# Patient Record
Sex: FEMALE | Race: WHITE | NOT HISPANIC OR LATINO | Employment: OTHER | RURAL
[De-identification: names, ages, dates, MRNs, and addresses within clinical notes are randomized per-mention and may not be internally consistent; named-entity substitution may affect disease eponyms.]

---

## 2020-06-16 ENCOUNTER — HISTORICAL (OUTPATIENT)
Dept: ADMINISTRATIVE | Facility: HOSPITAL | Age: 85
End: 2020-06-16

## 2020-09-07 ENCOUNTER — HISTORICAL (OUTPATIENT)
Dept: ADMINISTRATIVE | Facility: HOSPITAL | Age: 85
End: 2020-09-07

## 2020-09-07 LAB
ALBUMIN SERPL BCP-MCNC: 3.5 G/DL (ref 3.5–5)
ALBUMIN/GLOB SERPL: 0.9 {RATIO}
ALP SERPL-CCNC: 40 U/L (ref 55–142)
ALT SERPL W P-5'-P-CCNC: 11 U/L (ref 13–56)
ANION GAP SERPL CALCULATED.3IONS-SCNC: 16 MMOL/L
APTT PPP: 24.5 SECONDS (ref 25.2–37.3)
AST SERPL W P-5'-P-CCNC: 22 U/L (ref 15–37)
BACTERIA #/AREA URNS HPF: ABNORMAL /HPF
BASOPHILS # BLD AUTO: 0.04 X10E3/UL (ref 0–0.2)
BASOPHILS NFR BLD AUTO: 0.5 % (ref 0–1)
BILIRUB SERPL-MCNC: 0.3 MG/DL (ref 0–1.2)
BILIRUB UR QL STRIP: NEGATIVE MG/DL
BUN SERPL-MCNC: 70 MG/DL (ref 7–18)
BUN/CREAT SERPL: 23.3
CALCIUM SERPL-MCNC: 8.7 MG/DL (ref 8.5–10.1)
CHLORIDE SERPL-SCNC: 107 MMOL/L (ref 98–107)
CLARITY UR: CLEAR
CLARITY UR: CLEAR
CO2 SERPL-SCNC: 21 MMOL/L (ref 21–32)
COLOR UR: ABNORMAL
COLOR UR: ABNORMAL
CREAT SERPL-MCNC: 3 MG/DL (ref 0.55–1.02)
EOSINOPHIL # BLD AUTO: 0.22 X10E3/UL (ref 0–0.5)
EOSINOPHIL NFR BLD AUTO: 2.9 % (ref 1–4)
ERYTHROCYTE [DISTWIDTH] IN BLOOD BY AUTOMATED COUNT: 12.9 % (ref 11.5–14.5)
GLOBULIN SER-MCNC: 3.9 G/DL (ref 2–4)
GLUCOSE SERPL-MCNC: 224 MG/DL (ref 74–106)
GLUCOSE UR STRIP-MCNC: ABNORMAL MG/DL
HCT VFR BLD AUTO: 32.8 % (ref 38–47)
HGB BLD-MCNC: 10.7 G/DL (ref 12–16)
IMM GRANULOCYTES # BLD AUTO: 0.02 X10E3/UL (ref 0–0.04)
IMM GRANULOCYTES NFR BLD: 0.3 % (ref 0–0.4)
INR BLD: 1 (ref 0–3.4)
KETONES UR STRIP-SCNC: NEGATIVE MG/DL
LEUKOCYTE ESTERASE UR QL STRIP: NEGATIVE LEU/UL
LYMPHOCYTES # BLD AUTO: 1.25 X10E3/UL (ref 1–4.8)
LYMPHOCYTES NFR BLD AUTO: 16.2 % (ref 27–41)
MCH RBC QN AUTO: 31.8 PG (ref 27–31)
MCHC RBC AUTO-ENTMCNC: 32.6 G/DL (ref 32–36)
MCV RBC AUTO: 97.3 FL (ref 80–96)
MONOCYTES # BLD AUTO: 0.79 X10E3/UL (ref 0–0.8)
MONOCYTES NFR BLD AUTO: 10.3 % (ref 2–6)
MPC BLD CALC-MCNC: 11.2 FL (ref 9.4–12.4)
NEUTROPHILS # BLD AUTO: 5.38 X10E3/UL (ref 1.8–7.7)
NEUTROPHILS NFR BLD AUTO: 69.8 % (ref 53–65)
NITRITE UR QL STRIP: NEGATIVE
PH UR STRIP: 6 PH UNITS (ref 5–8)
PLATELET # BLD AUTO: 162 X10E3/UL (ref 150–400)
POTASSIUM SERPL-SCNC: 4.4 MMOL/L (ref 3.5–5.1)
PROT SERPL-MCNC: 7.4 G/DL (ref 6.4–8.2)
PROT UR QL STRIP: 100 MG/DL
PROTHROMBIN TIME: 12.9 SECONDS (ref 11.7–14.7)
RBC # BLD AUTO: 3.37 X10E6/UL (ref 4.2–5.4)
RBC # UR STRIP: ABNORMAL ERY/UL
RBC #/AREA URNS HPF: ABNORMAL /HPF (ref 0–3)
SARS-COV+SARS-COV-2 AG RESP QL IA.RAPID: NEGATIVE
SODIUM SERPL-SCNC: 140 MMOL/L (ref 136–145)
SP GR UR STRIP: 1.02 (ref 1–1.03)
SQUAMOUS #/AREA URNS LPF: ABNORMAL /LPF
UROBILINOGEN UR STRIP-ACNC: 0.2 MG/DL
WBC # BLD AUTO: 7.7 X10E3/UL (ref 4.5–11)
WBC #/AREA URNS HPF: ABNORMAL /HPF (ref 0–5)

## 2020-09-08 ENCOUNTER — HISTORICAL (OUTPATIENT)
Dept: ADMINISTRATIVE | Facility: HOSPITAL | Age: 85
End: 2020-09-08

## 2020-09-08 LAB
ABO: NORMAL
ANION GAP SERPL CALCULATED.3IONS-SCNC: 13 MMOL/L
ANTIBODY SCREEN: NORMAL
BUN SERPL-MCNC: 63 MG/DL (ref 7–18)
CALCIUM SERPL-MCNC: 7.9 MG/DL (ref 8.5–10.1)
CHLORIDE SERPL-SCNC: 111 MMOL/L (ref 98–107)
CO2 SERPL-SCNC: 20 MMOL/L (ref 21–32)
CREAT SERPL-MCNC: 2.9 MG/DL (ref 0.55–1.02)
GLUCOSE SERPL-MCNC: 219 MG/DL (ref 74–106)
HCT VFR BLD AUTO: 27.2 % (ref 38–47)
HGB BLD-MCNC: 8.6 G/DL (ref 12–16)
PLATELET # BLD AUTO: 143 X10E3/UL (ref 150–400)
POTASSIUM SERPL-SCNC: 4.6 MMOL/L (ref 3.5–5.1)
RH TYPE: NORMAL
SODIUM SERPL-SCNC: 139 MMOL/L (ref 136–145)

## 2020-09-09 ENCOUNTER — HISTORICAL (OUTPATIENT)
Dept: ADMINISTRATIVE | Facility: HOSPITAL | Age: 85
End: 2020-09-09

## 2020-09-09 LAB
ANION GAP SERPL CALCULATED.3IONS-SCNC: 12 MMOL/L (ref 7–16)
BASOPHILS # BLD AUTO: 0.01 X10E3/UL (ref 0–0.2)
BASOPHILS NFR BLD AUTO: 0.1 % (ref 0–1)
BUN SERPL-MCNC: 58 MG/DL (ref 7–18)
CALCIUM SERPL-MCNC: 7.8 MG/DL (ref 8.5–10.1)
CHLORIDE SERPL-SCNC: 118 MMOL/L (ref 98–107)
CO2 SERPL-SCNC: 18 MMOL/L (ref 21–32)
CREAT SERPL-MCNC: 2.94 MG/DL (ref 0.5–1.02)
EOSINOPHIL # BLD AUTO: 0 X10E3/UL (ref 0–0.5)
EOSINOPHIL NFR BLD AUTO: 0 % (ref 1–4)
ERYTHROCYTE [DISTWIDTH] IN BLOOD BY AUTOMATED COUNT: 13.2 % (ref 11.5–14.5)
EST. AVERAGE GLUCOSE BLD GHB EST-MCNC: 117 MG/DL
FOLATE SERPL-MCNC: >20 NG/ML (ref 3.1–17.5)
GLUCOSE SERPL-MCNC: 196 MG/DL (ref 70–105)
GLUCOSE SERPL-MCNC: 199 MG/DL (ref 70–105)
GLUCOSE SERPL-MCNC: 210 MG/DL (ref 70–105)
GLUCOSE SERPL-MCNC: 222 MG/DL (ref 74–106)
GLUCOSE SERPL-MCNC: 295 MG/DL (ref 70–105)
HBA1C MFR BLD HPLC: 6.1 %
HCT VFR BLD AUTO: 26.7 % (ref 38–47)
HGB BLD-MCNC: 8.4 G/DL (ref 12–16)
IMM GRANULOCYTES # BLD AUTO: 0.02 X10E3/UL (ref 0–0.04)
IMM GRANULOCYTES NFR BLD: 0.2 % (ref 0–0.4)
LYMPHOCYTES # BLD AUTO: 0.31 X10E3/UL (ref 1–4.8)
LYMPHOCYTES NFR BLD AUTO: 3.5 % (ref 27–41)
MCH RBC QN AUTO: 31 PG (ref 27–31)
MCHC RBC AUTO-ENTMCNC: 31.5 G/DL (ref 32–36)
MCV RBC AUTO: 98.5 FL (ref 80–96)
MONOCYTES # BLD AUTO: 0.69 X10E3/UL (ref 0–0.8)
MONOCYTES NFR BLD AUTO: 7.8 % (ref 2–6)
MPC BLD CALC-MCNC: 11.9 FL (ref 9.4–12.4)
NEUTROPHILS # BLD AUTO: 7.81 X10E3/UL (ref 1.8–7.7)
NEUTROPHILS NFR BLD AUTO: 88.4 % (ref 53–65)
NRBC # BLD AUTO: 0 X10E3/UL (ref 0–0)
NRBC, AUTO (.00): 0 /100 (ref 0–0)
PLATELET # BLD AUTO: 139 X10E3/UL (ref 150–400)
POTASSIUM SERPL-SCNC: 4.4 MMOL/L (ref 3.5–5.1)
RBC # BLD AUTO: 2.71 X10E6/UL (ref 4.2–5.4)
SODIUM SERPL-SCNC: 144 MMOL/L (ref 136–145)
T4 SERPL-MCNC: 6.8 UG/DL (ref 4.8–13.9)
TSH SERPL DL<=0.005 MIU/L-ACNC: 0.72 UIU/ML (ref 0.36–3.74)
VIT B12 SERPL-MCNC: 1659 PG/ML (ref 193–986)
WBC # BLD AUTO: 8.84 X10E3/UL (ref 4.5–11)

## 2020-09-10 ENCOUNTER — HISTORICAL (OUTPATIENT)
Dept: ADMINISTRATIVE | Facility: HOSPITAL | Age: 85
End: 2020-09-10

## 2020-09-10 LAB
ABO: NORMAL
ANION GAP SERPL CALCULATED.3IONS-SCNC: 12 MMOL/L
ANTIBODY SCREEN: NORMAL
BASOPHILS # BLD AUTO: 0.02 X10E3/UL (ref 0–0.2)
BASOPHILS NFR BLD AUTO: 0.2 % (ref 0–1)
BUN SERPL-MCNC: 69 MG/DL (ref 7–18)
CALCIUM SERPL-MCNC: 7.9 MG/DL (ref 8.5–10.1)
CHLORIDE SERPL-SCNC: 111 MMOL/L (ref 98–107)
CO2 SERPL-SCNC: 23 MMOL/L (ref 21–32)
CREAT SERPL-MCNC: 2.86 MG/DL (ref 0.55–1.02)
EOSINOPHIL # BLD AUTO: 0.02 X10E3/UL (ref 0–0.5)
EOSINOPHIL NFR BLD AUTO: 0.2 % (ref 1–4)
ERYTHROCYTE [DISTWIDTH] IN BLOOD BY AUTOMATED COUNT: 13.4 % (ref 11.5–14.5)
GLUCOSE SERPL-MCNC: 110 MG/DL (ref 70–105)
GLUCOSE SERPL-MCNC: 114 MG/DL (ref 70–105)
GLUCOSE SERPL-MCNC: 144 MG/DL (ref 70–105)
GLUCOSE SERPL-MCNC: 189 MG/DL (ref 70–105)
GLUCOSE SERPL-MCNC: 64 MG/DL (ref 70–105)
GLUCOSE SERPL-MCNC: 99 MG/DL (ref 74–106)
HCT VFR BLD AUTO: 22 % (ref 38–47)
HGB BLD-MCNC: 6.9 G/DL (ref 12–16)
IMM GRANULOCYTES # BLD AUTO: 0.04 X10E3/UL (ref 0–0.04)
IMM GRANULOCYTES NFR BLD: 0.5 % (ref 0–0.4)
LYMPHOCYTES # BLD AUTO: 1.19 X10E3/UL (ref 1–4.8)
LYMPHOCYTES NFR BLD AUTO: 13.6 % (ref 27–41)
MCH RBC QN AUTO: 31.4 PG (ref 27–31)
MCHC RBC AUTO-ENTMCNC: 31.4 G/DL (ref 32–36)
MCV RBC AUTO: 100 FL (ref 80–96)
MONOCYTES # BLD AUTO: 0.98 X10E3/UL (ref 0–0.8)
MONOCYTES NFR BLD AUTO: 11.2 % (ref 2–6)
MPC BLD CALC-MCNC: 10.4 FL (ref 9.4–12.4)
NEUTROPHILS # BLD AUTO: 6.49 X10E3/UL (ref 1.8–7.7)
NEUTROPHILS NFR BLD AUTO: 74.3 % (ref 53–65)
NRBC # BLD AUTO: 0 X10E3/UL (ref 0–0)
NRBC, AUTO (.00): 0 /100 (ref 0–0)
PLATELET # BLD AUTO: 119 X10E3/UL (ref 150–400)
POTASSIUM SERPL-SCNC: 4.4 MMOL/L (ref 3.5–5.1)
RBC # BLD AUTO: 2.2 X10E6/UL (ref 4.2–5.4)
RH TYPE: NORMAL
SODIUM SERPL-SCNC: 142 MMOL/L (ref 136–145)
UNIT NUMBER: NORMAL
UNIT NUMBER: NORMAL
UNIT STATUS: NORMAL
UNIT STATUS: NORMAL
WBC # BLD AUTO: 8.74 X10E3/UL (ref 4.5–11)

## 2020-09-11 ENCOUNTER — HISTORICAL (OUTPATIENT)
Dept: ADMINISTRATIVE | Facility: HOSPITAL | Age: 85
End: 2020-09-11

## 2020-09-11 LAB
ANION GAP SERPL CALCULATED.3IONS-SCNC: 11 MMOL/L
ANION GAP SERPL CALCULATED.3IONS-SCNC: 12 MMOL/L
BACTERIA #/AREA URNS HPF: ABNORMAL /HPF
BASOPHILS # BLD AUTO: 0.02 X10E3/UL (ref 0–0.2)
BASOPHILS # BLD AUTO: 0.04 X10E3/UL (ref 0–0.2)
BASOPHILS NFR BLD AUTO: 0.3 % (ref 0–1)
BASOPHILS NFR BLD AUTO: 0.5 % (ref 0–1)
BILIRUB UR QL STRIP: NEGATIVE MG/DL
BUN SERPL-MCNC: 72 MG/DL (ref 7–18)
BUN SERPL-MCNC: 74 MG/DL (ref 7–18)
CALCIUM SERPL-MCNC: 8.2 MG/DL (ref 8.5–10.1)
CALCIUM SERPL-MCNC: 8.6 MG/DL (ref 8.5–10.1)
CHLORIDE SERPL-SCNC: 108 MMOL/L (ref 98–107)
CHLORIDE SERPL-SCNC: 109 MMOL/L (ref 98–107)
CLARITY UR: CLEAR
CLARITY UR: CLEAR
CO2 SERPL-SCNC: 25 MMOL/L (ref 21–32)
CO2 SERPL-SCNC: 26 MMOL/L (ref 21–32)
COLOR UR: YELLOW
COLOR UR: YELLOW
CREAT SERPL-MCNC: 2.73 MG/DL (ref 0.55–1.02)
CREAT SERPL-MCNC: 2.94 MG/DL (ref 0.55–1.02)
EOSINOPHIL # BLD AUTO: 0.11 X10E3/UL (ref 0–0.5)
EOSINOPHIL # BLD AUTO: 0.17 X10E3/UL (ref 0–0.5)
EOSINOPHIL NFR BLD AUTO: 1.5 % (ref 1–4)
EOSINOPHIL NFR BLD AUTO: 2.4 % (ref 1–4)
ERYTHROCYTE [DISTWIDTH] IN BLOOD BY AUTOMATED COUNT: 16.7 % (ref 11.5–14.5)
ERYTHROCYTE [DISTWIDTH] IN BLOOD BY AUTOMATED COUNT: 16.8 % (ref 11.5–14.5)
GLUCOSE SERPL-MCNC: 179 MG/DL (ref 74–106)
GLUCOSE SERPL-MCNC: 91 MG/DL (ref 70–105)
GLUCOSE SERPL-MCNC: 93 MG/DL (ref 74–106)
GLUCOSE UR STRIP-MCNC: ABNORMAL MG/DL
HCT VFR BLD AUTO: 27.5 % (ref 38–47)
HCT VFR BLD AUTO: 29.3 % (ref 38–47)
HGB BLD-MCNC: 8.8 G/DL (ref 12–16)
HGB BLD-MCNC: 9.8 G/DL (ref 12–16)
IMM GRANULOCYTES # BLD AUTO: 0.02 X10E3/UL (ref 0–0.04)
IMM GRANULOCYTES # BLD AUTO: 0.06 X10E3/UL (ref 0–0.04)
IMM GRANULOCYTES NFR BLD: 0.3 % (ref 0–0.4)
IMM GRANULOCYTES NFR BLD: 0.8 % (ref 0–0.4)
KETONES UR STRIP-SCNC: NEGATIVE MG/DL
LEUKOCYTE ESTERASE UR QL STRIP: NEGATIVE LEU/UL
LYMPHOCYTES # BLD AUTO: 0.84 X10E3/UL (ref 1–4.8)
LYMPHOCYTES # BLD AUTO: 1.18 X10E3/UL (ref 1–4.8)
LYMPHOCYTES NFR BLD AUTO: 12 % (ref 27–41)
LYMPHOCYTES NFR BLD AUTO: 16.1 % (ref 27–41)
MCH RBC QN AUTO: 30.4 PG (ref 27–31)
MCH RBC QN AUTO: 31 PG (ref 27–31)
MCHC RBC AUTO-ENTMCNC: 32 G/DL (ref 32–36)
MCHC RBC AUTO-ENTMCNC: 33.4 G/DL (ref 32–36)
MCV RBC AUTO: 92.7 FL (ref 80–96)
MCV RBC AUTO: 95.2 FL (ref 80–96)
MONOCYTES # BLD AUTO: 0.9 X10E3/UL (ref 0–0.8)
MONOCYTES # BLD AUTO: 0.97 X10E3/UL (ref 0–0.8)
MONOCYTES NFR BLD AUTO: 12.8 % (ref 2–6)
MONOCYTES NFR BLD AUTO: 13.3 % (ref 2–6)
MPC BLD CALC-MCNC: 10.5 FL (ref 9.4–12.4)
MPC BLD CALC-MCNC: 11.2 FL (ref 9.4–12.4)
NEUTROPHILS # BLD AUTO: 4.95 X10E3/UL (ref 1.8–7.7)
NEUTROPHILS # BLD AUTO: 5.06 X10E3/UL (ref 1.8–7.7)
NEUTROPHILS NFR BLD AUTO: 67.8 % (ref 53–65)
NEUTROPHILS NFR BLD AUTO: 72.2 % (ref 53–65)
NITRITE UR QL STRIP: NEGATIVE
NRBC # BLD AUTO: 0 X10E3/UL (ref 0–0)
NRBC, AUTO (.00): 0 /100 (ref 0–0)
PH UR STRIP: 5.5 PH UNITS (ref 5–8)
PLATELET # BLD AUTO: 130 X10E3/UL (ref 150–400)
PLATELET # BLD AUTO: 145 X10E3/UL (ref 150–400)
POTASSIUM SERPL-SCNC: 4.2 MMOL/L (ref 3.5–5.1)
POTASSIUM SERPL-SCNC: 4.4 MMOL/L (ref 3.5–5.1)
PROT UR QL STRIP: 100 MG/DL
RBC # BLD AUTO: 2.89 X10E6/UL (ref 4.2–5.4)
RBC # BLD AUTO: 3.16 X10E6/UL (ref 4.2–5.4)
RBC # UR STRIP: ABNORMAL ERY/UL
RBC #/AREA URNS HPF: ABNORMAL /HPF (ref 0–3)
SODIUM SERPL-SCNC: 141 MMOL/L (ref 136–145)
SODIUM SERPL-SCNC: 142 MMOL/L (ref 136–145)
SP GR UR STRIP: 1.02 (ref 1–1.03)
SQUAMOUS #/AREA URNS LPF: ABNORMAL /LPF
UROBILINOGEN UR STRIP-ACNC: 0.2 MG/DL
WBC # BLD AUTO: 7.01 X10E3/UL (ref 4.5–11)
WBC # BLD AUTO: 7.31 X10E3/UL (ref 4.5–11)
WBC #/AREA URNS HPF: ABNORMAL /HPF (ref 0–5)

## 2020-09-12 ENCOUNTER — HISTORICAL (OUTPATIENT)
Dept: ADMINISTRATIVE | Facility: HOSPITAL | Age: 85
End: 2020-09-12

## 2020-09-12 LAB
GLUCOSE SERPL-MCNC: 130 MG/DL (ref 70–105)
GLUCOSE SERPL-MCNC: 140 MG/DL (ref 70–105)
GLUCOSE SERPL-MCNC: 179 MG/DL (ref 70–105)
GLUCOSE SERPL-MCNC: 55 MG/DL (ref 70–105)

## 2020-09-13 ENCOUNTER — HISTORICAL (OUTPATIENT)
Dept: ADMINISTRATIVE | Facility: HOSPITAL | Age: 85
End: 2020-09-13

## 2020-09-13 LAB
GLUCOSE SERPL-MCNC: 164 MG/DL (ref 70–105)
GLUCOSE SERPL-MCNC: 286 MG/DL (ref 70–105)
GLUCOSE SERPL-MCNC: 63 MG/DL (ref 70–105)

## 2020-09-14 ENCOUNTER — HISTORICAL (OUTPATIENT)
Dept: ADMINISTRATIVE | Facility: HOSPITAL | Age: 85
End: 2020-09-14

## 2020-09-14 LAB
GLUCOSE SERPL-MCNC: 101 MG/DL (ref 70–105)
GLUCOSE SERPL-MCNC: 201 MG/DL (ref 70–105)

## 2020-09-15 ENCOUNTER — HISTORICAL (OUTPATIENT)
Dept: ADMINISTRATIVE | Facility: HOSPITAL | Age: 85
End: 2020-09-15

## 2020-09-15 LAB
GLUCOSE SERPL-MCNC: 140 MG/DL (ref 70–105)
GLUCOSE SERPL-MCNC: 93 MG/DL (ref 70–105)

## 2020-09-16 ENCOUNTER — HISTORICAL (OUTPATIENT)
Dept: ADMINISTRATIVE | Facility: HOSPITAL | Age: 85
End: 2020-09-16

## 2020-09-16 LAB
ANION GAP SERPL CALCULATED.3IONS-SCNC: 12 MMOL/L
BUN SERPL-MCNC: 74 MG/DL (ref 7–18)
CALCIUM SERPL-MCNC: 8.8 MG/DL (ref 8.5–10.1)
CHLORIDE SERPL-SCNC: 107 MMOL/L (ref 98–107)
CO2 SERPL-SCNC: 27 MMOL/L (ref 21–32)
CREAT SERPL-MCNC: 2.5 MG/DL (ref 0.55–1.02)
GLUCOSE SERPL-MCNC: 122 MG/DL (ref 70–105)
GLUCOSE SERPL-MCNC: 127 MG/DL (ref 74–106)
GLUCOSE SERPL-MCNC: 95 MG/DL (ref 70–105)
POTASSIUM SERPL-SCNC: 5 MMOL/L (ref 3.5–5.1)
SODIUM SERPL-SCNC: 141 MMOL/L (ref 136–145)

## 2020-09-17 ENCOUNTER — HISTORICAL (OUTPATIENT)
Dept: ADMINISTRATIVE | Facility: HOSPITAL | Age: 85
End: 2020-09-17

## 2020-09-17 LAB
GLUCOSE SERPL-MCNC: 174 MG/DL (ref 70–105)
GLUCOSE SERPL-MCNC: 78 MG/DL (ref 70–105)

## 2020-09-18 ENCOUNTER — HISTORICAL (OUTPATIENT)
Dept: ADMINISTRATIVE | Facility: HOSPITAL | Age: 85
End: 2020-09-18

## 2020-09-18 LAB
ANION GAP SERPL CALCULATED.3IONS-SCNC: 9 MMOL/L
BASOPHILS # BLD AUTO: 0.05 X10E3/UL (ref 0–0.2)
BASOPHILS NFR BLD AUTO: 0.8 % (ref 0–1)
BUN SERPL-MCNC: 71 MG/DL (ref 7–18)
CALCIUM SERPL-MCNC: 8.3 MG/DL (ref 8.5–10.1)
CHLORIDE SERPL-SCNC: 110 MMOL/L (ref 98–107)
CO2 SERPL-SCNC: 25 MMOL/L (ref 21–32)
CREAT SERPL-MCNC: 2.64 MG/DL (ref 0.55–1.02)
EOSINOPHIL # BLD AUTO: 0.34 X10E3/UL (ref 0–0.5)
EOSINOPHIL NFR BLD AUTO: 5.2 % (ref 1–4)
ERYTHROCYTE [DISTWIDTH] IN BLOOD BY AUTOMATED COUNT: 14.9 % (ref 11.5–14.5)
GLUCOSE SERPL-MCNC: 106 MG/DL (ref 74–106)
GLUCOSE SERPL-MCNC: 113 MG/DL (ref 70–105)
GLUCOSE SERPL-MCNC: 161 MG/DL (ref 70–105)
HCT VFR BLD AUTO: 26.7 % (ref 38–47)
HGB BLD-MCNC: 8.3 G/DL (ref 12–16)
IMM GRANULOCYTES # BLD AUTO: 0.01 X10E3/UL (ref 0–0.04)
IMM GRANULOCYTES NFR BLD: 0.2 % (ref 0–0.4)
LYMPHOCYTES # BLD AUTO: 1.61 X10E3/UL (ref 1–4.8)
LYMPHOCYTES NFR BLD AUTO: 24.7 % (ref 27–41)
MCH RBC QN AUTO: 30.1 PG (ref 27–31)
MCHC RBC AUTO-ENTMCNC: 31.1 G/DL (ref 32–36)
MCV RBC AUTO: 96.7 FL (ref 80–96)
MONOCYTES # BLD AUTO: 0.97 X10E3/UL (ref 0–0.8)
MONOCYTES NFR BLD AUTO: 14.9 % (ref 2–6)
MPC BLD CALC-MCNC: 10.5 FL (ref 9.4–12.4)
NEUTROPHILS # BLD AUTO: 3.54 X10E3/UL (ref 1.8–7.7)
NEUTROPHILS NFR BLD AUTO: 54.2 % (ref 53–65)
PLATELET # BLD AUTO: 222 X10E3/UL (ref 150–400)
POTASSIUM SERPL-SCNC: 4.3 MMOL/L (ref 3.5–5.1)
RBC # BLD AUTO: 2.76 X10E6/UL (ref 4.2–5.4)
SODIUM SERPL-SCNC: 140 MMOL/L (ref 136–145)
WBC # BLD AUTO: 6.52 X10E3/UL (ref 4.5–11)

## 2020-09-19 ENCOUNTER — HISTORICAL (OUTPATIENT)
Dept: ADMINISTRATIVE | Facility: HOSPITAL | Age: 85
End: 2020-09-19

## 2020-09-19 LAB — GLUCOSE SERPL-MCNC: 119 MG/DL (ref 70–105)

## 2020-09-20 ENCOUNTER — HISTORICAL (OUTPATIENT)
Dept: ADMINISTRATIVE | Facility: HOSPITAL | Age: 85
End: 2020-09-20

## 2020-09-20 LAB
GLUCOSE SERPL-MCNC: 121 MG/DL (ref 70–105)
GLUCOSE SERPL-MCNC: 99 MG/DL (ref 70–105)

## 2020-09-21 ENCOUNTER — HISTORICAL (OUTPATIENT)
Dept: ADMINISTRATIVE | Facility: HOSPITAL | Age: 85
End: 2020-09-21

## 2020-09-21 LAB
GLUCOSE SERPL-MCNC: 119 MG/DL (ref 70–105)
GLUCOSE SERPL-MCNC: 76 MG/DL (ref 70–105)

## 2020-09-22 ENCOUNTER — HISTORICAL (OUTPATIENT)
Dept: ADMINISTRATIVE | Facility: HOSPITAL | Age: 85
End: 2020-09-22

## 2020-09-22 LAB
GLUCOSE SERPL-MCNC: 74 MG/DL (ref 70–105)
GLUCOSE SERPL-MCNC: 96 MG/DL (ref 70–105)

## 2020-09-23 ENCOUNTER — HISTORICAL (OUTPATIENT)
Dept: ADMINISTRATIVE | Facility: HOSPITAL | Age: 85
End: 2020-09-23

## 2020-09-23 LAB — GLUCOSE SERPL-MCNC: 85 MG/DL (ref 70–105)

## 2020-11-05 ENCOUNTER — HISTORICAL (OUTPATIENT)
Dept: ADMINISTRATIVE | Facility: HOSPITAL | Age: 85
End: 2020-11-05

## 2020-11-05 LAB
ALBUMIN SERPL BCP-MCNC: 3.5 G/DL (ref 3.5–5)
ALBUMIN/GLOB SERPL: 0.9 {RATIO}
ALP SERPL-CCNC: 62 U/L (ref 55–142)
ALT SERPL W P-5'-P-CCNC: 18 U/L (ref 13–56)
ANION GAP SERPL CALCULATED.3IONS-SCNC: 13 MMOL/L
APTT PPP: 26.2 SECONDS (ref 25.2–37.3)
AST SERPL W P-5'-P-CCNC: 23 U/L (ref 15–37)
BASOPHILS # BLD AUTO: 0.05 X10E3/UL (ref 0–0.2)
BASOPHILS NFR BLD AUTO: 0.9 % (ref 0–1)
BILIRUB SERPL-MCNC: 0.3 MG/DL (ref 0–1.2)
BUN SERPL-MCNC: 75 MG/DL (ref 7–18)
BUN/CREAT SERPL: 22.8
CALCIUM SERPL-MCNC: 9.1 MG/DL (ref 8.5–10.1)
CHLORIDE SERPL-SCNC: 106 MMOL/L (ref 98–107)
CO2 SERPL-SCNC: 26 MMOL/L (ref 21–32)
CREAT SERPL-MCNC: 3.29 MG/DL (ref 0.55–1.02)
EOSINOPHIL # BLD AUTO: 0.42 X10E3/UL (ref 0–0.5)
EOSINOPHIL NFR BLD AUTO: 7.2 % (ref 1–4)
ERYTHROCYTE [DISTWIDTH] IN BLOOD BY AUTOMATED COUNT: 13.8 % (ref 11.5–14.5)
GLOBULIN SER-MCNC: 4 G/DL (ref 2–4)
GLUCOSE SERPL-MCNC: 91 MG/DL (ref 74–106)
GLUCOSE SERPL-MCNC: 92 MG/DL (ref 70–105)
HCT VFR BLD AUTO: 34 % (ref 38–47)
HCT VFR BLD AUTO: 35.6 % (ref 38–47)
HGB BLD-MCNC: 10.7 G/DL (ref 12–16)
HGB BLD-MCNC: 11.3 G/DL (ref 12–16)
IMM GRANULOCYTES # BLD AUTO: 0.01 X10E3/UL (ref 0–0.04)
IMM GRANULOCYTES NFR BLD: 0.2 % (ref 0–0.4)
INR BLD: 1 (ref 0–3.4)
LYMPHOCYTES # BLD AUTO: 2.28 X10E3/UL (ref 1–4.8)
LYMPHOCYTES NFR BLD AUTO: 39.3 % (ref 27–41)
MAGNESIUM SERPL-MCNC: 2.5 MG/DL (ref 1.7–2.3)
MCH RBC QN AUTO: 31.2 PG (ref 27–31)
MCHC RBC AUTO-ENTMCNC: 31.7 G/DL (ref 32–36)
MCV RBC AUTO: 98.3 FL (ref 80–96)
MONOCYTES # BLD AUTO: 0.6 X10E3/UL (ref 0–0.8)
MONOCYTES NFR BLD AUTO: 10.3 % (ref 2–6)
MPC BLD CALC-MCNC: 10.7 FL (ref 9.4–12.4)
NEUTROPHILS # BLD AUTO: 2.44 X10E3/UL (ref 1.8–7.7)
NEUTROPHILS NFR BLD AUTO: 42.1 % (ref 53–65)
NT-PROBNP SERPL-MCNC: 975 PG/ML (ref 1–450)
PLATELET # BLD AUTO: 168 X10E3/UL (ref 150–400)
PLATELET # BLD AUTO: 172 X10E3/UL (ref 150–400)
POTASSIUM SERPL-SCNC: 4.4 MMOL/L (ref 3.5–5.1)
PROT SERPL-MCNC: 7.5 G/DL (ref 6.4–8.2)
PROTHROMBIN TIME: 13.1 SECONDS (ref 11.7–14.7)
RBC # BLD AUTO: 3.62 X10E6/UL (ref 4.2–5.4)
REPORT: NORMAL
SARS-COV+SARS-COV-2 AG RESP QL IA.RAPID: NEGATIVE
SODIUM SERPL-SCNC: 141 MMOL/L (ref 136–145)
WBC # BLD AUTO: 5.8 X10E3/UL (ref 4.5–11)

## 2020-11-06 ENCOUNTER — HISTORICAL (OUTPATIENT)
Dept: ADMINISTRATIVE | Facility: HOSPITAL | Age: 85
End: 2020-11-06

## 2020-11-06 LAB
ANION GAP SERPL CALCULATED.3IONS-SCNC: 17 MMOL/L
BASOPHILS # BLD AUTO: 0.06 X10E3/UL (ref 0–0.2)
BASOPHILS NFR BLD AUTO: 1 % (ref 0–1)
BUN SERPL-MCNC: 74 MG/DL (ref 7–18)
CALCIUM SERPL-MCNC: 8.6 MG/DL (ref 8.5–10.1)
CHLORIDE SERPL-SCNC: 110 MMOL/L (ref 98–107)
CO2 SERPL-SCNC: 22 MMOL/L (ref 21–32)
CREAT SERPL-MCNC: 3.11 MG/DL (ref 0.55–1.02)
EOSINOPHIL # BLD AUTO: 0.15 X10E3/UL (ref 0–0.5)
EOSINOPHIL NFR BLD AUTO: 2.4 % (ref 1–4)
ERYTHROCYTE [DISTWIDTH] IN BLOOD BY AUTOMATED COUNT: 13.4 % (ref 11.5–14.5)
GLUCOSE SERPL-MCNC: 102 MG/DL (ref 74–106)
HCT VFR BLD AUTO: 30.8 % (ref 38–47)
HCT VFR BLD AUTO: 31.8 % (ref 38–47)
HCT VFR BLD AUTO: 32.8 % (ref 38–47)
HGB BLD-MCNC: 10 G/DL (ref 12–16)
HGB BLD-MCNC: 10.3 G/DL (ref 12–16)
HGB BLD-MCNC: 9.6 G/DL (ref 12–16)
IMM GRANULOCYTES # BLD AUTO: 0.02 X10E3/UL (ref 0–0.04)
IMM GRANULOCYTES NFR BLD: 0.3 % (ref 0–0.4)
LYMPHOCYTES # BLD AUTO: 0.84 X10E3/UL (ref 1–4.8)
LYMPHOCYTES NFR BLD AUTO: 13.4 % (ref 27–41)
MCH RBC QN AUTO: 31.3 PG (ref 27–31)
MCHC RBC AUTO-ENTMCNC: 31.4 G/DL (ref 32–36)
MCV RBC AUTO: 99.7 FL (ref 80–96)
MONOCYTES # BLD AUTO: 0.68 X10E3/UL (ref 0–0.8)
MONOCYTES NFR BLD AUTO: 10.9 % (ref 2–6)
MPC BLD CALC-MCNC: 11 FL (ref 9.4–12.4)
NEUTROPHILS # BLD AUTO: 4.5 X10E3/UL (ref 1.8–7.7)
NEUTROPHILS NFR BLD AUTO: 72 % (ref 53–65)
NRBC # BLD AUTO: 0 X10E3/UL (ref 0–0)
NRBC, AUTO (.00): 0 /100 (ref 0–0)
PLATELET # BLD AUTO: 152 X10E3/UL (ref 150–400)
PLATELET # BLD AUTO: 154 X10E3/UL (ref 150–400)
PLATELET # BLD AUTO: 164 X10E3/UL (ref 150–400)
POTASSIUM SERPL-SCNC: 4.4 MMOL/L (ref 3.5–5.1)
RBC # BLD AUTO: 3.19 X10E6/UL (ref 4.2–5.4)
REPORT: NORMAL
SODIUM SERPL-SCNC: 145 MMOL/L (ref 136–145)
T4 FREE SERPL-MCNC: 1.05 NG/DL (ref 0.76–1.46)
TSH SERPL DL<=0.005 MIU/L-ACNC: 0.93 UIU/ML (ref 0.36–3.74)
WBC # BLD AUTO: 6.25 X10E3/UL (ref 4.5–11)

## 2020-11-08 LAB
REPORT: 38
REPORT: NORMAL

## 2020-11-09 LAB
INSULIN SERPL-ACNC: NORMAL U[IU]/ML
LAB AP CLINICAL INFORMATION: NORMAL
LAB AP CLINICAL INFORMATION: NORMAL
LAB AP COMMENTS: NORMAL
LAB AP COMMENTS: NORMAL
LAB AP DIAGNOSIS - HISTORICAL: NORMAL
LAB AP DIAGNOSIS - HISTORICAL: NORMAL
LAB AP GENERAL CAT - HISTORICAL: NEGATIVE
LAB AP GROSS PATHOLOGY - HISTORICAL: NORMAL
LAB AP PREPARATIONS - HISTORICAL: NORMAL
LAB AP SPECIMEN SUBMITTED - HISTORICAL: NORMAL
LAB AP SPECIMEN SUBMITTED - HISTORICAL: NORMAL

## 2020-11-11 LAB
REPORT: NORMAL

## 2020-12-13 LAB
REPORT: NORMAL

## 2020-12-27 LAB
REPORT: NORMAL

## 2021-01-01 ENCOUNTER — TELEPHONE (OUTPATIENT)
Dept: PAIN MEDICINE | Facility: CLINIC | Age: 86
End: 2021-01-01

## 2021-01-01 ENCOUNTER — OFFICE VISIT (OUTPATIENT)
Dept: PAIN MEDICINE | Facility: CLINIC | Age: 86
End: 2021-01-01
Payer: COMMERCIAL

## 2021-01-01 ENCOUNTER — CLINICAL SUPPORT (OUTPATIENT)
Dept: PRIMARY CARE CLINIC | Facility: CLINIC | Age: 86
End: 2021-01-01
Payer: COMMERCIAL

## 2021-01-01 VITALS
RESPIRATION RATE: 18 BRPM | SYSTOLIC BLOOD PRESSURE: 160 MMHG | DIASTOLIC BLOOD PRESSURE: 64 MMHG | HEIGHT: 67 IN | HEART RATE: 52 BPM | BODY MASS INDEX: 26.84 KG/M2 | WEIGHT: 171 LBS

## 2021-01-01 DIAGNOSIS — M54.17 LUMBOSACRAL RADICULOPATHY: Primary | Chronic | ICD-10-CM

## 2021-01-01 DIAGNOSIS — G62.9 NEUROPATHY: Chronic | ICD-10-CM

## 2021-01-01 DIAGNOSIS — M89.49 OSTEOARTHROSIS MULTIPLE SITES, NOT SPECIFIED AS GENERALIZED: Chronic | ICD-10-CM

## 2021-01-01 DIAGNOSIS — G89.4 CHRONIC PAIN SYNDROME: Chronic | ICD-10-CM

## 2021-01-01 DIAGNOSIS — Z79.899 ENCOUNTER FOR LONG-TERM (CURRENT) USE OF OTHER MEDICATIONS: ICD-10-CM

## 2021-01-01 DIAGNOSIS — E11.9 TYPE 2 DIABETES MELLITUS WITHOUT COMPLICATION, WITHOUT LONG-TERM CURRENT USE OF INSULIN: ICD-10-CM

## 2021-01-01 DIAGNOSIS — E78.5 HYPERLIPIDEMIA, UNSPECIFIED HYPERLIPIDEMIA TYPE: ICD-10-CM

## 2021-01-01 LAB
6-ACETYLMORPHINE, URINE (RUSH): NEGATIVE 10 NG/ML
7-AMINOCLONAZEPAM, URINE (RUSH): NEGATIVE 25 NG/ML
A-HYDROXYALPRAZOLAM, URINE (RUSH): NEGATIVE 25 NG/ML
ACETYL FENTANYL, URINE (RUSH): NEGATIVE 2.5 NG/ML
ACETYL NORFENTANYL OXALATE, URINE (RUSH): NEGATIVE 5 NG/ML
AMPHET UR QL SCN: NEGATIVE 100 NG/ML
BENZOYLECGONINE, URINE (RUSH): NEGATIVE 100 NG/ML
BUPRENORPHINE UR QL SCN: NEGATIVE 25 NG/ML
CODEINE, URINE (RUSH): NEGATIVE 25 NG/ML
CREAT UR-MCNC: 129 MG/DL (ref 28–219)
CTP QC/QA: YES
EDDP, URINE (RUSH): NEGATIVE 25 NG/ML
FENTANYL, URINE (RUSH): NEGATIVE 2.5 NG/ML
HYDROCODONE, URINE (RUSH): >250 25 NG/ML
HYDROMORPHONE, URINE (RUSH): >250 25 NG/ML
LORAZEPAM, URINE (RUSH): NEGATIVE 25 NG/ML
METHADONE UR QL SCN: NEGATIVE 25 NG/ML
METHAMPHET UR QL SCN: NEGATIVE 100 NG/ML
MORPHINE, URINE (RUSH): NEGATIVE 25 NG/ML
NORBUPRENORPHINE, URINE (RUSH): NEGATIVE 25 NG/ML
NORDIAZEPAM, URINE (RUSH): NEGATIVE 25 NG/ML
NORFENTANYL OXALATE, URINE (RUSH): NEGATIVE 5 NG/ML
NORHYDROCODONE, URINE (RUSH): >500 50 NG/ML
NOROXYCODONE HCL, URINE (RUSH): NEGATIVE 50 NG/ML
OXAZEPAM, URINE (RUSH): NEGATIVE 25 NG/ML
OXYCODONE UR QL SCN: NEGATIVE 25 NG/ML
OXYMORPHONE, URINE (RUSH): NEGATIVE 25 NG/ML
PH UR STRIP: 5.5 PH UNITS
POC (AMP) AMPHETAMINE: NEGATIVE
POC (BAR) BARBITURATES: NEGATIVE
POC (BUP) BUPRENORPHINE: NEGATIVE
POC (BZO) BENZODIAZEPINES: ABNORMAL
POC (COC) COCAINE: NEGATIVE
POC (MDMA) METHYLENEDIOXYMETHAMPHETAMINE 3,4: NEGATIVE
POC (MET) METHAMPHETAMINE: NEGATIVE
POC (MOP) OPIATES: ABNORMAL
POC (MTD) METHADONE: NEGATIVE
POC (OXY) OXYCODONE: NEGATIVE
POC (PCP) PHENCYCLIDINE: NEGATIVE
POC (TCA) TRICYCLIC ANTIDEPRESSANTS: NEGATIVE
POC TEMPERATURE (URINE): 90
POC THC: NEGATIVE
SP GR UR STRIP: >=1.03
TAPENTADOL, URINE (RUSH): NEGATIVE 25 NG/ML
TEMAZEPAM, URINE (RUSH): 25.4 25 NG/ML
THC-COOH, URINE (RUSH): NEGATIVE 25 NG/ML
TRAMADOL, URINE (RUSH): NEGATIVE 100 NG/ML

## 2021-01-01 PROCEDURE — 99214 OFFICE O/P EST MOD 30 MIN: CPT | Mod: S$PBB,,, | Performed by: PHYSICIAN ASSISTANT

## 2021-01-01 PROCEDURE — 99214 PR OFFICE/OUTPT VISIT, EST, LEVL IV, 30-39 MIN: ICD-10-PCS | Mod: S$PBB,,, | Performed by: PHYSICIAN ASSISTANT

## 2021-01-01 PROCEDURE — 99214 OFFICE O/P EST MOD 30 MIN: CPT | Mod: PBBFAC | Performed by: PHYSICIAN ASSISTANT

## 2021-01-01 PROCEDURE — G0481 PR DRUG TEST DEF 8-14 CLASSES: ICD-10-PCS | Mod: ,,, | Performed by: CLINICAL MEDICAL LABORATORY

## 2021-01-01 PROCEDURE — 80305 DRUG TEST PRSMV DIR OPT OBS: CPT | Mod: PBBFAC | Performed by: PHYSICIAN ASSISTANT

## 2021-01-01 PROCEDURE — G0481 DRUG TEST DEF 8-14 CLASSES: HCPCS | Mod: ,,, | Performed by: CLINICAL MEDICAL LABORATORY

## 2021-01-01 RX ORDER — GABAPENTIN 300 MG/1
300 CAPSULE ORAL EVERY 8 HOURS
Qty: 90 CAPSULE | Refills: 2 | Status: SHIPPED | OUTPATIENT
Start: 2021-01-01 | End: 2022-01-01 | Stop reason: SDUPTHER

## 2021-01-01 RX ORDER — HYDROCODONE BITARTRATE AND ACETAMINOPHEN 10; 325 MG/1; MG/1
1 TABLET ORAL EVERY 12 HOURS
Qty: 60 TABLET | Refills: 0 | Status: SHIPPED | OUTPATIENT
Start: 2021-01-01 | End: 2021-01-01

## 2021-01-01 RX ORDER — SIMVASTATIN 20 MG/1
20 TABLET, FILM COATED ORAL NIGHTLY
Qty: 90 TABLET | Refills: 3 | Status: SHIPPED | OUTPATIENT
Start: 2021-01-01 | End: 2022-01-01 | Stop reason: SDUPTHER

## 2021-01-01 RX ORDER — HYDROCODONE BITARTRATE AND ACETAMINOPHEN 10; 325 MG/1; MG/1
1 TABLET ORAL EVERY 12 HOURS
Qty: 60 TABLET | Refills: 0 | Status: SHIPPED | OUTPATIENT
Start: 2021-01-01 | End: 2022-01-01 | Stop reason: SDUPTHER

## 2021-01-01 RX ORDER — GLIPIZIDE 5 MG/1
5 TABLET, FILM COATED, EXTENDED RELEASE ORAL
Qty: 30 TABLET | Refills: 3 | Status: SHIPPED | OUTPATIENT
Start: 2021-01-01 | End: 2022-01-01 | Stop reason: SDUPTHER

## 2021-02-17 ENCOUNTER — HISTORICAL (OUTPATIENT)
Dept: ADMINISTRATIVE | Facility: HOSPITAL | Age: 86
End: 2021-02-17

## 2021-04-25 RX ORDER — ALLOPURINOL 100 MG/1
100 TABLET ORAL DAILY
COMMUNITY
End: 2021-07-19 | Stop reason: SDUPTHER

## 2021-04-25 RX ORDER — HYDROCODONE BITARTRATE AND ACETAMINOPHEN 10; 325 MG/1; MG/1
1 TABLET ORAL EVERY 12 HOURS
COMMUNITY
End: 2021-05-19 | Stop reason: SDUPTHER

## 2021-04-25 RX ORDER — GLIPIZIDE 5 MG/1
5 TABLET, FILM COATED, EXTENDED RELEASE ORAL
COMMUNITY
End: 2021-05-21 | Stop reason: SDUPTHER

## 2021-04-25 RX ORDER — SIMVASTATIN 20 MG/1
20 TABLET, FILM COATED ORAL NIGHTLY
COMMUNITY
End: 2021-07-19 | Stop reason: SDUPTHER

## 2021-04-25 RX ORDER — GABAPENTIN 300 MG/1
300 CAPSULE ORAL EVERY 8 HOURS
COMMUNITY
End: 2021-04-26 | Stop reason: SDUPTHER

## 2021-04-25 RX ORDER — METOPROLOL TARTRATE 25 MG/1
25 TABLET, FILM COATED ORAL 2 TIMES DAILY
COMMUNITY
End: 2021-07-19 | Stop reason: SDUPTHER

## 2021-04-25 RX ORDER — MULTIVITAMIN
1 TABLET ORAL DAILY
COMMUNITY
End: 2022-01-01 | Stop reason: SDUPTHER

## 2021-04-25 RX ORDER — UBIDECARENONE 75 MG
500 CAPSULE ORAL DAILY
COMMUNITY
End: 2022-01-01 | Stop reason: SDUPTHER

## 2021-04-25 RX ORDER — AMLODIPINE BESYLATE 5 MG/1
5 TABLET ORAL DAILY
COMMUNITY
End: 2021-07-19 | Stop reason: SDUPTHER

## 2021-04-26 ENCOUNTER — OFFICE VISIT (OUTPATIENT)
Dept: PAIN MEDICINE | Facility: CLINIC | Age: 86
End: 2021-04-26
Payer: COMMERCIAL

## 2021-04-26 VITALS
BODY MASS INDEX: 25.11 KG/M2 | RESPIRATION RATE: 17 BRPM | HEIGHT: 67 IN | WEIGHT: 160 LBS | SYSTOLIC BLOOD PRESSURE: 129 MMHG | HEART RATE: 56 BPM | DIASTOLIC BLOOD PRESSURE: 78 MMHG

## 2021-04-26 DIAGNOSIS — G89.4 CHRONIC PAIN SYNDROME: Chronic | ICD-10-CM

## 2021-04-26 DIAGNOSIS — M54.17 LUMBOSACRAL RADICULOPATHY: Primary | Chronic | ICD-10-CM

## 2021-04-26 DIAGNOSIS — M89.49 OSTEOARTHROSIS MULTIPLE SITES, NOT SPECIFIED AS GENERALIZED: Chronic | ICD-10-CM

## 2021-04-26 DIAGNOSIS — Z79.899 ENCOUNTER FOR LONG-TERM (CURRENT) USE OF OTHER MEDICATIONS: ICD-10-CM

## 2021-04-26 DIAGNOSIS — G62.9 NEUROPATHY: Chronic | ICD-10-CM

## 2021-04-26 LAB
CTP QC/QA: YES
POC (AMP) AMPHETAMINE: NEGATIVE
POC (BAR) BARBITURATES: NEGATIVE
POC (BUP) BUPRENORPHINE: NEGATIVE
POC (BZO) BENZODIAZEPINES: NEGATIVE
POC (COC) COCAINE: NEGATIVE
POC (MDMA) METHYLENEDIOXYMETHAMPHETAMINE 3,4: NEGATIVE
POC (MET) METHAMPHETAMINE: NEGATIVE
POC (MOP) OPIATES: ABNORMAL
POC (MTD) METHADONE: NEGATIVE
POC (OXY) OXYCODONE: NEGATIVE
POC (PCP) PHENCYCLIDINE: NEGATIVE
POC (TCA) TRICYCLIC ANTIDEPRESSANTS: NEGATIVE
POC TEMPERATURE (URINE): 92
POC THC: NEGATIVE

## 2021-04-26 PROCEDURE — 99214 OFFICE O/P EST MOD 30 MIN: CPT | Mod: S$PBB,,, | Performed by: PHYSICIAN ASSISTANT

## 2021-04-26 PROCEDURE — 99999 PR PBB SHADOW E&M-EST. PATIENT-LVL IV: ICD-10-PCS | Mod: PBBFAC,,, | Performed by: PHYSICIAN ASSISTANT

## 2021-04-26 PROCEDURE — 80305 DRUG TEST PRSMV DIR OPT OBS: CPT | Mod: PBBFAC | Performed by: PHYSICIAN ASSISTANT

## 2021-04-26 PROCEDURE — 99999 PR PBB SHADOW E&M-EST. PATIENT-LVL IV: CPT | Mod: PBBFAC,,, | Performed by: PHYSICIAN ASSISTANT

## 2021-04-26 PROCEDURE — 99214 PR OFFICE/OUTPT VISIT, EST, LEVL IV, 30-39 MIN: ICD-10-PCS | Mod: S$PBB,,, | Performed by: PHYSICIAN ASSISTANT

## 2021-04-26 PROCEDURE — 99214 OFFICE O/P EST MOD 30 MIN: CPT | Mod: PBBFAC | Performed by: PHYSICIAN ASSISTANT

## 2021-04-26 RX ORDER — HYDROCODONE BITARTRATE AND ACETAMINOPHEN 10; 325 MG/1; MG/1
1 TABLET ORAL EVERY 12 HOURS
Qty: 60 TABLET | Refills: 0 | Status: SHIPPED | OUTPATIENT
Start: 2021-04-26 | End: 2021-05-19 | Stop reason: SDUPTHER

## 2021-04-26 RX ORDER — HYDROCODONE BITARTRATE AND ACETAMINOPHEN 10; 325 MG/1; MG/1
1 TABLET ORAL EVERY 12 HOURS
Qty: 60 TABLET | Refills: 0 | Status: SHIPPED | OUTPATIENT
Start: 2021-05-26 | End: 2021-05-19 | Stop reason: SDUPTHER

## 2021-04-26 RX ORDER — GABAPENTIN 300 MG/1
300 CAPSULE ORAL EVERY 8 HOURS
Qty: 90 CAPSULE | Refills: 2 | Status: SHIPPED | OUTPATIENT
Start: 2021-04-26 | End: 2021-07-26 | Stop reason: SDUPTHER

## 2021-04-26 RX ORDER — HYDROCODONE BITARTRATE AND ACETAMINOPHEN 10; 325 MG/1; MG/1
1 TABLET ORAL EVERY 12 HOURS
Qty: 60 TABLET | Refills: 0 | Status: SHIPPED | OUTPATIENT
Start: 2021-06-25 | End: 2021-07-26

## 2021-05-19 ENCOUNTER — OFFICE VISIT (OUTPATIENT)
Dept: CARDIOLOGY | Facility: CLINIC | Age: 86
End: 2021-05-19
Payer: COMMERCIAL

## 2021-05-19 VITALS
HEIGHT: 67 IN | SYSTOLIC BLOOD PRESSURE: 130 MMHG | HEART RATE: 72 BPM | RESPIRATION RATE: 18 BRPM | BODY MASS INDEX: 25.11 KG/M2 | WEIGHT: 160 LBS | DIASTOLIC BLOOD PRESSURE: 70 MMHG

## 2021-05-19 DIAGNOSIS — N18.5 STAGE 5 CHRONIC KIDNEY DISEASE NOT ON CHRONIC DIALYSIS: ICD-10-CM

## 2021-05-19 DIAGNOSIS — R05.3 CHRONIC COUGH: Primary | ICD-10-CM

## 2021-05-19 PROCEDURE — 99214 PR OFFICE/OUTPT VISIT, EST, LEVL IV, 30-39 MIN: ICD-10-PCS | Mod: S$PBB,,, | Performed by: STUDENT IN AN ORGANIZED HEALTH CARE EDUCATION/TRAINING PROGRAM

## 2021-05-19 PROCEDURE — 99214 OFFICE O/P EST MOD 30 MIN: CPT | Mod: PBBFAC | Performed by: STUDENT IN AN ORGANIZED HEALTH CARE EDUCATION/TRAINING PROGRAM

## 2021-05-19 PROCEDURE — 99214 OFFICE O/P EST MOD 30 MIN: CPT | Mod: S$PBB,,, | Performed by: STUDENT IN AN ORGANIZED HEALTH CARE EDUCATION/TRAINING PROGRAM

## 2021-05-19 RX ORDER — FUROSEMIDE 20 MG/1
20 TABLET ORAL 2 TIMES DAILY
Qty: 5 TABLET | Refills: 0 | Status: SHIPPED | OUTPATIENT
Start: 2021-05-19 | End: 2022-01-01 | Stop reason: SDUPTHER

## 2021-05-19 RX ORDER — ASPIRIN 81 MG/1
81 TABLET ORAL DAILY
COMMUNITY
End: 2022-01-01 | Stop reason: SDUPTHER

## 2021-05-19 RX ORDER — FENOFIBRATE 54 MG/1
54 TABLET ORAL DAILY
COMMUNITY
End: 2021-05-21 | Stop reason: SDUPTHER

## 2021-05-21 RX ORDER — GLIPIZIDE 5 MG/1
5 TABLET, FILM COATED, EXTENDED RELEASE ORAL
Qty: 90 TABLET | Refills: 3 | Status: SHIPPED | OUTPATIENT
Start: 2021-05-21 | End: 2021-07-19 | Stop reason: SDUPTHER

## 2021-05-21 RX ORDER — FENOFIBRATE 54 MG/1
54 TABLET ORAL DAILY
Qty: 90 TABLET | Refills: 3 | Status: SHIPPED | OUTPATIENT
Start: 2021-05-21 | End: 2021-05-24

## 2021-07-01 ENCOUNTER — OFFICE VISIT (OUTPATIENT)
Dept: PULMONOLOGY | Facility: CLINIC | Age: 86
End: 2021-07-01
Payer: COMMERCIAL

## 2021-07-01 VITALS
DIASTOLIC BLOOD PRESSURE: 74 MMHG | WEIGHT: 165 LBS | BODY MASS INDEX: 25.9 KG/M2 | HEIGHT: 67 IN | RESPIRATION RATE: 18 BRPM | HEART RATE: 61 BPM | SYSTOLIC BLOOD PRESSURE: 130 MMHG | OXYGEN SATURATION: 98 %

## 2021-07-01 DIAGNOSIS — R06.02 SOB (SHORTNESS OF BREATH): Primary | ICD-10-CM

## 2021-07-01 DIAGNOSIS — R05.3 CHRONIC COUGH: ICD-10-CM

## 2021-07-01 PROCEDURE — 99215 OFFICE O/P EST HI 40 MIN: CPT | Mod: PBBFAC | Performed by: INTERNAL MEDICINE

## 2021-07-01 PROCEDURE — 99213 PR OFFICE/OUTPT VISIT, EST, LEVL III, 20-29 MIN: ICD-10-PCS | Mod: S$PBB,,, | Performed by: INTERNAL MEDICINE

## 2021-07-01 PROCEDURE — 99213 OFFICE O/P EST LOW 20 MIN: CPT | Mod: S$PBB,,, | Performed by: INTERNAL MEDICINE

## 2021-07-01 RX ORDER — BUDESONIDE AND FORMOTEROL FUMARATE DIHYDRATE 160; 4.5 UG/1; UG/1
2 AEROSOL RESPIRATORY (INHALATION) EVERY 12 HOURS
Qty: 10.2 G | Refills: 5 | Status: SHIPPED | OUTPATIENT
Start: 2021-07-01 | End: 2022-01-01 | Stop reason: SDUPTHER

## 2021-07-01 RX ORDER — ALBUTEROL SULFATE 90 UG/1
2 AEROSOL, METERED RESPIRATORY (INHALATION) EVERY 6 HOURS PRN
Qty: 8 G | Refills: 5 | Status: SHIPPED | OUTPATIENT
Start: 2021-07-01

## 2021-07-19 DIAGNOSIS — E78.5 HYPERLIPIDEMIA, UNSPECIFIED HYPERLIPIDEMIA TYPE: ICD-10-CM

## 2021-07-19 DIAGNOSIS — I10 ESSENTIAL HYPERTENSION: ICD-10-CM

## 2021-07-19 DIAGNOSIS — M10.9 GOUT, UNSPECIFIED CAUSE, UNSPECIFIED CHRONICITY, UNSPECIFIED SITE: ICD-10-CM

## 2021-07-19 DIAGNOSIS — E11.9 TYPE 2 DIABETES MELLITUS WITHOUT COMPLICATION, WITHOUT LONG-TERM CURRENT USE OF INSULIN: Primary | ICD-10-CM

## 2021-07-19 RX ORDER — ALLOPURINOL 100 MG/1
100 TABLET ORAL DAILY
Qty: 90 TABLET | Refills: 1 | Status: SHIPPED | OUTPATIENT
Start: 2021-07-19 | End: 2022-01-01 | Stop reason: SDUPTHER

## 2021-07-19 RX ORDER — AMLODIPINE BESYLATE 5 MG/1
5 TABLET ORAL DAILY
Qty: 90 TABLET | Refills: 1 | Status: SHIPPED | OUTPATIENT
Start: 2021-07-19 | End: 2022-01-01 | Stop reason: SDUPTHER

## 2021-07-19 RX ORDER — GLIPIZIDE 5 MG/1
5 TABLET, FILM COATED, EXTENDED RELEASE ORAL
Qty: 90 TABLET | Refills: 1 | Status: SHIPPED | OUTPATIENT
Start: 2021-07-19 | End: 2021-08-18 | Stop reason: SDUPTHER

## 2021-07-19 RX ORDER — FENOFIBRATE 54 MG/1
54 TABLET ORAL DAILY
Qty: 90 TABLET | Refills: 1 | Status: SHIPPED | OUTPATIENT
Start: 2021-07-19 | End: 2021-08-18 | Stop reason: SDUPTHER

## 2021-07-19 RX ORDER — METOPROLOL TARTRATE 25 MG/1
25 TABLET, FILM COATED ORAL 2 TIMES DAILY
Qty: 180 TABLET | Refills: 1 | Status: SHIPPED | OUTPATIENT
Start: 2021-07-19 | End: 2022-01-01 | Stop reason: SDUPTHER

## 2021-07-19 RX ORDER — SIMVASTATIN 20 MG/1
20 TABLET, FILM COATED ORAL NIGHTLY
Qty: 90 TABLET | Refills: 1 | Status: SHIPPED | OUTPATIENT
Start: 2021-07-19 | End: 2021-01-01 | Stop reason: SDUPTHER

## 2021-07-26 ENCOUNTER — OFFICE VISIT (OUTPATIENT)
Dept: PAIN MEDICINE | Facility: CLINIC | Age: 86
End: 2021-07-26
Payer: COMMERCIAL

## 2021-07-26 VITALS
SYSTOLIC BLOOD PRESSURE: 155 MMHG | HEIGHT: 67 IN | DIASTOLIC BLOOD PRESSURE: 79 MMHG | HEART RATE: 47 BPM | BODY MASS INDEX: 26.84 KG/M2 | WEIGHT: 171 LBS

## 2021-07-26 DIAGNOSIS — M54.17 LUMBOSACRAL RADICULOPATHY: Chronic | ICD-10-CM

## 2021-07-26 DIAGNOSIS — M89.49 OSTEOARTHROSIS MULTIPLE SITES, NOT SPECIFIED AS GENERALIZED: Chronic | ICD-10-CM

## 2021-07-26 DIAGNOSIS — Z79.899 ENCOUNTER FOR LONG-TERM (CURRENT) USE OF OTHER MEDICATIONS: Primary | ICD-10-CM

## 2021-07-26 DIAGNOSIS — G89.4 CHRONIC PAIN SYNDROME: Chronic | ICD-10-CM

## 2021-07-26 PROCEDURE — 99214 PR OFFICE/OUTPT VISIT, EST, LEVL IV, 30-39 MIN: ICD-10-PCS | Mod: S$PBB,,, | Performed by: PAIN MEDICINE

## 2021-07-26 PROCEDURE — 99214 OFFICE O/P EST MOD 30 MIN: CPT | Mod: PBBFAC | Performed by: PAIN MEDICINE

## 2021-07-26 PROCEDURE — 99214 OFFICE O/P EST MOD 30 MIN: CPT | Mod: S$PBB,,, | Performed by: PAIN MEDICINE

## 2021-07-26 PROCEDURE — 80305 DRUG TEST PRSMV DIR OPT OBS: CPT | Mod: PBBFAC | Performed by: PAIN MEDICINE

## 2021-07-26 RX ORDER — HYDROCODONE BITARTRATE AND ACETAMINOPHEN 10; 325 MG/1; MG/1
1 TABLET ORAL EVERY 12 HOURS PRN
Qty: 60 TABLET | Refills: 0 | Status: SHIPPED | OUTPATIENT
Start: 2021-01-01 | End: 2021-01-01 | Stop reason: SDUPTHER

## 2021-07-26 RX ORDER — HYDROCODONE BITARTRATE AND ACETAMINOPHEN 10; 325 MG/1; MG/1
1 TABLET ORAL EVERY 12 HOURS PRN
Qty: 60 TABLET | Refills: 0 | Status: SHIPPED | OUTPATIENT
Start: 2021-07-26 | End: 2021-08-25

## 2021-07-26 RX ORDER — GABAPENTIN 300 MG/1
300 CAPSULE ORAL EVERY 8 HOURS
Qty: 90 CAPSULE | Refills: 2 | Status: SHIPPED | OUTPATIENT
Start: 2021-07-26 | End: 2021-01-01 | Stop reason: SDUPTHER

## 2021-07-26 RX ORDER — HYDROCODONE BITARTRATE AND ACETAMINOPHEN 10; 325 MG/1; MG/1
1 TABLET ORAL EVERY 12 HOURS PRN
Qty: 60 TABLET | Refills: 0 | Status: SHIPPED | OUTPATIENT
Start: 2021-08-25 | End: 2021-01-01

## 2021-08-12 ENCOUNTER — OFFICE VISIT (OUTPATIENT)
Dept: PULMONOLOGY | Facility: CLINIC | Age: 86
End: 2021-08-12
Payer: COMMERCIAL

## 2021-08-12 VITALS
DIASTOLIC BLOOD PRESSURE: 78 MMHG | OXYGEN SATURATION: 96 % | SYSTOLIC BLOOD PRESSURE: 210 MMHG | BODY MASS INDEX: 26.84 KG/M2 | HEIGHT: 67 IN | HEART RATE: 52 BPM | WEIGHT: 171 LBS | RESPIRATION RATE: 16 BRPM

## 2021-08-12 DIAGNOSIS — R05.3 CHRONIC COUGH: ICD-10-CM

## 2021-08-12 DIAGNOSIS — R06.02 SOB (SHORTNESS OF BREATH): ICD-10-CM

## 2021-08-12 PROCEDURE — 99215 OFFICE O/P EST HI 40 MIN: CPT | Mod: PBBFAC | Performed by: INTERNAL MEDICINE

## 2021-08-12 PROCEDURE — 99213 PR OFFICE/OUTPT VISIT, EST, LEVL III, 20-29 MIN: ICD-10-PCS | Mod: S$PBB,,, | Performed by: INTERNAL MEDICINE

## 2021-08-12 PROCEDURE — 99213 OFFICE O/P EST LOW 20 MIN: CPT | Mod: S$PBB,,, | Performed by: INTERNAL MEDICINE

## 2021-08-18 ENCOUNTER — OFFICE VISIT (OUTPATIENT)
Dept: PRIMARY CARE CLINIC | Facility: CLINIC | Age: 86
End: 2021-08-18
Payer: COMMERCIAL

## 2021-08-18 VITALS
RESPIRATION RATE: 20 BRPM | OXYGEN SATURATION: 98 % | BODY MASS INDEX: 27.15 KG/M2 | DIASTOLIC BLOOD PRESSURE: 86 MMHG | HEART RATE: 98 BPM | HEIGHT: 67 IN | SYSTOLIC BLOOD PRESSURE: 124 MMHG | WEIGHT: 173 LBS | TEMPERATURE: 98 F

## 2021-08-18 DIAGNOSIS — E78.5 HYPERLIPIDEMIA, UNSPECIFIED HYPERLIPIDEMIA TYPE: ICD-10-CM

## 2021-08-18 DIAGNOSIS — N18.30 CHRONIC RENAL FAILURE, STAGE 3 (MODERATE), UNSPECIFIED WHETHER STAGE 3A OR 3B CKD: Primary | ICD-10-CM

## 2021-08-18 DIAGNOSIS — E11.9 TYPE 2 DIABETES MELLITUS WITHOUT COMPLICATION, WITHOUT LONG-TERM CURRENT USE OF INSULIN: ICD-10-CM

## 2021-08-18 PROCEDURE — 99213 PR OFFICE/OUTPT VISIT, EST, LEVL III, 20-29 MIN: ICD-10-PCS | Mod: ,,, | Performed by: FAMILY MEDICINE

## 2021-08-18 PROCEDURE — 99213 OFFICE O/P EST LOW 20 MIN: CPT | Mod: ,,, | Performed by: FAMILY MEDICINE

## 2021-08-18 RX ORDER — FENOFIBRATE 54 MG/1
54 TABLET ORAL DAILY
Qty: 90 TABLET | Refills: 1 | Status: SHIPPED | OUTPATIENT
Start: 2021-08-18 | End: 2022-01-01 | Stop reason: SDUPTHER

## 2021-08-18 RX ORDER — GLIPIZIDE 5 MG/1
5 TABLET, FILM COATED, EXTENDED RELEASE ORAL
Qty: 90 TABLET | Refills: 1 | Status: SHIPPED | OUTPATIENT
Start: 2021-08-18 | End: 2021-09-15 | Stop reason: SDUPTHER

## 2021-09-15 DIAGNOSIS — E11.9 TYPE 2 DIABETES MELLITUS WITHOUT COMPLICATION, WITHOUT LONG-TERM CURRENT USE OF INSULIN: Primary | ICD-10-CM

## 2021-09-15 RX ORDER — GLIPIZIDE 5 MG/1
5 TABLET, FILM COATED, EXTENDED RELEASE ORAL
Qty: 90 TABLET | Refills: 3 | Status: SHIPPED | OUTPATIENT
Start: 2021-09-15 | End: 2021-09-15 | Stop reason: SDUPTHER

## 2021-09-15 RX ORDER — GLIPIZIDE 5 MG/1
5 TABLET, FILM COATED, EXTENDED RELEASE ORAL
Qty: 30 TABLET | Refills: 1 | Status: SHIPPED | OUTPATIENT
Start: 2021-09-15 | End: 2021-01-01 | Stop reason: SDUPTHER

## 2022-01-01 ENCOUNTER — HOSPITAL ENCOUNTER (INPATIENT)
Facility: HOSPITAL | Age: 87
LOS: 9 days | Discharge: HOME HEALTH | DRG: 189 | End: 2022-04-28
Attending: EMERGENCY MEDICINE | Admitting: HOSPITALIST
Payer: MEDICARE

## 2022-01-01 ENCOUNTER — OFFICE VISIT (OUTPATIENT)
Dept: SURGERY | Facility: CLINIC | Age: 87
End: 2022-01-01
Attending: SURGERY
Payer: MEDICARE

## 2022-01-01 ENCOUNTER — OFFICE VISIT (OUTPATIENT)
Dept: PRIMARY CARE CLINIC | Facility: CLINIC | Age: 87
End: 2022-01-01
Payer: MEDICARE

## 2022-01-01 ENCOUNTER — TELEPHONE (OUTPATIENT)
Dept: PRIMARY CARE CLINIC | Facility: CLINIC | Age: 87
End: 2022-01-01
Payer: MEDICARE

## 2022-01-01 ENCOUNTER — TELEPHONE (OUTPATIENT)
Dept: EMERGENCY MEDICINE | Facility: HOSPITAL | Age: 87
End: 2022-01-01
Payer: MEDICARE

## 2022-01-01 ENCOUNTER — TELEPHONE (OUTPATIENT)
Dept: ORTHOPEDICS | Facility: HOSPITAL | Age: 87
End: 2022-01-01
Payer: MEDICARE

## 2022-01-01 ENCOUNTER — HOSPITAL ENCOUNTER (OUTPATIENT)
Dept: RADIOLOGY | Facility: HOSPITAL | Age: 87
Discharge: HOME | End: 2022-05-12
Attending: SURGERY
Payer: MEDICARE

## 2022-01-01 ENCOUNTER — OFFICE VISIT (OUTPATIENT)
Dept: CARDIOLOGY | Facility: CLINIC | Age: 87
End: 2022-01-01
Payer: MEDICARE

## 2022-01-01 ENCOUNTER — HOSPITAL ENCOUNTER (OUTPATIENT)
Dept: RADIOLOGY | Facility: HOSPITAL | Age: 87
Discharge: HOME | End: 2022-05-17
Attending: SURGERY
Payer: MEDICARE

## 2022-01-01 ENCOUNTER — TELEPHONE (OUTPATIENT)
Dept: SURGERY | Facility: CLINIC | Age: 87
End: 2022-01-01
Payer: MEDICARE

## 2022-01-01 ENCOUNTER — HOSPITAL ENCOUNTER (OUTPATIENT)
Dept: RADIOLOGY | Facility: HOSPITAL | Age: 87
Discharge: HOME | End: 2022-08-25
Attending: INTERNAL MEDICINE
Payer: MEDICARE

## 2022-01-01 ENCOUNTER — HOSPITAL ENCOUNTER (EMERGENCY)
Facility: HOSPITAL | Age: 87
Discharge: ANOTHER HOSPITAL | End: 2022-04-19
Attending: EMERGENCY MEDICINE
Payer: MEDICARE

## 2022-01-01 ENCOUNTER — ANESTHESIA EVENT (OUTPATIENT)
Dept: SURGERY | Facility: HOSPITAL | Age: 87
DRG: 208 | End: 2022-01-01
Payer: MEDICARE

## 2022-01-01 ENCOUNTER — OFFICE VISIT (OUTPATIENT)
Dept: PULMONOLOGY | Facility: CLINIC | Age: 87
End: 2022-01-01
Payer: MEDICARE

## 2022-01-01 ENCOUNTER — ANESTHESIA EVENT (OUTPATIENT)
Dept: SURGERY | Facility: HOSPITAL | Age: 87
End: 2022-01-01
Payer: MEDICARE

## 2022-01-01 ENCOUNTER — HOSPITAL ENCOUNTER (OUTPATIENT)
Dept: CARDIOLOGY | Facility: HOSPITAL | Age: 87
Discharge: HOME | End: 2022-05-17
Attending: SURGERY
Payer: MEDICARE

## 2022-01-01 ENCOUNTER — HOSPITAL ENCOUNTER (INPATIENT)
Facility: HOSPITAL | Age: 87
LOS: 5 days | Discharge: HOME HEALTH | DRG: 683 | End: 2022-04-12
Attending: FAMILY MEDICINE | Admitting: FAMILY MEDICINE
Payer: MEDICARE

## 2022-01-01 ENCOUNTER — ANESTHESIA (OUTPATIENT)
Dept: SURGERY | Facility: HOSPITAL | Age: 87
DRG: 208 | End: 2022-01-01
Payer: MEDICARE

## 2022-01-01 ENCOUNTER — HOSPITAL ENCOUNTER (OUTPATIENT)
Dept: RADIOLOGY | Facility: HOSPITAL | Age: 87
Discharge: HOME | End: 2022-01-18
Attending: FAMILY MEDICINE
Payer: MEDICARE

## 2022-01-01 ENCOUNTER — HOSPITAL ENCOUNTER (EMERGENCY)
Facility: HOSPITAL | Age: 87
Discharge: HOME | End: 2022-05-16
Attending: FAMILY MEDICINE
Payer: MEDICARE

## 2022-01-01 ENCOUNTER — ANESTHESIA (OUTPATIENT)
Dept: SURGERY | Facility: HOSPITAL | Age: 87
DRG: 189 | End: 2022-01-01
Payer: MEDICARE

## 2022-01-01 ENCOUNTER — HOSPITAL ENCOUNTER (OUTPATIENT)
Facility: HOSPITAL | Age: 87
Discharge: HOME | End: 2022-05-21
Attending: SURGERY | Admitting: SURGERY
Payer: MEDICARE

## 2022-01-01 ENCOUNTER — HOSPITAL ENCOUNTER (EMERGENCY)
Facility: HOSPITAL | Age: 87
Discharge: HOME | End: 2022-01-21
Attending: SPECIALIST
Payer: MEDICARE

## 2022-01-01 ENCOUNTER — OFFICE VISIT (OUTPATIENT)
Dept: PAIN MEDICINE | Facility: CLINIC | Age: 87
End: 2022-01-01
Payer: MEDICARE

## 2022-01-01 ENCOUNTER — TELEPHONE (OUTPATIENT)
Dept: INTERNAL MEDICINE | Facility: CLINIC | Age: 87
End: 2022-01-01
Payer: MEDICARE

## 2022-01-01 ENCOUNTER — ANESTHESIA (OUTPATIENT)
Dept: SURGERY | Facility: HOSPITAL | Age: 87
End: 2022-01-01
Payer: MEDICARE

## 2022-01-01 ENCOUNTER — HOSPITAL ENCOUNTER (EMERGENCY)
Facility: HOSPITAL | Age: 87
Discharge: ANOTHER HOSPITAL | End: 2022-04-07
Attending: INTERNAL MEDICINE
Payer: MEDICARE

## 2022-01-01 ENCOUNTER — ANESTHESIA EVENT (OUTPATIENT)
Dept: SURGERY | Facility: HOSPITAL | Age: 87
DRG: 189 | End: 2022-01-01
Payer: MEDICARE

## 2022-01-01 ENCOUNTER — HOSPITAL ENCOUNTER (INPATIENT)
Facility: HOSPITAL | Age: 87
LOS: 22 days | DRG: 208 | End: 2022-09-20
Attending: INTERNAL MEDICINE | Admitting: INTERNAL MEDICINE
Payer: MEDICARE

## 2022-01-01 ENCOUNTER — TELEPHONE (OUTPATIENT)
Dept: PULMONOLOGY | Facility: CLINIC | Age: 87
End: 2022-01-01
Payer: MEDICARE

## 2022-01-01 ENCOUNTER — HOSPITAL ENCOUNTER (OUTPATIENT)
Dept: RADIOLOGY | Facility: HOSPITAL | Age: 87
Discharge: HOME | End: 2022-03-10
Attending: INTERNAL MEDICINE
Payer: MEDICARE

## 2022-01-01 ENCOUNTER — TELEPHONE (OUTPATIENT)
Dept: PRIMARY CARE CLINIC | Facility: CLINIC | Age: 87
End: 2022-01-01

## 2022-01-01 VITALS
HEIGHT: 67 IN | SYSTOLIC BLOOD PRESSURE: 120 MMHG | WEIGHT: 152 LBS | DIASTOLIC BLOOD PRESSURE: 72 MMHG | WEIGHT: 153 LBS | OXYGEN SATURATION: 97 % | DIASTOLIC BLOOD PRESSURE: 68 MMHG | HEART RATE: 60 BPM | HEART RATE: 44 BPM | TEMPERATURE: 97 F | SYSTOLIC BLOOD PRESSURE: 122 MMHG | BODY MASS INDEX: 23.86 KG/M2 | BODY MASS INDEX: 24.01 KG/M2 | HEIGHT: 67 IN

## 2022-01-01 VITALS
BODY MASS INDEX: 23.86 KG/M2 | DIASTOLIC BLOOD PRESSURE: 70 MMHG | WEIGHT: 152 LBS | SYSTOLIC BLOOD PRESSURE: 120 MMHG | HEART RATE: 64 BPM | HEIGHT: 67 IN | RESPIRATION RATE: 18 BRPM

## 2022-01-01 VITALS
WEIGHT: 166 LBS | SYSTOLIC BLOOD PRESSURE: 163 MMHG | RESPIRATION RATE: 19 BRPM | HEART RATE: 66 BPM | HEIGHT: 67 IN | DIASTOLIC BLOOD PRESSURE: 88 MMHG | BODY MASS INDEX: 26.06 KG/M2 | OXYGEN SATURATION: 99 % | TEMPERATURE: 98 F

## 2022-01-01 VITALS
HEART RATE: 105 BPM | WEIGHT: 167 LBS | RESPIRATION RATE: 18 BRPM | DIASTOLIC BLOOD PRESSURE: 72 MMHG | SYSTOLIC BLOOD PRESSURE: 153 MMHG | BODY MASS INDEX: 26.21 KG/M2 | HEIGHT: 67 IN | OXYGEN SATURATION: 93 % | TEMPERATURE: 98 F

## 2022-01-01 VITALS
HEART RATE: 65 BPM | HEIGHT: 67 IN | WEIGHT: 165 LBS | DIASTOLIC BLOOD PRESSURE: 82 MMHG | BODY MASS INDEX: 25.9 KG/M2 | RESPIRATION RATE: 14 BRPM | SYSTOLIC BLOOD PRESSURE: 122 MMHG | OXYGEN SATURATION: 96 %

## 2022-01-01 VITALS
HEART RATE: 56 BPM | WEIGHT: 171 LBS | HEIGHT: 67 IN | DIASTOLIC BLOOD PRESSURE: 68 MMHG | SYSTOLIC BLOOD PRESSURE: 172 MMHG | TEMPERATURE: 98 F | OXYGEN SATURATION: 94 % | RESPIRATION RATE: 18 BRPM | BODY MASS INDEX: 26.84 KG/M2

## 2022-01-01 VITALS
HEART RATE: 95 BPM | HEIGHT: 67 IN | WEIGHT: 171.5 LBS | TEMPERATURE: 98 F | RESPIRATION RATE: 18 BRPM | BODY MASS INDEX: 26.92 KG/M2 | SYSTOLIC BLOOD PRESSURE: 127 MMHG | OXYGEN SATURATION: 96 % | DIASTOLIC BLOOD PRESSURE: 75 MMHG

## 2022-01-01 VITALS
WEIGHT: 166.19 LBS | TEMPERATURE: 97 F | RESPIRATION RATE: 16 BRPM | SYSTOLIC BLOOD PRESSURE: 131 MMHG | DIASTOLIC BLOOD PRESSURE: 62 MMHG | HEART RATE: 57 BPM | HEIGHT: 67 IN | BODY MASS INDEX: 26.08 KG/M2 | OXYGEN SATURATION: 100 %

## 2022-01-01 VITALS
OXYGEN SATURATION: 94 % | WEIGHT: 155 LBS | OXYGEN SATURATION: 94 % | TEMPERATURE: 98 F | HEART RATE: 87 BPM | BODY MASS INDEX: 24.64 KG/M2 | DIASTOLIC BLOOD PRESSURE: 70 MMHG | BODY MASS INDEX: 24.33 KG/M2 | RESPIRATION RATE: 22 BRPM | HEART RATE: 102 BPM | HEIGHT: 67 IN | HEIGHT: 67 IN | WEIGHT: 157 LBS | SYSTOLIC BLOOD PRESSURE: 122 MMHG | SYSTOLIC BLOOD PRESSURE: 130 MMHG | DIASTOLIC BLOOD PRESSURE: 68 MMHG | RESPIRATION RATE: 16 BRPM

## 2022-01-01 VITALS
BODY MASS INDEX: 24.48 KG/M2 | OXYGEN SATURATION: 98 % | HEART RATE: 97 BPM | HEIGHT: 67 IN | DIASTOLIC BLOOD PRESSURE: 74 MMHG | SYSTOLIC BLOOD PRESSURE: 109 MMHG | RESPIRATION RATE: 18 BRPM | WEIGHT: 156 LBS

## 2022-01-01 VITALS
HEIGHT: 67 IN | WEIGHT: 171 LBS | DIASTOLIC BLOOD PRESSURE: 60 MMHG | HEART RATE: 52 BPM | BODY MASS INDEX: 26.84 KG/M2 | OXYGEN SATURATION: 93 % | SYSTOLIC BLOOD PRESSURE: 120 MMHG | TEMPERATURE: 96 F

## 2022-01-01 VITALS
SYSTOLIC BLOOD PRESSURE: 122 MMHG | WEIGHT: 151 LBS | HEIGHT: 65 IN | TEMPERATURE: 98 F | HEART RATE: 106 BPM | OXYGEN SATURATION: 88 % | BODY MASS INDEX: 25.16 KG/M2 | DIASTOLIC BLOOD PRESSURE: 68 MMHG | RESPIRATION RATE: 22 BRPM

## 2022-01-01 VITALS
HEART RATE: 65 BPM | BODY MASS INDEX: 26.21 KG/M2 | RESPIRATION RATE: 15 BRPM | DIASTOLIC BLOOD PRESSURE: 67 MMHG | HEIGHT: 67 IN | SYSTOLIC BLOOD PRESSURE: 145 MMHG | WEIGHT: 167 LBS

## 2022-01-01 VITALS
RESPIRATION RATE: 18 BRPM | WEIGHT: 153 LBS | BODY MASS INDEX: 24.01 KG/M2 | HEART RATE: 65 BPM | SYSTOLIC BLOOD PRESSURE: 118 MMHG | DIASTOLIC BLOOD PRESSURE: 79 MMHG | DIASTOLIC BLOOD PRESSURE: 76 MMHG | HEART RATE: 86 BPM | SYSTOLIC BLOOD PRESSURE: 121 MMHG | RESPIRATION RATE: 17 BRPM | TEMPERATURE: 98 F | OXYGEN SATURATION: 95 % | BODY MASS INDEX: 23.98 KG/M2 | HEIGHT: 67 IN | TEMPERATURE: 98 F | HEIGHT: 67 IN | WEIGHT: 152.81 LBS | OXYGEN SATURATION: 98 %

## 2022-01-01 VITALS
DIASTOLIC BLOOD PRESSURE: 88 MMHG | TEMPERATURE: 98 F | BODY MASS INDEX: 26.21 KG/M2 | SYSTOLIC BLOOD PRESSURE: 140 MMHG | HEIGHT: 67 IN | WEIGHT: 167 LBS | RESPIRATION RATE: 20 BRPM | OXYGEN SATURATION: 91 % | HEART RATE: 102 BPM

## 2022-01-01 VITALS
HEIGHT: 65 IN | DIASTOLIC BLOOD PRESSURE: 72 MMHG | OXYGEN SATURATION: 92 % | HEART RATE: 87 BPM | RESPIRATION RATE: 18 BRPM | TEMPERATURE: 98 F | SYSTOLIC BLOOD PRESSURE: 108 MMHG | BODY MASS INDEX: 26.13 KG/M2

## 2022-01-01 VITALS
OXYGEN SATURATION: 100 % | WEIGHT: 172.19 LBS | TEMPERATURE: 100 F | RESPIRATION RATE: 14 BRPM | HEIGHT: 65 IN | DIASTOLIC BLOOD PRESSURE: 32 MMHG | HEART RATE: 80 BPM | SYSTOLIC BLOOD PRESSURE: 77 MMHG | BODY MASS INDEX: 28.69 KG/M2

## 2022-01-01 VITALS
TEMPERATURE: 97 F | RESPIRATION RATE: 12 BRPM | SYSTOLIC BLOOD PRESSURE: 152 MMHG | WEIGHT: 152 LBS | HEIGHT: 67 IN | OXYGEN SATURATION: 97 % | BODY MASS INDEX: 23.86 KG/M2 | HEART RATE: 70 BPM | DIASTOLIC BLOOD PRESSURE: 65 MMHG

## 2022-01-01 DIAGNOSIS — R00.1 BRADYCARDIA: ICD-10-CM

## 2022-01-01 DIAGNOSIS — E11.9 TYPE 2 DIABETES MELLITUS WITHOUT COMPLICATION, WITHOUT LONG-TERM CURRENT USE OF INSULIN: ICD-10-CM

## 2022-01-01 DIAGNOSIS — M10.9 GOUT, UNSPECIFIED CAUSE, UNSPECIFIED CHRONICITY, UNSPECIFIED SITE: ICD-10-CM

## 2022-01-01 DIAGNOSIS — N18.5 CHRONIC RENAL FAILURE, STAGE 5: Primary | ICD-10-CM

## 2022-01-01 DIAGNOSIS — N18.6 TYPE 2 DIABETES MELLITUS WITH CHRONIC KIDNEY DISEASE ON CHRONIC DIALYSIS, WITHOUT LONG-TERM CURRENT USE OF INSULIN: ICD-10-CM

## 2022-01-01 DIAGNOSIS — N18.5 ACUTE RENAL FAILURE WITH OTHER SPECIFIED PATHOLOGICAL KIDNEY LESION SUPERIMPOSED ON STAGE 5 CHRONIC KIDNEY DISEASE, NOT ON CHRONIC DIALYSIS: Primary | ICD-10-CM

## 2022-01-01 DIAGNOSIS — N18.6 TYPE 2 DIABETES MELLITUS WITH CHRONIC KIDNEY DISEASE ON CHRONIC DIALYSIS, WITHOUT LONG-TERM CURRENT USE OF INSULIN: Primary | ICD-10-CM

## 2022-01-01 DIAGNOSIS — J41.0 SIMPLE CHRONIC BRONCHITIS: ICD-10-CM

## 2022-01-01 DIAGNOSIS — D63.1 ANEMIA OF CHRONIC RENAL FAILURE: ICD-10-CM

## 2022-01-01 DIAGNOSIS — N18.4 ACUTE RENAL FAILURE SUPERIMPOSED ON STAGE 4 CHRONIC KIDNEY DISEASE: ICD-10-CM

## 2022-01-01 DIAGNOSIS — M89.49 OSTEOARTHROSIS MULTIPLE SITES, NOT SPECIFIED AS GENERALIZED: Chronic | ICD-10-CM

## 2022-01-01 DIAGNOSIS — Z86.2 HISTORY OF ANEMIA DUE TO CHRONIC KIDNEY DISEASE: ICD-10-CM

## 2022-01-01 DIAGNOSIS — J90 PLEURAL EFFUSION, NOT ELSEWHERE CLASSIFIED: Primary | ICD-10-CM

## 2022-01-01 DIAGNOSIS — N18.30 CHRONIC RENAL FAILURE, STAGE 3 (MODERATE): ICD-10-CM

## 2022-01-01 DIAGNOSIS — M54.17 LUMBOSACRAL RADICULOPATHY: Chronic | ICD-10-CM

## 2022-01-01 DIAGNOSIS — J81.0 ACUTE PULMONARY EDEMA: Primary | ICD-10-CM

## 2022-01-01 DIAGNOSIS — R06.02 SOB (SHORTNESS OF BREATH): Primary | ICD-10-CM

## 2022-01-01 DIAGNOSIS — Z99.2 DEPENDENCE ON PERITONEAL DIALYSIS: ICD-10-CM

## 2022-01-01 DIAGNOSIS — R91.1 SOLITARY PULMONARY NODULE: ICD-10-CM

## 2022-01-01 DIAGNOSIS — N18.4 CKD (CHRONIC KIDNEY DISEASE), STAGE IV: ICD-10-CM

## 2022-01-01 DIAGNOSIS — J93.9 PNEUMOTHORAX ON RIGHT: ICD-10-CM

## 2022-01-01 DIAGNOSIS — Z79.899 DVT PROPHYLAXIS: ICD-10-CM

## 2022-01-01 DIAGNOSIS — G62.9 NEUROPATHY: Chronic | ICD-10-CM

## 2022-01-01 DIAGNOSIS — N18.6 END STAGE CHRONIC KIDNEY DISEASE: Primary | ICD-10-CM

## 2022-01-01 DIAGNOSIS — N18.6 END-STAGE RENAL DISEASE NEEDING DIALYSIS: ICD-10-CM

## 2022-01-01 DIAGNOSIS — R79.89 ELEVATED TROPONIN: ICD-10-CM

## 2022-01-01 DIAGNOSIS — D63.1 ANEMIA OF CHRONIC RENAL FAILURE, UNSPECIFIED CKD STAGE: ICD-10-CM

## 2022-01-01 DIAGNOSIS — E34.9 NEUROPATHY ASSOCIATED WITH ENDOCRINE DISORDER: ICD-10-CM

## 2022-01-01 DIAGNOSIS — Z99.2 TYPE 2 DIABETES MELLITUS WITH CHRONIC KIDNEY DISEASE ON CHRONIC DIALYSIS, WITHOUT LONG-TERM CURRENT USE OF INSULIN: ICD-10-CM

## 2022-01-01 DIAGNOSIS — E78.5 HYPERLIPIDEMIA, UNSPECIFIED HYPERLIPIDEMIA TYPE: ICD-10-CM

## 2022-01-01 DIAGNOSIS — J06.9 UPPER RESPIRATORY TRACT INFECTION, UNSPECIFIED TYPE: Primary | ICD-10-CM

## 2022-01-01 DIAGNOSIS — I10 ESSENTIAL HYPERTENSION: ICD-10-CM

## 2022-01-01 DIAGNOSIS — N18.6 END STAGE RENAL DISEASE: ICD-10-CM

## 2022-01-01 DIAGNOSIS — D53.9 MACROCYTIC ANEMIA: ICD-10-CM

## 2022-01-01 DIAGNOSIS — M19.90 ARTHRITIS: ICD-10-CM

## 2022-01-01 DIAGNOSIS — R53.1 WEAKNESS: ICD-10-CM

## 2022-01-01 DIAGNOSIS — G89.4 CHRONIC PAIN SYNDROME: Chronic | ICD-10-CM

## 2022-01-01 DIAGNOSIS — J40 BRONCHITIS: ICD-10-CM

## 2022-01-01 DIAGNOSIS — E11.22 TYPE 2 DIABETES MELLITUS WITH CHRONIC KIDNEY DISEASE ON CHRONIC DIALYSIS, WITHOUT LONG-TERM CURRENT USE OF INSULIN: Primary | ICD-10-CM

## 2022-01-01 DIAGNOSIS — J90 PLEURAL EFFUSION, NOT ELSEWHERE CLASSIFIED: ICD-10-CM

## 2022-01-01 DIAGNOSIS — E11.22 TYPE 2 DIABETES MELLITUS WITH CHRONIC KIDNEY DISEASE ON CHRONIC DIALYSIS, WITHOUT LONG-TERM CURRENT USE OF INSULIN: ICD-10-CM

## 2022-01-01 DIAGNOSIS — N18.9 ANEMIA OF CHRONIC RENAL FAILURE, UNSPECIFIED CKD STAGE: ICD-10-CM

## 2022-01-01 DIAGNOSIS — R19.7 DIARRHEA, UNSPECIFIED TYPE: Primary | ICD-10-CM

## 2022-01-01 DIAGNOSIS — J20.9 COPD WITH ACUTE BRONCHITIS: ICD-10-CM

## 2022-01-01 DIAGNOSIS — T68.XXXA HYPOTHERMIA, INITIAL ENCOUNTER: ICD-10-CM

## 2022-01-01 DIAGNOSIS — E11.9 TYPE 2 DIABETES MELLITUS: ICD-10-CM

## 2022-01-01 DIAGNOSIS — D63.1 ANEMIA OF CHRONIC RENAL FAILURE, STAGE 5: ICD-10-CM

## 2022-01-01 DIAGNOSIS — G63 NEUROPATHY ASSOCIATED WITH ENDOCRINE DISORDER: ICD-10-CM

## 2022-01-01 DIAGNOSIS — J96.01 ACUTE HYPOXEMIC RESPIRATORY FAILURE: ICD-10-CM

## 2022-01-01 DIAGNOSIS — N20.0 NEPHROLITHIASIS: ICD-10-CM

## 2022-01-01 DIAGNOSIS — R06.02 SOB (SHORTNESS OF BREATH): ICD-10-CM

## 2022-01-01 DIAGNOSIS — N18.5 CHRONIC RENAL FAILURE, STAGE 5: ICD-10-CM

## 2022-01-01 DIAGNOSIS — N18.4 ACUTE RENAL FAILURE SUPERIMPOSED ON STAGE 4 CHRONIC KIDNEY DISEASE, UNSPECIFIED ACUTE RENAL FAILURE TYPE: ICD-10-CM

## 2022-01-01 DIAGNOSIS — N18.4 STAGE 4 CHRONIC KIDNEY DISEASE: ICD-10-CM

## 2022-01-01 DIAGNOSIS — J44.9 COPD (CHRONIC OBSTRUCTIVE PULMONARY DISEASE): ICD-10-CM

## 2022-01-01 DIAGNOSIS — N18.9 ANEMIA OF CHRONIC RENAL FAILURE: ICD-10-CM

## 2022-01-01 DIAGNOSIS — I48.91 A-FIB: ICD-10-CM

## 2022-01-01 DIAGNOSIS — R09.02 HYPOXIA: ICD-10-CM

## 2022-01-01 DIAGNOSIS — Z99.2 END-STAGE RENAL DISEASE NEEDING DIALYSIS: ICD-10-CM

## 2022-01-01 DIAGNOSIS — E13.9 DIABETES 1.5, MANAGED AS TYPE 2: ICD-10-CM

## 2022-01-01 DIAGNOSIS — E11.649 TYPE 2 DIABETES MELLITUS WITH HYPOGLYCEMIA WITHOUT COMA, WITH LONG-TERM CURRENT USE OF INSULIN: ICD-10-CM

## 2022-01-01 DIAGNOSIS — N18.9 HISTORY OF ANEMIA DUE TO CHRONIC KIDNEY DISEASE: ICD-10-CM

## 2022-01-01 DIAGNOSIS — Z79.4 TYPE 2 DIABETES MELLITUS WITH HYPOGLYCEMIA WITHOUT COMA, WITH LONG-TERM CURRENT USE OF INSULIN: ICD-10-CM

## 2022-01-01 DIAGNOSIS — J90 PLEURAL EFFUSION ON RIGHT: Primary | ICD-10-CM

## 2022-01-01 DIAGNOSIS — E11.9 TYPE 2 DIABETES MELLITUS WITHOUT COMPLICATION, WITHOUT LONG-TERM CURRENT USE OF INSULIN: Primary | ICD-10-CM

## 2022-01-01 DIAGNOSIS — N17.9 ACUTE RENAL FAILURE SUPERIMPOSED ON STAGE 5 CHRONIC KIDNEY DISEASE, NOT ON CHRONIC DIALYSIS, UNSPECIFIED ACUTE RENAL FAILURE TYPE: ICD-10-CM

## 2022-01-01 DIAGNOSIS — R06.02 SHORTNESS OF BREATH: ICD-10-CM

## 2022-01-01 DIAGNOSIS — N17.9 ACUTE RENAL FAILURE: ICD-10-CM

## 2022-01-01 DIAGNOSIS — N17.9 ACUTE RENAL FAILURE SUPERIMPOSED ON STAGE 4 CHRONIC KIDNEY DISEASE: ICD-10-CM

## 2022-01-01 DIAGNOSIS — R05.9 COUGH: ICD-10-CM

## 2022-01-01 DIAGNOSIS — R94.31 ABNORMAL ELECTROCARDIOGRAM (ECG) (EKG): ICD-10-CM

## 2022-01-01 DIAGNOSIS — N17.9 ACUTE RENAL FAILURE SUPERIMPOSED ON STAGE 4 CHRONIC KIDNEY DISEASE, UNSPECIFIED ACUTE RENAL FAILURE TYPE: ICD-10-CM

## 2022-01-01 DIAGNOSIS — Z99.2 TYPE 2 DIABETES MELLITUS WITH CHRONIC KIDNEY DISEASE ON CHRONIC DIALYSIS, WITHOUT LONG-TERM CURRENT USE OF INSULIN: Primary | ICD-10-CM

## 2022-01-01 DIAGNOSIS — M19.90 ARTHRITIS: Primary | ICD-10-CM

## 2022-01-01 DIAGNOSIS — J20.9 COPD WITH ACUTE BRONCHITIS: Primary | ICD-10-CM

## 2022-01-01 DIAGNOSIS — N17.8 ACUTE RENAL FAILURE WITH OTHER SPECIFIED PATHOLOGICAL KIDNEY LESION SUPERIMPOSED ON STAGE 5 CHRONIC KIDNEY DISEASE, NOT ON CHRONIC DIALYSIS: Primary | ICD-10-CM

## 2022-01-01 DIAGNOSIS — M54.2 NECK PAIN: Primary | ICD-10-CM

## 2022-01-01 DIAGNOSIS — N18.6 END STAGE CHRONIC KIDNEY DISEASE: ICD-10-CM

## 2022-01-01 DIAGNOSIS — Z79.899 ENCOUNTER FOR LONG-TERM (CURRENT) USE OF OTHER MEDICATIONS: ICD-10-CM

## 2022-01-01 DIAGNOSIS — J44.0 COPD WITH ACUTE BRONCHITIS: ICD-10-CM

## 2022-01-01 DIAGNOSIS — N19 RENAL FAILURE, UNSPECIFIED CHRONICITY: Primary | ICD-10-CM

## 2022-01-01 DIAGNOSIS — M54.17 LUMBOSACRAL RADICULOPATHY: Primary | Chronic | ICD-10-CM

## 2022-01-01 DIAGNOSIS — J44.0 COPD WITH ACUTE BRONCHITIS: Primary | ICD-10-CM

## 2022-01-01 DIAGNOSIS — E86.0 DEHYDRATION: ICD-10-CM

## 2022-01-01 DIAGNOSIS — N18.9 CHRONIC RENAL FAILURE, UNSPECIFIED CKD STAGE: ICD-10-CM

## 2022-01-01 DIAGNOSIS — J44.9 CHRONIC OBSTRUCTIVE PULMONARY DISEASE, UNSPECIFIED COPD TYPE: ICD-10-CM

## 2022-01-01 DIAGNOSIS — Z13.6 ENCOUNTER FOR SCREENING FOR CARDIOVASCULAR DISORDERS: ICD-10-CM

## 2022-01-01 DIAGNOSIS — R07.9 CHEST PAIN: ICD-10-CM

## 2022-01-01 DIAGNOSIS — N18.6 END STAGE RENAL DISEASE: Primary | ICD-10-CM

## 2022-01-01 DIAGNOSIS — N18.5 ACUTE RENAL FAILURE SUPERIMPOSED ON STAGE 5 CHRONIC KIDNEY DISEASE, NOT ON CHRONIC DIALYSIS, UNSPECIFIED ACUTE RENAL FAILURE TYPE: ICD-10-CM

## 2022-01-01 DIAGNOSIS — J90 PLEURAL EFFUSION ON RIGHT: ICD-10-CM

## 2022-01-01 DIAGNOSIS — J93.9 PNEUMOTHORAX: ICD-10-CM

## 2022-01-01 DIAGNOSIS — I50.9 ACUTE ON CHRONIC CONGESTIVE HEART FAILURE, UNSPECIFIED HEART FAILURE TYPE: ICD-10-CM

## 2022-01-01 DIAGNOSIS — J40 BRONCHITIS: Primary | ICD-10-CM

## 2022-01-01 DIAGNOSIS — N18.5 ANEMIA OF CHRONIC RENAL FAILURE, STAGE 5: ICD-10-CM

## 2022-01-01 DIAGNOSIS — R41.82 ALTERED MENTAL STATUS, UNSPECIFIED ALTERED MENTAL STATUS TYPE: ICD-10-CM

## 2022-01-01 DIAGNOSIS — N18.30 CHRONIC RENAL FAILURE, STAGE 3 (MODERATE), UNSPECIFIED WHETHER STAGE 3A OR 3B CKD: Primary | ICD-10-CM

## 2022-01-01 DIAGNOSIS — E16.2 HYPOGLYCEMIA: ICD-10-CM

## 2022-01-01 DIAGNOSIS — I49.9 CARDIAC RHYTHM DISORDER OR DISTURBANCE OR CHANGE: ICD-10-CM

## 2022-01-01 DIAGNOSIS — E87.70 HYPERVOLEMIA, UNSPECIFIED HYPERVOLEMIA TYPE: Primary | ICD-10-CM

## 2022-01-01 DIAGNOSIS — R06.02 SHORTNESS OF BREATH: Primary | ICD-10-CM

## 2022-01-01 DIAGNOSIS — R00.0 TACHYCARDIA: ICD-10-CM

## 2022-01-01 DIAGNOSIS — I10 PRIMARY HYPERTENSION: ICD-10-CM

## 2022-01-01 DIAGNOSIS — I15.1 HYPERTENSION SECONDARY TO OTHER RENAL DISORDERS: ICD-10-CM

## 2022-01-01 DIAGNOSIS — R06.00 DYSPNEA: ICD-10-CM

## 2022-01-01 DIAGNOSIS — Z01.810 ENCOUNTER FOR PRE-OPERATIVE CARDIOVASCULAR CLEARANCE: Primary | ICD-10-CM

## 2022-01-01 LAB
ABO + RH BLD: NORMAL
ALBUMIN SERPL BCP-MCNC: 2 G/DL (ref 3.5–5)
ALBUMIN SERPL BCP-MCNC: 2 G/DL (ref 3.5–5)
ALBUMIN SERPL BCP-MCNC: 2.1 G/DL (ref 3.5–5)
ALBUMIN SERPL BCP-MCNC: 2.2 G/DL (ref 3.5–5)
ALBUMIN SERPL BCP-MCNC: 2.3 G/DL (ref 3.5–5)
ALBUMIN SERPL BCP-MCNC: 2.5 G/DL (ref 3.5–5)
ALBUMIN SERPL BCP-MCNC: 2.6 G/DL (ref 3.5–5)
ALBUMIN SERPL BCP-MCNC: 2.7 G/DL (ref 3.5–5)
ALBUMIN SERPL BCP-MCNC: 2.8 G/DL (ref 3.5–5)
ALBUMIN SERPL BCP-MCNC: 3 G/DL (ref 3.5–5)
ALBUMIN SERPL BCP-MCNC: 3.1 G/DL (ref 3.5–5)
ALBUMIN/GLOB SERPL: 0.4 {RATIO}
ALBUMIN/GLOB SERPL: 0.5 {RATIO}
ALBUMIN/GLOB SERPL: 0.6 {RATIO}
ALBUMIN/GLOB SERPL: 0.7 {RATIO}
ALBUMIN/GLOB SERPL: 0.8 {RATIO}
ALBUMIN/GLOB SERPL: 0.9 {RATIO}
ALP SERPL-CCNC: 40 U/L (ref 55–142)
ALP SERPL-CCNC: 50 U/L (ref 55–142)
ALP SERPL-CCNC: 67 U/L (ref 55–142)
ALP SERPL-CCNC: 72 U/L (ref 55–142)
ALP SERPL-CCNC: 74 U/L (ref 55–142)
ALP SERPL-CCNC: 81 U/L (ref 55–142)
ALP SERPL-CCNC: 96 U/L (ref 55–142)
ALT SERPL W P-5'-P-CCNC: 10 U/L (ref 13–56)
ALT SERPL W P-5'-P-CCNC: 21 U/L (ref 13–56)
ALT SERPL W P-5'-P-CCNC: 21 U/L (ref 13–56)
ALT SERPL W P-5'-P-CCNC: 23 U/L (ref 13–56)
ALT SERPL W P-5'-P-CCNC: 35 U/L (ref 13–56)
ALT SERPL W P-5'-P-CCNC: 6 U/L (ref 13–56)
ALT SERPL W P-5'-P-CCNC: 9 U/L (ref 13–56)
AMORPH PHOS CRY #/AREA URNS LPF: ABNORMAL /LPF
ANION GAP SERPL CALCULATED.3IONS-SCNC: 11 MMOL/L (ref 7–16)
ANION GAP SERPL CALCULATED.3IONS-SCNC: 11 MMOL/L (ref 7–16)
ANION GAP SERPL CALCULATED.3IONS-SCNC: 13 MMOL/L (ref 7–16)
ANION GAP SERPL CALCULATED.3IONS-SCNC: 14 MMOL/L (ref 7–16)
ANION GAP SERPL CALCULATED.3IONS-SCNC: 15 MMOL/L (ref 7–16)
ANION GAP SERPL CALCULATED.3IONS-SCNC: 16 MMOL/L (ref 7–16)
ANION GAP SERPL CALCULATED.3IONS-SCNC: 17 MMOL/L (ref 7–16)
ANION GAP SERPL CALCULATED.3IONS-SCNC: 17 MMOL/L (ref 7–16)
ANION GAP SERPL CALCULATED.3IONS-SCNC: 18 MMOL/L (ref 7–16)
ANION GAP SERPL CALCULATED.3IONS-SCNC: 20 MMOL/L (ref 7–16)
ANION GAP SERPL CALCULATED.3IONS-SCNC: 22 MMOL/L (ref 7–16)
ANION GAP SERPL CALCULATED.3IONS-SCNC: 23 MMOL/L (ref 7–16)
ANION GAP SERPL CALCULATED.3IONS-SCNC: 24 MMOL/L (ref 7–16)
ANION GAP SERPL CALCULATED.3IONS-SCNC: 24 MMOL/L (ref 7–16)
ANION GAP SERPL CALCULATED.3IONS-SCNC: 8 MMOL/L (ref 7–16)
ANISOCYTOSIS BLD QL SMEAR: ABNORMAL
AORTIC ROOT ANNULUS: 2.2 CM
AORTIC VALVE CUSP SEPERATION: 18.3 CM
APTT PPP: 20.6 SECONDS (ref 25.2–37.3)
APTT PPP: 23 SECONDS (ref 25.2–37.3)
APTT PPP: 29.7 SECONDS (ref 25.2–37.3)
AST SERPL W P-5'-P-CCNC: 15 U/L (ref 15–37)
AST SERPL W P-5'-P-CCNC: 16 U/L (ref 15–37)
AST SERPL W P-5'-P-CCNC: 22 U/L (ref 15–37)
AST SERPL W P-5'-P-CCNC: 25 U/L (ref 15–37)
AST SERPL W P-5'-P-CCNC: 30 U/L (ref 15–37)
AST SERPL W P-5'-P-CCNC: 39 U/L (ref 15–37)
AST SERPL W P-5'-P-CCNC: 43 U/L (ref 15–37)
ATYPICAL LYMPHOCYTES: ABNORMAL
AV INDEX (PROSTH): 0.74
AV MEAN GRADIENT: 6 MMHG
AV PEAK GRADIENT: 10 MMHG
AV VALVE AREA: 1.48 CM2
AV VELOCITY RATIO: 0.75
BACTERIA #/AREA URNS HPF: ABNORMAL /HPF
BACTERIA #/AREA URNS HPF: ABNORMAL /HPF
BACTERIA BLD CULT: ABNORMAL
BACTERIA BLD CULT: ABNORMAL
BACTERIA BLD CULT: NORMAL
BACTERIA BLD CULT: NORMAL
BACTERIA SPEC BFLD CULT: NORMAL
BASOPHILS # BLD AUTO: 0.02 K/UL (ref 0–0.2)
BASOPHILS # BLD AUTO: 0.03 K/UL (ref 0–0.2)
BASOPHILS # BLD AUTO: 0.04 K/UL (ref 0–0.2)
BASOPHILS # BLD AUTO: 0.05 K/UL (ref 0–0.2)
BASOPHILS # BLD AUTO: 0.06 K/UL (ref 0–0.2)
BASOPHILS # BLD AUTO: 0.07 K/UL (ref 0–0.2)
BASOPHILS NFR BLD AUTO: 0.3 % (ref 0–1)
BASOPHILS NFR BLD AUTO: 0.4 % (ref 0–1)
BASOPHILS NFR BLD AUTO: 0.5 % (ref 0–1)
BASOPHILS NFR BLD AUTO: 0.6 % (ref 0–1)
BASOPHILS NFR BLD AUTO: 0.7 % (ref 0–1)
BASOPHILS NFR BLD AUTO: 0.8 % (ref 0–1)
BASOPHILS NFR BLD AUTO: 0.9 % (ref 0–1)
BASOPHILS NFR BLD AUTO: 1 % (ref 0–1)
BASOPHILS NFR BLD MANUAL: 1 % (ref 0–1)
BASOPHILS NFR BLD MANUAL: 1 % (ref 0–1)
BASOPHILS NFR BLD MANUAL: 2 % (ref 0–1)
BILIRUB DIRECT SERPL-MCNC: 0.3 MG/DL (ref 0–0.2)
BILIRUB SERPL-MCNC: 0.3 MG/DL (ref 0–1.2)
BILIRUB SERPL-MCNC: 0.3 MG/DL (ref 0–1.2)
BILIRUB SERPL-MCNC: 0.4 MG/DL (ref 0–1.2)
BILIRUB SERPL-MCNC: 0.4 MG/DL (ref ?–1.2)
BILIRUB SERPL-MCNC: 0.5 MG/DL (ref 0–1.2)
BILIRUB SERPL-MCNC: 0.5 MG/DL (ref ?–1.2)
BILIRUB SERPL-MCNC: 0.6 MG/DL (ref ?–1.2)
BILIRUB UR QL STRIP: NEGATIVE
BILIRUB UR QL STRIP: NEGATIVE
BLD PROD TYP BPU: NORMAL
BLOOD UNIT EXPIRATION DATE: NORMAL
BLOOD UNIT TYPE CODE: 5100
BLOOD UNIT TYPE CODE: 5100
BLOOD UNIT TYPE CODE: 600
BLOOD UNIT TYPE CODE: 6200
BLOOD UNIT TYPE CODE: 6200
BSA FOR ECHO PROCEDURE: 1.91 M2
BUN SERPL-MCNC: 102 MG/DL (ref 7–18)
BUN SERPL-MCNC: 102 MG/DL (ref 7–18)
BUN SERPL-MCNC: 103 MG/DL (ref 7–18)
BUN SERPL-MCNC: 111 MG/DL (ref 7–18)
BUN SERPL-MCNC: 13 MG/DL (ref 7–18)
BUN SERPL-MCNC: 16 MG/DL (ref 7–18)
BUN SERPL-MCNC: 19 MG/DL (ref 7–18)
BUN SERPL-MCNC: 19 MG/DL (ref 7–18)
BUN SERPL-MCNC: 22 MG/DL (ref 7–18)
BUN SERPL-MCNC: 25 MG/DL (ref 7–18)
BUN SERPL-MCNC: 26 MG/DL (ref 7–18)
BUN SERPL-MCNC: 27 MG/DL (ref 7–18)
BUN SERPL-MCNC: 28 MG/DL (ref 7–18)
BUN SERPL-MCNC: 29 MG/DL (ref 7–18)
BUN SERPL-MCNC: 30 MG/DL (ref 7–18)
BUN SERPL-MCNC: 33 MG/DL (ref 7–18)
BUN SERPL-MCNC: 33 MG/DL (ref 7–18)
BUN SERPL-MCNC: 36 MG/DL (ref 7–18)
BUN SERPL-MCNC: 37 MG/DL (ref 7–18)
BUN SERPL-MCNC: 38 MG/DL (ref 7–18)
BUN SERPL-MCNC: 41 MG/DL (ref 7–18)
BUN SERPL-MCNC: 46 MG/DL (ref 7–18)
BUN SERPL-MCNC: 46 MG/DL (ref 7–18)
BUN SERPL-MCNC: 50 MG/DL (ref 7–18)
BUN SERPL-MCNC: 64 MG/DL (ref 7–18)
BUN SERPL-MCNC: 70 MG/DL (ref 7–18)
BUN SERPL-MCNC: 72 MG/DL (ref 7–18)
BUN SERPL-MCNC: 79 MG/DL (ref 7–18)
BUN SERPL-MCNC: 80 MG/DL (ref 7–18)
BUN SERPL-MCNC: 81 MG/DL (ref 7–18)
BUN SERPL-MCNC: 81 MG/DL (ref 7–18)
BUN SERPL-MCNC: 87 MG/DL (ref 7–18)
BUN SERPL-MCNC: 94 MG/DL (ref 7–18)
BUN/CREAT SERPL: 10 (ref 6–20)
BUN/CREAT SERPL: 11 (ref 6–20)
BUN/CREAT SERPL: 12 (ref 6–20)
BUN/CREAT SERPL: 12 (ref 6–20)
BUN/CREAT SERPL: 13 (ref 6–20)
BUN/CREAT SERPL: 15 (ref 6–20)
BUN/CREAT SERPL: 16 (ref 6–20)
BUN/CREAT SERPL: 16 (ref 6–20)
BUN/CREAT SERPL: 4 (ref 6–20)
BUN/CREAT SERPL: 4 (ref 6–20)
BUN/CREAT SERPL: 5 (ref 6–20)
BUN/CREAT SERPL: 6 (ref 6–20)
BUN/CREAT SERPL: 7 (ref 6–20)
BUN/CREAT SERPL: 7 (ref 6–20)
BUN/CREAT SERPL: 9 (ref 6–20)
C COLI+JEJ+UPSA DNA STL QL NAA+NON-PROBE: NEGATIVE
C DIFF TOX A+B STL IA-ACNC: NEGATIVE
CALCIUM SERPL-MCNC: 6.6 MG/DL (ref 8.5–10.1)
CALCIUM SERPL-MCNC: 6.8 MG/DL (ref 8.5–10.1)
CALCIUM SERPL-MCNC: 6.8 MG/DL (ref 8.5–10.1)
CALCIUM SERPL-MCNC: 7 MG/DL (ref 8.5–10.1)
CALCIUM SERPL-MCNC: 7.2 MG/DL (ref 8.5–10.1)
CALCIUM SERPL-MCNC: 7.2 MG/DL (ref 8.5–10.1)
CALCIUM SERPL-MCNC: 7.3 MG/DL (ref 8.5–10.1)
CALCIUM SERPL-MCNC: 7.5 MG/DL (ref 8.5–10.1)
CALCIUM SERPL-MCNC: 7.6 MG/DL (ref 8.5–10.1)
CALCIUM SERPL-MCNC: 7.7 MG/DL (ref 8.5–10.1)
CALCIUM SERPL-MCNC: 7.7 MG/DL (ref 8.5–10.1)
CALCIUM SERPL-MCNC: 7.9 MG/DL (ref 8.5–10.1)
CALCIUM SERPL-MCNC: 7.9 MG/DL (ref 8.5–10.1)
CALCIUM SERPL-MCNC: 8 MG/DL (ref 8.5–10.1)
CALCIUM SERPL-MCNC: 8.1 MG/DL (ref 8.5–10.1)
CALCIUM SERPL-MCNC: 8.1 MG/DL (ref 8.5–10.1)
CALCIUM SERPL-MCNC: 8.2 MG/DL (ref 8.5–10.1)
CALCIUM SERPL-MCNC: 8.2 MG/DL (ref 8.5–10.1)
CALCIUM SERPL-MCNC: 8.3 MG/DL (ref 8.5–10.1)
CALCIUM SERPL-MCNC: 8.4 MG/DL (ref 8.5–10.1)
CALCIUM SERPL-MCNC: 8.5 MG/DL (ref 8.5–10.1)
CALCIUM SERPL-MCNC: 8.6 MG/DL (ref 8.5–10.1)
CALCIUM SERPL-MCNC: 8.7 MG/DL (ref 8.5–10.1)
CALCIUM SERPL-MCNC: 8.8 MG/DL (ref 8.5–10.1)
CALCIUM SERPL-MCNC: 8.9 MG/DL (ref 8.5–10.1)
CHLORIDE SERPL-SCNC: 100 MMOL/L (ref 98–107)
CHLORIDE SERPL-SCNC: 100 MMOL/L (ref 98–107)
CHLORIDE SERPL-SCNC: 101 MMOL/L (ref 98–107)
CHLORIDE SERPL-SCNC: 102 MMOL/L (ref 98–107)
CHLORIDE SERPL-SCNC: 103 MMOL/L (ref 98–107)
CHLORIDE SERPL-SCNC: 104 MMOL/L (ref 98–107)
CHLORIDE SERPL-SCNC: 105 MMOL/L (ref 98–107)
CHLORIDE SERPL-SCNC: 105 MMOL/L (ref 98–107)
CHLORIDE SERPL-SCNC: 106 MMOL/L (ref 98–107)
CHLORIDE SERPL-SCNC: 107 MMOL/L (ref 98–107)
CHLORIDE SERPL-SCNC: 107 MMOL/L (ref 98–107)
CHLORIDE SERPL-SCNC: 108 MMOL/L (ref 98–107)
CHLORIDE SERPL-SCNC: 109 MMOL/L (ref 98–107)
CHLORIDE SERPL-SCNC: 110 MMOL/L (ref 98–107)
CHLORIDE SERPL-SCNC: 112 MMOL/L (ref 98–107)
CHLORIDE SERPL-SCNC: 112 MMOL/L (ref 98–107)
CHLORIDE SERPL-SCNC: 113 MMOL/L (ref 98–107)
CHLORIDE SERPL-SCNC: 114 MMOL/L (ref 98–107)
CHLORIDE SERPL-SCNC: 92 MMOL/L (ref 98–107)
CHLORIDE SERPL-SCNC: 96 MMOL/L (ref 98–107)
CHLORIDE SERPL-SCNC: 97 MMOL/L (ref 98–107)
CHLORIDE SERPL-SCNC: 97 MMOL/L (ref 98–107)
CHLORIDE SERPL-SCNC: 98 MMOL/L (ref 98–107)
CHLORIDE SERPL-SCNC: 98 MMOL/L (ref 98–107)
CHLORIDE SERPL-SCNC: 99 MMOL/L (ref 98–107)
CHOLEST FLD-MCNC: <50 MG/DL
CLARITY FLD: CLEAR
CLARITY FLD: CLEAR
CLARITY UR: ABNORMAL
CLARITY UR: CLEAR
CO2 SERPL-SCNC: 14 MMOL/L (ref 21–32)
CO2 SERPL-SCNC: 17 MMOL/L (ref 21–32)
CO2 SERPL-SCNC: 18 MMOL/L (ref 21–32)
CO2 SERPL-SCNC: 19 MMOL/L (ref 21–32)
CO2 SERPL-SCNC: 20 MMOL/L (ref 21–32)
CO2 SERPL-SCNC: 21 MMOL/L (ref 21–32)
CO2 SERPL-SCNC: 22 MMOL/L (ref 21–32)
CO2 SERPL-SCNC: 23 MMOL/L (ref 21–32)
CO2 SERPL-SCNC: 23 MMOL/L (ref 21–32)
CO2 SERPL-SCNC: 24 MMOL/L (ref 21–32)
CO2 SERPL-SCNC: 25 MMOL/L (ref 21–32)
CO2 SERPL-SCNC: 26 MMOL/L (ref 21–32)
CO2 SERPL-SCNC: 27 MMOL/L (ref 21–32)
CO2 SERPL-SCNC: 29 MMOL/L (ref 21–32)
COARSE GRAN CASTS #/AREA URNS LPF: ABNORMAL /LPF
COLOR FLD: ABNORMAL
COLOR FLD: COLORLESS
COLOR UR: YELLOW
COLOR UR: YELLOW
CREAT SERPL-MCNC: 1.52 MG/DL (ref 0.55–1.02)
CREAT SERPL-MCNC: 2.21 MG/DL (ref 0.55–1.02)
CREAT SERPL-MCNC: 3.01 MG/DL (ref 0.55–1.02)
CREAT SERPL-MCNC: 3.1 MG/DL (ref 0.55–1.02)
CREAT SERPL-MCNC: 3.6 MG/DL (ref 0.55–1.02)
CREAT SERPL-MCNC: 3.63 MG/DL (ref 0.55–1.02)
CREAT SERPL-MCNC: 3.77 MG/DL (ref 0.55–1.02)
CREAT SERPL-MCNC: 4.07 MG/DL (ref 0.55–1.02)
CREAT SERPL-MCNC: 4.73 MG/DL (ref 0.55–1.02)
CREAT SERPL-MCNC: 4.77 MG/DL (ref 0.55–1.02)
CREAT SERPL-MCNC: 5.32 MG/DL (ref 0.55–1.02)
CREAT SERPL-MCNC: 5.33 MG/DL (ref 0.55–1.02)
CREAT SERPL-MCNC: 5.37 MG/DL (ref 0.55–1.02)
CREAT SERPL-MCNC: 6.08 MG/DL (ref 0.55–1.02)
CREAT SERPL-MCNC: 6.18 MG/DL (ref 0.55–1.02)
CREAT SERPL-MCNC: 6.19 MG/DL (ref 0.55–1.02)
CREAT SERPL-MCNC: 6.25 MG/DL (ref 0.55–1.02)
CREAT SERPL-MCNC: 6.31 MG/DL (ref 0.55–1.02)
CREAT SERPL-MCNC: 6.37 MG/DL (ref 0.55–1.02)
CREAT SERPL-MCNC: 6.38 MG/DL (ref 0.55–1.02)
CREAT SERPL-MCNC: 6.4 MG/DL (ref 0.55–1.02)
CREAT SERPL-MCNC: 6.46 MG/DL (ref 0.55–1.02)
CREAT SERPL-MCNC: 6.48 MG/DL (ref 0.55–1.02)
CREAT SERPL-MCNC: 6.52 MG/DL (ref 0.55–1.02)
CREAT SERPL-MCNC: 6.65 MG/DL (ref 0.55–1.02)
CREAT SERPL-MCNC: 6.68 MG/DL (ref 0.55–1.02)
CREAT SERPL-MCNC: 6.83 MG/DL (ref 0.55–1.02)
CREAT SERPL-MCNC: 6.91 MG/DL (ref 0.55–1.02)
CREAT SERPL-MCNC: 6.92 MG/DL (ref 0.55–1.02)
CREAT SERPL-MCNC: 6.93 MG/DL (ref 0.55–1.02)
CREAT SERPL-MCNC: 7.16 MG/DL (ref 0.55–1.02)
CREAT SERPL-MCNC: 7.75 MG/DL (ref 0.55–1.02)
CREAT SERPL-MCNC: 7.84 MG/DL (ref 0.55–1.02)
CROSSMATCH INTERPRETATION: NORMAL
CTP QC/QA: YES
CV ECHO LV RWT: 0.53 CM
CV STRESS BASE HR: 44 BPM
D DIMER PPP FEU-MCNC: 5.03 ΜG/ML (ref 0–0.47)
DACRYOCYTES BLD QL SMEAR: ABNORMAL
DIASTOLIC BLOOD PRESSURE: 68 MMHG
DIFFERENTIAL METHOD BLD: ABNORMAL
DISPENSE STATUS: NORMAL
DOP CALC AO PEAK VEL: 1.6 M/S
DOP CALC AO VTI: 42 CM
DOP CALC LVOT AREA: 2 CM2
DOP CALC LVOT DIAMETER: 1.6 CM
DOP CALC LVOT PEAK VEL: 1.2 M/S
DOP CALC LVOT STROKE VOLUME: 62.3 CM3
DOP CALC MV VTI: 51.6 CM
DOP CALCLVOT PEAK VEL VTI: 31 CM
E COLI SXT1 STL QL IA: NEGATIVE
E COLI SXT2 STL QL IA: NEGATIVE
E WAVE DECELERATION TIME: 163 MSEC
ECHO EF ESTIMATED: 70 %
ECHO LV POSTERIOR WALL: 1.26 CM (ref 0.6–1.1)
EGFR (NO RACE VARIABLE) (RUSH/TITUS): 10 ML/MIN/1.73M²
EGFR (NO RACE VARIABLE) (RUSH/TITUS): 12 ML/MIN/1.73M²
EGFR (NO RACE VARIABLE) (RUSH/TITUS): 5 ML/MIN/1.73M²
EGFR (NO RACE VARIABLE) (RUSH/TITUS): 6 ML/MIN/1.73M²
EGFR (NO RACE VARIABLE) (RUSH/TITUS): 7 ML/MIN/1.73M²
EGFR (NO RACE VARIABLE) (RUSH/TITUS): 9 ML/MIN/1.73M²
EJECTION FRACTION: 70 %
EOSINOPHIL # BLD AUTO: 0.05 K/UL (ref 0–0.5)
EOSINOPHIL # BLD AUTO: 0.06 K/UL (ref 0–0.5)
EOSINOPHIL # BLD AUTO: 0.1 K/UL (ref 0–0.5)
EOSINOPHIL # BLD AUTO: 0.12 K/UL (ref 0–0.5)
EOSINOPHIL # BLD AUTO: 0.13 K/UL (ref 0–0.5)
EOSINOPHIL # BLD AUTO: 0.16 K/UL (ref 0–0.5)
EOSINOPHIL # BLD AUTO: 0.17 K/UL (ref 0–0.5)
EOSINOPHIL # BLD AUTO: 0.18 K/UL (ref 0–0.5)
EOSINOPHIL # BLD AUTO: 0.18 K/UL (ref 0–0.5)
EOSINOPHIL # BLD AUTO: 0.19 K/UL (ref 0–0.5)
EOSINOPHIL # BLD AUTO: 0.21 K/UL (ref 0–0.5)
EOSINOPHIL # BLD AUTO: 0.22 K/UL (ref 0–0.5)
EOSINOPHIL # BLD AUTO: 0.24 K/UL (ref 0–0.5)
EOSINOPHIL # BLD AUTO: 0.25 K/UL (ref 0–0.5)
EOSINOPHIL # BLD AUTO: 0.25 K/UL (ref 0–0.5)
EOSINOPHIL # BLD AUTO: 0.26 K/UL (ref 0–0.5)
EOSINOPHIL # BLD AUTO: 0.27 K/UL (ref 0–0.5)
EOSINOPHIL # BLD AUTO: 0.29 K/UL (ref 0–0.5)
EOSINOPHIL # BLD AUTO: 0.34 K/UL (ref 0–0.5)
EOSINOPHIL # BLD AUTO: 0.34 K/UL (ref 0–0.5)
EOSINOPHIL # BLD AUTO: 0.35 K/UL (ref 0–0.5)
EOSINOPHIL # BLD AUTO: 0.42 K/UL (ref 0–0.5)
EOSINOPHIL NFR BLD AUTO: 0.7 % (ref 1–4)
EOSINOPHIL NFR BLD AUTO: 0.8 % (ref 1–4)
EOSINOPHIL NFR BLD AUTO: 0.9 % (ref 1–4)
EOSINOPHIL NFR BLD AUTO: 1 % (ref 1–4)
EOSINOPHIL NFR BLD AUTO: 1.1 % (ref 1–4)
EOSINOPHIL NFR BLD AUTO: 1.6 % (ref 1–4)
EOSINOPHIL NFR BLD AUTO: 1.6 % (ref 1–4)
EOSINOPHIL NFR BLD AUTO: 1.9 % (ref 1–4)
EOSINOPHIL NFR BLD AUTO: 2.4 % (ref 1–4)
EOSINOPHIL NFR BLD AUTO: 2.5 % (ref 1–4)
EOSINOPHIL NFR BLD AUTO: 2.5 % (ref 1–4)
EOSINOPHIL NFR BLD AUTO: 2.7 % (ref 1–4)
EOSINOPHIL NFR BLD AUTO: 2.9 % (ref 1–4)
EOSINOPHIL NFR BLD AUTO: 3.2 % (ref 1–4)
EOSINOPHIL NFR BLD AUTO: 3.5 % (ref 1–4)
EOSINOPHIL NFR BLD AUTO: 3.6 % (ref 1–4)
EOSINOPHIL NFR BLD AUTO: 3.6 % (ref 1–4)
EOSINOPHIL NFR BLD AUTO: 3.9 % (ref 1–4)
EOSINOPHIL NFR BLD AUTO: 4.6 % (ref 1–4)
EOSINOPHIL NFR BLD AUTO: 4.8 % (ref 1–4)
EOSINOPHIL NFR BLD AUTO: 4.9 % (ref 1–4)
EOSINOPHIL NFR BLD AUTO: 5 % (ref 1–4)
EOSINOPHIL NFR BLD AUTO: 5.1 % (ref 1–4)
EOSINOPHIL NFR BLD AUTO: 5.3 % (ref 1–4)
EOSINOPHIL NFR BLD AUTO: 5.4 % (ref 1–4)
EOSINOPHIL NFR BLD AUTO: 5.4 % (ref 1–4)
EOSINOPHIL NFR BLD AUTO: 5.6 % (ref 1–4)
EOSINOPHIL NFR BLD AUTO: 5.7 % (ref 1–4)
EOSINOPHIL NFR BLD AUTO: 6.1 % (ref 1–4)
EOSINOPHIL NFR BLD AUTO: 6.1 % (ref 1–4)
EOSINOPHIL NFR BLD AUTO: 7.2 % (ref 1–4)
EOSINOPHIL NFR BLD MANUAL: 1 % (ref 1–4)
EOSINOPHIL NFR BLD MANUAL: 1 % (ref 1–4)
EOSINOPHIL NFR BLD MANUAL: 2 % (ref 1–4)
EOSINOPHIL NFR BLD MANUAL: 3 % (ref 1–4)
EOSINOPHIL NFR BLD MANUAL: 5 % (ref 1–4)
EOSINOPHIL NFR BLD MANUAL: 6 % (ref 1–4)
EOSINOPHIL NFR FLD MANUAL: 1 %
ERYTHROCYTE [DISTWIDTH] IN BLOOD BY AUTOMATED COUNT: 13 % (ref 11.5–14.5)
ERYTHROCYTE [DISTWIDTH] IN BLOOD BY AUTOMATED COUNT: 13.1 % (ref 11.5–14.5)
ERYTHROCYTE [DISTWIDTH] IN BLOOD BY AUTOMATED COUNT: 13.2 % (ref 11.5–14.5)
ERYTHROCYTE [DISTWIDTH] IN BLOOD BY AUTOMATED COUNT: 13.2 % (ref 11.5–14.5)
ERYTHROCYTE [DISTWIDTH] IN BLOOD BY AUTOMATED COUNT: 13.3 % (ref 11.5–14.5)
ERYTHROCYTE [DISTWIDTH] IN BLOOD BY AUTOMATED COUNT: 13.3 % (ref 11.5–14.5)
ERYTHROCYTE [DISTWIDTH] IN BLOOD BY AUTOMATED COUNT: 13.4 % (ref 11.5–14.5)
ERYTHROCYTE [DISTWIDTH] IN BLOOD BY AUTOMATED COUNT: 13.5 % (ref 11.5–14.5)
ERYTHROCYTE [DISTWIDTH] IN BLOOD BY AUTOMATED COUNT: 13.5 % (ref 11.5–14.5)
ERYTHROCYTE [DISTWIDTH] IN BLOOD BY AUTOMATED COUNT: 13.7 % (ref 11.5–14.5)
ERYTHROCYTE [DISTWIDTH] IN BLOOD BY AUTOMATED COUNT: 14.1 % (ref 11.5–14.5)
ERYTHROCYTE [DISTWIDTH] IN BLOOD BY AUTOMATED COUNT: 14.7 % (ref 11.5–14.5)
ERYTHROCYTE [DISTWIDTH] IN BLOOD BY AUTOMATED COUNT: 15 % (ref 11.5–14.5)
ERYTHROCYTE [DISTWIDTH] IN BLOOD BY AUTOMATED COUNT: 15.3 % (ref 11.5–14.5)
ERYTHROCYTE [DISTWIDTH] IN BLOOD BY AUTOMATED COUNT: 15.4 % (ref 11.5–14.5)
ERYTHROCYTE [DISTWIDTH] IN BLOOD BY AUTOMATED COUNT: 15.4 % (ref 11.5–14.5)
ERYTHROCYTE [DISTWIDTH] IN BLOOD BY AUTOMATED COUNT: 15.6 % (ref 11.5–14.5)
ERYTHROCYTE [DISTWIDTH] IN BLOOD BY AUTOMATED COUNT: 15.7 % (ref 11.5–14.5)
ERYTHROCYTE [DISTWIDTH] IN BLOOD BY AUTOMATED COUNT: 16 % (ref 11.5–14.5)
ERYTHROCYTE [DISTWIDTH] IN BLOOD BY AUTOMATED COUNT: 16.5 % (ref 11.5–14.5)
ERYTHROCYTE [DISTWIDTH] IN BLOOD BY AUTOMATED COUNT: 16.6 % (ref 11.5–14.5)
ERYTHROCYTE [DISTWIDTH] IN BLOOD BY AUTOMATED COUNT: 16.9 % (ref 11.5–14.5)
ERYTHROCYTE [DISTWIDTH] IN BLOOD BY AUTOMATED COUNT: 17 % (ref 11.5–14.5)
ERYTHROCYTE [DISTWIDTH] IN BLOOD BY AUTOMATED COUNT: 17.4 % (ref 11.5–14.5)
ERYTHROCYTE [DISTWIDTH] IN BLOOD BY AUTOMATED COUNT: 17.5 % (ref 11.5–14.5)
ERYTHROCYTE [DISTWIDTH] IN BLOOD BY AUTOMATED COUNT: 17.6 % (ref 11.5–14.5)
ERYTHROCYTE [DISTWIDTH] IN BLOOD BY AUTOMATED COUNT: 18.1 % (ref 11.5–14.5)
EST. AVERAGE GLUCOSE BLD GHB EST-MCNC: 97 MG/DL
FERRITIN SERPL-MCNC: 136 NG/ML (ref 8–252)
FERRITIN SERPL-MCNC: 588 NG/ML (ref 8–252)
FINE GRAN CASTS #/AREA URNS LPF: ABNORMAL /LPF
FLUAV AG UPPER RESP QL IA.RAPID: NEGATIVE
FLUBV AG UPPER RESP QL IA.RAPID: NEGATIVE
FOLATE SERPL-MCNC: 13.6 NG/ML (ref 3.1–17.5)
FOLATE SERPL-MCNC: 7.8 NG/ML (ref 3.1–17.5)
FOLATE SERPL-MCNC: 9.5 NG/ML (ref 3.1–17.5)
FRACTIONAL SHORTENING: 44 % (ref 28–44)
GLOBULIN SER-MCNC: 2.9 G/DL (ref 2–4)
GLOBULIN SER-MCNC: 3.5 G/DL (ref 2–4)
GLOBULIN SER-MCNC: 4 G/DL (ref 2–4)
GLOBULIN SER-MCNC: 4.1 G/DL (ref 2–4)
GLOBULIN SER-MCNC: 4.3 G/DL (ref 2–4)
GLOBULIN SER-MCNC: 5.3 G/DL (ref 2–4)
GLUCOSE FLD-MCNC: 203 MG/DL
GLUCOSE SERPL-MCNC: 100 MG/DL (ref 70–105)
GLUCOSE SERPL-MCNC: 101 MG/DL (ref 70–105)
GLUCOSE SERPL-MCNC: 102 MG/DL (ref 70–105)
GLUCOSE SERPL-MCNC: 102 MG/DL (ref 74–106)
GLUCOSE SERPL-MCNC: 102 MG/DL (ref 74–106)
GLUCOSE SERPL-MCNC: 104 MG/DL (ref 70–105)
GLUCOSE SERPL-MCNC: 107 MG/DL (ref 74–106)
GLUCOSE SERPL-MCNC: 108 MG/DL (ref 70–105)
GLUCOSE SERPL-MCNC: 109 MG/DL (ref 70–105)
GLUCOSE SERPL-MCNC: 109 MG/DL (ref 74–106)
GLUCOSE SERPL-MCNC: 110 MG/DL (ref 74–106)
GLUCOSE SERPL-MCNC: 111 MG/DL (ref 70–105)
GLUCOSE SERPL-MCNC: 112 MG/DL (ref 70–105)
GLUCOSE SERPL-MCNC: 113 MG/DL (ref 70–105)
GLUCOSE SERPL-MCNC: 114 MG/DL (ref 70–105)
GLUCOSE SERPL-MCNC: 114 MG/DL (ref 70–105)
GLUCOSE SERPL-MCNC: 115 MG/DL (ref 70–105)
GLUCOSE SERPL-MCNC: 116 MG/DL (ref 70–105)
GLUCOSE SERPL-MCNC: 116 MG/DL (ref 74–106)
GLUCOSE SERPL-MCNC: 118 MG/DL (ref 70–105)
GLUCOSE SERPL-MCNC: 118 MG/DL (ref 70–105)
GLUCOSE SERPL-MCNC: 118 MG/DL (ref 74–106)
GLUCOSE SERPL-MCNC: 119 MG/DL (ref 70–105)
GLUCOSE SERPL-MCNC: 119 MG/DL (ref 70–105)
GLUCOSE SERPL-MCNC: 120 MG/DL (ref 70–105)
GLUCOSE SERPL-MCNC: 121 MG/DL (ref 70–105)
GLUCOSE SERPL-MCNC: 121 MG/DL (ref 70–105)
GLUCOSE SERPL-MCNC: 122 MG/DL (ref 70–105)
GLUCOSE SERPL-MCNC: 123 MG/DL (ref 70–105)
GLUCOSE SERPL-MCNC: 124 MG/DL (ref 70–105)
GLUCOSE SERPL-MCNC: 124 MG/DL (ref 70–105)
GLUCOSE SERPL-MCNC: 125 MG/DL (ref 70–105)
GLUCOSE SERPL-MCNC: 125 MG/DL (ref 70–105)
GLUCOSE SERPL-MCNC: 126 MG/DL (ref 70–105)
GLUCOSE SERPL-MCNC: 127 MG/DL (ref 70–105)
GLUCOSE SERPL-MCNC: 127 MG/DL (ref 70–105)
GLUCOSE SERPL-MCNC: 127 MG/DL (ref 74–106)
GLUCOSE SERPL-MCNC: 128 MG/DL (ref 70–105)
GLUCOSE SERPL-MCNC: 128 MG/DL (ref 74–106)
GLUCOSE SERPL-MCNC: 129 MG/DL (ref 70–105)
GLUCOSE SERPL-MCNC: 129 MG/DL (ref 74–106)
GLUCOSE SERPL-MCNC: 130 MG/DL (ref 70–105)
GLUCOSE SERPL-MCNC: 130 MG/DL (ref 74–106)
GLUCOSE SERPL-MCNC: 131 MG/DL (ref 70–105)
GLUCOSE SERPL-MCNC: 131 MG/DL (ref 70–105)
GLUCOSE SERPL-MCNC: 132 MG/DL (ref 70–105)
GLUCOSE SERPL-MCNC: 132 MG/DL (ref 70–105)
GLUCOSE SERPL-MCNC: 133 MG/DL (ref 70–105)
GLUCOSE SERPL-MCNC: 134 MG/DL (ref 70–105)
GLUCOSE SERPL-MCNC: 134 MG/DL (ref 74–106)
GLUCOSE SERPL-MCNC: 135 MG/DL (ref 70–105)
GLUCOSE SERPL-MCNC: 136 MG/DL (ref 70–105)
GLUCOSE SERPL-MCNC: 137 MG/DL (ref 70–105)
GLUCOSE SERPL-MCNC: 138 MG/DL (ref 70–105)
GLUCOSE SERPL-MCNC: 140 MG/DL (ref 70–105)
GLUCOSE SERPL-MCNC: 141 MG/DL (ref 70–105)
GLUCOSE SERPL-MCNC: 142 MG/DL (ref 70–105)
GLUCOSE SERPL-MCNC: 142 MG/DL (ref 70–105)
GLUCOSE SERPL-MCNC: 142 MG/DL (ref 70–110)
GLUCOSE SERPL-MCNC: 144 MG/DL (ref 74–106)
GLUCOSE SERPL-MCNC: 145 MG/DL (ref 70–105)
GLUCOSE SERPL-MCNC: 145 MG/DL (ref 74–106)
GLUCOSE SERPL-MCNC: 147 MG/DL (ref 70–105)
GLUCOSE SERPL-MCNC: 147 MG/DL (ref 70–105)
GLUCOSE SERPL-MCNC: 148 MG/DL (ref 70–105)
GLUCOSE SERPL-MCNC: 150 MG/DL (ref 70–105)
GLUCOSE SERPL-MCNC: 152 MG/DL (ref 70–105)
GLUCOSE SERPL-MCNC: 152 MG/DL (ref 70–105)
GLUCOSE SERPL-MCNC: 152 MG/DL (ref 74–106)
GLUCOSE SERPL-MCNC: 153 MG/DL (ref 70–105)
GLUCOSE SERPL-MCNC: 153 MG/DL (ref 70–105)
GLUCOSE SERPL-MCNC: 157 MG/DL (ref 74–106)
GLUCOSE SERPL-MCNC: 158 MG/DL (ref 70–105)
GLUCOSE SERPL-MCNC: 159 MG/DL (ref 70–105)
GLUCOSE SERPL-MCNC: 161 MG/DL (ref 70–105)
GLUCOSE SERPL-MCNC: 163 MG/DL (ref 70–105)
GLUCOSE SERPL-MCNC: 164 MG/DL (ref 70–105)
GLUCOSE SERPL-MCNC: 165 MG/DL (ref 70–105)
GLUCOSE SERPL-MCNC: 167 MG/DL (ref 70–105)
GLUCOSE SERPL-MCNC: 172 MG/DL (ref 70–105)
GLUCOSE SERPL-MCNC: 174 MG/DL (ref 70–105)
GLUCOSE SERPL-MCNC: 176 MG/DL (ref 74–106)
GLUCOSE SERPL-MCNC: 179 MG/DL (ref 70–105)
GLUCOSE SERPL-MCNC: 180 MG/DL (ref 70–105)
GLUCOSE SERPL-MCNC: 181 MG/DL (ref 70–105)
GLUCOSE SERPL-MCNC: 185 MG/DL (ref 70–105)
GLUCOSE SERPL-MCNC: 187 MG/DL (ref 70–105)
GLUCOSE SERPL-MCNC: 188 MG/DL (ref 70–105)
GLUCOSE SERPL-MCNC: 191 MG/DL (ref 70–105)
GLUCOSE SERPL-MCNC: 195 MG/DL (ref 70–105)
GLUCOSE SERPL-MCNC: 195 MG/DL (ref 70–105)
GLUCOSE SERPL-MCNC: 195 MG/DL (ref 74–106)
GLUCOSE SERPL-MCNC: 198 MG/DL (ref 70–105)
GLUCOSE SERPL-MCNC: 201 MG/DL (ref 70–105)
GLUCOSE SERPL-MCNC: 202 MG/DL (ref 70–105)
GLUCOSE SERPL-MCNC: 209 MG/DL (ref 70–105)
GLUCOSE SERPL-MCNC: 210 MG/DL (ref 70–105)
GLUCOSE SERPL-MCNC: 211 MG/DL (ref 70–105)
GLUCOSE SERPL-MCNC: 214 MG/DL (ref 70–105)
GLUCOSE SERPL-MCNC: 214 MG/DL (ref 70–105)
GLUCOSE SERPL-MCNC: 215 MG/DL (ref 74–106)
GLUCOSE SERPL-MCNC: 219 MG/DL (ref 70–105)
GLUCOSE SERPL-MCNC: 221 MG/DL (ref 70–105)
GLUCOSE SERPL-MCNC: 222 MG/DL (ref 70–105)
GLUCOSE SERPL-MCNC: 222 MG/DL (ref 70–105)
GLUCOSE SERPL-MCNC: 232 MG/DL (ref 70–110)
GLUCOSE SERPL-MCNC: 232 MG/DL (ref 74–106)
GLUCOSE SERPL-MCNC: 233 MG/DL (ref 70–105)
GLUCOSE SERPL-MCNC: 237 MG/DL (ref 70–105)
GLUCOSE SERPL-MCNC: 241 MG/DL (ref 74–106)
GLUCOSE SERPL-MCNC: 247 MG/DL (ref 70–105)
GLUCOSE SERPL-MCNC: 248 MG/DL (ref 70–105)
GLUCOSE SERPL-MCNC: 248 MG/DL (ref 70–105)
GLUCOSE SERPL-MCNC: 248 MG/DL (ref 74–106)
GLUCOSE SERPL-MCNC: 266 MG/DL (ref 70–105)
GLUCOSE SERPL-MCNC: 274 MG/DL (ref 70–105)
GLUCOSE SERPL-MCNC: 310 MG/DL (ref 74–106)
GLUCOSE SERPL-MCNC: 322 MG/DL (ref 74–106)
GLUCOSE SERPL-MCNC: 327 MG/DL (ref 70–105)
GLUCOSE SERPL-MCNC: 333 MG/DL (ref 70–105)
GLUCOSE SERPL-MCNC: 61 MG/DL (ref 74–106)
GLUCOSE SERPL-MCNC: 78 MG/DL (ref 70–105)
GLUCOSE SERPL-MCNC: 81 MG/DL (ref 70–105)
GLUCOSE SERPL-MCNC: 83 MG/DL (ref 70–105)
GLUCOSE SERPL-MCNC: 86 MG/DL (ref 70–105)
GLUCOSE SERPL-MCNC: 90 MG/DL (ref 70–105)
GLUCOSE SERPL-MCNC: 91 MG/DL (ref 70–105)
GLUCOSE SERPL-MCNC: 91 MG/DL (ref 70–105)
GLUCOSE SERPL-MCNC: 92 MG/DL (ref 70–105)
GLUCOSE SERPL-MCNC: 93 MG/DL (ref 70–105)
GLUCOSE SERPL-MCNC: 93 MG/DL (ref 74–106)
GLUCOSE SERPL-MCNC: 94 MG/DL (ref 74–106)
GLUCOSE SERPL-MCNC: 94 MG/DL (ref 74–106)
GLUCOSE SERPL-MCNC: 95 MG/DL (ref 74–106)
GLUCOSE SERPL-MCNC: 96 MG/DL (ref 70–105)
GLUCOSE SERPL-MCNC: 97 MG/DL (ref 70–105)
GLUCOSE SERPL-MCNC: 97 MG/DL (ref 70–105)
GLUCOSE SERPL-MCNC: 97 MG/DL (ref 74–106)
GLUCOSE SERPL-MCNC: 98 MG/DL (ref 70–105)
GLUCOSE SERPL-MCNC: 98 MG/DL (ref 74–106)
GLUCOSE UR STRIP-MCNC: 250 MG/DL
GLUCOSE UR STRIP-MCNC: 500 MG/DL
GRAM STN SPEC: ABNORMAL
GRAM STN SPEC: NORMAL
GRAM STN SPEC: NORMAL
GRANULOCYTES NFR FLD MANUAL: 8 % (ref 0–25)
HAV IGM SER QL: NORMAL
HAV IGM SER QL: NORMAL
HBA1C MFR BLD HPLC: 5.5 % (ref 4.5–6.6)
HBV CORE IGM SER QL: NORMAL
HBV CORE IGM SER QL: NORMAL
HBV SURFACE AG SERPL QL IA: NORMAL
HBV SURFACE AG SERPL QL IA: NORMAL
HCO3 UR-SCNC: 18.4 MMOL/L (ref 21–28)
HCT VFR BLD AUTO: 20.6 % (ref 38–47)
HCT VFR BLD AUTO: 20.8 % (ref 38–47)
HCT VFR BLD AUTO: 21.8 % (ref 38–47)
HCT VFR BLD AUTO: 21.8 % (ref 38–47)
HCT VFR BLD AUTO: 22.2 % (ref 38–47)
HCT VFR BLD AUTO: 22.5 % (ref 38–47)
HCT VFR BLD AUTO: 23.1 % (ref 38–47)
HCT VFR BLD AUTO: 23.6 % (ref 38–47)
HCT VFR BLD AUTO: 23.9 % (ref 38–47)
HCT VFR BLD AUTO: 24.2 % (ref 38–47)
HCT VFR BLD AUTO: 24.3 % (ref 38–47)
HCT VFR BLD AUTO: 25 % (ref 38–47)
HCT VFR BLD AUTO: 25.4 % (ref 38–47)
HCT VFR BLD AUTO: 25.6 % (ref 38–47)
HCT VFR BLD AUTO: 25.8 % (ref 38–47)
HCT VFR BLD AUTO: 25.8 % (ref 38–47)
HCT VFR BLD AUTO: 26.5 % (ref 38–47)
HCT VFR BLD AUTO: 26.7 % (ref 38–47)
HCT VFR BLD AUTO: 26.8 % (ref 38–47)
HCT VFR BLD AUTO: 27.1 % (ref 38–47)
HCT VFR BLD AUTO: 27.3 % (ref 38–47)
HCT VFR BLD AUTO: 27.6 % (ref 38–47)
HCT VFR BLD AUTO: 27.7 % (ref 38–47)
HCT VFR BLD AUTO: 27.8 % (ref 38–47)
HCT VFR BLD AUTO: 28.7 % (ref 38–47)
HCT VFR BLD AUTO: 28.8 % (ref 38–47)
HCT VFR BLD AUTO: 29.4 % (ref 38–47)
HCT VFR BLD AUTO: 30.5 % (ref 38–47)
HCT VFR BLD AUTO: 31 % (ref 38–47)
HCT VFR BLD AUTO: 31.2 % (ref 38–47)
HCT VFR BLD AUTO: 31.9 % (ref 38–47)
HCT VFR BLD AUTO: 32.7 % (ref 38–47)
HCT VFR BLD AUTO: 33.3 % (ref 38–47)
HCT VFR BLD AUTO: 33.6 % (ref 38–47)
HCT VFR BLD AUTO: 33.8 % (ref 38–47)
HCT VFR BLD AUTO: 34.3 % (ref 38–47)
HCV AB SER QL: NORMAL
HCV AB SER QL: NORMAL
HGB BLD-MCNC: 10 G/DL (ref 12–16)
HGB BLD-MCNC: 10.1 G/DL (ref 12–16)
HGB BLD-MCNC: 10.5 G/DL (ref 12–16)
HGB BLD-MCNC: 10.6 G/DL (ref 12–16)
HGB BLD-MCNC: 11.1 G/DL (ref 12–16)
HGB BLD-MCNC: 11.3 G/DL (ref 12–16)
HGB BLD-MCNC: 6.2 G/DL (ref 12–16)
HGB BLD-MCNC: 6.3 G/DL (ref 12–16)
HGB BLD-MCNC: 6.7 G/DL (ref 12–16)
HGB BLD-MCNC: 6.7 G/DL (ref 12–16)
HGB BLD-MCNC: 6.8 G/DL (ref 12–16)
HGB BLD-MCNC: 7.2 G/DL (ref 12–16)
HGB BLD-MCNC: 7.3 G/DL (ref 12–16)
HGB BLD-MCNC: 7.4 G/DL (ref 12–16)
HGB BLD-MCNC: 7.6 G/DL (ref 12–16)
HGB BLD-MCNC: 7.9 G/DL (ref 12–16)
HGB BLD-MCNC: 7.9 G/DL (ref 12–16)
HGB BLD-MCNC: 8 G/DL (ref 12–16)
HGB BLD-MCNC: 8.1 G/DL (ref 12–16)
HGB BLD-MCNC: 8.2 G/DL (ref 12–16)
HGB BLD-MCNC: 8.3 G/DL (ref 12–16)
HGB BLD-MCNC: 8.3 G/DL (ref 12–16)
HGB BLD-MCNC: 8.5 G/DL (ref 12–16)
HGB BLD-MCNC: 8.7 G/DL (ref 12–16)
HGB BLD-MCNC: 8.9 G/DL (ref 12–16)
HGB BLD-MCNC: 9.3 G/DL (ref 12–16)
HGB BLD-MCNC: 9.5 G/DL (ref 12–16)
HGB BLD-MCNC: 9.9 G/DL (ref 12–16)
HOLD SPECIMEN: NORMAL
HYPOCHROMIA BLD QL SMEAR: ABNORMAL
IMM GRANULOCYTES # BLD AUTO: 0.01 K/UL (ref 0–0.04)
IMM GRANULOCYTES # BLD AUTO: 0.02 K/UL (ref 0–0.04)
IMM GRANULOCYTES # BLD AUTO: 0.03 K/UL (ref 0–0.04)
IMM GRANULOCYTES # BLD AUTO: 0.04 K/UL (ref 0–0.04)
IMM GRANULOCYTES # BLD AUTO: 0.05 K/UL (ref 0–0.04)
IMM GRANULOCYTES # BLD AUTO: 0.05 K/UL (ref 0–0.04)
IMM GRANULOCYTES # BLD AUTO: 0.08 K/UL (ref 0–0.04)
IMM GRANULOCYTES # BLD AUTO: 0.33 K/UL (ref 0–0.04)
IMM GRANULOCYTES # BLD AUTO: 0.53 K/UL (ref 0–0.04)
IMM GRANULOCYTES NFR BLD: 0.1 % (ref 0–0.4)
IMM GRANULOCYTES NFR BLD: 0.2 % (ref 0–0.4)
IMM GRANULOCYTES NFR BLD: 0.2 % (ref 0–0.4)
IMM GRANULOCYTES NFR BLD: 0.3 % (ref 0–0.4)
IMM GRANULOCYTES NFR BLD: 0.4 % (ref 0–0.4)
IMM GRANULOCYTES NFR BLD: 0.5 % (ref 0–0.4)
IMM GRANULOCYTES NFR BLD: 0.6 % (ref 0–0.4)
IMM GRANULOCYTES NFR BLD: 0.7 % (ref 0–0.4)
IMM GRANULOCYTES NFR BLD: 0.7 % (ref 0–0.4)
IMM GRANULOCYTES NFR BLD: 0.8 % (ref 0–0.4)
IMM GRANULOCYTES NFR BLD: 1.4 % (ref 0–0.4)
IMM GRANULOCYTES NFR BLD: 2.4 % (ref 0–0.4)
IMM GRANULOCYTES NFR BLD: 5.1 % (ref 0–0.4)
INDIRECT COOMBS: NORMAL
INR BLD: 0.98
INR BLD: 1
INR BLD: 1.11 (ref 0.9–1.1)
INSULIN SERPL-ACNC: NORMAL U[IU]/ML
INSULIN SERPL-ACNC: NORMAL U[IU]/ML
INTERVENTRICULAR SEPTUM: 1.37 CM (ref 0.6–1.1)
IRON SATN MFR SERPL: 10 % (ref 14–50)
IRON SATN MFR SERPL: 18 % (ref 14–50)
IRON SATN MFR SERPL: 21 % (ref 14–50)
IRON SERPL-MCNC: 24 ΜG/DL (ref 50–170)
IRON SERPL-MCNC: 31 ΜG/DL (ref 50–170)
IRON SERPL-MCNC: 64 ΜG/DL (ref 50–170)
IVC OSTIUM: 1.5 CM
KETONES UR STRIP-SCNC: NEGATIVE MG/DL
KETONES UR STRIP-SCNC: NEGATIVE MG/DL
LAB AP CLINICAL INFORMATION: NORMAL
LAB AP COMMENTS: NORMAL
LAB AP GROSS DESCRIPTION: NORMAL
LAB AP GROSS DESCRIPTION: NORMAL
LAB AP LABORATORY NOTES: NORMAL
LAB AP LABORATORY NOTES: NORMAL
LAB AP SPECIMEN A NON-GYN GENERAL CATEGORIZATION: NEGATIVE
LAB AP SPECIMEN A NON-GYN GENERAL CATEGORIZATION: NEGATIVE
LAB AP SPECIMEN A NON-GYN INTERPETATION: NORMAL
LAB AP SPECIMEN A NON-GYN INTERPETATION: NORMAL
LACTATE SERPL-SCNC: 1.2 MMOL/L (ref 0.4–2)
LACTATE SERPL-SCNC: 1.6 MMOL/L (ref 0.4–2)
LACTATE SERPL-SCNC: 2.2 MMOL/L (ref 0.4–2)
LDH FLD-CCNC: 43 U/L
LEFT ATRIUM SIZE: 3.4 CM
LEFT INTERNAL DIMENSION IN SYSTOLE: 2.62 CM (ref 2.1–4)
LEFT VENTRICLE DIASTOLIC VOLUME INDEX: 54.71 ML/M2
LEFT VENTRICLE DIASTOLIC VOLUME: 103.4 ML
LEFT VENTRICLE MASS INDEX: 129 G/M2
LEFT VENTRICLE SYSTOLIC VOLUME INDEX: 13.3 ML/M2
LEFT VENTRICLE SYSTOLIC VOLUME: 25.1 ML
LEFT VENTRICULAR INTERNAL DIMENSION IN DIASTOLE: 4.72 CM (ref 3.5–6)
LEFT VENTRICULAR MASS: 243.47 G
LEUKOCYTE ESTERASE UR QL STRIP: NEGATIVE
LEUKOCYTE ESTERASE UR QL STRIP: NEGATIVE
LVOT MG: 3 MMHG
LYMPHOCYTES # BLD AUTO: 0.34 K/UL (ref 1–4.8)
LYMPHOCYTES # BLD AUTO: 0.55 K/UL (ref 1–4.8)
LYMPHOCYTES # BLD AUTO: 0.56 K/UL (ref 1–4.8)
LYMPHOCYTES # BLD AUTO: 0.57 K/UL (ref 1–4.8)
LYMPHOCYTES # BLD AUTO: 0.57 K/UL (ref 1–4.8)
LYMPHOCYTES # BLD AUTO: 0.58 K/UL (ref 1–4.8)
LYMPHOCYTES # BLD AUTO: 0.58 K/UL (ref 1–4.8)
LYMPHOCYTES # BLD AUTO: 0.61 K/UL (ref 1–4.8)
LYMPHOCYTES # BLD AUTO: 0.65 K/UL (ref 1–4.8)
LYMPHOCYTES # BLD AUTO: 0.66 K/UL (ref 1–4.8)
LYMPHOCYTES # BLD AUTO: 0.71 K/UL (ref 1–4.8)
LYMPHOCYTES # BLD AUTO: 0.72 K/UL (ref 1–4.8)
LYMPHOCYTES # BLD AUTO: 0.77 K/UL (ref 1–4.8)
LYMPHOCYTES # BLD AUTO: 0.82 K/UL (ref 1–4.8)
LYMPHOCYTES # BLD AUTO: 0.84 K/UL (ref 1–4.8)
LYMPHOCYTES # BLD AUTO: 0.86 K/UL (ref 1–4.8)
LYMPHOCYTES # BLD AUTO: 0.88 K/UL (ref 1–4.8)
LYMPHOCYTES # BLD AUTO: 0.88 K/UL (ref 1–4.8)
LYMPHOCYTES # BLD AUTO: 0.89 K/UL (ref 1–4.8)
LYMPHOCYTES # BLD AUTO: 0.91 K/UL (ref 1–4.8)
LYMPHOCYTES # BLD AUTO: 0.94 K/UL (ref 1–4.8)
LYMPHOCYTES # BLD AUTO: 1 K/UL (ref 1–4.8)
LYMPHOCYTES # BLD AUTO: 1.01 K/UL (ref 1–4.8)
LYMPHOCYTES # BLD AUTO: 1.02 K/UL (ref 1–4.8)
LYMPHOCYTES # BLD AUTO: 1.03 K/UL (ref 1–4.8)
LYMPHOCYTES # BLD AUTO: 1.1 K/UL (ref 1–4.8)
LYMPHOCYTES # BLD AUTO: 1.22 K/UL (ref 1–4.8)
LYMPHOCYTES # BLD AUTO: 1.48 K/UL (ref 1–4.8)
LYMPHOCYTES # BLD AUTO: 1.48 K/UL (ref 1–4.8)
LYMPHOCYTES # BLD AUTO: 1.77 K/UL (ref 1–4.8)
LYMPHOCYTES # BLD AUTO: 1.91 K/UL (ref 1–4.8)
LYMPHOCYTES NFR BLD AUTO: 12 % (ref 27–41)
LYMPHOCYTES NFR BLD AUTO: 12.2 % (ref 27–41)
LYMPHOCYTES NFR BLD AUTO: 12.3 % (ref 27–41)
LYMPHOCYTES NFR BLD AUTO: 12.5 % (ref 27–41)
LYMPHOCYTES NFR BLD AUTO: 12.6 % (ref 27–41)
LYMPHOCYTES NFR BLD AUTO: 13 % (ref 27–41)
LYMPHOCYTES NFR BLD AUTO: 14.3 % (ref 27–41)
LYMPHOCYTES NFR BLD AUTO: 14.7 % (ref 27–41)
LYMPHOCYTES NFR BLD AUTO: 15.2 % (ref 27–41)
LYMPHOCYTES NFR BLD AUTO: 15.2 % (ref 27–41)
LYMPHOCYTES NFR BLD AUTO: 16.1 % (ref 27–41)
LYMPHOCYTES NFR BLD AUTO: 16.5 % (ref 27–41)
LYMPHOCYTES NFR BLD AUTO: 17.2 % (ref 27–41)
LYMPHOCYTES NFR BLD AUTO: 17.4 % (ref 27–41)
LYMPHOCYTES NFR BLD AUTO: 17.5 % (ref 27–41)
LYMPHOCYTES NFR BLD AUTO: 18.4 % (ref 27–41)
LYMPHOCYTES NFR BLD AUTO: 18.4 % (ref 27–41)
LYMPHOCYTES NFR BLD AUTO: 19.4 % (ref 27–41)
LYMPHOCYTES NFR BLD AUTO: 20.7 % (ref 27–41)
LYMPHOCYTES NFR BLD AUTO: 21.3 % (ref 27–41)
LYMPHOCYTES NFR BLD AUTO: 21.4 % (ref 27–41)
LYMPHOCYTES NFR BLD AUTO: 21.9 % (ref 27–41)
LYMPHOCYTES NFR BLD AUTO: 22.4 % (ref 27–41)
LYMPHOCYTES NFR BLD AUTO: 7.6 % (ref 27–41)
LYMPHOCYTES NFR BLD AUTO: 7.6 % (ref 27–41)
LYMPHOCYTES NFR BLD AUTO: 8.1 % (ref 27–41)
LYMPHOCYTES NFR BLD AUTO: 8.4 % (ref 27–41)
LYMPHOCYTES NFR BLD AUTO: 8.5 % (ref 27–41)
LYMPHOCYTES NFR BLD AUTO: 9.2 % (ref 27–41)
LYMPHOCYTES NFR BLD MANUAL: 10 % (ref 27–41)
LYMPHOCYTES NFR BLD MANUAL: 11 % (ref 27–41)
LYMPHOCYTES NFR BLD MANUAL: 12 % (ref 27–41)
LYMPHOCYTES NFR BLD MANUAL: 12 % (ref 27–41)
LYMPHOCYTES NFR BLD MANUAL: 14 % (ref 27–41)
LYMPHOCYTES NFR BLD MANUAL: 21 % (ref 27–41)
LYMPHOCYTES NFR BLD MANUAL: 4 % (ref 27–41)
LYMPHOCYTES NFR BLD MANUAL: 7 % (ref 27–41)
LYMPHOCYTES NFR FLD MANUAL: 100 %
LYMPHOCYTES NFR FLD MANUAL: 48 %
MACROCYTES BLD QL SMEAR: ABNORMAL
MACROPHAGES NFR FLD MANUAL: 11 %
MAGNESIUM SERPL-MCNC: 1.7 MG/DL (ref 1.7–2.3)
MAGNESIUM SERPL-MCNC: 1.7 MG/DL (ref 1.7–2.3)
MAGNESIUM SERPL-MCNC: 1.8 MG/DL (ref 1.7–2.3)
MAGNESIUM SERPL-MCNC: 1.9 MG/DL (ref 1.7–2.3)
MAGNESIUM SERPL-MCNC: 2 MG/DL (ref 1.7–2.3)
MAGNESIUM SERPL-MCNC: 2 MG/DL (ref 1.7–2.3)
MAGNESIUM SERPL-MCNC: 2.2 MG/DL (ref 1.7–2.3)
MCH RBC QN AUTO: 29.1 PG (ref 27–31)
MCH RBC QN AUTO: 29.6 PG (ref 27–31)
MCH RBC QN AUTO: 29.6 PG (ref 27–31)
MCH RBC QN AUTO: 29.8 PG (ref 27–31)
MCH RBC QN AUTO: 29.9 PG (ref 27–31)
MCH RBC QN AUTO: 29.9 PG (ref 27–31)
MCH RBC QN AUTO: 30 PG (ref 27–31)
MCH RBC QN AUTO: 30.2 PG (ref 27–31)
MCH RBC QN AUTO: 30.3 PG (ref 27–31)
MCH RBC QN AUTO: 30.4 PG (ref 27–31)
MCH RBC QN AUTO: 30.7 PG (ref 27–31)
MCH RBC QN AUTO: 30.7 PG (ref 27–31)
MCH RBC QN AUTO: 30.8 PG (ref 27–31)
MCH RBC QN AUTO: 30.9 PG (ref 27–31)
MCH RBC QN AUTO: 31.6 PG (ref 27–31)
MCH RBC QN AUTO: 31.8 PG (ref 27–31)
MCH RBC QN AUTO: 32 PG (ref 27–31)
MCH RBC QN AUTO: 32.1 PG (ref 27–31)
MCH RBC QN AUTO: 32.3 PG (ref 27–31)
MCH RBC QN AUTO: 32.4 PG (ref 27–31)
MCH RBC QN AUTO: 32.6 PG (ref 27–31)
MCH RBC QN AUTO: 32.6 PG (ref 27–31)
MCH RBC QN AUTO: 32.7 PG (ref 27–31)
MCH RBC QN AUTO: 32.7 PG (ref 27–31)
MCH RBC QN AUTO: 32.8 PG (ref 27–31)
MCHC RBC AUTO-ENTMCNC: 29.4 G/DL (ref 32–36)
MCHC RBC AUTO-ENTMCNC: 29.5 G/DL (ref 32–36)
MCHC RBC AUTO-ENTMCNC: 29.7 G/DL (ref 32–36)
MCHC RBC AUTO-ENTMCNC: 29.7 G/DL (ref 32–36)
MCHC RBC AUTO-ENTMCNC: 29.8 G/DL (ref 32–36)
MCHC RBC AUTO-ENTMCNC: 30.1 G/DL (ref 32–36)
MCHC RBC AUTO-ENTMCNC: 30.1 G/DL (ref 32–36)
MCHC RBC AUTO-ENTMCNC: 30.2 G/DL (ref 32–36)
MCHC RBC AUTO-ENTMCNC: 30.3 G/DL (ref 32–36)
MCHC RBC AUTO-ENTMCNC: 30.3 G/DL (ref 32–36)
MCHC RBC AUTO-ENTMCNC: 30.4 G/DL (ref 32–36)
MCHC RBC AUTO-ENTMCNC: 30.6 G/DL (ref 32–36)
MCHC RBC AUTO-ENTMCNC: 30.7 G/DL (ref 32–36)
MCHC RBC AUTO-ENTMCNC: 30.7 G/DL (ref 32–36)
MCHC RBC AUTO-ENTMCNC: 30.9 G/DL (ref 32–36)
MCHC RBC AUTO-ENTMCNC: 31 G/DL (ref 32–36)
MCHC RBC AUTO-ENTMCNC: 31 G/DL (ref 32–36)
MCHC RBC AUTO-ENTMCNC: 31.1 G/DL (ref 32–36)
MCHC RBC AUTO-ENTMCNC: 31.1 G/DL (ref 32–36)
MCHC RBC AUTO-ENTMCNC: 31.3 G/DL (ref 32–36)
MCHC RBC AUTO-ENTMCNC: 31.4 G/DL (ref 32–36)
MCHC RBC AUTO-ENTMCNC: 31.5 G/DL (ref 32–36)
MCHC RBC AUTO-ENTMCNC: 31.6 G/DL (ref 32–36)
MCHC RBC AUTO-ENTMCNC: 32.4 G/DL (ref 32–36)
MCHC RBC AUTO-ENTMCNC: 32.5 G/DL (ref 32–36)
MCHC RBC AUTO-ENTMCNC: 32.9 G/DL (ref 32–36)
MCHC RBC AUTO-ENTMCNC: 33.3 G/DL (ref 32–36)
MCHC RBC AUTO-ENTMCNC: 34.2 G/DL (ref 32–36)
MCV RBC AUTO: 100 FL (ref 80–96)
MCV RBC AUTO: 100.4 FL (ref 80–96)
MCV RBC AUTO: 100.4 FL (ref 80–96)
MCV RBC AUTO: 100.6 FL (ref 80–96)
MCV RBC AUTO: 101 FL (ref 80–96)
MCV RBC AUTO: 101.1 FL (ref 80–96)
MCV RBC AUTO: 101.2 FL (ref 80–96)
MCV RBC AUTO: 102.4 FL (ref 80–96)
MCV RBC AUTO: 103 FL (ref 80–96)
MCV RBC AUTO: 103.4 FL (ref 80–96)
MCV RBC AUTO: 104.4 FL (ref 80–96)
MCV RBC AUTO: 105 FL (ref 80–96)
MCV RBC AUTO: 105.4 FL (ref 80–96)
MCV RBC AUTO: 105.7 FL (ref 80–96)
MCV RBC AUTO: 108 FL (ref 80–96)
MCV RBC AUTO: 108.3 FL (ref 80–96)
MCV RBC AUTO: 94.9 FL (ref 80–96)
MCV RBC AUTO: 95.5 FL (ref 80–96)
MCV RBC AUTO: 95.6 FL (ref 80–96)
MCV RBC AUTO: 95.7 FL (ref 80–96)
MCV RBC AUTO: 95.8 FL (ref 80–96)
MCV RBC AUTO: 97.5 FL (ref 80–96)
MCV RBC AUTO: 97.6 FL (ref 80–96)
MCV RBC AUTO: 97.6 FL (ref 80–96)
MCV RBC AUTO: 97.8 FL (ref 80–96)
MCV RBC AUTO: 97.9 FL (ref 80–96)
MCV RBC AUTO: 97.9 FL (ref 80–96)
MCV RBC AUTO: 99 FL (ref 80–96)
MCV RBC AUTO: 99.3 FL (ref 80–96)
MCV RBC AUTO: 99.4 FL (ref 80–96)
MCV RBC AUTO: 99.7 FL (ref 80–96)
METAMYELOCYTES NFR BLD MANUAL: 1 %
METAMYELOCYTES NFR BLD MANUAL: 2 %
MONOCYTES # BLD AUTO: 0.44 K/UL (ref 0–0.8)
MONOCYTES # BLD AUTO: 0.55 K/UL (ref 0–0.8)
MONOCYTES # BLD AUTO: 0.58 K/UL (ref 0–0.8)
MONOCYTES # BLD AUTO: 0.6 K/UL (ref 0–0.8)
MONOCYTES # BLD AUTO: 0.61 K/UL (ref 0–0.8)
MONOCYTES # BLD AUTO: 0.62 K/UL (ref 0–0.8)
MONOCYTES # BLD AUTO: 0.63 K/UL (ref 0–0.8)
MONOCYTES # BLD AUTO: 0.64 K/UL (ref 0–0.8)
MONOCYTES # BLD AUTO: 0.64 K/UL (ref 0–0.8)
MONOCYTES # BLD AUTO: 0.65 K/UL (ref 0–0.8)
MONOCYTES # BLD AUTO: 0.67 K/UL (ref 0–0.8)
MONOCYTES # BLD AUTO: 0.7 K/UL (ref 0–0.8)
MONOCYTES # BLD AUTO: 0.71 K/UL (ref 0–0.8)
MONOCYTES # BLD AUTO: 0.72 K/UL (ref 0–0.8)
MONOCYTES # BLD AUTO: 0.72 K/UL (ref 0–0.8)
MONOCYTES # BLD AUTO: 0.73 K/UL (ref 0–0.8)
MONOCYTES # BLD AUTO: 0.75 K/UL (ref 0–0.8)
MONOCYTES # BLD AUTO: 0.75 K/UL (ref 0–0.8)
MONOCYTES # BLD AUTO: 0.76 K/UL (ref 0–0.8)
MONOCYTES # BLD AUTO: 0.79 K/UL (ref 0–0.8)
MONOCYTES # BLD AUTO: 0.8 K/UL (ref 0–0.8)
MONOCYTES # BLD AUTO: 0.8 K/UL (ref 0–0.8)
MONOCYTES # BLD AUTO: 0.83 K/UL (ref 0–0.8)
MONOCYTES # BLD AUTO: 0.83 K/UL (ref 0–0.8)
MONOCYTES # BLD AUTO: 0.85 K/UL (ref 0–0.8)
MONOCYTES # BLD AUTO: 0.85 K/UL (ref 0–0.8)
MONOCYTES # BLD AUTO: 0.96 K/UL (ref 0–0.8)
MONOCYTES # BLD AUTO: 0.98 K/UL (ref 0–0.8)
MONOCYTES # BLD AUTO: 1.03 K/UL (ref 0–0.8)
MONOCYTES # BLD AUTO: 1.04 K/UL (ref 0–0.8)
MONOCYTES # BLD AUTO: 1.26 K/UL (ref 0–0.8)
MONOCYTES NFR BLD AUTO: 10.5 % (ref 2–6)
MONOCYTES NFR BLD AUTO: 10.6 % (ref 2–6)
MONOCYTES NFR BLD AUTO: 10.9 % (ref 2–6)
MONOCYTES NFR BLD AUTO: 10.9 % (ref 2–6)
MONOCYTES NFR BLD AUTO: 11.8 % (ref 2–6)
MONOCYTES NFR BLD AUTO: 11.9 % (ref 2–6)
MONOCYTES NFR BLD AUTO: 11.9 % (ref 2–6)
MONOCYTES NFR BLD AUTO: 12.3 % (ref 2–6)
MONOCYTES NFR BLD AUTO: 12.4 % (ref 2–6)
MONOCYTES NFR BLD AUTO: 12.6 % (ref 2–6)
MONOCYTES NFR BLD AUTO: 12.6 % (ref 2–6)
MONOCYTES NFR BLD AUTO: 12.8 % (ref 2–6)
MONOCYTES NFR BLD AUTO: 13 % (ref 2–6)
MONOCYTES NFR BLD AUTO: 13.1 % (ref 2–6)
MONOCYTES NFR BLD AUTO: 13.2 % (ref 2–6)
MONOCYTES NFR BLD AUTO: 13.4 % (ref 2–6)
MONOCYTES NFR BLD AUTO: 13.8 % (ref 2–6)
MONOCYTES NFR BLD AUTO: 15.3 % (ref 2–6)
MONOCYTES NFR BLD AUTO: 15.5 % (ref 2–6)
MONOCYTES NFR BLD AUTO: 15.9 % (ref 2–6)
MONOCYTES NFR BLD AUTO: 17.1 % (ref 2–6)
MONOCYTES NFR BLD AUTO: 17.9 % (ref 2–6)
MONOCYTES NFR BLD AUTO: 18 % (ref 2–6)
MONOCYTES NFR BLD AUTO: 18.2 % (ref 2–6)
MONOCYTES NFR BLD AUTO: 21.3 % (ref 2–6)
MONOCYTES NFR BLD AUTO: 6 % (ref 2–6)
MONOCYTES NFR BLD AUTO: 8.1 % (ref 2–6)
MONOCYTES NFR BLD AUTO: 8.8 % (ref 2–6)
MONOCYTES NFR BLD AUTO: 8.9 % (ref 2–6)
MONOCYTES NFR BLD AUTO: 9.3 % (ref 2–6)
MONOCYTES NFR BLD AUTO: 9.6 % (ref 2–6)
MONOCYTES NFR BLD MANUAL: 11 % (ref 2–6)
MONOCYTES NFR BLD MANUAL: 12 % (ref 2–6)
MONOCYTES NFR BLD MANUAL: 15 % (ref 2–6)
MONOCYTES NFR BLD MANUAL: 16 % (ref 2–6)
MONOCYTES NFR BLD MANUAL: 5 % (ref 2–6)
MONOCYTES NFR BLD MANUAL: 7 % (ref 2–6)
MONOCYTES NFR BLD MANUAL: 7 % (ref 2–6)
MONOCYTES NFR BLD MANUAL: 9 % (ref 2–6)
MONOCYTES NFR FLD MANUAL: 32 %
MPC BLD CALC-MCNC: 10.1 FL (ref 9.4–12.4)
MPC BLD CALC-MCNC: 10.2 FL (ref 9.4–12.4)
MPC BLD CALC-MCNC: 10.3 FL (ref 9.4–12.4)
MPC BLD CALC-MCNC: 10.4 FL (ref 9.4–12.4)
MPC BLD CALC-MCNC: 10.5 FL (ref 9.4–12.4)
MPC BLD CALC-MCNC: 10.7 FL (ref 9.4–12.4)
MPC BLD CALC-MCNC: 10.8 FL (ref 9.4–12.4)
MPC BLD CALC-MCNC: 10.9 FL (ref 9.4–12.4)
MPC BLD CALC-MCNC: 11 FL (ref 9.4–12.4)
MPC BLD CALC-MCNC: 11.1 FL (ref 9.4–12.4)
MPC BLD CALC-MCNC: 11.2 FL (ref 9.4–12.4)
MPC BLD CALC-MCNC: 11.2 FL (ref 9.4–12.4)
MPC BLD CALC-MCNC: 11.3 FL (ref 9.4–12.4)
MPC BLD CALC-MCNC: 11.4 FL (ref 9.4–12.4)
MPC BLD CALC-MCNC: 11.5 FL (ref 9.4–12.4)
MPC BLD CALC-MCNC: 11.5 FL (ref 9.4–12.4)
MPC BLD CALC-MCNC: 11.6 FL (ref 9.4–12.4)
MPC BLD CALC-MCNC: 11.8 FL (ref 9.4–12.4)
MPC BLD CALC-MCNC: 11.9 FL (ref 9.4–12.4)
MPC BLD CALC-MCNC: 12.3 FL (ref 9.4–12.4)
MPC BLD CALC-MCNC: 9.8 FL (ref 9.4–12.4)
MUCOUS THREADS #/AREA URNS HPF: ABNORMAL /HPF
MV MEAN GRADIENT: 2 MMHG
MV PEAK E VEL: 1.06 M/S
MV PEAK GRADIENT: 6 MMHG
MV STENOSIS PRESSURE HALF TIME: 78 MS
MV VALVE AREA BY CONTINUITY EQUATION: 1.21 CM2
MV VALVE AREA P 1/2 METHOD: 2.82 CM2
NEUTROPHILS # BLD AUTO: 10.53 K/UL (ref 1.8–7.7)
NEUTROPHILS # BLD AUTO: 2.63 K/UL (ref 1.8–7.7)
NEUTROPHILS # BLD AUTO: 2.72 K/UL (ref 1.8–7.7)
NEUTROPHILS # BLD AUTO: 2.82 K/UL (ref 1.8–7.7)
NEUTROPHILS # BLD AUTO: 2.91 K/UL (ref 1.8–7.7)
NEUTROPHILS # BLD AUTO: 2.93 K/UL (ref 1.8–7.7)
NEUTROPHILS # BLD AUTO: 2.94 K/UL (ref 1.8–7.7)
NEUTROPHILS # BLD AUTO: 2.97 K/UL (ref 1.8–7.7)
NEUTROPHILS # BLD AUTO: 3 K/UL (ref 1.8–7.7)
NEUTROPHILS # BLD AUTO: 3.04 K/UL (ref 1.8–7.7)
NEUTROPHILS # BLD AUTO: 3.23 K/UL (ref 1.8–7.7)
NEUTROPHILS # BLD AUTO: 3.25 K/UL (ref 1.8–7.7)
NEUTROPHILS # BLD AUTO: 3.29 K/UL (ref 1.8–7.7)
NEUTROPHILS # BLD AUTO: 3.4 K/UL (ref 1.8–7.7)
NEUTROPHILS # BLD AUTO: 3.53 K/UL (ref 1.8–7.7)
NEUTROPHILS # BLD AUTO: 3.56 K/UL (ref 1.8–7.7)
NEUTROPHILS # BLD AUTO: 3.73 K/UL (ref 1.8–7.7)
NEUTROPHILS # BLD AUTO: 3.75 K/UL (ref 1.8–7.7)
NEUTROPHILS # BLD AUTO: 3.81 K/UL (ref 1.8–7.7)
NEUTROPHILS # BLD AUTO: 3.97 K/UL (ref 1.8–7.7)
NEUTROPHILS # BLD AUTO: 4.07 K/UL (ref 1.8–7.7)
NEUTROPHILS # BLD AUTO: 4.38 K/UL (ref 1.8–7.7)
NEUTROPHILS # BLD AUTO: 4.6 K/UL (ref 1.8–7.7)
NEUTROPHILS # BLD AUTO: 5.15 K/UL (ref 1.8–7.7)
NEUTROPHILS # BLD AUTO: 5.32 K/UL (ref 1.8–7.7)
NEUTROPHILS # BLD AUTO: 5.44 K/UL (ref 1.8–7.7)
NEUTROPHILS # BLD AUTO: 5.58 K/UL (ref 1.8–7.7)
NEUTROPHILS # BLD AUTO: 5.79 K/UL (ref 1.8–7.7)
NEUTROPHILS # BLD AUTO: 6.36 K/UL (ref 1.8–7.7)
NEUTROPHILS # BLD AUTO: 6.41 K/UL (ref 1.8–7.7)
NEUTROPHILS # BLD AUTO: 7.17 K/UL (ref 1.8–7.7)
NEUTROPHILS NFR BLD AUTO: 58.1 % (ref 53–65)
NEUTROPHILS NFR BLD AUTO: 58.7 % (ref 53–65)
NEUTROPHILS NFR BLD AUTO: 59.4 % (ref 53–65)
NEUTROPHILS NFR BLD AUTO: 59.9 % (ref 53–65)
NEUTROPHILS NFR BLD AUTO: 60 % (ref 53–65)
NEUTROPHILS NFR BLD AUTO: 61.1 % (ref 53–65)
NEUTROPHILS NFR BLD AUTO: 61.6 % (ref 53–65)
NEUTROPHILS NFR BLD AUTO: 61.6 % (ref 53–65)
NEUTROPHILS NFR BLD AUTO: 62.2 % (ref 53–65)
NEUTROPHILS NFR BLD AUTO: 62.7 % (ref 53–65)
NEUTROPHILS NFR BLD AUTO: 63 % (ref 53–65)
NEUTROPHILS NFR BLD AUTO: 63.7 % (ref 53–65)
NEUTROPHILS NFR BLD AUTO: 64.2 % (ref 53–65)
NEUTROPHILS NFR BLD AUTO: 64.8 % (ref 53–65)
NEUTROPHILS NFR BLD AUTO: 66 % (ref 53–65)
NEUTROPHILS NFR BLD AUTO: 66.5 % (ref 53–65)
NEUTROPHILS NFR BLD AUTO: 67.2 % (ref 53–65)
NEUTROPHILS NFR BLD AUTO: 67.2 % (ref 53–65)
NEUTROPHILS NFR BLD AUTO: 68.1 % (ref 53–65)
NEUTROPHILS NFR BLD AUTO: 68.5 % (ref 53–65)
NEUTROPHILS NFR BLD AUTO: 69 % (ref 53–65)
NEUTROPHILS NFR BLD AUTO: 70.2 % (ref 53–65)
NEUTROPHILS NFR BLD AUTO: 71.6 % (ref 53–65)
NEUTROPHILS NFR BLD AUTO: 73.4 % (ref 53–65)
NEUTROPHILS NFR BLD AUTO: 75.5 % (ref 53–65)
NEUTROPHILS NFR BLD AUTO: 76.2 % (ref 53–65)
NEUTROPHILS NFR BLD AUTO: 77.6 % (ref 53–65)
NEUTROPHILS NFR BLD AUTO: 77.7 % (ref 53–65)
NEUTROPHILS NFR BLD AUTO: 78.3 % (ref 53–65)
NEUTROPHILS NFR BLD AUTO: 79.1 % (ref 53–65)
NEUTROPHILS NFR BLD AUTO: 79.5 % (ref 53–65)
NEUTS BAND NFR BLD MANUAL: 2 % (ref 1–5)
NEUTS BAND NFR BLD MANUAL: 4 % (ref 1–5)
NEUTS SEG NFR BLD MANUAL: 63 % (ref 50–62)
NEUTS SEG NFR BLD MANUAL: 67 % (ref 50–62)
NEUTS SEG NFR BLD MANUAL: 70 % (ref 50–62)
NEUTS SEG NFR BLD MANUAL: 72 % (ref 50–62)
NEUTS SEG NFR BLD MANUAL: 75 % (ref 50–62)
NEUTS SEG NFR BLD MANUAL: 79 % (ref 50–62)
NEUTS SEG NFR BLD MANUAL: 83 % (ref 50–62)
NEUTS SEG NFR BLD MANUAL: 87 % (ref 50–62)
NITRITE UR QL STRIP: NEGATIVE
NITRITE UR QL STRIP: NEGATIVE
NOROVIRUS GI+II RNA STL QL NAA+NON-PROBE: NEGATIVE
NRBC # BLD AUTO: 0 X10E3/UL
NRBC # BLD AUTO: 0.03 X10E3/UL
NRBC # BLD AUTO: 0.08 X10E3/UL
NRBC BLD MANUAL-RTO: 1 /100 WBC
NRBC BLD MANUAL-RTO: ABNORMAL %
NRBC, AUTO (.00): 0 %
NRBC, AUTO (.00): 0.6 %
NRBC, AUTO (.00): 0.8 %
NT-PROBNP SERPL-MCNC: 7607 PG/ML (ref 1–450)
NT-PROBNP SERPL-MCNC: 7729 PG/ML (ref 1–450)
NT-PROBNP SERPL-MCNC: ABNORMAL PG/ML (ref 1–450)
OCCULT BLOOD: NEGATIVE
OHS CV CPX 1 MINUTE RECOVERY HEART RATE: 54 BPM
OHS CV CPX 85 PERCENT MAX PREDICTED HEART RATE MALE: 112
OHS CV CPX MAX PREDICTED HEART RATE: 131
OHS CV CPX PATIENT IS FEMALE: 1
OHS CV CPX PATIENT IS MALE: 0
OHS CV CPX PEAK DIASTOLIC BLOOD PRESSURE: 43 MMHG
OHS CV CPX PEAK HEAR RATE: 66 BPM
OHS CV CPX PEAK RATE PRESSURE PRODUCT: 8382
OHS CV CPX PEAK SYSTOLIC BLOOD PRESSURE: 127 MMHG
OHS CV CPX PERCENT MAX PREDICTED HEART RATE ACHIEVED: 50
OHS CV CPX RATE PRESSURE PRODUCT PRESENTING: 5368
OVALOCYTES BLD QL SMEAR: ABNORMAL
PCO2 BLDA: 40 MMHG (ref 35–48)
PH FLD STRIP: 8.5 PH UNITS (ref 6.8–7.6)
PH SMN: 7.27 [PH] (ref 7.35–7.45)
PH UR STRIP: 5.5 PH UNITS
PH UR STRIP: 7 PH UNITS
PHOSPHATE SERPL-MCNC: 2 MG/DL (ref 2.5–4.5)
PHOSPHATE SERPL-MCNC: 2.8 MG/DL (ref 2.5–4.5)
PHOSPHATE SERPL-MCNC: 3.2 MG/DL (ref 2.5–4.5)
PHOSPHATE SERPL-MCNC: 4.3 MG/DL (ref 2.5–4.5)
PHOSPHATE SERPL-MCNC: 4.4 MG/DL (ref 2.5–4.5)
PHOSPHATE SERPL-MCNC: 5.5 MG/DL (ref 2.5–4.5)
PHOSPHATE SERPL-MCNC: 5.8 MG/DL (ref 2.5–4.5)
PHOSPHATE SERPL-MCNC: 6.3 MG/DL (ref 2.5–4.5)
PHOSPHATE SERPL-MCNC: 6.6 MG/DL (ref 2.5–4.5)
PISA TR MAX VEL: 3.1 M/S
PLATELET # BLD AUTO: 101 K/UL (ref 150–400)
PLATELET # BLD AUTO: 109 K/UL (ref 150–400)
PLATELET # BLD AUTO: 110 K/UL (ref 150–400)
PLATELET # BLD AUTO: 110 K/UL (ref 150–400)
PLATELET # BLD AUTO: 111 K/UL (ref 150–400)
PLATELET # BLD AUTO: 116 K/UL (ref 150–400)
PLATELET # BLD AUTO: 118 K/UL (ref 150–400)
PLATELET # BLD AUTO: 122 K/UL (ref 150–400)
PLATELET # BLD AUTO: 125 K/UL (ref 150–400)
PLATELET # BLD AUTO: 127 K/UL (ref 150–400)
PLATELET # BLD AUTO: 127 K/UL (ref 150–400)
PLATELET # BLD AUTO: 133 K/UL (ref 150–400)
PLATELET # BLD AUTO: 134 K/UL (ref 150–400)
PLATELET # BLD AUTO: 134 K/UL (ref 150–400)
PLATELET # BLD AUTO: 135 K/UL (ref 150–400)
PLATELET # BLD AUTO: 136 K/UL (ref 150–400)
PLATELET # BLD AUTO: 145 K/UL (ref 150–400)
PLATELET # BLD AUTO: 146 K/UL (ref 150–400)
PLATELET # BLD AUTO: 149 K/UL (ref 150–400)
PLATELET # BLD AUTO: 155 K/UL (ref 150–400)
PLATELET # BLD AUTO: 162 K/UL (ref 150–400)
PLATELET # BLD AUTO: 163 K/UL (ref 150–400)
PLATELET # BLD AUTO: 170 K/UL (ref 150–400)
PLATELET # BLD AUTO: 174 K/UL (ref 150–400)
PLATELET # BLD AUTO: 184 K/UL (ref 150–400)
PLATELET # BLD AUTO: 254 K/UL (ref 150–400)
PLATELET # BLD AUTO: 287 K/UL (ref 150–400)
PLATELET # BLD AUTO: 87 K/UL (ref 150–400)
PLATELET # BLD AUTO: 95 K/UL (ref 150–400)
PLATELET # BLD AUTO: 97 K/UL (ref 150–400)
PLATELET # BLD AUTO: 98 K/UL (ref 150–400)
PLATELET MORPHOLOGY: ABNORMAL
PLATELET MORPHOLOGY: NORMAL
PLATELET MORPHOLOGY: NORMAL
PO2 BLDA: 376 MMHG (ref 83–108)
POC (AMP) AMPHETAMINE: NEGATIVE
POC (BAR) BARBITURATES: NEGATIVE
POC (BUP) BUPRENORPHINE: NEGATIVE
POC (BZO) BENZODIAZEPINES: NEGATIVE
POC (COC) COCAINE: NEGATIVE
POC (MDMA) METHYLENEDIOXYMETHAMPHETAMINE 3,4: NEGATIVE
POC (MET) METHAMPHETAMINE: NEGATIVE
POC (MOP) OPIATES: ABNORMAL
POC (MTD) METHADONE: NEGATIVE
POC (OXY) OXYCODONE: NEGATIVE
POC (PCP) PHENCYCLIDINE: NEGATIVE
POC (TCA) TRICYCLIC ANTIDEPRESSANTS: NEGATIVE
POC BASE EXCESS: -8 MMOL/L (ref -2–3)
POC SATURATED O2: 100 % (ref 95–98)
POC TEMPERATURE (URINE): 90
POC THC: NEGATIVE
POLYCHROMASIA BLD QL SMEAR: ABNORMAL
POTASSIUM SERPL-SCNC: 3.4 MMOL/L (ref 3.5–5.1)
POTASSIUM SERPL-SCNC: 3.5 MMOL/L (ref 3.5–5.1)
POTASSIUM SERPL-SCNC: 3.7 MMOL/L (ref 3.5–5.1)
POTASSIUM SERPL-SCNC: 4 MMOL/L (ref 3.5–5.1)
POTASSIUM SERPL-SCNC: 4 MMOL/L (ref 3.5–5.1)
POTASSIUM SERPL-SCNC: 4.1 MMOL/L (ref 3.5–5.1)
POTASSIUM SERPL-SCNC: 4.2 MMOL/L (ref 3.5–5.1)
POTASSIUM SERPL-SCNC: 4.3 MMOL/L (ref 3.5–5.1)
POTASSIUM SERPL-SCNC: 4.3 MMOL/L (ref 3.5–5.1)
POTASSIUM SERPL-SCNC: 4.4 MMOL/L (ref 3.5–5.1)
POTASSIUM SERPL-SCNC: 4.5 MMOL/L (ref 3.5–5.1)
POTASSIUM SERPL-SCNC: 4.6 MMOL/L (ref 3.5–5.1)
POTASSIUM SERPL-SCNC: 4.7 MMOL/L (ref 3.5–5.1)
POTASSIUM SERPL-SCNC: 4.8 MMOL/L (ref 3.5–5.1)
POTASSIUM SERPL-SCNC: 4.9 MMOL/L (ref 3.5–5.1)
POTASSIUM SERPL-SCNC: 5.1 MMOL/L (ref 3.5–5.1)
POTASSIUM SERPL-SCNC: 5.3 MMOL/L (ref 3.5–5.1)
POTASSIUM SERPL-SCNC: 5.4 MMOL/L (ref 3.5–5.1)
POTASSIUM SERPL-SCNC: 5.4 MMOL/L (ref 3.5–5.1)
PROT FLD-MCNC: 0.6 G/DL
PROT SERPL-MCNC: 5.6 G/DL (ref 6.4–8.2)
PROT SERPL-MCNC: 5.7 G/DL (ref 6.4–8.2)
PROT SERPL-MCNC: 6.1 G/DL (ref 6.4–8.2)
PROT SERPL-MCNC: 6.2 G/DL (ref 6.4–8.2)
PROT SERPL-MCNC: 6.8 G/DL (ref 6.4–8.2)
PROT SERPL-MCNC: 7.1 G/DL (ref 6.4–8.2)
PROT SERPL-MCNC: 7.5 G/DL (ref 6.4–8.2)
PROT UR QL STRIP: 100
PROT UR QL STRIP: >=300
PROTHROMBIN TIME: 12.6 SECONDS (ref 11.7–14.7)
PROTHROMBIN TIME: 12.8 SECONDS (ref 11.7–14.7)
PROTHROMBIN TIME: 14.3 SECONDS (ref 11.7–14.7)
RA MAJOR: 4.3 CM
RA PRESSURE: 3 MMHG
RBC # BLD AUTO: 2.04 M/UL (ref 4.2–5.4)
RBC # BLD AUTO: 2.18 M/UL (ref 4.2–5.4)
RBC # BLD AUTO: 2.2 M/UL (ref 4.2–5.4)
RBC # BLD AUTO: 2.34 M/UL (ref 4.2–5.4)
RBC # BLD AUTO: 2.35 M/UL (ref 4.2–5.4)
RBC # BLD AUTO: 2.35 M/UL (ref 4.2–5.4)
RBC # BLD AUTO: 2.37 M/UL (ref 4.2–5.4)
RBC # BLD AUTO: 2.42 M/UL (ref 4.2–5.4)
RBC # BLD AUTO: 2.44 M/UL (ref 4.2–5.4)
RBC # BLD AUTO: 2.45 M/UL (ref 4.2–5.4)
RBC # BLD AUTO: 2.47 M/UL (ref 4.2–5.4)
RBC # BLD AUTO: 2.57 M/UL (ref 4.2–5.4)
RBC # BLD AUTO: 2.57 M/UL (ref 4.2–5.4)
RBC # BLD AUTO: 2.65 M/UL (ref 4.2–5.4)
RBC # BLD AUTO: 2.66 M/UL (ref 4.2–5.4)
RBC # BLD AUTO: 2.67 M/UL (ref 4.2–5.4)
RBC # BLD AUTO: 2.67 M/UL (ref 4.2–5.4)
RBC # BLD AUTO: 2.7 M/UL (ref 4.2–5.4)
RBC # BLD AUTO: 2.73 M/UL (ref 4.2–5.4)
RBC # BLD AUTO: 2.75 M/UL (ref 4.2–5.4)
RBC # BLD AUTO: 2.82 M/UL (ref 4.2–5.4)
RBC # BLD AUTO: 2.97 M/UL (ref 4.2–5.4)
RBC # BLD AUTO: 3.1 M/UL (ref 4.2–5.4)
RBC # BLD AUTO: 3.19 M/UL (ref 4.2–5.4)
RBC # BLD AUTO: 3.24 M/UL (ref 4.2–5.4)
RBC # BLD AUTO: 3.3 M/UL (ref 4.2–5.4)
RBC # BLD AUTO: 3.34 M/UL (ref 4.2–5.4)
RBC # BLD AUTO: 3.35 M/UL (ref 4.2–5.4)
RBC # BLD AUTO: 3.38 M/UL (ref 4.2–5.4)
RBC # BLD AUTO: 3.4 M/UL (ref 4.2–5.4)
RBC # BLD AUTO: 3.44 M/UL (ref 4.2–5.4)
RBC # FLD MANUAL: <3000 /CUMM
RBC # FLD MANUAL: <3000 /CUMM
RBC # UR STRIP: ABNORMAL /UL
RBC # UR STRIP: ABNORMAL /UL
RBC #/AREA URNS HPF: ABNORMAL /HPF
RBC #/AREA URNS HPF: ABNORMAL /HPF
RBC MORPH BLD: NORMAL
RBC MORPH BLD: NORMAL
RH BLD: NORMAL
RIGHT VENTRICULAR END-DIASTOLIC DIMENSION: 4.6 CM
RVA RNA STL QL NAA+NON-PROBE: NEGATIVE
S ENT+BONG DNA STL QL NAA+NON-PROBE: NEGATIVE
SARS-COV+SARS-COV-2 AG RESP QL IA.RAPID: NEGATIVE
SHIGELLA SPECIES NAT: NEGATIVE
SODIUM SERPL-SCNC: 126 MMOL/L (ref 136–145)
SODIUM SERPL-SCNC: 131 MMOL/L (ref 136–145)
SODIUM SERPL-SCNC: 131 MMOL/L (ref 136–145)
SODIUM SERPL-SCNC: 133 MMOL/L (ref 136–145)
SODIUM SERPL-SCNC: 134 MMOL/L (ref 136–145)
SODIUM SERPL-SCNC: 134 MMOL/L (ref 136–145)
SODIUM SERPL-SCNC: 135 MMOL/L (ref 136–145)
SODIUM SERPL-SCNC: 136 MMOL/L (ref 136–145)
SODIUM SERPL-SCNC: 136 MMOL/L (ref 136–145)
SODIUM SERPL-SCNC: 137 MMOL/L (ref 136–145)
SODIUM SERPL-SCNC: 137 MMOL/L (ref 136–145)
SODIUM SERPL-SCNC: 138 MMOL/L (ref 136–145)
SODIUM SERPL-SCNC: 139 MMOL/L (ref 136–145)
SODIUM SERPL-SCNC: 140 MMOL/L (ref 136–145)
SODIUM SERPL-SCNC: 141 MMOL/L (ref 136–145)
SODIUM SERPL-SCNC: 142 MMOL/L (ref 136–145)
SODIUM SERPL-SCNC: 143 MMOL/L (ref 136–145)
SODIUM SERPL-SCNC: 145 MMOL/L (ref 136–145)
SP GR UR STRIP: 1.01
SP GR UR STRIP: 1.02
SQUAMOUS #/AREA URNS LPF: ABNORMAL /LPF
SQUAMOUS #/AREA URNS LPF: ABNORMAL /LPF
SYSTOLIC BLOOD PRESSURE: 122 MMHG
TIBC SERPL-MCNC: 171 ΜG/DL (ref 250–450)
TIBC SERPL-MCNC: 239 ΜG/DL (ref 250–450)
TIBC SERPL-MCNC: 312 ΜG/DL (ref 250–450)
TR MAX PG: 38 MMHG
TRANS CELLS #/AREA URNS LPF: ABNORMAL /LPF
TRICUSPID ANNULAR PLANE SYSTOLIC EXCURSION: 2 CM
TROPONIN I SERPL HS-MCNC: 104 PG/ML
TROPONIN I SERPL HS-MCNC: 155.9 PG/ML
TROPONIN I SERPL HS-MCNC: 184.1 PG/ML
TROPONIN I SERPL HS-MCNC: 200.8 PG/ML
TROPONIN I SERPL HS-MCNC: 214 PG/ML
TROPONIN I SERPL HS-MCNC: 31.7 PG/ML
TROPONIN I SERPL HS-MCNC: 47.1 PG/ML
TROPONIN I SERPL HS-MCNC: 49.7 PG/ML
TROPONIN I SERPL HS-MCNC: 50.2 PG/ML
TROPONIN I SERPL HS-MCNC: 50.6 PG/ML
TROPONIN I SERPL HS-MCNC: 53 PG/ML
TROPONIN I SERPL HS-MCNC: 55.5 PG/ML
TV REST PULMONARY ARTERY PRESSURE: 41 MMHG
UA COMPLETE W REFLEX CULTURE PNL UR: NO GROWTH
UNIT NUMBER: NORMAL
UROBILINOGEN UR STRIP-ACNC: 0.2 MG/DL
UROBILINOGEN UR STRIP-ACNC: 0.2 MG/DL
V CHOL+PARA+VUL DNA STL QL NAA+NON-PROBE: NEGATIVE
VERIGENE RESULT: ABNORMAL
VIT B12 SERPL-MCNC: 1809 PG/ML (ref 193–986)
VIT B12 SERPL-MCNC: 1941 PG/ML (ref 193–986)
VIT B12 SERPL-MCNC: 819 PG/ML (ref 193–986)
WBC # BLD AUTO: 10.38 K/UL (ref 4.5–11)
WBC # BLD AUTO: 13.56 K/UL (ref 4.5–11)
WBC # BLD AUTO: 3.7 K/UL (ref 4.5–11)
WBC # BLD AUTO: 4.42 K/UL (ref 4.5–11)
WBC # BLD AUTO: 4.53 K/UL (ref 4.5–11)
WBC # BLD AUTO: 4.62 K/UL (ref 4.5–11)
WBC # BLD AUTO: 4.63 K/UL (ref 4.5–11)
WBC # BLD AUTO: 4.79 K/UL (ref 4.5–11)
WBC # BLD AUTO: 4.82 K/UL (ref 4.5–11)
WBC # BLD AUTO: 4.83 K/UL (ref 4.5–11)
WBC # BLD AUTO: 4.84 K/UL (ref 4.5–11)
WBC # BLD AUTO: 4.89 K/UL (ref 4.5–11)
WBC # BLD AUTO: 5.17 K/UL (ref 4.5–11)
WBC # BLD AUTO: 5.33 K/UL (ref 4.5–11)
WBC # BLD AUTO: 5.44 K/UL (ref 4.5–11)
WBC # BLD AUTO: 5.48 K/UL (ref 4.5–11)
WBC # BLD AUTO: 5.55 K/UL (ref 4.5–11)
WBC # BLD AUTO: 5.67 K/UL (ref 4.5–11)
WBC # BLD AUTO: 5.73 K/UL (ref 4.5–11)
WBC # BLD AUTO: 5.84 K/UL (ref 4.5–11)
WBC # BLD AUTO: 5.86 K/UL (ref 4.5–11)
WBC # BLD AUTO: 6.17 K/UL (ref 4.5–11)
WBC # BLD AUTO: 6.63 K/UL (ref 4.5–11)
WBC # BLD AUTO: 6.72 K/UL (ref 4.5–11)
WBC # BLD AUTO: 6.73 K/UL (ref 4.5–11)
WBC # BLD AUTO: 6.77 K/UL (ref 4.5–11)
WBC # BLD AUTO: 7.2 K/UL (ref 4.5–11)
WBC # BLD AUTO: 7.27 K/UL (ref 4.5–11)
WBC # BLD AUTO: 8.13 K/UL (ref 4.5–11)
WBC # BLD AUTO: 8.29 K/UL (ref 4.5–11)
WBC # BLD AUTO: 8.96 K/UL (ref 4.5–11)
WBC # FLD MANUAL: 210 /CUMM
WBC # FLD MANUAL: <2 /CUMM
WBC #/AREA URNS HPF: ABNORMAL /HPF
WBC #/AREA URNS HPF: ABNORMAL /HPF
WBC CLUMPS, UA: ABNORMAL /HPF
Y ENTEROCOL DNA STL QL NAA+NON-PROBE: NEGATIVE

## 2022-01-01 PROCEDURE — 3074F PR MOST RECENT SYSTOLIC BLOOD PRESSURE < 130 MM HG: ICD-10-PCS | Mod: ,,, | Performed by: FAMILY MEDICINE

## 2022-01-01 PROCEDURE — 80053 COMPREHEN METABOLIC PANEL: CPT | Performed by: SPECIALIST

## 2022-01-01 PROCEDURE — 32551 INSERTION OF CHEST TUBE: CPT | Mod: RT,,, | Performed by: SURGERY

## 2022-01-01 PROCEDURE — 63600175 PHARM REV CODE 636 W HCPCS

## 2022-01-01 PROCEDURE — 94640 AIRWAY INHALATION TREATMENT: CPT

## 2022-01-01 PROCEDURE — 94761 N-INVAS EAR/PLS OXIMETRY MLT: CPT

## 2022-01-01 PROCEDURE — 99233 SBSQ HOSP IP/OBS HIGH 50: CPT | Mod: ,,, | Performed by: STUDENT IN AN ORGANIZED HEALTH CARE EDUCATION/TRAINING PROGRAM

## 2022-01-01 PROCEDURE — 82962 GLUCOSE BLOOD TEST: CPT

## 2022-01-01 PROCEDURE — 93005 ELECTROCARDIOGRAM TRACING: CPT

## 2022-01-01 PROCEDURE — 80053 COMPREHEN METABOLIC PANEL: CPT | Performed by: HOSPITALIST

## 2022-01-01 PROCEDURE — 1159F MED LIST DOCD IN RCRD: CPT | Mod: CPTII,,, | Performed by: SURGERY

## 2022-01-01 PROCEDURE — 83735 ASSAY OF MAGNESIUM: CPT | Performed by: HOSPITALIST

## 2022-01-01 PROCEDURE — 3288F FALL RISK ASSESSMENT DOCD: CPT | Mod: CPTII,,, | Performed by: INTERNAL MEDICINE

## 2022-01-01 PROCEDURE — 25000003 PHARM REV CODE 250: Performed by: NURSE PRACTITIONER

## 2022-01-01 PROCEDURE — 3288F PR FALLS RISK ASSESSMENT DOCUMENTED: ICD-10-PCS | Mod: ,,, | Performed by: FAMILY MEDICINE

## 2022-01-01 PROCEDURE — 25000003 PHARM REV CODE 250: Performed by: STUDENT IN AN ORGANIZED HEALTH CARE EDUCATION/TRAINING PROGRAM

## 2022-01-01 PROCEDURE — 11000001 HC ACUTE MED/SURG PRIVATE ROOM

## 2022-01-01 PROCEDURE — 36415 COLL VENOUS BLD VENIPUNCTURE: CPT | Performed by: NURSE PRACTITIONER

## 2022-01-01 PROCEDURE — 3288F FALL RISK ASSESSMENT DOCD: CPT | Mod: ,,, | Performed by: FAMILY MEDICINE

## 2022-01-01 PROCEDURE — 99233 PR SUBSEQUENT HOSPITAL CARE,LEVL III: ICD-10-PCS | Mod: ,,, | Performed by: INTERNAL MEDICINE

## 2022-01-01 PROCEDURE — 25000003 PHARM REV CODE 250: Performed by: RADIOLOGY

## 2022-01-01 PROCEDURE — 82746 ASSAY OF FOLIC ACID SERUM: CPT | Performed by: HOSPITALIST

## 2022-01-01 PROCEDURE — 90935 HEMODIALYSIS ONE EVALUATION: CPT

## 2022-01-01 PROCEDURE — 82728 ASSAY OF FERRITIN: CPT | Performed by: STUDENT IN AN ORGANIZED HEALTH CARE EDUCATION/TRAINING PROGRAM

## 2022-01-01 PROCEDURE — 63600175 PHARM REV CODE 636 W HCPCS: Performed by: INTERNAL MEDICINE

## 2022-01-01 PROCEDURE — 71000015 HC POSTOP RECOV 1ST HR: Performed by: SURGERY

## 2022-01-01 PROCEDURE — 80069 RENAL FUNCTION PANEL: CPT | Performed by: STUDENT IN AN ORGANIZED HEALTH CARE EDUCATION/TRAINING PROGRAM

## 2022-01-01 PROCEDURE — 93018 CV STRESS TEST I&R ONLY: CPT | Mod: ,,, | Performed by: STUDENT IN AN ORGANIZED HEALTH CARE EDUCATION/TRAINING PROGRAM

## 2022-01-01 PROCEDURE — A4216 STERILE WATER/SALINE, 10 ML: HCPCS | Performed by: RADIOLOGY

## 2022-01-01 PROCEDURE — 96372 THER/PROPH/DIAG INJ SC/IM: CPT | Mod: ,,, | Performed by: FAMILY MEDICINE

## 2022-01-01 PROCEDURE — 51798 US URINE CAPACITY MEASURE: CPT

## 2022-01-01 PROCEDURE — 3288F PR FALLS RISK ASSESSMENT DOCUMENTED: ICD-10-PCS | Mod: CPTII,,, | Performed by: INTERNAL MEDICINE

## 2022-01-01 PROCEDURE — 93016 CV STRESS TEST SUPVJ ONLY: CPT | Mod: ,,, | Performed by: NURSE PRACTITIONER

## 2022-01-01 PROCEDURE — 36592 COLLECT BLOOD FROM PICC: CPT | Performed by: NURSE PRACTITIONER

## 2022-01-01 PROCEDURE — 20000000 HC ICU ROOM

## 2022-01-01 PROCEDURE — 36415 COLL VENOUS BLD VENIPUNCTURE: CPT | Performed by: STUDENT IN AN ORGANIZED HEALTH CARE EDUCATION/TRAINING PROGRAM

## 2022-01-01 PROCEDURE — 85025 COMPLETE CBC W/AUTO DIFF WBC: CPT | Performed by: INTERNAL MEDICINE

## 2022-01-01 PROCEDURE — 87428 SARSCOV & INF VIR A&B AG IA: CPT | Performed by: EMERGENCY MEDICINE

## 2022-01-01 PROCEDURE — 3078F DIAST BP <80 MM HG: CPT | Mod: CPTII,,, | Performed by: STUDENT IN AN ORGANIZED HEALTH CARE EDUCATION/TRAINING PROGRAM

## 2022-01-01 PROCEDURE — 1159F PR MEDICATION LIST DOCUMENTED IN MEDICAL RECORD: ICD-10-PCS | Mod: CPTII,,, | Performed by: SURGERY

## 2022-01-01 PROCEDURE — 63600175 PHARM REV CODE 636 W HCPCS: Performed by: NURSE PRACTITIONER

## 2022-01-01 PROCEDURE — 99239 PR HOSPITAL DISCHARGE DAY,>30 MIN: ICD-10-PCS | Mod: ,,, | Performed by: FAMILY MEDICINE

## 2022-01-01 PROCEDURE — 1125F PR PAIN SEVERITY QUANTIFIED, PAIN PRESENT: ICD-10-PCS | Mod: CPTII,,, | Performed by: PHYSICIAN ASSISTANT

## 2022-01-01 PROCEDURE — 99900035 HC TECH TIME PER 15 MIN (STAT)

## 2022-01-01 PROCEDURE — 36415 COLL VENOUS BLD VENIPUNCTURE: CPT | Performed by: HOSPITALIST

## 2022-01-01 PROCEDURE — 99213 OFFICE O/P EST LOW 20 MIN: CPT | Mod: PBBFAC | Performed by: SURGERY

## 2022-01-01 PROCEDURE — 25000242 PHARM REV CODE 250 ALT 637 W/ HCPCS: Performed by: INTERNAL MEDICINE

## 2022-01-01 PROCEDURE — 71046 X-RAY EXAM CHEST 2 VIEWS: CPT | Mod: TC,59,77

## 2022-01-01 PROCEDURE — C9113 INJ PANTOPRAZOLE SODIUM, VIA: HCPCS | Performed by: NURSE PRACTITIONER

## 2022-01-01 PROCEDURE — 1100F PR PT FALLS ASSESS DOC 2+ FALLS/FALL W/INJURY/YR: ICD-10-PCS | Mod: ,,, | Performed by: FAMILY MEDICINE

## 2022-01-01 PROCEDURE — 25000003 PHARM REV CODE 250: Performed by: NURSE ANESTHETIST, CERTIFIED REGISTERED

## 2022-01-01 PROCEDURE — 99232 SBSQ HOSP IP/OBS MODERATE 35: CPT | Mod: ,,, | Performed by: INTERNAL MEDICINE

## 2022-01-01 PROCEDURE — 25000003 PHARM REV CODE 250: Performed by: INTERNAL MEDICINE

## 2022-01-01 PROCEDURE — 31500 INSERT EMERGENCY AIRWAY: CPT

## 2022-01-01 PROCEDURE — 25000003 PHARM REV CODE 250: Performed by: HOSPITALIST

## 2022-01-01 PROCEDURE — 99233 PR SUBSEQUENT HOSPITAL CARE,LEVL III: ICD-10-PCS | Mod: ,,, | Performed by: STUDENT IN AN ORGANIZED HEALTH CARE EDUCATION/TRAINING PROGRAM

## 2022-01-01 PROCEDURE — 99285 EMERGENCY DEPT VISIT HI MDM: CPT | Mod: 25 | Performed by: EMERGENCY MEDICINE

## 2022-01-01 PROCEDURE — 97110 THERAPEUTIC EXERCISES: CPT

## 2022-01-01 PROCEDURE — 93010 ELECTROCARDIOGRAM REPORT: CPT | Mod: ,,, | Performed by: INTERNAL MEDICINE

## 2022-01-01 PROCEDURE — 36415 COLL VENOUS BLD VENIPUNCTURE: CPT | Performed by: SPECIALIST

## 2022-01-01 PROCEDURE — 99215 OFFICE O/P EST HI 40 MIN: CPT | Mod: PBBFAC,25 | Performed by: INTERNAL MEDICINE

## 2022-01-01 PROCEDURE — 36000709 HC OR TIME LEV III EA ADD 15 MIN: Performed by: SURGERY

## 2022-01-01 PROCEDURE — 96372 THER/PROPH/DIAG INJ SC/IM: CPT

## 2022-01-01 PROCEDURE — 99232 SBSQ HOSP IP/OBS MODERATE 35: CPT | Mod: ,,, | Performed by: STUDENT IN AN ORGANIZED HEALTH CARE EDUCATION/TRAINING PROGRAM

## 2022-01-01 PROCEDURE — 85025 COMPLETE CBC W/AUTO DIFF WBC: CPT | Performed by: HOSPITALIST

## 2022-01-01 PROCEDURE — 89050 BODY FLUID CELL COUNT: CPT | Performed by: INTERNAL MEDICINE

## 2022-01-01 PROCEDURE — 71046 X-RAY EXAM CHEST 2 VIEWS: CPT | Mod: TC,59

## 2022-01-01 PROCEDURE — 25000003 PHARM REV CODE 250: Performed by: SURGERY

## 2022-01-01 PROCEDURE — 63600175 PHARM REV CODE 636 W HCPCS: Performed by: NURSE ANESTHETIST, CERTIFIED REGISTERED

## 2022-01-01 PROCEDURE — 85025 COMPLETE CBC W/AUTO DIFF WBC: CPT | Performed by: EMERGENCY MEDICINE

## 2022-01-01 PROCEDURE — 77001 FLUOROGUIDE FOR VEIN DEVICE: CPT | Mod: 26,,, | Performed by: SURGERY

## 2022-01-01 PROCEDURE — 3078F PR MOST RECENT DIASTOLIC BLOOD PRESSURE < 80 MM HG: ICD-10-PCS | Mod: CPTII,,, | Performed by: STUDENT IN AN ORGANIZED HEALTH CARE EDUCATION/TRAINING PROGRAM

## 2022-01-01 PROCEDURE — 3074F SYST BP LT 130 MM HG: CPT | Mod: CPTII,,, | Performed by: INTERNAL MEDICINE

## 2022-01-01 PROCEDURE — 85014 HEMATOCRIT: CPT | Mod: 91 | Performed by: SURGERY

## 2022-01-01 PROCEDURE — 80048 BASIC METABOLIC PNL TOTAL CA: CPT | Performed by: INTERNAL MEDICINE

## 2022-01-01 PROCEDURE — 36430 TRANSFUSION BLD/BLD COMPNT: CPT

## 2022-01-01 PROCEDURE — 36415 COLL VENOUS BLD VENIPUNCTURE: CPT | Performed by: INTERNAL MEDICINE

## 2022-01-01 PROCEDURE — 1159F PR MEDICATION LIST DOCUMENTED IN MEDICAL RECORD: ICD-10-PCS | Mod: CPTII,,, | Performed by: STUDENT IN AN ORGANIZED HEALTH CARE EDUCATION/TRAINING PROGRAM

## 2022-01-01 PROCEDURE — 3079F DIAST BP 80-89 MM HG: CPT | Mod: CPTII,,, | Performed by: INTERNAL MEDICINE

## 2022-01-01 PROCEDURE — 1101F PT FALLS ASSESS-DOCD LE1/YR: CPT | Mod: CPTII,,, | Performed by: INTERNAL MEDICINE

## 2022-01-01 PROCEDURE — 3077F SYST BP >= 140 MM HG: CPT | Mod: CPTII,,, | Performed by: PHYSICIAN ASSISTANT

## 2022-01-01 PROCEDURE — 80048 BASIC METABOLIC PNL TOTAL CA: CPT | Performed by: NURSE PRACTITIONER

## 2022-01-01 PROCEDURE — 1160F PR REVIEW ALL MEDS BY PRESCRIBER/CLIN PHARMACIST DOCUMENTED: ICD-10-PCS | Mod: CPTII,,, | Performed by: SURGERY

## 2022-01-01 PROCEDURE — 25000003 PHARM REV CODE 250

## 2022-01-01 PROCEDURE — 82962 GLUCOSE BLOOD TEST: CPT | Mod: 91

## 2022-01-01 PROCEDURE — 25000003 PHARM REV CODE 250: Performed by: SPECIALIST

## 2022-01-01 PROCEDURE — 3074F PR MOST RECENT SYSTOLIC BLOOD PRESSURE < 130 MM HG: ICD-10-PCS | Mod: CPTII,,, | Performed by: INTERNAL MEDICINE

## 2022-01-01 PROCEDURE — 99232 PR SUBSEQUENT HOSPITAL CARE,LEVL II: ICD-10-PCS | Mod: ,,, | Performed by: INTERNAL MEDICINE

## 2022-01-01 PROCEDURE — 27000221 HC OXYGEN, UP TO 24 HOURS

## 2022-01-01 PROCEDURE — 71250 CT THORAX DX C-: CPT | Mod: 26,,, | Performed by: STUDENT IN AN ORGANIZED HEALTH CARE EDUCATION/TRAINING PROGRAM

## 2022-01-01 PROCEDURE — P9016 RBC LEUKOCYTES REDUCED: HCPCS | Performed by: HOSPITALIST

## 2022-01-01 PROCEDURE — 84132 ASSAY OF SERUM POTASSIUM: CPT | Performed by: SURGERY

## 2022-01-01 PROCEDURE — 1159F MED LIST DOCD IN RCRD: CPT | Mod: CPTII,,, | Performed by: PHYSICIAN ASSISTANT

## 2022-01-01 PROCEDURE — 99499 UNLISTED E&M SERVICE: CPT | Mod: S$PBB,,, | Performed by: SURGERY

## 2022-01-01 PROCEDURE — 3078F DIAST BP <80 MM HG: CPT | Mod: CPTII,,, | Performed by: PHYSICIAN ASSISTANT

## 2022-01-01 PROCEDURE — 36558 INSERT TUNNELED CV CATH: CPT | Mod: LT,,, | Performed by: SURGERY

## 2022-01-01 PROCEDURE — 99213 OFFICE O/P EST LOW 20 MIN: CPT | Mod: S$PBB,,, | Performed by: INTERNAL MEDICINE

## 2022-01-01 PROCEDURE — D9220A PRA ANESTHESIA: Mod: CRNA,,, | Performed by: NURSE ANESTHETIST, CERTIFIED REGISTERED

## 2022-01-01 PROCEDURE — 85025 COMPLETE CBC W/AUTO DIFF WBC: CPT | Performed by: NURSE PRACTITIONER

## 2022-01-01 PROCEDURE — 80074 ACUTE HEPATITIS PANEL: CPT | Performed by: INTERNAL MEDICINE

## 2022-01-01 PROCEDURE — 63600175 PHARM REV CODE 636 W HCPCS: Performed by: HOSPITALIST

## 2022-01-01 PROCEDURE — 97161 PT EVAL LOW COMPLEX 20 MIN: CPT

## 2022-01-01 PROCEDURE — 3074F SYST BP LT 130 MM HG: CPT | Mod: ,,, | Performed by: FAMILY MEDICINE

## 2022-01-01 PROCEDURE — D9220A PRA ANESTHESIA: Mod: ANES,,, | Performed by: ANESTHESIOLOGY

## 2022-01-01 PROCEDURE — 99232 PR SUBSEQUENT HOSPITAL CARE,LEVL II: ICD-10-PCS | Mod: ,,, | Performed by: STUDENT IN AN ORGANIZED HEALTH CARE EDUCATION/TRAINING PROGRAM

## 2022-01-01 PROCEDURE — 80069 RENAL FUNCTION PANEL: CPT | Mod: ,,, | Performed by: CLINICAL MEDICAL LABORATORY

## 2022-01-01 PROCEDURE — 3077F SYST BP >= 140 MM HG: CPT | Mod: ,,, | Performed by: FAMILY MEDICINE

## 2022-01-01 PROCEDURE — 3078F PR MOST RECENT DIASTOLIC BLOOD PRESSURE < 80 MM HG: ICD-10-PCS | Mod: ,,, | Performed by: FAMILY MEDICINE

## 2022-01-01 PROCEDURE — 84484 ASSAY OF TROPONIN QUANT: CPT | Performed by: HOSPITALIST

## 2022-01-01 PROCEDURE — 36000706: Performed by: SURGERY

## 2022-01-01 PROCEDURE — 83735 ASSAY OF MAGNESIUM: CPT | Performed by: EMERGENCY MEDICINE

## 2022-01-01 PROCEDURE — 71250 CT THORAX DX C-: CPT | Mod: TC

## 2022-01-01 PROCEDURE — 86923 COMPATIBILITY TEST ELECTRIC: CPT | Performed by: HOSPITALIST

## 2022-01-01 PROCEDURE — 1160F PR REVIEW ALL MEDS BY PRESCRIBER/CLIN PHARMACIST DOCUMENTED: ICD-10-PCS | Mod: CPTII,,, | Performed by: INTERNAL MEDICINE

## 2022-01-01 PROCEDURE — 82803 BLOOD GASES ANY COMBINATION: CPT

## 2022-01-01 PROCEDURE — 71000033 HC RECOVERY, INTIAL HOUR: Performed by: SURGERY

## 2022-01-01 PROCEDURE — 3078F DIAST BP <80 MM HG: CPT | Mod: ,,, | Performed by: FAMILY MEDICINE

## 2022-01-01 PROCEDURE — 3075F PR MOST RECENT SYSTOLIC BLOOD PRESS GE 130-139MM HG: ICD-10-PCS | Mod: ,,, | Performed by: FAMILY MEDICINE

## 2022-01-01 PROCEDURE — 71046 X-RAY EXAM CHEST 2 VIEWS: CPT | Mod: 26,,, | Performed by: RADIOLOGY

## 2022-01-01 PROCEDURE — 63600175 PHARM REV CODE 636 W HCPCS: Performed by: FAMILY MEDICINE

## 2022-01-01 PROCEDURE — 99233 SBSQ HOSP IP/OBS HIGH 50: CPT | Mod: ,,, | Performed by: NURSE PRACTITIONER

## 2022-01-01 PROCEDURE — 80048 BASIC METABOLIC PNL TOTAL CA: CPT | Performed by: HOSPITALIST

## 2022-01-01 PROCEDURE — 99499 NO LOS: ICD-10-PCS | Mod: S$PBB,,, | Performed by: SURGERY

## 2022-01-01 PROCEDURE — 80053 COMPREHEN METABOLIC PANEL: CPT | Performed by: INTERNAL MEDICINE

## 2022-01-01 PROCEDURE — 1159F MED LIST DOCD IN RCRD: CPT | Mod: ,,, | Performed by: FAMILY MEDICINE

## 2022-01-01 PROCEDURE — 25000003 PHARM REV CODE 250: Performed by: EMERGENCY MEDICINE

## 2022-01-01 PROCEDURE — 1126F AMNT PAIN NOTED NONE PRSNT: CPT | Mod: CPTII,,, | Performed by: INTERNAL MEDICINE

## 2022-01-01 PROCEDURE — 36415 COLL VENOUS BLD VENIPUNCTURE: CPT | Performed by: REGISTERED NURSE

## 2022-01-01 PROCEDURE — 3077F PR MOST RECENT SYSTOLIC BLOOD PRESSURE >= 140 MM HG: ICD-10-PCS | Mod: ,,, | Performed by: FAMILY MEDICINE

## 2022-01-01 PROCEDURE — 85014 HEMATOCRIT: CPT | Performed by: INTERNAL MEDICINE

## 2022-01-01 PROCEDURE — 99213 OFFICE O/P EST LOW 20 MIN: CPT | Mod: 25,,, | Performed by: FAMILY MEDICINE

## 2022-01-01 PROCEDURE — A4216 STERILE WATER/SALINE, 10 ML: HCPCS | Performed by: INTERNAL MEDICINE

## 2022-01-01 PROCEDURE — 99285 EMERGENCY DEPT VISIT HI MDM: CPT | Mod: 25,CS

## 2022-01-01 PROCEDURE — 99223 PR INITIAL HOSPITAL CARE,LEVL III: ICD-10-PCS | Mod: ,,, | Performed by: INTERNAL MEDICINE

## 2022-01-01 PROCEDURE — A4216 STERILE WATER/SALINE, 10 ML: HCPCS | Performed by: STUDENT IN AN ORGANIZED HEALTH CARE EDUCATION/TRAINING PROGRAM

## 2022-01-01 PROCEDURE — 3078F DIAST BP <80 MM HG: CPT | Mod: CPTII,,, | Performed by: INTERNAL MEDICINE

## 2022-01-01 PROCEDURE — 99214 PR OFFICE/OUTPT VISIT, EST, LEVL IV, 30-39 MIN: ICD-10-PCS | Mod: S$PBB,,, | Performed by: SURGERY

## 2022-01-01 PROCEDURE — 71000016 HC POSTOP RECOV ADDL HR: Performed by: SURGERY

## 2022-01-01 PROCEDURE — 93010 EKG 12-LEAD: ICD-10-PCS | Mod: ,,, | Performed by: INTERNAL MEDICINE

## 2022-01-01 PROCEDURE — 1111F DSCHRG MED/CURRENT MED MERGE: CPT | Mod: ,,, | Performed by: FAMILY MEDICINE

## 2022-01-01 PROCEDURE — 87428 SARSCOV & INF VIR A&B AG IA: CPT | Performed by: INTERNAL MEDICINE

## 2022-01-01 PROCEDURE — D9220A PRA ANESTHESIA: ICD-10-PCS | Mod: CRNA,,, | Performed by: NURSE ANESTHETIST, CERTIFIED REGISTERED

## 2022-01-01 PROCEDURE — 99212 OFFICE O/P EST SF 10 MIN: CPT | Mod: ,,, | Performed by: FAMILY MEDICINE

## 2022-01-01 PROCEDURE — 82962 GLUCOSE BLOOD TEST: CPT | Mod: QW,,, | Performed by: FAMILY MEDICINE

## 2022-01-01 PROCEDURE — 3079F PR MOST RECENT DIASTOLIC BLOOD PRESSURE 80-89 MM HG: ICD-10-PCS | Mod: CPTII,,, | Performed by: INTERNAL MEDICINE

## 2022-01-01 PROCEDURE — 99223 PR INITIAL HOSPITAL CARE,LEVL III: ICD-10-PCS | Mod: 25,,, | Performed by: SURGERY

## 2022-01-01 PROCEDURE — 99233 PR SUBSEQUENT HOSPITAL CARE,LEVL III: ICD-10-PCS | Mod: ,,, | Performed by: NURSE PRACTITIONER

## 2022-01-01 PROCEDURE — 25000242 PHARM REV CODE 250 ALT 637 W/ HCPCS: Performed by: SPECIALIST

## 2022-01-01 PROCEDURE — 87506 IADNA-DNA/RNA PROBE TQ 6-11: CPT | Performed by: HOSPITALIST

## 2022-01-01 PROCEDURE — 25000003 PHARM REV CODE 250: Performed by: ANESTHESIOLOGY

## 2022-01-01 PROCEDURE — 1101F PT FALLS ASSESS-DOCD LE1/YR: CPT | Mod: CPTII,,, | Performed by: PHYSICIAN ASSISTANT

## 2022-01-01 PROCEDURE — 99232 PR SUBSEQUENT HOSPITAL CARE,LEVL II: ICD-10-PCS | Mod: ,,, | Performed by: FAMILY MEDICINE

## 2022-01-01 PROCEDURE — 63600175 PHARM REV CODE 636 W HCPCS: Performed by: ANESTHESIOLOGY

## 2022-01-01 PROCEDURE — 87040 BLOOD CULTURE FOR BACTERIA: CPT | Mod: 59 | Performed by: SPECIALIST

## 2022-01-01 PROCEDURE — 87653 STREP B DNA AMP PROBE: CPT | Performed by: INTERNAL MEDICINE

## 2022-01-01 PROCEDURE — 99291 CRITICAL CARE FIRST HOUR: CPT | Mod: ,,, | Performed by: INTERNAL MEDICINE

## 2022-01-01 PROCEDURE — D9220A PRA ANESTHESIA: ICD-10-PCS | Mod: ANES,,, | Performed by: ANESTHESIOLOGY

## 2022-01-01 PROCEDURE — 93018 NUCLEAR STRESS TEST (CUPID ONLY): ICD-10-PCS | Mod: ,,, | Performed by: STUDENT IN AN ORGANIZED HEALTH CARE EDUCATION/TRAINING PROGRAM

## 2022-01-01 PROCEDURE — 1159F PR MEDICATION LIST DOCUMENTED IN MEDICAL RECORD: ICD-10-PCS | Mod: ,,, | Performed by: FAMILY MEDICINE

## 2022-01-01 PROCEDURE — 80076 HEPATIC FUNCTION PANEL: CPT | Performed by: INTERNAL MEDICINE

## 2022-01-01 PROCEDURE — 36415 COLL VENOUS BLD VENIPUNCTURE: CPT | Performed by: SURGERY

## 2022-01-01 PROCEDURE — 99213 PR OFFICE/OUTPT VISIT, EST, LEVL III, 20-29 MIN: ICD-10-PCS | Mod: ,,, | Performed by: FAMILY MEDICINE

## 2022-01-01 PROCEDURE — 3078F PR MOST RECENT DIASTOLIC BLOOD PRESSURE < 80 MM HG: ICD-10-PCS | Mod: CPTII,,, | Performed by: PHYSICIAN ASSISTANT

## 2022-01-01 PROCEDURE — 99223 PR INITIAL HOSPITAL CARE,LEVL III: ICD-10-PCS | Mod: ,,, | Performed by: HOSPITALIST

## 2022-01-01 PROCEDURE — 97162 PT EVAL MOD COMPLEX 30 MIN: CPT

## 2022-01-01 PROCEDURE — 99223 PR INITIAL HOSPITAL CARE,LEVL III: ICD-10-PCS | Mod: AI,,, | Performed by: HOSPITALIST

## 2022-01-01 PROCEDURE — 36415 COLL VENOUS BLD VENIPUNCTURE: CPT

## 2022-01-01 PROCEDURE — 97165 OT EVAL LOW COMPLEX 30 MIN: CPT

## 2022-01-01 PROCEDURE — 99214 OFFICE O/P EST MOD 30 MIN: CPT | Mod: S$PBB,,, | Performed by: PHYSICIAN ASSISTANT

## 2022-01-01 PROCEDURE — 99284 PR EMERGENCY DEPT VISIT,LEVEL IV: ICD-10-PCS | Mod: ,,, | Performed by: EMERGENCY MEDICINE

## 2022-01-01 PROCEDURE — 87149 DNA/RNA DIRECT PROBE: CPT | Performed by: SPECIALIST

## 2022-01-01 PROCEDURE — 63600175 PHARM REV CODE 636 W HCPCS: Performed by: EMERGENCY MEDICINE

## 2022-01-01 PROCEDURE — 99232 SBSQ HOSP IP/OBS MODERATE 35: CPT | Mod: ,,, | Performed by: FAMILY MEDICINE

## 2022-01-01 PROCEDURE — 99223 1ST HOSP IP/OBS HIGH 75: CPT | Mod: ,,, | Performed by: HOSPITALIST

## 2022-01-01 PROCEDURE — 96361 HYDRATE IV INFUSION ADD-ON: CPT

## 2022-01-01 PROCEDURE — 1101F PR PT FALLS ASSESS DOC 0-1 FALLS W/OUT INJ PAST YR: ICD-10-PCS | Mod: ,,, | Performed by: FAMILY MEDICINE

## 2022-01-01 PROCEDURE — 80100014 HC HEMODIALYSIS 1:1

## 2022-01-01 PROCEDURE — 1159F MED LIST DOCD IN RCRD: CPT | Mod: CPTII,,, | Performed by: INTERNAL MEDICINE

## 2022-01-01 PROCEDURE — 85025 COMPLETE CBC W/AUTO DIFF WBC: CPT | Performed by: STUDENT IN AN ORGANIZED HEALTH CARE EDUCATION/TRAINING PROGRAM

## 2022-01-01 PROCEDURE — 86850 RBC ANTIBODY SCREEN: CPT | Performed by: SURGERY

## 2022-01-01 PROCEDURE — 99223 PR INITIAL HOSPITAL CARE,LEVL III: ICD-10-PCS | Mod: AI,,, | Performed by: INTERNAL MEDICINE

## 2022-01-01 PROCEDURE — 99223 1ST HOSP IP/OBS HIGH 75: CPT | Mod: AI,,, | Performed by: HOSPITALIST

## 2022-01-01 PROCEDURE — 83605 ASSAY OF LACTIC ACID: CPT | Performed by: SPECIALIST

## 2022-01-01 PROCEDURE — 81001 URINALYSIS AUTO W/SCOPE: CPT | Performed by: EMERGENCY MEDICINE

## 2022-01-01 PROCEDURE — 99231 PR SUBSEQUENT HOSPITAL CARE,LEVL I: ICD-10-PCS | Mod: ,,, | Performed by: INTERNAL MEDICINE

## 2022-01-01 PROCEDURE — 1111F PR DISCHARGE MEDS RECONCILED W/ CURRENT OUTPATIENT MED LIST: ICD-10-PCS | Mod: ,,, | Performed by: FAMILY MEDICINE

## 2022-01-01 PROCEDURE — P9612 CATHETERIZE FOR URINE SPEC: HCPCS

## 2022-01-01 PROCEDURE — 99213 PR OFFICE/OUTPT VISIT, EST, LEVL III, 20-29 MIN: ICD-10-PCS | Mod: S$PBB,,, | Performed by: INTERNAL MEDICINE

## 2022-01-01 PROCEDURE — 83605 ASSAY OF LACTIC ACID: CPT | Performed by: INTERNAL MEDICINE

## 2022-01-01 PROCEDURE — 99231 SBSQ HOSP IP/OBS SF/LOW 25: CPT | Mod: ,,, | Performed by: INTERNAL MEDICINE

## 2022-01-01 PROCEDURE — 99214 OFFICE O/P EST MOD 30 MIN: CPT | Mod: PBBFAC,25 | Performed by: STUDENT IN AN ORGANIZED HEALTH CARE EDUCATION/TRAINING PROGRAM

## 2022-01-01 PROCEDURE — 71046 X-RAY EXAM CHEST 2 VIEWS: CPT | Mod: TC

## 2022-01-01 PROCEDURE — 99233 PR SUBSEQUENT HOSPITAL CARE,LEVL III: ICD-10-PCS | Mod: ,,, | Performed by: HOSPITALIST

## 2022-01-01 PROCEDURE — 96374 THER/PROPH/DIAG INJ IV PUSH: CPT | Performed by: EMERGENCY MEDICINE

## 2022-01-01 PROCEDURE — 99223 1ST HOSP IP/OBS HIGH 75: CPT | Mod: ,,, | Performed by: INTERNAL MEDICINE

## 2022-01-01 PROCEDURE — 99233 SBSQ HOSP IP/OBS HIGH 50: CPT | Mod: ,,, | Performed by: HOSPITALIST

## 2022-01-01 PROCEDURE — 31500 PR INSERT, EMERGENCY ENDOTRACH AIRWAY: ICD-10-PCS | Mod: ,,, | Performed by: HOSPITALIST

## 2022-01-01 PROCEDURE — 1111F DSCHRG MED/CURRENT MED MERGE: CPT | Mod: CPTII,,, | Performed by: STUDENT IN AN ORGANIZED HEALTH CARE EDUCATION/TRAINING PROGRAM

## 2022-01-01 PROCEDURE — 83735 ASSAY OF MAGNESIUM: CPT | Performed by: INTERNAL MEDICINE

## 2022-01-01 PROCEDURE — 37000008 HC ANESTHESIA 1ST 15 MINUTES: Performed by: SURGERY

## 2022-01-01 PROCEDURE — 37000009 HC ANESTHESIA EA ADD 15 MINS: Performed by: SURGERY

## 2022-01-01 PROCEDURE — 99284 EMERGENCY DEPT VISIT MOD MDM: CPT | Performed by: SPECIALIST

## 2022-01-01 PROCEDURE — 86901 BLOOD TYPING SEROLOGIC RH(D): CPT | Performed by: HOSPITALIST

## 2022-01-01 PROCEDURE — A9500 TC99M SESTAMIBI: HCPCS

## 2022-01-01 PROCEDURE — C1750 CATH, HEMODIALYSIS,LONG-TERM: HCPCS | Performed by: SURGERY

## 2022-01-01 PROCEDURE — 82607 VITAMIN B-12: CPT | Performed by: HOSPITALIST

## 2022-01-01 PROCEDURE — 83880 ASSAY OF NATRIURETIC PEPTIDE: CPT | Performed by: EMERGENCY MEDICINE

## 2022-01-01 PROCEDURE — 85025 COMPLETE CBC W/AUTO DIFF WBC: CPT | Performed by: SPECIALIST

## 2022-01-01 PROCEDURE — P9016 RBC LEUKOCYTES REDUCED: HCPCS | Performed by: NURSE PRACTITIONER

## 2022-01-01 PROCEDURE — 99233 PR SUBSEQUENT HOSPITAL CARE,LEVL III: ICD-10-PCS | Mod: ,,, | Performed by: SURGERY

## 2022-01-01 PROCEDURE — 1160F RVW MEDS BY RX/DR IN RCRD: CPT | Mod: ,,, | Performed by: FAMILY MEDICINE

## 2022-01-01 PROCEDURE — 36000708 HC OR TIME LEV III 1ST 15 MIN: Performed by: SURGERY

## 2022-01-01 PROCEDURE — 49324 LAP INSERT TUNNEL IP CATH: CPT | Mod: 22,,, | Performed by: SURGERY

## 2022-01-01 PROCEDURE — 96365 THER/PROPH/DIAG IV INF INIT: CPT

## 2022-01-01 PROCEDURE — C1729 CATH, DRAINAGE: HCPCS

## 2022-01-01 PROCEDURE — 3075F SYST BP GE 130 - 139MM HG: CPT | Mod: ,,, | Performed by: FAMILY MEDICINE

## 2022-01-01 PROCEDURE — 3078F PR MOST RECENT DIASTOLIC BLOOD PRESSURE < 80 MM HG: ICD-10-PCS | Mod: CPTII,,, | Performed by: INTERNAL MEDICINE

## 2022-01-01 PROCEDURE — 99900026 HC AIRWAY MAINTENANCE (STAT)

## 2022-01-01 PROCEDURE — 87040 BLOOD CULTURE FOR BACTERIA: CPT | Performed by: SPECIALIST

## 2022-01-01 PROCEDURE — 63600175 PHARM REV CODE 636 W HCPCS: Performed by: SURGERY

## 2022-01-01 PROCEDURE — 3077F PR MOST RECENT SYSTOLIC BLOOD PRESSURE >= 140 MM HG: ICD-10-PCS | Mod: CPTII,,, | Performed by: PHYSICIAN ASSISTANT

## 2022-01-01 PROCEDURE — 83036 HEMOGLOBIN GLYCOSYLATED A1C: CPT | Performed by: HOSPITALIST

## 2022-01-01 PROCEDURE — 1101F PR PT FALLS ASSESS DOC 0-1 FALLS W/OUT INJ PAST YR: ICD-10-PCS | Mod: CPTII,,, | Performed by: INTERNAL MEDICINE

## 2022-01-01 PROCEDURE — 99495 TCM SERVICES (MODERATE COMPLEXITY): ICD-10-PCS | Mod: ,,, | Performed by: FAMILY MEDICINE

## 2022-01-01 PROCEDURE — 36558 INSERT TUNNELED CV CATH: CPT | Mod: RT,,, | Performed by: SURGERY

## 2022-01-01 PROCEDURE — 99233 SBSQ HOSP IP/OBS HIGH 50: CPT | Mod: ,,, | Performed by: INTERNAL MEDICINE

## 2022-01-01 PROCEDURE — 99233 SBSQ HOSP IP/OBS HIGH 50: CPT | Mod: ,,, | Performed by: SURGERY

## 2022-01-01 PROCEDURE — 3288F FALL RISK ASSESSMENT DOCD: CPT | Mod: CPTII,,, | Performed by: PHYSICIAN ASSISTANT

## 2022-01-01 PROCEDURE — 80069 RENAL FUNCTION PANEL: ICD-10-PCS | Mod: ,,, | Performed by: CLINICAL MEDICAL LABORATORY

## 2022-01-01 PROCEDURE — 84484 ASSAY OF TROPONIN QUANT: CPT | Performed by: INTERNAL MEDICINE

## 2022-01-01 PROCEDURE — 1101F PT FALLS ASSESS-DOCD LE1/YR: CPT | Mod: ,,, | Performed by: FAMILY MEDICINE

## 2022-01-01 PROCEDURE — 3074F PR MOST RECENT SYSTOLIC BLOOD PRESSURE < 130 MM HG: ICD-10-PCS | Mod: CPTII,,, | Performed by: STUDENT IN AN ORGANIZED HEALTH CARE EDUCATION/TRAINING PROGRAM

## 2022-01-01 PROCEDURE — 80053 COMPREHEN METABOLIC PANEL: CPT | Performed by: FAMILY MEDICINE

## 2022-01-01 PROCEDURE — 1111F DSCHRG MED/CURRENT MED MERGE: CPT | Mod: CPTII,,, | Performed by: SURGERY

## 2022-01-01 PROCEDURE — 99239 HOSP IP/OBS DSCHRG MGMT >30: CPT | Mod: ,,, | Performed by: FAMILY MEDICINE

## 2022-01-01 PROCEDURE — 25000003 PHARM REV CODE 250: Performed by: FAMILY MEDICINE

## 2022-01-01 PROCEDURE — 89051 BODY FLUID CELL COUNT: CPT | Performed by: INTERNAL MEDICINE

## 2022-01-01 PROCEDURE — 71046 XR CHEST PA AND LATERAL: ICD-10-PCS | Mod: 26,,, | Performed by: RADIOLOGY

## 2022-01-01 PROCEDURE — 96375 TX/PRO/DX INJ NEW DRUG ADDON: CPT

## 2022-01-01 PROCEDURE — 82962 GLUCOSE BLOOD TEST: CPT | Mod: ,,, | Performed by: FAMILY MEDICINE

## 2022-01-01 PROCEDURE — 84484 ASSAY OF TROPONIN QUANT: CPT | Performed by: EMERGENCY MEDICINE

## 2022-01-01 PROCEDURE — 87493 C DIFF AMPLIFIED PROBE: CPT | Performed by: HOSPITALIST

## 2022-01-01 PROCEDURE — 99212 PR OFFICE/OUTPT VISIT, EST, LEVL II, 10-19 MIN: ICD-10-PCS | Mod: ,,, | Performed by: FAMILY MEDICINE

## 2022-01-01 PROCEDURE — 3079F DIAST BP 80-89 MM HG: CPT | Mod: ,,, | Performed by: FAMILY MEDICINE

## 2022-01-01 PROCEDURE — 99215 OFFICE O/P EST HI 40 MIN: CPT | Mod: PBBFAC,25 | Performed by: SURGERY

## 2022-01-01 PROCEDURE — 25000242 PHARM REV CODE 250 ALT 637 W/ HCPCS: Performed by: HOSPITALIST

## 2022-01-01 PROCEDURE — 36000707: Performed by: SURGERY

## 2022-01-01 PROCEDURE — 87040 BLOOD CULTURE FOR BACTERIA: CPT | Performed by: INTERNAL MEDICINE

## 2022-01-01 PROCEDURE — 3288F PR FALLS RISK ASSESSMENT DOCUMENTED: ICD-10-PCS | Mod: CPTII,,, | Performed by: PHYSICIAN ASSISTANT

## 2022-01-01 PROCEDURE — 99223 1ST HOSP IP/OBS HIGH 75: CPT | Mod: AI,,, | Performed by: INTERNAL MEDICINE

## 2022-01-01 PROCEDURE — 94002 VENT MGMT INPAT INIT DAY: CPT

## 2022-01-01 PROCEDURE — 94640 AIRWAY INHALATION TREATMENT: CPT | Mod: XB

## 2022-01-01 PROCEDURE — 87428 SARSCOV & INF VIR A&B AG IA: CPT | Performed by: SPECIALIST

## 2022-01-01 PROCEDURE — 27200155 *HC SET PRIMARY NONVENTED: Performed by: ANESTHESIOLOGY

## 2022-01-01 PROCEDURE — 93010 EKG 12-LEAD: ICD-10-PCS | Mod: ,,, | Performed by: HOSPITALIST

## 2022-01-01 PROCEDURE — 77001 CHG FLUOROGUIDE CNTRL VEN ACCESS,PLACE,REPLACE,REMOVE: ICD-10-PCS | Mod: 26,,, | Performed by: SURGERY

## 2022-01-01 PROCEDURE — 80053 COMPREHEN METABOLIC PANEL: CPT | Performed by: EMERGENCY MEDICINE

## 2022-01-01 PROCEDURE — 99213 PR OFFICE/OUTPT VISIT, EST, LEVL III, 20-29 MIN: ICD-10-PCS | Mod: 25,,, | Performed by: FAMILY MEDICINE

## 2022-01-01 PROCEDURE — 82746 ASSAY OF FOLIC ACID SERUM: CPT | Performed by: STUDENT IN AN ORGANIZED HEALTH CARE EDUCATION/TRAINING PROGRAM

## 2022-01-01 PROCEDURE — 99223 1ST HOSP IP/OBS HIGH 75: CPT | Mod: 25,,, | Performed by: SURGERY

## 2022-01-01 PROCEDURE — G0257 UNSCHED DIALYSIS ESRD PT HOS: HCPCS

## 2022-01-01 PROCEDURE — 85014 HEMATOCRIT: CPT | Performed by: HOSPITALIST

## 2022-01-01 PROCEDURE — 99495 TRANSJ CARE MGMT MOD F2F 14D: CPT | Mod: ,,, | Performed by: FAMILY MEDICINE

## 2022-01-01 PROCEDURE — 32551 PR TUBE THORACOSTOMY INCLUDES WATER SEAL: ICD-10-PCS | Mod: RT,,, | Performed by: SURGERY

## 2022-01-01 PROCEDURE — 49324 PR LAP INSERTION TUNNELED INTRAPERITONEAL CATHETER: ICD-10-PCS | Mod: 22,,, | Performed by: SURGERY

## 2022-01-01 PROCEDURE — 87086 URINE CULTURE/COLONY COUNT: CPT | Performed by: INTERNAL MEDICINE

## 2022-01-01 PROCEDURE — 93010 EKG 12-LEAD: ICD-10-PCS | Mod: ,,, | Performed by: EMERGENCY MEDICINE

## 2022-01-01 PROCEDURE — 93985 DUP-SCAN HEMO COMPL BI STD: CPT | Mod: TC

## 2022-01-01 PROCEDURE — 99214 PR OFFICE/OUTPT VISIT, EST, LEVL IV, 30-39 MIN: ICD-10-PCS | Mod: S$PBB,,, | Performed by: PHYSICIAN ASSISTANT

## 2022-01-01 PROCEDURE — 83986 ASSAY PH BODY FLUID NOS: CPT | Performed by: INTERNAL MEDICINE

## 2022-01-01 PROCEDURE — 1159F PR MEDICATION LIST DOCUMENTED IN MEDICAL RECORD: ICD-10-PCS | Mod: CPTII,,, | Performed by: PHYSICIAN ASSISTANT

## 2022-01-01 PROCEDURE — 83540 ASSAY OF IRON: CPT | Performed by: STUDENT IN AN ORGANIZED HEALTH CARE EDUCATION/TRAINING PROGRAM

## 2022-01-01 PROCEDURE — 84484 ASSAY OF TROPONIN QUANT: CPT | Performed by: SPECIALIST

## 2022-01-01 PROCEDURE — 99285 EMERGENCY DEPT VISIT HI MDM: CPT | Performed by: INTERNAL MEDICINE

## 2022-01-01 PROCEDURE — 85025 COMPLETE CBC W/AUTO DIFF WBC: CPT | Performed by: REGISTERED NURSE

## 2022-01-01 PROCEDURE — 99214 OFFICE O/P EST MOD 30 MIN: CPT | Mod: S$PBB,,, | Performed by: STUDENT IN AN ORGANIZED HEALTH CARE EDUCATION/TRAINING PROGRAM

## 2022-01-01 PROCEDURE — 71046 XR CHEST 2 VIEW INSPIRATION EXPIRATION: ICD-10-PCS | Mod: 26,,, | Performed by: RADIOLOGY

## 2022-01-01 PROCEDURE — 82728 ASSAY OF FERRITIN: CPT | Performed by: HOSPITALIST

## 2022-01-01 PROCEDURE — 85610 PROTHROMBIN TIME: CPT | Performed by: HOSPITALIST

## 2022-01-01 PROCEDURE — 31500 INSERT EMERGENCY AIRWAY: CPT | Mod: ,,, | Performed by: HOSPITALIST

## 2022-01-01 PROCEDURE — 99214 OFFICE O/P EST MOD 30 MIN: CPT | Mod: PBBFAC | Performed by: PHYSICIAN ASSISTANT

## 2022-01-01 PROCEDURE — C9113 INJ PANTOPRAZOLE SODIUM, VIA: HCPCS | Performed by: INTERNAL MEDICINE

## 2022-01-01 PROCEDURE — 1111F DSCHRG MED/CURRENT MED MERGE: CPT | Mod: CPTII,,, | Performed by: FAMILY MEDICINE

## 2022-01-01 PROCEDURE — 1125F AMNT PAIN NOTED PAIN PRSNT: CPT | Mod: CPTII,,, | Performed by: PHYSICIAN ASSISTANT

## 2022-01-01 PROCEDURE — 99285 EMERGENCY DEPT VISIT HI MDM: CPT | Performed by: EMERGENCY MEDICINE

## 2022-01-01 PROCEDURE — 1100F PTFALLS ASSESS-DOCD GE2>/YR: CPT | Mod: CPTII,,, | Performed by: INTERNAL MEDICINE

## 2022-01-01 PROCEDURE — 1160F PR REVIEW ALL MEDS BY PRESCRIBER/CLIN PHARMACIST DOCUMENTED: ICD-10-PCS | Mod: ,,, | Performed by: FAMILY MEDICINE

## 2022-01-01 PROCEDURE — 99214 OFFICE O/P EST MOD 30 MIN: CPT | Mod: S$PBB,,, | Performed by: SURGERY

## 2022-01-01 PROCEDURE — P9047 ALBUMIN (HUMAN), 25%, 50ML: HCPCS | Performed by: INTERNAL MEDICINE

## 2022-01-01 PROCEDURE — 27201423 OPTIME MED/SURG SUP & DEVICES STERILE SUPPLY: Performed by: SURGERY

## 2022-01-01 PROCEDURE — 83540 ASSAY OF IRON: CPT | Performed by: HOSPITALIST

## 2022-01-01 PROCEDURE — 93010 ELECTROCARDIOGRAM REPORT: CPT | Mod: ,,, | Performed by: HOSPITALIST

## 2022-01-01 PROCEDURE — 84484 ASSAY OF TROPONIN QUANT: CPT | Performed by: FAMILY MEDICINE

## 2022-01-01 PROCEDURE — 99291 PR CRITICAL CARE, E/M 30-74 MINUTES: ICD-10-PCS | Mod: ,,, | Performed by: INTERNAL MEDICINE

## 2022-01-01 PROCEDURE — 99024 PR POST-OP FOLLOW-UP VISIT: ICD-10-PCS | Mod: ,,, | Performed by: SURGERY

## 2022-01-01 PROCEDURE — 85730 THROMBOPLASTIN TIME PARTIAL: CPT | Performed by: STUDENT IN AN ORGANIZED HEALTH CARE EDUCATION/TRAINING PROGRAM

## 2022-01-01 PROCEDURE — 80069 RENAL FUNCTION PANEL: CPT | Performed by: REGISTERED NURSE

## 2022-01-01 PROCEDURE — 1111F PR DISCHARGE MEDS RECONCILED W/ CURRENT OUTPATIENT MED LIST: ICD-10-PCS | Mod: CPTII,,, | Performed by: SURGERY

## 2022-01-01 PROCEDURE — P9016 RBC LEUKOCYTES REDUCED: HCPCS | Performed by: INTERNAL MEDICINE

## 2022-01-01 PROCEDURE — 36415 COLL VENOUS BLD VENIPUNCTURE: CPT | Performed by: EMERGENCY MEDICINE

## 2022-01-01 PROCEDURE — 92950 HEART/LUNG RESUSCITATION CPR: CPT

## 2022-01-01 PROCEDURE — 84100 ASSAY OF PHOSPHORUS: CPT | Performed by: EMERGENCY MEDICINE

## 2022-01-01 PROCEDURE — 1159F MED LIST DOCD IN RCRD: CPT | Mod: CPTII,,, | Performed by: STUDENT IN AN ORGANIZED HEALTH CARE EDUCATION/TRAINING PROGRAM

## 2022-01-01 PROCEDURE — 3074F PR MOST RECENT SYSTOLIC BLOOD PRESSURE < 130 MM HG: ICD-10-PCS | Mod: CPTII,,, | Performed by: PHYSICIAN ASSISTANT

## 2022-01-01 PROCEDURE — 32555 US THORACENTESIS WITH IMAGING, ASPIRATION ONLY: ICD-10-PCS | Mod: RT,,, | Performed by: STUDENT IN AN ORGANIZED HEALTH CARE EDUCATION/TRAINING PROGRAM

## 2022-01-01 PROCEDURE — 1111F PR DISCHARGE MEDS RECONCILED W/ CURRENT OUTPATIENT MED LIST: ICD-10-PCS | Mod: CPTII,,, | Performed by: FAMILY MEDICINE

## 2022-01-01 PROCEDURE — 93010 ELECTROCARDIOGRAM REPORT: CPT | Mod: ,,, | Performed by: EMERGENCY MEDICINE

## 2022-01-01 PROCEDURE — 99284 EMERGENCY DEPT VISIT MOD MDM: CPT | Mod: ,,, | Performed by: EMERGENCY MEDICINE

## 2022-01-01 PROCEDURE — 1100F PTFALLS ASSESS-DOCD GE2>/YR: CPT | Mod: ,,, | Performed by: FAMILY MEDICINE

## 2022-01-01 PROCEDURE — 99024 POSTOP FOLLOW-UP VISIT: CPT | Mod: ,,, | Performed by: SURGERY

## 2022-01-01 PROCEDURE — 83880 ASSAY OF NATRIURETIC PEPTIDE: CPT | Performed by: FAMILY MEDICINE

## 2022-01-01 PROCEDURE — 36415 COLL VENOUS BLD VENIPUNCTURE: CPT | Performed by: FAMILY MEDICINE

## 2022-01-01 PROCEDURE — 36558 PR INSERT TUNNELED CV CATH W/O PORT OR PUMP: ICD-10-PCS | Mod: RT,,, | Performed by: SURGERY

## 2022-01-01 PROCEDURE — 1159F PR MEDICATION LIST DOCUMENTED IN MEDICAL RECORD: ICD-10-PCS | Mod: CPTII,,, | Performed by: INTERNAL MEDICINE

## 2022-01-01 PROCEDURE — 94003 VENT MGMT INPAT SUBQ DAY: CPT

## 2022-01-01 PROCEDURE — 93016 NUCLEAR STRESS TEST (CUPID ONLY): ICD-10-PCS | Mod: ,,, | Performed by: NURSE PRACTITIONER

## 2022-01-01 PROCEDURE — 84157 ASSAY OF PROTEIN OTHER: CPT | Performed by: INTERNAL MEDICINE

## 2022-01-01 PROCEDURE — 81001 URINALYSIS AUTO W/SCOPE: CPT | Performed by: INTERNAL MEDICINE

## 2022-01-01 PROCEDURE — 86923 COMPATIBILITY TEST ELECTRIC: CPT | Mod: 91 | Performed by: NURSE PRACTITIONER

## 2022-01-01 PROCEDURE — 83735 ASSAY OF MAGNESIUM: CPT | Performed by: SPECIALIST

## 2022-01-01 PROCEDURE — 84075 ASSAY ALKALINE PHOSPHATASE: CPT | Performed by: FAMILY MEDICINE

## 2022-01-01 PROCEDURE — 78452 HT MUSCLE IMAGE SPECT MULT: CPT | Mod: 26,,, | Performed by: STUDENT IN AN ORGANIZED HEALTH CARE EDUCATION/TRAINING PROGRAM

## 2022-01-01 PROCEDURE — P9045 ALBUMIN (HUMAN), 5%, 250 ML: HCPCS | Performed by: INTERNAL MEDICINE

## 2022-01-01 PROCEDURE — 1101F PR PT FALLS ASSESS DOC 0-1 FALLS W/OUT INJ PAST YR: ICD-10-PCS | Mod: CPTII,,, | Performed by: PHYSICIAN ASSISTANT

## 2022-01-01 PROCEDURE — 93985 US VEIN MAPPING, HEMODIALYSIS, BILATERAL: ICD-10-PCS | Mod: 26,,, | Performed by: SURGERY

## 2022-01-01 PROCEDURE — 94760 N-INVAS EAR/PLS OXIMETRY 1: CPT

## 2022-01-01 PROCEDURE — 3074F SYST BP LT 130 MM HG: CPT | Mod: CPTII,,, | Performed by: PHYSICIAN ASSISTANT

## 2022-01-01 PROCEDURE — 32555 ASPIRATE PLEURA W/ IMAGING: CPT | Mod: RT,,, | Performed by: STUDENT IN AN ORGANIZED HEALTH CARE EDUCATION/TRAINING PROGRAM

## 2022-01-01 PROCEDURE — 78452 NM MYOCARDIAL PERFUSION SPECT MULTI PHARM: ICD-10-PCS | Mod: 26,,, | Performed by: STUDENT IN AN ORGANIZED HEALTH CARE EDUCATION/TRAINING PROGRAM

## 2022-01-01 PROCEDURE — 96372 PR INJECTION,THERAP/PROPH/DIAG2ST, IM OR SUBCUT: ICD-10-PCS | Mod: ,,, | Performed by: FAMILY MEDICINE

## 2022-01-01 PROCEDURE — 85730 THROMBOPLASTIN TIME PARTIAL: CPT | Performed by: HOSPITALIST

## 2022-01-01 PROCEDURE — 93985 DUP-SCAN HEMO COMPL BI STD: CPT | Mod: 26,,, | Performed by: SURGERY

## 2022-01-01 PROCEDURE — 1126F PR PAIN SEVERITY QUANTIFIED, NO PAIN PRESENT: ICD-10-PCS | Mod: CPTII,,, | Performed by: INTERNAL MEDICINE

## 2022-01-01 PROCEDURE — 99213 OFFICE O/P EST LOW 20 MIN: CPT | Mod: ,,, | Performed by: FAMILY MEDICINE

## 2022-01-01 PROCEDURE — 82962 POCT GLUCOSE, HAND-HELD DEVICE: ICD-10-PCS | Mod: QW,,, | Performed by: FAMILY MEDICINE

## 2022-01-01 PROCEDURE — 99284 EMERGENCY DEPT VISIT MOD MDM: CPT | Performed by: FAMILY MEDICINE

## 2022-01-01 PROCEDURE — 99284 EMERGENCY DEPT VISIT MOD MDM: CPT

## 2022-01-01 PROCEDURE — 85014 HEMATOCRIT: CPT | Performed by: SURGERY

## 2022-01-01 PROCEDURE — 80305 DRUG TEST PRSMV DIR OPT OBS: CPT | Mod: PBBFAC | Performed by: PHYSICIAN ASSISTANT

## 2022-01-01 PROCEDURE — 3074F SYST BP LT 130 MM HG: CPT | Mod: CPTII,,, | Performed by: STUDENT IN AN ORGANIZED HEALTH CARE EDUCATION/TRAINING PROGRAM

## 2022-01-01 PROCEDURE — 1111F PR DISCHARGE MEDS RECONCILED W/ CURRENT OUTPATIENT MED LIST: ICD-10-PCS | Mod: CPTII,,, | Performed by: STUDENT IN AN ORGANIZED HEALTH CARE EDUCATION/TRAINING PROGRAM

## 2022-01-01 PROCEDURE — 1100F PR PT FALLS ASSESS DOC 2+ FALLS/FALL W/INJURY/YR: ICD-10-PCS | Mod: CPTII,,, | Performed by: INTERNAL MEDICINE

## 2022-01-01 PROCEDURE — 82962 POCT GLUCOSE, HAND-HELD DEVICE: ICD-10-PCS | Mod: ,,, | Performed by: FAMILY MEDICINE

## 2022-01-01 PROCEDURE — 3079F PR MOST RECENT DIASTOLIC BLOOD PRESSURE 80-89 MM HG: ICD-10-PCS | Mod: ,,, | Performed by: FAMILY MEDICINE

## 2022-01-01 PROCEDURE — 85025 COMPLETE CBC W/AUTO DIFF WBC: CPT | Performed by: FAMILY MEDICINE

## 2022-01-01 PROCEDURE — 84100 ASSAY OF PHOSPHORUS: CPT | Performed by: INTERNAL MEDICINE

## 2022-01-01 PROCEDURE — 63600175 PHARM REV CODE 636 W HCPCS: Performed by: SPECIALIST

## 2022-01-01 PROCEDURE — 93017 CV STRESS TEST TRACING ONLY: CPT

## 2022-01-01 PROCEDURE — 99285 EMERGENCY DEPT VISIT HI MDM: CPT | Mod: 25

## 2022-01-01 PROCEDURE — 82040 ASSAY OF SERUM ALBUMIN: CPT | Performed by: FAMILY MEDICINE

## 2022-01-01 PROCEDURE — 99214 PR OFFICE/OUTPT VISIT, EST, LEVL IV, 30-39 MIN: ICD-10-PCS | Mod: S$PBB,,, | Performed by: STUDENT IN AN ORGANIZED HEALTH CARE EDUCATION/TRAINING PROGRAM

## 2022-01-01 PROCEDURE — 1160F RVW MEDS BY RX/DR IN RCRD: CPT | Mod: CPTII,,, | Performed by: INTERNAL MEDICINE

## 2022-01-01 PROCEDURE — 85378 FIBRIN DEGRADE SEMIQUANT: CPT | Performed by: INTERNAL MEDICINE

## 2022-01-01 PROCEDURE — 85610 PROTHROMBIN TIME: CPT | Performed by: INTERNAL MEDICINE

## 2022-01-01 PROCEDURE — 1160F RVW MEDS BY RX/DR IN RCRD: CPT | Mod: CPTII,,, | Performed by: SURGERY

## 2022-01-01 PROCEDURE — 36558 PR INSERT TUNNELED CV CATH W/O PORT OR PUMP: ICD-10-PCS | Mod: LT,,, | Performed by: SURGERY

## 2022-01-01 PROCEDURE — 93010 ELECTROCARDIOGRAM REPORT: CPT | Performed by: INTERNAL MEDICINE

## 2022-01-01 PROCEDURE — 85730 THROMBOPLASTIN TIME PARTIAL: CPT | Performed by: INTERNAL MEDICINE

## 2022-01-01 PROCEDURE — 86923 COMPATIBILITY TEST ELECTRIC: CPT | Performed by: INTERNAL MEDICINE

## 2022-01-01 PROCEDURE — 71250 CT CHEST WITHOUT CONTRAST: ICD-10-PCS | Mod: 26,,, | Performed by: STUDENT IN AN ORGANIZED HEALTH CARE EDUCATION/TRAINING PROGRAM

## 2022-01-01 PROCEDURE — C1729 CATH, DRAINAGE: HCPCS | Performed by: SURGERY

## 2022-01-01 PROCEDURE — 83880 ASSAY OF NATRIURETIC PEPTIDE: CPT | Performed by: INTERNAL MEDICINE

## 2022-01-01 DEVICE — CATH GLIDEPATH 14.5F 19CM 24CM: Type: IMPLANTABLE DEVICE | Site: CHEST | Status: FUNCTIONAL

## 2022-01-01 RX ORDER — SIMVASTATIN 20 MG/1
20 TABLET, FILM COATED ORAL NIGHTLY
Qty: 90 TABLET | Refills: 3 | Status: SHIPPED | OUTPATIENT
Start: 2022-01-01 | End: 2022-01-01 | Stop reason: SDUPTHER

## 2022-01-01 RX ORDER — METOPROLOL TARTRATE 1 MG/ML
5 INJECTION, SOLUTION INTRAVENOUS ONCE
Status: DISCONTINUED | OUTPATIENT
Start: 2022-01-01 | End: 2022-01-01

## 2022-01-01 RX ORDER — GABAPENTIN 100 MG/1
100 CAPSULE ORAL NIGHTLY
Status: DISCONTINUED | OUTPATIENT
Start: 2022-01-01 | End: 2022-01-01 | Stop reason: HOSPADM

## 2022-01-01 RX ORDER — SODIUM CHLORIDE 9 MG/ML
INJECTION, SOLUTION INTRAVENOUS CONTINUOUS
Status: DISCONTINUED | OUTPATIENT
Start: 2022-01-01 | End: 2022-01-01

## 2022-01-01 RX ORDER — DOCUSATE SODIUM 100 MG/1
100 CAPSULE, LIQUID FILLED ORAL 2 TIMES DAILY
Status: DISCONTINUED | OUTPATIENT
Start: 2022-01-01 | End: 2022-01-01 | Stop reason: HOSPADM

## 2022-01-01 RX ORDER — SEVELAMER CARBONATE 800 MG/1
TABLET, FILM COATED ORAL
COMMUNITY
Start: 2022-01-01

## 2022-01-01 RX ORDER — HYDRALAZINE HYDROCHLORIDE 100 MG/1
100 TABLET, FILM COATED ORAL EVERY 8 HOURS
Qty: 90 TABLET | Refills: 11 | Status: SHIPPED | OUTPATIENT
Start: 2022-01-01 | End: 2023-07-19

## 2022-01-01 RX ORDER — BUDESONIDE 0.5 MG/2ML
0.5 INHALANT ORAL EVERY 12 HOURS
Status: DISCONTINUED | OUTPATIENT
Start: 2022-01-01 | End: 2022-01-01 | Stop reason: HOSPADM

## 2022-01-01 RX ORDER — HYDRALAZINE HYDROCHLORIDE 20 MG/ML
5 INJECTION INTRAMUSCULAR; INTRAVENOUS EVERY 6 HOURS PRN
Status: DISCONTINUED | OUTPATIENT
Start: 2022-01-01 | End: 2022-01-01 | Stop reason: HOSPADM

## 2022-01-01 RX ORDER — AMLODIPINE BESYLATE 5 MG/1
5 TABLET ORAL DAILY
Status: DISCONTINUED | OUTPATIENT
Start: 2022-01-01 | End: 2022-01-01

## 2022-01-01 RX ORDER — ATORVASTATIN CALCIUM 20 MG/1
20 TABLET, FILM COATED ORAL DAILY
Refills: 3 | Status: DISCONTINUED | OUTPATIENT
Start: 2022-01-01 | End: 2022-01-01 | Stop reason: HOSPADM

## 2022-01-01 RX ORDER — MORPHINE SULFATE 2 MG/ML
2 INJECTION, SOLUTION INTRAMUSCULAR; INTRAVENOUS
Status: DISCONTINUED | OUTPATIENT
Start: 2022-01-01 | End: 2022-01-01

## 2022-01-01 RX ORDER — MORPHINE SULFATE 2 MG/ML
2 INJECTION, SOLUTION INTRAMUSCULAR; INTRAVENOUS ONCE
Status: DISCONTINUED | OUTPATIENT
Start: 2022-01-01 | End: 2022-01-01

## 2022-01-01 RX ORDER — LIDOCAINE AND PRILOCAINE 25; 25 MG/G; MG/G
CREAM TOPICAL
Status: DISCONTINUED | OUTPATIENT
Start: 2022-01-01 | End: 2022-01-01 | Stop reason: HOSPADM

## 2022-01-01 RX ORDER — OXYCODONE HYDROCHLORIDE 5 MG/1
5 TABLET ORAL EVERY 4 HOURS PRN
Status: DISCONTINUED | OUTPATIENT
Start: 2022-01-01 | End: 2022-01-01 | Stop reason: HOSPADM

## 2022-01-01 RX ORDER — AMOXICILLIN 250 MG
1 CAPSULE ORAL DAILY
Status: DISCONTINUED | OUTPATIENT
Start: 2022-01-01 | End: 2022-01-01

## 2022-01-01 RX ORDER — HYDROCODONE BITARTRATE AND ACETAMINOPHEN 500; 5 MG/1; MG/1
TABLET ORAL
Status: DISCONTINUED | OUTPATIENT
Start: 2022-01-01 | End: 2022-01-01 | Stop reason: HOSPADM

## 2022-01-01 RX ORDER — SODIUM CHLORIDE 0.9 % (FLUSH) 0.9 %
10 SYRINGE (ML) INJECTION EVERY 12 HOURS PRN
Status: DISCONTINUED | OUTPATIENT
Start: 2022-01-01 | End: 2022-01-01 | Stop reason: HOSPADM

## 2022-01-01 RX ORDER — ALBUTEROL SULFATE 0.83 MG/ML
2.5 SOLUTION RESPIRATORY (INHALATION) EVERY 6 HOURS
Status: DISCONTINUED | OUTPATIENT
Start: 2022-01-01 | End: 2022-01-01

## 2022-01-01 RX ORDER — GABAPENTIN 100 MG/1
100 CAPSULE ORAL 2 TIMES DAILY
Qty: 60 CAPSULE | Refills: 0 | Status: SHIPPED | OUTPATIENT
Start: 2022-01-01 | End: 2022-01-01 | Stop reason: SDUPTHER

## 2022-01-01 RX ORDER — ONDANSETRON 4 MG/1
8 TABLET, ORALLY DISINTEGRATING ORAL EVERY 8 HOURS PRN
Status: DISCONTINUED | OUTPATIENT
Start: 2022-01-01 | End: 2022-01-01 | Stop reason: HOSPADM

## 2022-01-01 RX ORDER — AMOXICILLIN 250 MG
1 CAPSULE ORAL 2 TIMES DAILY
Status: DISCONTINUED | OUTPATIENT
Start: 2022-01-01 | End: 2022-01-01

## 2022-01-01 RX ORDER — CALCITRIOL 0.25 UG/1
0.25 CAPSULE ORAL DAILY
COMMUNITY
Start: 2022-01-01

## 2022-01-01 RX ORDER — METOPROLOL SUCCINATE 25 MG/1
12.5 TABLET, EXTENDED RELEASE ORAL DAILY
COMMUNITY
End: 2022-01-01 | Stop reason: SDUPTHER

## 2022-01-01 RX ORDER — ONDANSETRON 2 MG/ML
4 INJECTION INTRAMUSCULAR; INTRAVENOUS DAILY PRN
Status: DISCONTINUED | OUTPATIENT
Start: 2022-01-01 | End: 2022-01-01 | Stop reason: HOSPADM

## 2022-01-01 RX ORDER — HYDROCODONE BITARTRATE AND ACETAMINOPHEN 10; 325 MG/1; MG/1
1 TABLET ORAL EVERY 12 HOURS
Qty: 60 TABLET | Refills: 0 | Status: SHIPPED | OUTPATIENT
Start: 2022-01-01 | End: 2022-01-01

## 2022-01-01 RX ORDER — METOPROLOL TARTRATE 25 MG/1
25 TABLET, FILM COATED ORAL 2 TIMES DAILY
Qty: 180 TABLET | Refills: 3 | Status: SHIPPED | OUTPATIENT
Start: 2022-01-01 | End: 2022-01-01 | Stop reason: SDUPTHER

## 2022-01-01 RX ORDER — ONDANSETRON 4 MG/1
8 TABLET, ORALLY DISINTEGRATING ORAL EVERY 8 HOURS PRN
Status: CANCELLED | OUTPATIENT
Start: 2022-01-01

## 2022-01-01 RX ORDER — LACTULOSE 10 G/15ML
20 SOLUTION ORAL DAILY
Status: DISCONTINUED | OUTPATIENT
Start: 2022-01-01 | End: 2022-01-01

## 2022-01-01 RX ORDER — MORPHINE SULFATE 10 MG/ML
4 INJECTION INTRAMUSCULAR; INTRAVENOUS; SUBCUTANEOUS EVERY 5 MIN PRN
Status: DISCONTINUED | OUTPATIENT
Start: 2022-01-01 | End: 2022-01-01 | Stop reason: HOSPADM

## 2022-01-01 RX ORDER — AMLODIPINE BESYLATE 10 MG/1
10 TABLET ORAL DAILY
Qty: 30 TABLET | Refills: 11 | Status: SHIPPED | OUTPATIENT
Start: 2022-01-01 | End: 2022-01-01 | Stop reason: ALTCHOICE

## 2022-01-01 RX ORDER — METOPROLOL TARTRATE 25 MG/1
25 TABLET, FILM COATED ORAL 2 TIMES DAILY
Status: DISCONTINUED | OUTPATIENT
Start: 2022-01-01 | End: 2022-01-01 | Stop reason: HOSPADM

## 2022-01-01 RX ORDER — CALCIUM CHLORIDE INJECTION 100 MG/ML
INJECTION, SOLUTION INTRAVENOUS
Status: DISCONTINUED | OUTPATIENT
Start: 2022-01-01 | End: 2022-01-01

## 2022-01-01 RX ORDER — ALLOPURINOL 100 MG/1
100 TABLET ORAL DAILY
Qty: 90 TABLET | Refills: 1 | Status: SHIPPED | OUTPATIENT
Start: 2022-01-01 | End: 2022-01-01 | Stop reason: SDUPTHER

## 2022-01-01 RX ORDER — OXYCODONE HYDROCHLORIDE 5 MG/1
5 TABLET ORAL
Status: DISCONTINUED | OUTPATIENT
Start: 2022-01-01 | End: 2022-01-01 | Stop reason: HOSPADM

## 2022-01-01 RX ORDER — LANOLIN ALCOHOL/MO/W.PET/CERES
800 CREAM (GRAM) TOPICAL
Status: DISCONTINUED | OUTPATIENT
Start: 2022-01-01 | End: 2022-01-01 | Stop reason: HOSPADM

## 2022-01-01 RX ORDER — HEPARIN SODIUM 1000 [USP'U]/ML
4000 INJECTION, SOLUTION INTRAVENOUS; SUBCUTANEOUS
Status: DISCONTINUED | OUTPATIENT
Start: 2022-01-01 | End: 2022-01-01 | Stop reason: HOSPADM

## 2022-01-01 RX ORDER — CALCITRIOL 0.25 UG/1
0.25 CAPSULE ORAL DAILY
Status: DISCONTINUED | OUTPATIENT
Start: 2022-01-01 | End: 2022-01-01 | Stop reason: HOSPADM

## 2022-01-01 RX ORDER — OXYCODONE AND ACETAMINOPHEN 10; 325 MG/1; MG/1
1 TABLET ORAL EVERY 4 HOURS PRN
Status: DISCONTINUED | OUTPATIENT
Start: 2022-01-01 | End: 2022-01-01 | Stop reason: HOSPADM

## 2022-01-01 RX ORDER — MEPERIDINE HYDROCHLORIDE 25 MG/ML
25 INJECTION INTRAMUSCULAR; INTRAVENOUS; SUBCUTANEOUS EVERY 10 MIN PRN
Status: ACTIVE | OUTPATIENT
Start: 2022-01-01 | End: 2022-01-01

## 2022-01-01 RX ORDER — PROPOFOL 10 MG/ML
0-50 INJECTION, EMULSION INTRAVENOUS CONTINUOUS
Status: DISCONTINUED | OUTPATIENT
Start: 2022-01-01 | End: 2022-01-01 | Stop reason: HOSPADM

## 2022-01-01 RX ORDER — CALCIUM CARBONATE 200(500)MG
1 TABLET,CHEWABLE ORAL DAILY
Qty: 30 TABLET | Refills: 0 | Status: SHIPPED | OUTPATIENT
Start: 2022-01-01 | End: 2023-04-12

## 2022-01-01 RX ORDER — HYDRALAZINE HYDROCHLORIDE 50 MG/1
50 TABLET, FILM COATED ORAL EVERY 8 HOURS
Qty: 90 TABLET | Refills: 0 | Status: SHIPPED | OUTPATIENT
Start: 2022-01-01 | End: 2022-01-01 | Stop reason: SDUPTHER

## 2022-01-01 RX ORDER — AMLODIPINE BESYLATE 5 MG/1
5 TABLET ORAL DAILY
COMMUNITY
End: 2022-01-01 | Stop reason: SDUPTHER

## 2022-01-01 RX ORDER — PANTOPRAZOLE SODIUM 40 MG/1
40 TABLET, DELAYED RELEASE ORAL
Status: DISCONTINUED | OUTPATIENT
Start: 2022-01-01 | End: 2022-01-01

## 2022-01-01 RX ORDER — ASPIRIN 81 MG/1
81 TABLET ORAL DAILY
Qty: 90 TABLET | Refills: 3 | Status: SHIPPED | OUTPATIENT
Start: 2022-01-01 | End: 2022-01-01 | Stop reason: SDUPTHER

## 2022-01-01 RX ORDER — INSULIN ASPART 100 [IU]/ML
1-10 INJECTION, SOLUTION INTRAVENOUS; SUBCUTANEOUS
Status: DISCONTINUED | OUTPATIENT
Start: 2022-01-01 | End: 2022-01-01

## 2022-01-01 RX ORDER — GLIPIZIDE 5 MG/1
5 TABLET, FILM COATED, EXTENDED RELEASE ORAL
Qty: 90 TABLET | Refills: 3 | Status: SHIPPED | OUTPATIENT
Start: 2022-01-01 | End: 2022-01-01

## 2022-01-01 RX ORDER — GUAIFENESIN 100 MG/5ML
100-200 SOLUTION ORAL 4 TIMES DAILY PRN
Qty: 118 ML | Refills: 0 | Status: SHIPPED | OUTPATIENT
Start: 2022-01-01 | End: 2022-01-01

## 2022-01-01 RX ORDER — KETOROLAC TROMETHAMINE 30 MG/ML
INJECTION, SOLUTION INTRAMUSCULAR; INTRAVENOUS
Status: DISCONTINUED | OUTPATIENT
Start: 2022-01-01 | End: 2022-01-01

## 2022-01-01 RX ORDER — CEFAZOLIN SODIUM 1 G/3ML
INJECTION, POWDER, FOR SOLUTION INTRAMUSCULAR; INTRAVENOUS
Status: DISCONTINUED | OUTPATIENT
Start: 2022-01-01 | End: 2022-01-01

## 2022-01-01 RX ORDER — ENOXAPARIN SODIUM 100 MG/ML
40 INJECTION SUBCUTANEOUS EVERY 24 HOURS
Status: DISCONTINUED | OUTPATIENT
Start: 2022-01-01 | End: 2022-01-01 | Stop reason: HOSPADM

## 2022-01-01 RX ORDER — AMLODIPINE BESYLATE 5 MG/1
5 TABLET ORAL DAILY
Status: DISCONTINUED | OUTPATIENT
Start: 2022-01-01 | End: 2022-01-01 | Stop reason: HOSPADM

## 2022-01-01 RX ORDER — MEPERIDINE HYDROCHLORIDE 25 MG/ML
25 INJECTION INTRAMUSCULAR; INTRAVENOUS; SUBCUTANEOUS EVERY 10 MIN PRN
Status: DISCONTINUED | OUTPATIENT
Start: 2022-01-01 | End: 2022-01-01 | Stop reason: HOSPADM

## 2022-01-01 RX ORDER — IPRATROPIUM BROMIDE AND ALBUTEROL SULFATE 2.5; .5 MG/3ML; MG/3ML
3 SOLUTION RESPIRATORY (INHALATION) EVERY 6 HOURS
Status: DISCONTINUED | OUTPATIENT
Start: 2022-01-01 | End: 2022-01-01 | Stop reason: HOSPADM

## 2022-01-01 RX ORDER — BUPIVACAINE HYDROCHLORIDE 2.5 MG/ML
INJECTION, SOLUTION EPIDURAL; INFILTRATION; INTRACAUDAL
Status: DISCONTINUED | OUTPATIENT
Start: 2022-01-01 | End: 2022-01-01 | Stop reason: HOSPADM

## 2022-01-01 RX ORDER — FENTANYL CITRATE 50 UG/ML
INJECTION, SOLUTION INTRAMUSCULAR; INTRAVENOUS
Status: DISCONTINUED | OUTPATIENT
Start: 2022-01-01 | End: 2022-01-01

## 2022-01-01 RX ORDER — HYDRALAZINE HYDROCHLORIDE 50 MG/1
50 TABLET, FILM COATED ORAL EVERY 8 HOURS
Status: DISCONTINUED | OUTPATIENT
Start: 2022-01-01 | End: 2022-01-01

## 2022-01-01 RX ORDER — FENTANYL CITRATE 50 UG/ML
25 INJECTION, SOLUTION INTRAMUSCULAR; INTRAVENOUS ONCE
Status: DISCONTINUED | OUTPATIENT
Start: 2022-01-01 | End: 2022-01-01

## 2022-01-01 RX ORDER — MORPHINE SULFATE 8 MG/ML
4 INJECTION INTRAMUSCULAR; INTRAVENOUS; SUBCUTANEOUS EVERY 5 MIN PRN
Status: DISCONTINUED | OUTPATIENT
Start: 2022-01-01 | End: 2022-01-01 | Stop reason: HOSPADM

## 2022-01-01 RX ORDER — SODIUM CHLORIDE 0.9 % (FLUSH) 0.9 %
10 SYRINGE (ML) INJECTION
Status: DISCONTINUED | OUTPATIENT
Start: 2022-01-01 | End: 2022-01-01 | Stop reason: HOSPADM

## 2022-01-01 RX ORDER — SODIUM BICARBONATE 325 MG/1
325 TABLET ORAL DAILY
Status: DISCONTINUED | OUTPATIENT
Start: 2022-01-01 | End: 2022-01-01

## 2022-01-01 RX ORDER — PROPOFOL 10 MG/ML
VIAL (ML) INTRAVENOUS
Status: DISCONTINUED | OUTPATIENT
Start: 2022-01-01 | End: 2022-01-01

## 2022-01-01 RX ORDER — DIGOXIN 0.25 MG/ML
500 INJECTION INTRAMUSCULAR; INTRAVENOUS ONCE
Status: COMPLETED | OUTPATIENT
Start: 2022-01-01 | End: 2022-01-01

## 2022-01-01 RX ORDER — ENOXAPARIN SODIUM 100 MG/ML
1 INJECTION SUBCUTANEOUS
Status: DISCONTINUED | OUTPATIENT
Start: 2022-01-01 | End: 2022-01-01 | Stop reason: HOSPADM

## 2022-01-01 RX ORDER — ACETAMINOPHEN 325 MG/1
650 TABLET ORAL EVERY 6 HOURS PRN
Status: DISCONTINUED | OUTPATIENT
Start: 2022-01-01 | End: 2022-01-01 | Stop reason: HOSPADM

## 2022-01-01 RX ORDER — SIMVASTATIN 20 MG/1
20 TABLET, FILM COATED ORAL NIGHTLY
Qty: 90 TABLET | Refills: 3 | Status: SHIPPED | OUTPATIENT
Start: 2022-01-01

## 2022-01-01 RX ORDER — SODIUM,POTASSIUM PHOSPHATES 280-250MG
2 POWDER IN PACKET (EA) ORAL
Status: DISCONTINUED | OUTPATIENT
Start: 2022-01-01 | End: 2022-01-01 | Stop reason: HOSPADM

## 2022-01-01 RX ORDER — ALBUTEROL SULFATE 90 UG/1
2 AEROSOL, METERED RESPIRATORY (INHALATION) EVERY 6 HOURS PRN
Status: DISCONTINUED | OUTPATIENT
Start: 2022-01-01 | End: 2022-01-01

## 2022-01-01 RX ORDER — GABAPENTIN 300 MG/1
300 CAPSULE ORAL 2 TIMES DAILY
Status: DISCONTINUED | OUTPATIENT
Start: 2022-01-01 | End: 2022-01-01

## 2022-01-01 RX ORDER — HYDROMORPHONE HYDROCHLORIDE 2 MG/ML
0.5 INJECTION, SOLUTION INTRAMUSCULAR; INTRAVENOUS; SUBCUTANEOUS EVERY 5 MIN PRN
Status: DISCONTINUED | OUTPATIENT
Start: 2022-01-01 | End: 2022-01-01

## 2022-01-01 RX ORDER — FUROSEMIDE 10 MG/ML
40 INJECTION INTRAMUSCULAR; INTRAVENOUS ONCE
Status: COMPLETED | OUTPATIENT
Start: 2022-01-01 | End: 2022-01-01

## 2022-01-01 RX ORDER — ONDANSETRON 2 MG/ML
4 INJECTION INTRAMUSCULAR; INTRAVENOUS EVERY 6 HOURS PRN
Status: DISCONTINUED | OUTPATIENT
Start: 2022-01-01 | End: 2022-01-01

## 2022-01-01 RX ORDER — MORPHINE SULFATE 2 MG/ML
2 INJECTION, SOLUTION INTRAMUSCULAR; INTRAVENOUS ONCE
Status: COMPLETED | OUTPATIENT
Start: 2022-01-01 | End: 2022-01-01

## 2022-01-01 RX ORDER — CALCIUM CARBONATE 200(500)MG
1 TABLET,CHEWABLE ORAL DAILY
Status: DISCONTINUED | OUTPATIENT
Start: 2022-01-01 | End: 2022-01-01 | Stop reason: HOSPADM

## 2022-01-01 RX ORDER — ORPHENADRINE CITRATE 30 MG/ML
60 INJECTION INTRAMUSCULAR; INTRAVENOUS
Status: COMPLETED | OUTPATIENT
Start: 2022-01-01 | End: 2022-01-01

## 2022-01-01 RX ORDER — TALC
6 POWDER (GRAM) TOPICAL NIGHTLY PRN
Status: DISCONTINUED | OUTPATIENT
Start: 2022-01-01 | End: 2022-01-01 | Stop reason: HOSPADM

## 2022-01-01 RX ORDER — ASPIRIN 325 MG
325 TABLET ORAL
Status: COMPLETED | OUTPATIENT
Start: 2022-01-01 | End: 2022-01-01

## 2022-01-01 RX ORDER — IBUPROFEN 200 MG
16 TABLET ORAL
Status: DISCONTINUED | OUTPATIENT
Start: 2022-01-01 | End: 2022-01-01 | Stop reason: HOSPADM

## 2022-01-01 RX ORDER — METOPROLOL TARTRATE 25 MG/1
25 TABLET, FILM COATED ORAL 2 TIMES DAILY
Qty: 180 TABLET | Refills: 3 | Status: ON HOLD | OUTPATIENT
Start: 2022-01-01 | End: 2022-01-01 | Stop reason: SDUPTHER

## 2022-01-01 RX ORDER — HYDROCODONE BITARTRATE AND ACETAMINOPHEN 7.5; 325 MG/1; MG/1
1 TABLET ORAL EVERY 6 HOURS PRN
Qty: 15 TABLET | Refills: 0 | Status: SHIPPED | OUTPATIENT
Start: 2022-01-01 | End: 2022-01-01

## 2022-01-01 RX ORDER — HYDROCODONE BITARTRATE AND ACETAMINOPHEN 5; 325 MG/1; MG/1
1 TABLET ORAL ONCE
Status: COMPLETED | OUTPATIENT
Start: 2022-01-01 | End: 2022-01-01

## 2022-01-01 RX ORDER — ALLOPURINOL 100 MG/1
100 TABLET ORAL DAILY
Status: DISCONTINUED | OUTPATIENT
Start: 2022-01-01 | End: 2022-01-01 | Stop reason: HOSPADM

## 2022-01-01 RX ORDER — OXYCODONE AND ACETAMINOPHEN 7.5; 325 MG/1; MG/1
1 TABLET ORAL EVERY 4 HOURS PRN
Status: DISCONTINUED | OUTPATIENT
Start: 2022-01-01 | End: 2022-01-01

## 2022-01-01 RX ORDER — ACETAMINOPHEN 500 MG
1000 TABLET ORAL EVERY 6 HOURS PRN
Status: DISCONTINUED | OUTPATIENT
Start: 2022-01-01 | End: 2022-01-01 | Stop reason: HOSPADM

## 2022-01-01 RX ORDER — AMLODIPINE BESYLATE 10 MG/1
10 TABLET ORAL DAILY
Status: DISCONTINUED | OUTPATIENT
Start: 2022-01-01 | End: 2022-01-01 | Stop reason: HOSPADM

## 2022-01-01 RX ORDER — SODIUM CHLORIDE 9 MG/ML
INJECTION, SOLUTION INTRAVENOUS ONCE
Status: DISCONTINUED | OUTPATIENT
Start: 2022-01-01 | End: 2022-01-01 | Stop reason: HOSPADM

## 2022-01-01 RX ORDER — MUPIROCIN 20 MG/G
1 OINTMENT TOPICAL 2 TIMES DAILY
Status: DISCONTINUED | OUTPATIENT
Start: 2022-01-01 | End: 2022-01-01 | Stop reason: HOSPADM

## 2022-01-01 RX ORDER — BENZONATATE 100 MG/1
200 CAPSULE ORAL 3 TIMES DAILY PRN
Qty: 30 CAPSULE | Refills: 0 | Status: SHIPPED | OUTPATIENT
Start: 2022-01-01 | End: 2022-01-01

## 2022-01-01 RX ORDER — SODIUM CHLORIDE 9 MG/ML
INJECTION, SOLUTION INTRAVENOUS CONTINUOUS PRN
Status: DISCONTINUED | OUTPATIENT
Start: 2022-01-01 | End: 2022-01-01

## 2022-01-01 RX ORDER — ONDANSETRON 2 MG/ML
INJECTION INTRAMUSCULAR; INTRAVENOUS
Status: DISCONTINUED | OUTPATIENT
Start: 2022-01-01 | End: 2022-01-01

## 2022-01-01 RX ORDER — FUROSEMIDE 20 MG/1
20 TABLET ORAL 2 TIMES DAILY
Qty: 180 TABLET | Refills: 3 | Status: SHIPPED | OUTPATIENT
Start: 2022-01-01 | End: 2022-01-01 | Stop reason: SDUPTHER

## 2022-01-01 RX ORDER — PNV NO.95/FERROUS FUM/FOLIC AC 28MG-0.8MG
100 TABLET ORAL DAILY
COMMUNITY

## 2022-01-01 RX ORDER — ETOMIDATE 2 MG/ML
INJECTION INTRAVENOUS
Status: DISCONTINUED | OUTPATIENT
Start: 2022-01-01 | End: 2022-01-01

## 2022-01-01 RX ORDER — LIDOCAINE HYDROCHLORIDE AND EPINEPHRINE 10; 10 MG/ML; UG/ML
INJECTION, SOLUTION INFILTRATION; PERINEURAL
Status: DISCONTINUED | OUTPATIENT
Start: 2022-01-01 | End: 2022-01-01 | Stop reason: HOSPADM

## 2022-01-01 RX ORDER — MULTIVITAMIN
1 TABLET ORAL DAILY
Qty: 90 TABLET | Refills: 3 | Status: ON HOLD | OUTPATIENT
Start: 2022-01-01 | End: 2022-01-01 | Stop reason: CLARIF

## 2022-01-01 RX ORDER — HEPARIN SODIUM 5000 [USP'U]/ML
5000 INJECTION, SOLUTION INTRAVENOUS; SUBCUTANEOUS EVERY 12 HOURS
Status: DISCONTINUED | OUTPATIENT
Start: 2022-01-01 | End: 2022-01-01

## 2022-01-01 RX ORDER — PANTOPRAZOLE SODIUM 40 MG/10ML
40 INJECTION, POWDER, LYOPHILIZED, FOR SOLUTION INTRAVENOUS 2 TIMES DAILY
Status: DISCONTINUED | OUTPATIENT
Start: 2022-01-01 | End: 2022-01-01

## 2022-01-01 RX ORDER — POLYETHYLENE GLYCOL 3350 17 G/17G
34 POWDER, FOR SOLUTION ORAL 2 TIMES DAILY
Status: DISCONTINUED | OUTPATIENT
Start: 2022-01-01 | End: 2022-01-01

## 2022-01-01 RX ORDER — HEPARIN SODIUM 1000 [USP'U]/ML
4000 INJECTION, SOLUTION INTRAVENOUS; SUBCUTANEOUS ONCE
Status: COMPLETED | OUTPATIENT
Start: 2022-01-01 | End: 2022-01-01

## 2022-01-01 RX ORDER — IPRATROPIUM BROMIDE AND ALBUTEROL SULFATE 2.5; .5 MG/3ML; MG/3ML
3 SOLUTION RESPIRATORY (INHALATION) EVERY 6 HOURS PRN
Status: DISCONTINUED | OUTPATIENT
Start: 2022-01-01 | End: 2022-01-01 | Stop reason: HOSPADM

## 2022-01-01 RX ORDER — DEXTROMETHORPHAN POLISTIREX 30 MG/5 ML
1 SUSPENSION, EXTENDED RELEASE 12 HR ORAL ONCE
Status: COMPLETED | OUTPATIENT
Start: 2022-01-01 | End: 2022-01-01

## 2022-01-01 RX ORDER — ASPIRIN 81 MG/1
81 TABLET ORAL DAILY
Status: DISCONTINUED | OUTPATIENT
Start: 2022-01-01 | End: 2022-01-01

## 2022-01-01 RX ORDER — ASPIRIN 81 MG/1
81 TABLET ORAL DAILY
Status: DISCONTINUED | OUTPATIENT
Start: 2022-01-01 | End: 2022-01-01 | Stop reason: HOSPADM

## 2022-01-01 RX ORDER — HYDROCODONE BITARTRATE AND ACETAMINOPHEN 10; 325 MG/1; MG/1
1 TABLET ORAL EVERY 12 HOURS
Qty: 60 TABLET | Refills: 0 | Status: SHIPPED | OUTPATIENT
Start: 2022-01-01 | End: 2022-09-25

## 2022-01-01 RX ORDER — SODIUM CHLORIDE 9 MG/ML
INJECTION, SOLUTION INTRAVENOUS
Status: DISCONTINUED | OUTPATIENT
Start: 2022-01-01 | End: 2022-01-01 | Stop reason: HOSPADM

## 2022-01-01 RX ORDER — ACETAMINOPHEN 325 MG/1
650 TABLET ORAL EVERY 4 HOURS PRN
Status: DISCONTINUED | OUTPATIENT
Start: 2022-01-01 | End: 2022-01-01

## 2022-01-01 RX ORDER — ALLOPURINOL 100 MG/1
100 TABLET ORAL DAILY
Status: DISCONTINUED | OUTPATIENT
Start: 2022-01-01 | End: 2022-01-01

## 2022-01-01 RX ORDER — HYDRALAZINE HYDROCHLORIDE 20 MG/ML
10 INJECTION INTRAMUSCULAR; INTRAVENOUS EVERY 6 HOURS PRN
Status: DISCONTINUED | OUTPATIENT
Start: 2022-01-01 | End: 2022-01-01

## 2022-01-01 RX ORDER — FUROSEMIDE 10 MG/ML
120 INJECTION INTRAMUSCULAR; INTRAVENOUS
Status: COMPLETED | OUTPATIENT
Start: 2022-01-01 | End: 2022-01-01

## 2022-01-01 RX ORDER — ATORVASTATIN CALCIUM 40 MG/1
40 TABLET, FILM COATED ORAL DAILY
Refills: 3 | Status: DISCONTINUED | OUTPATIENT
Start: 2022-01-01 | End: 2022-01-01 | Stop reason: HOSPADM

## 2022-01-01 RX ORDER — LIDOCAINE HYDROCHLORIDE 10 MG/ML
INJECTION INFILTRATION; PERINEURAL
Status: COMPLETED
Start: 2022-01-01 | End: 2022-01-01

## 2022-01-01 RX ORDER — ATORVASTATIN CALCIUM 40 MG/1
40 TABLET, FILM COATED ORAL NIGHTLY
Status: DISCONTINUED | OUTPATIENT
Start: 2022-01-01 | End: 2022-01-01 | Stop reason: HOSPADM

## 2022-01-01 RX ORDER — METOPROLOL TARTRATE 25 MG/1
12.5 TABLET, FILM COATED ORAL DAILY
Qty: 180 TABLET | Refills: 3 | Status: SHIPPED | OUTPATIENT
Start: 2022-01-01 | End: 2022-01-01

## 2022-01-01 RX ORDER — HYDRALAZINE HYDROCHLORIDE 50 MG/1
50 TABLET, FILM COATED ORAL EVERY 8 HOURS
Status: DISCONTINUED | OUTPATIENT
Start: 2022-01-01 | End: 2022-01-01 | Stop reason: HOSPADM

## 2022-01-01 RX ORDER — PNV NO.95/FERROUS FUM/FOLIC AC 28MG-0.8MG
100 TABLET ORAL DAILY
Status: DISCONTINUED | OUTPATIENT
Start: 2022-01-01 | End: 2022-01-01 | Stop reason: HOSPADM

## 2022-01-01 RX ORDER — GUAIFENESIN 100 MG/5ML
100-200 SOLUTION ORAL 4 TIMES DAILY PRN
Qty: 118 ML | Refills: 0 | Status: SHIPPED | OUTPATIENT
Start: 2022-01-01 | End: 2022-01-01 | Stop reason: SDUPTHER

## 2022-01-01 RX ORDER — AZITHROMYCIN 250 MG/1
250 TABLET, FILM COATED ORAL DAILY
Qty: 10 TABLET | Refills: 0 | Status: SHIPPED | OUTPATIENT
Start: 2022-01-01 | End: 2022-01-01

## 2022-01-01 RX ORDER — GABAPENTIN 100 MG/1
100 CAPSULE ORAL 2 TIMES DAILY
Status: DISCONTINUED | OUTPATIENT
Start: 2022-01-01 | End: 2022-01-01 | Stop reason: HOSPADM

## 2022-01-01 RX ORDER — ROCURONIUM BROMIDE 10 MG/ML
INJECTION, SOLUTION INTRAVENOUS
Status: DISCONTINUED | OUTPATIENT
Start: 2022-01-01 | End: 2022-01-01

## 2022-01-01 RX ORDER — HYDROMORPHONE HYDROCHLORIDE 2 MG/ML
0.5 INJECTION, SOLUTION INTRAMUSCULAR; INTRAVENOUS; SUBCUTANEOUS EVERY 5 MIN PRN
Status: DISCONTINUED | OUTPATIENT
Start: 2022-01-01 | End: 2022-01-01 | Stop reason: HOSPADM

## 2022-01-01 RX ORDER — PHENYLEPHRINE HYDROCHLORIDE 10 MG/ML
INJECTION INTRAVENOUS
Status: DISCONTINUED | OUTPATIENT
Start: 2022-01-01 | End: 2022-01-01

## 2022-01-01 RX ORDER — HEPARIN SODIUM 1000 [USP'U]/ML
INJECTION, SOLUTION INTRAVENOUS; SUBCUTANEOUS
Status: DISCONTINUED | OUTPATIENT
Start: 2022-01-01 | End: 2022-01-01 | Stop reason: HOSPADM

## 2022-01-01 RX ORDER — HYDROCODONE BITARTRATE AND ACETAMINOPHEN 10; 325 MG/1; MG/1
1 TABLET ORAL EVERY 6 HOURS PRN
Status: DISCONTINUED | OUTPATIENT
Start: 2022-01-01 | End: 2022-01-01 | Stop reason: HOSPADM

## 2022-01-01 RX ORDER — GLUCAGON 1 MG
1 KIT INJECTION
Status: DISCONTINUED | OUTPATIENT
Start: 2022-01-01 | End: 2022-01-01 | Stop reason: HOSPADM

## 2022-01-01 RX ORDER — SODIUM CHLORIDE 9 MG/ML
INJECTION, SOLUTION INTRAVENOUS CONTINUOUS
Status: CANCELLED | OUTPATIENT
Start: 2022-01-01

## 2022-01-01 RX ORDER — DIPHENHYDRAMINE HYDROCHLORIDE 50 MG/ML
25 INJECTION INTRAMUSCULAR; INTRAVENOUS EVERY 6 HOURS PRN
Status: DISCONTINUED | OUTPATIENT
Start: 2022-01-01 | End: 2022-01-01 | Stop reason: HOSPADM

## 2022-01-01 RX ORDER — INSULIN ASPART 100 [IU]/ML
0-5 INJECTION, SOLUTION INTRAVENOUS; SUBCUTANEOUS
Status: DISCONTINUED | OUTPATIENT
Start: 2022-01-01 | End: 2022-01-01

## 2022-01-01 RX ORDER — ZOLPIDEM TARTRATE 5 MG/1
5 TABLET ORAL NIGHTLY PRN
Status: DISCONTINUED | OUTPATIENT
Start: 2022-01-01 | End: 2022-01-01 | Stop reason: HOSPADM

## 2022-01-01 RX ORDER — SODIUM CHLORIDE 0.9 % (FLUSH) 0.9 %
10 SYRINGE (ML) INJECTION EVERY 6 HOURS
Status: DISCONTINUED | OUTPATIENT
Start: 2022-01-01 | End: 2022-01-01

## 2022-01-01 RX ORDER — IPRATROPIUM BROMIDE AND ALBUTEROL SULFATE 2.5; .5 MG/3ML; MG/3ML
3 SOLUTION RESPIRATORY (INHALATION)
Status: COMPLETED | OUTPATIENT
Start: 2022-01-01 | End: 2022-01-01

## 2022-01-01 RX ORDER — SODIUM CHLORIDE 9 MG/ML
INJECTION, SOLUTION INTRAVENOUS CONTINUOUS
Status: DISCONTINUED | OUTPATIENT
Start: 2022-01-01 | End: 2022-01-01 | Stop reason: HOSPADM

## 2022-01-01 RX ORDER — HYDROCODONE BITARTRATE AND ACETAMINOPHEN 10; 325 MG/1; MG/1
1 TABLET ORAL EVERY 12 HOURS
Qty: 60 TABLET | Refills: 0 | Status: SHIPPED | OUTPATIENT
Start: 2022-09-23 | End: 2022-10-23

## 2022-01-01 RX ORDER — INSULIN ASPART 100 [IU]/ML
1-10 INJECTION, SOLUTION INTRAVENOUS; SUBCUTANEOUS EVERY 6 HOURS
Status: DISCONTINUED | OUTPATIENT
Start: 2022-01-01 | End: 2022-01-01 | Stop reason: HOSPADM

## 2022-01-01 RX ORDER — MAG HYDROX/ALUMINUM HYD/SIMETH 200-200-20
30 SUSPENSION, ORAL (FINAL DOSE FORM) ORAL 4 TIMES DAILY PRN
Status: DISCONTINUED | OUTPATIENT
Start: 2022-01-01 | End: 2022-01-01 | Stop reason: HOSPADM

## 2022-01-01 RX ORDER — TRAMADOL HYDROCHLORIDE 50 MG/1
50 TABLET ORAL EVERY 12 HOURS PRN
Qty: 60 TABLET | Refills: 2 | Status: SHIPPED | OUTPATIENT
Start: 2022-01-01 | End: 2022-01-01

## 2022-01-01 RX ORDER — UBIDECARENONE 75 MG
500 CAPSULE ORAL DAILY
Status: DISCONTINUED | OUTPATIENT
Start: 2022-01-01 | End: 2022-01-01

## 2022-01-01 RX ORDER — GABAPENTIN 100 MG/1
100 CAPSULE ORAL 2 TIMES DAILY
Qty: 60 CAPSULE | Refills: 2 | Status: SHIPPED | OUTPATIENT
Start: 2022-01-01 | End: 2023-07-19

## 2022-01-01 RX ORDER — ASPIRIN 81 MG/1
81 TABLET ORAL DAILY
Qty: 90 TABLET | Refills: 3 | Status: SHIPPED | OUTPATIENT
Start: 2022-01-01

## 2022-01-01 RX ORDER — DIPHENHYDRAMINE HCL 25 MG
25 CAPSULE ORAL ONCE
Status: COMPLETED | OUTPATIENT
Start: 2022-01-01 | End: 2022-01-01

## 2022-01-01 RX ORDER — DEXTROSE MONOHYDRATE 5 G/100ML
INJECTION INTRAVENOUS
Status: DISPENSED
Start: 2022-01-01 | End: 2022-01-01

## 2022-01-01 RX ORDER — AZITHROMYCIN 250 MG/1
250 TABLET, FILM COATED ORAL DAILY
COMMUNITY
End: 2022-01-01 | Stop reason: SDUPTHER

## 2022-01-01 RX ORDER — SODIUM CHLORIDE 0.9 % (FLUSH) 0.9 %
10 SYRINGE (ML) INJECTION
Status: DISCONTINUED | OUTPATIENT
Start: 2022-01-01 | End: 2022-01-01

## 2022-01-01 RX ORDER — BUDESONIDE AND FORMOTEROL FUMARATE DIHYDRATE 160; 4.5 UG/1; UG/1
2 AEROSOL RESPIRATORY (INHALATION) EVERY 12 HOURS
Qty: 10.2 G | Refills: 5 | Status: SHIPPED | OUTPATIENT
Start: 2022-01-01 | End: 2022-01-01 | Stop reason: SDUPTHER

## 2022-01-01 RX ORDER — ALLOPURINOL 100 MG/1
100 TABLET ORAL DAILY
Qty: 90 TABLET | Refills: 3 | Status: SHIPPED | OUTPATIENT
Start: 2022-01-01 | End: 2022-01-01 | Stop reason: SDUPTHER

## 2022-01-01 RX ORDER — TALC
9 POWDER (GRAM) TOPICAL NIGHTLY PRN
Status: DISCONTINUED | OUTPATIENT
Start: 2022-01-01 | End: 2022-01-01 | Stop reason: HOSPADM

## 2022-01-01 RX ORDER — TRAMADOL HYDROCHLORIDE 50 MG/1
50 TABLET ORAL EVERY 6 HOURS PRN
Status: DISCONTINUED | OUTPATIENT
Start: 2022-01-01 | End: 2022-01-01 | Stop reason: HOSPADM

## 2022-01-01 RX ORDER — GLIPIZIDE 5 MG/1
5 TABLET, FILM COATED, EXTENDED RELEASE ORAL
Qty: 90 TABLET | Refills: 3 | Status: SHIPPED | OUTPATIENT
Start: 2022-01-01 | End: 2022-01-01 | Stop reason: SDUPTHER

## 2022-01-01 RX ORDER — PANTOPRAZOLE SODIUM 40 MG/1
40 TABLET, DELAYED RELEASE ORAL 2 TIMES DAILY
Status: DISCONTINUED | OUTPATIENT
Start: 2022-01-01 | End: 2022-01-01 | Stop reason: HOSPADM

## 2022-01-01 RX ORDER — MORPHINE SULFATE 4 MG/ML
4 INJECTION, SOLUTION INTRAMUSCULAR; INTRAVENOUS ONCE
Status: COMPLETED | OUTPATIENT
Start: 2022-01-01 | End: 2022-01-01

## 2022-01-01 RX ORDER — PNV NO.95/FERROUS FUM/FOLIC AC 28MG-0.8MG
100 TABLET ORAL DAILY
Qty: 30 TABLET | Refills: 0 | Status: SHIPPED | OUTPATIENT
Start: 2022-01-01 | End: 2022-01-01

## 2022-01-01 RX ORDER — HYDROCODONE BITARTRATE AND ACETAMINOPHEN 7.5; 325 MG/1; MG/1
1 TABLET ORAL EVERY 6 HOURS PRN
Status: DISCONTINUED | OUTPATIENT
Start: 2022-01-01 | End: 2022-01-01 | Stop reason: HOSPADM

## 2022-01-01 RX ORDER — INSULIN ASPART 100 [IU]/ML
0-5 INJECTION, SOLUTION INTRAVENOUS; SUBCUTANEOUS
Status: DISCONTINUED | OUTPATIENT
Start: 2022-01-01 | End: 2022-01-01 | Stop reason: HOSPADM

## 2022-01-01 RX ORDER — KETOROLAC TROMETHAMINE 30 MG/ML
30 INJECTION, SOLUTION INTRAMUSCULAR; INTRAVENOUS
Status: COMPLETED | OUTPATIENT
Start: 2022-01-01 | End: 2022-01-01

## 2022-01-01 RX ORDER — REGADENOSON 0.08 MG/ML
0.4 INJECTION, SOLUTION INTRAVENOUS ONCE
Status: COMPLETED | OUTPATIENT
Start: 2022-01-01 | End: 2022-01-01

## 2022-01-01 RX ORDER — HYDRALAZINE HYDROCHLORIDE 50 MG/1
100 TABLET, FILM COATED ORAL EVERY 8 HOURS
Status: DISCONTINUED | OUTPATIENT
Start: 2022-01-01 | End: 2022-01-01

## 2022-01-01 RX ORDER — AMLODIPINE BESYLATE 5 MG/1
5 TABLET ORAL DAILY
Qty: 90 TABLET | Refills: 3 | Status: ON HOLD | OUTPATIENT
Start: 2022-01-01 | End: 2022-01-01 | Stop reason: HOSPADM

## 2022-01-01 RX ORDER — MIDAZOLAM HYDROCHLORIDE 1 MG/ML
2 INJECTION INTRAMUSCULAR; INTRAVENOUS ONCE
Status: DISCONTINUED | OUTPATIENT
Start: 2022-01-01 | End: 2022-01-01

## 2022-01-01 RX ORDER — ONDANSETRON 4 MG/1
8 TABLET, ORALLY DISINTEGRATING ORAL EVERY 8 HOURS PRN
Status: DISCONTINUED | OUTPATIENT
Start: 2022-01-01 | End: 2022-01-01

## 2022-01-01 RX ORDER — SODIUM CHLORIDE 0.9 % (FLUSH) 0.9 %
10 SYRINGE (ML) INJECTION EVERY 6 HOURS
Status: DISCONTINUED | OUTPATIENT
Start: 2022-01-01 | End: 2022-01-01 | Stop reason: HOSPADM

## 2022-01-01 RX ORDER — IBUPROFEN 200 MG
24 TABLET ORAL
Status: DISCONTINUED | OUTPATIENT
Start: 2022-01-01 | End: 2022-01-01 | Stop reason: HOSPADM

## 2022-01-01 RX ORDER — GABAPENTIN 300 MG/1
300 CAPSULE ORAL EVERY 8 HOURS
Qty: 90 CAPSULE | Refills: 2 | Status: ON HOLD | OUTPATIENT
Start: 2022-01-01 | End: 2022-01-01 | Stop reason: HOSPADM

## 2022-01-01 RX ORDER — ACETAMINOPHEN 325 MG/1
650 TABLET ORAL EVERY 8 HOURS PRN
Status: DISCONTINUED | OUTPATIENT
Start: 2022-01-01 | End: 2022-01-01

## 2022-01-01 RX ORDER — NALOXONE HCL 0.4 MG/ML
0.02 VIAL (ML) INJECTION
Status: DISCONTINUED | OUTPATIENT
Start: 2022-01-01 | End: 2022-01-01 | Stop reason: HOSPADM

## 2022-01-01 RX ORDER — HYDROMORPHONE HYDROCHLORIDE 2 MG/ML
1 INJECTION, SOLUTION INTRAMUSCULAR; INTRAVENOUS; SUBCUTANEOUS EVERY 4 HOURS PRN
Status: DISCONTINUED | OUTPATIENT
Start: 2022-01-01 | End: 2022-01-01 | Stop reason: HOSPADM

## 2022-01-01 RX ORDER — CALCIUM CARBONATE 200(500)MG
1 TABLET,CHEWABLE ORAL DAILY
Status: DISCONTINUED | OUTPATIENT
Start: 2022-01-01 | End: 2022-01-01

## 2022-01-01 RX ORDER — METOCLOPRAMIDE HYDROCHLORIDE 5 MG/ML
10 INJECTION INTRAMUSCULAR; INTRAVENOUS EVERY 6 HOURS PRN
Status: DISCONTINUED | OUTPATIENT
Start: 2022-01-01 | End: 2022-01-01 | Stop reason: HOSPADM

## 2022-01-01 RX ORDER — FLUTICASONE FUROATE AND VILANTEROL 100; 25 UG/1; UG/1
1 POWDER RESPIRATORY (INHALATION) DAILY
Refills: 5 | Status: DISCONTINUED | OUTPATIENT
Start: 2022-01-01 | End: 2022-01-01

## 2022-01-01 RX ORDER — POLYETHYLENE GLYCOL 3350 17 G/17G
17 POWDER, FOR SOLUTION ORAL DAILY PRN
Status: DISCONTINUED | OUTPATIENT
Start: 2022-01-01 | End: 2022-01-01 | Stop reason: HOSPADM

## 2022-01-01 RX ORDER — CEFAZOLIN SODIUM 2 G/50ML
2 SOLUTION INTRAVENOUS
Status: DISCONTINUED | OUTPATIENT
Start: 2022-01-01 | End: 2022-01-01 | Stop reason: HOSPADM

## 2022-01-01 RX ORDER — AMIODARONE HYDROCHLORIDE 200 MG/1
400 TABLET ORAL 2 TIMES DAILY
Status: DISCONTINUED | OUTPATIENT
Start: 2022-01-01 | End: 2022-01-01

## 2022-01-01 RX ORDER — BUDESONIDE AND FORMOTEROL FUMARATE DIHYDRATE 160; 4.5 UG/1; UG/1
2 AEROSOL RESPIRATORY (INHALATION) EVERY 12 HOURS
Qty: 10.2 G | Refills: 5 | Status: SHIPPED | OUTPATIENT
Start: 2022-01-01 | End: 2022-09-21

## 2022-01-01 RX ORDER — GLIPIZIDE 5 MG/1
5 TABLET, FILM COATED, EXTENDED RELEASE ORAL
Qty: 30 TABLET | Refills: 3 | Status: SHIPPED | OUTPATIENT
Start: 2022-01-01 | End: 2022-01-01 | Stop reason: SDUPTHER

## 2022-01-01 RX ORDER — HYDROCODONE BITARTRATE AND ACETAMINOPHEN 5; 325 MG/1; MG/1
1 TABLET ORAL EVERY 6 HOURS PRN
Status: DISCONTINUED | OUTPATIENT
Start: 2022-01-01 | End: 2022-01-01

## 2022-01-01 RX ORDER — ALBUMIN HUMAN 250 G/1000ML
25 SOLUTION INTRAVENOUS ONCE
Status: COMPLETED | OUTPATIENT
Start: 2022-01-01 | End: 2022-01-01

## 2022-01-01 RX ORDER — AMLODIPINE BESYLATE 5 MG/1
5 TABLET ORAL DAILY
Qty: 90 TABLET | Refills: 1 | Status: SHIPPED | OUTPATIENT
Start: 2022-01-01 | End: 2022-01-01 | Stop reason: SDUPTHER

## 2022-01-01 RX ORDER — ALLOPURINOL 100 MG/1
100 TABLET ORAL DAILY
Qty: 90 TABLET | Refills: 3 | Status: SHIPPED | OUTPATIENT
Start: 2022-01-01

## 2022-01-01 RX ORDER — MORPHINE SULFATE 10 MG/ML
4 INJECTION INTRAMUSCULAR; INTRAVENOUS; SUBCUTANEOUS EVERY 5 MIN PRN
Status: DISCONTINUED | OUTPATIENT
Start: 2022-01-01 | End: 2022-01-01

## 2022-01-01 RX ORDER — ALBUTEROL SULFATE 2.5 MG/.5ML
2.5 SOLUTION RESPIRATORY (INHALATION) EVERY 4 HOURS PRN
Qty: 60 EACH | Refills: 0 | Status: SHIPPED | OUTPATIENT
Start: 2022-01-01 | End: 2022-01-01

## 2022-01-01 RX ORDER — ALBUTEROL SULFATE 0.83 MG/ML
SOLUTION RESPIRATORY (INHALATION)
COMMUNITY
Start: 2022-01-01

## 2022-01-01 RX ORDER — HYDROCODONE BITARTRATE AND ACETAMINOPHEN 5; 325 MG/1; MG/1
1 TABLET ORAL EVERY 4 HOURS PRN
Status: DISCONTINUED | OUTPATIENT
Start: 2022-01-01 | End: 2022-01-01 | Stop reason: HOSPADM

## 2022-01-01 RX ORDER — DEXTROSE 4 G
TABLET,CHEWABLE ORAL
Qty: 1 EACH | Refills: 0 | Status: SHIPPED | OUTPATIENT
Start: 2022-01-01

## 2022-01-01 RX ORDER — EPHEDRINE SULFATE 50 MG/ML
INJECTION, SOLUTION INTRAVENOUS
Status: DISCONTINUED | OUTPATIENT
Start: 2022-01-01 | End: 2022-01-01

## 2022-01-01 RX ORDER — LORAZEPAM 2 MG/ML
1 INJECTION INTRAMUSCULAR
Status: DISCONTINUED | OUTPATIENT
Start: 2022-01-01 | End: 2022-01-01 | Stop reason: HOSPADM

## 2022-01-01 RX ORDER — METOPROLOL TARTRATE 25 MG/1
25 TABLET, FILM COATED ORAL 2 TIMES DAILY
Qty: 180 TABLET | Refills: 1 | Status: SHIPPED | OUTPATIENT
Start: 2022-01-01 | End: 2022-01-01 | Stop reason: SDUPTHER

## 2022-01-01 RX ORDER — CEFAZOLIN SODIUM 2 G/50ML
2 SOLUTION INTRAVENOUS
Status: CANCELLED | OUTPATIENT
Start: 2022-01-01

## 2022-01-01 RX ORDER — SEVELAMER CARBONATE 800 MG/1
800 TABLET, FILM COATED ORAL
Status: DISCONTINUED | OUTPATIENT
Start: 2022-01-01 | End: 2022-01-01

## 2022-01-01 RX ORDER — ALBUMIN HUMAN 50 G/1000ML
12.5 SOLUTION INTRAVENOUS ONCE
Status: COMPLETED | OUTPATIENT
Start: 2022-01-01 | End: 2022-01-01

## 2022-01-01 RX ORDER — HYDROCODONE BITARTRATE AND ACETAMINOPHEN 10; 325 MG/1; MG/1
1 TABLET ORAL EVERY 12 HOURS
Qty: 60 TABLET | Refills: 0 | Status: ON HOLD | OUTPATIENT
Start: 2022-01-01 | End: 2022-01-01 | Stop reason: HOSPADM

## 2022-01-01 RX ORDER — FENOFIBRATE 54 MG/1
54 TABLET ORAL DAILY
Qty: 90 TABLET | Refills: 3 | Status: SHIPPED | OUTPATIENT
Start: 2022-01-01 | End: 2022-01-01 | Stop reason: SDUPTHER

## 2022-01-01 RX ORDER — AMLODIPINE BESYLATE 5 MG/1
5 TABLET ORAL DAILY
Qty: 90 TABLET | Refills: 3 | Status: SHIPPED | OUTPATIENT
Start: 2022-01-01 | End: 2022-01-01 | Stop reason: SDUPTHER

## 2022-01-01 RX ORDER — UBIDECARENONE 75 MG
500 CAPSULE ORAL DAILY
Qty: 90 TABLET | Refills: 3 | Status: ON HOLD | OUTPATIENT
Start: 2022-01-01 | End: 2022-01-01 | Stop reason: HOSPADM

## 2022-01-01 RX ORDER — DRONABINOL 2.5 MG/1
2.5 CAPSULE ORAL 2 TIMES DAILY
Status: DISCONTINUED | OUTPATIENT
Start: 2022-01-01 | End: 2022-01-01 | Stop reason: HOSPADM

## 2022-01-01 RX ORDER — SODIUM BICARBONATE 325 MG/1
325 TABLET ORAL DAILY
Qty: 14 TABLET | Refills: 0 | Status: ON HOLD | OUTPATIENT
Start: 2022-01-01 | End: 2022-01-01 | Stop reason: CLARIF

## 2022-01-01 RX ORDER — POLYETHYLENE GLYCOL 3350 17 G/17G
17 POWDER, FOR SOLUTION ORAL 2 TIMES DAILY
Status: DISCONTINUED | OUTPATIENT
Start: 2022-01-01 | End: 2022-01-01

## 2022-01-01 RX ORDER — LIDOCAINE HYDROCHLORIDE 20 MG/ML
INJECTION INTRAVENOUS
Status: DISCONTINUED | OUTPATIENT
Start: 2022-01-01 | End: 2022-01-01

## 2022-01-01 RX ORDER — ACETAMINOPHEN 500 MG
1000 TABLET ORAL EVERY 8 HOURS PRN
Status: DISCONTINUED | OUTPATIENT
Start: 2022-01-01 | End: 2022-01-01 | Stop reason: HOSPADM

## 2022-01-01 RX ORDER — IPRATROPIUM BROMIDE AND ALBUTEROL SULFATE 2.5; .5 MG/3ML; MG/3ML
3 SOLUTION RESPIRATORY (INHALATION) EVERY 12 HOURS
Status: DISCONTINUED | OUTPATIENT
Start: 2022-01-01 | End: 2022-01-01 | Stop reason: DRUGHIGH

## 2022-01-01 RX ORDER — MORPHINE SULFATE 2 MG/ML
INJECTION, SOLUTION INTRAMUSCULAR; INTRAVENOUS
Status: COMPLETED
Start: 2022-01-01 | End: 2022-01-01

## 2022-01-01 RX ORDER — METOPROLOL TARTRATE 1 MG/ML
5 INJECTION, SOLUTION INTRAVENOUS ONCE
Status: COMPLETED | OUTPATIENT
Start: 2022-01-01 | End: 2022-01-01

## 2022-01-01 RX ORDER — ALBUTEROL SULFATE 0.83 MG/ML
2.5 SOLUTION RESPIRATORY (INHALATION) EVERY 6 HOURS PRN
Status: DISCONTINUED | OUTPATIENT
Start: 2022-01-01 | End: 2022-01-01 | Stop reason: HOSPADM

## 2022-01-01 RX ORDER — HYDRALAZINE HYDROCHLORIDE 50 MG/1
50 TABLET, FILM COATED ORAL EVERY 8 HOURS
Qty: 90 TABLET | Refills: 0 | Status: ON HOLD | OUTPATIENT
Start: 2022-01-01 | End: 2022-01-01 | Stop reason: HOSPADM

## 2022-01-01 RX ORDER — GABAPENTIN 100 MG/1
100 CAPSULE ORAL 2 TIMES DAILY
Qty: 60 CAPSULE | Refills: 2 | Status: SHIPPED | OUTPATIENT
Start: 2022-01-01 | End: 2022-01-01 | Stop reason: SDUPTHER

## 2022-01-01 RX ORDER — AMLODIPINE BESYLATE 5 MG/1
5 TABLET ORAL DAILY
Qty: 90 TABLET | Refills: 1 | Status: SHIPPED | OUTPATIENT
Start: 2022-01-01

## 2022-01-01 RX ORDER — DEXAMETHASONE SODIUM PHOSPHATE 4 MG/ML
INJECTION, SOLUTION INTRA-ARTICULAR; INTRALESIONAL; INTRAMUSCULAR; INTRAVENOUS; SOFT TISSUE
Status: DISCONTINUED | OUTPATIENT
Start: 2022-01-01 | End: 2022-01-01

## 2022-01-01 RX ORDER — ADHESIVE BANDAGE
30 BANDAGE TOPICAL DAILY
Status: DISCONTINUED | OUTPATIENT
Start: 2022-01-01 | End: 2022-01-01

## 2022-01-01 RX ORDER — FUROSEMIDE 20 MG/1
20 TABLET ORAL 2 TIMES DAILY
Qty: 180 TABLET | Refills: 3 | Status: SHIPPED | OUTPATIENT
Start: 2022-01-01 | End: 2022-01-01

## 2022-01-01 RX ORDER — GABAPENTIN 300 MG/1
300 CAPSULE ORAL EVERY 8 HOURS
Qty: 90 CAPSULE | Refills: 2 | Status: SHIPPED | OUTPATIENT
Start: 2022-01-01 | End: 2022-01-01

## 2022-01-01 RX ORDER — PANTOPRAZOLE SODIUM 40 MG/10ML
40 INJECTION, POWDER, LYOPHILIZED, FOR SOLUTION INTRAVENOUS DAILY
Status: DISCONTINUED | OUTPATIENT
Start: 2022-01-01 | End: 2022-01-01 | Stop reason: HOSPADM

## 2022-01-01 RX ORDER — HYDRALAZINE HYDROCHLORIDE 100 MG/1
100 TABLET, FILM COATED ORAL EVERY 8 HOURS
Qty: 90 TABLET | Refills: 11 | Status: SHIPPED | OUTPATIENT
Start: 2022-01-01 | End: 2022-01-01 | Stop reason: SDUPTHER

## 2022-01-01 RX ORDER — DEXAMETHASONE SODIUM PHOSPHATE 4 MG/ML
4 INJECTION, SOLUTION INTRA-ARTICULAR; INTRALESIONAL; INTRAMUSCULAR; INTRAVENOUS; SOFT TISSUE
Status: COMPLETED | OUTPATIENT
Start: 2022-01-01 | End: 2022-01-01

## 2022-01-01 RX ORDER — HYDRALAZINE HYDROCHLORIDE 100 MG/1
100 TABLET, FILM COATED ORAL EVERY 8 HOURS
Status: DISCONTINUED | OUTPATIENT
Start: 2022-01-01 | End: 2022-01-01 | Stop reason: HOSPADM

## 2022-01-01 RX ORDER — LIDOCAINE HYDROCHLORIDE 20 MG/ML
INJECTION, SOLUTION EPIDURAL; INFILTRATION; INTRACAUDAL; PERINEURAL
Status: DISCONTINUED | OUTPATIENT
Start: 2022-01-01 | End: 2022-01-01

## 2022-01-01 RX ORDER — FENOFIBRATE 54 MG/1
54 TABLET ORAL DAILY
Qty: 90 TABLET | Refills: 3 | Status: ON HOLD | OUTPATIENT
Start: 2022-01-01 | End: 2022-01-01 | Stop reason: HOSPADM

## 2022-01-01 RX ORDER — MUPIROCIN 20 MG/G
OINTMENT TOPICAL DAILY
Status: DISCONTINUED | OUTPATIENT
Start: 2022-01-01 | End: 2022-01-01 | Stop reason: HOSPADM

## 2022-01-01 RX ORDER — BISACODYL 10 MG
10 SUPPOSITORY, RECTAL RECTAL DAILY PRN
Status: DISCONTINUED | OUTPATIENT
Start: 2022-01-01 | End: 2022-01-01 | Stop reason: HOSPADM

## 2022-01-01 RX ADMIN — SODIUM CHLORIDE 200 ML: 9 INJECTION, SOLUTION INTRAVENOUS at 12:09

## 2022-01-01 RX ADMIN — SEVELAMER CARBONATE 800 MG: 800 TABLET, FILM COATED ORAL at 04:09

## 2022-01-01 RX ADMIN — INSULIN ASPART 1 UNITS: 100 INJECTION, SOLUTION INTRAVENOUS; SUBCUTANEOUS at 08:08

## 2022-01-01 RX ADMIN — SODIUM CHLORIDE, PRESERVATIVE FREE 10 ML: 5 INJECTION INTRAVENOUS at 06:09

## 2022-01-01 RX ADMIN — CALCITRIOL 0.25 MCG: 0.25 CAPSULE, LIQUID FILLED ORAL at 09:09

## 2022-01-01 RX ADMIN — OXYCODONE AND ACETAMINOPHEN 1 TABLET: 10; 325 TABLET ORAL at 06:09

## 2022-01-01 RX ADMIN — ASPIRIN 81 MG: 81 TABLET, COATED ORAL at 09:09

## 2022-01-01 RX ADMIN — HYDRALAZINE HYDROCHLORIDE 100 MG: 100 TABLET ORAL at 01:04

## 2022-01-01 RX ADMIN — CALCITRIOL 0.25 MCG: 0.25 CAPSULE, LIQUID FILLED ORAL at 10:09

## 2022-01-01 RX ADMIN — PANTOPRAZOLE SODIUM 40 MG: 40 TABLET, DELAYED RELEASE ORAL at 06:09

## 2022-01-01 RX ADMIN — SODIUM CHLORIDE, PRESERVATIVE FREE 10 ML: 5 INJECTION INTRAVENOUS at 05:09

## 2022-01-01 RX ADMIN — ALLOPURINOL 100 MG: 100 TABLET ORAL at 09:09

## 2022-01-01 RX ADMIN — SENNOSIDES AND DOCUSATE SODIUM 1 TABLET: 50; 8.6 TABLET ORAL at 09:09

## 2022-01-01 RX ADMIN — IPRATROPIUM BROMIDE AND ALBUTEROL SULFATE 3 ML: 2.5; .5 SOLUTION RESPIRATORY (INHALATION) at 12:09

## 2022-01-01 RX ADMIN — FUROSEMIDE 120 MG: 10 INJECTION, SOLUTION INTRAVENOUS at 11:04

## 2022-01-01 RX ADMIN — PROPOFOL 30 MCG/KG/MIN: 10 INJECTION, EMULSION INTRAVENOUS at 07:09

## 2022-01-01 RX ADMIN — GABAPENTIN 100 MG: 100 CAPSULE ORAL at 08:04

## 2022-01-01 RX ADMIN — SODIUM CHLORIDE, PRESERVATIVE FREE 10 ML: 5 INJECTION INTRAVENOUS at 12:09

## 2022-01-01 RX ADMIN — SENNOSIDES AND DOCUSATE SODIUM 1 TABLET: 50; 8.6 TABLET ORAL at 08:09

## 2022-01-01 RX ADMIN — Medication 100 MCG: at 09:09

## 2022-01-01 RX ADMIN — APIXABAN 2.5 MG: 2.5 TABLET, FILM COATED ORAL at 09:09

## 2022-01-01 RX ADMIN — BUDESONIDE INHALATION 0.5 MG: 0.5 SUSPENSION RESPIRATORY (INHALATION) at 07:09

## 2022-01-01 RX ADMIN — AMIODARONE HYDROCHLORIDE 400 MG: 200 TABLET ORAL at 08:09

## 2022-01-01 RX ADMIN — GABAPENTIN 100 MG: 100 CAPSULE ORAL at 09:09

## 2022-01-01 RX ADMIN — AMLODIPINE BESYLATE 10 MG: 10 TABLET ORAL at 08:04

## 2022-01-01 RX ADMIN — IPRATROPIUM BROMIDE AND ALBUTEROL SULFATE 3 ML: 2.5; .5 SOLUTION RESPIRATORY (INHALATION) at 01:09

## 2022-01-01 RX ADMIN — ATORVASTATIN CALCIUM 40 MG: 40 TABLET, FILM COATED ORAL at 08:04

## 2022-01-01 RX ADMIN — ATORVASTATIN CALCIUM 40 MG: 20 TABLET, FILM COATED ORAL at 10:09

## 2022-01-01 RX ADMIN — SEVELAMER CARBONATE 800 MG: 800 TABLET, FILM COATED ORAL at 06:09

## 2022-01-01 RX ADMIN — METOPROLOL TARTRATE 25 MG: 25 TABLET, FILM COATED ORAL at 09:04

## 2022-01-01 RX ADMIN — BUDESONIDE INHALATION 0.5 MG: 0.5 SUSPENSION RESPIRATORY (INHALATION) at 08:09

## 2022-01-01 RX ADMIN — SODIUM CHLORIDE, POTASSIUM CHLORIDE, SODIUM LACTATE AND CALCIUM CHLORIDE 1000 ML: 600; 310; 30; 20 INJECTION, SOLUTION INTRAVENOUS at 11:05

## 2022-01-01 RX ADMIN — BUDESONIDE INHALATION 0.5 MG: 0.5 SUSPENSION RESPIRATORY (INHALATION) at 07:04

## 2022-01-01 RX ADMIN — HYDRALAZINE HYDROCHLORIDE 50 MG: 50 TABLET ORAL at 05:04

## 2022-01-01 RX ADMIN — GABAPENTIN 100 MG: 100 CAPSULE ORAL at 08:09

## 2022-01-01 RX ADMIN — ALLOPURINOL 100 MG: 100 TABLET ORAL at 09:04

## 2022-01-01 RX ADMIN — MUPIROCIN: 20 OINTMENT TOPICAL at 08:09

## 2022-01-01 RX ADMIN — GABAPENTIN 100 MG: 100 CAPSULE ORAL at 09:05

## 2022-01-01 RX ADMIN — AMIODARONE HYDROCHLORIDE 400 MG: 200 TABLET ORAL at 09:09

## 2022-01-01 RX ADMIN — CALCITRIOL 0.25 MCG: 0.25 CAPSULE, LIQUID FILLED ORAL at 08:09

## 2022-01-01 RX ADMIN — SENNOSIDES AND DOCUSATE SODIUM 1 TABLET: 50; 8.6 TABLET ORAL at 08:08

## 2022-01-01 RX ADMIN — MUPIROCIN: 20 OINTMENT TOPICAL at 10:09

## 2022-01-01 RX ADMIN — ALLOPURINOL 100 MG: 100 TABLET ORAL at 10:09

## 2022-01-01 RX ADMIN — ASPIRIN 81 MG: 81 TABLET, COATED ORAL at 10:04

## 2022-01-01 RX ADMIN — ALLOPURINOL 100 MG: 100 TABLET ORAL at 11:04

## 2022-01-01 RX ADMIN — IPRATROPIUM BROMIDE AND ALBUTEROL SULFATE 3 ML: 2.5; .5 SOLUTION RESPIRATORY (INHALATION) at 12:08

## 2022-01-01 RX ADMIN — APIXABAN 2.5 MG: 2.5 TABLET, FILM COATED ORAL at 10:09

## 2022-01-01 RX ADMIN — Medication 100 MCG: at 10:09

## 2022-01-01 RX ADMIN — HEPARIN SODIUM 5000 UNITS: 5000 INJECTION INTRAVENOUS; SUBCUTANEOUS at 08:09

## 2022-01-01 RX ADMIN — AMIODARONE HYDROCHLORIDE 400 MG: 200 TABLET ORAL at 10:09

## 2022-01-01 RX ADMIN — HYDRALAZINE HYDROCHLORIDE 50 MG: 50 TABLET ORAL at 09:04

## 2022-01-01 RX ADMIN — PROPOFOL 10 MG: 10 INJECTION, EMULSION INTRAVENOUS at 10:04

## 2022-01-01 RX ADMIN — SODIUM CHLORIDE: 9 INJECTION, SOLUTION INTRAVENOUS at 10:05

## 2022-01-01 RX ADMIN — IPRATROPIUM BROMIDE AND ALBUTEROL SULFATE 3 ML: 2.5; .5 SOLUTION RESPIRATORY (INHALATION) at 07:09

## 2022-01-01 RX ADMIN — IPRATROPIUM BROMIDE AND ALBUTEROL SULFATE 3 ML: 2.5; .5 SOLUTION RESPIRATORY (INHALATION) at 01:08

## 2022-01-01 RX ADMIN — CALCIUM CARBONATE (ANTACID) CHEW TAB 500 MG 500 MG: 500 CHEW TAB at 08:09

## 2022-01-01 RX ADMIN — INSULIN ASPART 2 UNITS: 100 INJECTION, SOLUTION INTRAVENOUS; SUBCUTANEOUS at 12:09

## 2022-01-01 RX ADMIN — SENNOSIDES AND DOCUSATE SODIUM 1 TABLET: 50; 8.6 TABLET ORAL at 09:08

## 2022-01-01 RX ADMIN — ATORVASTATIN CALCIUM 40 MG: 40 TABLET, FILM COATED ORAL at 09:04

## 2022-01-01 RX ADMIN — MORPHINE SULFATE 2 MG: 2 INJECTION, SOLUTION INTRAMUSCULAR; INTRAVENOUS at 03:08

## 2022-01-01 RX ADMIN — IPRATROPIUM BROMIDE AND ALBUTEROL SULFATE 3 ML: .5; 3 SOLUTION RESPIRATORY (INHALATION) at 07:04

## 2022-01-01 RX ADMIN — SODIUM CHLORIDE, PRESERVATIVE FREE 10 ML: 5 INJECTION INTRAVENOUS at 10:09

## 2022-01-01 RX ADMIN — HEPARIN SODIUM 5000 UNITS: 5000 INJECTION INTRAVENOUS; SUBCUTANEOUS at 10:04

## 2022-01-01 RX ADMIN — HYDRALAZINE HYDROCHLORIDE 50 MG: 50 TABLET ORAL at 02:04

## 2022-01-01 RX ADMIN — HEPARIN SODIUM 4000 UNITS: 1000 INJECTION INTRAVENOUS; SUBCUTANEOUS at 12:09

## 2022-01-01 RX ADMIN — Medication 100 MCG: at 08:04

## 2022-01-01 RX ADMIN — MELATONIN 6 MG: at 09:04

## 2022-01-01 RX ADMIN — PROPOFOL 35 MCG/KG/MIN: 10 INJECTION, EMULSION INTRAVENOUS at 10:09

## 2022-01-01 RX ADMIN — SODIUM CHLORIDE: 9 INJECTION, SOLUTION INTRAVENOUS at 09:04

## 2022-01-01 RX ADMIN — SODIUM BICARBONATE 325 MG: 650 TABLET ORAL at 09:04

## 2022-01-01 RX ADMIN — LIDOCAINE HYDROCHLORIDE 20 MG: 20 INJECTION, SOLUTION INTRAVENOUS at 10:04

## 2022-01-01 RX ADMIN — HYDRALAZINE HYDROCHLORIDE 50 MG: 50 TABLET ORAL at 03:04

## 2022-01-01 RX ADMIN — POLYETHYLENE GLYCOL 3350 17 G: 17 POWDER, FOR SOLUTION ORAL at 10:09

## 2022-01-01 RX ADMIN — POLYETHYLENE GLYCOL 3350 17 G: 17 POWDER, FOR SOLUTION ORAL at 09:09

## 2022-01-01 RX ADMIN — GABAPENTIN 100 MG: 100 CAPSULE ORAL at 08:08

## 2022-01-01 RX ADMIN — POLYETHYLENE GLYCOL 3350 34 G: 17 POWDER, FOR SOLUTION ORAL at 08:09

## 2022-01-01 RX ADMIN — INSULIN ASPART 2 UNITS: 100 INJECTION, SOLUTION INTRAVENOUS; SUBCUTANEOUS at 11:08

## 2022-01-01 RX ADMIN — CEFAZOLIN 2 G: 1 INJECTION, POWDER, FOR SOLUTION INTRAMUSCULAR; INTRAVENOUS; PARENTERAL at 03:05

## 2022-01-01 RX ADMIN — GABAPENTIN 100 MG: 100 CAPSULE ORAL at 10:04

## 2022-01-01 RX ADMIN — HYDRALAZINE HYDROCHLORIDE 100 MG: 100 TABLET ORAL at 03:04

## 2022-01-01 RX ADMIN — POLYETHYLENE GLYCOL 3350 34 G: 17 POWDER, FOR SOLUTION ORAL at 09:09

## 2022-01-01 RX ADMIN — HYDRALAZINE HYDROCHLORIDE 100 MG: 100 TABLET ORAL at 02:04

## 2022-01-01 RX ADMIN — SODIUM CHLORIDE, PRESERVATIVE FREE 10 ML: 5 INJECTION INTRAVENOUS at 01:09

## 2022-01-01 RX ADMIN — SODIUM CHLORIDE: 9 INJECTION, SOLUTION INTRAVENOUS at 08:09

## 2022-01-01 RX ADMIN — AMIODARONE HYDROCHLORIDE 400 MG: 200 TABLET ORAL at 03:09

## 2022-01-01 RX ADMIN — GABAPENTIN 100 MG: 100 CAPSULE ORAL at 09:04

## 2022-01-01 RX ADMIN — PANTOPRAZOLE SODIUM 40 MG: 40 INJECTION, POWDER, FOR SOLUTION INTRAVENOUS at 08:09

## 2022-01-01 RX ADMIN — NOREPINEPHRINE BITARTRATE 0.28 MCG/KG/MIN: 1 INJECTION INTRAVENOUS at 11:09

## 2022-01-01 RX ADMIN — AMLODIPINE BESYLATE 5 MG: 5 TABLET ORAL at 09:04

## 2022-01-01 RX ADMIN — NOREPINEPHRINE BITARTRATE 0.32 MCG/KG/MIN: 1 INJECTION INTRAVENOUS at 12:09

## 2022-01-01 RX ADMIN — ALLOPURINOL 100 MG: 100 TABLET ORAL at 08:04

## 2022-01-01 RX ADMIN — ONDANSETRON 4 MG: 2 INJECTION INTRAMUSCULAR; INTRAVENOUS at 09:09

## 2022-01-01 RX ADMIN — NOREPINEPHRINE BITARTRATE 0.44 MCG/KG/MIN: 1 INJECTION INTRAVENOUS at 04:09

## 2022-01-01 RX ADMIN — CALCIUM CARBONATE (ANTACID) CHEW TAB 500 MG 500 MG: 500 CHEW TAB at 09:09

## 2022-01-01 RX ADMIN — SEVELAMER CARBONATE 800 MG: 800 TABLET, FILM COATED ORAL at 11:09

## 2022-01-01 RX ADMIN — SODIUM CHLORIDE 250 ML: 9 INJECTION, SOLUTION INTRAVENOUS at 09:08

## 2022-01-01 RX ADMIN — FUROSEMIDE 40 MG: 10 INJECTION, SOLUTION INTRAVENOUS at 03:04

## 2022-01-01 RX ADMIN — OXYCODONE AND ACETAMINOPHEN 1 TABLET: 10; 325 TABLET ORAL at 05:09

## 2022-01-01 RX ADMIN — ETOMIDATE 2 MG: 2 INJECTION, SOLUTION INTRAVENOUS at 07:09

## 2022-01-01 RX ADMIN — HEPARIN SODIUM 5000 UNITS: 5000 INJECTION INTRAVENOUS; SUBCUTANEOUS at 09:08

## 2022-01-01 RX ADMIN — METOCLOPRAMIDE 10 MG: 5 INJECTION, SOLUTION INTRAMUSCULAR; INTRAVENOUS at 12:04

## 2022-01-01 RX ADMIN — Medication 100 MCG: at 09:08

## 2022-01-01 RX ADMIN — HYDRALAZINE HYDROCHLORIDE 100 MG: 50 TABLET ORAL at 08:08

## 2022-01-01 RX ADMIN — ASPIRIN 81 MG: 81 TABLET, COATED ORAL at 08:09

## 2022-01-01 RX ADMIN — METOPROLOL TARTRATE 25 MG: 25 TABLET, FILM COATED ORAL at 10:04

## 2022-01-01 RX ADMIN — SODIUM CHLORIDE: 9 INJECTION, SOLUTION INTRAVENOUS at 11:04

## 2022-01-01 RX ADMIN — ROCURONIUM BROMIDE 30 MG: 10 INJECTION, SOLUTION INTRAVENOUS at 03:05

## 2022-01-01 RX ADMIN — HEPARIN SODIUM 4000 UNITS: 1000 INJECTION INTRAVENOUS; SUBCUTANEOUS at 10:09

## 2022-01-01 RX ADMIN — MUPIROCIN: 20 OINTMENT TOPICAL at 09:09

## 2022-01-01 RX ADMIN — GABAPENTIN 100 MG: 100 CAPSULE ORAL at 10:09

## 2022-01-01 RX ADMIN — DEXAMETHASONE SODIUM PHOSPHATE 8 MG: 4 INJECTION, SOLUTION INTRA-ARTICULAR; INTRALESIONAL; INTRAMUSCULAR; INTRAVENOUS; SOFT TISSUE at 09:09

## 2022-01-01 RX ADMIN — OXYCODONE AND ACETAMINOPHEN 1 TABLET: 7.5; 325 TABLET ORAL at 12:09

## 2022-01-01 RX ADMIN — PROPOFOL 100 MG: 10 INJECTION, EMULSION INTRAVENOUS at 07:09

## 2022-01-01 RX ADMIN — SODIUM CHLORIDE: 9 INJECTION, SOLUTION INTRAVENOUS at 03:09

## 2022-01-01 RX ADMIN — OXYCODONE AND ACETAMINOPHEN 1 TABLET: 10; 325 TABLET ORAL at 11:09

## 2022-01-01 RX ADMIN — SODIUM CHLORIDE, PRESERVATIVE FREE 10 ML: 5 INJECTION INTRAVENOUS at 11:09

## 2022-01-01 RX ADMIN — HYDRALAZINE HYDROCHLORIDE 75 MG: 50 TABLET ORAL at 09:04

## 2022-01-01 RX ADMIN — HYDRALAZINE HYDROCHLORIDE 100 MG: 100 TABLET ORAL at 05:04

## 2022-01-01 RX ADMIN — SODIUM CHLORIDE, PRESERVATIVE FREE 10 ML: 5 INJECTION INTRAVENOUS at 07:09

## 2022-01-01 RX ADMIN — IPRATROPIUM BROMIDE AND ALBUTEROL SULFATE 3 ML: 2.5; .5 SOLUTION RESPIRATORY (INHALATION) at 07:08

## 2022-01-01 RX ADMIN — HYDRALAZINE HYDROCHLORIDE 75 MG: 50 TABLET ORAL at 05:04

## 2022-01-01 RX ADMIN — PANTOPRAZOLE SODIUM 40 MG: 40 TABLET, DELAYED RELEASE ORAL at 03:09

## 2022-01-01 RX ADMIN — PANTOPRAZOLE SODIUM 40 MG: 40 TABLET, DELAYED RELEASE ORAL at 05:09

## 2022-01-01 RX ADMIN — NOREPINEPHRINE BITARTRATE 0.3 MCG/KG/MIN: 1 INJECTION INTRAVENOUS at 09:09

## 2022-01-01 RX ADMIN — PANTOPRAZOLE SODIUM 40 MG: 40 TABLET, DELAYED RELEASE ORAL at 04:09

## 2022-01-01 RX ADMIN — HEPARIN SODIUM 4000 UNITS: 1000 INJECTION INTRAVENOUS; SUBCUTANEOUS at 03:09

## 2022-01-01 RX ADMIN — ATORVASTATIN CALCIUM 40 MG: 20 TABLET, FILM COATED ORAL at 09:09

## 2022-01-01 RX ADMIN — ASPIRIN 81 MG: 81 TABLET, COATED ORAL at 09:08

## 2022-01-01 RX ADMIN — AMIODARONE HYDROCHLORIDE 150 MG: 50 INJECTION, SOLUTION INTRAVENOUS at 01:09

## 2022-01-01 RX ADMIN — AMLODIPINE BESYLATE 5 MG: 5 TABLET ORAL at 08:04

## 2022-01-01 RX ADMIN — OXYCODONE HYDROCHLORIDE 5 MG: 5 TABLET ORAL at 05:05

## 2022-01-01 RX ADMIN — HYDRALAZINE HYDROCHLORIDE 50 MG: 50 TABLET ORAL at 11:04

## 2022-01-01 RX ADMIN — OXYCODONE AND ACETAMINOPHEN 1 TABLET: 10; 325 TABLET ORAL at 08:09

## 2022-01-01 RX ADMIN — SODIUM BICARBONATE 325 MG: 650 TABLET ORAL at 10:04

## 2022-01-01 RX ADMIN — Medication 100 MCG: at 08:09

## 2022-01-01 RX ADMIN — ATORVASTATIN CALCIUM 40 MG: 20 TABLET, FILM COATED ORAL at 08:09

## 2022-01-01 RX ADMIN — ETOMIDATE 2 MG: 2 INJECTION, SOLUTION INTRAVENOUS at 10:04

## 2022-01-01 RX ADMIN — ONDANSETRON 8 MG: 4 TABLET, ORALLY DISINTEGRATING ORAL at 06:04

## 2022-01-01 RX ADMIN — SODIUM CHLORIDE: 9 INJECTION, SOLUTION INTRAVENOUS at 08:04

## 2022-01-01 RX ADMIN — CALCIUM CARBONATE (ANTACID) CHEW TAB 500 MG 500 MG: 500 CHEW TAB at 10:09

## 2022-01-01 RX ADMIN — EPOETIN ALFA-EPBX 10000 UNITS: 10000 INJECTION, SOLUTION INTRAVENOUS; SUBCUTANEOUS at 03:09

## 2022-01-01 RX ADMIN — METOPROLOL TARTRATE 5 MG: 5 INJECTION, SOLUTION INTRAVENOUS at 02:09

## 2022-01-01 RX ADMIN — NOREPINEPHRINE BITARTRATE 0.52 MCG/KG/MIN: 1 INJECTION INTRAVENOUS at 02:09

## 2022-01-01 RX ADMIN — ALLOPURINOL 100 MG: 100 TABLET ORAL at 08:09

## 2022-01-01 RX ADMIN — PROPOFOL 45 MCG/KG/MIN: 10 INJECTION, EMULSION INTRAVENOUS at 08:09

## 2022-01-01 RX ADMIN — NOREPINEPHRINE BITARTRATE 0.3 MCG/KG/MIN: 1 INJECTION INTRAVENOUS at 06:09

## 2022-01-01 RX ADMIN — OXYCODONE AND ACETAMINOPHEN 1 TABLET: 7.5; 325 TABLET ORAL at 04:09

## 2022-01-01 RX ADMIN — Medication 100 MCG: at 09:04

## 2022-01-01 RX ADMIN — MORPHINE SULFATE 4 MG: 4 INJECTION INTRAVENOUS at 08:08

## 2022-01-01 RX ADMIN — OXYCODONE AND ACETAMINOPHEN 1 TABLET: 10; 325 TABLET ORAL at 01:09

## 2022-01-01 RX ADMIN — HYDRALAZINE HYDROCHLORIDE 100 MG: 100 TABLET ORAL at 09:04

## 2022-01-01 RX ADMIN — GABAPENTIN 300 MG: 300 CAPSULE ORAL at 08:04

## 2022-01-01 RX ADMIN — IPRATROPIUM BROMIDE AND ALBUTEROL SULFATE 3 ML: .5; 3 SOLUTION RESPIRATORY (INHALATION) at 04:01

## 2022-01-01 RX ADMIN — SODIUM CHLORIDE, PRESERVATIVE FREE 10 ML: 5 INJECTION INTRAVENOUS at 03:09

## 2022-01-01 RX ADMIN — SEVELAMER CARBONATE 800 MG: 800 TABLET, FILM COATED ORAL at 10:09

## 2022-01-01 RX ADMIN — OXYCODONE AND ACETAMINOPHEN 1 TABLET: 10; 325 TABLET ORAL at 12:09

## 2022-01-01 RX ADMIN — ALLOPURINOL 100 MG: 100 TABLET ORAL at 09:05

## 2022-01-01 RX ADMIN — SEVELAMER CARBONATE 800 MG: 800 TABLET, FILM COATED ORAL at 06:08

## 2022-01-01 RX ADMIN — SODIUM CHLORIDE: 9 INJECTION, SOLUTION INTRAVENOUS at 03:05

## 2022-01-01 RX ADMIN — NOREPINEPHRINE BITARTRATE 0.02 MCG/KG/MIN: 1 INJECTION INTRAVENOUS at 12:09

## 2022-01-01 RX ADMIN — CEFAZOLIN 2 G: 1 INJECTION, POWDER, FOR SOLUTION INTRAMUSCULAR; INTRAVENOUS; PARENTERAL at 10:04

## 2022-01-01 RX ADMIN — INSULIN ASPART 1 UNITS: 100 INJECTION, SOLUTION INTRAVENOUS; SUBCUTANEOUS at 09:04

## 2022-01-01 RX ADMIN — SODIUM CHLORIDE 2000 ML: 9 INJECTION, SOLUTION INTRAVENOUS at 03:04

## 2022-01-01 RX ADMIN — HEPARIN SODIUM 4000 UNITS: 1000 INJECTION, SOLUTION INTRAVENOUS; SUBCUTANEOUS at 04:04

## 2022-01-01 RX ADMIN — PANTOPRAZOLE SODIUM 40 MG: 40 TABLET, DELAYED RELEASE ORAL at 08:04

## 2022-01-01 RX ADMIN — SITAGLIPTIN 25 MG: 25 TABLET, FILM COATED ORAL at 09:05

## 2022-01-01 RX ADMIN — SEVELAMER CARBONATE 800 MG: 800 TABLET, FILM COATED ORAL at 05:09

## 2022-01-01 RX ADMIN — IPRATROPIUM BROMIDE AND ALBUTEROL SULFATE 3 ML: 2.5; .5 SOLUTION RESPIRATORY (INHALATION) at 02:08

## 2022-01-01 RX ADMIN — EPOETIN ALFA-EPBX 10000 UNITS: 10000 INJECTION, SOLUTION INTRAVENOUS; SUBCUTANEOUS at 10:09

## 2022-01-01 RX ADMIN — CALCIUM CARBONATE (ANTACID) CHEW TAB 500 MG 500 MG: 500 CHEW TAB at 09:05

## 2022-01-01 RX ADMIN — IPRATROPIUM BROMIDE AND ALBUTEROL SULFATE 3 ML: .5; 3 SOLUTION RESPIRATORY (INHALATION) at 02:01

## 2022-01-01 RX ADMIN — OXYCODONE AND ACETAMINOPHEN 1 TABLET: 7.5; 325 TABLET ORAL at 09:09

## 2022-01-01 RX ADMIN — PROPOFOL 25 MG: 10 INJECTION, EMULSION INTRAVENOUS at 03:05

## 2022-01-01 RX ADMIN — SODIUM BICARBONATE: 84 INJECTION, SOLUTION INTRAVENOUS at 01:04

## 2022-01-01 RX ADMIN — INSULIN ASPART 4 UNITS: 100 INJECTION, SOLUTION INTRAVENOUS; SUBCUTANEOUS at 05:09

## 2022-01-01 RX ADMIN — DRONABINOL 2.5 MG: 2.5 CAPSULE ORAL at 08:04

## 2022-01-01 RX ADMIN — IPRATROPIUM BROMIDE AND ALBUTEROL SULFATE 3 ML: 2.5; .5 SOLUTION RESPIRATORY (INHALATION) at 08:09

## 2022-01-01 RX ADMIN — ENOXAPARIN SODIUM 70 MG: 80 INJECTION SUBCUTANEOUS at 10:09

## 2022-01-01 RX ADMIN — MORPHINE SULFATE: 2 INJECTION, SOLUTION INTRAMUSCULAR; INTRAVENOUS at 01:08

## 2022-01-01 RX ADMIN — LACTULOSE 20 G: 20 SOLUTION ORAL at 08:09

## 2022-01-01 RX ADMIN — MUPIROCIN: 20 OINTMENT TOPICAL at 09:08

## 2022-01-01 RX ADMIN — OXYCODONE AND ACETAMINOPHEN 1 TABLET: 10; 325 TABLET ORAL at 07:09

## 2022-01-01 RX ADMIN — SEVELAMER CARBONATE 800 MG: 800 TABLET, FILM COATED ORAL at 03:09

## 2022-01-01 RX ADMIN — HYDRALAZINE HYDROCHLORIDE 100 MG: 100 TABLET ORAL at 06:04

## 2022-01-01 RX ADMIN — BUDESONIDE INHALATION 0.5 MG: 0.5 SUSPENSION RESPIRATORY (INHALATION) at 08:08

## 2022-01-01 RX ADMIN — ONDANSETRON 8 MG: 4 TABLET, ORALLY DISINTEGRATING ORAL at 02:04

## 2022-01-01 RX ADMIN — HEPARIN SODIUM 5000 UNITS: 5000 INJECTION INTRAVENOUS; SUBCUTANEOUS at 08:08

## 2022-01-01 RX ADMIN — PANTOPRAZOLE SODIUM 40 MG: 40 TABLET, DELAYED RELEASE ORAL at 10:09

## 2022-01-01 RX ADMIN — OXYCODONE AND ACETAMINOPHEN 1 TABLET: 10; 325 TABLET ORAL at 02:09

## 2022-01-01 RX ADMIN — SEVELAMER CARBONATE 800 MG: 800 TABLET, FILM COATED ORAL at 12:09

## 2022-01-01 RX ADMIN — ALLOPURINOL 100 MG: 100 TABLET ORAL at 10:04

## 2022-01-01 RX ADMIN — KETOROLAC TROMETHAMINE 30 MG: 30 INJECTION, SOLUTION INTRAMUSCULAR; INTRAVENOUS at 01:05

## 2022-01-01 RX ADMIN — ASPIRIN 81 MG: 81 TABLET, COATED ORAL at 09:05

## 2022-01-01 RX ADMIN — PROPOFOL 10 MG: 10 INJECTION, EMULSION INTRAVENOUS at 11:04

## 2022-01-01 RX ADMIN — HEPARIN SODIUM 5000 UNITS: 5000 INJECTION INTRAVENOUS; SUBCUTANEOUS at 09:09

## 2022-01-01 RX ADMIN — OXYCODONE AND ACETAMINOPHEN 1 TABLET: 7.5; 325 TABLET ORAL at 05:09

## 2022-01-01 RX ADMIN — SODIUM CHLORIDE 2000 ML: 9 INJECTION, SOLUTION INTRAVENOUS at 03:09

## 2022-01-01 RX ADMIN — SEVELAMER CARBONATE 800 MG: 800 TABLET, FILM COATED ORAL at 03:08

## 2022-01-01 RX ADMIN — MUPIROCIN 1 G: 20 OINTMENT TOPICAL at 09:05

## 2022-01-01 RX ADMIN — SEVELAMER CARBONATE 800 MG: 800 TABLET, FILM COATED ORAL at 05:08

## 2022-01-01 RX ADMIN — SODIUM BICARBONATE: 84 INJECTION, SOLUTION INTRAVENOUS at 12:04

## 2022-01-01 RX ADMIN — HYDROMORPHONE HYDROCHLORIDE 1 MG: 2 INJECTION INTRAMUSCULAR; INTRAVENOUS; SUBCUTANEOUS at 07:09

## 2022-01-01 RX ADMIN — PROPOFOL 35 MCG/KG/MIN: 10 INJECTION, EMULSION INTRAVENOUS at 01:09

## 2022-01-01 RX ADMIN — CALCITRIOL 0.25 MCG: 0.25 CAPSULE, LIQUID FILLED ORAL at 09:08

## 2022-01-01 RX ADMIN — CEFTRIAXONE SODIUM 1 G: 1 INJECTION, POWDER, FOR SOLUTION INTRAMUSCULAR; INTRAVENOUS at 01:01

## 2022-01-01 RX ADMIN — SODIUM CHLORIDE 3000 ML: 9 INJECTION, SOLUTION INTRAVENOUS at 10:04

## 2022-01-01 RX ADMIN — OXYCODONE AND ACETAMINOPHEN 1 TABLET: 7.5; 325 TABLET ORAL at 03:08

## 2022-01-01 RX ADMIN — ORPHENADRINE CITRATE 60 MG: 30 INJECTION INTRAMUSCULAR; INTRAVENOUS at 08:05

## 2022-01-01 RX ADMIN — OXYCODONE AND ACETAMINOPHEN 1 TABLET: 7.5; 325 TABLET ORAL at 09:08

## 2022-01-01 RX ADMIN — HYDRALAZINE HYDROCHLORIDE 50 MG: 50 TABLET ORAL at 01:04

## 2022-01-01 RX ADMIN — AMLODIPINE BESYLATE 10 MG: 10 TABLET ORAL at 10:04

## 2022-01-01 RX ADMIN — PANTOPRAZOLE SODIUM 40 MG: 40 TABLET, DELAYED RELEASE ORAL at 09:04

## 2022-01-01 RX ADMIN — SODIUM CHLORIDE: 9 INJECTION, SOLUTION INTRAVENOUS at 11:09

## 2022-01-01 RX ADMIN — REGADENOSON 0.4 MG: 0.08 INJECTION, SOLUTION INTRAVENOUS at 10:05

## 2022-01-01 RX ADMIN — NOREPINEPHRINE BITARTRATE 0.3 MCG/KG/MIN: 1 INJECTION INTRAVENOUS at 03:09

## 2022-01-01 RX ADMIN — MUPIROCIN 1 G: 20 OINTMENT TOPICAL at 11:05

## 2022-01-01 RX ADMIN — CALCIUM GLUCONATE 1 G: 98 INJECTION, SOLUTION INTRAVENOUS at 09:04

## 2022-01-01 RX ADMIN — AMLODIPINE BESYLATE 10 MG: 10 TABLET ORAL at 11:04

## 2022-01-01 RX ADMIN — METOPROLOL TARTRATE 25 MG: 25 TABLET, FILM COATED ORAL at 08:04

## 2022-01-01 RX ADMIN — ATORVASTATIN CALCIUM 40 MG: 20 TABLET, FILM COATED ORAL at 09:08

## 2022-01-01 RX ADMIN — ALLOPURINOL 100 MG: 100 TABLET ORAL at 09:08

## 2022-01-01 RX ADMIN — HEPARIN SODIUM 4000 UNITS: 1000 INJECTION INTRAVENOUS; SUBCUTANEOUS at 11:09

## 2022-01-01 RX ADMIN — HEPARIN SODIUM 5000 UNITS: 5000 INJECTION INTRAVENOUS; SUBCUTANEOUS at 09:04

## 2022-01-01 RX ADMIN — MUPIROCIN: 20 OINTMENT TOPICAL at 12:09

## 2022-01-01 RX ADMIN — OXYCODONE HYDROCHLORIDE 5 MG: 5 TABLET ORAL at 01:04

## 2022-01-01 RX ADMIN — INSULIN ASPART 4 UNITS: 100 INJECTION, SOLUTION INTRAVENOUS; SUBCUTANEOUS at 07:09

## 2022-01-01 RX ADMIN — SENNOSIDES AND DOCUSATE SODIUM 1 TABLET: 50; 8.6 TABLET ORAL at 10:09

## 2022-01-01 RX ADMIN — ETOMIDATE 2 MG: 2 INJECTION, SOLUTION INTRAVENOUS at 08:09

## 2022-01-01 RX ADMIN — EPOETIN ALFA-EPBX 10000 UNITS: 10000 INJECTION, SOLUTION INTRAVENOUS; SUBCUTANEOUS at 11:09

## 2022-01-01 RX ADMIN — ASPIRIN 81 MG: 81 TABLET, COATED ORAL at 09:04

## 2022-01-01 RX ADMIN — LIDOCAINE HYDROCHLORIDE 100 MG: 20 INJECTION, SOLUTION INTRAVENOUS at 03:05

## 2022-01-01 RX ADMIN — LORAZEPAM 1 MG: 2 INJECTION INTRAMUSCULAR; INTRAVENOUS at 12:09

## 2022-01-01 RX ADMIN — AMLODIPINE BESYLATE 10 MG: 10 TABLET ORAL at 09:04

## 2022-01-01 RX ADMIN — EPOETIN ALFA-EPBX 10000 UNITS: 10000 INJECTION, SOLUTION INTRAVENOUS; SUBCUTANEOUS at 12:09

## 2022-01-01 RX ADMIN — ACETAMINOPHEN 1000 MG: 500 TABLET ORAL at 08:09

## 2022-01-01 RX ADMIN — MAGNESIUM HYDROXIDE 2400 MG: 400 SUSPENSION ORAL at 08:09

## 2022-01-01 RX ADMIN — HYDROMORPHONE HYDROCHLORIDE 1 MG: 2 INJECTION INTRAMUSCULAR; INTRAVENOUS; SUBCUTANEOUS at 11:09

## 2022-01-01 RX ADMIN — SODIUM CHLORIDE: 9 INJECTION, SOLUTION INTRAVENOUS at 07:09

## 2022-01-01 RX ADMIN — AMLODIPINE BESYLATE 5 MG: 5 TABLET ORAL at 11:08

## 2022-01-01 RX ADMIN — SODIUM CHLORIDE: 9 INJECTION, SOLUTION INTRAVENOUS at 12:09

## 2022-01-01 RX ADMIN — DIGOXIN 500 MCG: 0.25 INJECTION INTRAMUSCULAR; INTRAVENOUS at 04:09

## 2022-01-01 RX ADMIN — INSULIN ASPART 1 UNITS: 100 INJECTION, SOLUTION INTRAVENOUS; SUBCUTANEOUS at 08:04

## 2022-01-01 RX ADMIN — OXYCODONE AND ACETAMINOPHEN 1 TABLET: 10; 325 TABLET ORAL at 03:09

## 2022-01-01 RX ADMIN — ASPIRIN 81 MG: 81 TABLET, COATED ORAL at 10:09

## 2022-01-01 RX ADMIN — ASPIRIN 81 MG: 81 TABLET, COATED ORAL at 08:04

## 2022-01-01 RX ADMIN — OXYCODONE AND ACETAMINOPHEN 1 TABLET: 7.5; 325 TABLET ORAL at 11:09

## 2022-01-01 RX ADMIN — Medication 500 MCG: at 08:04

## 2022-01-01 RX ADMIN — HYDRALAZINE HYDROCHLORIDE 100 MG: 100 TABLET ORAL at 10:04

## 2022-01-01 RX ADMIN — BUDESONIDE INHALATION 0.5 MG: 0.5 SUSPENSION RESPIRATORY (INHALATION) at 07:08

## 2022-01-01 RX ADMIN — ONDANSETRON 4 MG: 2 INJECTION INTRAMUSCULAR; INTRAVENOUS at 08:04

## 2022-01-01 RX ADMIN — HYDROCODONE BITARTRATE AND ACETAMINOPHEN 1 TABLET: 5; 325 TABLET ORAL at 10:05

## 2022-01-01 RX ADMIN — FENTANYL CITRATE 100 MCG: 50 INJECTION INTRAMUSCULAR; INTRAVENOUS at 03:05

## 2022-01-01 RX ADMIN — IPRATROPIUM BROMIDE AND ALBUTEROL SULFATE 3 ML: 2.5; .5 SOLUTION RESPIRATORY (INHALATION) at 08:08

## 2022-01-01 RX ADMIN — ALBUMIN (HUMAN) 25 G: 12.5 SOLUTION INTRAVENOUS at 10:09

## 2022-01-01 RX ADMIN — ATORVASTATIN CALCIUM 20 MG: 20 TABLET, FILM COATED ORAL at 09:05

## 2022-01-01 RX ADMIN — AMLODIPINE BESYLATE 5 MG: 5 TABLET ORAL at 10:04

## 2022-01-01 RX ADMIN — CEFAZOLIN 2 G: 1 INJECTION, POWDER, FOR SOLUTION INTRAMUSCULAR; INTRAVENOUS; PARENTERAL at 07:09

## 2022-01-01 RX ADMIN — BUDESONIDE INHALATION 0.5 MG: 0.5 SUSPENSION RESPIRATORY (INHALATION) at 08:04

## 2022-01-01 RX ADMIN — OXYCODONE AND ACETAMINOPHEN 1 TABLET: 7.5; 325 TABLET ORAL at 06:08

## 2022-01-01 RX ADMIN — CALCIUM GLUCONATE 1 G: 98 INJECTION, SOLUTION INTRAVENOUS at 08:04

## 2022-01-01 RX ADMIN — DRONABINOL 2.5 MG: 2.5 CAPSULE ORAL at 11:04

## 2022-01-01 RX ADMIN — DEXAMETHASONE SODIUM PHOSPHATE 4 MG: 4 INJECTION, SOLUTION INTRAMUSCULAR; INTRAVENOUS at 01:01

## 2022-01-01 RX ADMIN — GABAPENTIN 100 MG: 100 CAPSULE ORAL at 09:08

## 2022-01-01 RX ADMIN — DIPHENHYDRAMINE HYDROCHLORIDE 25 MG: 25 CAPSULE ORAL at 05:04

## 2022-01-01 RX ADMIN — CALCIUM CARBONATE (ANTACID) CHEW TAB 500 MG 500 MG: 500 CHEW TAB at 09:08

## 2022-01-01 RX ADMIN — SODIUM CHLORIDE: 9 INJECTION, SOLUTION INTRAVENOUS at 09:05

## 2022-01-01 RX ADMIN — FUROSEMIDE 40 MG: 10 INJECTION, SOLUTION INTRAVENOUS at 01:04

## 2022-01-01 RX ADMIN — METOPROLOL TARTRATE 25 MG: 25 TABLET, FILM COATED ORAL at 11:04

## 2022-01-01 RX ADMIN — SODIUM BICARBONATE: 84 INJECTION, SOLUTION INTRAVENOUS at 11:04

## 2022-01-01 RX ADMIN — ACETAMINOPHEN 1000 MG: 500 TABLET ORAL at 09:09

## 2022-01-01 RX ADMIN — PROMETHAZINE HYDROCHLORIDE 12.5 MG: 25 INJECTION INTRAMUSCULAR; INTRAVENOUS at 12:04

## 2022-01-01 RX ADMIN — SODIUM CHLORIDE: 9 INJECTION, SOLUTION INTRAVENOUS at 10:04

## 2022-01-01 RX ADMIN — AMIODARONE HYDROCHLORIDE 1 MG/MIN: 50 INJECTION, SOLUTION INTRAVENOUS at 02:09

## 2022-01-01 RX ADMIN — DRONABINOL 2.5 MG: 2.5 CAPSULE ORAL at 10:04

## 2022-01-01 RX ADMIN — HYDROCODONE BITARTRATE AND ACETAMINOPHEN 1 TABLET: 7.5; 325 TABLET ORAL at 09:05

## 2022-01-01 RX ADMIN — SODIUM CHLORIDE 250 ML: 9 INJECTION, SOLUTION INTRAVENOUS at 02:09

## 2022-01-01 RX ADMIN — Medication 100 MCG: at 09:05

## 2022-01-01 RX ADMIN — SODIUM CHLORIDE 2000 ML: 9 INJECTION, SOLUTION INTRAVENOUS at 12:09

## 2022-01-01 RX ADMIN — PROPOFOL 5 MCG/KG/MIN: 10 INJECTION, EMULSION INTRAVENOUS at 12:09

## 2022-01-01 RX ADMIN — EPHEDRINE SULFATE 50 MG: 50 INJECTION INTRAVENOUS at 03:05

## 2022-01-01 RX ADMIN — AMIODARONE HYDROCHLORIDE 0.5 MG/MIN: 50 INJECTION, SOLUTION INTRAVENOUS at 07:09

## 2022-01-01 RX ADMIN — HEPARIN SODIUM 4000 UNITS: 1000 INJECTION, SOLUTION INTRAVENOUS; SUBCUTANEOUS at 11:04

## 2022-01-01 RX ADMIN — MINERAL OIL 1 ENEMA: 100 ENEMA RECTAL at 11:09

## 2022-01-01 RX ADMIN — HYDRALAZINE HYDROCHLORIDE 75 MG: 50 TABLET ORAL at 02:04

## 2022-01-01 RX ADMIN — PANTOPRAZOLE SODIUM 40 MG: 40 INJECTION, POWDER, FOR SOLUTION INTRAVENOUS at 11:04

## 2022-01-01 RX ADMIN — INSULIN ASPART 2 UNITS: 100 INJECTION, SOLUTION INTRAVENOUS; SUBCUTANEOUS at 11:09

## 2022-01-01 RX ADMIN — KETOROLAC TROMETHAMINE 60 MG: 30 INJECTION, SOLUTION INTRAMUSCULAR at 09:09

## 2022-01-01 RX ADMIN — OXYCODONE AND ACETAMINOPHEN 1 TABLET: 7.5; 325 TABLET ORAL at 06:09

## 2022-01-01 RX ADMIN — LACTULOSE 20 G: 20 SOLUTION ORAL at 11:09

## 2022-01-01 RX ADMIN — SODIUM CHLORIDE 1000 ML: 9 INJECTION, SOLUTION INTRAVENOUS at 03:01

## 2022-01-01 RX ADMIN — HYDROCODONE BITARTRATE AND ACETAMINOPHEN 1 TABLET: 10; 325 TABLET ORAL at 04:04

## 2022-01-01 RX ADMIN — NOREPINEPHRINE BITARTRATE 0.2 MCG/KG/MIN: 1 INJECTION INTRAVENOUS at 04:09

## 2022-01-01 RX ADMIN — ALBUMIN (HUMAN) 12.5 G: 12.5 INJECTION, SOLUTION INTRAVENOUS at 04:04

## 2022-01-01 RX ADMIN — HYDROMORPHONE HYDROCHLORIDE 1 MG: 2 INJECTION INTRAMUSCULAR; INTRAVENOUS; SUBCUTANEOUS at 04:09

## 2022-01-01 RX ADMIN — HYDRALAZINE HYDROCHLORIDE 50 MG: 50 TABLET ORAL at 06:04

## 2022-01-01 RX ADMIN — SODIUM CHLORIDE, PRESERVATIVE FREE 10 ML: 5 INJECTION INTRAVENOUS at 08:09

## 2022-01-01 RX ADMIN — ONDANSETRON 8 MG: 4 TABLET, ORALLY DISINTEGRATING ORAL at 07:05

## 2022-01-01 RX ADMIN — INSULIN ASPART 2 UNITS: 100 INJECTION, SOLUTION INTRAVENOUS; SUBCUTANEOUS at 08:08

## 2022-01-01 RX ADMIN — HEPARIN SODIUM 4000 UNITS: 1000 INJECTION, SOLUTION INTRAVENOUS; SUBCUTANEOUS at 08:04

## 2022-01-01 RX ADMIN — NOREPINEPHRINE BITARTRATE 0.2 MCG/KG/MIN: 1 INJECTION INTRAVENOUS at 12:09

## 2022-01-01 RX ADMIN — HYDROMORPHONE HYDROCHLORIDE 1 MG: 2 INJECTION INTRAMUSCULAR; INTRAVENOUS; SUBCUTANEOUS at 09:09

## 2022-01-01 RX ADMIN — HYDROCODONE BITARTRATE AND ACETAMINOPHEN 1 TABLET: 5; 325 TABLET ORAL at 12:05

## 2022-01-01 RX ADMIN — PROMETHAZINE HYDROCHLORIDE 12.5 MG: 25 INJECTION INTRAMUSCULAR; INTRAVENOUS at 01:04

## 2022-01-01 RX ADMIN — HYDROCODONE BITARTRATE AND ACETAMINOPHEN 1 TABLET: 10; 325 TABLET ORAL at 08:04

## 2022-01-01 RX ADMIN — HEPARIN SODIUM 5000 UNITS: 5000 INJECTION INTRAVENOUS; SUBCUTANEOUS at 10:09

## 2022-01-01 RX ADMIN — SODIUM CHLORIDE, PRESERVATIVE FREE 10 ML: 5 INJECTION INTRAVENOUS at 06:08

## 2022-01-01 RX ADMIN — PROPOFOL 50 MCG/KG/MIN: 10 INJECTION, EMULSION INTRAVENOUS at 04:09

## 2022-01-01 RX ADMIN — NOREPINEPHRINE BITARTRATE 0.22 MCG/KG/MIN: 1 INJECTION INTRAVENOUS at 06:09

## 2022-01-01 RX ADMIN — Medication 500 MCG: at 09:04

## 2022-01-01 RX ADMIN — PHENYLEPHRINE HYDROCHLORIDE 100 MCG: 10 INJECTION INTRAVENOUS at 08:09

## 2022-01-01 RX ADMIN — NITROGLYCERIN 0.5 INCH: 20 OINTMENT TOPICAL at 11:04

## 2022-01-01 RX ADMIN — CALCIUM CHLORIDE 1 G: 100 INJECTION INTRAVENOUS; INTRAVENTRICULAR at 03:05

## 2022-01-01 RX ADMIN — ASPIRIN 325 MG ORAL TABLET 325 MG: 325 PILL ORAL at 08:05

## 2022-01-01 RX ADMIN — HUMAN INSULIN 10 UNITS: 100 INJECTION, SOLUTION SUBCUTANEOUS at 03:01

## 2022-01-01 RX ADMIN — INSULIN ASPART 4 UNITS: 100 INJECTION, SOLUTION INTRAVENOUS; SUBCUTANEOUS at 11:08

## 2022-01-01 RX ADMIN — DOCUSATE SODIUM 100 MG: 100 CAPSULE, LIQUID FILLED ORAL at 09:05

## 2022-01-01 RX ADMIN — INSULIN ASPART 1 UNITS: 100 INJECTION, SOLUTION INTRAVENOUS; SUBCUTANEOUS at 10:04

## 2022-01-01 RX ADMIN — OXYCODONE AND ACETAMINOPHEN 1 TABLET: 7.5; 325 TABLET ORAL at 03:09

## 2022-01-01 RX ADMIN — SODIUM CHLORIDE 250 ML: 9 INJECTION, SOLUTION INTRAVENOUS at 06:05

## 2022-01-01 RX ADMIN — SODIUM CHLORIDE: 9 INJECTION, SOLUTION INTRAVENOUS at 09:09

## 2022-01-01 RX ADMIN — OXYCODONE AND ACETAMINOPHEN 1 TABLET: 7.5; 325 TABLET ORAL at 10:09

## 2022-01-01 RX ADMIN — PANTOPRAZOLE SODIUM 40 MG: 40 TABLET, DELAYED RELEASE ORAL at 07:09

## 2022-01-01 RX ADMIN — SEVELAMER CARBONATE 800 MG: 800 TABLET, FILM COATED ORAL at 07:09

## 2022-01-01 RX ADMIN — SEVELAMER CARBONATE 800 MG: 800 TABLET, FILM COATED ORAL at 11:08

## 2022-01-01 RX ADMIN — OXYCODONE AND ACETAMINOPHEN 1 TABLET: 10; 325 TABLET ORAL at 10:09

## 2022-01-01 RX ADMIN — LIDOCAINE HYDROCHLORIDE: 10 INJECTION, SOLUTION INFILTRATION; PERINEURAL at 01:08

## 2022-01-01 RX ADMIN — OXYCODONE AND ACETAMINOPHEN 1 TABLET: 7.5; 325 TABLET ORAL at 11:08

## 2022-01-01 RX ADMIN — HYDROMORPHONE HYDROCHLORIDE 1 MG: 2 INJECTION INTRAMUSCULAR; INTRAVENOUS; SUBCUTANEOUS at 03:09

## 2022-01-12 NOTE — TELEPHONE ENCOUNTER
----- Message from Derrick Hunt sent at 1/11/2022  1:45 PM CST -----  Regarding: refill  Glipizide  Simvastatin  Amlodipine  Allopurinol  Metoprolol    Needs the following medicine sent to [facility] pharmacy

## 2022-01-18 PROBLEM — I10 ESSENTIAL HYPERTENSION: Status: ACTIVE | Noted: 2022-01-01

## 2022-01-18 PROBLEM — I12.9 NEPHROSCLEROSIS: Status: ACTIVE | Noted: 2022-01-01

## 2022-01-18 PROBLEM — G62.2 INFLAMMATORY AND TOXIC NEUROPATHY: Status: ACTIVE | Noted: 2022-01-01

## 2022-01-18 PROBLEM — G61.9 INFLAMMATORY AND TOXIC NEUROPATHY: Status: ACTIVE | Noted: 2022-01-01

## 2022-01-18 PROBLEM — N20.0 NEPHROLITHIASIS: Status: ACTIVE | Noted: 2022-01-01

## 2022-01-18 PROBLEM — R05.9 COUGH: Status: ACTIVE | Noted: 2021-05-19

## 2022-01-18 PROBLEM — I25.10 ATHEROSCLEROTIC HEART DISEASE OF NATIVE CORONARY ARTERY WITHOUT ANGINA PECTORIS: Status: ACTIVE | Noted: 2022-01-01

## 2022-01-18 PROBLEM — R91.1 SOLITARY PULMONARY NODULE: Status: ACTIVE | Noted: 2022-01-01

## 2022-01-18 PROBLEM — N18.4 STAGE 4 CHRONIC KIDNEY DISEASE: Status: ACTIVE | Noted: 2022-01-01

## 2022-01-18 PROBLEM — M19.90 ARTHRITIS: Status: ACTIVE | Noted: 2022-01-01

## 2022-01-18 PROBLEM — E11.21 DIABETIC RENAL DISEASE: Status: ACTIVE | Noted: 2022-01-01

## 2022-01-18 PROBLEM — G47.30 SLEEP APNEA: Status: ACTIVE | Noted: 2022-01-01

## 2022-01-18 NOTE — PROGRESS NOTES
Subjective:       Patient ID: Bria Ray is a 86 y.o. female.    Chief Complaint: Cough, Fatigue, Nasal Congestion (C/O cough, fatigue, fever, shortness of breath, and congestion x 2 weeks.), and Fever      Pt. Feels terrible. She has been weak and congested for over 2 weeks. CXR is abnormal - looks like a right hilar mass.    Review of Systems   Constitutional: Negative for fatigue and fever.   HENT: Negative for nasal congestion and dental problem.    Eyes: Negative for discharge.   Respiratory: Positive for cough and shortness of breath.    Cardiovascular: Negative for chest pain.   Gastrointestinal: Negative for constipation, diarrhea, nausea and vomiting.   Genitourinary: Negative for bladder incontinence, difficulty urinating and hot flashes.   Allergic/Immunologic: Negative for environmental allergies.   Neurological: Negative for headaches.   Psychiatric/Behavioral: Negative for behavioral problems and confusion.         Objective:      Physical Exam  Vitals and nursing note reviewed.   Constitutional:       Appearance: Normal appearance. She is normal weight.   HENT:      Head: Normocephalic and atraumatic.      Right Ear: Tympanic membrane normal.      Left Ear: Tympanic membrane normal.      Nose: Nose normal.      Mouth/Throat:      Mouth: Mucous membranes are moist.   Eyes:      Extraocular Movements: Extraocular movements intact.      Conjunctiva/sclera: Conjunctivae normal.      Pupils: Pupils are equal, round, and reactive to light.   Cardiovascular:      Rate and Rhythm: Normal rate and regular rhythm.      Pulses: Normal pulses.   Pulmonary:      Effort: Pulmonary effort is normal.      Breath sounds: Rhonchi present.      Comments: Pt. Is moving air bilaterally. Scattered rhonchi that do clear with cough  Abdominal:      General: Abdomen is flat. Bowel sounds are normal.      Palpations: Abdomen is soft.   Musculoskeletal:         General: Normal range of motion.      Cervical back: Normal  range of motion and neck supple.      Comments: Multiple site arthritis   Skin:     General: Skin is warm and dry.   Neurological:      General: No focal deficit present.      Mental Status: She is alert and oriented to person, place, and time.   Psychiatric:         Mood and Affect: Mood normal.         Assessment:       Shortness of breath  -     X-Ray Chest PA And Lateral; Future; Expected date: 01/18/2022    Type 2 diabetes mellitus without complication, without long-term current use of insulin  -     POCT Glucose, Hand-Held Device    Solitary pulmonary nodule  -     CT Chest Without Contrast; Future; Expected date: 01/18/2022    Cough  -     CT Chest Without Contrast; Future; Expected date: 01/18/2022

## 2022-01-20 NOTE — PROGRESS NOTES
Disclaimer:  This note has been generated using voice recognition software.  There may be type of graft focal areas that have been missed during a proof reading      Subjective:      Patient ID: Bria Ray is a 86 y.o. female.    Chief Complaint: Joint Pain, Neck Pain, and Back Pain      Foot Pain   This is a chronic problem. The current episode started more than 1 year ago. The problem occurs daily. The problem has been unchanged. Pertinent negatives include no fever.     Review of Systems   Constitutional: Negative for activity change, chills, diaphoresis, fever and unexpected weight change.   HENT: Negative for drooling, ear discharge, ear pain, facial swelling, nosebleeds, sore throat, trouble swallowing, voice change and goiter.    Eyes: Negative for photophobia, pain, discharge, redness and visual disturbance.   Respiratory: Negative for apnea, cough, choking, chest tightness, shortness of breath, wheezing and stridor.    Cardiovascular: Negative for chest pain, palpitations and leg swelling.   Gastrointestinal: Negative for abdominal distention, change in bowel habit, diarrhea, rectal pain, vomiting, fecal incontinence and change in bowel habit.   Endocrine: Negative for cold intolerance, heat intolerance, polydipsia, polyphagia and polyuria.   Genitourinary: Negative for bladder incontinence, dysuria, flank pain, frequency and hot flashes.   Musculoskeletal: Positive for arthralgias, back pain, gait problem, leg pain and neck stiffness.   Integumentary:  Negative for color change, pallor and rash.   Allergic/Immunologic: Negative for immunocompromised state.   Neurological: Negative for dizziness, vertigo, seizures, syncope, facial asymmetry, speech difficulty, light-headedness, disturbances in coordination, memory loss and coordination difficulties.   Hematological: Negative for adenopathy. Does not bruise/bleed easily.   Psychiatric/Behavioral: Negative for agitation, behavioral problems, confusion,  "decreased concentration, dysphoric mood, hallucinations, self-injury and suicidal ideas. The patient is not nervous/anxious and is not hyperactive.             Objective:  Vitals:    01/24/22 1239 01/24/22 1240   BP: (!) 145/67    Pulse: 65    Resp: 15    Weight: 75.8 kg (167 lb)    Height: 5' 7" (1.702 m)    PainSc:   8   8         Physical Exam  Vitals and nursing note reviewed. Exam conducted with a chaperone present.   Constitutional:       General: She is awake. She is not in acute distress.     Appearance: Normal appearance. She is not toxic-appearing or diaphoretic.   HENT:      Head: Normocephalic and atraumatic.      Nose: Nose normal.      Mouth/Throat:      Mouth: Mucous membranes are moist.      Pharynx: Oropharynx is clear.   Eyes:      Conjunctiva/sclera: Conjunctivae normal.      Pupils: Pupils are equal, round, and reactive to light.   Cardiovascular:      Rate and Rhythm: Normal rate.   Pulmonary:      Effort: Pulmonary effort is normal. No respiratory distress.   Abdominal:      Palpations: Abdomen is soft.      Tenderness: There is no guarding.   Musculoskeletal:         General: No signs of injury. Normal range of motion.      Cervical back: Normal range of motion and neck supple. No rigidity.   Skin:     General: Skin is warm and dry.      Coloration: Skin is not jaundiced or pale.   Neurological:      General: No focal deficit present.      Mental Status: She is alert and oriented to person, place, and time. Mental status is at baseline.      Cranial Nerves: Cranial nerves are intact. No cranial nerve deficit (II-XII).   Psychiatric:         Mood and Affect: Mood normal.         Behavior: Behavior normal. Behavior is cooperative.         Thought Content: Thought content normal.           X-Ray Chest PA And Lateral  Narrative: EXAMINATION:  XR CHEST PA AND LATERAL    CLINICAL HISTORY:  Shortness of breath    COMPARISON:  18 January 2022    FINDINGS:  The heart and mediastinum are normal in size " and configuration.  The pulmonary vascularity is normal in caliber.  Small amounts of increased lower lung density present similar to previous.  No other lung infiltrates, effusions, pneumothorax or other abnormality is demonstrated.  Impression: Small amounts of faint lower lung pulmonary density appears similar to recent study.  No other definite changes.    Electronically signed by: Robert Montoya  Date:    01/21/2022  Time:    12:03       Admission on 01/21/2022, Discharged on 01/21/2022   Component Date Value Ref Range Status    Sodium 01/21/2022 137  136 - 145 mmol/L Final    Potassium 01/21/2022 4.9  3.5 - 5.1 mmol/L Final    Chloride 01/21/2022 102  98 - 107 mmol/L Final    CO2 01/21/2022 22  21 - 32 mmol/L Final    Anion Gap 01/21/2022 18* 7 - 16 mmol/L Final    Glucose 01/21/2022 322* 74 - 106 mg/dL Final    BUN 01/21/2022 70* 7 - 18 mg/dL Final    Creatinine 01/21/2022 4.77* 0.55 - 1.02 mg/dL Final    BUN/Creatinine Ratio 01/21/2022 15  6 - 20 Final    Calcium 01/21/2022 8.5  8.5 - 10.1 mg/dL Final    Total Protein 01/21/2022 7.5  6.4 - 8.2 g/dL Final    Albumin 01/21/2022 2.2* 3.5 - 5.0 g/dL Final    Globulin 01/21/2022 5.3* 2.0 - 4.0 g/dL Final    A/G Ratio 01/21/2022 0.4   Final    Bilirubin, Total 01/21/2022 0.3  0.0 - 1.2 mg/dL Final    Alk Phos 01/21/2022 96  55 - 142 U/L Final    ALT 01/21/2022 10* 13 - 56 U/L Final    AST 01/21/2022 22  15 - 37 U/L Final    eGFR 01/21/2022 9* >=60 mL/min/1.73m² Final    Lactic Acid 01/21/2022 1.2  0.4 - 2.0 mmol/L Final    Magnesium 01/21/2022 2.0  1.7 - 2.3 mg/dL Final    Gram Stain Result 01/21/2022 Gram positive cocci*  Preliminary    Gram Stain Result 01/21/2022 Gram positive cocci*  Preliminary    Culture, Blood 01/21/2022 Coagulase-negative Staphylococcus species*  Preliminary    Gram Stain Result 01/21/2022 Gram positive cocci*  Preliminary    Gram Stain Result 01/21/2022 Gram positive cocci*  Preliminary    WBC 01/21/2022 13.56*  4.50 - 11.00 K/uL Final    RBC 01/21/2022 3.10* 4.20 - 5.40 M/uL Final    Hemoglobin 01/21/2022 9.3* 12.0 - 16.0 g/dL Final    Hematocrit 01/21/2022 31.2* 38.0 - 47.0 % Final    MCV 01/21/2022 100.6* 80.0 - 96.0 fL Final    MCH 01/21/2022 30.0  27.0 - 31.0 pg Final    MCHC 01/21/2022 29.8* 32.0 - 36.0 g/dL Final    RDW 01/21/2022 14.1  11.5 - 14.5 % Final    Platelet Count 01/21/2022 254  150 - 400 K/uL Final    MPV 01/21/2022 10.4  9.4 - 12.4 fL Final    Neutrophils % 01/21/2022 77.7* 53.0 - 65.0 % Final    Lymphocytes % 01/21/2022 7.6* 27.0 - 41.0 % Final    Neutrophils, Abs 01/21/2022 10.53* 1.80 - 7.70 K/uL Final    Lymphocytes, Absolute 01/21/2022 1.03  1.00 - 4.80 K/uL Final    Diff Type 01/21/2022 Manual   Final    Monocytes % 01/21/2022 9.3* 2.0 - 6.0 % Final    Eosinophils % 01/21/2022 2.5  1.0 - 4.0 % Final    Basophils % 01/21/2022 0.5  0.0 - 1.0 % Final    Immature Granulocytes % 01/21/2022 2.4* 0.0 - 0.4 % Final    Monocytes, Absolute 01/21/2022 1.26* 0.00 - 0.80 K/uL Final    Eosinophils, Absolute 01/21/2022 0.34  0.00 - 0.50 K/uL Final    Immature Granulocytes, Absolute 01/21/2022 0.33* 0.00 - 0.04 K/uL Final    Basophils, Absolute 01/21/2022 0.07  0.00 - 0.20 K/uL Final    Influenza A 01/21/2022 Negative  Negative, Invalid Final    Influenza B 01/21/2022 Negative  Negative, Invalid Final    COVID-19 Ag 01/21/2022 Negative  Negative, Invalid Final    Segmented Neutrophils, Man % 01/21/2022 79* 50 - 62 % Final    Bands, Man % 01/21/2022 2  1 - 5 % Final    Lymphocytes, Man % 01/21/2022 12* 27 - 41 % Final    Monocytes, Man % 01/21/2022 7* 2 - 6 % Final    Platelet Morphology 01/21/2022 Normal  Normal Final    RBC Morphology 01/21/2022 Normal   Final    Atypical Lymphocytes 01/21/2022 Few   Final    Troponin I High Sensitivity 01/21/2022 31.7  <=60.4 pg/mL Final    POC Glucose 01/21/2022 210* 70 - 105 mg/dL Final    Verigene Result 01/21/2022 Coagulase-negative  Staphylococcus, other than Staph epidermidis and Staph lugdunensis* Negative, E Coli ESBL Positive Final   Office Visit on 01/18/2022   Component Date Value Ref Range Status    POC Glucose 01/18/2022 232* 70 - 110 MG/DL Final   Office Visit on 10/26/2021   Component Date Value Ref Range Status    POC Amphetamines 10/26/2021 Negative  Negative, Inconclusive Final    POC Barbiturates 10/26/2021 Negative  Negative, Inconclusive Final    POC Benzodiazepines 10/26/2021 Presumptive Positive* Negative, Inconclusive Final    POC Cocaine 10/26/2021 Negative  Negative, Inconclusive Final    POC THC 10/26/2021 Negative  Negative, Inconclusive Final    POC Methadone 10/26/2021 Negative  Negative, Inconclusive Final    POC Methamphetamine 10/26/2021 Negative  Negative, Inconclusive Final    POC Opiates 10/26/2021 Presumptive Positive* Negative, Inconclusive Final    POC Oxycodone 10/26/2021 Negative  Negative, Inconclusive Final    POC Phencyclidine 10/26/2021 Negative  Negative, Inconclusive Final    POC Methylenedioxymethamphetamine * 10/26/2021 Negative  Negative, Inconclusive Final    POC Tricyclic Antidepressants 10/26/2021 Negative  Negative, Inconclusive Final    POC Buprenorphine 10/26/2021 Negative   Final     Acceptable 10/26/2021 Yes   Final    POC Temperature (Urine) 10/26/2021 90   Final    pH, UA 10/26/2021 5.5  5.0, 5.5, 6.0, 6.5, 7.0, 7.5, 8.0 pH Units Final    Specific Gravity, UA 10/26/2021 >=1.030* <=1.005, 1.010, 1.015, 1.020, 1.025, 1.030 Final    Creatinine, Urine 10/26/2021 129  28 - 219 mg/dL Final    6-Acetylmorphine 10/26/2021 Negative  10 ng/mL Final    7-Aminoclonazepam 10/26/2021 Negative  Negative 25 ng/mL Final    a-Hydroxyalprazolam 10/26/2021 Negative  25 ng/mL Final    Acetyl Fentanyl 10/26/2021 Negative  2.5 ng/mL Final    Acetyl Norfentanyl Oxalate 10/26/2021 Negative  5 ng/mL Final    Benzoylecgonine 10/26/2021 Negative  100 ng/mL Final     Buprenorphine 10/26/2021 Negative  25 ng/mL Final    Codeine 10/26/2021 Negative  25 ng/mL Final    EDDP 10/26/2021 Negative  25 ng/mL Final    Fentanyl 10/26/2021 Negative  2.5 ng/mL Final    Hydrocodone 10/26/2021 >250.0* <25.0 25 ng/mL Final    Hydromorphone 10/26/2021 >250.0* <25.0 25 ng/mL Final    Lorazepam 10/26/2021 Negative  25 ng/mL Final    Morphine 10/26/2021 Negative  25 ng/mL Final    Norbuprenorphine 10/26/2021 Negative  25 ng/mL Final    Nordiazepam 10/26/2021 Negative  25 ng/mL Final    Norfentanyl Oxalate 10/26/2021 Negative  5 ng/mL Final    Norhydrocodone 10/26/2021 >500.0* <50.0 50 ng/mL Final    Noroxycodone HCL 10/26/2021 Negative  50 ng/mL Final    Oxazepam 10/26/2021 Negative  25 ng/mL Final    Oxymorphone 10/26/2021 Negative  25 ng/mL Final    Tapentadol 10/26/2021 Negative  25 ng/mL Final    Temazepam 10/26/2021 25.4* <25.0 25 ng/mL Final    THC-COOH 10/26/2021 Negative  25 ng/mL Final    Tramadol 10/26/2021 Negative  100 ng/mL Final    Amphetamine, Urine 10/26/2021 Negative  Negative 100 ng/mL Final    Methamphetamines, Urine 10/26/2021 Negative  Negative 100 ng/mL Final    Methadone, Urine 10/26/2021 Negative  Negative 25 ng/mL Final    Oxycodone, Urine 10/26/2021 Negative  Negative 25 ng/mL Final   Office Visit on 07/26/2021   Component Date Value Ref Range Status    POC Amphetamines 07/26/2021 Negative  Negative, Inconclusive Final    POC Barbiturates 07/26/2021 Negative  Negative, Inconclusive Final    POC Benzodiazepines 07/26/2021 Negative  Negative, Inconclusive Final    POC Cocaine 07/26/2021 Negative  Negative, Inconclusive Final    POC THC 07/26/2021 Negative  Negative, Inconclusive Final    POC Methadone 07/26/2021 Negative  Negative, Inconclusive Final    POC Methamphetamine 07/26/2021 Negative  Negative, Inconclusive Final    POC Opiates 07/26/2021 Presumptive Positive* Negative, Inconclusive Final    POC Oxycodone 07/26/2021 Negative  Negative,  Inconclusive Final    POC Phencyclidine 07/26/2021 Negative  Negative, Inconclusive Final    POC Methylenedioxymethamphetamine * 07/26/2021 Negative  Negative, Inconclusive Final    POC Tricyclic Antidepressants 07/26/2021 Negative  Negative, Inconclusive Final    POC Buprenorphine 07/26/2021 Negative   Final     Acceptable 07/26/2021 Yes   Final    POC Temperature (Urine) 07/26/2021 92   Final           Assessment:      1. Lumbosacral radiculopathy    2. 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 Neuropathy    4. Encounter for long-term (current) use of other medications          Orders Placed This Encounter   Procedures    POCT Urine Drug Screen Presump     Interpretive Information:     Negative:  No drug detected at the cut off level.   Positive:  This result represents presumptive positive for the   tested drug, other substances may yield a positive response other   than the analyte of interest. This result should be utilized for   diagnostic purpose only. Confirmation testing will be performed upon physician request only.            A's of Opioid Risk Assessment  Activity:Patient can perform ADL.   Analgesia:Patients pain is partially controlled by current medication. Patient has tried OTC medications such as Tylenol and Ibuprofen with out relief.   Adverse Effects: Patient denies constipation or sedation.  Aberrant Behavior:  reviewed with no aberrant drug seeking/taking behavior.  Overdose reversal drug naloxone discussed    Drug screen reviewed      Requested Prescriptions     Signed Prescriptions Disp Refills    gabapentin (NEURONTIN) 300 MG capsule 90 capsule 2     Sig: Take 1 capsule (300 mg total) by mouth every 8 (eight) hours.    HYDROcodone-acetaminophen (NORCO)  mg per tablet 60 tablet 0     Sig: Take 1 tablet by mouth every 12 (twelve) hours.    HYDROcodone-acetaminophen (NORCO)  mg per tablet 60 tablet 0     Sig: Take 1 tablet by mouth  every 12 (twelve) hours.    HYDROcodone-acetaminophen (NORCO)  mg per tablet 60 tablet 0     Sig: Take 1 tablet by mouth every 12 (twelve) hours.         Plan:    Using 4 point walker assistance with ambulation    Last definitive drug screen positive for benzodiazepine patient was given this medication before her cataract surgery    She states she is recovering after bronchitis a week in the hospital    She states current medications helping control her discomfort    She would like to continue with conservative management    Continue current medication    Follow-up 3 months    Dr. Quintero, October 2022    Bring original prescription medication bottles/container/box with labels to each visit

## 2022-01-21 PROBLEM — E86.0 DEHYDRATION: Status: ACTIVE | Noted: 2022-01-01

## 2022-01-21 PROBLEM — J40 BRONCHITIS: Status: ACTIVE | Noted: 2022-01-01

## 2022-01-21 PROBLEM — J20.9 COPD WITH ACUTE BRONCHITIS: Status: ACTIVE | Noted: 2022-01-01

## 2022-01-21 PROBLEM — J44.0 COPD WITH ACUTE BRONCHITIS: Status: ACTIVE | Noted: 2022-01-01

## 2022-01-21 NOTE — ED PROVIDER NOTES
Encounter Date: 1/21/2022       History     Chief Complaint   Patient presents with    Cough     C/o coughing-dx with pneumonia on Tuesday and given rx for     Patient is a 87 yo wf who was diagnosed with Pneumonia on Tuesday by physician and given RX. Per patient, she was not tested for COVID.  Patient is exhausted from increased coughing.  Patient is alert and oriented cooperative, and communicative. AF with increased BP.  Patient states that she was told by Dr Richardson that she had pneumonia.  She states that she is coughing a lot which is her most upsetting symptom to her.        Review of patient's allergies indicates:  No Known Allergies  Past Medical History:   Diagnosis Date    Age-related physical debility     Anemia     Bone cyst     Cardiomyopathy     Chronic diastolic (congestive) heart failure     Chronic kidney disease     Chronic renal impairment     Dyspnea     Essential hypertension     Fatigue     GERD (gastroesophageal reflux disease)     Gout     Labyrinthitis     Lumbosacral radiculopathy     Sanchez's neuroma of right foot     Neuropathy     Old myocardial infarction     Renal failure syndrome     Shortness of breath     Sleep disorder     Type 2 diabetes mellitus     Vertigo     Vitamin D deficiency      Past Surgical History:   Procedure Laterality Date    BLADDER SUSPENSION      BRONCHOSCOPY       11/2020    CHOLECYSTECTOMY      HERNIA REPAIR      HYSTERECTOMY      right hip surgery Right      Family History   Problem Relation Age of Onset    Cancer Other     Diabetes Mother     Diabetes Sister     Asthma Son      Social History     Tobacco Use    Smoking status: Never Smoker    Smokeless tobacco: Never Used   Substance Use Topics    Alcohol use: Never    Drug use: Never     Review of Systems   Constitutional: Positive for activity change, appetite change and fatigue.   HENT: Positive for congestion.    Eyes: Positive for discharge.    Respiratory: Positive for cough and shortness of breath.    Cardiovascular: Negative.    Gastrointestinal: Negative.    Endocrine: Negative.    Genitourinary: Negative.    Musculoskeletal: Negative.    Allergic/Immunologic: Negative.    Neurological: Negative.    Hematological: Negative.    Psychiatric/Behavioral: Negative.        Physical Exam     Initial Vitals [01/21/22 1142]   BP Pulse Resp Temp SpO2   (!) 194/85 89 (!) 22 97.5 °F (36.4 °C) (!) 94 %      MAP       --         Physical Exam    Constitutional: She appears well-developed and well-nourished. No distress.   HENT:   Head: Normocephalic and atraumatic.   Eyes: EOM are normal. Pupils are equal, round, and reactive to light.   Neck: Neck supple.   Normal range of motion.  Cardiovascular: Normal rate.   Pulmonary/Chest: Breath sounds normal.   Abdominal: Abdomen is soft.   Musculoskeletal:         General: Normal range of motion.      Cervical back: Normal range of motion and neck supple.     Neurological: She is alert and oriented to person, place, and time. She has normal strength. GCS score is 15. GCS eye subscore is 4. GCS verbal subscore is 5. GCS motor subscore is 6.   Skin: Skin is warm.   Psychiatric: She has a normal mood and affect. Her behavior is normal.         Medical Screening Exam   See Full Note    ED Course   Procedures  Labs Reviewed   COMPREHENSIVE METABOLIC PANEL - Abnormal; Notable for the following components:       Result Value    Anion Gap 18 (*)     Glucose 322 (*)     BUN 70 (*)     Creatinine 4.77 (*)     Albumin 2.2 (*)     Globulin 5.3 (*)     ALT 10 (*)     eGFR 9 (*)     All other components within normal limits   CBC WITH DIFFERENTIAL - Abnormal; Notable for the following components:    WBC 13.56 (*)     RBC 3.10 (*)     Hemoglobin 9.3 (*)     Hematocrit 31.2 (*)     .6 (*)     MCHC 29.8 (*)     Neutrophils % 77.7 (*)     Lymphocytes % 7.6 (*)     Neutrophils, Abs 10.53 (*)     Monocytes % 9.3 (*)     Immature  Granulocytes % 2.4 (*)     Monocytes, Absolute 1.26 (*)     Immature Granulocytes, Absolute 0.33 (*)     All other components within normal limits   MANUAL DIFFERENTIAL - Abnormal; Notable for the following components:    Segmented Neutrophils, Man % 79 (*)     Lymphocytes, Man % 12 (*)     Monocytes, Man % 7 (*)     All other components within normal limits   LACTIC ACID, PLASMA - Normal   MAGNESIUM - Normal   SARS-COV2 (COVID) W/ FLU ANTIGEN - Normal    Narrative:     Negative SARS-CoV results should not be used as the sole basis for treatment or patient management decisions; negative results should be considered in the context of a patient's recent exposures, history and the presene of clinical signs and symptoms consistent with COVID-19.  Negative results should be treated as presumptive and confirmed by molecular assay, if necessary for patient management.   TROPONIN I - Normal   CULTURE, BLOOD   CULTURE, BLOOD   CBC W/ AUTO DIFFERENTIAL    Narrative:     The following orders were created for panel order CBC auto differential.  Procedure                               Abnormality         Status                     ---------                               -----------         ------                     CBC with Differential[[id number]]        Abnormal            Final result               Manual Differential[[id number]]          Abnormal            Final result                 Please view results for these tests on the individual orders.        ECG Results          EKG 12-lead (In process)  Result time 01/21/22 14:05:45    In process by Interface, Lab In [facility] (01/21/22 14:05:45)                 Narrative:    Test Reason : R06.02,    Vent. Rate : 094 BPM     Atrial Rate : 094 BPM     P-R Int : 150 ms          QRS Dur : 128 ms      QT Int : 400 ms       P-R-T Axes : 070 056 030 degrees     QTc Int : 500 ms    Sinus rhythm with Premature atrial complexes  Right bundle branch block  Abnormal ECG  No previous ECGs  available    Referred By: [email or web address]   SELF           Confirmed By:                             Imaging Results          X-Ray Chest PA And Lateral (Final result)  Result time 01/21/22 12:03:42    Final result by Robert Montoya II, MD (01/21/22 12:03:42)                 Impression:      Small amounts of faint lower lung pulmonary density appears similar to recent study.  No other definite changes.      Electronically signed by: Robert Montoya  Date:    01/21/2022  Time:    12:03             Narrative:    EXAMINATION:  XR CHEST PA AND LATERAL    CLINICAL HISTORY:  Shortness of breath    COMPARISON:  18 January 2022    FINDINGS:  The heart and mediastinum are normal in size and configuration.  The pulmonary vascularity is normal in caliber.  Small amounts of increased lower lung density present similar to previous.  No other lung infiltrates, effusions, pneumothorax or other abnormality is demonstrated.                                 Medications   insulin regular injection 10 Units (has no administration in time range)   sodium chloride 0.9% bolus 1,000 mL (has no administration in time range)   albuterol-ipratropium 2.5 mg-0.5 mg/3 mL nebulizer solution 3 mL (3 mLs Nebulization Given by Other 1/21/22 1420)   dexamethasone injection 4 mg (4 mg Intravenous Given 1/21/22 1326)   cefTRIAXone (ROCEPHIN) 1 g in dextrose 5 % in water (D5W) 5 % 50 mL IVPB (MB+) (0 g Intravenous Stopped 1/21/22 1416)                       Clinical Impression:   Final diagnoses:  [R06.02] Shortness of breath  [J40] Bronchitis (Primary)  [E86.0] Dehydration                 Alma Poe MD  01/21/22 3235

## 2022-01-21 NOTE — TELEPHONE ENCOUNTER
pt called stating that she is no better and very weak. Informed pt that she needs to go to ED for further evaluation. Pt had granddaughter there with her and they voiced understanding.

## 2022-01-21 NOTE — DISCHARGE INSTRUCTIONS
Glucerna 30 grams Protein 3-4 times a day  Increase fluids  Bedrest  If you get worse either see ER or your PCP

## 2022-01-24 NOTE — PATIENT INSTRUCTIONS
Patient Education       Peripheral Neuropathy Discharge Instructions   About this topic   Your nerves carry information to and from the brain. They also carry signals to and from the spinal cord. You have many nerves outside of your spinal cord. They are all a part of your peripheral nervous system. They work with your brain and spinal cord. All of these parts give your body information about senses, moving, and the environment. Damage to any of the nerves outside of your brain or spinal cord is peripheral neuropathy. What you feel and where it is will depend on what nerves are affected.  What care is needed at home?   · Ask your doctor what you need to do when you go home. Make sure you ask questions if you do not understand what the doctor says. This way you will know what you need to do.  · Take your drugs as ordered by your doctor.  · Wear braces or splints to keep pressure off the nerves if you doctor suggests you wear them.  · Use a cane, walker, or a wheelchair to help you get around safely if you are having balance problems or trouble walking.  · Wear compression sleeves or stockings if your doctor suggests you wear them.  · Do daily checks on your skin and any parts that have less feeling in them. Check the bottom of your feet daily. Use a mirror to see all of your foot.  · If you have decreased feeling in your feet, ask your doctor about proper footwear. Never go outside without shoes. Wear shoes at the beach and around the pool.  What follow-up care is needed?   Your doctor may ask you to make visits to the office to check on your progress. Be sure to keep these visits. Your doctor may send you to a physical therapist (PT) for care to lessen your pain and to learn exercises. Your doctor may send you to some other doctor, called a neurologist, who specializes in nerve problems.  What drugs may be needed?   The doctor may order drugs to:  · Control blood sugar  · Help with pain  · Suppress the immune  system  · Help with eating, bathroom, or sex problems  Will physical activity be limited?   Physical activities may be limited due to problems from the peripheral neuropathy. Talk to your doctor about the right amount of activity for you.  What problems could happen?   · Long-term pain or nerve damage  · Sores on the feet  · Loss of balance, trouble walking, and a higher risk of falling  · Damage in the peripheral nerves affects the functions that control your blood flow and heartbeat  What can be done to prevent this health problem?   · Control high blood sugar.  · Limit alcohol use.  · If you are a smoker, quit. Smoking lessens the blood supply to peripheral nerves.  · If you have a vitamin deficiency, talk to your doctor to see if you need to add any vitamins to your diet.  · Keep a healthy weight. If you are overweight, lose weight.  · Avoid toxic chemicals, pesticides, and other toxins.  When do I need to call the doctor?   · Signs of infection. These include a fever of 100.4°F (38°C) or higher, chills, or a wound that will not heal.  · New sores or signs of wound infection. These include swelling, redness, warmth around the wound; too much pain when touched; yellowish, greenish, or bloody discharge; foul smell coming from the wound.  · Numbness on the foot or legs  · Blood sugar is lower or higher than normal  · Trouble breathing  · Chest pain  · Dizziness or lightheadedness  Teach Back: Helping You Understand   The Teach Back Method helps you understand the information we are giving you. After you talk with the staff, tell them in your own words what you learned. This helps to make sure the staff has described each thing clearly. It also helps to explain things that may have been confusing. Before going home, make sure you can do these:  · I can tell you about my condition.  · I can tell you what may help me stay safe when moving about.  · I can tell you what I will do if I have numbness in my feet or legs,  trouble breathing, chest pain, or feel dizzy.  Where can I learn more?   American Cancer Society  https://www.cancer.org/treatment/treatments-and-side-effects/physical-side-effects/peripheral-neuropathy/what-is-peripherial-neuropathy.html   [place] of Neurological Disorders and Stroke  http://www.ninds.nih.gov/disorders/peripheralneuropathy/detail_peripheralneuropathy.htm   Last Reviewed Date   2020-10-12  Consumer Information Use and Disclaimer   This information is not specific medical advice and does not replace information you receive from your health care provider. This is only a brief summary of general information. It does NOT include all information about conditions, illnesses, injuries, tests, procedures, treatments, therapies, discharge instructions or life-style choices that may apply to you. You must talk with your health care provider for complete information about your health and treatment options. This information should not be used to decide whether or not to accept your health care providers advice, instructions or recommendations. Only your health care provider has the knowledge and training to provide advice that is right for you.  Copyright   Copyright © 2021 [employer] and its affiliates and/or licensors. All rights reserved.

## 2022-01-26 NOTE — TELEPHONE ENCOUNTER
Pt returned call at this time.  Sates she is feeling better.  Tells nurse she has started her new Antibiotic today ( Doxycycline Per Dr. Poe) and has stopped the Zithromax as instructed.  Pt tells nurse she has also stopped using her Nebulizer as often also. Pt tells nurse she will schedule an appointment with Dr. Richardson for follow up.  Tells nurse she had another appointment With a different doctor and that's why she hadn't already made her PCP appointment. Thanked Nurse for calling.

## 2022-01-26 NOTE — TELEPHONE ENCOUNTER
Called to follow up with Pt regarding abn blood cultures and new Rx called in for Pt.  No answer. Left message on answering service to return call.

## 2022-01-27 NOTE — TELEPHONE ENCOUNTER
----- Message from Lorena Richardson MD sent at 1/26/2022  8:52 AM CST -----  Please check on patient

## 2022-02-05 NOTE — TELEPHONE ENCOUNTER
was called and reminded of 2/14/22 appointment with  and need to add CXR to appointment.    She reported that at present she does not plan  to keep 2/14/22 appointment as has two or three appointments next week.     asked that clinic call next week to reschedule:  She agreed that March appt would be soon enough for her.    She was told request for appointment will be forwarded to [place] next week.

## 2022-03-10 NOTE — ASSESSMENT & PLAN NOTE
Patient currently taking Symbicort Ventolin in her symptoms are completely resolved she is able to do whatever she wants to do whatever she wants to do it no change in her medical regimen.  Stay as active as possible see me p.r.n. continue current medicines

## 2022-03-10 NOTE — PROGRESS NOTES
Subjective:       Patient ID: Bria Ray is a 86 y.o. female.    Chief Complaint: Shortness of Breath (6 month follow up with CXR)    Shortness of Breath  This is a chronic problem. The current episode started today. The problem has been unchanged. Pertinent negatives include no abdominal pain, chest pain, ear pain, headaches or rash. Nothing aggravates the symptoms. The patient has no known risk factors for DVT/PE. She has tried nothing for the symptoms. The treatment provided mild relief.     Past Medical History:   Diagnosis Date    Age-related physical debility     Anemia     Bone cyst     Cardiomyopathy     Chronic diastolic (congestive) heart failure     Chronic kidney disease     Chronic renal impairment     Dyspnea     Essential hypertension     Fatigue     GERD (gastroesophageal reflux disease)     Gout     Labyrinthitis     Lumbosacral radiculopathy     Sanchez's neuroma of right foot     Neuropathy     Old myocardial infarction     Renal failure syndrome     Shortness of breath     Sleep disorder     Type 2 diabetes mellitus     Vertigo     Vitamin D deficiency      Past Surgical History:   Procedure Laterality Date    BLADDER SUSPENSION      BRONCHOSCOPY       11/2020    CHOLECYSTECTOMY      HERNIA REPAIR      HYSTERECTOMY      right hip surgery Right      Family History   Problem Relation Age of Onset    Cancer Other     Diabetes Mother     Diabetes Sister     Asthma Son      Review of patient's allergies indicates:  No Known Allergies   Social History     Tobacco Use    Smoking status: Never Smoker    Smokeless tobacco: Never Used   Substance Use Topics    Alcohol use: Never    Drug use: Never      Review of Systems   Constitutional: Negative for chills, activity change and night sweats.   HENT: Negative for congestion and ear pain.    Eyes: Negative for redness and itching.   Respiratory: Positive for shortness of breath.    Cardiovascular: Negative  for chest pain and palpitations.   Musculoskeletal: Negative for arthralgias and back pain.   Skin: Negative for rash.   Gastrointestinal: Negative for abdominal pain and abdominal distention.   Neurological: Negative for dizziness and headaches.   Hematological: Negative for adenopathy. Does not bruise/bleed easily.   Psychiatric/Behavioral: Negative for confusion. The patient is not nervous/anxious.        Objective:      Physical Exam   Constitutional: She is oriented to person, place, and time. She appears well-developed and well-nourished.   HENT:   Head: Normocephalic.   Nose: Nose normal.   Mouth/Throat: Oropharynx is clear and moist.   Neck: No JVD present. No thyromegaly present.   Cardiovascular: Normal rate, regular rhythm, normal heart sounds and intact distal pulses.   Pulmonary/Chest: Normal expansion, hyperinflation, symmetric chest wall expansion, effort normal and breath sounds normal.   Abdominal: Soft. Bowel sounds are normal.   Musculoskeletal:         General: Normal range of motion.      Cervical back: Normal range of motion and neck supple.   Lymphadenopathy: No supraclavicular adenopathy is present.     She has no cervical adenopathy.     She has axillary adenopathy.   Neurological: She is alert and oriented to person, place, and time. She has normal reflexes.   Skin: Skin is warm and dry.   Psychiatric: She has a normal mood and affect. Her behavior is normal.     Personal Diagnostic Review  none pertinent    No flowsheet data found.      Assessment:       1. Essential hypertension    2. SOB (shortness of breath)    3. Solitary pulmonary nodule    4. COPD with acute bronchitis        Outpatient Encounter Medications as of 3/10/2022   Medication Sig Dispense Refill    albuterol (VENTOLIN HFA) 90 mcg/actuation inhaler Inhale 2 puffs into the lungs every 6 (six) hours as needed for Wheezing. Rescue 8 g 5    allopurinoL (ZYLOPRIM) 100 MG tablet Take 1 tablet (100 mg total) by mouth once daily.  90 tablet 3    amLODIPine (NORVASC) 5 MG tablet Take 1 tablet (5 mg total) by mouth once daily. 90 tablet 3    aspirin (ECOTRIN) 81 MG EC tablet Take 1 tablet (81 mg total) by mouth once daily. 90 tablet 3    azithromycin (ZITHROMAX) 250 MG tablet Take 1 tablet (250 mg total) by mouth once daily. 10 tablet 0    budesonide-formoterol 160-4.5 mcg (SYMBICORT) 160-4.5 mcg/actuation HFAA Inhale 2 puffs into the lungs every 12 (twelve) hours. Controller 10.2 g 5    cyanocobalamin 500 MCG tablet Take 1 tablet (500 mcg total) by mouth once daily. 90 tablet 3    fenofibrate (TRICOR) 54 MG tablet Take 1 tablet (54 mg total) by mouth once daily. 90 tablet 3    furosemide (LASIX) 20 MG tablet Take 1 tablet (20 mg total) by mouth 2 (two) times daily. 180 tablet 3    gabapentin (NEURONTIN) 300 MG capsule Take 1 capsule (300 mg total) by mouth every 8 (eight) hours. 90 capsule 2    glipiZIDE (GLUCOTROL) 5 MG TR24 Take 1 tablet (5 mg total) by mouth daily with breakfast. 90 tablet 3    HYDROcodone-acetaminophen (NORCO)  mg per tablet Take 1 tablet by mouth every 12 (twelve) hours. 60 tablet 0    [START ON 3/25/2022] HYDROcodone-acetaminophen (NORCO)  mg per tablet Take 1 tablet by mouth every 12 (twelve) hours. 60 tablet 0    metoprolol tartrate (LOPRESSOR) 25 MG tablet Take 1 tablet (25 mg total) by mouth 2 (two) times daily. 180 tablet 3    multivitamin with folic acid 400 mcg Tab Take 1 tablet by mouth once daily. 90 tablet 3    simvastatin (ZOCOR) 20 MG tablet Take 1 tablet (20 mg total) by mouth every evening. 90 tablet 3    [DISCONTINUED] budesonide-formoterol 160-4.5 mcg (SYMBICORT) 160-4.5 mcg/actuation HFAA Inhale 2 puffs into the lungs every 12 (twelve) hours. Controller 10.2 g 5     No facility-administered encounter medications on file as of 3/10/2022.     No orders of the defined types were placed in this encounter.      Plan:       Problem List Items Addressed This Visit        Pulmonary     Solitary pulmonary nodule     Last chest x-ray was clear           COPD with acute bronchitis     Patient currently taking Symbicort Ventolin in her symptoms are completely resolved she is able to do whatever she wants to do whatever she wants to do it no change in her medical regimen.  Stay as active as possible see me p.r.n. continue current medicines              Cardiac/Vascular    Essential hypertension      Other Visit Diagnoses     SOB (shortness of breath)        Relevant Medications    budesonide-formoterol 160-4.5 mcg (SYMBICORT) 160-4.5 mcg/actuation HFAA

## 2022-04-07 NOTE — ED NOTES
[facility] returned call for Dr Chris to speak with Dr Hoover at [facility]

## 2022-04-07 NOTE — ED NOTES
Attempted to call report to nurse for room 656. Nurse states that room is not ready and for nurse to call back in 30 minutes.

## 2022-04-07 NOTE — ED NOTES
Call rec'd from [facility] declined transfer-will try [facility]

## 2022-04-07 NOTE — ED PROVIDER NOTES
Encounter Date: 4/7/2022       History     Chief Complaint   Patient presents with    Hypoglycemia     Patient brought in by ambulance altered mental status with hypoglycemia blood sugar 40. The patient was in the bed he was able to call a family members who can is here and paramedics gave her medications he was up to 70.  The patient states that she had not eaten or drank a taking medicine this morning.  She denies any headache nausea vomiting fever chills chest pain shortness of breath.      The patient states her blood sugars have been running low for last several weeks including low as 40.  She has not seen her primary care provider and she has continued to take her regular insulin and medications as prescribed.    The patient sees Dr. Bautista, pulmonologist for COPD.  She also has history of chronic renal failure with her last BUN and creatinine in January being 70 and 4.7 but there is no record of her seen in a renal doctor or a primary care doctor since last year.  She normally sees Dr Fields at [facility].         Review of patient's allergies indicates:  No Known Allergies  Past Medical History:   Diagnosis Date    Age-related physical debility     Anemia     Bone cyst     Cardiomyopathy     Chronic diastolic (congestive) heart failure     Chronic kidney disease     Chronic renal impairment     Dyspnea     Essential hypertension     Fatigue     GERD (gastroesophageal reflux disease)     Gout     Labyrinthitis     Lumbosacral radiculopathy     Sanchez's neuroma of right foot     Neuropathy     Old myocardial infarction     Renal failure syndrome     Shortness of breath     Sleep disorder     Type 2 diabetes mellitus     Vertigo     Vitamin D deficiency      Past Surgical History:   Procedure Laterality Date    BLADDER SUSPENSION      BRONCHOSCOPY       11/2020    CHOLECYSTECTOMY      HERNIA REPAIR      HYSTERECTOMY      right hip surgery Right      Family History    Problem Relation Age of Onset    Cancer Other     Diabetes Mother     Diabetes Sister     Asthma Son      Social History     Tobacco Use    Smoking status: Never Smoker    Smokeless tobacco: Never Used   Substance Use Topics    Alcohol use: Never    Drug use: Never     Review of Systems   Constitutional: Negative for fever.   HENT: Negative for sore throat.    Respiratory: Negative for shortness of breath.    Cardiovascular: Negative for chest pain.   Gastrointestinal: Negative for nausea.   Genitourinary: Negative for dysuria.   Musculoskeletal: Negative for back pain.   Skin: Negative for rash.   Neurological: Negative for weakness.   Hematological: Does not bruise/bleed easily.       Physical Exam     Initial Vitals [04/07/22 1013]   BP Pulse Resp Temp SpO2   (!) 212/77 62 18 (!) 94.5 °F (34.7 °C) 100 %      MAP       --         Physical Exam    Vitals reviewed.  Constitutional: She appears well-developed.   Eyes: Pupils are equal, round, and reactive to light.   Neck:   Normal range of motion.  Cardiovascular: Normal rate.   Pulmonary/Chest: Breath sounds normal.   Abdominal: Abdomen is soft.   Musculoskeletal:         General: Normal range of motion.      Cervical back: Normal range of motion.     Neurological: She is alert.   Skin: Skin is warm.   Psychiatric: She has a normal mood and affect.         Medical Screening Exam   See Full Note    ED Course   Procedures  Labs Reviewed   BASIC METABOLIC PANEL - Abnormal; Notable for the following components:       Result Value    CO2 17 (*)     Anion Gap 23 (*)     Glucose 232 (*)      (*)     Creatinine 6.92 (*)     Calcium 7.6 (*)     eGFR 6 (*)     All other components within normal limits   URINALYSIS, REFLEX TO URINE CULTURE - Abnormal; Notable for the following components:    Clarity, UA Slightly Cloudy (*)     Protein,   (*)     Glucose,   (*)     Blood, UA Small (*)     All other components within normal limits   CBC WITH  DIFFERENTIAL - Abnormal; Notable for the following components:    RBC 2.67 (*)     Hemoglobin 8.1 (*)     Hematocrit 26.5 (*)     MCV 99.3 (*)     MCHC 30.6 (*)     RDW 15.6 (*)     Platelet Count 145 (*)     Neutrophils % 73.4 (*)     Lymphocytes % 12.5 (*)     Lymphocytes, Absolute 0.58 (*)     Monocytes % 11.9 (*)     All other components within normal limits   URINALYSIS, MICROSCOPIC - Abnormal; Notable for the following components:    WBC, UA 5-10 (*)     Bacteria, UA Moderate (*)     Squamous Epithelial Cells, UA Occasional (*)     Transitional Epithelial Cells, UA Rare (*)     WBC Clumps Rare (*)     Mucus, UA Rare (*)     Fine Granular Casts, UA 0-2 (*)     Coarse Granular Casts, UA 0-2 (*)     Amorphous Crystals, UA Few (*)     All other components within normal limits   POCT GLUCOSE MONITORING CONTINUOUS - Abnormal; Notable for the following components:    POC Glucose 201 (*)     All other components within normal limits   POCT GLUCOSE MONITORING CONTINUOUS - Abnormal; Notable for the following components:    POC Glucose 274 (*)     All other components within normal limits   POCT GLUCOSE MONITORING CONTINUOUS - Abnormal; Notable for the following components:    POC Glucose 327 (*)     All other components within normal limits   TROPONIN I - Normal   SARS-COV2 (COVID) W/ FLU ANTIGEN - Normal    Narrative:     Negative SARS-CoV results should not be used as the sole basis for treatment or patient management decisions; negative results should be considered in the context of a patient's recent exposures, history and the presene of clinical signs and symptoms consistent with COVID-19.  Negative results should be treated as presumptive and confirmed by molecular assay, if necessary for patient management.   CULTURE, URINE   CBC W/ AUTO DIFFERENTIAL    Narrative:     The following orders were created for panel order CBC auto differential.  Procedure                               Abnormality         Status                      ---------                               -----------         ------                     CBC with Differential[[id number]]        Abnormal            Final result                 Please view results for these tests on the individual orders.        ECG Results          EKG 12-lead (Final result)  Result time 04/07/22 12:13:33    Final result by Interface, Lab In [facility] (04/07/22 12:13:33)                 Narrative:    Test Reason : E16.2,    Vent. Rate : 064 BPM     Atrial Rate : 064 BPM     P-R Int : 178 ms          QRS Dur : 140 ms      QT Int : 456 ms       P-R-T Axes : 093 050 028 degrees     QTc Int : 470 ms    Normal sinus rhythm  Right bundle branch block  Abnormal ECG  When compared with ECG of 21-JAN-2022 13:44,  Premature atrial complexes are no longer Present  T wave inversion no longer evident in Inferior leads  Confirmed by Bipin Chris MD ([id number]) on 4/7/2022 12:13:22 PM    Referred By: [email or web address]   SELF           Confirmed By:Bipin Chris MD                  ED Interpretation by Bipin Chris MD (04/07/22 10:57:13, [facility], Emergency Medicine)    Normal sinus rhythm rate of 64 and right bundle branch block which is not changed since January of 2022                            Imaging Results          X-Ray Chest AP Portable (Final result)  Result time 04/07/22 12:19:39    Final result by Derek Suarez MD (04/07/22 12:19:39)                 Impression:      No acute findings.      Electronically signed by: Derek Suarez  Date:    04/07/2022  Time:    12:19             Narrative:    EXAMINATION:  XR CHEST AP PORTABLE    CLINICAL HISTORY:  hypogly;    TECHNIQUE:  Single frontal view of the chest was performed.    COMPARISON:  3/10/2022    FINDINGS:  Heart size normal. Lungs clear. No pneumothorax or pleural effusion.                               CT Head Without Contrast (Final result)  Result time 04/07/22 10:57:58    Final result by Derek Suarez MD (04/07/22  10:57:58)                 Impression:      No acute intracranial abnormality.      Electronically signed by: Derek Suarez  Date:    04/07/2022  Time:    10:57             Narrative:    EXAMINATION:  CT HEAD WITHOUT CONTRAST    CLINICAL HISTORY:  Mental status change, unknown cause;    TECHNIQUE:  Low dose axial images were obtained through the head.  Coronal and sagittal reformations were also performed. Contrast was not administered.    COMPARISON:  None.    FINDINGS:  No acute intracranial hemorrhage identified.  There is global parenchymal volume loss mildly and a mild-to-moderate degree of chronic microvascular ischemic change present.  No large vessel acute infarct identified.  Vascular calcifications.  Calvarium intact.  Mastoid air cells and paranasal sinuses clear.                                 Medications - No data to display              ED Course as of 04/07/22 1320   Thu Apr 07, 2022   1026 POC Glucose(!): 201 [PW]   1056 CBC auto differential(!) [PW]   1057 EKG 12-lead [PW]   1146 Urinalysis, Microscopic(!) [PW]   1146 WBC, UA(!): 5-10 [PW]   1146 Appearance, UA(!): Slightly Cloudy [PW]   1147 Basic metabolic panel(!) [PW]   1147 Troponin I High Sensitivity: 50.2 [PW]   1147 BUN(!): 111 [PW]   1147 Creatinine(!): 6.92 [PW]   1202 POC Glucose(!): 274 [PW]   1204 Advised patient her family member that her BUN creatinine and now in the failure range, will need transfer to renal doctor for further workup evaluation.  Call the [facility] transfer line portal [PW]   1224 X-Ray Chest AP Portable [PW]   1257 COVID-19 Ag: Negative [PW]   1257 Influenza B, Molecular: Negative [PW]   1257 Influenza A: Negative [PW]   1307 POC Glucose(!): 327 [PW]      ED Course User Index  [PW] Bipin Chris MD          Clinical Impression:   Final diagnoses:  [E16.2] Hypoglycemia  [N17.8, N18.5] Acute renal failure with other specified pathological kidney lesion superimposed on stage 5 chronic kidney disease, not on chronic dialysis  (Primary)  [T68.XXXA] Hypothermia, initial encounter  [R41.82] Altered mental status, unspecified altered mental status type          ED Disposition Condition    Transfer to Another Facility Stable              Bipin Chris MD  04/07/22 1212       Bipin Chris MD  04/07/22 1320

## 2022-04-07 NOTE — ED TRIAGE NOTES
"Pt brought to er via ems for hypoglycemia. bs in 40's upon ems arrival pt given d50. bs on arrival 201. Pt alert and oriented. Pt lives alone and was able to call family. Family reported to ems that pt was weak and not acting herself. Pt reports that her blood sugars have been running low for the last couple of weeks, 40's and 50's, but she has continued to take her sugar meds because "no one told me not too".  "

## 2022-04-08 PROBLEM — R19.7 DIARRHEA: Status: ACTIVE | Noted: 2022-01-01

## 2022-04-08 NOTE — ASSESSMENT & PLAN NOTE
02/08/2022:  Creatinine 4.0 BUN 67  Acute worsening of renal function secondary to volume depletion.  Continue IV fluid.

## 2022-04-08 NOTE — NURSING
Pt resting in bed. resp even & unabored. NADN. 1 unit of PRBC's started. Verified with TACOS Eason. Denies pain or needs. Safety ms in place. cb in reach. wctm.

## 2022-04-08 NOTE — PLAN OF CARE
Problem: Adult Inpatient Plan of Care  Goal: Plan of Care Review  Outcome: Ongoing, Progressing  Goal: Patient-Specific Goal (Individualized)  Outcome: Ongoing, Progressing  Goal: Absence of Hospital-Acquired Illness or Injury  Outcome: Ongoing, Progressing  Goal: Optimal Comfort and Wellbeing  Outcome: Ongoing, Progressing  Goal: Readiness for Transition of Care  Outcome: Ongoing, Progressing     Problem: Diabetes Comorbidity  Goal: Blood Glucose Level Within Targeted Range  Outcome: Ongoing, Progressing     Problem: Fall Injury Risk  Goal: Absence of Fall and Fall-Related Injury  Outcome: Ongoing, Progressing  Intervention: Identify and Manage Contributors  Flowsheets (Taken 4/8/2022 0253)  Self-Care Promotion: BADL personal objects within reach  Medication Review/Management:   medications reviewed   infusion initiated  Intervention: Promote Injury-Free Environment  Flowsheets (Taken 4/8/2022 0253)  Safety Promotion/Fall Prevention:   assistive device/personal item within reach   nonskid shoes/socks when out of bed

## 2022-04-08 NOTE — ASSESSMENT & PLAN NOTE
Hypoglycemia prior to admission.  She was on long-acting glipizide.  Half-life extended by worsening renal function.

## 2022-04-08 NOTE — SUBJECTIVE & OBJECTIVE
Interval History:   Pt hb low, denies any bleeding  Will get fobt  Prbc one unit  Now new complains.     Review of Systems   Constitutional:  Negative for appetite change, chills, fatigue, fever and unexpected weight change.   HENT:  Negative for congestion, mouth sores, nosebleeds, sinus pain, sore throat and trouble swallowing.    Eyes:  Negative for visual disturbance.   Respiratory:  Negative for apnea, cough, chest tightness and shortness of breath.    Cardiovascular:  Negative for chest pain, palpitations and leg swelling.   Gastrointestinal:  Negative for abdominal pain, blood in stool, constipation, nausea and vomiting.   Endocrine: Negative for polydipsia and polyuria.   Genitourinary:  Positive for flank pain. Negative for decreased urine volume, difficulty urinating, dysuria and frequency.   Musculoskeletal:  Negative for arthralgias, back pain and neck pain.   Skin:  Negative for rash.   Neurological:  Negative for seizures, syncope, light-headedness and headaches.   Hematological:  Does not bruise/bleed easily.   Psychiatric/Behavioral:  Negative for agitation and suicidal ideas.    Objective:     Vital Signs (Most Recent):  Temp: 97.8 °F (36.6 °C) (04/08/22 0705)  Pulse: 67 (04/08/22 0705)  Resp: 18 (04/08/22 0705)  BP: (!) 184/64 (04/08/22 0705)  SpO2: 96 % (04/08/22 0705)   Vital Signs (24h Range):  Temp:  [97.2 °F (36.2 °C)-98.9 °F (37.2 °C)] 97.8 °F (36.6 °C)  Pulse:  [61-71] 67  Resp:  [14-19] 18  SpO2:  [96 %-99 %] 96 %  BP: (147-208)/(61-88) 184/64     Weight: 77.8 kg (171 lb 8.3 oz)  Body mass index is 26.86 kg/m².  No intake or output data in the 24 hours ending 04/08/22 1122   Physical Exam  Constitutional:       Appearance: Normal appearance.   HENT:      Head: Atraumatic.      Mouth/Throat:      Mouth: Mucous membranes are moist.      Pharynx: Oropharynx is clear.   Eyes:      Conjunctiva/sclera: Conjunctivae normal.      Pupils: Pupils are equal, round, and reactive to light.       Comments: Decreased vision right eye   Cardiovascular:      Rate and Rhythm: Normal rate and regular rhythm.      Pulses: Normal pulses.      Heart sounds: Normal heart sounds.   Pulmonary:      Effort: Pulmonary effort is normal.      Breath sounds: Normal breath sounds.   Abdominal:      General: Abdomen is flat. Bowel sounds are normal.      Palpations: Abdomen is soft.   Musculoskeletal:         General: Normal range of motion.   Skin:     General: Skin is warm and dry.      Capillary Refill: Capillary refill takes 2 to 3 seconds.      Coloration: Skin is not jaundiced or pale.      Findings: No bruising, lesion or rash.   Neurological:      General: No focal deficit present.      Mental Status: She is alert and oriented to person, place, and time.   Psychiatric:         Mood and Affect: Mood normal.       Significant Labs: All pertinent labs within the past 24 hours have been reviewed.    Significant Imaging: I have reviewed all pertinent imaging results/findings within the past 24 hours.

## 2022-04-08 NOTE — ASSESSMENT & PLAN NOTE
Hemoccult pending as well as anemia studies and repeat CBC.    Recommend T&S with next H/H and transfuse as indicated.    Will use SCDs until results of hemoccult returns.

## 2022-04-08 NOTE — SUBJECTIVE & OBJECTIVE
Past Medical History:   Diagnosis Date    Anemia of chronic renal failure     Bone cyst     Chronic diastolic (congestive) heart failure     Essential hypertension     GERD (gastroesophageal reflux disease)     Gout     Labyrinthitis     Lumbosacral radiculopathy     Sanchez's neuroma of right foot     Neuropathy     Renal failure syndrome     Right bundle branch block     Type 2 diabetes mellitus     Vertigo     Vitamin D deficiency        Past Surgical History:   Procedure Laterality Date    BLADDER SUSPENSION      BRONCHOSCOPY       11/2020    CHOLECYSTECTOMY      FRACTURE SURGERY      HERNIA REPAIR      HIP FRACTURE SURGERY Right 2005    HYSTERECTOMY         Review of patient's allergies indicates:  No Known Allergies    No current facility-administered medications on file prior to encounter.     Current Outpatient Medications on File Prior to Encounter   Medication Sig    albuterol (VENTOLIN HFA) 90 mcg/actuation inhaler Inhale 2 puffs into the lungs every 6 (six) hours as needed for Wheezing. Rescue    amLODIPine (NORVASC) 5 MG tablet Take 1 tablet (5 mg total) by mouth once daily.    aspirin (ECOTRIN) 81 MG EC tablet Take 1 tablet (81 mg total) by mouth once daily.    budesonide-formoterol 160-4.5 mcg (SYMBICORT) 160-4.5 mcg/actuation HFAA Inhale 2 puffs into the lungs every 12 (twelve) hours. Controller    cyanocobalamin 500 MCG tablet Take 1 tablet (500 mcg total) by mouth once daily.    gabapentin (NEURONTIN) 300 MG capsule Take 1 capsule (300 mg total) by mouth every 8 (eight) hours.    glipiZIDE (GLUCOTROL) 5 MG TR24 Take 1 tablet (5 mg total) by mouth daily with breakfast.    HYDROcodone-acetaminophen (NORCO)  mg per tablet Take 1 tablet by mouth every 12 (twelve) hours.    metoprolol tartrate (LOPRESSOR) 25 MG tablet Take 1 tablet (25 mg total) by mouth 2 (two) times daily.    multivitamin with folic acid 400 mcg Tab Take 1 tablet by mouth once daily.    simvastatin (ZOCOR) 20 MG tablet  Take 1 tablet (20 mg total) by mouth every evening.    albuterol (PROVENTIL) 2.5 mg /3 mL (0.083 %) nebulizer solution     allopurinoL (ZYLOPRIM) 100 MG tablet Take 1 tablet (100 mg total) by mouth once daily.    fenofibrate (TRICOR) 54 MG tablet Take 1 tablet (54 mg total) by mouth once daily.     Family History       Problem Relation (Age of Onset)    Alcohol abuse Father    Asthma Son    Cancer Other    Diabetes Mother, Sister          Tobacco Use    Smoking status: Never Smoker    Smokeless tobacco: Never Used   Substance and Sexual Activity    Alcohol use: Never    Drug use: Never    Sexual activity: Not Currently     Review of Systems   Constitutional:  Negative for appetite change, chills, fatigue, fever and unexpected weight change.   HENT:  Negative for congestion, mouth sores, nosebleeds, sinus pain, sore throat and trouble swallowing.    Eyes:  Negative for visual disturbance.   Respiratory:  Negative for apnea, cough, chest tightness and shortness of breath.    Cardiovascular:  Negative for chest pain, palpitations and leg swelling.   Gastrointestinal:  Negative for abdominal pain, blood in stool, constipation, nausea and vomiting.   Endocrine: Negative for polydipsia and polyuria.   Genitourinary:  Positive for flank pain. Negative for decreased urine volume, difficulty urinating, dysuria and frequency.   Musculoskeletal:  Negative for arthralgias, back pain and neck pain.   Skin:  Negative for rash.   Neurological:  Negative for seizures, syncope, light-headedness and headaches.   Hematological:  Does not bruise/bleed easily.   Psychiatric/Behavioral:  Positive for confusion. Negative for agitation and suicidal ideas.    Objective:     Vital Signs (Most Recent):  Temp: 98 °F (36.7 °C) (04/08/22 0000)  Pulse: 69 (04/08/22 0000)  Resp: 18 (04/08/22 0000)  BP: (!) 174/82 (04/08/22 0000)  SpO2: 96 % (04/08/22 0000)   Vital Signs (24h Range):  Temp:  [93.9 °F (34.4 °C)-98.9 °F (37.2 °C)] 98 °F (36.7  °C)  Pulse:  [61-71] 69  Resp:  [14-19] 18  SpO2:  [96 %-100 %] 96 %  BP: (147-212)/(61-88) 174/82     Weight: 75.3 kg (166 lb 0.1 oz)  Body mass index is 25.99 kg/m².    Physical Exam  Constitutional:       Appearance: Normal appearance.   HENT:      Head: Atraumatic.      Mouth/Throat:      Mouth: Mucous membranes are moist.      Pharynx: Oropharynx is clear.   Eyes:      Conjunctiva/sclera: Conjunctivae normal.      Pupils: Pupils are equal, round, and reactive to light.      Comments: Decreased vision right eye   Cardiovascular:      Rate and Rhythm: Normal rate and regular rhythm.      Pulses: Normal pulses.      Heart sounds: Normal heart sounds.   Pulmonary:      Effort: Pulmonary effort is normal.      Breath sounds: Normal breath sounds.   Abdominal:      General: Abdomen is flat. Bowel sounds are normal.      Palpations: Abdomen is soft.   Musculoskeletal:         General: Normal range of motion.   Skin:     General: Skin is warm and dry.      Capillary Refill: Capillary refill takes 2 to 3 seconds.      Coloration: Skin is not jaundiced or pale.      Findings: No bruising, lesion or rash.   Neurological:      General: No focal deficit present.      Mental Status: She is alert and oriented to person, place, and time.   Psychiatric:         Mood and Affect: Mood normal.         CRANIAL NERVES     CN III, IV, VI   Pupils are equal, round, and reactive to light.     Significant Labs: All pertinent labs within the past 24 hours have been reviewed.    Significant Imaging: I have reviewed all pertinent imaging results/findings within the past 24 hours.

## 2022-04-08 NOTE — HPI
87 yo patient presents to the [facility] ED this am with confusion and shaking.  She called her granddaughter and states that her hands were shaking so badly she most likely had just hit redial.  She had meant to call her son who is present with her and checks on her every morning and takes her breakfast.  He lives in a house down the road and she lives alone.  Her BS was 40 when she arrived at the ED and responded well to dextrose.      Patient sees Dr. Fields for her CKD4 and her baseline creat is between 3 to 4.  Today it is 7 and her BUN is 111.  She states that she has had some loose stools over the past month.  She says they are several times a day and that she has been wearing adult diapers day and night because when she has to have a BM she goes immediately.  She knows when she has to have a BM and is not incontinent, she just cannot get to the bathroom in time.  She has had to change her bed several times this past month at night. Her UA had some protein and WBC but also was contaminated with epithelium and has no nitrite.      Patient reports no melena or hematechezia but has hgb of 8.  Could be from renal disease.  Her BP is elevated but no CP, SOB or other complaints.  Patient transferred for nephrology evaluation.  Patient did have some hemoptysis with cough last year and had bronchoscopy with Dr. Goodman and was treated for bronchitis and improved.  She states that she saw Dr. Bautista in clinic and was started on lasix but she states Dr. Fields took her off and told her not to take lasix anymore.  She has continued to take MDI.

## 2022-04-08 NOTE — ASSESSMENT & PLAN NOTE
Holding long acting insulin at this time due to AM hypoglycemia.    Monitor glycemic control and cover with correctional insulin.

## 2022-04-08 NOTE — PROGRESS NOTES
[facility] - [facility] Medicine  Progress Note    Patient Name: Bria Ray  MRN: [id number]  Patient Class: IP- Inpatient   Admission Date: 4/7/2022  Length of Stay: 1 days  Attending Physician: Te Conrad MD  Primary Care Provider: Lorena Richardson MD        Subjective:     Principal Problem:Acute renal failure superimposed on stage 4 chronic kidney disease        HPI:  85 yo patient presents to the [facility] ED this am with confusion and shaking.  She called her granddaughter and states that her hands were shaking so badly she most likely had just hit redial.  She had meant to call her son who is present with her and checks on her every morning and takes her breakfast.  He lives in a house down the road and she lives alone.  Her BS was 40 when she arrived at the ED and responded well to dextrose.      Patient sees Dr. Fields for her CKD4 and her baseline creat is between 3 to 4.  Today it is 7 and her BUN is 111.  She states that she has had some loose stools over the past month.  She says they are several times a day and that she has been wearing adult diapers day and night because when she has to have a BM she goes immediately.  She knows when she has to have a BM and is not incontinent, she just cannot get to the bathroom in time.  She has had to change her bed several times this past month at night. Her UA had some protein and WBC but also was contaminated with epithelium and has no nitrite.      Patient reports no melena or hematechezia but has hgb of 8.  Could be from renal disease.  Her BP is elevated but no CP, SOB or other complaints.  Patient transferred for nephrology evaluation.  Patient did have some hemoptysis with cough last year and had bronchoscopy with Dr. Goodman and was treated for bronchitis and improved.  She states that she saw Dr. Bautista in clinic and was started on lasix but she states Dr. Fields took her off and told her not to take lasix  anymore.  She has continued to take MDI.        Overview/Hospital Course:  No notes on file    Interval History:   Pt hb low, denies any bleeding  Will get fobt  Prbc one unit  Now new complains.     Review of Systems   Constitutional:  Negative for appetite change, chills, fatigue, fever and unexpected weight change.   HENT:  Negative for congestion, mouth sores, nosebleeds, sinus pain, sore throat and trouble swallowing.    Eyes:  Negative for visual disturbance.   Respiratory:  Negative for apnea, cough, chest tightness and shortness of breath.    Cardiovascular:  Negative for chest pain, palpitations and leg swelling.   Gastrointestinal:  Negative for abdominal pain, blood in stool, constipation, nausea and vomiting.   Endocrine: Negative for polydipsia and polyuria.   Genitourinary:  Positive for flank pain. Negative for decreased urine volume, difficulty urinating, dysuria and frequency.   Musculoskeletal:  Negative for arthralgias, back pain and neck pain.   Skin:  Negative for rash.   Neurological:  Negative for seizures, syncope, light-headedness and headaches.   Hematological:  Does not bruise/bleed easily.   Psychiatric/Behavioral:  Negative for agitation and suicidal ideas.    Objective:     Vital Signs (Most Recent):  Temp: 97.8 °F (36.6 °C) (04/08/22 0705)  Pulse: 67 (04/08/22 0705)  Resp: 18 (04/08/22 0705)  BP: (!) 184/64 (04/08/22 0705)  SpO2: 96 % (04/08/22 0705)   Vital Signs (24h Range):  Temp:  [97.2 °F (36.2 °C)-98.9 °F (37.2 °C)] 97.8 °F (36.6 °C)  Pulse:  [61-71] 67  Resp:  [14-19] 18  SpO2:  [96 %-99 %] 96 %  BP: (147-208)/(61-88) 184/64     Weight: 77.8 kg (171 lb 8.3 oz)  Body mass index is 26.86 kg/m².  No intake or output data in the 24 hours ending 04/08/22 1122   Physical Exam  Constitutional:       Appearance: Normal appearance.   HENT:      Head: Atraumatic.      Mouth/Throat:      Mouth: Mucous membranes are moist.      Pharynx: Oropharynx is clear.   Eyes:      Conjunctiva/sclera:  Conjunctivae normal.      Pupils: Pupils are equal, round, and reactive to light.      Comments: Decreased vision right eye   Cardiovascular:      Rate and Rhythm: Normal rate and regular rhythm.      Pulses: Normal pulses.      Heart sounds: Normal heart sounds.   Pulmonary:      Effort: Pulmonary effort is normal.      Breath sounds: Normal breath sounds.   Abdominal:      General: Abdomen is flat. Bowel sounds are normal.      Palpations: Abdomen is soft.   Musculoskeletal:         General: Normal range of motion.   Skin:     General: Skin is warm and dry.      Capillary Refill: Capillary refill takes 2 to 3 seconds.      Coloration: Skin is not jaundiced or pale.      Findings: No bruising, lesion or rash.   Neurological:      General: No focal deficit present.      Mental Status: She is alert and oriented to person, place, and time.   Psychiatric:         Mood and Affect: Mood normal.       Significant Labs: All pertinent labs within the past 24 hours have been reviewed.    Significant Imaging: I have reviewed all pertinent imaging results/findings within the past 24 hours.      Assessment/Plan:      * Acute renal failure superimposed on stage 4 chronic kidney disease  Volume resuscitate and recheck BMP  Hold any nephrotoxic agents  Follow urine culture and stool studies.    Appreciate Dr. Fields assistance.        Type 2 diabetes mellitus  Holding long acting insulin at this time due to AM hypoglycemia.    Monitor glycemic control and cover with correctional insulin.        COPD (chronic obstructive pulmonary disease)  Continue inhaled bronchodilators and inhaled steroids.        Anemia of chronic renal failure  Hemoccult pending as well as anemia studies and repeat CBC.    Recommend T&S with next H/H and transfuse as indicated.    Will use SCDs until results of hemoccult returns.          Arthritis  No NSAIDS      Essential hypertension  Will continue norvasc and monitor BP.          VTE Risk Mitigation (From  admission, onward)         Ordered     Place sequential compression device  Until discontinued         04/08/22 0449                Discharge Planning   ANGY:      Code Status: Full Code   Is the patient medically ready for discharge?:     Reason for patient still in hospital (select all that apply): Treatment  Discharge Plan A: Home                  Rehmat ALEJANDRO Conrad MD  Department of Hospital Medicine   [address]

## 2022-04-08 NOTE — ASSESSMENT & PLAN NOTE
Volume resuscitate and recheck BMP  Hold any nephrotoxic agents  Follow urine culture and stool studies.    Appreciate Dr. Fields assistance.

## 2022-04-08 NOTE — HPI
86-year-old woman with chronic renal failure secondary to diabetes.  She was admitted with hypoglycemia.  She reports diarrhea for several days prior to admission.  Recent baseline creatinine 4.0.  Renal function noted to be worse on presentation.  She denies dysuria.  No symptoms of volume overload.

## 2022-04-08 NOTE — CONSULTS
[facility] - [address] [place] [address]  Nephrology  Consult Note    Patient Name: Bria Ray  MRN: [id number]  Admission Date: 4/7/2022  Hospital Length of Stay: 1 days  Attending Provider: Te Conrad MD   Primary Care Physician: Lorena Richardson MD  Principal Problem:Acute renal failure superimposed on stage 4 chronic kidney disease    Consults  Subjective:     HPI: 86-year-old woman with chronic renal failure secondary to diabetes.  She was admitted with hypoglycemia.  She reports diarrhea for several days prior to admission.  Recent baseline creatinine 4.0.  Renal function noted to be worse on presentation.  She denies dysuria.  No symptoms of volume overload.      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, without long-term current use of insulin     Vertigo     Vitamin D deficiency        Past Surgical History:   Procedure Laterality Date    BLADDER SUSPENSION      BRONCHOSCOPY       11/2020    CHOLECYSTECTOMY      FRACTURE SURGERY      HERNIA REPAIR      HIP FRACTURE SURGERY Right 2005    HYSTERECTOMY         Review of patient's allergies indicates:  No Known Allergies  Current Facility-Administered Medications   Medication Frequency    0.9%  NaCl infusion (for blood administration) Q24H PRN    0.9%  NaCl infusion Continuous    albuterol-ipratropium 2.5 mg-0.5 mg/3 mL nebulizer solution 3 mL Q12H    allopurinoL tablet 100 mg Daily    amLODIPine tablet 5  mg Daily    aspirin EC tablet 81 mg Daily    atorvastatin tablet 40 mg QHS    budesonide nebulizer solution 0.5 mg Q12H    cyanocobalamin tablet 500 mcg Daily    dextrose 10% bolus 125 mL PRN    glucagon (human recombinant) injection 1 mg PRN    glucose chewable tablet 16 g PRN    glucose chewable tablet 24 g PRN    hydrALAZINE tablet 50 mg Q8H    insulin aspart U-100 injection 0-5 Units QID (AC + HS) PRN    pantoprazole injection 40 mg BID     Family History       Problem Relation (Age of Onset)    Alcohol abuse Father    Asthma Son    Cancer Other    Diabetes Mother, Sister          Tobacco Use    Smoking status: Never Smoker    Smokeless tobacco: Never Used   Substance and Sexual Activity    Alcohol use: Never    Drug use: Never    Sexual activity: Not Currently     Review of Systems   Constitutional:  Positive for appetite change. Negative for fever.   HENT: Negative.     Respiratory:  Negative for cough and shortness of breath.    Cardiovascular:  Negative for chest pain and leg swelling.   Gastrointestinal:  Positive for diarrhea. Negative for nausea and vomiting.   Genitourinary:  Negative for difficulty urinating, dysuria and flank pain.   Musculoskeletal:  Negative for myalgias.   Objective:     Vital Signs (Most Recent):  Temp: 97.8 °F (36.6 °C) (04/08/22 0705)  Pulse: 67 (04/08/22 0705)  Resp: 18 (04/08/22 0705)  BP: (!) 184/64 (04/08/22 0705)  SpO2: 96 % (04/08/22 0705)  O2 Device (Oxygen Therapy): room air (04/07/22 1901)   Vital Signs (24h Range):  Temp:  [97.2 °F (36.2 °C)-98.9 °F (37.2 °C)] 97.8 °F (36.6 °C)  Pulse:  [61-71] 67  Resp:  [14-19] 18  SpO2:  [96 %-99 %] 96 %  BP: (147-208)/(61-88) 184/64     Weight: 77.8 kg (171 lb 8.3 oz) (04/08/22 0400)  Body mass index is 26.86 kg/m².  Body surface area is 1.92 meters squared.    No intake/output data recorded.    Physical Exam  Constitutional:       General: She is not in acute distress.  HENT:      Head: Normocephalic and atraumatic.       Mouth/Throat:      Mouth: Mucous membranes are dry.   Eyes:      Pupils: Pupils are equal, round, and reactive to light.   Cardiovascular:      Rate and Rhythm: Normal rate and regular rhythm.      Heart sounds: No murmur heard.    No friction rub. No gallop.   Pulmonary:      Effort: No respiratory distress.      Breath sounds: Normal breath sounds.   Abdominal:      General: Bowel sounds are normal.      Tenderness: There is no abdominal tenderness. There is no guarding.   Musculoskeletal:      Right lower leg: No edema.      Left lower leg: No edema.   Neurological:      Mental Status: She is alert and oriented to person, place, and time.   Psychiatric:         Behavior: Behavior normal.       Significant Labs:  BMP:   Recent Labs   Lab 04/08/22 0442   GLU 61*      K 4.2   *   CO2 14*   *   CREATININE 6.31*   CALCIUM 6.6*   MG 1.8     CBC:   Recent Labs   Lab 04/08/22 0442   WBC 5.44   RBC 2.04*   HGB 6.2*   HCT 20.6*   *   .0*   MCH 30.4   MCHC 30.1*       Significant Imaging:      Assessment/Plan:     * Acute renal failure superimposed on stage 4 chronic kidney disease  02/08/2022:  Creatinine 4.0 BUN 67  Acute worsening of renal function secondary to volume depletion.  Continue IV fluid.    Diarrhea  Volume depletion secondary to diarrhea.  Workup in progress    Type 2 diabetes mellitus  Hypoglycemia prior to admission.  She was on long-acting glipizide.  Half-life extended by worsening renal function.    Anemia of chronic renal failure  She has been transfused    Nephrolithiasis  No recent stones or hematuria    Essential hypertension  Moderate systolic hypertension        Thank you for your consult.     Beni Fields MD  Nephrology  [address]

## 2022-04-08 NOTE — PROGRESS NOTES
Pharmacist Intervention IV to PO Note    Bria Ray is a 86 y.o. female being treated with IV medication pantoprazole    Patient Data:    Vital Signs (Most Recent):  Temp: 98 °F (36.7 °C) (04/08/22 1115)  Pulse: 70 (04/08/22 1115)  Resp: 18 (04/08/22 1115)  BP: (!) 140/60 (04/08/22 1115)  SpO2: 95 % (04/08/22 1115)   Vital Signs (72h Range):  Temp:  [93.9 °F (34.4 °C)-98.9 °F (37.2 °C)]   Pulse:  [61-71]   Resp:  [14-19]   BP: (140-212)/(60-88)   SpO2:  [95 %-100 %]      CBC:  Recent Labs   Lab 04/07/22  1042 04/08/22  0442   WBC 4.63 5.44   RBC 2.67* 2.04*   HGB 8.1* 6.2*   HCT 26.5* 20.6*   * 127*   MCV 99.3* 101.0*   MCH 30.3 30.4   MCHC 30.6* 30.1*     CMP:     Recent Labs   Lab 04/07/22  1042 04/08/22  0442   * 61*   CALCIUM 7.6* 6.6*   ALBUMIN  --  2.7*   PROT  --  5.6*    142   K 4.5 4.2   CO2 17* 14*    112*   * 103*   CREATININE 6.92* 6.31*   ALKPHOS  --  40*   ALT  --  21   AST  --  25   BILITOT  --  0.3       Dietary Orders:  Diet Orders  Report           None            Based on the following criteria, this patient qualifies for intravenous to oral conversion:  [x] The patients gastrointestinal tract is functioning (tolerating medications via oral or enteral route for 24 hours and tolerating food or enteral feeds for 24 hours).  [x] The patient is hemodynamically stable for 24 hours (heart rate <100 beats per minute, systolic blood pressure >99 mm Hg, and respiratory rate <20 breaths per minute).  [x] The patient shows clinical improvement (afebrile for at least 24 hours and white blood cell count downtrending or normalized). Additionally, the patient must be non-neutropenic (absolute neutrophil count >500 cells/mm3).  [x] For antimicrobials, the patient has received IV therapy for at least 24 hours.    IV medication pantoprazole 40mg BID will be changed to oral medication pantoprazole 40mg BID    Pharmacist's Name: Yue Garcia  Pharmacist's Extension: [phone]

## 2022-04-08 NOTE — SUBJECTIVE & OBJECTIVE
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, without long-term current use of insulin     Vertigo     Vitamin D deficiency        Past Surgical History:   Procedure Laterality Date    BLADDER SUSPENSION      BRONCHOSCOPY       11/2020    CHOLECYSTECTOMY      FRACTURE SURGERY      HERNIA REPAIR      HIP FRACTURE SURGERY Right 2005    HYSTERECTOMY         Review of patient's allergies indicates:  No Known Allergies  Current Facility-Administered Medications   Medication Frequency    0.9%  NaCl infusion (for blood administration) Q24H PRN    0.9%  NaCl infusion Continuous    albuterol-ipratropium 2.5 mg-0.5 mg/3 mL nebulizer solution 3 mL Q12H    allopurinoL tablet 100 mg Daily    amLODIPine tablet 5 mg Daily    aspirin EC tablet 81 mg Daily    atorvastatin tablet 40 mg QHS    budesonide nebulizer solution 0.5 mg Q12H    cyanocobalamin tablet 500 mcg Daily    dextrose 10% bolus 125 mL PRN    glucagon (human recombinant) injection 1 mg PRN    glucose chewable tablet 16 g PRN    glucose chewable tablet 24 g PRN    hydrALAZINE tablet 50 mg Q8H    insulin aspart U-100 injection 0-5 Units QID (AC + HS) PRN    pantoprazole injection 40 mg BID     Family History       Problem Relation (Age of Onset)    Alcohol abuse Father    Asthma Son    Cancer Other    Diabetes Mother, Sister          Tobacco Use    Smoking status: Never Smoker    Smokeless tobacco: Never Used   Substance and Sexual Activity    Alcohol use: Never     Drug use: Never    Sexual activity: Not Currently     Review of Systems   Constitutional:  Positive for appetite change. Negative for fever.   HENT: Negative.     Respiratory:  Negative for cough and shortness of breath.    Cardiovascular:  Negative for chest pain and leg swelling.   Gastrointestinal:  Positive for diarrhea. Negative for nausea and vomiting.   Genitourinary:  Negative for difficulty urinating, dysuria and flank pain.   Musculoskeletal:  Negative for myalgias.   Objective:     Vital Signs (Most Recent):  Temp: 97.8 °F (36.6 °C) (04/08/22 0705)  Pulse: 67 (04/08/22 0705)  Resp: 18 (04/08/22 0705)  BP: (!) 184/64 (04/08/22 0705)  SpO2: 96 % (04/08/22 0705)  O2 Device (Oxygen Therapy): room air (04/07/22 1901)   Vital Signs (24h Range):  Temp:  [97.2 °F (36.2 °C)-98.9 °F (37.2 °C)] 97.8 °F (36.6 °C)  Pulse:  [61-71] 67  Resp:  [14-19] 18  SpO2:  [96 %-99 %] 96 %  BP: (147-208)/(61-88) 184/64     Weight: 77.8 kg (171 lb 8.3 oz) (04/08/22 0400)  Body mass index is 26.86 kg/m².  Body surface area is 1.92 meters squared.    No intake/output data recorded.    Physical Exam  Constitutional:       General: She is not in acute distress.  HENT:      Head: Normocephalic and atraumatic.      Mouth/Throat:      Mouth: Mucous membranes are dry.   Eyes:      Pupils: Pupils are equal, round, and reactive to light.   Cardiovascular:      Rate and Rhythm: Normal rate and regular rhythm.      Heart sounds: No murmur heard.    No friction rub. No gallop.   Pulmonary:      Effort: No respiratory distress.      Breath sounds: Normal breath sounds.   Abdominal:      General: Bowel sounds are normal.      Tenderness: There is no abdominal tenderness. There is no guarding.   Musculoskeletal:      Right lower leg: No edema.      Left lower leg: No edema.   Neurological:      Mental Status: She is alert and oriented to person, place, and time.   Psychiatric:         Behavior: Behavior normal.       Significant Labs:  BMP:    Recent Labs   Lab 04/08/22 0442   GLU 61*      K 4.2   *   CO2 14*   *   CREATININE 6.31*   CALCIUM 6.6*   MG 1.8     CBC:   Recent Labs   Lab 04/08/22 0442   WBC 5.44   RBC 2.04*   HGB 6.2*   HCT 20.6*   *   .0*   MCH 30.4   MCHC 30.1*       Significant Imaging:

## 2022-04-08 NOTE — PLAN OF CARE
[place] [facility] - [facility] Telemetry  Initial Discharge Assessment       Primary Care Provider: Lorena Richardson MD    Admission Diagnosis: Acute renal failure [N17.9]    Admission Date: 4/7/2022  Expected Discharge Date:     Discharge Barriers Identified: None    Payor: HU[employer] MANAGED MEDICARE / Plan: HU[employer] MEDICARE PPO / Product Type: Medicare Advantage /     Extended Emergency Contact Information  Primary Emergency Contact: Trudy Pierce  Mobile Phone: [phone]  Relation: Son  Preferred language: English   needed? No    Discharge Plan A: Home  Discharge Plan B: Home      [facility] [place] [address]  [address]  [place] [address]  Phone: [phone] Fax: [phone]    [facility] - [place], [place] - [address] [facility]  [address] [facility] [address]  Phone: [phone] Fax: [phone]      Initial Assessment (most recent)     Adult Discharge Assessment - 04/08/22 0945        Discharge Assessment    Assessment Type Discharge Planning Assessment     Source of Information patient     Lives With alone     Do you expect to return to your current living situation? Yes     Current cognitive status: Alert/Oriented     Walking or Climbing Stairs Difficulty ambulation difficulty, requires equipment     Dressing/Bathing Difficulty none     Equipment Currently Used at Home rollator;cane, quad     Readmission within 30 days? Yes     Do you currently have service(s) that help you manage your care at home? No     Who is going to help you get home at discharge? trudy henderson, son     How do you get to doctors appointments? car, drives self;family or friend will provide     Are you on dialysis? No     Discharge Plan A Home     Discharge Plan B Home     DME Needed Upon Discharge  none     Discharge Plan discussed with: Patient     Discharge Barriers Identified None               SS spoke with pt, states she lives at home alone. Not  current with HH. Uses a rollator and a cane. Plan at d/c is to return home. IM obtained. SS following for d/c needs.

## 2022-04-08 NOTE — H&P
[address] Medicine  History & Physical    Patient Name: Bria Ray  MRN: [id number]  Patient Class: IP- Inpatient  Admission Date: 4/7/2022  Attending Physician: Te Conrad MD   Primary Care Provider: Lorena Richardson MD         Patient information was obtained from patient, relative(s), past medical records and ER records.     Subjective:     Principal Problem:Acute renal failure superimposed on stage 4 chronic kidney disease    Chief Complaint: No chief complaint on file.       HPI: 85 yo patient presents to the [facility] ED this am with confusion and shaking.  She called her granddaughter and states that her hands were shaking so badly she most likely had just hit redial.  She had meant to call her son who is present with her and checks on her every morning and takes her breakfast.  He lives in a house down the road and she lives alone.  Her BS was 40 when she arrived at the ED and responded well to dextrose.      Patient sees Dr. Fields for her CKD4 and her baseline creat is between 3 to 4.  Today it is 7 and her BUN is 111.  She states that she has had some loose stools over the past month.  She says they are several times a day and that she has been wearing adult diapers day and night because when she has to have a BM she goes immediately.  She knows when she has to have a BM and is not incontinent, she just cannot get to the bathroom in time.  She has had to change her bed several times this past month at night. Her UA had some protein and WBC but also was contaminated with epithelium and has no nitrite.      Patient reports no melena or hematechezia but has hgb of 8.  Could be from renal disease.  Her BP is elevated but no CP, SOB or other complaints.  Patient transferred for nephrology evaluation.  Patient did have some hemoptysis with cough last year and had bronchoscopy with Dr. Goodman and was treated for bronchitis and improved.  She states  that she saw Dr. Bautista in clinic and was started on lasix but she states Dr. Fields took her off and told her not to take lasix anymore.  She has continued to take MDI.        Past Medical History:   Diagnosis Date    Anemia of chronic renal failure     Bone cyst     Chronic diastolic (congestive) heart failure     Essential hypertension     GERD (gastroesophageal reflux disease)     Gout     Labyrinthitis     Lumbosacral radiculopathy     Sanchez's neuroma of right foot     Neuropathy     Renal failure syndrome     Right bundle branch block     Type 2 diabetes mellitus     Vertigo     Vitamin D deficiency        Past Surgical History:   Procedure Laterality Date    BLADDER SUSPENSION      BRONCHOSCOPY       11/2020    CHOLECYSTECTOMY      FRACTURE SURGERY      HERNIA REPAIR      HIP FRACTURE SURGERY Right 2005    HYSTERECTOMY         Review of patient's allergies indicates:  No Known Allergies    No current facility-administered medications on file prior to encounter.     Current Outpatient Medications on File Prior to Encounter   Medication Sig    albuterol (VENTOLIN HFA) 90 mcg/actuation inhaler Inhale 2 puffs into the lungs every 6 (six) hours as needed for Wheezing. Rescue    amLODIPine (NORVASC) 5 MG tablet Take 1 tablet (5 mg total) by mouth once daily.    aspirin (ECOTRIN) 81 MG EC tablet Take 1 tablet (81 mg total) by mouth once daily.    budesonide-formoterol 160-4.5 mcg (SYMBICORT) 160-4.5 mcg/actuation HFAA Inhale 2 puffs into the lungs every 12 (twelve) hours. Controller    cyanocobalamin 500 MCG tablet Take 1 tablet (500 mcg total) by mouth once daily.    gabapentin (NEURONTIN) 300 MG capsule Take 1 capsule (300 mg total) by mouth every 8 (eight) hours.    glipiZIDE (GLUCOTROL) 5 MG TR24 Take 1 tablet (5 mg total) by mouth daily with breakfast.    HYDROcodone-acetaminophen (NORCO)  mg per tablet Take 1 tablet by mouth every 12 (twelve) hours.    metoprolol  tartrate (LOPRESSOR) 25 MG tablet Take 1 tablet (25 mg total) by mouth 2 (two) times daily.    multivitamin with folic acid 400 mcg Tab Take 1 tablet by mouth once daily.    simvastatin (ZOCOR) 20 MG tablet Take 1 tablet (20 mg total) by mouth every evening.    albuterol (PROVENTIL) 2.5 mg /3 mL (0.083 %) nebulizer solution     allopurinoL (ZYLOPRIM) 100 MG tablet Take 1 tablet (100 mg total) by mouth once daily.    fenofibrate (TRICOR) 54 MG tablet Take 1 tablet (54 mg total) by mouth once daily.     Family History       Problem Relation (Age of Onset)    Alcohol abuse Father    Asthma Son    Cancer Other    Diabetes Mother, Sister          Tobacco Use    Smoking status: Never Smoker    Smokeless tobacco: Never Used   Substance and Sexual Activity    Alcohol use: Never    Drug use: Never    Sexual activity: Not Currently     Review of Systems   Constitutional:  Negative for appetite change, chills, fatigue, fever and unexpected weight change.   HENT:  Negative for congestion, mouth sores, nosebleeds, sinus pain, sore throat and trouble swallowing.    Eyes:  Negative for visual disturbance.   Respiratory:  Negative for apnea, cough, chest tightness and shortness of breath.    Cardiovascular:  Negative for chest pain, palpitations and leg swelling.   Gastrointestinal:  Negative for abdominal pain, blood in stool, constipation, nausea and vomiting.   Endocrine: Negative for polydipsia and polyuria.   Genitourinary:  Positive for flank pain. Negative for decreased urine volume, difficulty urinating, dysuria and frequency.   Musculoskeletal:  Negative for arthralgias, back pain and neck pain.   Skin:  Negative for rash.   Neurological:  Negative for seizures, syncope, light-headedness and headaches.   Hematological:  Does not bruise/bleed easily.   Psychiatric/Behavioral:  Positive for confusion. Negative for agitation and suicidal ideas.    Objective:     Vital Signs (Most Recent):  Temp: 98 °F (36.7 °C)  (04/08/22 0000)  Pulse: 69 (04/08/22 0000)  Resp: 18 (04/08/22 0000)  BP: (!) 174/82 (04/08/22 0000)  SpO2: 96 % (04/08/22 0000)   Vital Signs (24h Range):  Temp:  [93.9 °F (34.4 °C)-98.9 °F (37.2 °C)] 98 °F (36.7 °C)  Pulse:  [61-71] 69  Resp:  [14-19] 18  SpO2:  [96 %-100 %] 96 %  BP: (147-212)/(61-88) 174/82     Weight: 75.3 kg (166 lb 0.1 oz)  Body mass index is 25.99 kg/m².    Physical Exam  Constitutional:       Appearance: Normal appearance.   HENT:      Head: Atraumatic.      Mouth/Throat:      Mouth: Mucous membranes are moist.      Pharynx: Oropharynx is clear.   Eyes:      Conjunctiva/sclera: Conjunctivae normal.      Pupils: Pupils are equal, round, and reactive to light.      Comments: Decreased vision right eye   Cardiovascular:      Rate and Rhythm: Normal rate and regular rhythm.      Pulses: Normal pulses.      Heart sounds: Normal heart sounds.   Pulmonary:      Effort: Pulmonary effort is normal.      Breath sounds: Normal breath sounds.   Abdominal:      General: Abdomen is flat. Bowel sounds are normal.      Palpations: Abdomen is soft.   Musculoskeletal:         General: Normal range of motion.   Skin:     General: Skin is warm and dry.      Capillary Refill: Capillary refill takes 2 to 3 seconds.      Coloration: Skin is not jaundiced or pale.      Findings: No bruising, lesion or rash.   Neurological:      General: No focal deficit present.      Mental Status: She is alert and oriented to person, place, and time.   Psychiatric:         Mood and Affect: Mood normal.         CRANIAL NERVES     CN III, IV, VI   Pupils are equal, round, and reactive to light.     Significant Labs: All pertinent labs within the past 24 hours have been reviewed.    Significant Imaging: I have reviewed all pertinent imaging results/findings within the past 24 hours.    Assessment/Plan:     * Acute renal failure superimposed on stage 4 chronic kidney disease  Volume resuscitate and recheck BMP  Hold any nephrotoxic  agents  Follow urine culture and stool studies.    Appreciate Dr. Fields assistance.        Anemia of chronic renal failure  Hemoccult pending as well as anemia studies and repeat CBC.    Added T&S to labs and transfuse as indicated.    Will use SCDs until results of hemoccult returns.          Essential hypertension  Will continue norvasc and monitor BP.        Type 2 diabetes mellitus  Holding long acting insulin at this time due to AM hypoglycemia.    Monitor glycemic control and cover with correctional insulin.        COPD (chronic obstructive pulmonary disease)  Continue inhaled bronchodilators and inhaled steroids.        Arthritis  No NSAIDS        VTE Risk Mitigation (From admission, onward)         Ordered     Place sequential compression device  Until discontinued         04/08/22 1923                   Rosa Park MD  Department of Hospital Medicine   [facility] - [address] [place] [address]

## 2022-04-09 NOTE — SUBJECTIVE & OBJECTIVE
Interval History:  She denies SOB.  No nausea.    Review of patient's allergies indicates:  No Known Allergies  Current Facility-Administered Medications   Medication Frequency    0.9%  NaCl infusion (for blood administration) Q24H PRN    0.9%  NaCl infusion Continuous    albuterol-ipratropium 2.5 mg-0.5 mg/3 mL nebulizer solution 3 mL Q6H PRN    allopurinoL tablet 100 mg Daily    amLODIPine tablet 5 mg Daily    aspirin EC tablet 81 mg Daily    atorvastatin tablet 40 mg QHS    budesonide nebulizer solution 0.5 mg Q12H    cyanocobalamin tablet 500 mcg Daily    dextrose 10% bolus 125 mL PRN    gabapentin capsule 100 mg BID    glucagon (human recombinant) injection 1 mg PRN    glucose chewable tablet 16 g PRN    glucose chewable tablet 24 g PRN    hydrALAZINE tablet 50 mg Q8H    HYDROcodone-acetaminophen  mg per tablet 1 tablet Q6H PRN    insulin aspart U-100 injection 0-5 Units QID (AC + HS) PRN    pantoprazole EC tablet 40 mg BID       Objective:     Vital Signs (Most Recent):  Temp: 98 °F (36.7 °C) (04/09/22 1600)  Pulse: 96 (04/09/22 1600)  Resp: 16 (04/09/22 1600)  BP: (!) 155/67 (04/09/22 1600)  SpO2: 95 % (04/09/22 1600)  O2 Device (Oxygen Therapy): room air (04/09/22 0734)   Vital Signs (24h Range):  Temp:  [98 °F (36.7 °C)-98.7 °F (37.1 °C)] 98 °F (36.7 °C)  Pulse:  [] 96  Resp:  [16-20] 16  SpO2:  [95 %-98 %] 95 %  BP: (137-188)/(60-74) 155/67     Weight: 77.8 kg (171 lb 8.3 oz) (04/08/22 0400)  Body mass index is 26.86 kg/m².  Body surface area is 1.92 meters squared.    I/O last 3 completed shifts:  In: 385.4 [Blood:385.4]  Out: -     Physical Exam  Constitutional:       General: She is not in acute distress.  HENT:      Head: Normocephalic and atraumatic.      Mouth/Throat:      Mouth: Mucous membranes are dry.   Eyes:      Pupils: Pupils are equal, round, and reactive to light.   Cardiovascular:      Rate and Rhythm: Normal rate and regular rhythm.      Heart sounds: No murmur heard.    No  friction rub. No gallop.   Pulmonary:      Effort: No respiratory distress.      Breath sounds: Normal breath sounds.   Abdominal:      General: Bowel sounds are normal.      Tenderness: There is no abdominal tenderness. There is no guarding.   Musculoskeletal:      Right lower leg: No edema.      Left lower leg: No edema.   Neurological:      Mental Status: She is alert and oriented to person, place, and time.   Psychiatric:         Behavior: Behavior normal.       Significant Labs:  BMP:   Recent Labs   Lab 04/08/22  0442 04/09/22  0544   GLU 61* 110*    142   K 4.2 4.6   * 113*   CO2 14* 14*   * 102*   CREATININE 6.31* 6.93*   CALCIUM 6.6* 7.2*   MG 1.8  --      CBC:   Recent Labs   Lab 04/09/22  0700   WBC 5.17   RBC 2.42*   HGB 7.3*   HCT 23.6*   *   MCV 97.5*   MCH 30.2   MCHC 30.9*        Significant Imaging:

## 2022-04-09 NOTE — ASSESSMENT & PLAN NOTE
02/08/2022:  Creatinine 4.0 BUN 67  Acute worsening of renal function secondary to volume depletion.    Creatinine is higher at 6.9 today.  Increase IV fluid.

## 2022-04-09 NOTE — PLAN OF CARE
Problem: Adult Inpatient Plan of Care  Goal: Plan of Care Review  Outcome: Ongoing, Progressing  Goal: Patient-Specific Goal (Individualized)  Outcome: Ongoing, Progressing  Goal: Absence of Hospital-Acquired Illness or Injury  Outcome: Ongoing, Progressing  Goal: Optimal Comfort and Wellbeing  Outcome: Ongoing, Progressing  Goal: Readiness for Transition of Care  Outcome: Ongoing, Progressing     Problem: Diabetes Comorbidity  Goal: Blood Glucose Level Within Targeted Range  Outcome: Ongoing, Progressing  Intervention: Monitor and Manage Glycemia  Flowsheets (Taken 4/9/2022 4829)  Glycemic Management: blood glucose monitored     Problem: Fall Injury Risk  Goal: Absence of Fall and Fall-Related Injury  Outcome: Ongoing, Progressing     Problem: Fluid and Electrolyte Imbalance (Acute Kidney Injury/Impairment)  Goal: Fluid and Electrolyte Balance  Outcome: Ongoing, Progressing     Problem: Oral Intake Inadequate (Acute Kidney Injury/Impairment)  Goal: Optimal Nutrition Intake  Outcome: Ongoing, Progressing     Problem: Renal Function Impairment (Acute Kidney Injury/Impairment)  Goal: Effective Renal Function  Outcome: Ongoing, Progressing   POC reviewed and continues

## 2022-04-09 NOTE — NURSING
Dr. Conrad notified that patient got 300mg of Gabepentin prior to him changing the order to 100 mg.  He is okay with that just change 10 100mg twice daily.

## 2022-04-09 NOTE — PROGRESS NOTES
[address]  Nephrology  Progress Note    Patient Name: Bria Ray  MRN: [id number]  Admission Date: 4/7/2022  Hospital Length of Stay: 2 days  Attending Provider: Te Conrad MD   Primary Care Physician: Lorena Richardson MD  Principal Problem:Acute renal failure superimposed on stage 4 chronic kidney disease    Subjective:     HPI: 86-year-old woman with chronic renal failure secondary to diabetes.  She was admitted with hypoglycemia.  She reports diarrhea for several days prior to admission.  Recent baseline creatinine 4.0.  Renal function noted to be worse on presentation.  She denies dysuria.  No symptoms of volume overload.      Interval History:  She denies SOB.  No nausea.    Review of patient's allergies indicates:  No Known Allergies  Current Facility-Administered Medications   Medication Frequency    0.9%  NaCl infusion (for blood administration) Q24H PRN    0.9%  NaCl infusion Continuous    albuterol-ipratropium 2.5 mg-0.5 mg/3 mL nebulizer solution 3 mL Q6H PRN    allopurinoL tablet 100 mg Daily    amLODIPine tablet 5 mg Daily    aspirin EC tablet 81 mg Daily    atorvastatin tablet 40 mg QHS    budesonide nebulizer solution 0.5 mg Q12H    cyanocobalamin tablet 500 mcg Daily    dextrose 10% bolus 125 mL PRN    gabapentin capsule 100 mg BID    glucagon (human recombinant) injection 1 mg PRN    glucose chewable tablet 16 g PRN    glucose chewable tablet 24 g PRN    hydrALAZINE tablet 50 mg Q8H    HYDROcodone-acetaminophen  mg per tablet 1 tablet Q6H PRN    insulin aspart U-100 injection 0-5 Units QID (AC + HS) PRN    pantoprazole EC tablet 40 mg BID       Objective:     Vital Signs (Most Recent):  Temp: 98 °F (36.7 °C) (04/09/22 1600)  Pulse: 96 (04/09/22 1600)  Resp: 16 (04/09/22 1600)  BP: (!) 155/67 (04/09/22 1600)  SpO2: 95 % (04/09/22 1600)  O2 Device (Oxygen Therapy): room air (04/09/22 0734)   Vital Signs (24h Range):  Temp:  [98 °F  (36.7 °C)-98.7 °F (37.1 °C)] 98 °F (36.7 °C)  Pulse:  [] 96  Resp:  [16-20] 16  SpO2:  [95 %-98 %] 95 %  BP: (137-188)/(60-74) 155/67     Weight: 77.8 kg (171 lb 8.3 oz) (04/08/22 0400)  Body mass index is 26.86 kg/m².  Body surface area is 1.92 meters squared.    I/O last 3 completed shifts:  In: 385.4 [Blood:385.4]  Out: -     Physical Exam  Constitutional:       General: She is not in acute distress.  HENT:      Head: Normocephalic and atraumatic.      Mouth/Throat:      Mouth: Mucous membranes are dry.   Eyes:      Pupils: Pupils are equal, round, and reactive to light.   Cardiovascular:      Rate and Rhythm: Normal rate and regular rhythm.      Heart sounds: No murmur heard.    No friction rub. No gallop.   Pulmonary:      Effort: No respiratory distress.      Breath sounds: Normal breath sounds.   Abdominal:      General: Bowel sounds are normal.      Tenderness: There is no abdominal tenderness. There is no guarding.   Musculoskeletal:      Right lower leg: No edema.      Left lower leg: No edema.   Neurological:      Mental Status: She is alert and oriented to person, place, and time.   Psychiatric:         Behavior: Behavior normal.       Significant Labs:  BMP:   Recent Labs   Lab 04/08/22  0442 04/09/22  0544   GLU 61* 110*    142   K 4.2 4.6   * 113*   CO2 14* 14*   * 102*   CREATININE 6.31* 6.93*   CALCIUM 6.6* 7.2*   MG 1.8  --      CBC:   Recent Labs   Lab 04/09/22  0700   WBC 5.17   RBC 2.42*   HGB 7.3*   HCT 23.6*   *   MCV 97.5*   MCH 30.2   MCHC 30.9*        Significant Imaging:      Assessment/Plan:     * Acute renal failure superimposed on stage 4 chronic kidney disease  02/08/2022:  Creatinine 4.0 BUN 67  Acute worsening of renal function secondary to volume depletion.    Creatinine is higher at 6.9 today.  Increase IV fluid.    Diarrhea  Improving    Type 2 diabetes mellitus  Hypoglycemia prior to admission.  She was on long-acting glipizide.  Half-life extended  by worsening renal function.    Anemia of chronic renal failure   transfused    Nephrolithiasis  No recent stones or hematuria    Essential hypertension  Improved        Thank you for your consult.     Beni Fields MD  Nephrology  [facility] - [address] [place] [address]

## 2022-04-09 NOTE — PROGRESS NOTES
[facility] - [facility] Medicine  Progress Note    Patient Name: Bria Ray  MRN: [id number]  Patient Class: IP- Inpatient   Admission Date: 4/7/2022  Length of Stay: 2 days  Attending Physician: Te Conrad MD  Primary Care Provider: Lorena Richardson MD        Subjective:     Principal Problem:Acute renal failure superimposed on stage 4 chronic kidney disease        HPI:  87 yo patient presents to the [facility] ED this am with confusion and shaking.  She called her granddaughter and states that her hands were shaking so badly she most likely had just hit redial.  She had meant to call her son who is present with her and checks on her every morning and takes her breakfast.  He lives in a house down the road and she lives alone.  Her BS was 40 when she arrived at the ED and responded well to dextrose.      Patient sees Dr. Fields for her CKD4 and her baseline creat is between 3 to 4.  Today it is 7 and her BUN is 111.  She states that she has had some loose stools over the past month.  She says they are several times a day and that she has been wearing adult diapers day and night because when she has to have a BM she goes immediately.  She knows when she has to have a BM and is not incontinent, she just cannot get to the bathroom in time.  She has had to change her bed several times this past month at night. Her UA had some protein and WBC but also was contaminated with epithelium and has no nitrite.      Patient reports no melena or hematechezia but has hgb of 8.  Could be from renal disease.  Her BP is elevated but no CP, SOB or other complaints.  Patient transferred for nephrology evaluation.  Patient did have some hemoptysis with cough last year and had bronchoscopy with Dr. Goodman and was treated for bronchitis and improved.  She states that she saw Dr. Bautista in clinic and was started on lasix but she states Dr. Fields took her off and told her not to take lasix  anymore.  She has continued to take MDI.        Overview/Hospital Course:  No notes on file    Interval History:   JENNIE  C/O not having dinner tray last night, says that she was given a sandwitch only, notified the RN to ensure proper meal orders for her  Renal fx worsening      Review of Systems   Constitutional:  Negative for appetite change, chills, fatigue, fever and unexpected weight change.   HENT:  Negative for congestion, mouth sores, nosebleeds, sinus pain, sore throat and trouble swallowing.    Eyes:  Negative for visual disturbance.   Respiratory:  Negative for apnea, cough, chest tightness and shortness of breath.    Cardiovascular:  Negative for chest pain, palpitations and leg swelling.   Gastrointestinal:  Negative for abdominal pain, blood in stool, constipation, nausea and vomiting.   Endocrine: Negative for polydipsia and polyuria.   Genitourinary:  Negative for decreased urine volume, difficulty urinating, dysuria, flank pain and frequency.   Musculoskeletal:  Negative for arthralgias, back pain and neck pain.   Skin:  Negative for rash.   Neurological:  Negative for seizures, syncope, light-headedness and headaches.   Hematological:  Does not bruise/bleed easily.   Psychiatric/Behavioral:  Negative for agitation and suicidal ideas.    Objective:     Vital Signs (Most Recent):  Temp: 98 °F (36.7 °C) (04/09/22 0713)  Pulse: 76 (04/09/22 0729)  Resp: 16 (04/09/22 0729)  BP: 137/63 (04/09/22 0827)  SpO2: 95 % (04/09/22 0729)   Vital Signs (24h Range):  Temp:  [97.9 °F (36.6 °C)-98.7 °F (37.1 °C)] 98 °F (36.7 °C)  Pulse:  [66-83] 76  Resp:  [16-20] 16  SpO2:  [95 %-98 %] 95 %  BP: (137-188)/(58-74) 137/63     Weight: 77.8 kg (171 lb 8.3 oz)  Body mass index is 26.86 kg/m².    Intake/Output Summary (Last 24 hours) at 4/9/2022 0847  Last data filed at 4/8/2022 1742  Gross per 24 hour   Intake 385.42 ml   Output --   Net 385.42 ml        Physical Exam  Constitutional:       Appearance: Normal appearance.    HENT:      Head: Atraumatic.      Mouth/Throat:      Mouth: Mucous membranes are moist.      Pharynx: Oropharynx is clear.   Eyes:      Conjunctiva/sclera: Conjunctivae normal.      Pupils: Pupils are equal, round, and reactive to light.      Comments: Decreased vision right eye   Cardiovascular:      Rate and Rhythm: Normal rate and regular rhythm.      Pulses: Normal pulses.      Heart sounds: Normal heart sounds.   Pulmonary:      Effort: Pulmonary effort is normal.      Breath sounds: Normal breath sounds.   Abdominal:      General: Abdomen is flat. Bowel sounds are normal.      Palpations: Abdomen is soft.   Musculoskeletal:         General: Normal range of motion.   Skin:     General: Skin is warm and dry.      Capillary Refill: Capillary refill takes 2 to 3 seconds.      Coloration: Skin is not jaundiced or pale.      Findings: No bruising, lesion or rash.   Neurological:      General: No focal deficit present.      Mental Status: She is alert and oriented to person, place, and time.   Psychiatric:         Mood and Affect: Mood normal.       Significant Labs: All pertinent labs within the past 24 hours have been reviewed.    Significant Imaging: I have reviewed all pertinent imaging results/findings within the past 24 hours.      Assessment/Plan:      * Acute renal failure superimposed on stage 4 chronic kidney disease  Volume resuscitate and recheck BMP  Hold any nephrotoxic agents  Follow urine culture and stool studies.    Appreciate Dr. Fields assistance.        Type 2 diabetes mellitus  Holding long acting insulin at this time due to AM hypoglycemia.    Monitor glycemic control and cover with correctional insulin.        COPD (chronic obstructive pulmonary disease)  Continue inhaled bronchodilators and inhaled steroids.        Anemia of chronic renal failure  Hemoccult pending as well as anemia studies and repeat CBC.    Recommend T&S with next H/H and transfuse as indicated.    Will use SCDs until  results of hemoccult returns.          Arthritis  No NSAIDS      Essential hypertension  Will continue norvasc and monitor BP.          VTE Risk Mitigation (From admission, onward)         Ordered     Place sequential compression device  Until discontinued         04/08/22 9079                Discharge Planning   ANGY:      Code Status: Full Code   Is the patient medically ready for discharge?:     Reason for patient still in hospital (select all that apply): Treatment  Discharge Plan A: Home                  Rehmat ALEJANDRO Conrad MD  Department of Hospital Medicine   [address]

## 2022-04-09 NOTE — PLAN OF CARE
Problem: Adult Inpatient Plan of Care  Goal: Plan of Care Review  Outcome: Ongoing, Progressing  Goal: Patient-Specific Goal (Individualized)  Outcome: Ongoing, Progressing  Goal: Absence of Hospital-Acquired Illness or Injury  Outcome: Ongoing, Progressing  Goal: Optimal Comfort and Wellbeing  Outcome: Ongoing, Progressing  Goal: Readiness for Transition of Care  Outcome: Ongoing, Progressing     Problem: Diabetes Comorbidity  Goal: Blood Glucose Level Within Targeted Range  Outcome: Ongoing, Progressing  Intervention: Monitor and Manage Glycemia  Flowsheets (Taken 4/9/2022 9173)  Glycemic Management: blood glucose monitored     Problem: Fall Injury Risk  Goal: Absence of Fall and Fall-Related Injury  Outcome: Ongoing, Progressing     Problem: Fluid and Electrolyte Imbalance (Acute Kidney Injury/Impairment)  Goal: Fluid and Electrolyte Balance  Outcome: Ongoing, Progressing     Problem: Oral Intake Inadequate (Acute Kidney Injury/Impairment)  Goal: Optimal Nutrition Intake  Outcome: Ongoing, Progressing     Problem: Renal Function Impairment (Acute Kidney Injury/Impairment)  Goal: Effective Renal Function  Outcome: Ongoing, Progressing

## 2022-04-09 NOTE — SUBJECTIVE & OBJECTIVE
Interval History:   JENNIE  C/O not having dinner tray last night, says that she was given a sandwitch only, notified the RN to ensure proper meal orders for her  Renal fx worsening      Review of Systems   Constitutional:  Negative for appetite change, chills, fatigue, fever and unexpected weight change.   HENT:  Negative for congestion, mouth sores, nosebleeds, sinus pain, sore throat and trouble swallowing.    Eyes:  Negative for visual disturbance.   Respiratory:  Negative for apnea, cough, chest tightness and shortness of breath.    Cardiovascular:  Negative for chest pain, palpitations and leg swelling.   Gastrointestinal:  Negative for abdominal pain, blood in stool, constipation, nausea and vomiting.   Endocrine: Negative for polydipsia and polyuria.   Genitourinary:  Negative for decreased urine volume, difficulty urinating, dysuria, flank pain and frequency.   Musculoskeletal:  Negative for arthralgias, back pain and neck pain.   Skin:  Negative for rash.   Neurological:  Negative for seizures, syncope, light-headedness and headaches.   Hematological:  Does not bruise/bleed easily.   Psychiatric/Behavioral:  Negative for agitation and suicidal ideas.    Objective:     Vital Signs (Most Recent):  Temp: 98 °F (36.7 °C) (04/09/22 0713)  Pulse: 76 (04/09/22 0729)  Resp: 16 (04/09/22 0729)  BP: 137/63 (04/09/22 0827)  SpO2: 95 % (04/09/22 0729)   Vital Signs (24h Range):  Temp:  [97.9 °F (36.6 °C)-98.7 °F (37.1 °C)] 98 °F (36.7 °C)  Pulse:  [66-83] 76  Resp:  [16-20] 16  SpO2:  [95 %-98 %] 95 %  BP: (137-188)/(58-74) 137/63     Weight: 77.8 kg (171 lb 8.3 oz)  Body mass index is 26.86 kg/m².    Intake/Output Summary (Last 24 hours) at 4/9/2022 0847  Last data filed at 4/8/2022 1742  Gross per 24 hour   Intake 385.42 ml   Output --   Net 385.42 ml        Physical Exam  Constitutional:       Appearance: Normal appearance.   HENT:      Head: Atraumatic.      Mouth/Throat:      Mouth: Mucous membranes are moist.       Pharynx: Oropharynx is clear.   Eyes:      Conjunctiva/sclera: Conjunctivae normal.      Pupils: Pupils are equal, round, and reactive to light.      Comments: Decreased vision right eye   Cardiovascular:      Rate and Rhythm: Normal rate and regular rhythm.      Pulses: Normal pulses.      Heart sounds: Normal heart sounds.   Pulmonary:      Effort: Pulmonary effort is normal.      Breath sounds: Normal breath sounds.   Abdominal:      General: Abdomen is flat. Bowel sounds are normal.      Palpations: Abdomen is soft.   Musculoskeletal:         General: Normal range of motion.   Skin:     General: Skin is warm and dry.      Capillary Refill: Capillary refill takes 2 to 3 seconds.      Coloration: Skin is not jaundiced or pale.      Findings: No bruising, lesion or rash.   Neurological:      General: No focal deficit present.      Mental Status: She is alert and oriented to person, place, and time.   Psychiatric:         Mood and Affect: Mood normal.       Significant Labs: All pertinent labs within the past 24 hours have been reviewed.    Significant Imaging: I have reviewed all pertinent imaging results/findings within the past 24 hours.

## 2022-04-10 NOTE — ASSESSMENT & PLAN NOTE
02/08/2022:  Creatinine 4.0 BUN 67  Acute worsening of renal function secondary to volume depletion.    Creatinine is slightly better at 6.7 today   IV bicarbonate started for metabolic acidosis

## 2022-04-10 NOTE — PROGRESS NOTES
[address]  Nephrology  Progress Note    Patient Name: Bria Ray  MRN: [id number]  Admission Date: 4/7/2022  Hospital Length of Stay: 3 days  Attending Provider: Te Conrad MD   Primary Care Physician: Lorena Richardson MD  Principal Problem:Acute renal failure superimposed on stage 4 chronic kidney disease    Subjective:     HPI: 86-year-old woman with chronic renal failure secondary to diabetes.  She was admitted with hypoglycemia.  She reports diarrhea for several days prior to admission.  Recent baseline creatinine 4.0.  Renal function noted to be worse on presentation.  She denies dysuria.  No symptoms of volume overload.      Interval History:  She denies SOB.  No nausea.  No new symptoms.    Review of patient's allergies indicates:  No Known Allergies  Current Facility-Administered Medications   Medication Frequency    0.9%  NaCl infusion (for blood administration) Q24H PRN    albuterol-ipratropium 2.5 mg-0.5 mg/3 mL nebulizer solution 3 mL Q6H PRN    allopurinoL tablet 100 mg Daily    amLODIPine tablet 5 mg Daily    aspirin EC tablet 81 mg Daily    atorvastatin tablet 40 mg QHS    budesonide nebulizer solution 0.5 mg Q12H    cyanocobalamin tablet 100 mcg Daily    dextrose 10% bolus 125 mL PRN    gabapentin capsule 100 mg BID    glucagon (human recombinant) injection 1 mg PRN    glucose chewable tablet 16 g PRN    glucose chewable tablet 24 g PRN    hydrALAZINE tablet 50 mg Q8H    HYDROcodone-acetaminophen  mg per tablet 1 tablet Q6H PRN    insulin aspart U-100 injection 0-5 Units QID (AC + HS) PRN    pantoprazole EC tablet 40 mg BID    sodium bicarbonate 150 mEq in dextrose 5 % 1,000 mL infusion Continuous       Objective:     Vital Signs (Most Recent):  Temp: 97.3 °F (36.3 °C) (04/10/22 1200)  Pulse: 88 (04/10/22 1200)  Resp: 17 (04/10/22 1200)  BP: (!) 146/65 (04/10/22 1200)  SpO2: 99 % (04/10/22 1200)  O2 Device (Oxygen Therapy): room  air (04/10/22 0706)   Vital Signs (24h Range):  Temp:  [97.3 °F (36.3 °C)-98.9 °F (37.2 °C)] 97.3 °F (36.3 °C)  Pulse:  [74-95] 88  Resp:  [16-20] 17  SpO2:  [94 %-99 %] 99 %  BP: (136-164)/(47-72) 146/65     Weight: 77.8 kg (171 lb 8.3 oz) (04/08/22 0400)  Body mass index is 26.86 kg/m².  Body surface area is 1.92 meters squared.    No intake/output data recorded.    Physical Exam  Constitutional:       General: She is not in acute distress.  HENT:      Head: Normocephalic and atraumatic.      Mouth/Throat:      Mouth: Mucous membranes are dry.   Eyes:      Pupils: Pupils are equal, round, and reactive to light.   Cardiovascular:      Rate and Rhythm: Normal rate and regular rhythm.      Heart sounds: No murmur heard.    No friction rub. No gallop.   Pulmonary:      Effort: No respiratory distress.      Breath sounds: Normal breath sounds.   Abdominal:      General: Bowel sounds are normal.      Tenderness: There is no abdominal tenderness. There is no guarding.   Musculoskeletal:      Right lower leg: No edema.      Left lower leg: No edema.   Neurological:      Mental Status: She is alert and oriented to person, place, and time.   Psychiatric:         Behavior: Behavior normal.       Significant Labs:  BMP:   Recent Labs   Lab 04/08/22  0442 04/09/22  0544 04/10/22  0332   GLU 61*   < > 152*      < > 143   K 4.2   < > 4.7   *   < > 114*   CO2 14*   < > 14*   *   < > 102*   CREATININE 6.31*   < > 6.68*   CALCIUM 6.6*   < > 7.0*   MG 1.8  --   --     < > = values in this interval not displayed.        Significant Imaging:      Assessment/Plan:     * Acute renal failure superimposed on stage 4 chronic kidney disease  02/08/2022:  Creatinine 4.0 BUN 67  Acute worsening of renal function secondary to volume depletion.    Creatinine is slightly better at 6.7 today   IV bicarbonate started for metabolic acidosis    Diarrhea  Improving    Type 2 diabetes mellitus  Hypoglycemia prior to admission.  She  was on long-acting glipizide.  Half-life extended by worsening renal function.    Anemia of chronic renal failure   transfused    Nephrolithiasis  No recent stones or hematuria    Essential hypertension  Improved        Thank you for your consult.     Beni Fields MD  Nephrology  [address]

## 2022-04-10 NOTE — PLAN OF CARE
Problem: Adult Inpatient Plan of Care  Goal: Plan of Care Review  Outcome: Ongoing, Progressing  Goal: Patient-Specific Goal (Individualized)  Outcome: Ongoing, Progressing  Goal: Absence of Hospital-Acquired Illness or Injury  Outcome: Ongoing, Progressing  Goal: Optimal Comfort and Wellbeing  Outcome: Ongoing, Progressing  Goal: Readiness for Transition of Care  Outcome: Ongoing, Progressing     Problem: Diabetes Comorbidity  Goal: Blood Glucose Level Within Targeted Range  Outcome: Ongoing, Progressing     Problem: Fall Injury Risk  Goal: Absence of Fall and Fall-Related Injury  Outcome: Ongoing, Progressing     Problem: Fluid and Electrolyte Imbalance (Acute Kidney Injury/Impairment)  Goal: Fluid and Electrolyte Balance  Outcome: Ongoing, Progressing  Intervention: Monitor and Manage Fluid and Electrolyte Balance  Flowsheets (Taken 4/10/2022 1513)  Fluid/Electrolyte Management:   intravenous fluids adjusted   fluids adjusted   intravenous fluid replacement initiated     Problem: Oral Intake Inadequate (Acute Kidney Injury/Impairment)  Goal: Optimal Nutrition Intake  Outcome: Ongoing, Progressing     Problem: Renal Function Impairment (Acute Kidney Injury/Impairment)  Goal: Effective Renal Function  Outcome: Ongoing, Progressing      Yes

## 2022-04-10 NOTE — PROGRESS NOTES
[facility] - [facility] Medicine  Progress Note    Patient Name: Bria Ray  MRN: [id number]  Patient Class: IP- Inpatient   Admission Date: 4/7/2022  Length of Stay: 3 days  Attending Physician: Te Conrad MD  Primary Care Provider: Lorena Richardson MD        Subjective:     Principal Problem:Acute renal failure superimposed on stage 4 chronic kidney disease        HPI:  87 yo patient presents to the [facility] ED this am with confusion and shaking.  She called her granddaughter and states that her hands were shaking so badly she most likely had just hit redial.  She had meant to call her son who is present with her and checks on her every morning and takes her breakfast.  He lives in a house down the road and she lives alone.  Her BS was 40 when she arrived at the ED and responded well to dextrose.      Patient sees Dr. Fields for her CKD4 and her baseline creat is between 3 to 4.  Today it is 7 and her BUN is 111.  She states that she has had some loose stools over the past month.  She says they are several times a day and that she has been wearing adult diapers day and night because when she has to have a BM she goes immediately.  She knows when she has to have a BM and is not incontinent, she just cannot get to the bathroom in time.  She has had to change her bed several times this past month at night. Her UA had some protein and WBC but also was contaminated with epithelium and has no nitrite.      Patient reports no melena or hematechezia but has hgb of 8.  Could be from renal disease.  Her BP is elevated but no CP, SOB or other complaints.  Patient transferred for nephrology evaluation.  Patient did have some hemoptysis with cough last year and had bronchoscopy with Dr. Goodman and was treated for bronchitis and improved.  She states that she saw Dr. Bautista in clinic and was started on lasix but she states Dr. Fields took her off and told her not to take lasix  anymore.  She has continued to take MDI.        Overview/Hospital Course:  No notes on file    Interval History:   NAEO  Renal fx plateued, bicarb low consistently, will change fluids from NS to bicarb drip. Renal following appreciate recs.       Review of Systems   Constitutional:  Negative for appetite change, chills, fatigue, fever and unexpected weight change.   HENT:  Negative for congestion, mouth sores, nosebleeds, sinus pain, sore throat and trouble swallowing.    Eyes:  Negative for visual disturbance.   Respiratory:  Negative for apnea, cough, chest tightness and shortness of breath.    Cardiovascular:  Negative for chest pain, palpitations and leg swelling.   Gastrointestinal:  Negative for abdominal pain, blood in stool, constipation, nausea and vomiting.   Endocrine: Negative for polydipsia and polyuria.   Genitourinary:  Negative for decreased urine volume, difficulty urinating, dysuria, flank pain and frequency.   Musculoskeletal:  Negative for arthralgias, back pain and neck pain.   Skin:  Negative for rash.   Neurological:  Negative for seizures, syncope, light-headedness and headaches.   Hematological:  Does not bruise/bleed easily.   Psychiatric/Behavioral:  Negative for agitation and suicidal ideas.    Objective:     Vital Signs (Most Recent):  Temp: 98.6 °F (37 °C) (04/10/22 0719)  Pulse: 83 (04/10/22 0719)  Resp: 16 (04/10/22 0719)  BP: (!) 164/72 (04/10/22 0719)  SpO2: 96 % (04/10/22 0719)   Vital Signs (24h Range):  Temp:  [97.9 °F (36.6 °C)-98.9 °F (37.2 °C)] 98.6 °F (37 °C)  Pulse:  [] 83  Resp:  [16-20] 16  SpO2:  [94 %-98 %] 96 %  BP: (136-164)/(47-72) 164/72     Weight: 77.8 kg (171 lb 8.3 oz)  Body mass index is 26.86 kg/m².  No intake or output data in the 24 hours ending 04/10/22 0728     Physical Exam  Constitutional:       Appearance: Normal appearance.   HENT:      Head: Atraumatic.      Mouth/Throat:      Mouth: Mucous membranes are moist.      Pharynx: Oropharynx is clear.    Eyes:      Conjunctiva/sclera: Conjunctivae normal.      Pupils: Pupils are equal, round, and reactive to light.      Comments: Decreased vision right eye   Cardiovascular:      Rate and Rhythm: Normal rate and regular rhythm.      Pulses: Normal pulses.      Heart sounds: Normal heart sounds.   Pulmonary:      Effort: Pulmonary effort is normal.      Breath sounds: Normal breath sounds.   Abdominal:      General: Abdomen is flat. Bowel sounds are normal.      Palpations: Abdomen is soft.   Musculoskeletal:         General: Normal range of motion.   Skin:     General: Skin is warm and dry.      Capillary Refill: Capillary refill takes 2 to 3 seconds.      Coloration: Skin is not jaundiced or pale.      Findings: No bruising, lesion or rash.   Neurological:      General: No focal deficit present.      Mental Status: She is alert and oriented to person, place, and time.   Psychiatric:         Mood and Affect: Mood normal.       Significant Labs: All pertinent labs within the past 24 hours have been reviewed.    Significant Imaging: I have reviewed all pertinent imaging results/findings within the past 24 hours.      Assessment/Plan:      * Acute renal failure superimposed on stage 4 chronic kidney disease  Volume resuscitate and recheck BMP  Hold any nephrotoxic agents  Follow urine culture and stool studies.    Appreciate Dr. Fields assistance.        Type 2 diabetes mellitus  Holding long acting insulin at this time due to AM hypoglycemia.    Monitor glycemic control and cover with correctional insulin.        COPD (chronic obstructive pulmonary disease)  Continue inhaled bronchodilators and inhaled steroids.        Anemia of chronic renal failure  Hemoccult pending as well as anemia studies and repeat CBC.    Recommend T&S with next H/H and transfuse as indicated.    Will use SCDs until results of hemoccult returns.          Arthritis  No NSAIDS      Essential hypertension  Will continue norvasc and monitor BP.           VTE Risk Mitigation (From admission, onward)         Ordered     Place sequential compression device  Until discontinued         04/08/22 0449                Discharge Planning   ANGY:      Code Status: Full Code   Is the patient medically ready for discharge?:     Reason for patient still in hospital (select all that apply): Treatment  Discharge Plan A: Home                  Rehmat ALEJANDRO Conrad MD  Department of Hospital Medicine   [address]

## 2022-04-10 NOTE — SUBJECTIVE & OBJECTIVE
Interval History:   NAEO  Renal fx plateued, bicarb low consistently, will change fluids from NS to bicarb drip. Renal following appreciate recs.       Review of Systems   Constitutional:  Negative for appetite change, chills, fatigue, fever and unexpected weight change.   HENT:  Negative for congestion, mouth sores, nosebleeds, sinus pain, sore throat and trouble swallowing.    Eyes:  Negative for visual disturbance.   Respiratory:  Negative for apnea, cough, chest tightness and shortness of breath.    Cardiovascular:  Negative for chest pain, palpitations and leg swelling.   Gastrointestinal:  Negative for abdominal pain, blood in stool, constipation, nausea and vomiting.   Endocrine: Negative for polydipsia and polyuria.   Genitourinary:  Negative for decreased urine volume, difficulty urinating, dysuria, flank pain and frequency.   Musculoskeletal:  Negative for arthralgias, back pain and neck pain.   Skin:  Negative for rash.   Neurological:  Negative for seizures, syncope, light-headedness and headaches.   Hematological:  Does not bruise/bleed easily.   Psychiatric/Behavioral:  Negative for agitation and suicidal ideas.    Objective:     Vital Signs (Most Recent):  Temp: 98.6 °F (37 °C) (04/10/22 0719)  Pulse: 83 (04/10/22 0719)  Resp: 16 (04/10/22 0719)  BP: (!) 164/72 (04/10/22 0719)  SpO2: 96 % (04/10/22 0719)   Vital Signs (24h Range):  Temp:  [97.9 °F (36.6 °C)-98.9 °F (37.2 °C)] 98.6 °F (37 °C)  Pulse:  [] 83  Resp:  [16-20] 16  SpO2:  [94 %-98 %] 96 %  BP: (136-164)/(47-72) 164/72     Weight: 77.8 kg (171 lb 8.3 oz)  Body mass index is 26.86 kg/m².  No intake or output data in the 24 hours ending 04/10/22 0728     Physical Exam  Constitutional:       Appearance: Normal appearance.   HENT:      Head: Atraumatic.      Mouth/Throat:      Mouth: Mucous membranes are moist.      Pharynx: Oropharynx is clear.   Eyes:      Conjunctiva/sclera: Conjunctivae normal.      Pupils: Pupils are equal, round, and  reactive to light.      Comments: Decreased vision right eye   Cardiovascular:      Rate and Rhythm: Normal rate and regular rhythm.      Pulses: Normal pulses.      Heart sounds: Normal heart sounds.   Pulmonary:      Effort: Pulmonary effort is normal.      Breath sounds: Normal breath sounds.   Abdominal:      General: Abdomen is flat. Bowel sounds are normal.      Palpations: Abdomen is soft.   Musculoskeletal:         General: Normal range of motion.   Skin:     General: Skin is warm and dry.      Capillary Refill: Capillary refill takes 2 to 3 seconds.      Coloration: Skin is not jaundiced or pale.      Findings: No bruising, lesion or rash.   Neurological:      General: No focal deficit present.      Mental Status: She is alert and oriented to person, place, and time.   Psychiatric:         Mood and Affect: Mood normal.       Significant Labs: All pertinent labs within the past 24 hours have been reviewed.    Significant Imaging: I have reviewed all pertinent imaging results/findings within the past 24 hours.

## 2022-04-10 NOTE — PLAN OF CARE
Problem: Adult Inpatient Plan of Care  Goal: Plan of Care Review  Outcome: Ongoing, Progressing  Goal: Patient-Specific Goal (Individualized)  Outcome: Ongoing, Progressing  Goal: Absence of Hospital-Acquired Illness or Injury  Outcome: Ongoing, Progressing  Goal: Optimal Comfort and Wellbeing  Outcome: Ongoing, Progressing  Goal: Readiness for Transition of Care  Outcome: Ongoing, Progressing     Problem: Diabetes Comorbidity  Goal: Blood Glucose Level Within Targeted Range  Outcome: Ongoing, Progressing     Problem: Fall Injury Risk  Goal: Absence of Fall and Fall-Related Injury  Outcome: Ongoing, Progressing     Problem: Fluid and Electrolyte Imbalance (Acute Kidney Injury/Impairment)  Goal: Fluid and Electrolyte Balance  Outcome: Ongoing, Progressing     Problem: Oral Intake Inadequate (Acute Kidney Injury/Impairment)  Goal: Optimal Nutrition Intake  Outcome: Ongoing, Progressing     Problem: Renal Function Impairment (Acute Kidney Injury/Impairment)  Goal: Effective Renal Function  Outcome: Ongoing, Progressing   POC reviewed and continues

## 2022-04-10 NOTE — SUBJECTIVE & OBJECTIVE
Interval History:  She denies SOB.  No nausea.  No new symptoms.    Review of patient's allergies indicates:  No Known Allergies  Current Facility-Administered Medications   Medication Frequency    0.9%  NaCl infusion (for blood administration) Q24H PRN    albuterol-ipratropium 2.5 mg-0.5 mg/3 mL nebulizer solution 3 mL Q6H PRN    allopurinoL tablet 100 mg Daily    amLODIPine tablet 5 mg Daily    aspirin EC tablet 81 mg Daily    atorvastatin tablet 40 mg QHS    budesonide nebulizer solution 0.5 mg Q12H    cyanocobalamin tablet 100 mcg Daily    dextrose 10% bolus 125 mL PRN    gabapentin capsule 100 mg BID    glucagon (human recombinant) injection 1 mg PRN    glucose chewable tablet 16 g PRN    glucose chewable tablet 24 g PRN    hydrALAZINE tablet 50 mg Q8H    HYDROcodone-acetaminophen  mg per tablet 1 tablet Q6H PRN    insulin aspart U-100 injection 0-5 Units QID (AC + HS) PRN    pantoprazole EC tablet 40 mg BID    sodium bicarbonate 150 mEq in dextrose 5 % 1,000 mL infusion Continuous       Objective:     Vital Signs (Most Recent):  Temp: 97.3 °F (36.3 °C) (04/10/22 1200)  Pulse: 88 (04/10/22 1200)  Resp: 17 (04/10/22 1200)  BP: (!) 146/65 (04/10/22 1200)  SpO2: 99 % (04/10/22 1200)  O2 Device (Oxygen Therapy): room air (04/10/22 0706)   Vital Signs (24h Range):  Temp:  [97.3 °F (36.3 °C)-98.9 °F (37.2 °C)] 97.3 °F (36.3 °C)  Pulse:  [74-95] 88  Resp:  [16-20] 17  SpO2:  [94 %-99 %] 99 %  BP: (136-164)/(47-72) 146/65     Weight: 77.8 kg (171 lb 8.3 oz) (04/08/22 0400)  Body mass index is 26.86 kg/m².  Body surface area is 1.92 meters squared.    No intake/output data recorded.    Physical Exam  Constitutional:       General: She is not in acute distress.  HENT:      Head: Normocephalic and atraumatic.      Mouth/Throat:      Mouth: Mucous membranes are dry.   Eyes:      Pupils: Pupils are equal, round, and reactive to light.   Cardiovascular:      Rate and Rhythm: Normal rate and regular rhythm.      Heart  sounds: No murmur heard.    No friction rub. No gallop.   Pulmonary:      Effort: No respiratory distress.      Breath sounds: Normal breath sounds.   Abdominal:      General: Bowel sounds are normal.      Tenderness: There is no abdominal tenderness. There is no guarding.   Musculoskeletal:      Right lower leg: No edema.      Left lower leg: No edema.   Neurological:      Mental Status: She is alert and oriented to person, place, and time.   Psychiatric:         Behavior: Behavior normal.       Significant Labs:  BMP:   Recent Labs   Lab 04/08/22  0442 04/09/22  0544 04/10/22  0332   GLU 61*   < > 152*      < > 143   K 4.2   < > 4.7   *   < > 114*   CO2 14*   < > 14*   *   < > 102*   CREATININE 6.31*   < > 6.68*   CALCIUM 6.6*   < > 7.0*   MG 1.8  --   --     < > = values in this interval not displayed.        Significant Imaging:

## 2022-04-11 NOTE — NURSING
Called to room by patient, states itching above iv site. Albumin stopped, maint. Fluid restarted. Notified dr nunez, states he will order benadryl

## 2022-04-11 NOTE — PT/OT/SLP EVAL
Occupational Therapy   Evaluation    Name: Bria Ray  MRN: [id number]  Admitting Diagnosis:  Acute renal failure superimposed on stage 4 chronic kidney disease  Recent Surgery: * No surgery found *      Recommendations:     Discharge Recommendations: rehabilitation facility, nursing facility, skilled  Discharge Equipment Recommendations:  none  Barriers to discharge:  None    Assessment:     Bria Ray is a 86 y.o. female with a medical diagnosis of Acute renal failure superimposed on stage 4 chronic kidney disease.  She presents with weakness and decline with ADLs. Performance deficits affecting function: weakness, impaired endurance, impaired self care skills, impaired functional mobilty, impaired balance, decreased upper extremity function, decreased lower extremity function, decreased safety awareness.      Rehab Prognosis: Good; patient would benefit from acute skilled OT services to address these deficits and reach maximum level of function.       Plan:     Patient to be seen 5 x/week to address the above listed problems via self-care/home management, therapeutic activities, therapeutic exercises  · Plan of Care Expires: 05/11/22  · Plan of Care Reviewed with: patient    Subjective     Chief Complaint: Weakness  Patient/Family Comments/goals: To return to prior level of function    Occupational Profile:  Living Environment: Patient lives at home alone  Previous level of function: Independent with ADLs  Roles and Routines: Homemaker  Equipment Used at Home:  walker, rolling, walker, standard, cane, straight, bedside commode, shower chair  Assistance upon Discharge: Recommend D/C to home with HH versus SNF placement    Pain/Comfort:  · Pain Rating 1: 0/10  · Pain Rating Post-Intervention 1: 0/10    Patients cultural, spiritual, Episcopal conflicts given the current situation: no    Objective:     Communicated with: Nuurse prior to session.  Patient found supine with peripheral IV upon OT entry to  room.    General Precautions: Standard, fall   Orthopedic Precautions:N/A   Braces: N/A  Respiratory Status: Room air    Occupational Performance:    Bed Mobility:    · Patient completed Rolling/Turning to Left with  stand by assistance  · Patient completed Rolling/Turning to Right with stand by assistance  · Patient completed Scooting/Bridging with minimum assistance  · Patient completed Supine to Sit with minimum assistance  · Patient completed Sit to Supine with minimum assistance    Functional Mobility/Transfers:  · Patient completed Sit <> Stand Transfer with contact guard assistance  with  rolling walker   · Patient completed Toilet Transfer Step Transfer technique with contact guard assistance with  rolling walker  · Functional Mobility: Patient ambulated to/from bathroom with one LOB with Mod A to assist patient from falling during turn.     Activities of Daily Living:  · Grooming: minimum assistance to perform hygiene  · Upper Body Dressing: minimum assistance to [facility] gown  · Lower Body Dressing: maximal assistance to barry socks  · Toileting: stand by assistance to perform toilet hygiene    Cognitive/Visual Perceptual:  Cognitive/Psychosocial Skills:     -       Oriented to: Person, Place and Situation   -       Follows Commands/attention:Follows multistep  commands  -       Communication: clear/fluent  -       Memory: Poor immediate recall  -       Safety awareness/insight to disability: impaired   -       Mood/Affect/Coping skills/emotional control: Appropriate to situation  Visual/Perceptual:      -Intact WFLs    Physical Exam:  Balance:    -       fair  Motor Planning:    -       WFLs  Dominant hand:    -       right  Upper Extremity Range of Motion:     -       Right Upper Extremity: WFL  -       Left Upper Extremity: WFL  Upper Extremity Strength:    -       Right Upper Extremity: 3/5 grossly  -       Left Upper Extremity: 3/5 grossly   Strength:    -       Right Upper Extremity: WFL  -        Left Upper Extremity: WFL  Fine Motor Coordination:    -       Intact  Gross motor coordination:   WFL    AMPAC 6 Click ADL:  AMPAC Total Score: 22    Treatment & Education:  · Pt educated on OT role/POC.   · Importance of OOB activity with staff assistance.  · Importance of sitting up in the chair throughout the day as tolerated, especially for meals   · Safety during functional t/f and mobility  · Importance of assisting with self-care activities     Education:    Patient left HOB elevated with all lines intact, call button in reach and bed alarm on    GOALS:   Multidisciplinary Problems     Occupational Therapy Goals        Problem: Occupational Therapy    Goal Priority Disciplines Outcome Interventions   Occupational Therapy Goal     OT, PT/OT Ongoing, Progressing    Description: Patient will perform UE HEP to maintain muscle strength B Ue's.                   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

## 2022-04-11 NOTE — PROGRESS NOTES
[facility] - [facility] Medicine  Progress Note    Patient Name: Bria Ray  MRN: [id number]  Patient Class: IP- Inpatient   Admission Date: 4/7/2022  Length of Stay: 4 days  Attending Physician: Te Conrad MD  Primary Care Provider: Lorena Richardson MD        Subjective:     Principal Problem:Acute renal failure superimposed on stage 4 chronic kidney disease        HPI:  85 yo patient presents to the [facility] ED this am with confusion and shaking.  She called her granddaughter and states that her hands were shaking so badly she most likely had just hit redial.  She had meant to call her son who is present with her and checks on her every morning and takes her breakfast.  He lives in a house down the road and she lives alone.  Her BS was 40 when she arrived at the ED and responded well to dextrose.      Patient sees Dr. Fields for her CKD4 and her baseline creat is between 3 to 4.  Today it is 7 and her BUN is 111.  She states that she has had some loose stools over the past month.  She says they are several times a day and that she has been wearing adult diapers day and night because when she has to have a BM she goes immediately.  She knows when she has to have a BM and is not incontinent, she just cannot get to the bathroom in time.  She has had to change her bed several times this past month at night. Her UA had some protein and WBC but also was contaminated with epithelium and has no nitrite.      Patient reports no melena or hematechezia but has hgb of 8.  Could be from renal disease.  Her BP is elevated but no CP, SOB or other complaints.  Patient transferred for nephrology evaluation.  Patient did have some hemoptysis with cough last year and had bronchoscopy with Dr. Goodman and was treated for bronchitis and improved.  She states that she saw Dr. Bautista in clinic and was started on lasix but she states Dr. iFelds took her off and told her not to take lasix  anymore.  She has continued to take MDI.        Overview/Hospital Course:  No notes on file    Interval History:   JENNIE  Continues to be on bicarb drip for metabolic acidosis, renal following, will need LTAC d/t bicarb drip and nephrology assistance, SW consulted, appreciate recs.       Review of Systems   Constitutional:  Negative for appetite change, chills, fatigue, fever and unexpected weight change.   HENT:  Negative for congestion, mouth sores, nosebleeds, sinus pain, sore throat and trouble swallowing.    Eyes:  Negative for visual disturbance.   Respiratory:  Negative for apnea, cough, chest tightness and shortness of breath.    Cardiovascular:  Negative for chest pain, palpitations and leg swelling.   Gastrointestinal:  Negative for abdominal pain, blood in stool, constipation, nausea and vomiting.   Endocrine: Negative for polydipsia and polyuria.   Genitourinary:  Negative for decreased urine volume, difficulty urinating, dysuria, flank pain and frequency.   Musculoskeletal:  Negative for arthralgias, back pain and neck pain.   Skin:  Negative for rash.   Neurological:  Negative for seizures, syncope, light-headedness and headaches.   Hematological:  Does not bruise/bleed easily.   Psychiatric/Behavioral:  Negative for agitation and suicidal ideas.    Objective:     Vital Signs (Most Recent):  Temp: 98.1 °F (36.7 °C) (04/11/22 0717)  Pulse: 73 (04/11/22 0717)  Resp: 18 (04/11/22 0717)  BP: (!) 150/59 (04/11/22 0717)  SpO2: (!) 93 % (04/11/22 0717)   Vital Signs (24h Range):  Temp:  [97.3 °F (36.3 °C)-98.4 °F (36.9 °C)] 98.1 °F (36.7 °C)  Pulse:  [73-90] 73  Resp:  [16-20] 18  SpO2:  [92 %-99 %] 93 %  BP: (146-164)/(59-70) 150/59     Weight: 77.8 kg (171 lb 8.3 oz)  Body mass index is 26.86 kg/m².    Intake/Output Summary (Last 24 hours) at 4/11/2022 1003  Last data filed at 4/11/2022 0300  Gross per 24 hour   Intake --   Output 2 ml   Net -2 ml        Physical Exam  Constitutional:       Appearance:  Normal appearance.   HENT:      Head: Atraumatic.      Mouth/Throat:      Mouth: Mucous membranes are moist.      Pharynx: Oropharynx is clear.   Eyes:      Conjunctiva/sclera: Conjunctivae normal.      Pupils: Pupils are equal, round, and reactive to light.      Comments: Decreased vision right eye   Cardiovascular:      Rate and Rhythm: Normal rate and regular rhythm.      Pulses: Normal pulses.      Heart sounds: Normal heart sounds.   Pulmonary:      Effort: Pulmonary effort is normal.      Breath sounds: Normal breath sounds.   Abdominal:      General: Abdomen is flat. Bowel sounds are normal.      Palpations: Abdomen is soft.   Musculoskeletal:         General: Normal range of motion.   Skin:     General: Skin is warm and dry.      Capillary Refill: Capillary refill takes 2 to 3 seconds.      Coloration: Skin is not jaundiced or pale.      Findings: No bruising, lesion or rash.   Neurological:      General: No focal deficit present.      Mental Status: She is alert and oriented to person, place, and time.   Psychiatric:         Mood and Affect: Mood normal.       Significant Labs: All pertinent labs within the past 24 hours have been reviewed.    Significant Imaging: I have reviewed all pertinent imaging results/findings within the past 24 hours.      Assessment/Plan:      * Acute renal failure superimposed on stage 4 chronic kidney disease  Has metabolic acidosis, continues to be on bicarb drip  Appreciate Dr. Fields assistance.        Type 2 diabetes mellitus  Holding long acting insulin at this time due to AM hypoglycemia.    Monitor glycemic control and cover with correctional insulin.        COPD (chronic obstructive pulmonary disease)  Continue inhaled bronchodilators and inhaled steroids.        Anemia of chronic renal failure  Hemoccult pending as well as anemia studies and repeat CBC.    Recommend T&S with next H/H and transfuse as indicated.    Will use SCDs until results of hemoccult returns.           Arthritis  No NSAIDS      Essential hypertension  Will continue norvasc and monitor BP.          VTE Risk Mitigation (From admission, onward)         Ordered     Place sequential compression device  Until discontinued         04/08/22 6223                Discharge Planning   ANGY:      Code Status: Full Code   Is the patient medically ready for discharge?:     Reason for patient still in hospital (select all that apply): Treatment  Discharge Plan A: Home                  Rehmat ALEJANDRO Conrad MD  Department of Hospital Medicine   [address]

## 2022-04-11 NOTE — PLAN OF CARE
Problem: Occupational Therapy  Goal: Occupational Therapy Goal  Description: Patient will perform UE HEP to maintain muscle strength B Ue's.  Outcome: Ongoing, Progressing

## 2022-04-11 NOTE — SUBJECTIVE & OBJECTIVE
Interval History:   JENNIE  Continues to be on bicarb drip for metabolic acidosis, renal following, will need LTAC d/t bicarb drip and nephrology assistance, SW consulted, appreciate recs.       Review of Systems   Constitutional:  Negative for appetite change, chills, fatigue, fever and unexpected weight change.   HENT:  Negative for congestion, mouth sores, nosebleeds, sinus pain, sore throat and trouble swallowing.    Eyes:  Negative for visual disturbance.   Respiratory:  Negative for apnea, cough, chest tightness and shortness of breath.    Cardiovascular:  Negative for chest pain, palpitations and leg swelling.   Gastrointestinal:  Negative for abdominal pain, blood in stool, constipation, nausea and vomiting.   Endocrine: Negative for polydipsia and polyuria.   Genitourinary:  Negative for decreased urine volume, difficulty urinating, dysuria, flank pain and frequency.   Musculoskeletal:  Negative for arthralgias, back pain and neck pain.   Skin:  Negative for rash.   Neurological:  Negative for seizures, syncope, light-headedness and headaches.   Hematological:  Does not bruise/bleed easily.   Psychiatric/Behavioral:  Negative for agitation and suicidal ideas.    Objective:     Vital Signs (Most Recent):  Temp: 98.1 °F (36.7 °C) (04/11/22 0717)  Pulse: 73 (04/11/22 0717)  Resp: 18 (04/11/22 0717)  BP: (!) 150/59 (04/11/22 0717)  SpO2: (!) 93 % (04/11/22 0717)   Vital Signs (24h Range):  Temp:  [97.3 °F (36.3 °C)-98.4 °F (36.9 °C)] 98.1 °F (36.7 °C)  Pulse:  [73-90] 73  Resp:  [16-20] 18  SpO2:  [92 %-99 %] 93 %  BP: (146-164)/(59-70) 150/59     Weight: 77.8 kg (171 lb 8.3 oz)  Body mass index is 26.86 kg/m².    Intake/Output Summary (Last 24 hours) at 4/11/2022 1003  Last data filed at 4/11/2022 0300  Gross per 24 hour   Intake --   Output 2 ml   Net -2 ml        Physical Exam  Constitutional:       Appearance: Normal appearance.   HENT:      Head: Atraumatic.      Mouth/Throat:      Mouth: Mucous membranes  are moist.      Pharynx: Oropharynx is clear.   Eyes:      Conjunctiva/sclera: Conjunctivae normal.      Pupils: Pupils are equal, round, and reactive to light.      Comments: Decreased vision right eye   Cardiovascular:      Rate and Rhythm: Normal rate and regular rhythm.      Pulses: Normal pulses.      Heart sounds: Normal heart sounds.   Pulmonary:      Effort: Pulmonary effort is normal.      Breath sounds: Normal breath sounds.   Abdominal:      General: Abdomen is flat. Bowel sounds are normal.      Palpations: Abdomen is soft.   Musculoskeletal:         General: Normal range of motion.   Skin:     General: Skin is warm and dry.      Capillary Refill: Capillary refill takes 2 to 3 seconds.      Coloration: Skin is not jaundiced or pale.      Findings: No bruising, lesion or rash.   Neurological:      General: No focal deficit present.      Mental Status: She is alert and oriented to person, place, and time.   Psychiatric:         Mood and Affect: Mood normal.       Significant Labs: All pertinent labs within the past 24 hours have been reviewed.    Significant Imaging: I have reviewed all pertinent imaging results/findings within the past 24 hours.

## 2022-04-11 NOTE — PLAN OF CARE
SS rec'd consult for LTAC. Spoke with Tammie at [facility] who states pt does not have enough qualifying factors for ins approval for LTAC. Spoke with Dr. Conrad about swing bed, informed him will need pt/ot eval.

## 2022-04-11 NOTE — PLAN OF CARE
Patient stable. No distress noted.   Problem: Adult Inpatient Plan of Care  Goal: Plan of Care Review  Outcome: Ongoing, Progressing  Goal: Patient-Specific Goal (Individualized)  Outcome: Ongoing, Progressing  Goal: Absence of Hospital-Acquired Illness or Injury  Outcome: Ongoing, Progressing  Goal: Optimal Comfort and Wellbeing  Outcome: Ongoing, Progressing  Goal: Readiness for Transition of Care  Outcome: Ongoing, Progressing     Problem: Diabetes Comorbidity  Goal: Blood Glucose Level Within Targeted Range  Outcome: Ongoing, Progressing     Problem: Fall Injury Risk  Goal: Absence of Fall and Fall-Related Injury  Outcome: Ongoing, Progressing     Problem: Fluid and Electrolyte Imbalance (Acute Kidney Injury/Impairment)  Goal: Fluid and Electrolyte Balance  Outcome: Ongoing, Progressing     Problem: Oral Intake Inadequate (Acute Kidney Injury/Impairment)  Goal: Optimal Nutrition Intake  Outcome: Ongoing, Progressing     Problem: Renal Function Impairment (Acute Kidney Injury/Impairment)  Goal: Effective Renal Function  Outcome: Ongoing, Progressing     Problem: Skin Injury Risk Increased  Goal: Skin Health and Integrity  Outcome: Ongoing, Progressing

## 2022-04-11 NOTE — PLAN OF CARE
Problem: Adult Inpatient Plan of Care  Goal: Plan of Care Review  Outcome: Ongoing, Progressing  Goal: Patient-Specific Goal (Individualized)  Outcome: Ongoing, Progressing  Goal: Absence of Hospital-Acquired Illness or Injury  Outcome: Ongoing, Progressing  Goal: Optimal Comfort and Wellbeing  Outcome: Ongoing, Progressing  Goal: Readiness for Transition of Care  Outcome: Ongoing, Progressing     Problem: Fall Injury Risk  Goal: Absence of Fall and Fall-Related Injury  Outcome: Ongoing, Progressing     Problem: Oral Intake Inadequate (Acute Kidney Injury/Impairment)  Goal: Optimal Nutrition Intake  Outcome: Ongoing, Progressing     Problem: Skin Injury Risk Increased  Goal: Skin Health and Integrity  Outcome: Ongoing, Progressing

## 2022-04-11 NOTE — NURSING
Pt. Lying in bed awake. Resp even and unlabored. No complaints voiced. No distress noted. Safety measures ongoing.

## 2022-04-12 PROBLEM — R19.7 DIARRHEA: Status: RESOLVED | Noted: 2022-01-01 | Resolved: 2022-01-01

## 2022-04-12 NOTE — SUBJECTIVE & OBJECTIVE
Interval History:  She is alert.  She denies SOB.  No GI symptoms today    Review of patient's allergies indicates:  No Known Allergies  Current Facility-Administered Medications   Medication Frequency    0.9%  NaCl infusion (for blood administration) Q24H PRN    albuterol-ipratropium 2.5 mg-0.5 mg/3 mL nebulizer solution 3 mL Q6H PRN    allopurinoL tablet 100 mg Daily    amLODIPine tablet 5 mg Daily    aspirin EC tablet 81 mg Daily    atorvastatin tablet 40 mg QHS    budesonide nebulizer solution 0.5 mg Q12H    cyanocobalamin tablet 100 mcg Daily    dextrose 10% bolus 125 mL PRN    gabapentin capsule 100 mg BID    glucagon (human recombinant) injection 1 mg PRN    glucose chewable tablet 16 g PRN    glucose chewable tablet 24 g PRN    hydrALAZINE tablet 50 mg Q8H    HYDROcodone-acetaminophen  mg per tablet 1 tablet Q6H PRN    insulin aspart U-100 injection 0-5 Units QID (AC + HS) PRN    pantoprazole EC tablet 40 mg BID    sodium bicarbonate 150 mEq in dextrose 5 % 1,000 mL infusion Continuous       Objective:     Vital Signs (Most Recent):  Temp: 97.6 °F (36.4 °C) (04/12/22 1111)  Pulse: 95 (04/12/22 1111)  Resp: 18 (04/12/22 1111)  BP: 127/75 (04/12/22 1111)  SpO2: 96 % (04/12/22 1111)  O2 Device (Oxygen Therapy): room air (04/12/22 0710) Vital Signs (24h Range):  Temp:  [97.6 °F (36.4 °C)-98 °F (36.7 °C)] 97.6 °F (36.4 °C)  Pulse:  [79-95] 95  Resp:  [18-20] 18  SpO2:  [91 %-96 %] 96 %  BP: (122-166)/(54-77) 127/75     Weight: 77.8 kg (171 lb 8.3 oz) (04/12/22 0455)  Body mass index is 26.86 kg/m².  Body surface area is 1.92 meters squared.    I/O last 3 completed shifts:  In: 5426.7 [I.V.:5426.7]  Out: 627 [Urine:627]    Physical Exam  Constitutional:       General: She is not in acute distress.  HENT:      Head: Normocephalic and atraumatic.      Mouth/Throat:      Mouth: Mucous membranes are dry.   Eyes:      Pupils: Pupils are equal, round, and reactive to light.   Cardiovascular:      Rate and Rhythm:  Normal rate and regular rhythm.      Heart sounds: No murmur heard.    No friction rub. No gallop.   Pulmonary:      Effort: No respiratory distress.      Breath sounds: Normal breath sounds.   Abdominal:      General: Bowel sounds are normal.      Tenderness: There is no abdominal tenderness. There is no guarding.   Musculoskeletal:      Right lower leg: No edema.      Left lower leg: No edema.   Neurological:      Mental Status: She is alert and oriented to person, place, and time.   Psychiatric:         Behavior: Behavior normal.       Significant Labs:  BMP:   Recent Labs   Lab 04/08/22  0442 04/09/22  0544 04/12/22  0311   GLU 61*   < > 109*      < > 140   K 4.2   < > 3.5   *   < > 103   CO2 14*   < > 26   *   < > 87*   CREATININE 6.31*   < > 6.52*   CALCIUM 6.6*   < > 6.8*   MG 1.8  --   --     < > = values in this interval not displayed.        Significant Imaging:

## 2022-04-12 NOTE — HOSPITAL COURSE
Mrs. Ray presented to [facility] ED with confusion and shaking. She sees Dr. Fields for her CKD4 and her baseline creatinine is between 3-4; however on admission it was 7 and BUN was 111. She was also noted to have a Hgb of 8 but likely related to renal disease. Her blood glucose was also noted to be in the 40s. She was only taking glipizide at home but it was long acting and likely half life was extended due to worsening renal function causing hypoglycemia. Nephrology was consulted and noted that it was likely worsening renal function secondary to volume depletion as patient had been experiencing diarrhea for the last month or so. Her C-Diff was negative and with hydration her sCr has improved to 6. She was transfused 1 u PRBCs r/t anemia; however, H&H is now stable and hemoccult was negative. She will be sent home with sitagliptin PO for her diabetes and will follow up with Dr. Richardson, her PCP on 4/18/22 with a recheck BMP on 4/14/22. She will be sent home with bicarb PO daily for 10 days until her follow up with Dr. Fields.

## 2022-04-12 NOTE — PROGRESS NOTES
[address]  Nephrology  Progress Note    Patient Name: Bria Ray  MRN: [id number]  Admission Date: 4/7/2022  Hospital Length of Stay: 4 days  Attending Provider: Te Conrad MD   Primary Care Physician: Lorena Richardson MD  Principal Problem:Acute renal failure superimposed on stage 4 chronic kidney disease    Subjective:     HPI: 86-year-old woman with chronic renal failure secondary to diabetes.  She was admitted with hypoglycemia.  She reports diarrhea for several days prior to admission.  Recent baseline creatinine 4.0.  Renal function noted to be worse on presentation.  She denies dysuria.  No symptoms of volume overload.      Interval History:  She denies SOB.  Appetite is poor but no nausea.  No dysuria.    Review of patient's allergies indicates:  No Known Allergies  Current Facility-Administered Medications   Medication Frequency    0.9%  NaCl infusion (for blood administration) Q24H PRN    albuterol-ipratropium 2.5 mg-0.5 mg/3 mL nebulizer solution 3 mL Q6H PRN    allopurinoL tablet 100 mg Daily    amLODIPine tablet 5 mg Daily    aspirin EC tablet 81 mg Daily    atorvastatin tablet 40 mg QHS    budesonide nebulizer solution 0.5 mg Q12H    cyanocobalamin tablet 100 mcg Daily    dextrose 10% bolus 125 mL PRN    gabapentin capsule 100 mg BID    glucagon (human recombinant) injection 1 mg PRN    glucose chewable tablet 16 g PRN    glucose chewable tablet 24 g PRN    hydrALAZINE tablet 50 mg Q8H    HYDROcodone-acetaminophen  mg per tablet 1 tablet Q6H PRN    insulin aspart U-100 injection 0-5 Units QID (AC + HS) PRN    pantoprazole EC tablet 40 mg BID    sodium bicarbonate 150 mEq in dextrose 5 % 1,000 mL infusion Continuous       Objective:     Vital Signs (Most Recent):  Temp: 97.6 °F (36.4 °C) (04/11/22 1930)  Pulse: 80 (04/11/22 2044)  Resp: 18 (04/11/22 2044)  BP: (!) 166/67 (04/11/22 1930)  SpO2: 95 % (04/11/22 2044)  O2 Device (Oxygen  Therapy): room air (04/11/22 2044)   Vital Signs (24h Range):  Temp:  [97.6 °F (36.4 °C)-98.2 °F (36.8 °C)] 97.6 °F (36.4 °C)  Pulse:  [73-95] 80  Resp:  [16-19] 18  SpO2:  [93 %-95 %] 95 %  BP: (143-166)/(59-77) 166/67     Weight: 77.8 kg (171 lb 8.3 oz) (04/08/22 0400)  Body mass index is 26.86 kg/m².  Body surface area is 1.92 meters squared.    I/O last 3 completed shifts:  In: -   Out: 352 [Urine:352]    Physical Exam  Constitutional:       General: She is not in acute distress.  HENT:      Head: Normocephalic and atraumatic.      Mouth/Throat:      Mouth: Mucous membranes are dry.   Eyes:      Pupils: Pupils are equal, round, and reactive to light.   Cardiovascular:      Rate and Rhythm: Normal rate and regular rhythm.      Heart sounds: No murmur heard.    No friction rub. No gallop.   Pulmonary:      Effort: No respiratory distress.      Breath sounds: Normal breath sounds.   Abdominal:      General: Bowel sounds are normal.      Tenderness: There is no abdominal tenderness. There is no guarding.   Musculoskeletal:      Right lower leg: No edema.      Left lower leg: No edema.   Neurological:      Mental Status: She is alert and oriented to person, place, and time.   Psychiatric:         Behavior: Behavior normal.       Significant Labs:  BMP:   Recent Labs   Lab 04/08/22  0442 04/09/22  0544 04/11/22  1031   GLU 61*   < > 241*      < > 143   K 4.2   < > 3.7   *   < > 105   CO2 14*   < > 20*   *   < > 94*   CREATININE 6.31*   < > 6.48*   CALCIUM 6.6*   < > 6.8*   MG 1.8  --   --     < > = values in this interval not displayed.     CBC:   Recent Labs   Lab 04/11/22  1031   WBC 6.63   RBC 2.65*   HGB 8.0*   HCT 25.4*   *   MCV 95.8   MCH 30.2   MCHC 31.5*        Significant Imaging:      Assessment/Plan:     * Acute renal failure superimposed on stage 4 chronic kidney disease  02/08/2022:  Creatinine 4.0 BUN 67  Acute worsening of renal function secondary to volume depletion.     Creatinine is slightly better today.  Metabolic acidosis improved    Type 2 diabetes mellitus  Hypoglycemia resolved    Anemia of chronic renal failure   transfused    Nephrolithiasis  No recent stones or hematuria    Essential hypertension  Improved        Thank you for your consult.     Beni Fields MD  Nephrology  [facility] - [address] [place] [address]

## 2022-04-12 NOTE — SUBJECTIVE & OBJECTIVE
Interval History:  She denies SOB.  Appetite is poor but no nausea.  No dysuria.    Review of patient's allergies indicates:  No Known Allergies  Current Facility-Administered Medications   Medication Frequency    0.9%  NaCl infusion (for blood administration) Q24H PRN    albuterol-ipratropium 2.5 mg-0.5 mg/3 mL nebulizer solution 3 mL Q6H PRN    allopurinoL tablet 100 mg Daily    amLODIPine tablet 5 mg Daily    aspirin EC tablet 81 mg Daily    atorvastatin tablet 40 mg QHS    budesonide nebulizer solution 0.5 mg Q12H    cyanocobalamin tablet 100 mcg Daily    dextrose 10% bolus 125 mL PRN    gabapentin capsule 100 mg BID    glucagon (human recombinant) injection 1 mg PRN    glucose chewable tablet 16 g PRN    glucose chewable tablet 24 g PRN    hydrALAZINE tablet 50 mg Q8H    HYDROcodone-acetaminophen  mg per tablet 1 tablet Q6H PRN    insulin aspart U-100 injection 0-5 Units QID (AC + HS) PRN    pantoprazole EC tablet 40 mg BID    sodium bicarbonate 150 mEq in dextrose 5 % 1,000 mL infusion Continuous       Objective:     Vital Signs (Most Recent):  Temp: 97.6 °F (36.4 °C) (04/11/22 1930)  Pulse: 80 (04/11/22 2044)  Resp: 18 (04/11/22 2044)  BP: (!) 166/67 (04/11/22 1930)  SpO2: 95 % (04/11/22 2044)  O2 Device (Oxygen Therapy): room air (04/11/22 2044)   Vital Signs (24h Range):  Temp:  [97.6 °F (36.4 °C)-98.2 °F (36.8 °C)] 97.6 °F (36.4 °C)  Pulse:  [73-95] 80  Resp:  [16-19] 18  SpO2:  [93 %-95 %] 95 %  BP: (143-166)/(59-77) 166/67     Weight: 77.8 kg (171 lb 8.3 oz) (04/08/22 0400)  Body mass index is 26.86 kg/m².  Body surface area is 1.92 meters squared.    I/O last 3 completed shifts:  In: -   Out: 352 [Urine:352]    Physical Exam  Constitutional:       General: She is not in acute distress.  HENT:      Head: Normocephalic and atraumatic.      Mouth/Throat:      Mouth: Mucous membranes are dry.   Eyes:      Pupils: Pupils are equal, round, and reactive to light.   Cardiovascular:      Rate and Rhythm:  Normal rate and regular rhythm.      Heart sounds: No murmur heard.    No friction rub. No gallop.   Pulmonary:      Effort: No respiratory distress.      Breath sounds: Normal breath sounds.   Abdominal:      General: Bowel sounds are normal.      Tenderness: There is no abdominal tenderness. There is no guarding.   Musculoskeletal:      Right lower leg: No edema.      Left lower leg: No edema.   Neurological:      Mental Status: She is alert and oriented to person, place, and time.   Psychiatric:         Behavior: Behavior normal.       Significant Labs:  BMP:   Recent Labs   Lab 04/08/22  0442 04/09/22  0544 04/11/22  1031   GLU 61*   < > 241*      < > 143   K 4.2   < > 3.7   *   < > 105   CO2 14*   < > 20*   *   < > 94*   CREATININE 6.31*   < > 6.48*   CALCIUM 6.6*   < > 6.8*   MG 1.8  --   --     < > = values in this interval not displayed.     CBC:   Recent Labs   Lab 04/11/22  1031   WBC 6.63   RBC 2.65*   HGB 8.0*   HCT 25.4*   *   MCV 95.8   MCH 30.2   MCHC 31.5*        Significant Imaging:

## 2022-04-12 NOTE — ASSESSMENT & PLAN NOTE
02/08/2022:  Creatinine 4.0 BUN 67  Acute worsening of renal function secondary to volume depletion.    Creatinine is slightly better today.  Metabolic acidosis improved

## 2022-04-12 NOTE — PT/OT/SLP PROGRESS
"Occupational Therapy   Treatment    Name: Bria Ray  MRN: [id number]  Admitting Diagnosis:  Acute renal failure superimposed on stage 4 chronic kidney disease       Recommendations:     Discharge Recommendations: nursing facility, skilled, rehabilitation facility  Discharge Equipment Recommendations:  none  Barriers to discharge:  None    Assessment:     Bria Ray is a 86 y.o. female with a medical diagnosis of Acute renal failure superimposed on stage 4 chronic kidney disease.  She presents with weakness. Performance deficits affecting function are weakness, impaired endurance, impaired self care skills, impaired functional mobilty, impaired balance, decreased lower extremity function, decreased upper extremity function, decreased safety awareness.     Rehab Prognosis:  Fair; patient would benefit from acute skilled OT services to address these deficits and reach maximum level of function.   Patient states she frequently falls at home with no warning when legs "give way." Patient will need supervision at home for safety.     Plan:     Patient to be seen 5 x/week to address the above listed problems via self-care/home management, therapeutic activities, therapeutic exercises  · Plan of Care Expires: 05/11/22  · Plan of Care Reviewed with: patient    Subjective     Pain/Comfort:  · Pain Rating 1: 0/10  · Pain Rating Post-Intervention 1: 0/10    Objective:     Communicated with: Nurse prior to session.  Patient found supine with peripheral IV upon OT entry to room.    General Precautions: Standard, fall   Orthopedic Precautions:N/A   Braces: N/A  Respiratory Status: Room air     Occupational Performance:     Bed Mobility:    · Patient completed Rolling/Turning to Left with  independence  · Patient completed Rolling/Turning to Right with independence  · Patient completed Scooting/Bridging with independence  · Patient completed Supine to Sit with independence  · Patient completed Sit to Supine with independence "     Functional Mobility/Transfers:  · Patient completed Sit <> Stand Transfer with modified independence  with  rolling walker   · Patient completed Bed <> Chair Transfer using Step Transfer technique with modified independence with rolling walker  · Functional Mobility: Patient transferred bed to chair with no difficulty today. Patient instructed to ALWAYS have RW near and use it if discharges to home today. Patient has difficulty with turning during transfers but does not always use RW. Patient educated on the importance of utilizing DME prescribed by the physical therapist.     Activities of Daily Living:  · Grooming: stand by assistance to perform grooming hygiene  · Upper Body Dressing: minimum assistance to [facility] gown  · Lower Body Dressing: maximal assistance to barry socks      [facility] 6 Click ADL: 22    Treatment & Education:  Pt performed B UE strengthening exercises to include:   Shoulder flexion   Chest press    Elbow flexion   Elbow extension   Pectoral stretches   Bilateral rowing  All exercises performed 3x10 reps using 2# dowel and red theraband.    Patient left up in chair with all lines intact, call button in reach and student nurse presentEducation:      GOALS:   Multidisciplinary Problems     Occupational Therapy Goals        Problem: Occupational Therapy    Goal Priority Disciplines Outcome Interventions   Occupational Therapy Goal     OT, PT/OT Ongoing, Progressing    Description: Patient will perform UE HEP to maintain muscle strength B Ue's.                   Time Tracking:     OT Date of Treatment:    OT Start Time: 0948  OT Stop Time: 1005  OT Total Time (min): 17 min    Billable Minutes:Therapeutic Exercise 15 min    LOS [place]/L, CSRS  OT/KEARA: OT          4/12/2022

## 2022-04-12 NOTE — PT/OT/SLP EVAL
Physical Therapy Evaluation    Patient Name:  Bria Ray   MRN:  [id number]    Recommendations:     Discharge Recommendations:  home with home health   Discharge Equipment Recommendations: none   Barriers to discharge: None    Assessment:     Bria Ray is a 86 y.o. female admitted with a medical diagnosis of Acute renal failure superimposed on stage 4 chronic kidney disease.  She presents with the following impairments/functional limitations:  gait instability Patient appears to be close to baseline functionally. Uses walker at home and needs to continue to use it to assist in fall prevention. Okay to go home from PT standpoint.    Rehab Prognosis: Fair; patient would benefit from acute skilled PT services to address these deficits and reach maximum level of function.    Recent Surgery: * No surgery found *      Plan:     During this hospitalization, patient to be seen 1 x/week to address the identified rehab impairments via gait training and progress toward the following goals:    · Plan of Care Expires:       Subjective     Chief Complaint: generalized weakness  Patient/Family Comments/goals: Patient with history of falls  Pain/Comfort:  · Pain Rating 1: 0/10    Patients cultural, spiritual, Gnosticism conflicts given the current situation: no    Living Environment:  Lives with son  Prior to admission, patients level of function was household ambulation with walker.  Equipment used at home: rollator, walker, rolling.  DME owned (not currently used): rolling walker.  Upon discharge, patient will have assistance from son.    Objective:     Communicated with nurse prior to session.  Patient found supine with peripheral IV  upon PT entry to room.    General Precautions: Standard, fall   Orthopedic Precautions:    Braces:    Respiratory Status: Room air    Exams:  · RLE ROM: WFL  · RLE Strength: 4/5  · LLE ROM: WFL  · LLE Strength: 4/5    Functional Mobility:  · Bed Mobility:     · Supine to Sit: contact guard  assistance  · Sit to Supine: contact guard assistance  · Transfers:     · Sit to Stand:  contact guard assistance with rolling walker  · Gait: ambulated 50 feet with rolling walker cga, no lob  · Balance: fair    Therapeutic Activities and Exercises:   sat up in chair post ambulation    AM-PAC 6 CLICK MOBILITY  Total Score:22     Patient left up in chair with call button in reach.    GOALS:   Multidisciplinary Problems     Physical Therapy Goals        Problem: Physical Therapy    Goal Priority Disciplines Outcome Goal Variances Interventions   Physical Therapy Goal     PT, PT/OT                      History:     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

## 2022-04-12 NOTE — PROGRESS NOTES
[address]  Nephrology  Progress Note    Patient Name: Bria Ray  MRN: [id number]  Admission Date: 4/7/2022  Hospital Length of Stay: 5 days  Attending Provider: Te Conrad MD   Primary Care Physician: Lorena Richardson MD  Principal Problem:Acute renal failure superimposed on stage 4 chronic kidney disease    Subjective:     HPI: 86-year-old woman with chronic renal failure secondary to diabetes.  She was admitted with hypoglycemia.  She reports diarrhea for several days prior to admission.  Recent baseline creatinine 4.0.  Renal function noted to be worse on presentation.  She denies dysuria.  No symptoms of volume overload.      Interval History:  She is alert.  She denies SOB.  No GI symptoms today    Review of patient's allergies indicates:  No Known Allergies  Current Facility-Administered Medications   Medication Frequency    0.9%  NaCl infusion (for blood administration) Q24H PRN    albuterol-ipratropium 2.5 mg-0.5 mg/3 mL nebulizer solution 3 mL Q6H PRN    allopurinoL tablet 100 mg Daily    amLODIPine tablet 5 mg Daily    aspirin EC tablet 81 mg Daily    atorvastatin tablet 40 mg QHS    budesonide nebulizer solution 0.5 mg Q12H    cyanocobalamin tablet 100 mcg Daily    dextrose 10% bolus 125 mL PRN    gabapentin capsule 100 mg BID    glucagon (human recombinant) injection 1 mg PRN    glucose chewable tablet 16 g PRN    glucose chewable tablet 24 g PRN    hydrALAZINE tablet 50 mg Q8H    HYDROcodone-acetaminophen  mg per tablet 1 tablet Q6H PRN    insulin aspart U-100 injection 0-5 Units QID (AC + HS) PRN    pantoprazole EC tablet 40 mg BID    sodium bicarbonate 150 mEq in dextrose 5 % 1,000 mL infusion Continuous       Objective:     Vital Signs (Most Recent):  Temp: 97.6 °F (36.4 °C) (04/12/22 1111)  Pulse: 95 (04/12/22 1111)  Resp: 18 (04/12/22 1111)  BP: 127/75 (04/12/22 1111)  SpO2: 96 % (04/12/22 1111)  O2 Device (Oxygen Therapy): room  air (04/12/22 0710) Vital Signs (24h Range):  Temp:  [97.6 °F (36.4 °C)-98 °F (36.7 °C)] 97.6 °F (36.4 °C)  Pulse:  [79-95] 95  Resp:  [18-20] 18  SpO2:  [91 %-96 %] 96 %  BP: (122-166)/(54-77) 127/75     Weight: 77.8 kg (171 lb 8.3 oz) (04/12/22 0455)  Body mass index is 26.86 kg/m².  Body surface area is 1.92 meters squared.    I/O last 3 completed shifts:  In: 5426.7 [I.V.:5426.7]  Out: 627 [Urine:627]    Physical Exam  Constitutional:       General: She is not in acute distress.  HENT:      Head: Normocephalic and atraumatic.      Mouth/Throat:      Mouth: Mucous membranes are dry.   Eyes:      Pupils: Pupils are equal, round, and reactive to light.   Cardiovascular:      Rate and Rhythm: Normal rate and regular rhythm.      Heart sounds: No murmur heard.    No friction rub. No gallop.   Pulmonary:      Effort: No respiratory distress.      Breath sounds: Normal breath sounds.   Abdominal:      General: Bowel sounds are normal.      Tenderness: There is no abdominal tenderness. There is no guarding.   Musculoskeletal:      Right lower leg: No edema.      Left lower leg: No edema.   Neurological:      Mental Status: She is alert and oriented to person, place, and time.   Psychiatric:         Behavior: Behavior normal.       Significant Labs:  BMP:   Recent Labs   Lab 04/08/22  0442 04/09/22  0544 04/12/22  0311   GLU 61*   < > 109*      < > 140   K 4.2   < > 3.5   *   < > 103   CO2 14*   < > 26   *   < > 87*   CREATININE 6.31*   < > 6.52*   CALCIUM 6.6*   < > 6.8*   MG 1.8  --   --     < > = values in this interval not displayed.        Significant Imaging:      Assessment/Plan:     * Acute renal failure superimposed on stage 4 chronic kidney disease  02/08/2022:  Creatinine 4.0 BUN 67  Acute worsening of renal function secondary to volume depletion.    Creatinine is slightly better today.  BMP has been ordered through [facility] for 04/14/2022    Type 2 diabetes mellitus  Hypoglycemia  resolved    Nephrolithiasis  No recent stones or hematuria    Essential hypertension  Controlled        Thank you for your consult.     Beni Fields MD  Nephrology  [facility] - [address] [place] [address]

## 2022-04-12 NOTE — DISCHARGE SUMMARY
[place] [facility] - 6 [facility] Medicine  Discharge Summary      Patient Name: Bria Ray  MRN: [id number]  Patient Class: IP- Inpatient  Admission Date: 4/7/2022  Hospital Length of Stay: 5 days  Discharge Date and Time:  04/12/2022 1:07 PM  Attending Physician: Te Conrad MD   Discharging Provider: BELA Christie  Primary Care Provider: Lorena Richardson MD      HPI:   87 yo patient presents to the [facility] ED this am with confusion and shaking.  She called her granddaughter and states that her hands were shaking so badly she most likely had just hit redial.  She had meant to call her son who is present with her and checks on her every morning and takes her breakfast.  He lives in a house down the road and she lives alone.  Her BS was 40 when she arrived at the ED and responded well to dextrose.      Patient sees Dr. Fields for her CKD4 and her baseline creat is between 3 to 4.  Today it is 7 and her BUN is 111.  She states that she has had some loose stools over the past month.  She says they are several times a day and that she has been wearing adult diapers day and night because when she has to have a BM she goes immediately.  She knows when she has to have a BM and is not incontinent, she just cannot get to the bathroom in time.  She has had to change her bed several times this past month at night. Her UA had some protein and WBC but also was contaminated with epithelium and has no nitrite.      Patient reports no melena or hematechezia but has hgb of 8.  Could be from renal disease.  Her BP is elevated but no CP, SOB or other complaints.  Patient transferred for nephrology evaluation.  Patient did have some hemoptysis with cough last year and had bronchoscopy with Dr. Goodman and was treated for bronchitis and improved.  She states that she saw Dr. Bautista in clinic and was started on lasix but she states Dr. Fields took her off and told her not to take lasix anymore.   She has continued to take MDI.        * No surgery found *      Hospital Course:   Mrs. Ray presented to [facility] ED with confusion and shaking. She sees Dr. Fields for her CKD4 and her baseline creatinine is between 3-4; however on admission it was 7 and BUN was 111. She was also noted to have a Hgb of 8 but likely related to renal disease. Her blood glucose was also noted to be in the 40s. She was only taking glipizide at home but it was long acting and likely half life was extended due to worsening renal function causing hypoglycemia. Nephrology was consulted and noted that it was likely worsening renal function secondary to volume depletion as patient had been experiencing diarrhea for the last month or so. Her C-Diff was negative and with hydration her sCr has improved to 6. She was transfused 1 u PRBCs r/t anemia; however, H&H is now stable and hemoccult was negative. She will be sent home with sitagliptin PO for her diabetes and will follow up with Dr. Richardson, her PCP on 4/18/22 with a recheck BMP on 4/14/22. She will be sent home with bicarb PO daily for 10 days until her follow up with Dr. Fields. Discharge medications were discussed and reviewed with Dr. Duran.       Goals of Care Treatment Preferences:  Code Status: Full Code      Consults:   Consults (From admission, onward)        Status Ordering Provider     Inpatient consult to Social Work  Once        Provider:  (Not yet assigned)    Completed GRAZYNA BRAND     Inpatient consult to Social Work  Once        Provider:  (Not yet assigned)    Completed NINA DURAN     Inpatient consult to Nephrology  Once        Provider:  (Not yet assigned)    OUMOU Oliveira          No new Assessment & Plan notes have been filed under this hospital service since the last note was generated.  Service: Hospital Medicine    Final Active Diagnoses:    Diagnosis Date Noted POA    PRINCIPAL PROBLEM:  Acute renal failure superimposed on  stage 4 chronic kidney disease [N17.9, N18.4]  Yes    Anemia of chronic renal failure [N18.9, D63.1]  Yes    COPD (chronic obstructive pulmonary disease) [J44.9]  Yes    Type 2 diabetes mellitus [E11.9]  Yes    Arthritis [M19.90] 01/18/2022 Yes    Essential hypertension [I10] 01/18/2022 Yes    Nephrolithiasis [N20.0] 01/18/2022 Yes      Problems Resolved During this Admission:    Diagnosis Date Noted Date Resolved POA    Diarrhea [R19.7] 04/08/2022 04/12/2022 Yes       Discharged Condition: stable    Disposition: Home-Health Care [facility]    Follow Up:   Follow-up Information     Lorena Richardson MD Follow up on 4/18/2022.    Specialty: Family Medicine  Why: Hospital follow up acute renal failure superimposed on stage 4 CKD; 9:45 AM    (Lab work on Thursday 04/14/22)  Contact information:  [facility] [id number] [phone]             Beni Fields MD Follow up on 5/9/2022.    Specialty: Nephrology  Why: Hospital follow up acute renal failure superimposed on stage 4 CKD, will need repeat BMP prior to office visit.  3:00 pm  Contact information:  [address] [address]  [phone]                       Patient Instructions:      Diet diabetic     Notify your health care provider if you experience any of the following:  difficulty breathing or increased cough     Notify your health care provider if you experience any of the following:  severe persistent headache     Notify your health care provider if you experience any of the following:  persistent dizziness, light-headedness, or visual disturbances     Notify your health care provider if you experience any of the following:  increased confusion or weakness     Notify your health care provider if you experience any of the following:  persistent nausea and vomiting or diarrhea     Activity as tolerated       Significant Diagnostic Studies: Labs:   BMP:   Recent Labs   Lab 04/11/22  1031 04/12/22  0311   * 109*   NA  143 140   K 3.7 3.5    103   CO2 20* 26   BUN 94* 87*   CREATININE 6.48* 6.52*   CALCIUM 6.8* 6.8*   , CBC   Recent Labs   Lab 04/11/22  1031 04/12/22  0311   WBC 6.63 5.73   HGB 8.0* 7.2*   HCT 25.4* 22.2*   * 109*    and All labs within the past 24 hours have been reviewed  Microbiology: C.Diff negative    CT Head Without Contrast  Order: [id number]   Status: Final result     Visible to patient: No (inaccessible in Patient Portal)     Next appt: 04/18/2022 at 09:45 AM in Primary Care (Lorena Richardson MD)     0 Result Notes    Details    Reading Physician Reading Date Result Priority   Derek Suarez MD  [phone] 4/7/2022 STAT     Narrative & Impression  EXAMINATION:  CT HEAD WITHOUT CONTRAST     CLINICAL HISTORY:  Mental status change, unknown cause;     TECHNIQUE:  Low dose axial images were obtained through the head.  Coronal and sagittal reformations were also performed. Contrast was not administered.     COMPARISON:  None.     FINDINGS:  No acute intracranial hemorrhage identified.  There is global parenchymal volume loss mildly and a mild-to-moderate degree of chronic microvascular ischemic change present.  No large vessel acute infarct identified.  Vascular calcifications.  Calvarium intact.  Mastoid air cells and paranasal sinuses clear.     Impression:     No acute intracranial abnormality.        Electronically signed by: Derek Suarez  Date:                                            04/07/2022  Time:                                           10:57             Exam Ended: 04/07/22 10:56 Last Resulted: 04/07/22 10:57           X-Ray Chest AP Portable  Order: [id number]   Status: Final result     Visible to patient: No (inaccessible in Patient Portal)     Next appt: 04/18/2022 at 09:45 AM in Primary Care (Lorena Richardson MD)     0 Result Notes    Details    Reading Physician Reading Date Result Priority   Derek Suarez MD  [phone] 4/7/2022 STAT     Narrative &  Impression  EXAMINATION:  XR CHEST AP PORTABLE     CLINICAL HISTORY:  hypogly;     TECHNIQUE:  Single frontal view of the chest was performed.     COMPARISON:  3/10/2022     FINDINGS:  Heart size normal. Lungs clear. No pneumothorax or pleural effusion.     Impression:     No acute findings.        Electronically signed by: Derek Suarez  Date:                                            04/07/2022  Time:                                           12:19             Exam Ended: 04/07/22 12:17 Last Resulted: 04/07/22 12:19           Pending Diagnostic Studies:     Procedure Component Value Units Date/Time    EXTRA TUBES [[id number]]     Order Status: Sent Lab Status: No result     Specimen: Blood, Venous     Narrative:      The following orders were created for panel order EXTRA TUBES.  Procedure                               Abnormality         Status                     ---------                               -----------         ------                     Lavender Top Hold[[id number]]                                                             Please view results for these tests on the individual orders.    EXTRA TUBES [[id number]]     Order Status: Sent Lab Status: No result     Specimen: Blood, Venous     Narrative:      The following orders were created for panel order EXTRA TUBES.  Procedure                               Abnormality         Status                     ---------                               -----------         ------                     Red Top Hold[[id number]]                                                                Light Green Top Hold[[id number]]                                                        Lavender Top Hold[[id number]]                                                             Please view results for these tests on the individual orders.    Lavender Top Hold [[id number]]     Order Status: Sent Lab Status: No result     Specimen: Blood, Venous     Lavender Top Hold [[id number]]     Order  Status: Sent Lab Status: No result     Specimen: Blood, Venous     Red Top Hold [[id number]]     Order Status: Sent Lab Status: No result     Specimen: Blood, Venous          Medications:  Reconciled Home Medications:      Medication List      START taking these medications    calcium carbonate 200 mg calcium (500 mg) chewable tablet  Commonly known as: TUMS  Take 1 tablet (500 mg total) by mouth once daily.     hydrALAZINE 50 MG tablet  Commonly known as: APRESOLINE  Take 1 tablet (50 mg total) by mouth every 8 (eight) hours.     SITagliptin 25 MG Tab  Commonly known as: JANUVIA  Take 1 tablet (25 mg total) by mouth once daily.     sodium bicarbonate 325 MG tablet  Take 1 tablet (325 mg total) by mouth once daily.        CHANGE how you take these medications    cyanocobalamin 100 MCG tablet  Commonly known as: VITAMIN B-12  Take 1 tablet (100 mcg total) by mouth once daily.  Start taking on: April 13, 2022  What changed:   · medication strength  · how much to take     gabapentin 100 MG capsule  Commonly known as: NEURONTIN  Take 1 capsule (100 mg total) by mouth 2 (two) times daily.  What changed:   · medication strength  · how much to take  · when to take this        CONTINUE taking these medications    * albuterol 90 mcg/actuation inhaler  Commonly known as: VENTOLIN HFA  Inhale 2 puffs into the lungs every 6 (six) hours as needed for Wheezing. Rescue     * albuterol 2.5 mg /3 mL (0.083 %) nebulizer solution  Commonly known as: PROVENTIL     allopurinoL 100 MG tablet  Commonly known as: ZYLOPRIM  Take 1 tablet (100 mg total) by mouth once daily.     amLODIPine 5 MG tablet  Commonly known as: NORVASC  Take 1 tablet (5 mg total) by mouth once daily.     aspirin 81 MG EC tablet  Commonly known as: ECOTRIN  Take 1 tablet (81 mg total) by mouth once daily.     budesonide-formoterol 160-4.5 mcg 160-4.5 mcg/actuation Hfaa  Commonly known as: SYMBICORT  Inhale 2 puffs into the lungs every 12 (twelve) hours. Controller      HYDROcodone-acetaminophen  mg per tablet  Commonly known as: NORCO  Take 1 tablet by mouth every 12 (twelve) hours.     metoprolol tartrate 25 MG tablet  Commonly known as: LOPRESSOR  Take 1 tablet (25 mg total) by mouth 2 (two) times daily.     multivitamin with folic acid 400 mcg Tab  Take 1 tablet by mouth once daily.     simvastatin 20 MG tablet  Commonly known as: ZOCOR  Take 1 tablet (20 mg total) by mouth every evening.         * This list has 2 medication(s) that are the same as other medications prescribed for you. Read the directions carefully, and ask your doctor or other care provider to review them with you.            STOP taking these medications    fenofibrate 54 MG tablet  Commonly known as: TRICOR     glipiZIDE 5 MG Tr24  Commonly known as: GLUCOTROL            Indwelling Lines/Drains at time of discharge:   Lines/Drains/Airways     None                 Time spent on the discharge of patient: Greater than 30 minutes         BELA Christie  Department of Hospital Medicine  [address]

## 2022-04-12 NOTE — PLAN OF CARE
SS rec'd consult for HH. Spoke with pt and obtained choices for [facility] or [facility], whichever will accept pt's ins. Referral faxed. SS following.     SS spoke with [facility] who states they can accept pt.

## 2022-04-12 NOTE — ASSESSMENT & PLAN NOTE
02/08/2022:  Creatinine 4.0 BUN 67  Acute worsening of renal function secondary to volume depletion.    Creatinine is slightly better today.  BMP has been ordered through home health for 04/14/2022

## 2022-04-12 NOTE — ASSESSMENT & PLAN NOTE
-Patient to follow up with Dr. Fields, will receive Bicarb once daily until appt in about 10 days

## 2022-04-12 NOTE — PLAN OF CARE
Problem: Adult Inpatient Plan of Care  Goal: Plan of Care Review  Outcome: Ongoing, Progressing  Goal: Patient-Specific Goal (Individualized)  Outcome: Ongoing, Progressing  Goal: Absence of Hospital-Acquired Illness or Injury  Outcome: Ongoing, Progressing  Goal: Optimal Comfort and Wellbeing  Outcome: Ongoing, Progressing  Goal: Readiness for Transition of Care  Outcome: Ongoing, Progressing     Problem: Diabetes Comorbidity  Goal: Blood Glucose Level Within Targeted Range  Outcome: Ongoing, Progressing     Problem: Fall Injury Risk  Goal: Absence of Fall and Fall-Related Injury  Outcome: Ongoing, Progressing     Problem: Oral Intake Inadequate (Acute Kidney Injury/Impairment)  Goal: Optimal Nutrition Intake  Outcome: Ongoing, Progressing     Problem: Fluid and Electrolyte Imbalance (Acute Kidney Injury/Impairment)  Goal: Fluid and Electrolyte Balance  Outcome: Ongoing, Progressing     Problem: Renal Function Impairment (Acute Kidney Injury/Impairment)  Goal: Effective Renal Function  Outcome: Ongoing, Progressing     Problem: Skin Injury Risk Increased  Goal: Skin Health and Integrity  Outcome: Ongoing, Progressing

## 2022-04-13 NOTE — PLAN OF CARE
[facility] - [facility]  Discharge Final Note    Primary Care Provider: Lorena Richardson MD    Expected Discharge Date: 4/12/2022    Final Discharge Note (most recent)     Final Note - 04/13/22 0832        Final Note    Assessment Type Final Discharge Note     Anticipated Discharge Disposition Home-Health Care Svc        Post-Acute Status    Post-Acute Authorization Home Health     Home Health Status Set-up Complete/Auth obtained     Patient choice form signed by patient/caregiver List with quality metrics by geographic area provided;List from CMS Compare;List from System Post-Acute Care     Discharge Delays None known at this time                 Important Message from Medicare  Important Message from Medicare regarding Discharge Appeal Rights: Given to patient/caregiver, Explained to patient/caregiver, Signed/date by patient/caregiver     Date IMM was signed: 04/08/22  Time IMM was signed: 0948    Contact Info     Lorena Richardson MD   Specialty: Family Medicine   Relationship: PCP - General    [facility] [place] [place] [address]   Phone: [phone]       Next Steps: Follow up on 4/18/2022    Instructions: Hospital follow up acute renal failure superimposed on stage 4 CKD; 9:45 AM    (Lab work on Thursday 04/14/22)    Beni Fields MD   Specialty: Nephrology    [address] [place] [address] [address]  [place] [address]   Phone: [phone]       Next Steps: Follow up on 5/9/2022    Instructions: Hospital follow up acute renal failure superimposed on stage 4 CKD, will need repeat BMP prior to office visit.  3:00 pm      pt d/c home 4/12/22 with [facility]. D/c summary faxed. No further needs.

## 2022-04-13 NOTE — TELEPHONE ENCOUNTER
4/13/22-spoke with patient this am. Reports she is doing a little better but still a little weak. States she is using her walker with ambulation. Fall precautions discussed. States her appetite is still not up to par but she ate a pot pie last night and cooked biscuits this am and ate that. States she is drinking fluids. States she has not checked her sugar yet today.She lives alone but states her family members are staying with her until she gets better. States her granddaughter Haven prepared her medications for the next 2 weeks. States they went over the list yesterday and got all of her medications straight. States she knows she picked up her new meds and stopped taking some but not sure what they are. Will follow up with brittny for medication list obtained at visit. Informed of f/u appts with pcp and dr hernandez and she is aware. Instructed to bring all meds to follow up visit and to call office for questions/concerns. Also to seek medical attention for any new or worsening sx. Tami Son    Called [facility] in [place]/ spoke with Michelle. States still pending for HH. Left message with her for the nurse to please fax a copy of the med reconciliation list when they admit [facility]

## 2022-04-18 PROBLEM — E13.9 DIABETES 1.5, MANAGED AS TYPE 2: Status: ACTIVE | Noted: 2022-01-01

## 2022-04-18 NOTE — PROGRESS NOTES
Subjective:       Patient ID: Bria Ray is a 86 y.o. female.    Chief Complaint: Transitional Care (F/u from [facility]. Need a renal panel and results faxed to Dr. Fields.) and Shortness of Breath      Pt. Had a hypoglycemic episode. meds have been changed. Pt. States that she has to bend over her knees to breathe.    Shortness of Breath  Pertinent negatives include no chest pain, fever, headaches or vomiting.     Review of Systems   Constitutional: Negative for fatigue and fever.   HENT: Negative for nasal congestion and dental problem.    Eyes: Negative for discharge.   Respiratory: Positive for shortness of breath. Negative for cough.    Cardiovascular: Negative for chest pain.   Gastrointestinal: Negative for constipation, diarrhea, nausea and vomiting.   Genitourinary: Negative for bladder incontinence, difficulty urinating and hot flashes.   Musculoskeletal: Positive for arthralgias.   Allergic/Immunologic: Negative for environmental allergies.   Neurological: Negative for headaches.   Psychiatric/Behavioral: Negative for behavioral problems and confusion.         Objective:      Physical Exam  Vitals and nursing note reviewed.   Constitutional:       Appearance: Normal appearance. She is normal weight.   HENT:      Head: Normocephalic and atraumatic.      Right Ear: Tympanic membrane normal.      Left Ear: Tympanic membrane normal.      Nose: Nose normal.      Mouth/Throat:      Mouth: Mucous membranes are moist.   Eyes:      Extraocular Movements: Extraocular movements intact.      Conjunctiva/sclera: Conjunctivae normal.      Pupils: Pupils are equal, round, and reactive to light.   Cardiovascular:      Rate and Rhythm: Normal rate. Rhythm irregularly irregular. Occasional extrasystoles are present.     Pulses: Normal pulses.      Heart sounds: Murmur heard.    Systolic murmur is present with a grade of 2/6.    No friction rub. No gallop. No S3 or S4 sounds.   Pulmonary:      Effort: Pulmonary effort is  normal.      Breath sounds: Normal breath sounds.   Abdominal:      General: Abdomen is flat. Bowel sounds are normal.      Palpations: Abdomen is soft.   Musculoskeletal:         General: Normal range of motion.      Cervical back: Normal range of motion and neck supple.      Right lower leg: No edema.      Left lower leg: No edema.      Comments: Multiple site arthritis   Skin:     General: Skin is warm and dry.   Neurological:      General: No focal deficit present.      Mental Status: She is alert and oriented to person, place, and time.   Psychiatric:         Mood and Affect: Mood normal.         Assessment:       Chronic renal failure, stage 3 (moderate), unspecified whether stage 3a or 3b CKD  -     Renal Function Panel; Future; Expected date: 04/18/2022

## 2022-04-19 PROBLEM — R79.89 ELEVATED TROPONIN: Status: ACTIVE | Noted: 2022-01-01

## 2022-04-19 PROBLEM — D53.9 MACROCYTIC ANEMIA: Status: ACTIVE | Noted: 2022-01-01

## 2022-04-19 PROBLEM — Z79.899 DVT PROPHYLAXIS: Status: ACTIVE | Noted: 2022-01-01

## 2022-04-19 NOTE — ED PROVIDER NOTES
Encounter Date: 4/19/2022       History     Chief Complaint   Patient presents with    Shortness of Breath     Patient presents with severe shortness of breath.  This has been ongoing but has worsened over the past week.  She was seen on April 7th here in this emergency department and transferred to [facility] in [place] with stage 5 chronic kidney disease not on dialysis.  She states her nephrologist, Dr. Fields, did not say she needed dialysis when she was discharged home from the hospital 1 week ago.  Since her discharge home she has progressively gotten more and more short of breath.  She is also not on Epogen at this time.  Her BUN has been running , with creatinine is in the mid to high 6 range.  Most recent H&H was 7 and 22.         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, without long-term current use of insulin     Vertigo     Vitamin D deficiency      Past Surgical History:   Procedure Laterality Date    BLADDER SUSPENSION      BRONCHOSCOPY       11/2020    CHOLECYSTECTOMY      FRACTURE SURGERY      HERNIA REPAIR      HIP FRACTURE SURGERY Right 2005    HYSTERECTOMY       Family History   Problem Relation Age of Onset    Diabetes Mother     Alcohol abuse Father     Diabetes Sister     Asthma Son     Cancer Other      Social History     Tobacco Use    Smoking status: Never Smoker    Smokeless tobacco:  Never Used   Substance Use Topics    Alcohol use: Never    Drug use: Never     Review of Systems   Constitutional: Positive for fatigue. Negative for activity change, appetite change, chills, diaphoresis and fever.   HENT: Negative.    Eyes: Negative.    Respiratory: Positive for shortness of breath. Negative for apnea, cough, choking, chest tightness, wheezing and stridor.    Cardiovascular: Positive for leg swelling. Negative for chest pain and palpitations.   Gastrointestinal: Negative.    Genitourinary: Negative.    Musculoskeletal: Negative.    Skin: Negative.    Neurological: Negative.    Psychiatric/Behavioral: Negative.    All other systems reviewed and are negative.      Physical Exam     Initial Vitals   BP Pulse Resp Temp SpO2   04/19/22 1053 04/19/22 1104 04/19/22 1053 04/19/22 1104 04/19/22 1053   (!) 178/83 63 (!) 28 97.8 °F (36.6 °C) (!) 86 %      MAP       --                Physical Exam    Nursing note and vitals reviewed.  Constitutional: She appears well-developed and well-nourished. She is not diaphoretic. No distress.   HENT:   Head: Normocephalic.   Right Ear: External ear normal.   Left Ear: External ear normal.   Nose: Nose normal.   Mouth/Throat: Oropharynx is clear and moist. No oropharyngeal exudate.   Eyes: Conjunctivae and EOM are normal. Pupils are equal, round, and reactive to light. Right eye exhibits no discharge. Left eye exhibits no discharge. No scleral icterus.   Neck: Neck supple. No tracheal deviation present. JVD present.   Normal range of motion.  Cardiovascular: Normal rate, regular rhythm, normal heart sounds and intact distal pulses.   No murmur heard.  Pulmonary/Chest: No stridor. She has no wheezes. She has no rhonchi. She has rales (Has diffuse rales by laterally.). She exhibits no tenderness.   Decreased breath sounds bilaterally.  Patient is sitting in tripod position to improve her breathing upon arrival.   Abdominal: Abdomen is soft. Bowel sounds are normal. She  exhibits no distension. There is no abdominal tenderness.   Musculoskeletal:         General: Edema ( 2+ pitting edema of both lower extremities noted.  No calf tenderness) present. No tenderness. Normal range of motion.      Cervical back: Normal range of motion and neck supple.     Lymphadenopathy:     She has no cervical adenopathy.   Neurological: She is alert and oriented to person, place, and time. She has normal strength. No cranial nerve deficit. GCS score is 15. GCS eye subscore is 4. GCS verbal subscore is 5. GCS motor subscore is 6.   Skin: Skin is warm and dry. Capillary refill takes less than 2 seconds. No erythema. No pallor.   Patient has some purpuric lesions of the left lower extremity.   Psychiatric: She has a normal mood and affect. Her behavior is normal.         Medical Screening Exam   See Full Note    ED Course   Procedures  Labs Reviewed   COMPREHENSIVE METABOLIC PANEL - Abnormal; Notable for the following components:       Result Value    Glucose 176 (*)     BUN 72 (*)     Creatinine 6.46 (*)     Calcium 7.9 (*)     Albumin 3.1 (*)     Alk Phos 50 (*)     eGFR 7 (*)     All other components within normal limits   PHOSPHORUS - Abnormal; Notable for the following components:    Phosphorus 6.3 (*)     All other components within normal limits   TROPONIN I - Abnormal; Notable for the following components:    Troponin I High Sensitivity 200.8 (*)     All other components within normal limits   URINALYSIS, REFLEX TO URINE CULTURE - Abnormal; Notable for the following components:    Protein, UA >=300 (*)     Glucose,   (*)     Blood, UA Trace-Intact (*)     All other components within normal limits   CBC WITH DIFFERENTIAL - Abnormal; Notable for the following components:    RBC 2.57 (*)     Hemoglobin 7.6 (*)     Hematocrit 25.8 (*)     .4 (*)     MCHC 29.5 (*)     RDW 15.3 (*)     Neutrophils % 78.3 (*)     Lymphocytes % 8.1 (*)     Lymphocytes, Absolute 0.66 (*)     Monocytes % 11.8  (*)     Eosinophils % 0.7 (*)     Immature Granulocytes % 0.6 (*)     Monocytes, Absolute 0.96 (*)     Immature Granulocytes, Absolute 0.05 (*)     All other components within normal limits   NT-PRO NATRIURETIC PEPTIDE - Abnormal; Notable for the following components:    ProBNP 20,806 (*)     All other components within normal limits   URINALYSIS, MICROSCOPIC - Abnormal; Notable for the following components:    Bacteria, UA Few (*)     Squamous Epithelial Cells, UA Few (*)     All other components within normal limits   MAGNESIUM - Normal   CBC W/ AUTO DIFFERENTIAL    Narrative:     The following orders were created for panel order CBC auto differential.  Procedure                               Abnormality         Status                     ---------                               -----------         ------                     CBC with Differential[[id number]]        Abnormal            Final result                 Please view results for these tests on the individual orders.   SARS-COV2 (COVID) W/ FLU ANTIGEN        ECG Results          EKG 12-lead (In process)  Result time 04/19/22 11:51:18    In process by Interface, Lab In [facility] (04/19/22 11:51:18)                 Narrative:    Test Reason : R06.00,    Vent. Rate : 063 BPM     Atrial Rate : 063 BPM     P-R Int : 158 ms          QRS Dur : 130 ms      QT Int : 458 ms       P-R-T Axes : 084 041 022 degrees     QTc Int : 468 ms    Sinus rhythm with Premature atrial complexes  Right bundle branch block  Abnormal ECG  When compared with ECG of 07-APR-2022 10:32,  Premature atrial complexes are now Present  T wave inversion now evident in Inferior leads  T wave inversion more evident in Anterior leads    Referred By: [email or web address]   SELF           Confirmed By:                             Imaging Results          X-Ray Chest AP Portable (Final result)  Result time 04/19/22 11:54:50    Final result by Derek Suarez MD (04/19/22 11:54:50)                 Impression:       Findings indicative of pulmonary venous congestive changes.      Electronically signed by: Derek Suarez  Date:    04/19/2022  Time:    11:54             Narrative:    EXAMINATION:  XR CHEST AP PORTABLE    CLINICAL HISTORY:  Dyspnea;    TECHNIQUE:  Single frontal view of the chest was performed.    COMPARISON:  04/07/2022    FINDINGS:  Cardiomegaly is similar.  Prominent interstitial markings and pulmonary vessels.  No pneumothorax.  Suspect small right greater than left pleural effusions.                                 Medications   furosemide injection 120 mg (120 mg Intravenous Given 4/19/22 1157)   nitroGLYCERIN 2% TD oint ointment 0.5 inch (0.5 inches Topical (Top) Given 4/19/22 1145)     Medical Decision Making:   Independently Interpreted Test(s):   I have ordered and independently interpreted X-rays - see summary below.       <> Summary of X-Ray Reading(s): Chest x-ray shows bilateral pulmonary edema.  Clinical Tests:   Radiological Study: Reviewed  ED Management:  Patient was given supplemental oxygen 2 L by nasal cannula, O2 sat improved to 96%.  She was also given a half-inch of nitropaste for vasodilatation and afterload reduction, and Lasix 120 mg IV.   Other:   I have discussed this case with another health care provider.       <> Summary of the Discussion: Patient discussed with Dr. Meza at [facility] in [place], accepted in transfer.  Radiologist's report for chest x-ray reviewed, indicates diffuse increased interstitial markings consistent with pulmonary venous congestion.                 Clinical Impression:   Final diagnoses:  [R06.00] Dyspnea  [J81.0] Acute pulmonary edema (Primary)  [N17.9, N18.5] Acute renal failure superimposed on stage 5 chronic kidney disease, not on chronic dialysis, unspecified acute renal failure type  [I50.9] Acute on chronic congestive heart failure, unspecified heart failure type  [R77.8] Elevated troponin  [N18.9, Z86.2] History of anemia due to  chronic kidney disease          ED Disposition Condition    Transfer to Another Facility Shamar Miramontes,   04/19/22 8285

## 2022-04-19 NOTE — ED TRIAGE NOTES
Patient reports SOB x 2 days. Home Health nurse saw patient this morning and called Dr. Andrew. Dr. andrew reported to send pt to ER. Patient in tripod position in WC to room 1. O2 sat 86% on room air.

## 2022-04-19 NOTE — HPI
85 y/o female admitted today from [facility] ED for SOB. The patient was transferred from [facility] after presenting there for increased SOB over the last week. She was discharged from [facility] one week ago with worsening renal disease. Currently she is followed by Dr. Fields, Nephrologist and not on HD. Since her discharge last week she has increasingly gotten more SOB with orthopnea at night. Her symptoms are exacerbated with exertion and lying flat. There is no reported chest pain, vomiting, diarrhea, diaphoresis, vision change, numbness or recent illness.

## 2022-04-19 NOTE — ASSESSMENT & PLAN NOTE
* Telemetry monitoring    * Ekg:  Sr with  Pac's. 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   * sCre: 6.46.    Baseline sCre. 6 since 4/2021  * NA bicarbonate tabs daily

## 2022-04-19 NOTE — ASSESSMENT & PLAN NOTE
Likely from chronic renal failure.  Troponin >200  Repeat troponin level  Cardiology consulted

## 2022-04-19 NOTE — ED PROVIDER NOTES
Encounter Date: 4/19/2022       History     Chief Complaint   Patient presents with    Shortness of Breath     Patient transferred from [facility] ER where she presented for shortness of breath.  Patient was hospitalized here for 5 days and discharged 7 days ago with worsened chronic renal insufficiency.  Patient states that she was told to discontinue her Lasix due to the thought that was worsening her renal dysfunction.  She has never been dialyzed previously.  Patient denies a prior history congestive heart failure.  She was treated with Lasix in the ER today is produced at least 300 mL of urine.  Patient is feeling slightly improved.  She does have some hypoxic spells with minimal exertion dropping down to the mid 80s.  Otherwise patient denies chest pain.  She does complain of this is episode of shortness of breath starting about 2 days ago and becoming severe with lying flat or with any exertion over the past day.  No associated fever chills.  No associated cough.  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, without long-term current use of insulin     Vertigo     Vitamin D deficiency      Past Surgical History:   Procedure Laterality Date    BLADDER SUSPENSION      BRONCHOSCOPY       11/2020    CHOLECYSTECTOMY      FRACTURE SURGERY      HERNIA  REPAIR      HIP FRACTURE SURGERY Right 2005    HYSTERECTOMY       Family History   Problem Relation Age of Onset    Diabetes Mother     Alcohol abuse Father     Diabetes Sister     Asthma Son     Cancer Other      Social History     Tobacco Use    Smoking status: Never Smoker    Smokeless tobacco: Never Used   Substance Use Topics    Alcohol use: Never    Drug use: Never     Review of Systems   Constitutional: Negative for fever.   HENT: Negative for sore throat.    Respiratory: Positive for shortness of breath.    Cardiovascular: Negative for chest pain.   Gastrointestinal: Negative for nausea.   Genitourinary: Negative for dysuria.   Musculoskeletal: Negative for back pain.   Skin: Negative for rash.   Neurological: Negative for weakness.   Hematological: Does not bruise/bleed easily.       Physical Exam     Initial Vitals [04/19/22 1525]   BP Pulse Resp Temp SpO2   (!) 173/69 (!) 55 (!) 61 98.3 °F (36.8 °C) (!) 92 %      MAP       --         Physical Exam    Constitutional: She appears well-developed and well-nourished.   HENT:   Head: Normocephalic and atraumatic.   Eyes: EOM are normal. Pupils are equal, round, and reactive to light.   Neck: Neck supple.   Normal range of motion.  Cardiovascular: Normal rate and regular rhythm.   Pulmonary/Chest: She has rhonchi.   Abdominal: Abdomen is soft.   Musculoskeletal:         General: Edema present.      Cervical back: Normal range of motion and neck supple.     Neurological: She is alert and oriented to person, place, and time.   Skin: Skin is warm and dry. Capillary refill takes less than 2 seconds.   Psychiatric: She has a normal mood and affect. Thought content normal.         Medical Screening Exam   See Full Note    ED Course   Procedures  Labs Reviewed - No data to display       Imaging Results    None          Medications - No data to display  Medical Decision Making:   ED Management:  Reviewed workup from sending facility.  EKG shows sinus rhythm  premature atrial contractions.  No ectopy.  No acute ischemic changes.  Chronic anemia hemoglobin the sevens.  ProBNP significantly elevated.  Creatinine near baseline at 6. Case was discussed with our hospitalist Dr. Meza.  Patient will be admitted to our hospital for further management.  Patient's O2 sats are in the mid 90s on 2 L but when she exerts herself she becomes very dyspneic and O2 sats dip down to the 80s.  It appears that she is volume overloaded and needs diuresis.                   Clinical Impression:   Final diagnoses:  [E87.70] Hypervolemia, unspecified hypervolemia type (Primary)  [N18.9] Chronic renal failure, unspecified CKD stage  [R09.02] Hypoxia          ED Disposition Condition    Admit               Jeovany Guzman MD  04/19/22 6649       Jeovany Guzman MD  04/19/22 2435

## 2022-04-19 NOTE — ED TRIAGE NOTES
Pt to er with complaint of increasing sob- pt states she was recently discharged from [facility] due to kidney problems 1 week ago- states she has appointment with her kidney doctor on may 9th- pt states she saw doctor kamran on yesterday and had blood drawn- pt states she is on [email or web address] and was placed on last week

## 2022-04-19 NOTE — ED TRIAGE NOTES
Pt presents to ED with c/o elevated troponin and congestive heart failure sent from [facility]

## 2022-04-19 NOTE — SUBJECTIVE & OBJECTIVE
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, without long-term current use of insulin     Vertigo     Vitamin D deficiency        Past Surgical History:   Procedure Laterality Date    BLADDER SUSPENSION      BRONCHOSCOPY       11/2020    CHOLECYSTECTOMY      FRACTURE SURGERY      HERNIA REPAIR      HIP FRACTURE SURGERY Right 2005    HYSTERECTOMY         Review of patient's allergies indicates:  No Known Allergies    Current Facility-Administered Medications on File Prior to Encounter   Medication    [COMPLETED] furosemide injection 120 mg    [COMPLETED] nitroGLYCERIN 2% TD oint ointment 0.5 inch     Current Outpatient Medications on File Prior to Encounter   Medication Sig    albuterol (PROVENTIL) 2.5 mg /3 mL (0.083 %) nebulizer solution     albuterol (VENTOLIN HFA) 90 mcg/actuation inhaler Inhale 2 puffs into the lungs every 6 (six) hours as needed for Wheezing. Rescue    allopurinoL (ZYLOPRIM) 100 MG tablet Take 1 tablet (100 mg total) by mouth once daily.    amLODIPine (NORVASC) 5 MG tablet Take 1 tablet (5 mg total) by mouth once daily.    aspirin (ECOTRIN) 81 MG EC tablet Take 1 tablet (81 mg total) by mouth once daily.    calcium carbonate (TUMS) 200 mg calcium (500 mg) chewable tablet Take 1 tablet (500 mg total) by mouth once daily.    cyanocobalamin (VITAMIN B-12) 100 MCG tablet Take 1 tablet (100 mcg total) by mouth once daily.    gabapentin (NEURONTIN) 100 MG capsule Take 1 capsule (100 mg  total) by mouth 2 (two) times daily.    hydrALAZINE (APRESOLINE) 50 MG tablet Take 1 tablet (50 mg total) by mouth every 8 (eight) hours.    HYDROcodone-acetaminophen (NORCO)  mg per tablet Take 1 tablet by mouth every 12 (twelve) hours.    metoprolol tartrate (LOPRESSOR) 25 MG tablet Take 1 tablet (25 mg total) by mouth 2 (two) times daily.    multivitamin with folic acid 400 mcg Tab Take 1 tablet by mouth once daily.    simvastatin (ZOCOR) 20 MG tablet Take 1 tablet (20 mg total) by mouth every evening.    SITagliptin (JANUVIA) 25 MG Tab Take 1 tablet (25 mg total) by mouth once daily.    sodium bicarbonate 325 MG tablet Take 1 tablet (325 mg total) by mouth once daily.    budesonide-formoterol 160-4.5 mcg (SYMBICORT) 160-4.5 mcg/actuation HFAA Inhale 2 puffs into the lungs every 12 (twelve) hours. Controller    [DISCONTINUED] glipiZIDE (GLUCOTROL) 5 MG TR24 Take 1 tablet (5 mg total) by mouth daily with breakfast.     Family History       Problem Relation (Age of Onset)    Alcohol abuse Father    Asthma Son    Cancer Other    Diabetes Mother, Sister          Tobacco Use    Smoking status: Never Smoker    Smokeless tobacco: Never Used   Substance and Sexual Activity    Alcohol use: Never    Drug use: Never    Sexual activity: Not Currently     Review of Systems   Constitutional:  Positive for activity change.   Respiratory:  Positive for shortness of breath. Negative for chest tightness.    Cardiovascular:  Negative for chest pain.   Gastrointestinal:  Negative for abdominal pain, diarrhea and nausea.   Neurological:  Positive for weakness. Negative for dizziness, light-headedness and headaches.   All other systems reviewed and are negative.  Objective:     Vital Signs (Most Recent):  Temp: 98.3 °F (36.8 °C) (04/19/22 1525)  Pulse: (!) 55 (04/19/22 1545)  Resp: 14 (04/19/22 1545)  BP: (!) 158/60 (04/19/22 1545)  SpO2: (!) 93 % (04/19/22 1545)   Vital Signs (24h Range):  Temp:  [97.8 °F (36.6 °C)-98.3 °F  (36.8 °C)] 98.3 °F (36.8 °C)  Pulse:  [55-63] 55  Resp:  [14-61] 14  SpO2:  [86 %-96 %] 93 %  BP: (158-183)/(60-85) 158/60     Weight: 77.6 kg (171 lb)  Body mass index is 26.78 kg/m².    Physical Exam  Vitals reviewed.   Constitutional:       Appearance: She is ill-appearing.   HENT:      Head: Normocephalic.      Nose: Nose normal.      Mouth/Throat:      Mouth: Mucous membranes are moist.   Eyes:      Extraocular Movements: Extraocular movements intact.      Pupils: Pupils are equal, round, and reactive to light.   Cardiovascular:      Rate and Rhythm: Bradycardia present. Rhythm irregular.      Pulses: Normal pulses.   Pulmonary:      Breath sounds: Rales present.   Abdominal:      General: Bowel sounds are normal.      Palpations: Abdomen is soft.      Tenderness: There is no abdominal tenderness. There is no guarding.   Musculoskeletal:         General: Normal range of motion.      Cervical back: Normal range of motion and neck supple.      Right lower leg: 3+ Pitting Edema present.      Left lower leg: 3+ Pitting Edema present.   Skin:     General: Skin is warm and dry.      Capillary Refill: Capillary refill takes 2 to 3 seconds.   Neurological:      General: No focal deficit present.      Mental Status: She is alert and oriented to person, place, and time. Mental status is at baseline.      Cranial Nerves: No cranial nerve deficit.      Sensory: No sensory deficit.   Psychiatric:         Mood and Affect: Mood normal.         Behavior: Behavior normal.         CRANIAL NERVES     CN III, IV, VI   Pupils are equal, round, and reactive to light.     Significant Labs: All pertinent labs within the past 24 hours have been reviewed.    Significant Imaging: I have reviewed all pertinent imaging results/findings within the past 24 hours.

## 2022-04-20 PROBLEM — J96.01 ACUTE HYPOXEMIC RESPIRATORY FAILURE: Status: ACTIVE | Noted: 2022-01-01

## 2022-04-20 NOTE — PROGRESS NOTES
[facility]  [facility] Medicine  Progress Note    Patient Name: Bria Ray  MRN: [id number]  Patient Class: IP- Inpatient   Admission Date: 4/19/2022  Length of Stay: 1 days  Attending Physician: Roberto Meza MD  Primary Care Provider: Lorena Richardson MD        Subjective:     Principal Problem:Acute hypoxemic respiratory failure        HPI:  87 y/o female admitted today from [facility] ED for SOB. The patient was transferred from [facility] after presenting there for increased SOB over the last week. She was discharged from [facility] one week ago with worsening renal disease. Currently she is followed by Dr. Fields, Nephrologist and not on HD. Since her discharge last week she has increasingly gotten more SOB with orthopnea at night. Her symptoms are exacerbated with exertion and lying flat. There is no reported chest pain, vomiting, diarrhea, diaphoresis, vision change, numbness or recent illness.                     Overview/Hospital Course:  4/20: SOB has improved; renal function remains unchanged; slight decrease in H/H- pending transfusion; nephrology and cardiology following; discussions for HD per nephrology; plans for stress test in AM; repeat labs in AM to monitor H/H and renal function       Interval History: Pt resting in bed. States her breathing is improved but still not good. She denies any CP or abd pain. Does have generalizes achiness and weakness. Discussed lab findings and the need for blood transfusion. Discussed cardiology and nephrology consult- verbalizes understanding; denies any questions at present.     Review of Systems   Constitutional:  Positive for activity change and chills.   Respiratory:  Positive for shortness of breath. Negative for chest tightness.    Cardiovascular:  Positive for leg swelling. Negative for chest pain.   Gastrointestinal:  Negative for abdominal pain, diarrhea and nausea.   Musculoskeletal:  Positive for arthralgias and myalgias.    Neurological:  Positive for weakness. Negative for dizziness, light-headedness and headaches.   All other systems reviewed and are negative.  Objective:     Vital Signs (Most Recent):  Temp: 97.9 °F (36.6 °C) (04/20/22 1220)  Pulse: 60 (04/20/22 1220)  Resp: 18 (04/20/22 1220)  BP: (!) 178/83 (04/20/22 1220)  SpO2: (!) 94 % (04/20/22 1220)   Vital Signs (24h Range):  Temp:  [97.9 °F (36.6 °C)-98.1 °F (36.7 °C)] 97.9 °F (36.6 °C)  Pulse:  [56-66] 60  Resp:  [13-23] 18  SpO2:  [88 %-97 %] 94 %  BP: (149-188)/(55-83) 178/83     Weight: 77.6 kg (171 lb)  Body mass index is 26.78 kg/m².    Intake/Output Summary (Last 24 hours) at 4/20/2022 1706  Last data filed at 4/20/2022 1345  Gross per 24 hour   Intake 199.17 ml   Output 1470 ml   Net -1270.83 ml      Physical Exam  Vitals and nursing note reviewed.   Constitutional:       Appearance: She is ill-appearing.   HENT:      Head: Normocephalic.      Nose: Nose normal.      Mouth/Throat:      Mouth: Mucous membranes are moist.   Eyes:      Extraocular Movements: Extraocular movements intact.      Pupils: Pupils are equal, round, and reactive to light.   Cardiovascular:      Rate and Rhythm: Bradycardia present. Rhythm irregular.      Pulses: Normal pulses.   Pulmonary:      Breath sounds: Rales present.   Abdominal:      General: Bowel sounds are normal.      Palpations: Abdomen is soft.      Tenderness: There is no abdominal tenderness. There is no guarding.   Musculoskeletal:         General: Normal range of motion.      Cervical back: Normal range of motion and neck supple.      Right lower leg: Pitting Edema present.      Left lower leg: Pitting Edema present.   Skin:     General: Skin is warm and dry.      Capillary Refill: Capillary refill takes 2 to 3 seconds.   Neurological:      General: No focal deficit present.      Mental Status: She is alert and oriented to person, place, and time. Mental status is at baseline.      Cranial Nerves: No cranial nerve deficit.       Sensory: No sensory deficit.   Psychiatric:         Mood and Affect: Mood normal.         Behavior: Behavior normal.       Significant Labs: All pertinent labs within the past 24 hours have been reviewed.  Recent Lab Results  (Last 5 results in the past 24 hours)        04/20/22  1646   04/20/22  1052   04/20/22  0905   04/20/22  0655   04/20/22  0420        Unit Blood Type Code       5100                5100  [P]         Unit Expiration       [id number]  [P]         Anion Gap         13       Ao root annulus   2.20             Ao peak jeanna   1.6             Ao VTI   42.00             AV valve area   1.48             AORTIC VALVE CUSP SEPERATION   [id number]             AV mean gradient   6             AV index (prosthetic)   0.74             AV peak gradient   10             AV Velocity Ratio   0.75             Baso #         0.04       Basophil %         0.6       BSA   1.91             BUN         79       BUN/CREAT RATIO         12       Calcium         7.5       Chloride         112       CO2         23       Creatinine         6.37       CROSSMATCH INTERPRETATION       Compatible                Compatible  [P]         Left Ventricle Relative Wall Thickness   0.53             Differential Type         Auto       DISPENSE STATUS       Released                Issued  [P]         Echo EF Estimated   70             eGFR if non          7       EF   70             Eos #         0.06       Eosinophil %         0.8       E wave deceleration time   163             FS   44             Glucose         144       Group & Rh       A POS         Hematocrit         23.1       Hemoglobin         6.8       Immature Grans (Abs)         0.04       Immature Granulocytes         0.6       INDIRECT JHON       NEG         IVC ostium   1.5             IVS   1.37             LA size   3.40             LVOT area   2.0             LV Diastolic Volume   103.40             LV  Diastolic Volume Index   54.71             LVIDd   4.72             LVIDs   2.62             LV mass   243.47             LV Mass Index   129             Left Ventricular Outflow Tract Mean Gradient   3.00             LVOT diameter   1.60             LVOT peak aren   1.2             LVOT stroke volume   62.30             LVOT peak VTI   31.00             LV Systolic Volume   25.10             LV Systolic Volume Index   13.3             Lymph #         0.55       Lymph %         7.6       Magnesium         1.9       MCH         30.9       MCHC         29.4       MCV         105.0       Mono #         0.79       Mono %         10.9       MPV         11.5       MV valve area p 1/2 method   2.82             MV valve area by continuity eq   1.21             MV mean gradient   2             MV peak gradient   6             MV Peak E Aren   1.06             MV stenosis pressure 1/2 time   78             MV VTI   51.6             Neutrophils, Abs         5.79       Neutrophils Relative         79.5       nRBC         0.0       NUCLEATED RBC ABSOLUTE         0.00       Platelets         135       POC Glucose 137               Potassium         4.8       Product Code       [id number]                [id number]  [P]         Posterior Wall   1.26             Right Atrial Pressure (from IVC)   3             RA Major Axis   4.30             RBC         2.20       RDW         15.6       RVDD   4.60             Sodium         143       TAPSE   2.00             Triscuspid Valve Regurgitation Peak Gradient   38             TR Max Aren   3.10             Troponin I High Sensitivity     104.0           TV rest pulmonary artery pressure   41             Unit ABO/Rh       O POS                O POS  [P]         UNIT NUMBER       [id number]                [id number]  [P]         WBC         7.27                               [P] - Preliminary Result               Significant Imaging: I have reviewed all pertinent imaging results/findings  within the past 24 hours.  CXR with findings of pulmonary venous congestion       Assessment/Plan:      * Acute hypoxemic respiratory failure  Patient with Hypoxic Respiratory failure which is Acute.  she is not on home oxygen. Supplemental oxygen was provided and noted-  .   Signs/symptoms of respiratory failure include- tachypnea, increased work of breathing and respiratory distress. Contributing diagnoses includes - CHF and renal failure  Labs and images were reviewed. Patient Has not had a recent ABG. Will treat underlying causes and adjust management of respiratory failure as follows-   Nephrology and cardiology consults.  Echocardiogram.    4/20  - was given lasix in ED  - second dose given today with adequate UOP and improvement of symptoms   - echo with EF 70%  - nephrology following- discussed need for dialysis- pt to discuss with family     Macrocytic anemia  Hg 7.6 and problems with anemia in the past.    Will monitor carefully.    FOBT ordered.   4/20  - FOBT ordered  - was neg on previous admit   - h/h 6/23  - 1u PRBCs to transfuse       Elevated troponin  Likely from chronic renal failure.  Troponin >200  Repeat troponin level  Cardiology consulted  4/20  - trop max at 200 and trending down   - cardiology with plans for stress test in AM   - trop likely elevated secondary to CKD and demand mismatch       DVT prophylaxis  * Heparin 5000 units q 8 hours.       Type 2 diabetes mellitus  Low scale SSI.  Accu checks ac/hs   Hold Metformin  4/20  - continue SSI   - accuchecks ACHS   - BG range 137-248    Anemia of chronic renal failure  Cbc daily  H/H 7/25  Likely anemia from chronic renal failure.  4/20  - H/H 6/23  - 1u PRBCs pending    CKD (chronic kidney disease), stage IV  * Telemetry monitoring    * Ekg:  Sr with  Pac's. 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   * sCre: 6.46.     Baseline sCre. 6 since 4/2021  * NA bicarbonate tabs daily  4/20  - nephrology following   - discussed HD- pt will discuss with family     Essential hypertension  Chronic, resume home medications  4/20  - BP trend remains elevated but at pt's baseline   - prn hydralazine         VTE Risk Mitigation (From admission, onward)         Ordered     IP VTE HIGH RISK PATIENT  Once         04/19/22 1631     Place sequential compression device  Until discontinued         04/19/22 1631                Discharge Planning   ANGY:      Code Status: Full Code   Is the patient medically ready for discharge?:     Reason for patient still in hospital (select all that apply): Treatment             BELA Robbins  Department of Hospital Medicine   [facility] - Orth[facility]

## 2022-04-20 NOTE — HPI
87 y/o female with PMH of DM, HTN, CKD, and diastolic heart failure who presented to [facility] with SOB and transferred to [facility] for further treatment and evaluation. Troponin elevated and cardiology consulted. Patient discharged from here 7 days ago with worsened chronic renal failure after a 5 day stay, states she noticed increased sob Sunday and her home health nurse sent her to ED on Monday. She is being followed by Dr. Fields for renal failure and has not yet started dialysis. Since her discharge last week she has increasingly gotten more SOB with orthopnea at night. Apparently her she was told to discontinue her lasix last week at discharge with concern it was worsening her renal function. She was given lasix yesterday and had 300ml of UOP with symptoms slightly improving. Her symptoms are exacerbated with exertion and lying flat. There is no reported chest pain, vomiting, diarrhea, diaphoresis, vision change, numbness or recent illness.    Troponin 200, 184, 155. EKG SR with RBBB and nonspecific ST-T wave abnormalities. BNP 20,800 (baseline a year ago 975). H/H today 6.8/23.1 and one unit PRBCs infusing. Last echo 2/23/2021, will repeat today. She has seen Dr. Parker a year ago but has not followed up since.

## 2022-04-20 NOTE — ASSESSMENT & PLAN NOTE
* Telemetry monitoring    * Ekg:  Sr with  Pac's. 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   * sCre: 6.46.    Baseline sCre. 6 since 4/2021  * NA bicarbonate tabs daily  4/20  - nephrology following   - discussed HD- pt will discuss with family

## 2022-04-20 NOTE — ASSESSMENT & PLAN NOTE
Likely from chronic renal failure.  Troponin >200  Repeat troponin level  Cardiology consulted  4/20  - trop max at 200 and trending down   - cardiology with plans for stress test in AM   - trop likely elevated secondary to CKD and demand mismatch      NICU FEEDING THERAPY  [facility]      PATIENT IDENTIFICATION:  Name: MATTHIEU Gaines     Sex: male   : 2020  Admission Date: 2022   Age: 2 wk.o. Admitting Provider: Roger Medina MD   MRN: [id number]   Attending Provider: Roger Medina MD      INPATIENT PROBLEM LIST:    Active Hospital Problems    Diagnosis  POA    *  infant of 32 completed weeks of gestation [P07.35]  Yes    RDS (respiratory distress syndrome in the ) [P22.0]  Yes      Resolved Hospital Problems    Diagnosis Date Resolved POA    At high risk for alteration in nutrition [Z91.89] 2022 Not Applicable    Apnea of prematurity [P28.4] 2022 Yes    Hyperbilirubinemia,  [P59.9] 2022 No     twin  delivered by  section during current hospitalization, birth weight 1,750-1,999 grams, with 31-32 completed weeks of gestation, with liveborn mate [Z38.31, P07.17] 2022 Yes    At risk for infection in  [Z91.89] 2022 Yes          Subjective:  Respiratory Status:HFNC  Infant Bed:Isolette  State of Arousal: Drowsy  State Transition:disorganized    ST Minutes Provided: 15  Caregiver Present: no    Pain:  NIPS ( Infant Pain Scale) birth to one year: observe for 1 minute   Select 0 or 1; for cry select 0, 1, or 2   Facial Expression  1: Grimace   Cry 1: Whimper   Breathing Patterns 1: Change in breathing   Arms  0: Restrained/Relaxed   Legs  1: Flexed/Extended   State of Arousal  0: awake   NIPS Score 4 (during assessment)   Max score of 7 points, considering pain greater than or equal to 4.     Stress Cues:Diffuse squirming, Tongue extension, Grimace and Low-level alertness  Self Regulation Strategies:Grasping, Hands to face and mouth and Shut down  Response to Caregiver Intervention:Returning to baseline physiologic state and Transition to light sleep    TREATMENT:  SLP assisted with two person cares.     Oral Feeding  Readiness  Readiness Score 3: Briefly alert with care. No hunger behaviors. No change in tone.    Patient does not demonstrate oral readiness to feed evident by the following cues: drowsy following RN assessment and not accepting pacifier.     Rooting Reflex: Difficult to elicit  Sucking Reflex: Difficult to elicit  Secretion Management:WFL  Vocal/Respiratory Quality:Adequate    TEACHING AND INSTRUCTION:  Education was provided to RN regarding feeding readiness. RN did verbalize/express understanding.    Goals:  Multidisciplinary Problems     SLP Goals        Problem: SLP    Goal Priority Disciplines Outcome   SLP Goal     SLP Ongoing, Progressing   Description: Long Term Goals:  1. Infant will develop oral motor skills for safe, efficient nutritive sucking for safe oral feeding.  2. Infant will intake sufficient volume by mouth for adequate weight gain prior to discharge.  3. Caregiver(s) will implement feeding interventions independently to promote safe and efficient oral feeding prior to discharge.    Short Term Goals:   1. Infant will demonstrate appropriate nipple acceptance, tolerance to oral stimulation, and respond to caregiver regulation strategies to promote oral feedings for 4 sessions in a 24 hour period.   2. Infant will demonstrate no physiologic stress signs during oral feeding attempts given minimal caregiver intervention.   3. Infant will orally feed 50% of their allowed volume by mouth safely, with efficient nutritive sucking for adequate growth.   4. Caregiver(s) will implement feeding interventions to promote safe oral feeding with minimal cueing from staff.                    Quality feeding is the optimum goal, not volume. Please discontinue a feeding when patient exhibits disengagement cues, fatigue symptoms, persistent stridor despite modifications, respiratory concerns, cardiac concerns, drop in oxygen, and/ or drop in saturations.    Upon completion of therapy, patient remained in isolette  with all current needs addressed and RN notified.    Elham Bellamy at 3:56 PM on August 15, 2022

## 2022-04-20 NOTE — CONSULTS
[facility] - [facility]  Nephrology  Consult Note    Patient Name: Bria Ray  MRN: [id number]  Admission Date: 4/19/2022  Hospital Length of Stay: 1 days  Attending Provider: Roberto Meza MD   Primary Care Physician: Lorena Richardson MD  Principal Problem:Acute hypoxemic respiratory failure    Consults  Subjective:     HPI: 86-year-old woman with CKD 5. Her baseline creatinine was 4 prior to her last admission.  It dilcia to nearly 7, then improved slightly to 6-6.5.  She was volume depleted at the time of that admission.  Creatinine improved slightly with IV fluid.  She was discharged to swing bed with creatinine  6.5.  She developed progressive shortness of breath over the past 3-4 days.  She denies chest pain.  No pleuritic chest pain or hemoptysis.      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, without long-term current use of insulin     Vertigo     Vitamin D deficiency        Past Surgical History:   Procedure Laterality Date    BLADDER SUSPENSION      BRONCHOSCOPY       11/2020    CHOLECYSTECTOMY      FRACTURE SURGERY      HERNIA REPAIR      HIP FRACTURE SURGERY Right 2005    HYSTERECTOMY         Review of patient's allergies indicates:  No Known Allergies  Current Facility-Administered Medications   Medication Frequency    0.9%  NaCl infusion (for blood administration) Q24H PRN    acetaminophen tablet 1,000 mg Q8H PRN    acetaminophen tablet 650 mg  Q8H PRN    acetaminophen tablet 650 mg Q4H PRN    allopurinoL tablet 100 mg Daily    aluminum-magnesium hydroxide-simethicone 200-200-20 mg/5 mL suspension 30 mL QID PRN    amLODIPine tablet 5 mg Daily    aspirin EC tablet 81 mg Daily    dextrose 10% bolus 125 mL PRN    dextrose 10% bolus 250 mL PRN    furosemide injection 40 mg Once    gabapentin capsule 100 mg QHS    glucagon (human recombinant) injection 1 mg PRN    heparin (porcine) injection 5,000 Units Q12H    hydrALAZINE tablet 50 mg Q8H    insulin aspart U-100 injection 0-5 Units QID (AC + HS) PRN    magnesium oxide tablet 800 mg PRN    magnesium oxide tablet 800 mg PRN    melatonin tablet 6 mg Nightly PRN    metoprolol tartrate (LOPRESSOR) tablet 25 mg BID    naloxone 0.4 mg/mL injection 0.02 mg PRN    ondansetron disintegrating tablet 8 mg Q8H PRN    polyethylene glycol packet 17 g Daily PRN    potassium bicarbonate disintegrating tablet 35 mEq PRN    potassium bicarbonate disintegrating tablet 50 mEq PRN    potassium bicarbonate disintegrating tablet 60 mEq PRN    potassium, sodium phosphates 280-160-250 mg packet 2 packet PRN    potassium, sodium phosphates 280-160-250 mg packet 2 packet PRN    potassium, sodium phosphates 280-160-250 mg packet 2 packet PRN    sodium bicarbonate tablet 325 mg Daily    sodium chloride 0.9% flush 10 mL PRN     Family History       Problem Relation (Age of Onset)    Alcohol abuse Father    Asthma Son    Cancer Other    Diabetes Mother, Sister          Tobacco Use    Smoking status: Never Smoker    Smokeless tobacco: Never Used   Substance and Sexual Activity    Alcohol use: Never    Drug use: Never    Sexual activity: Not Currently     Review of Systems   Constitutional:  Positive for fatigue. Negative for appetite change and fever.   HENT: Negative.     Respiratory:  Positive for shortness of breath. Negative for wheezing.    Cardiovascular:  Positive for leg swelling. Negative for chest  pain.   Gastrointestinal:  Negative for abdominal pain, diarrhea, nausea and vomiting.   Genitourinary:  Negative for dysuria and hematuria.   Musculoskeletal:  Negative for arthralgias.   Neurological:  Negative for seizures, speech difficulty and numbness.   Objective:     Vital Signs (Most Recent):  Temp: 97.9 °F (36.6 °C) (04/20/22 1220)  Pulse: 60 (04/20/22 1220)  Resp: 18 (04/20/22 1220)  BP: (!) 178/83 (04/20/22 1220)  SpO2: (!) 94 % (04/20/22 1220)   Vital Signs (24h Range):  Temp:  [97.9 °F (36.6 °C)-98.3 °F (36.8 °C)] 97.9 °F (36.6 °C)  Pulse:  [53-66] 60  Resp:  [12-61] 18  SpO2:  [88 %-97 %] 94 %  BP: (149-188)/(55-83) 178/83     Weight: 77.6 kg (171 lb) (04/19/22 1525)  Body mass index is 26.78 kg/m².  Body surface area is 1.92 meters squared.    I/O last 3 completed shifts:  In: -   Out: 1470 [Urine:1470]    Physical Exam  Constitutional:       General: She is not in acute distress.  HENT:      Head: Normocephalic and atraumatic.   Eyes:      Pupils: Pupils are equal, round, and reactive to light.   Cardiovascular:      Rate and Rhythm: Normal rate and regular rhythm.      Heart sounds: No murmur heard.    No friction rub. No gallop.   Pulmonary:      Effort: No respiratory distress.      Breath sounds: Rales present.   Abdominal:      General: Bowel sounds are normal.      Tenderness: There is no abdominal tenderness. There is no guarding.   Musculoskeletal:      Right lower leg: No edema.      Left lower leg: No edema.      Comments: Trace lower extremity edema   Neurological:      Mental Status: She is alert and oriented to person, place, and time.   Psychiatric:         Behavior: Behavior normal.       Significant Labs:  BMP:   Recent Labs   Lab 04/20/22  0420   *      K 4.8   *   CO2 23   BUN 79*   CREATININE 6.37*   CALCIUM 7.5*   MG 1.9     CBC:   Recent Labs   Lab 04/20/22  0420   WBC 7.27   RBC 2.20*   HGB 6.8*   HCT 23.1*   *   .0*   MCH 30.9   MCHC 29.4*        Significant Imaging:      Assessment/Plan:     Type 2 diabetes mellitus  Continue current treatment    Anemia of chronic renal failure  She is being transfused    CKD (chronic kidney disease), stage IV  Renal function has not improved since her last admission.  She has developed volume overload.  Chest x-ray shows pulmonary edema.  She has been given diuretics.  This may temporarily improve her volume status.  I have discussed dialysis with her in detail, including temporary access.  She is anxious about this and wants to discuss it with her family.    Essential hypertension  Controlled        Thank you for your consult.     Beni Fields MD  Nephrology  [facility] - Orthopedic

## 2022-04-20 NOTE — TELEPHONE ENCOUNTER
----- Message from Lorena Richardson MD sent at 4/19/2022 11:52 AM CDT -----  Please send this to pt's nephrologist

## 2022-04-20 NOTE — ASSESSMENT & PLAN NOTE
Patient with Hypoxic Respiratory failure which is Acute.  she is not on home oxygen. Supplemental oxygen was provided and noted- Oxygen Concentration (%):  [28] 28.   Signs/symptoms of respiratory failure include- tachypnea, increased work of breathing and respiratory distress. Contributing diagnoses includes - CHF and renal failure  Labs and images were reviewed. Patient Has not had a recent ABG. Will treat underlying causes and adjust management of respiratory failure as follows-   Nephrology and cardiology consults.  Echocardiogram.

## 2022-04-20 NOTE — ASSESSMENT & PLAN NOTE
Renal function has not improved since her last admission.  She has developed volume overload.  Chest x-ray shows pulmonary edema.  She has been given diuretics.  This may temporarily improve her volume status.  I have discussed dialysis with her in detail, including temporary access.  She is anxious about this and wants to discuss it with her family.

## 2022-04-20 NOTE — HOSPITAL COURSE
4/20: SOB has improved; renal function remains unchanged; slight decrease in H/H- pending transfusion; nephrology and cardiology following; discussions for HD per nephrology; plans for stress test in AM; repeat labs in AM to monitor H/H and renal function   4/21: SOB continues to improve slightly daily; still not to pt's baseline; has not reached decision regarding HD; stress test for today; H/H 6/21- did not receive full unit of blood yesterday d/t IV issues; PRBCs ordered for today  4/22: unable to lay flat for lexiscan yesterday; has decided to proceed with HD- HD cath to be placed today with HD following; H/H stable 8/26; BP improving   4/23: had R chest wall tunneled HD cath placed yesterday; 1st round of dialysis yesterday- tolerated from a BP standpoint- nauseated and exhausted; dialysis again today; phenergan IVPB to offset the nausea and exhaustion.   4/24: plans for HD tomorrow; feels better today but still doesn't feel back to her baseline; continue current plan; SS consulted for assistance with OP HD   4/25: HD today; monitor for HD intolerance; SS to set up HD chair OP   4/26: pt continues to have nausea and decreased tolerance for HD--- feels terrible- weak, shaky, etc- worse after HD with slow improvement; repeat IVPB phenergan; discussed with Sneha will start on low dose marinol and monitor  4/28: Pt has met maximum benefit of hospitalization; for full details see progress notes. In short, pt was admitted 4/19/22 for SOB 2/2 CKD. Recent dc from [facility] within one week with worsening of sob that now requires HD. HD cath placed 4/22/22 and HD started with Dr. Fields following. Pt had a hard time adjusting to HD with persistent nausea that improved. Pt is scheduled for HD at [facility] starting tomorrow. DC on Norvasc 10 mg daily. Hydralizine 100 mg q 8 hours. Lopressor reduced to 12.5 mg bid. Glucotrol stopped.  PT saw in consult and HH with PT at home. Pt is able to tolerate oral intake well. Home  medications resumed at discharge. Follow up with primary care provider in 1 week.  Follow up with Cardiology and Nephrology as scheduled.

## 2022-04-20 NOTE — SUBJECTIVE & OBJECTIVE
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, without long-term current use of insulin     Vertigo     Vitamin D deficiency        Past Surgical History:   Procedure Laterality Date    BLADDER SUSPENSION      BRONCHOSCOPY       11/2020    CHOLECYSTECTOMY      FRACTURE SURGERY      HERNIA REPAIR      HIP FRACTURE SURGERY Right 2005    HYSTERECTOMY         Review of patient's allergies indicates:  No Known Allergies    Current Facility-Administered Medications on File Prior to Encounter   Medication    [COMPLETED] furosemide injection 120 mg    [COMPLETED] nitroGLYCERIN 2% TD oint ointment 0.5 inch     Current Outpatient Medications on File Prior to Encounter   Medication Sig    albuterol (PROVENTIL) 2.5 mg /3 mL (0.083 %) nebulizer solution     albuterol (VENTOLIN HFA) 90 mcg/actuation inhaler Inhale 2 puffs into the lungs every 6 (six) hours as needed for Wheezing. Rescue    allopurinoL (ZYLOPRIM) 100 MG tablet Take 1 tablet (100 mg total) by mouth once daily.    amLODIPine (NORVASC) 5 MG tablet Take 1 tablet (5 mg total) by mouth once daily.    aspirin (ECOTRIN) 81 MG EC tablet Take 1 tablet (81 mg total) by mouth once daily.    calcium carbonate (TUMS) 200 mg calcium (500 mg) chewable tablet Take 1 tablet (500 mg total) by mouth once daily.    cyanocobalamin (VITAMIN B-12) 100 MCG tablet Take 1 tablet (100 mcg total) by mouth once daily.    gabapentin (NEURONTIN) 100 MG capsule Take 1 capsule (100 mg  total) by mouth 2 (two) times daily.    hydrALAZINE (APRESOLINE) 50 MG tablet Take 1 tablet (50 mg total) by mouth every 8 (eight) hours.    HYDROcodone-acetaminophen (NORCO)  mg per tablet Take 1 tablet by mouth every 12 (twelve) hours.    metoprolol tartrate (LOPRESSOR) 25 MG tablet Take 1 tablet (25 mg total) by mouth 2 (two) times daily.    multivitamin with folic acid 400 mcg Tab Take 1 tablet by mouth once daily.    simvastatin (ZOCOR) 20 MG tablet Take 1 tablet (20 mg total) by mouth every evening.    SITagliptin (JANUVIA) 25 MG Tab Take 1 tablet (25 mg total) by mouth once daily.    sodium bicarbonate 325 MG tablet Take 1 tablet (325 mg total) by mouth once daily.    budesonide-formoterol 160-4.5 mcg (SYMBICORT) 160-4.5 mcg/actuation HFAA Inhale 2 puffs into the lungs every 12 (twelve) hours. Controller    [DISCONTINUED] glipiZIDE (GLUCOTROL) 5 MG TR24 Take 1 tablet (5 mg total) by mouth daily with breakfast.     Family History       Problem Relation (Age of Onset)    Alcohol abuse Father    Asthma Son    Cancer Other    Diabetes Mother, Sister          Tobacco Use    Smoking status: Never Smoker    Smokeless tobacco: Never Used   Substance and Sexual Activity    Alcohol use: Never    Drug use: Never    Sexual activity: Not Currently     Review of Systems   Constitutional: Positive for chills and malaise/fatigue. Negative for fever.   HENT:  Negative for ear pain and sore throat.    Eyes:  Negative for visual disturbance.   Cardiovascular:  Positive for dyspnea on exertion, leg swelling and orthopnea. Negative for chest pain, palpitations and syncope.   Respiratory:  Positive for shortness of breath and wheezing. Negative for cough.    Endocrine: Negative for polydipsia and polyuria.   Hematologic/Lymphatic: Negative for bleeding problem.   Gastrointestinal:  Positive for heartburn. Negative for abdominal pain, change in bowel habit, hematemesis, hematochezia, jaundice, melena, nausea and vomiting.    Genitourinary:  Negative for dysuria.   Neurological:  Positive for light-headedness. Negative for dizziness.   All other systems reviewed and are negative.  Objective:     Vital Signs (Most Recent):  Temp: 98.1 °F (36.7 °C) (04/20/22 0344)  Pulse: (!) 57 (04/20/22 0445)  Resp: 13 (04/20/22 0445)  BP: (!) 149/62 (04/20/22 0445)  SpO2: 97 % (04/20/22 0445)   Vital Signs (24h Range):  Temp:  [98.1 °F (36.7 °C)-98.3 °F (36.8 °C)] 98.1 °F (36.7 °C)  Pulse:  [53-66] 57  Resp:  [12-61] 13  SpO2:  [88 %-97 %] 97 %  BP: (149-188)/(55-85) 149/62     Weight: 77.6 kg (171 lb)  Body mass index is 26.78 kg/m².    SpO2: 97 %         Intake/Output Summary (Last 24 hours) at 4/20/2022 1106  Last data filed at 4/20/2022 0526  Gross per 24 hour   Intake --   Output 1470 ml   Net -1470 ml       Lines/Drains/Airways       Drain  Duration                  Urethral Catheter 04/19/22 1130 Non-latex 16 Fr. <1 day              Peripheral Intravenous Line  Duration                  Peripheral IV - Single Lumen 04/19/22 1105 20 G Left Wrist 1 day                    Physical Exam  Vitals reviewed.   Constitutional:       General: She is not in acute distress.  HENT:      Nose: Nose normal. No congestion.   Eyes:      General: No scleral icterus.     Pupils: Pupils are equal, round, and reactive to light.   Neck:      Vascular: No carotid bruit.   Cardiovascular:      Rate and Rhythm: Regular rhythm. Bradycardia present.      Pulses: Normal pulses.      Heart sounds: Normal heart sounds.   Pulmonary:      Effort: Pulmonary effort is normal. No respiratory distress.      Breath sounds: Rales present. No wheezing or rhonchi.   Abdominal:      General: Bowel sounds are normal. There is no distension.      Palpations: Abdomen is soft.      Tenderness: There is no abdominal tenderness.   Musculoskeletal:      Cervical back: Neck supple.      Right lower leg: No edema.      Left lower leg: No edema.   Skin:     General: Skin is warm and dry.       Coloration: Skin is not jaundiced.   Neurological:      Mental Status: She is alert and oriented to person, place, and time.       Significant Labs: ABG: No results for input(s): PH, PCO2, HCO3, POCSATURATED, BE in the last 48 hours., Blood Culture: No results for input(s): LABBLOO in the last 48 hours., BMP:   Recent Labs   Lab 04/19/22  1207 04/20/22  0420   * 144*    143   K 4.8 4.8    112*   CO2 24 23   BUN 72* 79*   CREATININE 6.46* 6.37*   CALCIUM 7.9* 7.5*   MG 1.7 1.9   , CMP   Recent Labs   Lab 04/19/22  1207 04/20/22  0420    143   K 4.8 4.8    112*   CO2 24 23   * 144*   BUN 72* 79*   CREATININE 6.46* 6.37*   CALCIUM 7.9* 7.5*   PROT 7.1  --    ALBUMIN 3.1*  --    BILITOT 0.5  --    ALKPHOS 50*  --    AST 30  --    ALT 21  --    ANIONGAP 15 13   EGFRNONAA 7* 7*   , CBC   Recent Labs   Lab 04/19/22  1207 04/20/22  0420   WBC 8.13 7.27   HGB 7.6* 6.8*   HCT 25.8* 23.1*    135*   , INR No results for input(s): INR, PROTIME in the last 48 hours., Lipid Panel No results for input(s): CHOL, HDL, LDLCALC, TRIG, CHOLHDL in the last 48 hours., and Troponin No results for input(s): TROPONINI in the last 48 hours.    Significant Imaging: Echocardiogram: Transthoracic echo (TTE) complete (Cupid Only): No results found for this or any previous visit., EKG: reviewed, and X-Ray: CXR: X-Ray Chest 1 View (CXR): No results found for this visit on 04/19/22.

## 2022-04-20 NOTE — ASSESSMENT & PLAN NOTE
Chronic, resume home medications  4/20  - BP trend remains elevated but at pt's baseline   - prn hydralazine

## 2022-04-20 NOTE — CONSULTS
[facility] - Orthopedic  Cardiology  Consult Note    Patient Name: Bria Ray  MRN: [id number]  Admission Date: 4/19/2022  Hospital Length of Stay: 1 days  Code Status: Full Code   Attending Provider: Roberto Meza MD   Consulting Provider: BELA Atkins  Primary Care Physician: Lorena Richardson MD  Principal Problem:Acute hypoxemic respiratory failure    Patient information was obtained from patient and ER records.     Inpatient consult to Cardiology  Consult performed by: BELA Atkins  Consult ordered by: Roberto Meza MD  Reason for consult: elevated troponin and sob        Subjective:     Chief Complaint:  Sob      HPI:   87 y/o female with PMH of DM, HTN, CKD, and diastolic heart failure who presented to [facility] with SOB and transferred to [facility] for further treatment and evaluation. Troponin elevated and cardiology consulted. Patient discharged from here 7 days ago with worsened chronic renal failure after a 5 day stay, states she noticed increased sob Sunday and her home health nurse sent her to ED on Monday. She is being followed by Dr. Fields for renal failure and has not yet started dialysis. Since her discharge last week she has increasingly gotten more SOB with orthopnea at night. Apparently her she was told to discontinue her lasix last week at discharge with concern it was worsening her renal function. She was given lasix yesterday and had 300ml of UOP with symptoms slightly improving. Her symptoms are exacerbated with exertion and lying flat. There is no reported chest pain, vomiting, diarrhea, diaphoresis, vision change, numbness or recent illness.    Troponin 200, 184, 155. EKG SR with RBBB and nonspecific ST-T wave abnormalities. BNP 20,800 (baseline a year ago 975). H/H today 6.8/23.1 and one unit PRBCs infusing. Last echo 2/23/2021, will repeat today. She has seen Dr. Parker a year ago but has not followed up since.                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, without long-term current use of insulin     Vertigo     Vitamin D deficiency        Past Surgical History:   Procedure Laterality Date    BLADDER SUSPENSION      BRONCHOSCOPY       11/2020    CHOLECYSTECTOMY      FRACTURE SURGERY      HERNIA REPAIR      HIP FRACTURE SURGERY Right 2005    HYSTERECTOMY         Review of patient's allergies indicates:  No Known Allergies    Current Facility-Administered Medications on File Prior to Encounter   Medication    [COMPLETED] furosemide injection 120 mg    [COMPLETED] nitroGLYCERIN 2% TD oint ointment 0.5 inch     Current Outpatient Medications on File Prior to Encounter   Medication Sig    albuterol (PROVENTIL) 2.5 mg /3 mL (0.083 %) nebulizer solution     albuterol (VENTOLIN HFA) 90 mcg/actuation inhaler Inhale 2 puffs into the lungs every 6 (six) hours as needed for Wheezing. Rescue    allopurinoL (ZYLOPRIM) 100 MG tablet Take 1 tablet (100 mg total) by mouth once daily.    amLODIPine (NORVASC) 5 MG tablet Take 1 tablet (5 mg total) by mouth once daily.    aspirin (ECOTRIN) 81 MG EC tablet Take 1 tablet (81 mg total) by mouth once daily.    calcium carbonate (TUMS) 200 mg calcium (500 mg) chewable tablet Take 1 tablet (500 mg total) by mouth once daily.    cyanocobalamin (VITAMIN B-12) 100 MCG tablet Take 1 tablet (100 mcg total) by mouth once daily.    gabapentin (NEURONTIN) 100 MG capsule Take 1  capsule (100 mg total) by mouth 2 (two) times daily.    hydrALAZINE (APRESOLINE) 50 MG tablet Take 1 tablet (50 mg total) by mouth every 8 (eight) hours.    HYDROcodone-acetaminophen (NORCO)  mg per tablet Take 1 tablet by mouth every 12 (twelve) hours.    metoprolol tartrate (LOPRESSOR) 25 MG tablet Take 1 tablet (25 mg total) by mouth 2 (two) times daily.    multivitamin with folic acid 400 mcg Tab Take 1 tablet by mouth once daily.    simvastatin (ZOCOR) 20 MG tablet Take 1 tablet (20 mg total) by mouth every evening.    SITagliptin (JANUVIA) 25 MG Tab Take 1 tablet (25 mg total) by mouth once daily.    sodium bicarbonate 325 MG tablet Take 1 tablet (325 mg total) by mouth once daily.    budesonide-formoterol 160-4.5 mcg (SYMBICORT) 160-4.5 mcg/actuation HFAA Inhale 2 puffs into the lungs every 12 (twelve) hours. Controller    [DISCONTINUED] glipiZIDE (GLUCOTROL) 5 MG TR24 Take 1 tablet (5 mg total) by mouth daily with breakfast.     Family History       Problem Relation (Age of Onset)    Alcohol abuse Father    Asthma Son    Cancer Other    Diabetes Mother, Sister          Tobacco Use    Smoking status: Never Smoker    Smokeless tobacco: Never Used   Substance and Sexual Activity    Alcohol use: Never    Drug use: Never    Sexual activity: Not Currently     Review of Systems   Constitutional: Positive for chills and malaise/fatigue. Negative for fever.   HENT:  Negative for ear pain and sore throat.    Eyes:  Negative for visual disturbance.   Cardiovascular:  Positive for dyspnea on exertion, leg swelling and orthopnea. Negative for chest pain, palpitations and syncope.   Respiratory:  Positive for shortness of breath and wheezing. Negative for cough.    Endocrine: Negative for polydipsia and polyuria.   Hematologic/Lymphatic: Negative for bleeding problem.   Gastrointestinal:  Positive for heartburn. Negative for abdominal pain, change in bowel habit, hematemesis, hematochezia, jaundice,  melena, nausea and vomiting.   Genitourinary:  Negative for dysuria.   Neurological:  Positive for light-headedness. Negative for dizziness.   All other systems reviewed and are negative.  Objective:     Vital Signs (Most Recent):  Temp: 98.1 °F (36.7 °C) (04/20/22 0344)  Pulse: (!) 57 (04/20/22 0445)  Resp: 13 (04/20/22 0445)  BP: (!) 149/62 (04/20/22 0445)  SpO2: 97 % (04/20/22 0445)   Vital Signs (24h Range):  Temp:  [98.1 °F (36.7 °C)-98.3 °F (36.8 °C)] 98.1 °F (36.7 °C)  Pulse:  [53-66] 57  Resp:  [12-61] 13  SpO2:  [88 %-97 %] 97 %  BP: (149-188)/(55-85) 149/62     Weight: 77.6 kg (171 lb)  Body mass index is 26.78 kg/m².    SpO2: 97 %         Intake/Output Summary (Last 24 hours) at 4/20/2022 1106  Last data filed at 4/20/2022 0526  Gross per 24 hour   Intake --   Output 1470 ml   Net -1470 ml       Lines/Drains/Airways       Drain  Duration                  Urethral Catheter 04/19/22 1130 Non-latex 16 Fr. <1 day              Peripheral Intravenous Line  Duration                  Peripheral IV - Single Lumen 04/19/22 1105 20 G Left Wrist 1 day                    Physical Exam  Vitals reviewed.   Constitutional:       General: She is not in acute distress.  HENT:      Nose: Nose normal. No congestion.   Eyes:      General: No scleral icterus.     Pupils: Pupils are equal, round, and reactive to light.   Neck:      Vascular: No carotid bruit.   Cardiovascular:      Rate and Rhythm: Regular rhythm. Bradycardia present.      Pulses: Normal pulses.      Heart sounds: Normal heart sounds.   Pulmonary:      Effort: Pulmonary effort is normal. No respiratory distress.      Breath sounds: Rales present. No wheezing or rhonchi.   Abdominal:      General: Bowel sounds are normal. There is no distension.      Palpations: Abdomen is soft.      Tenderness: There is no abdominal tenderness.   Musculoskeletal:      Cervical back: Neck supple.      Right lower leg: No edema.      Left lower leg: No edema.   Skin:     General:  Skin is warm and dry.      Coloration: Skin is not jaundiced.   Neurological:      Mental Status: She is alert and oriented to person, place, and time.       Significant Labs: ABG: No results for input(s): PH, PCO2, HCO3, POCSATURATED, BE in the last 48 hours., Blood Culture: No results for input(s): LABBLOO in the last 48 hours., BMP:   Recent Labs   Lab 04/19/22  1207 04/20/22  0420   * 144*    143   K 4.8 4.8    112*   CO2 24 23   BUN 72* 79*   CREATININE 6.46* 6.37*   CALCIUM 7.9* 7.5*   MG 1.7 1.9   , CMP   Recent Labs   Lab 04/19/22  1207 04/20/22  0420    143   K 4.8 4.8    112*   CO2 24 23   * 144*   BUN 72* 79*   CREATININE 6.46* 6.37*   CALCIUM 7.9* 7.5*   PROT 7.1  --    ALBUMIN 3.1*  --    BILITOT 0.5  --    ALKPHOS 50*  --    AST 30  --    ALT 21  --    ANIONGAP 15 13   EGFRNONAA 7* 7*   , CBC   Recent Labs   Lab 04/19/22  1207 04/20/22  0420   WBC 8.13 7.27   HGB 7.6* 6.8*   HCT 25.8* 23.1*    135*   , INR No results for input(s): INR, PROTIME in the last 48 hours., Lipid Panel No results for input(s): CHOL, HDL, LDLCALC, TRIG, CHOLHDL in the last 48 hours., and Troponin No results for input(s): TROPONINI in the last 48 hours.    Significant Imaging: Echocardiogram: Transthoracic echo (TTE) complete (Cupid Only): No results found for this or any previous visit., EKG: reviewed, and X-Ray: CXR: X-Ray Chest 1 View (CXR): No results found for this visit on 04/19/22.    Assessment and Plan:     Elevated troponin  - Patient seen and evaluated by Dr. Chatterjee  - Troponin 200, 184, 155  - EKG SR with RBBB and nonspecific ST-T abnormalities  - BNP 20,800 (baseline a year ago 975)  - Echo pending  - Troponin elevation in setting of CKD stage 5 and hypoxemia (likely type II supply/demand mismatch), no previous ischemic eval, will plan for Lexiscan tomorrow to evaluate for high risk ischemia  - IV lasix 40mg x 1 dose  - Further recommendations to follow    Anemia of  chronic renal failure  - Hgb 6.8 today, receiving 1 unit PRBCs today  - Being followed by primary medical team    CKD (chronic kidney disease), stage IV  - Creatinine 6.4  - Being followed by primary medical team and nephrology        VTE Risk Mitigation (From admission, onward)         Ordered     heparin (porcine) injection 5,000 Units  Every 12 hours         04/19/22 1631     IP VTE HIGH RISK PATIENT  Once         04/19/22 1631     Place sequential compression device  Until discontinued         04/19/22 1631            PT seen and examined, chart reviewed, essentially agree with findings as above.                         CC: Shortness of breath              HPI: Pt reports increasing shortness of breath over last several days since hospital discharge, and increased lower extremity swelling.           PE: agree with above               Labs, Xrays, EKGs reviewed, esentially agree with findings as above.        IMP/Plan;                1. Elevated hsT, curve flat, suspect due to poor renal clearance, type 2 MI due to demand perfusion mismatch, however multiple risk factors, recommend stress imaging to evaluate for ischemic substrate.    Thank you for your consult. I will follow-up with patient. Please contact us if you have any additional questions.    Sandra Ruiz, FNP  Cardiology   [facility] - Orthopedic

## 2022-04-20 NOTE — ASSESSMENT & PLAN NOTE
- Patient seen and evaluated by Dr. Chatterjee  - Troponin 200, 184, 155  - EKG SR with RBBB and nonspecific ST-T abnormalities  - BNP 20,800 (baseline a year ago 975)  - Echo pending  - Troponin elevation in setting of CKD stage 5 and hypoxemia (likely type II supply/demand mismatch), no previous ischemic eval, will plan for Lexiscan tomorrow to evaluate for high risk ischemia  - IV lasix 40mg x 1 dose  - Further recommendations to follow

## 2022-04-20 NOTE — H&P
[facility] Emergency Department  Hospital Medicine  History & Physical    Patient Name: Bria Ray  MRN: [id number]  Patient Class: IP- Inpatient  Admission Date: 4/19/2022  Attending Physician: Roberto Meza MD   Primary Care Provider: Lorena Richardson MD         Patient information was obtained from parent, relative(s) and ER records.     Subjective:     Principal Problem:<principal problem not specified>    Chief Complaint:   Chief Complaint   Patient presents with    Shortness of Breath        HPI: 87 y/o female admitted today from [facility] for SOB. The patient was transferred from [facility] after presenting there for increased SOB over the last week. She was discharged from [facility] one week ago with worsening renal disease. Currently she is followed by Dr. Fields, Nephrologist and not on HD. Since her discharge last week she has increasingly gotten more SOB with orthopnea at night. Her symptoms are exacerbated with exertion and lying flat. There is no reported chest pain, vomiting, diarrhea, diaphoresis, vision change, numbness or recent illness.                     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, without long-term current use of insulin     Vertigo     Vitamin D deficiency        Past Surgical History:   Procedure Laterality Date    BLADDER SUSPENSION      BRONCHOSCOPY       11/2020    CHOLECYSTECTOMY      FRACTURE SURGERY      HERNIA  REPAIR      HIP FRACTURE SURGERY Right 2005    HYSTERECTOMY         Review of patient's allergies indicates:  No Known Allergies    Current Facility-Administered Medications on File Prior to Encounter   Medication    [COMPLETED] furosemide injection 120 mg    [COMPLETED] nitroGLYCERIN 2% TD oint ointment 0.5 inch     Current Outpatient Medications on File Prior to Encounter   Medication Sig    albuterol (PROVENTIL) 2.5 mg /3 mL (0.083 %) nebulizer solution     albuterol (VENTOLIN HFA) 90 mcg/actuation inhaler Inhale 2 puffs into the lungs every 6 (six) hours as needed for Wheezing. Rescue    allopurinoL (ZYLOPRIM) 100 MG tablet Take 1 tablet (100 mg total) by mouth once daily.    amLODIPine (NORVASC) 5 MG tablet Take 1 tablet (5 mg total) by mouth once daily.    aspirin (ECOTRIN) 81 MG EC tablet Take 1 tablet (81 mg total) by mouth once daily.    calcium carbonate (TUMS) 200 mg calcium (500 mg) chewable tablet Take 1 tablet (500 mg total) by mouth once daily.    cyanocobalamin (VITAMIN B-12) 100 MCG tablet Take 1 tablet (100 mcg total) by mouth once daily.    gabapentin (NEURONTIN) 100 MG capsule Take 1 capsule (100 mg total) by mouth 2 (two) times daily.    hydrALAZINE (APRESOLINE) 50 MG tablet Take 1 tablet (50 mg total) by mouth every 8 (eight) hours.    HYDROcodone-acetaminophen (NORCO)  mg per tablet Take 1 tablet by mouth every 12 (twelve) hours.    metoprolol tartrate (LOPRESSOR) 25 MG tablet Take 1 tablet (25 mg total) by mouth 2 (two) times daily.    multivitamin with folic acid 400 mcg Tab Take 1 tablet by mouth once daily.    simvastatin (ZOCOR) 20 MG tablet Take 1 tablet (20 mg total) by mouth every evening.    SITagliptin (JANUVIA) 25 MG Tab Take 1 tablet (25 mg total) by mouth once daily.    sodium bicarbonate 325 MG tablet Take 1 tablet (325 mg total) by mouth once daily.    budesonide-formoterol 160-4.5 mcg (SYMBICORT) 160-4.5 mcg/actuation HFAA Inhale 2 puffs into the lungs  every 12 (twelve) hours. Controller    [DISCONTINUED] glipiZIDE (GLUCOTROL) 5 MG TR24 Take 1 tablet (5 mg total) by mouth daily with breakfast.     Family History       Problem Relation (Age of Onset)    Alcohol abuse Father    Asthma Son    Cancer Other    Diabetes Mother, Sister          Tobacco Use    Smoking status: Never Smoker    Smokeless tobacco: Never Used   Substance and Sexual Activity    Alcohol use: Never    Drug use: Never    Sexual activity: Not Currently     Review of Systems   Constitutional:  Positive for activity change.   Respiratory:  Positive for shortness of breath. Negative for chest tightness.    Cardiovascular:  Negative for chest pain.   Gastrointestinal:  Negative for abdominal pain, diarrhea and nausea.   Neurological:  Positive for weakness. Negative for dizziness, light-headedness and headaches.   All other systems reviewed and are negative.  Objective:     Vital Signs (Most Recent):  Temp: 98.3 °F (36.8 °C) (04/19/22 1525)  Pulse: (!) 55 (04/19/22 1545)  Resp: 14 (04/19/22 1545)  BP: (!) 158/60 (04/19/22 1545)  SpO2: (!) 93 % (04/19/22 1545)   Vital Signs (24h Range):  Temp:  [97.8 °F (36.6 °C)-98.3 °F (36.8 °C)] 98.3 °F (36.8 °C)  Pulse:  [55-63] 55  Resp:  [14-61] 14  SpO2:  [86 %-96 %] 93 %  BP: (158-183)/(60-85) 158/60     Weight: 77.6 kg (171 lb)  Body mass index is 26.78 kg/m².    Physical Exam  Vitals reviewed.   Constitutional:       Appearance: She is ill-appearing.   HENT:      Head: Normocephalic.      Nose: Nose normal.      Mouth/Throat:      Mouth: Mucous membranes are moist.   Eyes:      Extraocular Movements: Extraocular movements intact.      Pupils: Pupils are equal, round, and reactive to light.   Cardiovascular:      Rate and Rhythm: Bradycardia present. Rhythm irregular.      Pulses: Normal pulses.   Pulmonary:      Breath sounds: Rales present.   Abdominal:      General: Bowel sounds are normal.      Palpations: Abdomen is soft.      Tenderness: There is no  abdominal tenderness. There is no guarding.   Musculoskeletal:         General: Normal range of motion.      Cervical back: Normal range of motion and neck supple.      Right lower leg: 3+ Pitting Edema present.      Left lower leg: 3+ Pitting Edema present.   Skin:     General: Skin is warm and dry.      Capillary Refill: Capillary refill takes 2 to 3 seconds.   Neurological:      General: No focal deficit present.      Mental Status: She is alert and oriented to person, place, and time. Mental status is at baseline.      Cranial Nerves: No cranial nerve deficit.      Sensory: No sensory deficit.   Psychiatric:         Mood and Affect: Mood normal.         Behavior: Behavior normal.         CRANIAL NERVES     CN III, IV, VI   Pupils are equal, round, and reactive to light.     Significant Labs: All pertinent labs within the past 24 hours have been reviewed.    Significant Imaging: I have reviewed all pertinent imaging results/findings within the past 24 hours.    Assessment/Plan:     Macrocytic anemia  Hg 7.6 and problems with anemia in the past.    Will monitor carefully.        Elevated troponin  Likely from chronic renal failure.  Troponin >200  Repeat troponin level  Cardiology consulted        DVT prophylaxis  * Heparin 5000 units q 8 hours.       Type 2 diabetes mellitus  Low scale SSI.  Accu checks ac/hs   Hold Metformin      Anemia of chronic renal failure  Cbc daily  H/H 7/25  Likely anemia from chronic renal failure.    CKD (chronic kidney disease), stage IV  * Telemetry monitoring    * Ekg:  Sr with  Pac's. 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   * sCre: 6.46.    Baseline sCre. 6 since 4/2021  * NA bicarbonate tabs daily      Essential hypertension  Chronic, resume home medications        VTE Risk Mitigation (From admission, onward)         Ordered     heparin (porcine)  injection 5,000 Units  Every 12 hours         04/19/22 1631     IP VTE HIGH RISK PATIENT  Once         04/19/22 1631     Place sequential compression device  Until discontinued         04/19/22 1631                   DEBORAH Figueroa  Department of Hospital Medicine   [facility] - Emergency Department

## 2022-04-20 NOTE — H&P
[facility] [place] Emergency Department  Hospital Medicine  History & Physical    Patient Name: Bria Ray  MRN: [id number]  Patient Class: IP- Inpatient  Admission Date: 4/19/2022  Attending Physician: Roberto Meza MD   Primary Care Provider: Lorena Richardson MD         Patient information was obtained from patient, past medical records and ER records.     Subjective:     Principal Problem:Acute hypoxemic respiratory failure    Chief Complaint:   Chief Complaint   Patient presents with    Shortness of Breath        HPI: 85 y/o female admitted today from [facility] for SOB. The patient was transferred from [facility] after presenting there for increased SOB over the last week. She was discharged from [facility] one week ago with worsening renal disease. Currently she is followed by Dr. Fields, Nephrologist and not on HD. Since her discharge last week she has increasingly gotten more SOB with orthopnea at night. Her symptoms are exacerbated with exertion and lying flat. There is no reported chest pain, vomiting, diarrhea, diaphoresis, vision change, numbness or recent illness.                     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, without long-term current use of insulin     Vertigo     Vitamin D deficiency        Past Surgical History:   Procedure Laterality Date    BLADDER SUSPENSION      BRONCHOSCOPY       11/2020    CHOLECYSTECTOMY      FRACTURE SURGERY       HERNIA REPAIR      HIP FRACTURE SURGERY Right 2005    HYSTERECTOMY         Review of patient's allergies indicates:  No Known Allergies    Current Facility-Administered Medications on File Prior to Encounter   Medication    [COMPLETED] furosemide injection 120 mg    [COMPLETED] nitroGLYCERIN 2% TD oint ointment 0.5 inch     Current Outpatient Medications on File Prior to Encounter   Medication Sig    albuterol (PROVENTIL) 2.5 mg /3 mL (0.083 %) nebulizer solution     albuterol (VENTOLIN HFA) 90 mcg/actuation inhaler Inhale 2 puffs into the lungs every 6 (six) hours as needed for Wheezing. Rescue    allopurinoL (ZYLOPRIM) 100 MG tablet Take 1 tablet (100 mg total) by mouth once daily.    amLODIPine (NORVASC) 5 MG tablet Take 1 tablet (5 mg total) by mouth once daily.    aspirin (ECOTRIN) 81 MG EC tablet Take 1 tablet (81 mg total) by mouth once daily.    calcium carbonate (TUMS) 200 mg calcium (500 mg) chewable tablet Take 1 tablet (500 mg total) by mouth once daily.    cyanocobalamin (VITAMIN B-12) 100 MCG tablet Take 1 tablet (100 mcg total) by mouth once daily.    gabapentin (NEURONTIN) 100 MG capsule Take 1 capsule (100 mg total) by mouth 2 (two) times daily.    hydrALAZINE (APRESOLINE) 50 MG tablet Take 1 tablet (50 mg total) by mouth every 8 (eight) hours.    HYDROcodone-acetaminophen (NORCO)  mg per tablet Take 1 tablet by mouth every 12 (twelve) hours.    metoprolol tartrate (LOPRESSOR) 25 MG tablet Take 1 tablet (25 mg total) by mouth 2 (two) times daily.    multivitamin with folic acid 400 mcg Tab Take 1 tablet by mouth once daily.    simvastatin (ZOCOR) 20 MG tablet Take 1 tablet (20 mg total) by mouth every evening.    SITagliptin (JANUVIA) 25 MG Tab Take 1 tablet (25 mg total) by mouth once daily.    sodium bicarbonate 325 MG tablet Take 1 tablet (325 mg total) by mouth once daily.    budesonide-formoterol 160-4.5 mcg (SYMBICORT) 160-4.5 mcg/actuation HFAA Inhale 2 puffs  into the lungs every 12 (twelve) hours. Controller    [DISCONTINUED] glipiZIDE (GLUCOTROL) 5 MG TR24 Take 1 tablet (5 mg total) by mouth daily with breakfast.     Family History       Problem Relation (Age of Onset)    Alcohol abuse Father    Asthma Son    Cancer Other    Diabetes Mother, Sister          Tobacco Use    Smoking status: Never Smoker    Smokeless tobacco: Never Used   Substance and Sexual Activity    Alcohol use: Never    Drug use: Never    Sexual activity: Not Currently     Review of Systems   Constitutional:  Positive for activity change.   Respiratory:  Positive for shortness of breath. Negative for chest tightness.    Cardiovascular:  Negative for chest pain.   Gastrointestinal:  Negative for abdominal pain, diarrhea and nausea.   Neurological:  Positive for weakness. Negative for dizziness, light-headedness and headaches.   All other systems reviewed and are negative.  Objective:     Vital Signs (Most Recent):  Temp: 98.1 °F (36.7 °C) (04/20/22 0344)  Pulse: (!) 57 (04/20/22 0445)  Resp: 13 (04/20/22 0445)  BP: (!) 149/62 (04/20/22 0445)  SpO2: 97 % (04/20/22 0445)   Vital Signs (24h Range):  Temp:  [97.8 °F (36.6 °C)-98.3 °F (36.8 °C)] 98.1 °F (36.7 °C)  Pulse:  [53-66] 57  Resp:  [12-61] 13  SpO2:  [86 %-97 %] 97 %  BP: (149-188)/(55-85) 149/62     Weight: 77.6 kg (171 lb)  Body mass index is 26.78 kg/m².    Physical Exam  Vitals reviewed.   Constitutional:       Appearance: She is ill-appearing.   HENT:      Head: Normocephalic.      Nose: Nose normal.      Mouth/Throat:      Mouth: Mucous membranes are moist.   Eyes:      Extraocular Movements: Extraocular movements intact.      Pupils: Pupils are equal, round, and reactive to light.   Cardiovascular:      Rate and Rhythm: Bradycardia present. Rhythm irregular.      Pulses: Normal pulses.   Pulmonary:      Breath sounds: Rales present.   Abdominal:      General: Bowel sounds are normal.      Palpations: Abdomen is soft.      Tenderness:  There is no abdominal tenderness. There is no guarding.   Musculoskeletal:         General: Normal range of motion.      Cervical back: Normal range of motion and neck supple.      Right lower leg: 3+ Pitting Edema present.      Left lower leg: 3+ Pitting Edema present.   Skin:     General: Skin is warm and dry.      Capillary Refill: Capillary refill takes 2 to 3 seconds.   Neurological:      General: No focal deficit present.      Mental Status: She is alert and oriented to person, place, and time. Mental status is at baseline.      Cranial Nerves: No cranial nerve deficit.      Sensory: No sensory deficit.   Psychiatric:         Mood and Affect: Mood normal.         Behavior: Behavior normal.         CRANIAL NERVES     CN III, IV, VI   Pupils are equal, round, and reactive to light.     Significant Labs: All pertinent labs within the past 24 hours have been reviewed.  BMP:   Recent Labs   Lab 04/20/22  0420   *      K 4.8   *   CO2 23   BUN 79*   CREATININE 6.37*   CALCIUM 7.5*   MG 1.9     CBC:   Recent Labs   Lab 04/19/22  1207 04/20/22  0420   WBC 8.13 7.27   HGB 7.6* 6.8*   HCT 25.8* 23.1*    135*     Cardiac Markers: No results for input(s): CKMB, MYOGLOBIN, BNP, TROPISTAT in the last 48 hours.  Magnesium:   Recent Labs   Lab 04/19/22  1207 04/20/22  0420   MG 1.7 1.9     Troponin: No results for input(s): TROPONINI in the last 48 hours.    Significant Imaging: I have reviewed all pertinent imaging results/findings within the past 24 hours.    Assessment/Plan:     * Acute hypoxemic respiratory failure  Patient with Hypoxic Respiratory failure which is Acute.  she is not on home oxygen. Supplemental oxygen was provided and noted- Oxygen Concentration (%):  [28] 28.   Signs/symptoms of respiratory failure include- tachypnea, increased work of breathing and respiratory distress. Contributing diagnoses includes - CHF and renal failure  Labs and images were reviewed. Patient Has not had  a recent ABG. Will treat underlying causes and adjust management of respiratory failure as follows-   Nephrology and cardiology consults.  Echocardiogram.      Macrocytic anemia  Hg 7.6 and problems with anemia in the past.    Will monitor carefully.    FOBT ordered.       Elevated troponin  Likely from chronic renal failure.  Troponin >200  Repeat troponin level  Cardiology consulted        DVT prophylaxis  * Heparin 5000 units q 8 hours.       Type 2 diabetes mellitus  Low scale SSI.  Accu checks ac/hs   Hold Metformin      Anemia of chronic renal failure  Cbc daily  H/H 7/25  Likely anemia from chronic renal failure.    CKD (chronic kidney disease), stage IV  * Telemetry monitoring    * Ekg:  Sr with  Pac's. 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   * sCre: 6.46.    Baseline sCre. 6 since 4/2021  * NA bicarbonate tabs daily      Essential hypertension  Chronic, resume home medications        VTE Risk Mitigation (From admission, onward)         Ordered     heparin (porcine) injection 5,000 Units  Every 12 hours         04/19/22 1631     IP VTE HIGH RISK PATIENT  Once         04/19/22 1631     Place sequential compression device  Until discontinued         04/19/22 1631                   Roberto Meza MD  Department of Hospital Medicine   [facility] - Emergency Department

## 2022-04-20 NOTE — SUBJECTIVE & OBJECTIVE
Interval History: Pt resting in bed. States her breathing is improved but still not good. She denies any CP or abd pain. Does have generalizes achiness and weakness. Discussed lab findings and the need for blood transfusion. Discussed cardiology and nephrology consult- verbalizes understanding; denies any questions at present.     Review of Systems   Constitutional:  Positive for activity change and chills.   Respiratory:  Positive for shortness of breath. Negative for chest tightness.    Cardiovascular:  Positive for leg swelling. Negative for chest pain.   Gastrointestinal:  Negative for abdominal pain, diarrhea and nausea.   Musculoskeletal:  Positive for arthralgias and myalgias.   Neurological:  Positive for weakness. Negative for dizziness, light-headedness and headaches.   All other systems reviewed and are negative.  Objective:     Vital Signs (Most Recent):  Temp: 97.9 °F (36.6 °C) (04/20/22 1220)  Pulse: 60 (04/20/22 1220)  Resp: 18 (04/20/22 1220)  BP: (!) 178/83 (04/20/22 1220)  SpO2: (!) 94 % (04/20/22 1220)   Vital Signs (24h Range):  Temp:  [97.9 °F (36.6 °C)-98.1 °F (36.7 °C)] 97.9 °F (36.6 °C)  Pulse:  [56-66] 60  Resp:  [13-23] 18  SpO2:  [88 %-97 %] 94 %  BP: (149-188)/(55-83) 178/83     Weight: 77.6 kg (171 lb)  Body mass index is 26.78 kg/m².    Intake/Output Summary (Last 24 hours) at 4/20/2022 1706  Last data filed at 4/20/2022 1345  Gross per 24 hour   Intake 199.17 ml   Output 1470 ml   Net -1270.83 ml      Physical Exam  Vitals and nursing note reviewed.   Constitutional:       Appearance: She is ill-appearing.   HENT:      Head: Normocephalic.      Nose: Nose normal.      Mouth/Throat:      Mouth: Mucous membranes are moist.   Eyes:      Extraocular Movements: Extraocular movements intact.      Pupils: Pupils are equal, round, and reactive to light.   Cardiovascular:      Rate and Rhythm: Bradycardia present. Rhythm irregular.      Pulses: Normal pulses.   Pulmonary:      Breath sounds:  Rales present.   Abdominal:      General: Bowel sounds are normal.      Palpations: Abdomen is soft.      Tenderness: There is no abdominal tenderness. There is no guarding.   Musculoskeletal:         General: Normal range of motion.      Cervical back: Normal range of motion and neck supple.      Right lower leg: Pitting Edema present.      Left lower leg: Pitting Edema present.   Skin:     General: Skin is warm and dry.      Capillary Refill: Capillary refill takes 2 to 3 seconds.   Neurological:      General: No focal deficit present.      Mental Status: She is alert and oriented to person, place, and time. Mental status is at baseline.      Cranial Nerves: No cranial nerve deficit.      Sensory: No sensory deficit.   Psychiatric:         Mood and Affect: Mood normal.         Behavior: Behavior normal.       Significant Labs: All pertinent labs within the past 24 hours have been reviewed.  Recent Lab Results  (Last 5 results in the past 24 hours)        04/20/22  1646   04/20/22  1052   04/20/22  0905   04/20/22  0655   04/20/22  0420        Unit Blood Type Code       5100                5100  [P]         Unit Expiration       [id number]  [P]         Anion Gap         13       Ao root annulus   2.20             Ao peak jeanna   1.6             Ao VTI   42.00             AV valve area   1.48             AORTIC VALVE CUSP SEPERATION   [id number]             AV mean gradient   6             AV index (prosthetic)   0.74             AV peak gradient   10             AV Velocity Ratio   0.75             Baso #         0.04       Basophil %         0.6       BSA   1.91             BUN         79       BUN/CREAT RATIO         12       Calcium         7.5       Chloride         112       CO2         23       Creatinine         6.37       CROSSMATCH INTERPRETATION       Compatible                Compatible  [P]         Left Ventricle Relative Wall Thickness   0.53             Differential  Type         Auto       DISPENSE STATUS       Released                Issued  [P]         Echo EF Estimated   70             eGFR if non          7       EF   70             Eos #         0.06       Eosinophil %         0.8       E wave deceleration time   163             FS   44             Glucose         144       Group & Rh       A POS         Hematocrit         23.1       Hemoglobin         6.8       Immature Grans (Abs)         0.04       Immature Granulocytes         0.6       INDIRECT JHON       NEG         IVC ostium   1.5             IVS   1.37             LA size   3.40             LVOT area   2.0             LV Diastolic Volume   103.40             LV Diastolic Volume Index   54.71             LVIDd   4.72             LVIDs   2.62             LV mass   243.47             LV Mass Index   129             Left Ventricular Outflow Tract Mean Gradient   3.00             LVOT diameter   1.60             LVOT peak aren   1.2             LVOT stroke volume   62.30             LVOT peak VTI   31.00             LV Systolic Volume   25.10             LV Systolic Volume Index   13.3             Lymph #         0.55       Lymph %         7.6       Magnesium         1.9       MCH         30.9       MCHC         29.4       MCV         105.0       Mono #         0.79       Mono %         10.9       MPV         11.5       MV valve area p 1/2 method   2.82             MV valve area by continuity eq   1.21             MV mean gradient   2             MV peak gradient   6             MV Peak E Aren   1.06             MV stenosis pressure 1/2 time   78             MV VTI   51.6             Neutrophils, Abs         5.79       Neutrophils Relative         79.5       nRBC         0.0       NUCLEATED RBC ABSOLUTE         0.00       Platelets         135       POC Glucose 137               Potassium         4.8       Product Code       [id number]                [id number]  [P]         Posterior Wall   1.26              Right Atrial Pressure (from IVC)   3             RA Major Axis   4.30             RBC         2.20       RDW         15.6       RVDD   4.60             Sodium         143       TAPSE   2.00             Triscuspid Valve Regurgitation Peak Gradient   38             TR Max Aren   3.10             Troponin I High Sensitivity     104.0           TV rest pulmonary artery pressure   41             Unit ABO/Rh       O POS                O POS  [P]         UNIT NUMBER       [id number]                [id number]  [P]         WBC         7.27                               [P] - Preliminary Result               Significant Imaging: I have reviewed all pertinent imaging results/findings within the past 24 hours.  CXR with findings of pulmonary venous congestion

## 2022-04-20 NOTE — ASSESSMENT & PLAN NOTE
Cbc daily  H/H 7/25  Likely anemia from chronic renal failure.  4/20  - H/H 6/23  - 1u PRBCs pending

## 2022-04-20 NOTE — ASSESSMENT & PLAN NOTE
Hg 7.6 and problems with anemia in the past.    Will monitor carefully.    FOBT ordered.   4/20  - FOBT ordered  - was neg on previous admit   - h/h 6/23  - 1u PRBCs to transfuse

## 2022-04-20 NOTE — SUBJECTIVE & OBJECTIVE
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, without long-term current use of insulin     Vertigo     Vitamin D deficiency        Past Surgical History:   Procedure Laterality Date    BLADDER SUSPENSION      BRONCHOSCOPY       11/2020    CHOLECYSTECTOMY      FRACTURE SURGERY      HERNIA REPAIR      HIP FRACTURE SURGERY Right 2005    HYSTERECTOMY         Review of patient's allergies indicates:  No Known Allergies  Current Facility-Administered Medications   Medication Frequency    0.9%  NaCl infusion (for blood administration) Q24H PRN    acetaminophen tablet 1,000 mg Q8H PRN    acetaminophen tablet 650 mg Q8H PRN    acetaminophen tablet 650 mg Q4H PRN    allopurinoL tablet 100 mg Daily    aluminum-magnesium hydroxide-simethicone 200-200-20 mg/5 mL suspension 30 mL QID PRN    amLODIPine tablet 5 mg Daily    aspirin EC tablet 81 mg Daily    dextrose 10% bolus 125 mL PRN    dextrose 10% bolus 250 mL PRN    furosemide injection 40 mg Once    gabapentin capsule 100 mg QHS    glucagon (human recombinant) injection 1 mg PRN    heparin (porcine) injection 5,000 Units Q12H    hydrALAZINE tablet 50 mg Q8H    insulin aspart U-100 injection 0-5 Units QID (AC + HS) PRN    magnesium oxide tablet 800 mg PRN    magnesium oxide tablet 800 mg PRN    melatonin tablet 6 mg Nightly PRN    metoprolol tartrate (LOPRESSOR) tablet 25 mg BID    naloxone 0.4 mg/mL injection 0.02 mg PRN    ondansetron disintegrating tablet 8 mg Q8H  PRN    polyethylene glycol packet 17 g Daily PRN    potassium bicarbonate disintegrating tablet 35 mEq PRN    potassium bicarbonate disintegrating tablet 50 mEq PRN    potassium bicarbonate disintegrating tablet 60 mEq PRN    potassium, sodium phosphates 280-160-250 mg packet 2 packet PRN    potassium, sodium phosphates 280-160-250 mg packet 2 packet PRN    potassium, sodium phosphates 280-160-250 mg packet 2 packet PRN    sodium bicarbonate tablet 325 mg Daily    sodium chloride 0.9% flush 10 mL PRN     Family History       Problem Relation (Age of Onset)    Alcohol abuse Father    Asthma Son    Cancer Other    Diabetes Mother, Sister          Tobacco Use    Smoking status: Never Smoker    Smokeless tobacco: Never Used   Substance and Sexual Activity    Alcohol use: Never    Drug use: Never    Sexual activity: Not Currently     Review of Systems   Constitutional:  Positive for fatigue. Negative for appetite change and fever.   HENT: Negative.     Respiratory:  Positive for shortness of breath. Negative for wheezing.    Cardiovascular:  Positive for leg swelling. Negative for chest pain.   Gastrointestinal:  Negative for abdominal pain, diarrhea, nausea and vomiting.   Genitourinary:  Negative for dysuria and hematuria.   Musculoskeletal:  Negative for arthralgias.   Neurological:  Negative for seizures, speech difficulty and numbness.   Objective:     Vital Signs (Most Recent):  Temp: 97.9 °F (36.6 °C) (04/20/22 1220)  Pulse: 60 (04/20/22 1220)  Resp: 18 (04/20/22 1220)  BP: (!) 178/83 (04/20/22 1220)  SpO2: (!) 94 % (04/20/22 1220)   Vital Signs (24h Range):  Temp:  [97.9 °F (36.6 °C)-98.3 °F (36.8 °C)] 97.9 °F (36.6 °C)  Pulse:  [53-66] 60  Resp:  [12-61] 18  SpO2:  [88 %-97 %] 94 %  BP: (149-188)/(55-83) 178/83     Weight: 77.6 kg (171 lb) (04/19/22 1525)  Body mass index is 26.78 kg/m².  Body surface area is 1.92 meters squared.    I/O last 3 completed shifts:  In: -   Out: 1470 [Urine:1470]    Physical  Exam  Constitutional:       General: She is not in acute distress.  HENT:      Head: Normocephalic and atraumatic.   Eyes:      Pupils: Pupils are equal, round, and reactive to light.   Cardiovascular:      Rate and Rhythm: Normal rate and regular rhythm.      Heart sounds: No murmur heard.    No friction rub. No gallop.   Pulmonary:      Effort: No respiratory distress.      Breath sounds: Rales present.   Abdominal:      General: Bowel sounds are normal.      Tenderness: There is no abdominal tenderness. There is no guarding.   Musculoskeletal:      Right lower leg: No edema.      Left lower leg: No edema.      Comments: Trace lower extremity edema   Neurological:      Mental Status: She is alert and oriented to person, place, and time.   Psychiatric:         Behavior: Behavior normal.       Significant Labs:  BMP:   Recent Labs   Lab 04/20/22  0420   *      K 4.8   *   CO2 23   BUN 79*   CREATININE 6.37*   CALCIUM 7.5*   MG 1.9     CBC:   Recent Labs   Lab 04/20/22  0420   WBC 7.27   RBC 2.20*   HGB 6.8*   HCT 23.1*   *   .0*   MCH 30.9   MCHC 29.4*       Significant Imaging:

## 2022-04-20 NOTE — ASSESSMENT & PLAN NOTE
Low scale SSI.  Accu checks ac/hs   Hold Metformin  4/20  - continue SSI   - accuchecks ACHS   - BG range 137-248

## 2022-04-20 NOTE — HPI
86-year-old woman with CKD 5. Her baseline creatinine was 4 prior to her last admission.  It dilcia to nearly 7, then improved slightly to 6-6.5.  She was volume depleted at the time of that admission.  Creatinine improved slightly with IV fluid.  She was discharged to swing bed with creatinine  6.5.  She developed progressive shortness of breath over the past 3-4 days.  She denies chest pain.  No pleuritic chest pain or hemoptysis.

## 2022-04-20 NOTE — SUBJECTIVE & OBJECTIVE
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, without long-term current use of insulin     Vertigo     Vitamin D deficiency        Past Surgical History:   Procedure Laterality Date    BLADDER SUSPENSION      BRONCHOSCOPY       11/2020    CHOLECYSTECTOMY      FRACTURE SURGERY      HERNIA REPAIR      HIP FRACTURE SURGERY Right 2005    HYSTERECTOMY         Review of patient's allergies indicates:  No Known Allergies    Current Facility-Administered Medications on File Prior to Encounter   Medication    [COMPLETED] furosemide injection 120 mg    [COMPLETED] nitroGLYCERIN 2% TD oint ointment 0.5 inch     Current Outpatient Medications on File Prior to Encounter   Medication Sig    albuterol (PROVENTIL) 2.5 mg /3 mL (0.083 %) nebulizer solution     albuterol (VENTOLIN HFA) 90 mcg/actuation inhaler Inhale 2 puffs into the lungs every 6 (six) hours as needed for Wheezing. Rescue    allopurinoL (ZYLOPRIM) 100 MG tablet Take 1 tablet (100 mg total) by mouth once daily.    amLODIPine (NORVASC) 5 MG tablet Take 1 tablet (5 mg total) by mouth once daily.    aspirin (ECOTRIN) 81 MG EC tablet Take 1 tablet (81 mg total) by mouth once daily.    calcium carbonate (TUMS) 200 mg calcium (500 mg) chewable tablet Take 1 tablet (500 mg total) by mouth once daily.    cyanocobalamin (VITAMIN B-12) 100 MCG tablet Take 1 tablet (100 mcg total) by mouth once daily.    gabapentin (NEURONTIN) 100 MG capsule Take 1 capsule (100 mg  total) by mouth 2 (two) times daily.    hydrALAZINE (APRESOLINE) 50 MG tablet Take 1 tablet (50 mg total) by mouth every 8 (eight) hours.    HYDROcodone-acetaminophen (NORCO)  mg per tablet Take 1 tablet by mouth every 12 (twelve) hours.    metoprolol tartrate (LOPRESSOR) 25 MG tablet Take 1 tablet (25 mg total) by mouth 2 (two) times daily.    multivitamin with folic acid 400 mcg Tab Take 1 tablet by mouth once daily.    simvastatin (ZOCOR) 20 MG tablet Take 1 tablet (20 mg total) by mouth every evening.    SITagliptin (JANUVIA) 25 MG Tab Take 1 tablet (25 mg total) by mouth once daily.    sodium bicarbonate 325 MG tablet Take 1 tablet (325 mg total) by mouth once daily.    budesonide-formoterol 160-4.5 mcg (SYMBICORT) 160-4.5 mcg/actuation HFAA Inhale 2 puffs into the lungs every 12 (twelve) hours. Controller    [DISCONTINUED] glipiZIDE (GLUCOTROL) 5 MG TR24 Take 1 tablet (5 mg total) by mouth daily with breakfast.     Family History       Problem Relation (Age of Onset)    Alcohol abuse Father    Asthma Son    Cancer Other    Diabetes Mother, Sister          Tobacco Use    Smoking status: Never Smoker    Smokeless tobacco: Never Used   Substance and Sexual Activity    Alcohol use: Never    Drug use: Never    Sexual activity: Not Currently     Review of Systems   Constitutional:  Positive for activity change.   Respiratory:  Positive for shortness of breath. Negative for chest tightness.    Cardiovascular:  Negative for chest pain.   Gastrointestinal:  Negative for abdominal pain, diarrhea and nausea.   Neurological:  Positive for weakness. Negative for dizziness, light-headedness and headaches.   All other systems reviewed and are negative.  Objective:     Vital Signs (Most Recent):  Temp: 98.1 °F (36.7 °C) (04/20/22 0344)  Pulse: (!) 57 (04/20/22 0445)  Resp: 13 (04/20/22 0445)  BP: (!) 149/62 (04/20/22 0445)  SpO2: 97 % (04/20/22 0445)   Vital Signs (24h Range):  Temp:  [97.8 °F (36.6 °C)-98.3 °F (36.8  °C)] 98.1 °F (36.7 °C)  Pulse:  [53-66] 57  Resp:  [12-61] 13  SpO2:  [86 %-97 %] 97 %  BP: (149-188)/(55-85) 149/62     Weight: 77.6 kg (171 lb)  Body mass index is 26.78 kg/m².    Physical Exam  Vitals reviewed.   Constitutional:       Appearance: She is ill-appearing.   HENT:      Head: Normocephalic.      Nose: Nose normal.      Mouth/Throat:      Mouth: Mucous membranes are moist.   Eyes:      Extraocular Movements: Extraocular movements intact.      Pupils: Pupils are equal, round, and reactive to light.   Cardiovascular:      Rate and Rhythm: Bradycardia present. Rhythm irregular.      Pulses: Normal pulses.   Pulmonary:      Breath sounds: Rales present.   Abdominal:      General: Bowel sounds are normal.      Palpations: Abdomen is soft.      Tenderness: There is no abdominal tenderness. There is no guarding.   Musculoskeletal:         General: Normal range of motion.      Cervical back: Normal range of motion and neck supple.      Right lower leg: 3+ Pitting Edema present.      Left lower leg: 3+ Pitting Edema present.   Skin:     General: Skin is warm and dry.      Capillary Refill: Capillary refill takes 2 to 3 seconds.   Neurological:      General: No focal deficit present.      Mental Status: She is alert and oriented to person, place, and time. Mental status is at baseline.      Cranial Nerves: No cranial nerve deficit.      Sensory: No sensory deficit.   Psychiatric:         Mood and Affect: Mood normal.         Behavior: Behavior normal.         CRANIAL NERVES     CN III, IV, VI   Pupils are equal, round, and reactive to light.     Significant Labs: All pertinent labs within the past 24 hours have been reviewed.  BMP:   Recent Labs   Lab 04/20/22  0420   *      K 4.8   *   CO2 23   BUN 79*   CREATININE 6.37*   CALCIUM 7.5*   MG 1.9     CBC:   Recent Labs   Lab 04/19/22  1207 04/20/22  0420   WBC 8.13 7.27   HGB 7.6* 6.8*   HCT 25.8* 23.1*    135*     Cardiac Markers: No  results for input(s): CKMB, MYOGLOBIN, BNP, TROPISTAT in the last 48 hours.  Magnesium:   Recent Labs   Lab 04/19/22  1207 04/20/22  0420   MG 1.7 1.9     Troponin: No results for input(s): TROPONINI in the last 48 hours.    Significant Imaging: I have reviewed all pertinent imaging results/findings within the past 24 hours.

## 2022-04-20 NOTE — ASSESSMENT & PLAN NOTE
Patient with Hypoxic Respiratory failure which is Acute.  she is not on home oxygen. Supplemental oxygen was provided and noted-  .   Signs/symptoms of respiratory failure include- tachypnea, increased work of breathing and respiratory distress. Contributing diagnoses includes - CHF and renal failure  Labs and images were reviewed. Patient Has not had a recent ABG. Will treat underlying causes and adjust management of respiratory failure as follows-   Nephrology and cardiology consults.  Echocardiogram.    4/20  - was given lasix in ED  - second dose given today with adequate UOP and improvement of symptoms   - echo with EF 70%  - nephrology following- discussed need for dialysis- pt to discuss with family

## 2022-04-21 NOTE — PROGRESS NOTES
[facility] - Orthopedic  Nephrology  Progress Note    Patient Name: Bria Ray  MRN: [id number]  Admission Date: 4/19/2022  Hospital Length of Stay: 2 days  Attending Provider: Roberto Meza MD   Primary Care Physician: Lorena Richardson MD  Principal Problem:Acute hypoxemic respiratory failure    Subjective:     HPI: 86-year-old woman with CKD 5. Her baseline creatinine was 4 prior to her last admission.  It dilcia to nearly 7, then improved slightly to 6-6.5.  She was volume depleted at the time of that admission.  Creatinine improved slightly with IV fluid.  She was discharged to swing bed with creatinine  6.5.  She developed progressive shortness of breath over the past 3-4 days.  She denies chest pain.  No pleuritic chest pain or hemoptysis.      Interval History:  She reports orthopnea.  No chest pain.    Review of patient's allergies indicates:  No Known Allergies  Current Facility-Administered Medications   Medication Frequency    0.9%  NaCl infusion (for blood administration) Q24H PRN    acetaminophen tablet 1,000 mg Q8H PRN    acetaminophen tablet 650 mg Q8H PRN    acetaminophen tablet 650 mg Q4H PRN    allopurinoL tablet 100 mg Daily    aluminum-magnesium hydroxide-simethicone 200-200-20 mg/5 mL suspension 30 mL QID PRN    [START ON 4/22/2022] amLODIPine tablet 10 mg Daily    aspirin EC tablet 81 mg Daily    dextrose 10% bolus 125 mL PRN    dextrose 10% bolus 250 mL PRN    gabapentin capsule 100 mg QHS    glucagon (human recombinant) injection 1 mg PRN    hydrALAZINE tablet 75 mg Q8H    insulin aspart U-100 injection 0-5 Units QID (AC + HS) PRN    magnesium oxide tablet 800 mg PRN    magnesium oxide tablet 800 mg PRN    melatonin tablet 6 mg Nightly PRN    metoprolol tartrate (LOPRESSOR) tablet 25 mg BID    naloxone 0.4 mg/mL injection 0.02 mg PRN    ondansetron disintegrating tablet 8 mg Q8H PRN    polyethylene glycol packet 17 g Daily PRN    potassium bicarbonate  disintegrating tablet 35 mEq PRN    potassium bicarbonate disintegrating tablet 50 mEq PRN    potassium bicarbonate disintegrating tablet 60 mEq PRN    potassium, sodium phosphates 280-160-250 mg packet 2 packet PRN    potassium, sodium phosphates 280-160-250 mg packet 2 packet PRN    potassium, sodium phosphates 280-160-250 mg packet 2 packet PRN    sodium bicarbonate tablet 325 mg Daily    sodium chloride 0.9% flush 10 mL PRN       Objective:     Vital Signs (Most Recent):  Temp: 97.9 °F (36.6 °C) (04/21/22 1300)  Pulse: (!) 59 (04/21/22 1300)  Resp: 18 (04/21/22 1300)  BP: (!) 175/69 (04/21/22 1300)  SpO2: 95 % (04/21/22 1300)  O2 Device (Oxygen Therapy): nasal cannula w/ humidification (04/21/22 0930)   Vital Signs (24h Range):  Temp:  [97.5 °F (36.4 °C)-98.1 °F (36.7 °C)] 97.9 °F (36.6 °C)  Pulse:  [57-76] 59  Resp:  [16-19] 18  SpO2:  [93 %-97 %] 95 %  BP: (142-175)/(54-75) 175/69     Weight: 77.6 kg (171 lb) (04/21/22 1016)  Body mass index is 26.78 kg/m².  Body surface area is 1.92 meters squared.    I/O last 3 completed shifts:  In: 199.2 [Blood:199.2]  Out: 1370 [Urine:1370]    Physical Exam  Constitutional:       General: She is not in acute distress.  HENT:      Head: Normocephalic and atraumatic.   Eyes:      Pupils: Pupils are equal, round, and reactive to light.   Cardiovascular:      Rate and Rhythm: Normal rate and regular rhythm.      Heart sounds: No murmur heard.    No friction rub. No gallop.   Pulmonary:      Effort: No respiratory distress.      Breath sounds: Rales present.   Abdominal:      General: Bowel sounds are normal.      Tenderness: There is no abdominal tenderness. There is no guarding.   Musculoskeletal:      Right lower leg: No edema.      Left lower leg: No edema.      Comments: Trace lower extremity edema   Neurological:      Mental Status: She is alert and oriented to person, place, and time.   Psychiatric:         Behavior: Behavior normal.       Significant Labs:  BMP:    Recent Labs   Lab 04/21/22  0355   GLU 98      K 4.9   *   CO2 18*   BUN 81*   CREATININE 6.38*   CALCIUM 7.2*   MG 2.2     CBC:   Recent Labs   Lab 04/21/22  0355 04/21/22  1425   WBC 5.33  --    RBC 2.18*  --    HGB 6.7* 8.5*   HCT 21.8* 27.8*     --    .0*  --    MCH 30.7  --    MCHC 30.7*  --         Significant Imaging:      Assessment/Plan:     Type 2 diabetes mellitus  Continue current treatment    Anemia of chronic renal failure  Post transfusion    CKD (chronic kidney disease), stage IV  Renal function has not improved since her last admission.  She has developed volume overload.  Chest x-ray shows pulmonary edema.  She has been given diuretics.  This may temporarily improve her volume status.  I have discussed dialysis with her in detail, including temporary access.  She is anxious about this and wants to discuss it with her family.  04/21/2022:  Dialysis discussed with patient and her daughter.  She agrees to proceed.  Surgery will be consulted for tunneled catheter.  Dialysis to follow tomorrow.        Thank you for your consult.     Beni Fields MD  Nephrology  [facility] - Orthopedic

## 2022-04-21 NOTE — ASSESSMENT & PLAN NOTE
- Creatinine 6.4  - Being followed by primary medical team and nephrology    4/21/2022:  - Dialysis recommended, patient to decide

## 2022-04-21 NOTE — SUBJECTIVE & OBJECTIVE
Interval History: Patient seen today, she was unable to complete Lexiscan today due to severe orthopnea (only able to lay flat for about 8 seconds). She is still contemplating dialysis with granddaughter at bedside. Denies chest pain.     Review of Systems   Constitutional: Positive for chills and malaise/fatigue. Negative for fever.   HENT:  Negative for ear pain and sore throat.    Eyes:  Negative for visual disturbance.   Cardiovascular:  Positive for dyspnea on exertion, leg swelling and orthopnea. Negative for chest pain, palpitations and syncope.   Respiratory:  Positive for shortness of breath and wheezing. Negative for cough.    Endocrine: Negative for polydipsia and polyuria.   Hematologic/Lymphatic: Negative for bleeding problem.   Gastrointestinal:  Positive for heartburn. Negative for abdominal pain, change in bowel habit, hematemesis, hematochezia, jaundice, melena, nausea and vomiting.   Genitourinary:  Negative for dysuria.   Neurological:  Positive for light-headedness. Negative for dizziness.   All other systems reviewed and are negative.  Objective:     Vital Signs (Most Recent):  Temp: 97.9 °F (36.6 °C) (04/21/22 1300)  Pulse: (!) 59 (04/21/22 1300)  Resp: 18 (04/21/22 1300)  BP: (!) 175/69 (04/21/22 1300)  SpO2: 95 % (04/21/22 1300)   Vital Signs (24h Range):  Temp:  [97.5 °F (36.4 °C)-98.1 °F (36.7 °C)] 97.9 °F (36.6 °C)  Pulse:  [57-76] 59  Resp:  [16-19] 18  SpO2:  [93 %-97 %] 95 %  BP: (142-175)/(54-75) 175/69     Weight: 77.6 kg (171 lb)  Body mass index is 26.78 kg/m².     SpO2: 95 %  O2 Device (Oxygen Therapy): nasal cannula w/ humidification      Intake/Output Summary (Last 24 hours) at 4/21/2022 1434  Last data filed at 4/21/2022 1255  Gross per 24 hour   Intake 325 ml   Output 975 ml   Net -650 ml       Lines/Drains/Airways       Drain  Duration                  Urethral Catheter 04/19/22 1130 Non-latex 16 Fr. 2 days              Peripheral Intravenous Line  Duration                   Peripheral IV - Single Lumen 04/20/22 1530 20 G Anterior;Right Forearm <1 day                    Physical Exam  Vitals reviewed.   Constitutional:       General: She is not in acute distress.  Cardiovascular:      Rate and Rhythm: Regular rhythm. Bradycardia present.      Pulses: Normal pulses.      Heart sounds: Normal heart sounds.   Pulmonary:      Effort: Tachypnea present.      Breath sounds: Rales present. No wheezing or rhonchi.   Abdominal:      General: Bowel sounds are normal.      Palpations: Abdomen is soft.   Neurological:      Mental Status: She is alert and oriented to person, place, and time.       Significant Labs: ABG: No results for input(s): PH, PCO2, HCO3, POCSATURATED, BE in the last 48 hours., Blood Culture: No results for input(s): LABBLOO in the last 48 hours., BMP:   Recent Labs   Lab 04/20/22  0420 04/21/22  0355   * 98    145   K 4.8 4.9   * 108*   CO2 23 18*   BUN 79* 81*   CREATININE 6.37* 6.38*   CALCIUM 7.5* 7.2*   MG 1.9 2.2   , CMP   Recent Labs   Lab 04/20/22  0420 04/21/22  0355    145   K 4.8 4.9   * 108*   CO2 23 18*   * 98   BUN 79* 81*   CREATININE 6.37* 6.38*   CALCIUM 7.5* 7.2*   ANIONGAP 13 24*   EGFRNONAA 7* 7*   , CBC   Recent Labs   Lab 04/20/22  0420 04/21/22  0355   WBC 7.27 5.33   HGB 6.8* 6.7*   HCT 23.1* 21.8*   * 155   , INR No results for input(s): INR, PROTIME in the last 48 hours., Lipid Panel No results for input(s): CHOL, HDL, LDLCALC, TRIG, CHOLHDL in the last 48 hours., and Troponin No results for input(s): TROPONINI in the last 48 hours.    Significant Imaging: Echocardiogram: Transthoracic echo (TTE) complete (Cupid Only):   Results for orders placed or performed during the hospital encounter of 04/19/22   Echo   Result Value Ref Range    BSA 1.91 m2    Right Atrial Pressure (from IVC) 3 mmHg    EF 70 %    Left Ventricular Outflow Tract Mean Gradient 3.00 mmHg    AORTIC VALVE CUSP SEPERATION [id number]  cm    LVIDd 4.72 3.5 - 6.0 cm    IVS 1.37 (A) 0.6 - 1.1 cm    Posterior Wall 1.26 (A) 0.6 - 1.1 cm    Ao root annulus 2.20 cm    LVIDs 2.62 2.1 - 4.0 cm    FS 44 28 - 44 %    IVC ostium 1.5 cm    LV mass 243.47 g    RVDD 4.60 cm    TAPSE 2.00 cm    Left Ventricle Relative Wall Thickness 0.53 cm    AV mean gradient 6 mmHg    AV valve area 1.48 cm2    AV Velocity Ratio 0.75     AV index (prosthetic) 0.74     MV mean gradient 2 mmHg    MV valve area p 1/2 method 2.82 cm2    MV valve area by continuity eq 1.21 cm2    E wave deceleration time 163 msec    LVOT diameter 1.60 cm    LVOT area 2.0 cm2    LVOT peak aren 1.2 m/s    LVOT peak VTI 31.00 cm    Ao peak aren 1.6 m/s    Ao VTI 42.00 cm    LVOT stroke volume 62.30 cm3    AV peak gradient 10 mmHg    MV peak gradient 6 mmHg    TV rest pulmonary artery pressure 41 mmHg    MV Peak E Aren 1.06 m/s    TR Max Aren 3.10 m/s    MV VTI 51.6 cm    MV stenosis pressure 1/2 time 78 ms    LV Systolic Volume 25.10 mL    LV Systolic Volume Index 13.3 mL/m2    LV Diastolic Volume 103.40 mL    LV Diastolic Volume Index 54.71 mL/m2    LV Mass Index 129 g/m2    Echo EF Estimated 70 %    RA Major Axis 4.30 cm    Triscuspid Valve Regurgitation Peak Gradient 38 mmHg    LA size 3.40 cm    Narrative    · The left ventricle is normal in size with mild concentric hypertrophy   and normal systolic function.  · The estimated ejection fraction is 70%.  · Normal left ventricular diastolic function.  · Atrial fibrillation not observed.  · Mild right ventricular enlargement.  · Mild right atrial enlargement.  · Mild-to-moderate mitral regurgitation.  · Mild tricuspid regurgitation.  · Mild pulmonic regurgitation.  · Normal central venous pressure (3 mmHg).  · The estimated PA systolic pressure is 41 mmHg.  · There is pulmonary hypertension.       and X-Ray: CXR: X-Ray Chest 1 View (CXR): No results found for this visit on 04/19/22.

## 2022-04-21 NOTE — ASSESSMENT & PLAN NOTE
Chronic, resume home medications  4/20  - BP trend remains elevated but at pt's baseline   - prn hydralazine   4/21  - BP remains elevated  - increased hydralazine PO and norvasc

## 2022-04-21 NOTE — SUBJECTIVE & OBJECTIVE
Interval History:  She reports orthopnea.  No chest pain.    Review of patient's allergies indicates:  No Known Allergies  Current Facility-Administered Medications   Medication Frequency    0.9%  NaCl infusion (for blood administration) Q24H PRN    acetaminophen tablet 1,000 mg Q8H PRN    acetaminophen tablet 650 mg Q8H PRN    acetaminophen tablet 650 mg Q4H PRN    allopurinoL tablet 100 mg Daily    aluminum-magnesium hydroxide-simethicone 200-200-20 mg/5 mL suspension 30 mL QID PRN    [START ON 4/22/2022] amLODIPine tablet 10 mg Daily    aspirin EC tablet 81 mg Daily    dextrose 10% bolus 125 mL PRN    dextrose 10% bolus 250 mL PRN    gabapentin capsule 100 mg QHS    glucagon (human recombinant) injection 1 mg PRN    hydrALAZINE tablet 75 mg Q8H    insulin aspart U-100 injection 0-5 Units QID (AC + HS) PRN    magnesium oxide tablet 800 mg PRN    magnesium oxide tablet 800 mg PRN    melatonin tablet 6 mg Nightly PRN    metoprolol tartrate (LOPRESSOR) tablet 25 mg BID    naloxone 0.4 mg/mL injection 0.02 mg PRN    ondansetron disintegrating tablet 8 mg Q8H PRN    polyethylene glycol packet 17 g Daily PRN    potassium bicarbonate disintegrating tablet 35 mEq PRN    potassium bicarbonate disintegrating tablet 50 mEq PRN    potassium bicarbonate disintegrating tablet 60 mEq PRN    potassium, sodium phosphates 280-160-250 mg packet 2 packet PRN    potassium, sodium phosphates 280-160-250 mg packet 2 packet PRN    potassium, sodium phosphates 280-160-250 mg packet 2 packet PRN    sodium bicarbonate tablet 325 mg Daily    sodium chloride 0.9% flush 10 mL PRN       Objective:     Vital Signs (Most Recent):  Temp: 97.9 °F (36.6 °C) (04/21/22 1300)  Pulse: (!) 59 (04/21/22 1300)  Resp: 18 (04/21/22 1300)  BP: (!) 175/69 (04/21/22 1300)  SpO2: 95 % (04/21/22 1300)  O2 Device (Oxygen Therapy): nasal cannula w/ humidification (04/21/22 0930)   Vital Signs (24h Range):  Temp:  [97.5 °F (36.4 °C)-98.1 °F (36.7 °C)] 97.9 °F (36.6  °C)  Pulse:  [57-76] 59  Resp:  [16-19] 18  SpO2:  [93 %-97 %] 95 %  BP: (142-175)/(54-75) 175/69     Weight: 77.6 kg (171 lb) (04/21/22 1016)  Body mass index is 26.78 kg/m².  Body surface area is 1.92 meters squared.    I/O last 3 completed shifts:  In: 199.2 [Blood:199.2]  Out: 1370 [Urine:1370]    Physical Exam  Constitutional:       General: She is not in acute distress.  HENT:      Head: Normocephalic and atraumatic.   Eyes:      Pupils: Pupils are equal, round, and reactive to light.   Cardiovascular:      Rate and Rhythm: Normal rate and regular rhythm.      Heart sounds: No murmur heard.    No friction rub. No gallop.   Pulmonary:      Effort: No respiratory distress.      Breath sounds: Rales present.   Abdominal:      General: Bowel sounds are normal.      Tenderness: There is no abdominal tenderness. There is no guarding.   Musculoskeletal:      Right lower leg: No edema.      Left lower leg: No edema.      Comments: Trace lower extremity edema   Neurological:      Mental Status: She is alert and oriented to person, place, and time.   Psychiatric:         Behavior: Behavior normal.       Significant Labs:  BMP:   Recent Labs   Lab 04/21/22  0355   GLU 98      K 4.9   *   CO2 18*   BUN 81*   CREATININE 6.38*   CALCIUM 7.2*   MG 2.2     CBC:   Recent Labs   Lab 04/21/22  0355 04/21/22  1425   WBC 5.33  --    RBC 2.18*  --    HGB 6.7* 8.5*   HCT 21.8* 27.8*     --    .0*  --    MCH 30.7  --    MCHC 30.7*  --         Significant Imaging:

## 2022-04-21 NOTE — ASSESSMENT & PLAN NOTE
Low scale SSI.  Accu checks ac/hs   Hold Metformin  4/20  - continue SSI   - accuchecks ACHS   - BG range 137-248  4/21  - BG range   - continue SSI

## 2022-04-21 NOTE — PLAN OF CARE
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, G63]  Type 2 diabetes mellitus [E11.9]  Macrocytic anemia [D53.9]  Essential hypertension [I10]  DVT prophylaxis [Z29.9]  Diabetes 1.5, 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 No  Secondary Emergency Contact: Kiesha Wen  Mobile Phone: [phone]  Relation: Daughter    Discharge Plan A: Home Health  Discharge Plan B: Home      [facility] [place] - [address]  [address]  [place] [address]  Phone: [phone] Fax: [phone]    [facility] - [place], [place] - [address]  [address] [address]  Phone: [phone] Fax: [phone]      Initial Assessment (most recent)     Adult Discharge Assessment - 04/21/22 1555        Discharge Assessment     Assessment Type Discharge Planning Assessment     Source of Information patient     Lives With alone     Do you expect to return to your current living situation? Yes     Do you have help at home or someone to help you manage your care at home? Yes     Who are your caregiver(s) and their phone number(s)? Jacky Pierce- Son [phone]     Prior to hospitilization cognitive status: Coma/Sedated/Intubated     Current cognitive status: Alert/Oriented     Home Accessibility wheelchair accessible;stairs to enter home;stairs within home     Number of Stairs, Within Home, Primary none     Number of Stairs, Main Entrance none     Equipment Currently Used at Home walker, rolling     Patient currently being followed by outpatient case management? No     Do you currently have service(s) that help you manage your care at home? Yes     Name and Contact number of agency [facility] [phone]     Is the pt/caregiver preference to resume services with current agency Yes     How do you get to doctors appointments? car, drives self;family or friend will provide     Are you on dialysis? No     Do you take coumadin? No     Discharge Plan A Home Health     Discharge Plan B Home     DME Needed Upon Discharge  none     Discharge Plan discussed with: Patient     Discharge Barriers Identified None               Pt lives at home alone, but son lives next door. Pt is current with [facility] and uses a rw, choice obtained. Pt plans to dc back home when medically ready for dc, I.M. obtained. [facility] faxed initial QUE referral to [facility]. [facility] will cont to follow for dc needs.

## 2022-04-21 NOTE — ASSESSMENT & PLAN NOTE
Hg 7.6 and problems with anemia in the past.    Will monitor carefully.    FOBT ordered.   4/20  - FOBT ordered  - was neg on previous admit   - h/h 6/23  - 1u PRBCs to transfuse   4/21  - unable to receive full unit of blood yesterday   - H/H 6/21 this AM  - 1u PRBCs ordered

## 2022-04-21 NOTE — ASSESSMENT & PLAN NOTE
Cbc daily  H/H 7/25  Likely anemia from chronic renal failure.  4/20  - H/H 6/23  - 1u PRBCs pending  4/21  - did not receive full unit of blood yesterday  - H/H 6/21  - 1u PRBCs today  - recheck in AM

## 2022-04-21 NOTE — ASSESSMENT & PLAN NOTE
* Telemetry monitoring    * Ekg:  Sr with  Pac's. 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   * sCre: 6.46.    Baseline sCre. 6 since 4/2021  * NA bicarbonate tabs daily  4/20  - nephrology following   - discussed HD- pt will discuss with family   4/21  - renal function remains unchanged  - nephrology following

## 2022-04-21 NOTE — PROGRESS NOTES
[facility] - Orthopedic  Cardiology  Progress Note    Patient Name: Bria Ray  MRN: [id number]  Admission Date: 4/19/2022  Hospital Length of Stay: 2 days  Code Status: Full Code   Attending Physician: Roberto Meza MD   Primary Care Physician: Lorena Richardson MD  Expected Discharge Date:   Principal Problem:Acute hypoxemic respiratory failure    Subjective:     Hospital Course:   No notes on file    Interval History: Patient seen today, she was unable to complete Lexiscan today due to severe orthopnea (only able to lay flat for about 8 seconds). She is still contemplating dialysis with granddaughter at bedside. Denies chest pain.     Review of Systems   Constitutional: Positive for chills and malaise/fatigue. Negative for fever.   HENT:  Negative for ear pain and sore throat.    Eyes:  Negative for visual disturbance.   Cardiovascular:  Positive for dyspnea on exertion, leg swelling and orthopnea. Negative for chest pain, palpitations and syncope.   Respiratory:  Positive for shortness of breath and wheezing. Negative for cough.    Endocrine: Negative for polydipsia and polyuria.   Hematologic/Lymphatic: Negative for bleeding problem.   Gastrointestinal:  Positive for heartburn. Negative for abdominal pain, change in bowel habit, hematemesis, hematochezia, jaundice, melena, nausea and vomiting.   Genitourinary:  Negative for dysuria.   Neurological:  Positive for light-headedness. Negative for dizziness.   All other systems reviewed and are negative.  Objective:     Vital Signs (Most Recent):  Temp: 97.9 °F (36.6 °C) (04/21/22 1300)  Pulse: (!) 59 (04/21/22 1300)  Resp: 18 (04/21/22 1300)  BP: (!) 175/69 (04/21/22 1300)  SpO2: 95 % (04/21/22 1300)   Vital Signs (24h Range):  Temp:  [97.5 °F (36.4 °C)-98.1 °F (36.7 °C)] 97.9 °F (36.6 °C)  Pulse:  [57-76] 59  Resp:  [16-19] 18  SpO2:  [93 %-97 %] 95 %  BP: (142-175)/(54-75) 175/69     Weight: 77.6 kg (171 lb)  Body mass index is 26.78 kg/m².      SpO2: 95 %  O2 Device (Oxygen Therapy): nasal cannula w/ humidification      Intake/Output Summary (Last 24 hours) at 4/21/2022 1434  Last data filed at 4/21/2022 1255  Gross per 24 hour   Intake 325 ml   Output 975 ml   Net -650 ml       Lines/Drains/Airways       Drain  Duration                  Urethral Catheter 04/19/22 1130 Non-latex 16 Fr. 2 days              Peripheral Intravenous Line  Duration                  Peripheral IV - Single Lumen 04/20/22 1530 20 G Anterior;Right Forearm <1 day                    Physical Exam  Vitals reviewed.   Constitutional:       General: She is not in acute distress.  Cardiovascular:      Rate and Rhythm: Regular rhythm. Bradycardia present.      Pulses: Normal pulses.      Heart sounds: Normal heart sounds.   Pulmonary:      Effort: Tachypnea present.      Breath sounds: Rales present. No wheezing or rhonchi.   Abdominal:      General: Bowel sounds are normal.      Palpations: Abdomen is soft.   Neurological:      Mental Status: She is alert and oriented to person, place, and time.       Significant Labs: ABG: No results for input(s): PH, PCO2, HCO3, POCSATURATED, BE in the last 48 hours., Blood Culture: No results for input(s): LABBLOO in the last 48 hours., BMP:   Recent Labs   Lab 04/20/22  0420 04/21/22  0355   * 98    145   K 4.8 4.9   * 108*   CO2 23 18*   BUN 79* 81*   CREATININE 6.37* 6.38*   CALCIUM 7.5* 7.2*   MG 1.9 2.2   , CMP   Recent Labs   Lab 04/20/22  0420 04/21/22  0355    145   K 4.8 4.9   * 108*   CO2 23 18*   * 98   BUN 79* 81*   CREATININE 6.37* 6.38*   CALCIUM 7.5* 7.2*   ANIONGAP 13 24*   EGFRNONAA 7* 7*   , CBC   Recent Labs   Lab 04/20/22  0420 04/21/22  0355   WBC 7.27 5.33   HGB 6.8* 6.7*   HCT 23.1* 21.8*   * 155   , INR No results for input(s): INR, PROTIME in the last 48 hours., Lipid Panel No results for input(s): CHOL, HDL, LDLCALC, TRIG, CHOLHDL in the last 48 hours., and Troponin No results for  input(s): TROPONINI in the last 48 hours.    Significant Imaging: Echocardiogram: Transthoracic echo (TTE) complete (Cupid Only):   Results for orders placed or performed during the hospital encounter of 04/19/22   Echo   Result Value Ref Range    BSA 1.91 m2    Right Atrial Pressure (from IVC) 3 mmHg    EF 70 %    Left Ventricular Outflow Tract Mean Gradient 3.00 mmHg    AORTIC VALVE CUSP SEPERATION [id number] cm    LVIDd 4.72 3.5 - 6.0 cm    IVS 1.37 (A) 0.6 - 1.1 cm    Posterior Wall 1.26 (A) 0.6 - 1.1 cm    Ao root annulus 2.20 cm    LVIDs 2.62 2.1 - 4.0 cm    FS 44 28 - 44 %    IVC ostium 1.5 cm    LV mass 243.47 g    RVDD 4.60 cm    TAPSE 2.00 cm    Left Ventricle Relative Wall Thickness 0.53 cm    AV mean gradient 6 mmHg    AV valve area 1.48 cm2    AV Velocity Ratio 0.75     AV index (prosthetic) 0.74     MV mean gradient 2 mmHg    MV valve area p 1/2 method 2.82 cm2    MV valve area by continuity eq 1.21 cm2    E wave deceleration time 163 msec    LVOT diameter 1.60 cm    LVOT area 2.0 cm2    LVOT peak aren 1.2 m/s    LVOT peak VTI 31.00 cm    Ao peak aren 1.6 m/s    Ao VTI 42.00 cm    LVOT stroke volume 62.30 cm3    AV peak gradient 10 mmHg    MV peak gradient 6 mmHg    TV rest pulmonary artery pressure 41 mmHg    MV Peak E Aren 1.06 m/s    TR Max Aren 3.10 m/s    MV VTI 51.6 cm    MV stenosis pressure 1/2 time 78 ms    LV Systolic Volume 25.10 mL    LV Systolic Volume Index 13.3 mL/m2    LV Diastolic Volume 103.40 mL    LV Diastolic Volume Index 54.71 mL/m2    LV Mass Index 129 g/m2    Echo EF Estimated 70 %    RA Major Axis 4.30 cm    Triscuspid Valve Regurgitation Peak Gradient 38 mmHg    LA size 3.40 cm    Narrative    · The left ventricle is normal in size with mild concentric hypertrophy   and normal systolic function.  · The estimated ejection fraction is 70%.  · Normal left ventricular diastolic function.  · Atrial fibrillation not observed.  · Mild right ventricular enlargement.  · Mild right  atrial enlargement.  · Mild-to-moderate mitral regurgitation.  · Mild tricuspid regurgitation.  · Mild pulmonic regurgitation.  · Normal central venous pressure (3 mmHg).  · The estimated PA systolic pressure is 41 mmHg.  · There is pulmonary hypertension.       and X-Ray: CXR: X-Ray Chest 1 View (CXR): No results found for this visit on 04/19/22.    Assessment and Plan:     Brief HPI: 85 y/o female with PMH of DM, HTN, CKD, and diastolic heart failure who presented to [facility] with SOB and transferred to [facility] for further treatment and evaluation. Troponin elevated and cardiology consulted. Patient discharged from here 7 days ago with worsened chronic renal failure after a 5 day stay, states she noticed increased sob Sunday and her home health nurse sent her to ED on Monday. She is being followed by Dr. Fields for renal failure and has not yet started dialysis. Since her discharge last week she has increasingly gotten more SOB with orthopnea at night. Apparently her she was told to discontinue her lasix last week at discharge with concern it was worsening her renal function. She was given lasix yesterday and had 300ml of UOP with symptoms slightly improving. Her symptoms are exacerbated with exertion and lying flat. There is no reported chest pain, vomiting, diarrhea, diaphoresis, vision change, numbness or recent illness.     Troponin 200, 184, 155. EKG SR with RBBB and nonspecific ST-T wave abnormalities. BNP 20,800 (baseline a year ago 975). H/H today 6.8/23.1 and one unit PRBCs infusing. Last echo 2/23/2021, will repeat today. She has seen Dr. Parker a year ago but has not followed up since.    Elevated troponin  - Patient seen and evaluated by Dr. Chatterjee  - Troponin 200, 184, 155  - EKG SR with RBBB and nonspecific ST-T abnormalities  - BNP 20,800 (baseline a year ago 975)  - Echo pending  - Troponin elevation in setting of CKD stage 5 and hypoxemia (likely type II supply/demand mismatch), no previous  ischemic eval, will plan for Lexiscan tomorrow to evaluate for high risk ischemia  - IV lasix 40mg x 1 dose  - Further recommendations to follow    4/21/2022:  - Patient seen and evaluated by Dr. Chatterjee  - Lexiscan cancelled today due to patient having severe orthopnea (could only lay flat for approx 8 secs)  - She is not having any chest pain, at this point she needs dialysis  - Will have her follow up as outpatient once dialysis initiated and fluid status improves  - Follow up with Dr. Chatterjee in 2-3 weeks  - Cardiology will sign off at this time, please call if any further assistance is needed    Anemia of chronic renal failure  - Hgb 6.8 today, receiving 1 unit PRBCs today  - Being followed by primary medical team    CKD (chronic kidney disease), stage IV  - Creatinine 6.4  - Being followed by primary medical team and nephrology    4/21/2022:  - Dialysis recommended, patient to decide        VTE Risk Mitigation (From admission, onward)         Ordered     IP VTE HIGH RISK PATIENT  Once         04/19/22 1631     Place sequential compression device  Until discontinued         04/19/22 1631            Pt seen and examined, chart reviewed, essentially agree with findings as above          CC: Shortness of breath              HPI: Pt reports shortness of breath has improved, however unable to lie flat without dyspnea.                PE: agree with above               Labs, tele reviewed               1. Acute shortness of breath, due to volume overload, undergoing more intensive dialysis to achieve dry weight.             2. Troponin elevation: likely due to type 2 MI, demand perfusion mismatch and poor renal clearance due to ESRD, will follow up with stress imaging as outpatient to rule out ischemic component.     Sandra Ruiz, FNP  Cardiology  [facility] - Orthopedic

## 2022-04-21 NOTE — ASSESSMENT & PLAN NOTE
Renal function has not improved since her last admission.  She has developed volume overload.  Chest x-ray shows pulmonary edema.  She has been given diuretics.  This may temporarily improve her volume status.  I have discussed dialysis with her in detail, including temporary access.  She is anxious about this and wants to discuss it with her family.  04/21/2022:  Dialysis discussed with patient and her daughter.  She agrees to proceed.  Surgery will be consulted for tunneled catheter.  Dialysis to follow tomorrow.

## 2022-04-21 NOTE — ASSESSMENT & PLAN NOTE
Patient with Hypoxic Respiratory failure which is Acute.  she is not on home oxygen. Supplemental oxygen was provided and noted-  .   Signs/symptoms of respiratory failure include- tachypnea, increased work of breathing and respiratory distress. Contributing diagnoses includes - CHF and renal failure  Labs and images were reviewed. Patient Has not had a recent ABG. Will treat underlying causes and adjust management of respiratory failure as follows-   Nephrology and cardiology consults.  Echocardiogram.    4/20  - was given lasix in ED  - second dose given today with adequate UOP and improvement of symptoms   - echo with EF 70%  - nephrology following- discussed need for dialysis- pt to discuss with family   4/21  - 2L NC   - breathing improved; able to lay back more today   - BLE edema lessened

## 2022-04-21 NOTE — SUBJECTIVE & OBJECTIVE
Interval History: Pt resting in bed. Discussed HD- pt states she is still unsure- she doesn't want to do it but her family wants her to- has not reached decision. States breathing is not back to her normal. Discussed labs and the need for blood- verbalizes understanding.     Review of Systems   Constitutional:  Positive for activity change and chills.   Respiratory:  Positive for shortness of breath. Negative for chest tightness.    Cardiovascular:  Positive for leg swelling. Negative for chest pain.   Gastrointestinal:  Negative for abdominal pain, diarrhea and nausea.   Musculoskeletal:  Positive for arthralgias and myalgias.   Neurological:  Positive for weakness. Negative for dizziness, light-headedness and headaches.   All other systems reviewed and are negative.  Objective:     Vital Signs (Most Recent):  Temp: 97.9 °F (36.6 °C) (04/21/22 0955)  Pulse: 62 (04/21/22 0955)  Resp: 16 (04/21/22 0955)  BP: (!) 175/64 (04/21/22 0955)  SpO2: 96 % (04/21/22 0955)   Vital Signs (24h Range):  Temp:  [97.5 °F (36.4 °C)-98.1 °F (36.7 °C)] 97.9 °F (36.6 °C)  Pulse:  [57-76] 62  Resp:  [16-18] 16  SpO2:  [93 %-97 %] 96 %  BP: (142-178)/(54-83) 175/64     Weight: 77.6 kg (171 lb)  Body mass index is 26.78 kg/m².    Intake/Output Summary (Last 24 hours) at 4/21/2022 1116  Last data filed at 4/21/2022 0951  Gross per 24 hour   Intake 199.17 ml   Output 975 ml   Net -775.83 ml      Physical Exam  Vitals and nursing note reviewed.   Constitutional:       Appearance: She is ill-appearing.   HENT:      Head: Normocephalic.      Nose: Nose normal.      Mouth/Throat:      Mouth: Mucous membranes are moist.   Eyes:      Extraocular Movements: Extraocular movements intact.      Pupils: Pupils are equal, round, and reactive to light.   Cardiovascular:      Rate and Rhythm: Rhythm irregular.      Pulses: Normal pulses.   Pulmonary:      Comments: Coarse  Abdominal:      General: Bowel sounds are normal.      Palpations: Abdomen is soft.       Tenderness: There is no abdominal tenderness. There is no guarding.   Musculoskeletal:         General: Normal range of motion.      Cervical back: Normal range of motion and neck supple.      Right lower leg: Edema present.      Left lower leg: Edema present.   Skin:     General: Skin is warm and dry.      Capillary Refill: Capillary refill takes 2 to 3 seconds.   Neurological:      General: No focal deficit present.      Mental Status: She is alert and oriented to person, place, and time. Mental status is at baseline.      Cranial Nerves: No cranial nerve deficit.      Sensory: No sensory deficit.   Psychiatric:         Mood and Affect: Mood normal.         Behavior: Behavior normal.       Significant Labs: All pertinent labs within the past 24 hours have been reviewed.  Recent Lab Results  (Last 5 results in the past 24 hours)        04/21/22  0649   04/21/22  0355   04/20/22  2242   04/20/22  1942   04/20/22  1646        Anion Gap   24             Aniso   1+             Baso #   0.04             Basophil %   0.8             BUN   81             BUN/CREAT RATIO   13             Calcium   7.2             Chloride   108             CO2   18             Creatinine   6.38             Differential Type   Manual             eGFR if non    7             Eos #   0.19             Eosinophil %   3.6                6             Folate     13.6           Glucose   98             Hematocrit   21.8             Hemoglobin   6.7             Hep A IgM     Non-Reactive           Hep B C IgM     Non-Reactive           Hepatitis B Surface Ag     Non-Reactive           Hepatitis C Ab     Non-Reactive           Hypo   1+             Immature Grans (Abs)   0.03             Immature Granulocytes   0.6             Iron     24           Iron Saturation     10           Lymph #   0.65             Lymph %   12.2                12             Magnesium   2.2             MCH   30.7             MCHC   30.7             MCV    100.0             Mono #   0.67             Mono %   12.6                12             MPV   11.6             Neutrophils, Abs   3.75             Neutrophils Relative   70.2             nRBC   0.6             NUCLEATED RBC ABSOLUTE   0.03             Ovalocytes   Few             PLATELET MORPHOLOGY   Large & Giant Platelets             Platelets   155             POC Glucose 124       185   137       Potassium   4.9             RBC   2.18             RDW   16.0             Segmented Neutrophils, Man %   70             Sodium   145             TIBC     239           Vitamin B-12     1,809           WBC   5.33                                    Significant Imaging: I have reviewed all pertinent imaging results/findings within the past 24 hours.

## 2022-04-21 NOTE — PROGRESS NOTES
[facility] Medicine  Progress Note    Patient Name: Bria Ray  MRN: [id number]  Patient Class: IP- Inpatient   Admission Date: 4/19/2022  Length of Stay: 2 days  Attending Physician: Roberto Meza MD  Primary Care Provider: Lorena Richardson MD        Subjective:     Principal Problem:Acute hypoxemic respiratory failure        HPI:  85 y/o female admitted today from [facility] ED for SOB. The patient was transferred from [facility] after presenting there for increased SOB over the last week. She was discharged from [facility] one week ago with worsening renal disease. Currently she is followed by Dr. Fields, Nephrologist and not on HD. Since her discharge last week she has increasingly gotten more SOB with orthopnea at night. Her symptoms are exacerbated with exertion and lying flat. There is no reported chest pain, vomiting, diarrhea, diaphoresis, vision change, numbness or recent illness.                     Overview/Hospital Course:  4/20: SOB has improved; renal function remains unchanged; slight decrease in H/H- pending transfusion; nephrology and cardiology following; discussions for HD per nephrology; plans for stress test in AM; repeat labs in AM to monitor H/H and renal function   4/21: SOB continues to improve slightly daily; still not to pt's baseline; has not reached decision regarding HD; stress test for today; H/H 6/21- did not receive full unit of blood yesterday d/t IV issues; PRBCs ordered for today      Interval History: Pt resting in bed. Discussed HD- pt states she is still unsure- she doesn't want to do it but her family wants her to- has not reached decision. States breathing is not back to her normal. Discussed labs and the need for blood- verbalizes understanding.     Review of Systems   Constitutional:  Positive for activity change and chills.   Respiratory:  Positive for shortness of breath. Negative for chest tightness.    Cardiovascular:   Positive for leg swelling. Negative for chest pain.   Gastrointestinal:  Negative for abdominal pain, diarrhea and nausea.   Musculoskeletal:  Positive for arthralgias and myalgias.   Neurological:  Positive for weakness. Negative for dizziness, light-headedness and headaches.   All other systems reviewed and are negative.  Objective:     Vital Signs (Most Recent):  Temp: 97.9 °F (36.6 °C) (04/21/22 0955)  Pulse: 62 (04/21/22 0955)  Resp: 16 (04/21/22 0955)  BP: (!) 175/64 (04/21/22 0955)  SpO2: 96 % (04/21/22 0955)   Vital Signs (24h Range):  Temp:  [97.5 °F (36.4 °C)-98.1 °F (36.7 °C)] 97.9 °F (36.6 °C)  Pulse:  [57-76] 62  Resp:  [16-18] 16  SpO2:  [93 %-97 %] 96 %  BP: (142-178)/(54-83) 175/64     Weight: 77.6 kg (171 lb)  Body mass index is 26.78 kg/m².    Intake/Output Summary (Last 24 hours) at 4/21/2022 1116  Last data filed at 4/21/2022 0951  Gross per 24 hour   Intake 199.17 ml   Output 975 ml   Net -775.83 ml      Physical Exam  Vitals and nursing note reviewed.   Constitutional:       Appearance: She is ill-appearing.   HENT:      Head: Normocephalic.      Nose: Nose normal.      Mouth/Throat:      Mouth: Mucous membranes are moist.   Eyes:      Extraocular Movements: Extraocular movements intact.      Pupils: Pupils are equal, round, and reactive to light.   Cardiovascular:      Rate and Rhythm: Rhythm irregular.      Pulses: Normal pulses.   Pulmonary:      Comments: Coarse  Abdominal:      General: Bowel sounds are normal.      Palpations: Abdomen is soft.      Tenderness: There is no abdominal tenderness. There is no guarding.   Musculoskeletal:         General: Normal range of motion.      Cervical back: Normal range of motion and neck supple.      Right lower leg: Edema present.      Left lower leg: Edema present.   Skin:     General: Skin is warm and dry.      Capillary Refill: Capillary refill takes 2 to 3 seconds.   Neurological:      General: No focal deficit present.      Mental Status: She is  alert and oriented to person, place, and time. Mental status is at baseline.      Cranial Nerves: No cranial nerve deficit.      Sensory: No sensory deficit.   Psychiatric:         Mood and Affect: Mood normal.         Behavior: Behavior normal.       Significant Labs: All pertinent labs within the past 24 hours have been reviewed.  Recent Lab Results  (Last 5 results in the past 24 hours)        04/21/22  0649   04/21/22  0355   04/20/22  2242   04/20/22  1942   04/20/22  1646        Anion Gap   24             Aniso   1+             Baso #   0.04             Basophil %   0.8             BUN   81             BUN/CREAT RATIO   13             Calcium   7.2             Chloride   108             CO2   18             Creatinine   6.38             Differential Type   Manual             eGFR if non    7             Eos #   0.19             Eosinophil %   3.6                6             Folate     13.6           Glucose   98             Hematocrit   21.8             Hemoglobin   6.7             Hep A IgM     Non-Reactive           Hep B C IgM     Non-Reactive           Hepatitis B Surface Ag     Non-Reactive           Hepatitis C Ab     Non-Reactive           Hypo   1+             Immature Grans (Abs)   0.03             Immature Granulocytes   0.6             Iron     24           Iron Saturation     10           Lymph #   0.65             Lymph %   12.2                12             Magnesium   2.2             MCH   30.7             MCHC   30.7             MCV   100.0             Mono #   0.67             Mono %   12.6                12             MPV   11.6             Neutrophils, Abs   3.75             Neutrophils Relative   70.2             nRBC   0.6             NUCLEATED RBC ABSOLUTE   0.03             Ovalocytes   Few             PLATELET MORPHOLOGY   Large & Giant Platelets             Platelets   155             POC Glucose 124       185   137       Potassium   4.9             RBC   2.18              RDW   16.0             Segmented Neutrophils, Man %   70             Sodium   145             TIBC     239           Vitamin B-12     1,809           WBC   5.33                                    Significant Imaging: I have reviewed all pertinent imaging results/findings within the past 24 hours.      Assessment/Plan:      * Acute hypoxemic respiratory failure  Patient with Hypoxic Respiratory failure which is Acute.  she is not on home oxygen. Supplemental oxygen was provided and noted-  .   Signs/symptoms of respiratory failure include- tachypnea, increased work of breathing and respiratory distress. Contributing diagnoses includes - CHF and renal failure  Labs and images were reviewed. Patient Has not had a recent ABG. Will treat underlying causes and adjust management of respiratory failure as follows-   Nephrology and cardiology consults.  Echocardiogram.    4/20  - was given lasix in ED  - second dose given today with adequate UOP and improvement of symptoms   - echo with EF 70%  - nephrology following- discussed need for dialysis- pt to discuss with family   4/21  - 2L NC   - breathing improved; able to lay back more today   - BLE edema lessened     Macrocytic anemia  Hg 7.6 and problems with anemia in the past.    Will monitor carefully.    FOBT ordered.   4/20  - FOBT ordered  - was neg on previous admit   - h/h 6/23  - 1u PRBCs to transfuse   4/21  - unable to receive full unit of blood yesterday   - H/H 6/21 this AM  - 1u PRBCs ordered     Elevated troponin  Likely from chronic renal failure.  Troponin >200  Repeat troponin level  Cardiology consulted  4/20  - trop max at 200 and trending down   - cardiology with plans for stress test in AM   - trop likely elevated secondary to CKD and demand mismatch   4/21  - trending down with no c/o chest pain  - stress test for today     DVT prophylaxis  * Heparin 5000 units q 8 hours.       Type 2 diabetes mellitus  Low scale SSI.  Accu checks ac/hs   Hold  Metformin  4/20  - continue SSI   - accuchecks ACHS   - BG range 137-248  4/21  - BG range   - continue SSI    Anemia of chronic renal failure  Cbc daily  H/H 7/25  Likely anemia from chronic renal failure.  4/20  - H/H 6/23  - 1u PRBCs pending  4/21  - did not receive full unit of blood yesterday  - H/H 6/21  - 1u PRBCs today  - recheck in AM     CKD (chronic kidney disease), stage IV  * Telemetry monitoring    * Ekg:  Sr with  Pac's. 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   * sCre: 6.46.    Baseline sCre. 6 since 4/2021  * NA bicarbonate tabs daily  4/20  - nephrology following   - discussed HD- pt will discuss with family   4/21  - renal function remains unchanged  - nephrology following     Essential hypertension  Chronic, resume home medications  4/20  - BP trend remains elevated but at pt's baseline   - prn hydralazine   4/21  - BP remains elevated  - increased hydralazine PO and norvasc          VTE Risk Mitigation (From admission, onward)         Ordered     IP VTE HIGH RISK PATIENT  Once         04/19/22 1631     Place sequential compression device  Until discontinued         04/19/22 1631                Discharge Planning   ANGY:      Code Status: Full Code   Is the patient medically ready for discharge?:     Reason for patient still in hospital (select all that apply): Treatment             BELA Robbins  Department of Hospital Medicine   [facility]

## 2022-04-21 NOTE — NURSING
Dr. Fields rounded and stated he wants a tunneled Hd Cath put in tomorrow. I messaged Carolannatta telling him about it to get her on the surgery list for early in the morning. She also needs to start dialysis tomorrow after she gets it placed per Dr. Fields.

## 2022-04-21 NOTE — ASSESSMENT & PLAN NOTE
- Patient seen and evaluated by Dr. Chatterjee  - Troponin 200, 184, 155  - EKG SR with RBBB and nonspecific ST-T abnormalities  - BNP 20,800 (baseline a year ago 975)  - Echo pending  - Troponin elevation in setting of CKD stage 5 and hypoxemia (likely type II supply/demand mismatch), no previous ischemic eval, will plan for Lexiscan tomorrow to evaluate for high risk ischemia  - IV lasix 40mg x 1 dose  - Further recommendations to follow    4/21/2022:  - Patient seen and evaluated by Dr. Chatetrjee  - Lexiscan cancelled today due to patient having severe orthopnea (could only lay flat for approx 8 secs)  - She is not having any chest pain, at this point she needs dialysis  - Will have her follow up as outpatient once dialysis initiated and fluid status improves  - Follow up with Dr. Chatterjee in 2-3 weeks  - Cardiology will sign off at this time, please call if any further assistance is needed

## 2022-04-21 NOTE — ASSESSMENT & PLAN NOTE
Likely from chronic renal failure.  Troponin >200  Repeat troponin level  Cardiology consulted  4/20  - trop max at 200 and trending down   - cardiology with plans for stress test in AM   - trop likely elevated secondary to CKD and demand mismatch   4/21  - trending down with no c/o chest pain  - stress test for today

## 2022-04-22 NOTE — ASSESSMENT & PLAN NOTE
* Telemetry monitoring    * Ekg:  Sr with  Pac's. 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   * sCre: 6.46.    Baseline sCre. 6 since 4/2021  * NA bicarbonate tabs daily  4/20  - nephrology following   - discussed HD- pt will discuss with family   4/21  - renal function remains unchanged  - nephrology following   4/22  - HD cath to be placed today with HD following

## 2022-04-22 NOTE — SUBJECTIVE & OBJECTIVE
No current facility-administered medications on file prior to encounter.     Current Outpatient Medications on File Prior to Encounter   Medication Sig    albuterol (PROVENTIL) 2.5 mg /3 mL (0.083 %) nebulizer solution     albuterol (VENTOLIN HFA) 90 mcg/actuation inhaler Inhale 2 puffs into the lungs every 6 (six) hours as needed for Wheezing. Rescue    allopurinoL (ZYLOPRIM) 100 MG tablet Take 1 tablet (100 mg total) by mouth once daily.    amLODIPine (NORVASC) 5 MG tablet Take 1 tablet (5 mg total) by mouth once daily.    aspirin (ECOTRIN) 81 MG EC tablet Take 1 tablet (81 mg total) by mouth once daily.    calcium carbonate (TUMS) 200 mg calcium (500 mg) chewable tablet Take 1 tablet (500 mg total) by mouth once daily.    cyanocobalamin (VITAMIN B-12) 100 MCG tablet Take 1 tablet (100 mcg total) by mouth once daily.    gabapentin (NEURONTIN) 100 MG capsule Take 1 capsule (100 mg total) by mouth 2 (two) times daily.    hydrALAZINE (APRESOLINE) 50 MG tablet Take 1 tablet (50 mg total) by mouth every 8 (eight) hours.    HYDROcodone-acetaminophen (NORCO)  mg per tablet Take 1 tablet by mouth every 12 (twelve) hours.    metoprolol tartrate (LOPRESSOR) 25 MG tablet Take 1 tablet (25 mg total) by mouth 2 (two) times daily.    multivitamin with folic acid 400 mcg Tab Take 1 tablet by mouth once daily.    simvastatin (ZOCOR) 20 MG tablet Take 1 tablet (20 mg total) by mouth every evening.    SITagliptin (JANUVIA) 25 MG Tab Take 1 tablet (25 mg total) by mouth once daily.    sodium bicarbonate 325 MG tablet Take 1 tablet (325 mg total) by mouth once daily.    budesonide-formoterol 160-4.5 mcg (SYMBICORT) 160-4.5 mcg/actuation HFAA Inhale 2 puffs into the lungs every 12 (twelve) hours. Controller    [DISCONTINUED] glipiZIDE (GLUCOTROL) 5 MG TR24 Take 1 tablet (5 mg total) by mouth daily with breakfast.       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, without long-term current use of insulin     Vertigo     Vitamin D deficiency      Past Surgical History:   Procedure Laterality Date    BLADDER SUSPENSION      BRONCHOSCOPY       11/2020    CHOLECYSTECTOMY      FRACTURE SURGERY      HERNIA REPAIR      HIP FRACTURE SURGERY Right 2005    HYSTERECTOMY       Family History       Problem Relation (Age of Onset)    Alcohol abuse Father    Asthma Son    Cancer Other    Diabetes Mother, Sister          Tobacco Use    Smoking status: Never Smoker    Smokeless tobacco: Never Used   Substance and Sexual Activity    Alcohol use: Never    Drug use: Never    Sexual activity: Not Currently     Review of Systems   Respiratory:  Positive for shortness of breath.    Cardiovascular:  Positive for leg swelling.   Objective:     Vital Signs (Most Recent):  Temp: 97.9 °F (36.6 °C) (04/22/22 0824)  Pulse: 71 (04/22/22 0824)  Resp: 18 (04/22/22 0824)  BP: 106/72 (04/22/22 0824)  SpO2: 96 % (04/22/22 0824) Vital Signs (24h Range):  Temp:  [97.8 °F (36.6 °C)-98.3 °F (36.8 °C)] 97.9 °F (36.6 °C)  Pulse:  [56-71] 71  Resp:  [16-20] 18  SpO2:  [95 %-99 %] 96 %  BP: (106-175)/(58-72) 106/72     Weight: 77.6 kg (171 lb)  Body mass index is 26.78 kg/m².    Physical Exam  Vitals and nursing note reviewed.   HENT:      Head: Normocephalic.      Nose: Nose normal.   Eyes:      Conjunctiva/sclera: Conjunctivae normal.   Cardiovascular:      Rate and Rhythm: Normal rate.   Pulmonary:       Effort: Pulmonary effort is normal.   Abdominal:      Palpations: Abdomen is soft.   Musculoskeletal:      Right lower leg: Edema present.      Left lower leg: Edema present.   Skin:     General: Skin is warm and dry.   Neurological:      Mental Status: She is alert and oriented to person, place, and time.       Significant Labs:  I have reviewed all pertinent lab results within the past 24 hours.  CBC:   Recent Labs   Lab 04/22/22  0501   WBC 5.55   RBC 2.73*   HGB 8.2*   HCT 26.7*      MCV 97.8*   MCH 30.0   MCHC 30.7*     BMP:   Recent Labs   Lab 04/22/22  0318   *      K 4.5   *   CO2 21   BUN 81*   CREATININE 6.40*   CALCIUM 7.6*   MG 2.0     CMP:   Recent Labs   Lab 04/19/22  1207 04/20/22  0420 04/22/22  0318   *   < > 107*   CALCIUM 7.9*   < > 7.6*   ALBUMIN 3.1*  --   --    PROT 7.1  --   --       < > 140   K 4.8   < > 4.5   CO2 24   < > 21      < > 110*   BUN 72*   < > 81*   CREATININE 6.46*   < > 6.40*   ALKPHOS 50*  --   --    ALT 21  --   --    AST 30  --   --    BILITOT 0.5  --   --     < > = values in this interval not displayed.     LFTs:   Recent Labs   Lab 04/19/22  1207   ALT 21   AST 30   ALKPHOS 50*   BILITOT 0.5   PROT 7.1   ALBUMIN 3.1*     Coagulation: No results for input(s): LABPROT, INR, APTT in the last 168 hours.  Cardiac markers: No results for input(s): CKMB, CPKMB, TROPONINT, TROPONINI, MYOGLOBIN in the last 168 hours.    Significant Diagnostics:  I have reviewed all pertinent imaging results/findings within the past 24 hours.

## 2022-04-22 NOTE — TRANSFER OF CARE
"Anesthesia Transfer of Care Note    Patient: Bria Ray    Procedure(s) Performed: Procedure(s) (LRB):  INSERTION, CATHETER, DIALYSIS (Right)    Patient location: PACU    Anesthesia Type: MAC    Transport from OR: Transported from OR on 6-10 L/min O2 by face mask with adequate spontaneous ventilation    Post pain: adequate analgesia    Post assessment: no apparent anesthetic complications    Post vital signs: stable    Level of consciousness: awake and alert    Nausea/Vomiting: no nausea/vomiting    Complications: none    Transfer of care protocol was followed      Last vitals:   Visit Vitals  BP (!) 174/73 (BP Location: Right arm, Patient Position: Sitting)   Pulse 67   Temp 36.7 °C (98 °F) (Oral)   Resp 14   Ht 5' 7" (1.702 m)   Wt 77.6 kg (171 lb)   SpO2 98%   Breastfeeding No   BMI 26.78 kg/m²     "

## 2022-04-22 NOTE — INTERVAL H&P NOTE
The patient has been examined and the H&P has been reviewed:    I concur with the findings and no changes have occurred since H&P was written.    Surgery risks, benefits and alternative options discussed and understood by patient/family.          Active Hospital Problems    Diagnosis  POA    *Acute hypoxemic respiratory failure [J96.01]  Yes    DVT prophylaxis [Z29.9]  Not Applicable    Elevated troponin [R77.8]  Yes    Macrocytic anemia [D53.9]  Yes    Anemia of chronic renal failure [N18.9, D63.1]  Yes    Type 2 diabetes mellitus [E11.9]  Yes    Essential hypertension [I10]  Yes    CKD (chronic kidney disease), stage IV [N18.4]  Yes      Resolved Hospital Problems   No resolved problems to display.

## 2022-04-22 NOTE — PLAN OF CARE
Care plan reviewed and interventions implemented.  Ongoing and progressing.  Continue to monitor patient.

## 2022-04-22 NOTE — OR NURSING
1115 Rec'd pt to PACU awake and alert. VSS. No signs of distress noted. Right chest dressing intact with small amount of bloody drainage noted to site. Barragan to gravity patent, secure, intact with no kinks or obstructions noted. Will continue to monitor site for further signs of bleeding.     1135 Right chest dressing oozing blood through dressing. Dr. Jeffery notified and pressure held to site. Dr. Jeffery at bedside to see dressing and change it, pressure dressing applied per MD. Will continue to monitor. Pt denies needs.     1145 Dr. Kumar notified of pt HTN. No new orders rec'd.    1150 Out of PACU. VSS. No signs of distress/bleeding noted.     1200. Pt to room 464. Family at bedside. Report given to EJ Espinal RN. Pt transferred to regular bed, safety precautions in place. Right chest dressing c/d/i, no bleeding noted. Barragan to gravity patent, secure, intact with no kinks or obstructions noted. Pt denies needs. /78, P 68, R 16, O2 95% 2L NC.

## 2022-04-22 NOTE — PROGRESS NOTES
[facility] Medicine  Progress Note    Patient Name: Bria Ray  MRN: [id number]  Patient Class: IP- Inpatient   Admission Date: 4/19/2022  Length of Stay: 3 days  Attending Physician: Roberto Meza MD  Primary Care Provider: Lorena Richardson MD        Subjective:     Principal Problem:Acute hypoxemic respiratory failure        HPI:  85 y/o female admitted today from [facility] ED for SOB. The patient was transferred from [facility] after presenting there for increased SOB over the last week. She was discharged from [facility] one week ago with worsening renal disease. Currently she is followed by Dr. Fields, Nephrologist and not on HD. Since her discharge last week she has increasingly gotten more SOB with orthopnea at night. Her symptoms are exacerbated with exertion and lying flat. There is no reported chest pain, vomiting, diarrhea, diaphoresis, vision change, numbness or recent illness.                     Overview/Hospital Course:  4/20: SOB has improved; renal function remains unchanged; slight decrease in H/H- pending transfusion; nephrology and cardiology following; discussions for HD per nephrology; plans for stress test in AM; repeat labs in AM to monitor H/H and renal function   4/21: SOB continues to improve slightly daily; still not to pt's baseline; has not reached decision regarding HD; stress test for today; H/H 6/21- did not receive full unit of blood yesterday d/t IV issues; PRBCs ordered for today  4/22: unable to lay flat for lexiscan yesterday; has decided to proceed with HD- HD cath to be placed today with HD following; H/H stable 8/26; BP improving       Interval History: Pt resting in bed. States she is still having issues with her breathing and is cold this morning. Has agreed to dialysis and will have HD cath placed today with HD following. Denies any CP or abd pain.     Review of Systems   Constitutional:  Positive for activity change and  chills.   Respiratory:  Positive for shortness of breath. Negative for chest tightness.    Cardiovascular:  Positive for leg swelling. Negative for chest pain.   Gastrointestinal:  Negative for abdominal pain, diarrhea and nausea.   Musculoskeletal:  Positive for arthralgias and myalgias.   Neurological:  Positive for weakness. Negative for dizziness, light-headedness and headaches.   All other systems reviewed and are negative.  Objective:     Vital Signs (Most Recent):  Temp: 97.9 °F (36.6 °C) (04/22/22 0824)  Pulse: 71 (04/22/22 0824)  Resp: 18 (04/22/22 0824)  BP: 106/72 (04/22/22 0824)  SpO2: 96 % (04/22/22 0824) Vital Signs (24h Range):  Temp:  [97.8 °F (36.6 °C)-98.3 °F (36.8 °C)] 97.9 °F (36.6 °C)  Pulse:  [56-71] 71  Resp:  [18-20] 18  SpO2:  [95 %-99 %] 96 %  BP: (106-175)/(58-72) 106/72     Weight: 77.6 kg (171 lb)  Body mass index is 26.78 kg/m².    Intake/Output Summary (Last 24 hours) at 4/22/2022 1113  Last data filed at 4/22/2022 1101  Gross per 24 hour   Intake 590 ml   Output 1250 ml   Net -660 ml      Physical Exam  Vitals and nursing note reviewed.   Constitutional:       Appearance: She is ill-appearing.   HENT:      Head: Normocephalic.      Nose: Nose normal.      Mouth/Throat:      Mouth: Mucous membranes are moist.   Eyes:      Extraocular Movements: Extraocular movements intact.      Pupils: Pupils are equal, round, and reactive to light.   Cardiovascular:      Rate and Rhythm: Rhythm irregular.      Pulses: Normal pulses.   Pulmonary:      Comments: Coarse  Abdominal:      General: Bowel sounds are normal.      Palpations: Abdomen is soft.      Tenderness: There is no abdominal tenderness. There is no guarding.   Musculoskeletal:         General: Normal range of motion.      Cervical back: Normal range of motion and neck supple.      Right lower leg: Edema present.      Left lower leg: Edema present.   Skin:     General: Skin is warm and dry.      Capillary Refill: Capillary refill takes 2  to 3 seconds.   Neurological:      General: No focal deficit present.      Mental Status: She is alert and oriented to person, place, and time. Mental status is at baseline.      Cranial Nerves: No cranial nerve deficit.      Sensory: No sensory deficit.   Psychiatric:         Mood and Affect: Mood normal.         Behavior: Behavior normal.       Significant Labs: All pertinent labs within the past 24 hours have been reviewed.  Recent Lab Results  (Last 5 results in the past 24 hours)        04/22/22  1125   04/22/22  0613   04/22/22  0501   04/22/22  0318   04/21/22  1915        Anion Gap       14         Baso #     0.05           Basophil %     0.9           BUN       81         BUN/CREAT RATIO       13         Calcium       7.6         Chloride       110         CO2       21         Creatinine       6.40         Differential Type     Auto           eGFR if non        7         Eos #     0.34           Eosinophil %     6.1           Glucose       107         Hematocrit     26.7           Hemoglobin     8.2           Immature Grans (Abs)     0.03           Immature Granulocytes     0.5           Lymph #     0.72           Lymph %     13.0           Magnesium       2.0         MCH     30.0           MCHC     30.7           MCV     97.8           Mono #     0.85           Mono %     15.3           MPV     11.3           Neutrophils, Abs     3.56           Neutrophils Relative     64.2           nRBC     0.0           NUCLEATED RBC ABSOLUTE     0.00           Platelets     174           POC Glucose 148   141       188       Potassium       4.5         RBC     2.73           RDW     17.6           Sodium       140         WBC     5.55                                  Significant Imaging: I have reviewed all pertinent imaging results/findings within the past 24 hours.      Assessment/Plan:      * Acute hypoxemic respiratory failure  Patient with Hypoxic Respiratory failure which is Acute.  she is not on  home oxygen. Supplemental oxygen was provided and noted-  .   Signs/symptoms of respiratory failure include- tachypnea, increased work of breathing and respiratory distress. Contributing diagnoses includes - CHF and renal failure  Labs and images were reviewed. Patient Has not had a recent ABG. Will treat underlying causes and adjust management of respiratory failure as follows-   Nephrology and cardiology consults.  Echocardiogram.    4/20  - was given lasix in ED  - second dose given today with adequate UOP and improvement of symptoms   - echo with EF 70%  - nephrology following- discussed need for dialysis- pt to discuss with family   4/21  - 2L NC   - breathing improved; able to lay back more today   - BLE edema lessened   4/22  - continues to have SOB  - HD cath to be placed today  - HD following cath placement   - should assist with FVO and lessen burden     Macrocytic anemia  Hg 7.6 and problems with anemia in the past.    Will monitor carefully.    FOBT ordered.   4/20  - FOBT ordered  - was neg on previous admit   - h/h 6/23  - 1u PRBCs to transfuse   4/21  - unable to receive full unit of blood yesterday   - H/H 6/21 this AM  - 1u PRBCs ordered     Elevated troponin  Likely from chronic renal failure.  Troponin >200  Repeat troponin level  Cardiology consulted  4/20  - trop max at 200 and trending down   - cardiology with plans for stress test in AM   - trop likely elevated secondary to CKD and demand mismatch   4/21  - trending down with no c/o chest pain  - stress test for today   4/22  - unable to lay flat for stress test   - will follow up with cards OP     DVT prophylaxis  * Heparin 5000 units q 8 hours.       Type 2 diabetes mellitus  Low scale SSI.  Accu checks ac/hs   Hold Metformin  4/20  - continue SSI   - accuchecks ACHS   - BG range 137-248  4/21  - BG range   - continue SSI  4/22  - BG range 107-188  - SSI continues     Anemia of chronic renal failure  Cbc daily  H/H 7/25  Likely anemia  from chronic renal failure.  4/20  - H/H 6/23  - 1u PRBCs pending  4/21  - did not receive full unit of blood yesterday  - H/H 6/21  - 1u PRBCs today  - recheck in AM   4/22  - H/H stable 8/26    CKD (chronic kidney disease), stage IV  * Telemetry monitoring    * Ekg:  Sr with  Pac's. 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   * sCre: 6.46.    Baseline sCre. 6 since 4/2021  * NA bicarbonate tabs daily  4/20  - nephrology following   - discussed HD- pt will discuss with family   4/21  - renal function remains unchanged  - nephrology following   4/22  - HD cath to be placed today with HD following     Essential hypertension  Chronic, resume home medications  4/20  - BP trend remains elevated but at pt's baseline   - prn hydralazine   4/21  - BP remains elevated  - increased hydralazine PO and norvasc  4/22  - BP trend improved   - still slightly elevated likely secondary to FVO  - HD following cath placement         VTE Risk Mitigation (From admission, onward)         Ordered     IP VTE HIGH RISK PATIENT  Once         04/19/22 1631     Place sequential compression device  Until discontinued         04/19/22 1631                Discharge Planning   ANGY:      Code Status: Full Code   Is the patient medically ready for discharge?:     Reason for patient still in hospital (select all that apply): Treatment  Discharge Plan A: Home Health        BELA Robbins  Department of Hospital Medicine   [facility] - Periop Services

## 2022-04-22 NOTE — HPI
87 y/o female with DM, HTN, CKD V followed by Dr. Fields  Admitted with fluid overload, pulmonary edema, creatinine around 6 normal K  General Surgery consulted for HD cath   Breathing spontaneous and unlabored. Breath sounds clear and equal bilaterally with regular rhythm.

## 2022-04-22 NOTE — ANESTHESIA PREPROCEDURE EVALUATION
04/22/2022  Bria Ray is a 86 y.o., female.      Pre-op Assessment    I have reviewed the Patient Summary Reports.    I have reviewed the NPO Status.   I have reviewed the Medications.     Review of Systems  Social:  Non-Smoker, No Alcohol Use    Hematology/Oncology:  Hematology Normal   Oncology Normal     EENT/Dental:EENT/Dental Normal   Cardiovascular:   Hypertension ECG has been reviewed.    Pulmonary:   COPD Shortness of breath  Pulmonary Hypertension Echocardiographic Estimated Pulmonary Artery Systolic Pressure >=35 - 45    Renal/:   Chronic Renal Disease, ESRD, CRI    Hepatic/GI:  Hepatic/GI Normal    Musculoskeletal:   Arthritis     Neurological:  Neurology Normal    Endocrine:   Diabetes    Dermatological:  Skin Normal    Psych:  Psychiatric Normal           Physical Exam  General: Well nourished, Cooperative, Alert and Oriented    Airway:  Mallampati: II / II  Mouth Opening: Normal  TM Distance: Normal  Neck ROM: Normal ROM    Dental:  Dentures    Chest/Lungs:  Clear to auscultation    Heart:  Rate: Normal  Rhythm: Regular Rhythm  Sounds: Normal        Chemistry        Component Value Date/Time     04/22/2022 0318    K 4.5 04/22/2022 0318     (H) 04/22/2022 0318    CO2 21 04/22/2022 0318    BUN 81 (H) 04/22/2022 0318    CREATININE 6.40 (H) 04/22/2022 0318     (H) 04/22/2022 0318        Component Value Date/Time    CALCIUM 7.6 (L) 04/22/2022 0318    ALKPHOS 50 (L) 04/19/2022 1207    AST 30 04/19/2022 1207    ALT 21 04/19/2022 1207    BILITOT 0.5 04/19/2022 1207    ESTGFRAFRICA 18 11/06/2020 0400    EGFRNONAA 7 (L) 04/22/2022 0318        Lab Results   Component Value Date    WBC 5.55 04/22/2022    RBC 2.73 (L) 04/22/2022    HGB 8.2 (L) 04/22/2022    MCV 97.8 (H) 04/22/2022    MCH 30.0 04/22/2022    MCHC 30.7 (L) 04/22/2022    RDW 17.6 (H) 04/22/2022     04/22/2022     MPV 11.3 04/22/2022    LYMPH 13.0 (L) 04/22/2022    LYMPH 0.72 (L) 04/22/2022    MONO 15.3 (H) 04/22/2022    EOS 0.34 04/22/2022    BASO 0.05 04/22/2022     Results for orders placed or performed during the hospital encounter of 04/19/22   EKG 12-lead    Collection Time: 04/19/22  3:26 PM    Narrative    Test Reason : R53.1,    Vent. Rate : 055 BPM     Atrial Rate : 000 BPM     P-R Int : 166 ms          QRS Dur : 136 ms      QT Int : 478 ms       P-R-T Axes : 078 059 001 degrees     QTc Int : 469 ms    Sinus rhythm  Right bundle branch block  Inferior ST-T abnormality  is nonspecific  Abnormal ECG      Referred By: MARK   SELF           Confirmed By:      Echo Summary    · The left ventricle is normal in size with mild concentric hypertrophy and normal systolic function.  · The estimated ejection fraction is 70%.  · Normal left ventricular diastolic function.  · Atrial fibrillation not observed.  · Mild right ventricular enlargement.  · Mild right atrial enlargement.  · Mild-to-moderate mitral regurgitation.  · Mild tricuspid regurgitation.  · Mild pulmonic regurgitation.  · Normal central venous pressure (3 mmHg).  · The estimated PA systolic pressure is 41 mmHg.  · There is pulmonary hypertension.          Anesthesia Plan  Type of Anesthesia, risks & benefits discussed:    Anesthesia Type: Gen Natural Airway, MAC  Intra-op Monitoring Plan: Standard ASA Monitors  Post Op Pain Control Plan: multimodal analgesia  Induction:  IV  Airway Plan: Direct  Informed Consent: Informed consent signed with the Patient representative and all parties understand the risks and agree with anesthesia plan.  All questions answered. Patient consented to blood products? Yes  ASA Score: 4  Day of Surgery Review of History & Physical: H&P Update referred to the surgeon/provider.    Ready For Surgery From Anesthesia Perspective.     .

## 2022-04-22 NOTE — PLAN OF CARE
Per MD at 1130 meeting pt will need dialysis long term. CATRACHITO faxed initial dialysis request to [facility]. CATRACHITO following.

## 2022-04-22 NOTE — ASSESSMENT & PLAN NOTE
Patient with Hypoxic Respiratory failure which is Acute.  she is not on home oxygen. Supplemental oxygen was provided and noted-  .   Signs/symptoms of respiratory failure include- tachypnea, increased work of breathing and respiratory distress. Contributing diagnoses includes - CHF and renal failure  Labs and images were reviewed. Patient Has not had a recent ABG. Will treat underlying causes and adjust management of respiratory failure as follows-   Nephrology and cardiology consults.  Echocardiogram.    4/20  - was given lasix in ED  - second dose given today with adequate UOP and improvement of symptoms   - echo with EF 70%  - nephrology following- discussed need for dialysis- pt to discuss with family   4/21  - 2L NC   - breathing improved; able to lay back more today   - BLE edema lessened   4/22  - continues to have SOB  - HD cath to be placed today  - HD following cath placement   - should assist with FVO and lessen burden

## 2022-04-22 NOTE — ANESTHESIA POSTPROCEDURE EVALUATION
Anesthesia Post Evaluation    Patient: Bria Ray    Procedure(s) Performed: Procedure(s) (LRB):  INSERTION, CATHETER, DIALYSIS (Right)    Final Anesthesia Type: MAC      Patient location during evaluation: PACU  Patient participation: Yes- Able to Participate  Level of consciousness: awake and alert  Post-procedure vital signs: reviewed and stable  Pain management: adequate  Airway patency: patent  STEFANIE mitigation strategies: Multimodal analgesia  PONV status at discharge: No PONV  Anesthetic complications: no      Cardiovascular status: blood pressure returned to baseline  Respiratory status: unassisted  Hydration status: euvolemic  Follow-up not needed.          Vitals Value Taken Time   /78 04/22/22 1205   Temp 36.6 °C (97.8 °F) 04/22/22 1205   Pulse 68 04/22/22 1205   Resp 18 04/22/22 1205   SpO2 95 % 04/22/22 1205         Event Time   Out of Recovery 04/22/2022 11:50:00         Pain/Orlin Score: Orlin Score: 9 (4/22/2022 11:45 AM)

## 2022-04-22 NOTE — ASSESSMENT & PLAN NOTE
Low scale SSI.  Accu checks ac/hs   Hold Metformin  4/20  - continue SSI   - accuchecks ACHS   - BG range 137-248  4/21  - BG range   - continue SSI  4/22  - BG range 107-188  - SSI continues

## 2022-04-22 NOTE — ASSESSMENT & PLAN NOTE
Likely from chronic renal failure.  Troponin >200  Repeat troponin level  Cardiology consulted  4/20  - trop max at 200 and trending down   - cardiology with plans for stress test in AM   - trop likely elevated secondary to CKD and demand mismatch   4/21  - trending down with no c/o chest pain  - stress test for today   4/22  - unable to lay flat for stress test   - will follow up with cards OP

## 2022-04-22 NOTE — H&P
[facility] - Orthopedic  General Surgery  History & Physical    Patient Name: Bria Ray  MRN: [id number]  Admission Date: 4/19/2022  Attending Physician: Roberto Meza MD   Primary Care Provider: Lorena Richardson MD    Patient information was obtained from patient and ER records.     Subjective:     Chief Complaint/Reason for Admission: shortness of breath, needs hemodialysis    History of Present Illness: 87 y/o female with DM, HTN, CKD V followed by Dr. Fields  Admitted with fluid overload, pulmonary edema, creatinine around 6 normal K  General Surgery consulted for HD cath      No current facility-administered medications on file prior to encounter.     Current Outpatient Medications on File Prior to Encounter   Medication Sig    albuterol (PROVENTIL) 2.5 mg /3 mL (0.083 %) nebulizer solution     albuterol (VENTOLIN HFA) 90 mcg/actuation inhaler Inhale 2 puffs into the lungs every 6 (six) hours as needed for Wheezing. Rescue    allopurinoL (ZYLOPRIM) 100 MG tablet Take 1 tablet (100 mg total) by mouth once daily.    amLODIPine (NORVASC) 5 MG tablet Take 1 tablet (5 mg total) by mouth once daily.    aspirin (ECOTRIN) 81 MG EC tablet Take 1 tablet (81 mg total) by mouth once daily.    calcium carbonate (TUMS) 200 mg calcium (500 mg) chewable tablet Take 1 tablet (500 mg total) by mouth once daily.    cyanocobalamin (VITAMIN B-12) 100 MCG tablet Take 1 tablet (100 mcg total) by mouth once daily.    gabapentin (NEURONTIN) 100 MG capsule Take 1 capsule (100 mg total) by mouth 2 (two) times daily.    hydrALAZINE (APRESOLINE) 50 MG tablet Take 1 tablet (50 mg total) by mouth every 8 (eight) hours.    HYDROcodone-acetaminophen (NORCO)  mg per tablet Take 1 tablet by mouth every 12 (twelve) hours.    metoprolol tartrate (LOPRESSOR) 25 MG tablet Take 1 tablet (25 mg total) by mouth 2 (two) times daily.    multivitamin with folic acid 400 mcg Tab Take 1 tablet by mouth once daily.     simvastatin (ZOCOR) 20 MG tablet Take 1 tablet (20 mg total) by mouth every evening.    SITagliptin (JANUVIA) 25 MG Tab Take 1 tablet (25 mg total) by mouth once daily.    sodium bicarbonate 325 MG tablet Take 1 tablet (325 mg total) by mouth once daily.    budesonide-formoterol 160-4.5 mcg (SYMBICORT) 160-4.5 mcg/actuation HFAA Inhale 2 puffs into the lungs every 12 (twelve) hours. Controller    [DISCONTINUED] glipiZIDE (GLUCOTROL) 5 MG TR24 Take 1 tablet (5 mg total) by mouth daily with breakfast.       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, without long-term current use of insulin     Vertigo     Vitamin D deficiency      Past Surgical History:   Procedure Laterality Date    BLADDER SUSPENSION      BRONCHOSCOPY       11/2020    CHOLECYSTECTOMY      FRACTURE SURGERY      HERNIA REPAIR      HIP FRACTURE SURGERY Right 2005    HYSTERECTOMY       Family History       Problem Relation (Age of Onset)    Alcohol abuse Father    Asthma Son    Cancer Other    Diabetes Mother, Sister          Tobacco Use    Smoking status: Never Smoker    Smokeless tobacco: Never Used   Substance and Sexual Activity    Alcohol use: Never    Drug use: Never    Sexual activity: Not Currently     Review of Systems   Respiratory:  Positive for shortness of breath.    Cardiovascular:  Positive for leg swelling.   Objective:     Vital  Signs (Most Recent):  Temp: 97.9 °F (36.6 °C) (04/22/22 0824)  Pulse: 71 (04/22/22 0824)  Resp: 18 (04/22/22 0824)  BP: 106/72 (04/22/22 0824)  SpO2: 96 % (04/22/22 0824) Vital Signs (24h Range):  Temp:  [97.8 °F (36.6 °C)-98.3 °F (36.8 °C)] 97.9 °F (36.6 °C)  Pulse:  [56-71] 71  Resp:  [16-20] 18  SpO2:  [95 %-99 %] 96 %  BP: (106-175)/(58-72) 106/72     Weight: 77.6 kg (171 lb)  Body mass index is 26.78 kg/m².    Physical Exam  Vitals and nursing note reviewed.   HENT:      Head: Normocephalic.      Nose: Nose normal.   Eyes:      Conjunctiva/sclera: Conjunctivae normal.   Cardiovascular:      Rate and Rhythm: Normal rate.   Pulmonary:      Effort: Pulmonary effort is normal.   Abdominal:      Palpations: Abdomen is soft.   Musculoskeletal:      Right lower leg: Edema present.      Left lower leg: Edema present.   Skin:     General: Skin is warm and dry.   Neurological:      Mental Status: She is alert and oriented to person, place, and time.       Significant Labs:  I have reviewed all pertinent lab results within the past 24 hours.  CBC:   Recent Labs   Lab 04/22/22  0501   WBC 5.55   RBC 2.73*   HGB 8.2*   HCT 26.7*      MCV 97.8*   MCH 30.0   MCHC 30.7*     BMP:   Recent Labs   Lab 04/22/22  0318   *      K 4.5   *   CO2 21   BUN 81*   CREATININE 6.40*   CALCIUM 7.6*   MG 2.0     CMP:   Recent Labs   Lab 04/19/22  1207 04/20/22  0420 04/22/22 0318   *   < > 107*   CALCIUM 7.9*   < > 7.6*   ALBUMIN 3.1*  --   --    PROT 7.1  --   --       < > 140   K 4.8   < > 4.5   CO2 24   < > 21      < > 110*   BUN 72*   < > 81*   CREATININE 6.46*   < > 6.40*   ALKPHOS 50*  --   --    ALT 21  --   --    AST 30  --   --    BILITOT 0.5  --   --     < > = values in this interval not displayed.     LFTs:   Recent Labs   Lab 04/19/22  1207   ALT 21   AST 30   ALKPHOS 50*   BILITOT 0.5   PROT 7.1   ALBUMIN 3.1*     Coagulation: No results for input(s): LABPROT, INR, APTT in the last  168 hours.  Cardiac markers: No results for input(s): CKMB, CPKMB, TROPONINT, TROPONINI, MYOGLOBIN in the last 168 hours.    Significant Diagnostics:  I have reviewed all pertinent imaging results/findings within the past 24 hours.      Assessment/Plan:     CKD (chronic kidney disease), stage IV  04/22/2022 Needs access for hemodialysis today  Npo Tunneled HD catheter in OR today  Negative covid  Patient/daughter consents after explained procedure risks/benefits to include bleeding/infection possible injury to lung      VTE Risk Mitigation (From admission, onward)         Ordered     IP VTE HIGH RISK PATIENT  Once         04/19/22 1631     Place sequential compression device  Until discontinued         04/19/22 1631                Nitza Cruz, SALTYP  General Surgery  [facility] - Orthopedic

## 2022-04-22 NOTE — ASSESSMENT & PLAN NOTE
Cbc daily  H/H 7/25  Likely anemia from chronic renal failure.  4/20  - H/H 6/23  - 1u PRBCs pending  4/21  - did not receive full unit of blood yesterday  - H/H 6/21  - 1u PRBCs today  - recheck in AM   4/22  - H/H stable 8/26

## 2022-04-22 NOTE — ASSESSMENT & PLAN NOTE
Chronic, resume home medications  4/20  - BP trend remains elevated but at pt's baseline   - prn hydralazine   4/21  - BP remains elevated  - increased hydralazine PO and norvasc  4/22  - BP trend improved   - still slightly elevated likely secondary to FVO  - HD following cath placement

## 2022-04-22 NOTE — ASSESSMENT & PLAN NOTE
04/22/2022 Needs access for hemodialysis today  Npo Tunneled HD catheter in OR today  Negative covid  Patient/daughter consents after explained procedure risks/benefits to include bleeding/infection possible injury to lung

## 2022-04-22 NOTE — OP NOTE
[facility] - Periop Services  Surgery Department  Operative Note    SUMMARY     Date of Procedure: 4/22/2022     Procedure: Procedure(s) (LRB):  INSERTION, CATHETER, DIALYSIS (Right)     Surgeon(s) and Role:     * Terence Jeffery MD - Primary    Assisting Surgeon: None    Pre-Operative Diagnosis: Chronic renal failure, unspecified CKD stage [N18.9]    Post-Operative Diagnosis: Post-Op Diagnosis Codes:     * Chronic renal failure, unspecified CKD stage [N18.9]    Anesthesia: General/MAC    Procedures Performed: US guided fluoroscopy confirmed tunneled hemodialysis catheter    Significant Findings of the Procedure:     Procedure in Detail: After informed consent was obtained patient brought to the OR prepped and draped in the usual sterile fashion. We started out by using an ultrasound to scan the right IJ. We accessed the vein on the first attempt.  We then fluoroscopy to confirm in the SVC. We placed a separate incision for our infraclavicular catheter insertion with tunneling. We then using the Seldinger method dilated over the wire under fluoroscopic visualization. We then placed our catheter parking tip at the atriocaval junction. We flushed and aspirated good venous blood and the port and heparin locked it. We then secured the catheter in place. Patient tolerated the procedure well.      Complications: No    Estimated Blood Loss (EBL): * No values recorded between 4/22/2022 10:55 AM and 4/22/2022 11:13 AM *           Implants: * No implants in log *    Specimens:   Specimen (24h ago, onward)            None                  Condition: Good    Disposition: PACU - hemodynamically stable.    Attestation: I was present and scrubbed for the entire procedure.

## 2022-04-23 NOTE — PROGRESS NOTES
[facility] Medicine  Progress Note    Patient Name: Bria Ray  MRN: [id number]  Patient Class: IP- Inpatient   Admission Date: 4/19/2022  Length of Stay: 4 days  Attending Physician: Vijay Malcolm MD  Primary Care Provider: Lorena Richardson MD        Subjective:     Principal Problem:Acute hypoxemic respiratory failure        HPI:  85 y/o female admitted today from [facility] ED for SOB. The patient was transferred from [facility] after presenting there for increased SOB over the last week. She was discharged from [facility] one week ago with worsening renal disease. Currently she is followed by Dr. Fields, Nephrologist and not on HD. Since her discharge last week she has increasingly gotten more SOB with orthopnea at night. Her symptoms are exacerbated with exertion and lying flat. There is no reported chest pain, vomiting, diarrhea, diaphoresis, vision change, numbness or recent illness.                     Overview/Hospital Course:  4/20: SOB has improved; renal function remains unchanged; slight decrease in H/H- pending transfusion; nephrology and cardiology following; discussions for HD per nephrology; plans for stress test in AM; repeat labs in AM to monitor H/H and renal function   4/21: SOB continues to improve slightly daily; still not to pt's baseline; has not reached decision regarding HD; stress test for today; H/H 6/21- did not receive full unit of blood yesterday d/t IV issues; PRBCs ordered for today  4/22: unable to lay flat for lexiscan yesterday; has decided to proceed with HD- HD cath to be placed today with HD following; H/H stable 8/26; BP improving   4/23: had R chest wall tunneled HD cath placed yesterday; 1st round of dialysis yesterday- tolerated from a BP standpoint- nauseated and exhausted; dialysis again today; phenergan IVPB to offset the nausea and exhaustion.       Interval History: Pt seen in HD; 2nd day of HD; rec'd HD cath yesterday; states she  still feels drained and doesn't like the HD; does state she can breathe better; did not sleep well. Discussed labs, VS, and med adjustments. Discussed adding a one time dose of phenergan to help her rest and help her nausea- verbalizes understanding and agreeance.     Review of Systems   Constitutional:  Positive for activity change and chills.   Respiratory:  Positive for shortness of breath. Negative for chest tightness.    Cardiovascular:  Positive for leg swelling. Negative for chest pain.   Gastrointestinal:  Negative for abdominal pain, diarrhea and nausea.   Musculoskeletal:  Positive for arthralgias and myalgias.   Neurological:  Positive for weakness. Negative for dizziness, light-headedness and headaches.   All other systems reviewed and are negative.  Objective:     Vital Signs (Most Recent):  Temp: 97.6 °F (36.4 °C) (04/23/22 1150)  Pulse: 67 (04/23/22 1150)  Resp: 18 (04/23/22 1150)  BP: (!) 176/73 (04/23/22 1150)  SpO2: (!) 94 % (04/23/22 1150)   Vital Signs (24h Range):  Temp:  [97.5 °F (36.4 °C)-98.4 °F (36.9 °C)] 97.6 °F (36.4 °C)  Pulse:  [] 67  Resp:  [16-18] 18  SpO2:  [92 %-96 %] 94 %  BP: (101-193)/() 176/73     Weight: 77.6 kg (171 lb)  Body mass index is 26.78 kg/m².    Intake/Output Summary (Last 24 hours) at 4/23/2022 1309  Last data filed at 4/23/2022 1200  Gross per 24 hour   Intake 930 ml   Output 4650 ml   Net -3720 ml      Physical Exam  Vitals and nursing note reviewed.   Constitutional:       Appearance: She is ill-appearing.   HENT:      Head: Normocephalic.      Nose: Nose normal.      Mouth/Throat:      Mouth: Mucous membranes are moist.   Eyes:      Extraocular Movements: Extraocular movements intact.      Pupils: Pupils are equal, round, and reactive to light.   Cardiovascular:      Rate and Rhythm: Rhythm irregular.      Pulses: Normal pulses.   Pulmonary:      Comments: Coarse  Abdominal:      General: Bowel sounds are normal.      Palpations: Abdomen is soft.       Tenderness: There is no abdominal tenderness. There is no guarding.   Musculoskeletal:         General: Normal range of motion.      Cervical back: Normal range of motion and neck supple.      Right lower leg: Edema present.      Left lower leg: Edema present.   Skin:     General: Skin is warm and dry.      Capillary Refill: Capillary refill takes 2 to 3 seconds.      Comments: R chest wall HD cath    Neurological:      General: No focal deficit present.      Mental Status: She is alert and oriented to person, place, and time. Mental status is at baseline.      Cranial Nerves: No cranial nerve deficit.      Sensory: No sensory deficit.   Psychiatric:         Mood and Affect: Mood normal.         Behavior: Behavior normal.       Significant Labs: All pertinent labs within the past 24 hours have been reviewed.  Recent Lab Results  (Last 5 results in the past 24 hours)        04/23/22  1205   04/23/22  0653   04/23/22  0536   04/23/22  0452   04/22/22 2027        Anion Gap       15         Aniso       1+         Baso #       0.04         Basophil %       0.9                1         BUN       38         BUN/CREAT RATIO       10         Calcium       7.7         Chloride       106         CO2       26         Creatinine       3.63         Differential Type       Manual         eGFR if non        13         Eos #       0.25         Eosinophil %       5.4                5         Glucose       127         Hematocrit       27.6         Hemoglobin       8.2         Hypo       1+         Immature Grans (Abs)       0.02         Immature Granulocytes       0.4         Lymph #       0.57         Lymph %       12.3                14         MCH       29.1         MCHC       29.7         MCV       97.9         Metamyelocytes       1         Mono #       0.83         Mono %       18.0                16         MPV       10.9         Neutrophils, Abs       2.91         Neutrophils Relative       63.0         nRBC        0.0         NUCLEATED RBC ABSOLUTE       0.00         Ovalocytes       Few         PLATELET MORPHOLOGY       Large & Giant Platelets         Platelets       163         POC Glucose 163   145   121     119       Potassium       4.3         RBC       2.82         RDW       17.0         Segmented Neutrophils, Man %       63         Sodium       143         WBC       4.62                                Significant Imaging: I have reviewed all pertinent imaging results/findings within the past 24 hours.      Assessment/Plan:      * Acute hypoxemic respiratory failure  Patient with Hypoxic Respiratory failure which is Acute.  she is not on home oxygen. Supplemental oxygen was provided and noted- Oxygen Concentration (%):  [32] 32.   Signs/symptoms of respiratory failure include- tachypnea, increased work of breathing and respiratory distress. Contributing diagnoses includes - CHF and renal failure  Labs and images were reviewed. Patient Has not had a recent ABG. Will treat underlying causes and adjust management of respiratory failure as follows-   Nephrology and cardiology consults.  Echocardiogram.    4/20  - was given lasix in ED  - second dose given today with adequate UOP and improvement of symptoms   - echo with EF 70%  - nephrology following- discussed need for dialysis- pt to discuss with family   4/21  - 2L NC   - breathing improved; able to lay back more today   - BLE edema lessened   4/22  - continues to have SOB  - HD cath to be placed today  - HD following cath placement   - should assist with FVO and lessen burden   4/23  - improving     Macrocytic anemia  Hg 7.6 and problems with anemia in the past.    Will monitor carefully.    FOBT ordered.   4/20  - FOBT ordered  - was neg on previous admit   - h/h 6/23  - 1u PRBCs to transfuse   4/21  - unable to receive full unit of blood yesterday   - H/H 6/21 this AM  - 1u PRBCs ordered     Elevated troponin  Likely from chronic renal failure.  Troponin  >200  Repeat troponin level  Cardiology consulted  4/20  - trop max at 200 and trending down   - cardiology with plans for stress test in AM   - trop likely elevated secondary to CKD and demand mismatch   4/21  - trending down with no c/o chest pain  - stress test for today   4/22  - unable to lay flat for stress test   - will follow up with cards OP     DVT prophylaxis  * Heparin 5000 units q 8 hours.       Type 2 diabetes mellitus  Low scale SSI.  Accu checks ac/hs   Hold Metformin  4/20  - continue SSI   - accuchecks ACHS   - BG range 137-248  4/21  - BG range   - continue SSI  4/22  - BG range 107-188  - SSI continues     Anemia of chronic renal failure  Cbc daily  H/H 7/25  Likely anemia from chronic renal failure.  4/20  - H/H 6/23  - 1u PRBCs pending  4/21  - did not receive full unit of blood yesterday  - H/H 6/21  - 1u PRBCs today  - recheck in AM   4/22  - H/H stable 8/26    CKD (chronic kidney disease), stage IV  * Telemetry monitoring    * Ekg:  Sr with  Pac's. 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   * sCre: 6.46.    Baseline sCre. 6 since 4/2021  * NA bicarbonate tabs daily  4/20  - nephrology following   - discussed HD- pt will discuss with family   4/21  - renal function remains unchanged  - nephrology following   4/22  - HD cath to be placed today with HD following   4/23  - second day of HD  - tolerating ok     Essential hypertension  Chronic, resume home medications  4/20  - BP trend remains elevated but at pt's baseline   - prn hydralazine   4/21  - BP remains elevated  - increased hydralazine PO and norvasc  4/22  - BP trend improved   - still slightly elevated likely secondary to FVO  - HD following cath placement   4/23  - BP remains elevated  - will optimize meds       VTE Risk Mitigation (From admission, onward)         Ordered     heparin (porcine) injection  4,000 Units  As needed (PRN)         04/23/22 0811     IP VTE HIGH RISK PATIENT  Once         04/19/22 1631     Place sequential compression device  Until discontinued         04/19/22 1631                Discharge Planning   ANGY:      Code Status: Full Code   Is the patient medically ready for discharge?:     Reason for patient still in hospital (select all that apply): Treatment  Discharge Plan A: Home Health        BELA Robbins  Department of Hospital Medicine   [facility] - Orthopedic   Xenograft Text: The defect edges were debeveled with a #15 scalpel blade.  Given the location of the defect, shape of the defect and the proximity to free margins a xenograft was deemed most appropriate.  The graft was then trimmed to fit the size of the defect.  The graft was then placed in the primary defect and oriented appropriately.

## 2022-04-23 NOTE — PROGRESS NOTES
[facility] [place] - Orthopedic  Nephrology  Progress Note    Patient Name: Bria Ray  MRN: [id number]  Admission Date: 4/19/2022  Hospital Length of Stay: 4 days  Attending Provider: Vijay Malcolm MD   Primary Care Physician: Lorena Richardson MD  Principal Problem:Acute hypoxemic respiratory failure    Subjective:     HPI: 86-year-old woman with CKD 5. Her baseline creatinine was 4 prior to her last admission.  It dilcia to nearly 7, then improved slightly to 6-6.5.  She was volume depleted at the time of that admission.  Creatinine improved slightly with IV fluid.  She was discharged to swing bed with creatinine  6.5.  She developed progressive shortness of breath over the past 3-4 days.  She denies chest pain.  No pleuritic chest pain or hemoptysis.      Interval History: This is a late entry note for 4/22/22.  She was seen just as dialysis began.     Review of patient's allergies indicates:  No Known Allergies  Current Facility-Administered Medications   Medication Frequency    0.9%  NaCl infusion (for blood administration) Q24H PRN    0.9%  NaCl infusion PRN    acetaminophen tablet 1,000 mg Q8H PRN    acetaminophen tablet 650 mg Q8H PRN    acetaminophen tablet 650 mg Q4H PRN    allopurinoL tablet 100 mg Daily    aluminum-magnesium hydroxide-simethicone 200-200-20 mg/5 mL suspension 30 mL QID PRN    amLODIPine tablet 10 mg Daily    aspirin EC tablet 81 mg Daily    dextrose 10% bolus 125 mL PRN    dextrose 10% bolus 250 mL PRN    gabapentin capsule 100 mg QHS    glucagon (human recombinant) injection 1 mg PRN    heparin (porcine) injection 4,000 Units PRN    hydrALAZINE injection 10 mg Q6H PRN    hydrALAZINE tablet 100 mg Q8H    insulin aspart U-100 injection 0-5 Units QID (AC & HS)    magnesium oxide tablet 800 mg PRN    magnesium oxide tablet 800 mg PRN    melatonin tablet 6 mg Nightly PRN    metoprolol tartrate (LOPRESSOR) tablet 25 mg BID    naloxone 0.4 mg/mL injection 0.02 mg PRN     ondansetron disintegrating tablet 8 mg Q8H PRN    oxyCODONE immediate release tablet 5 mg Q4H PRN    polyethylene glycol packet 17 g Daily PRN    potassium bicarbonate disintegrating tablet 35 mEq PRN    potassium bicarbonate disintegrating tablet 50 mEq PRN    potassium bicarbonate disintegrating tablet 60 mEq PRN    potassium, sodium phosphates 280-160-250 mg packet 2 packet PRN    potassium, sodium phosphates 280-160-250 mg packet 2 packet PRN    potassium, sodium phosphates 280-160-250 mg packet 2 packet PRN    sodium bicarbonate tablet 325 mg Daily    sodium chloride 0.9% flush 10 mL PRN       Objective:     Vital Signs (Most Recent):  Temp: 98.8 °F (37.1 °C) (04/23/22 1600)  Pulse: 72 (04/23/22 1600)  Resp: 18 (04/23/22 1600)  BP: (!) 158/73 (04/23/22 1600)  SpO2: (!) 93 % (04/23/22 1600)  O2 Device (Oxygen Therapy): nasal cannula (04/23/22 0002)   Vital Signs (24h Range):  Temp:  [97.6 °F (36.4 °C)-98.8 °F (37.1 °C)] 98.8 °F (37.1 °C)  Pulse:  [61-85] 72  Resp:  [16-18] 18  SpO2:  [92 %-96 %] 93 %  BP: (141-187)/(58-75) 158/73     Weight: 77.6 kg (171 lb) (04/21/22 1016)  Body mass index is 26.78 kg/m².  Body surface area is 1.92 meters squared.    I/O last 3 completed shifts:  In: 1115 [P.O.:740; I.V.:150; Other:200; IV Piggyback:25]  Out: 4200 [Urine:1700; Other:2500]    Physical Exam  Constitutional:       General: She is not in acute distress.  HENT:      Head: Normocephalic and atraumatic.   Eyes:      Pupils: Pupils are equal, round, and reactive to light.   Cardiovascular:      Rate and Rhythm: Normal rate and regular rhythm.      Heart sounds: No murmur heard.    No friction rub. No gallop.   Pulmonary:      Effort: No respiratory distress.      Breath sounds: Rales present.   Abdominal:      General: Bowel sounds are normal.      Tenderness: There is no abdominal tenderness. There is no guarding.   Musculoskeletal:      Right lower leg: No edema.      Left lower leg: No edema.       Comments: Trace lower extremity edema   Neurological:      Mental Status: She is alert and oriented to person, place, and time.   Psychiatric:         Behavior: Behavior normal.       Significant Labs:  BMP:   Recent Labs   Lab 04/22/22  0318 04/23/22  0452   * 127*    143   K 4.5 4.3   * 106   CO2 21 26   BUN 81* 38*   CREATININE 6.40* 3.63*   CALCIUM 7.6* 7.7*   MG 2.0  --         Significant Imaging:      Assessment/Plan:     Type 2 diabetes mellitus  Continue current treatment    Anemia of chronic renal failure  Post transfusion    CKD (chronic kidney disease), stage IV  Renal function has not improved since her last admission.  She has developed volume overload.  Chest x-ray shows pulmonary edema.  She has been given diuretics.  This may temporarily improve her volume status.  I have discussed dialysis with her in detail, including temporary access.  She is anxious about this and wants to discuss it with her family.  04/21/2022:  Dialysis discussed with patient and her daughter.  She agrees to proceed.  Surgery will be consulted for tunneled catheter.  Dialysis to follow tomorrow.  4/22/22: Seen during initial dialysis. New catheter functioning ok. Discussed with pt daughter.    Essential hypertension  Controlled        Thank you for your consult.     Beni Fields MD  Nephrology  [facility] - Orthopedic

## 2022-04-23 NOTE — PLAN OF CARE
Problem: Adult Inpatient Plan of Care  Goal: Plan of Care Review  Outcome: Ongoing, Progressing  Goal: Patient-Specific Goal (Individualized)  Outcome: Ongoing, Progressing  Goal: Absence of Hospital-Acquired Illness or Injury  Outcome: Ongoing, Progressing  Goal: Optimal Comfort and Wellbeing  Outcome: Ongoing, Progressing  Goal: Readiness for Transition of Care  Outcome: Ongoing, Progressing     Problem: Infection  Goal: Absence of Infection Signs and Symptoms  Outcome: Ongoing, Progressing     Problem: Diabetes Comorbidity  Goal: Blood Glucose Level Within Targeted Range  Outcome: Ongoing, Progressing

## 2022-04-23 NOTE — ASSESSMENT & PLAN NOTE
Renal function has not improved since her last admission.  She has developed volume overload.  Chest x-ray shows pulmonary edema.  She has been given diuretics.  This may temporarily improve her volume status.  I have discussed dialysis with her in detail, including temporary access.  She is anxious about this and wants to discuss it with her family.  04/21/2022:  Dialysis discussed with patient and her daughter.  She agrees to proceed.  Surgery will be consulted for tunneled catheter.  Dialysis to follow tomorrow.  4/22/22: Seen during initial dialysis. New catheter functioning ok. Discussed with pt daughter.

## 2022-04-23 NOTE — SUBJECTIVE & OBJECTIVE
Interval History: Pt seen in HD; 2nd day of HD; rec'd HD cath yesterday; states she still feels drained and doesn't like the HD; does state she can breathe better; did not sleep well. Discussed labs, VS, and med adjustments. Discussed adding a one time dose of phenergan to help her rest and help her nausea- verbalizes understanding and agreeance.     Review of Systems   Constitutional:  Positive for activity change and chills.   Respiratory:  Positive for shortness of breath. Negative for chest tightness.    Cardiovascular:  Positive for leg swelling. Negative for chest pain.   Gastrointestinal:  Negative for abdominal pain, diarrhea and nausea.   Musculoskeletal:  Positive for arthralgias and myalgias.   Neurological:  Positive for weakness. Negative for dizziness, light-headedness and headaches.   All other systems reviewed and are negative.  Objective:     Vital Signs (Most Recent):  Temp: 97.6 °F (36.4 °C) (04/23/22 1150)  Pulse: 67 (04/23/22 1150)  Resp: 18 (04/23/22 1150)  BP: (!) 176/73 (04/23/22 1150)  SpO2: (!) 94 % (04/23/22 1150)   Vital Signs (24h Range):  Temp:  [97.5 °F (36.4 °C)-98.4 °F (36.9 °C)] 97.6 °F (36.4 °C)  Pulse:  [] 67  Resp:  [16-18] 18  SpO2:  [92 %-96 %] 94 %  BP: (101-193)/() 176/73     Weight: 77.6 kg (171 lb)  Body mass index is 26.78 kg/m².    Intake/Output Summary (Last 24 hours) at 4/23/2022 1309  Last data filed at 4/23/2022 1200  Gross per 24 hour   Intake 930 ml   Output 4650 ml   Net -3720 ml      Physical Exam  Vitals and nursing note reviewed.   Constitutional:       Appearance: She is ill-appearing.   HENT:      Head: Normocephalic.      Nose: Nose normal.      Mouth/Throat:      Mouth: Mucous membranes are moist.   Eyes:      Extraocular Movements: Extraocular movements intact.      Pupils: Pupils are equal, round, and reactive to light.   Cardiovascular:      Rate and Rhythm: Rhythm irregular.      Pulses: Normal pulses.   Pulmonary:      Comments:  Coarse  Abdominal:      General: Bowel sounds are normal.      Palpations: Abdomen is soft.      Tenderness: There is no abdominal tenderness. There is no guarding.   Musculoskeletal:         General: Normal range of motion.      Cervical back: Normal range of motion and neck supple.      Right lower leg: Edema present.      Left lower leg: Edema present.   Skin:     General: Skin is warm and dry.      Capillary Refill: Capillary refill takes 2 to 3 seconds.      Comments: R chest wall HD cath    Neurological:      General: No focal deficit present.      Mental Status: She is alert and oriented to person, place, and time. Mental status is at baseline.      Cranial Nerves: No cranial nerve deficit.      Sensory: No sensory deficit.   Psychiatric:         Mood and Affect: Mood normal.         Behavior: Behavior normal.       Significant Labs: All pertinent labs within the past 24 hours have been reviewed.  Recent Lab Results  (Last 5 results in the past 24 hours)        04/23/22  1205   04/23/22  0653   04/23/22  0536   04/23/22  0452   04/22/22 2027        Anion Gap       15         Aniso       1+         Baso #       0.04         Basophil %       0.9                1         BUN       38         BUN/CREAT RATIO       10         Calcium       7.7         Chloride       106         CO2       26         Creatinine       3.63         Differential Type       Manual         eGFR if non        13         Eos #       0.25         Eosinophil %       5.4                5         Glucose       127         Hematocrit       27.6         Hemoglobin       8.2         Hypo       1+         Immature Grans (Abs)       0.02         Immature Granulocytes       0.4         Lymph #       0.57         Lymph %       12.3                14         MCH       29.1         MCHC       29.7         MCV       97.9         Metamyelocytes       1         Mono #       0.83         Mono %       18.0                16         MPV        10.9         Neutrophils, Abs       2.91         Neutrophils Relative       63.0         nRBC       0.0         NUCLEATED RBC ABSOLUTE       0.00         Ovalocytes       Few         PLATELET MORPHOLOGY       Large & Giant Platelets         Platelets       163         POC Glucose 163   145   121     119       Potassium       4.3         RBC       2.82         RDW       17.0         Segmented Neutrophils, Man %       63         Sodium       143         WBC       4.62                                Significant Imaging: I have reviewed all pertinent imaging results/findings within the past 24 hours.

## 2022-04-23 NOTE — DIALYSIS ROUNDING
The patient was dialyzed today without complication.There was a net fluid removal of 1500 ml.She reports having nausea and an inability to keep any food down without having to vomit.    PE  Lungs-crackles at the bases           Cor-3/6 systolic murmur  Abd-soft  Plan  Continue the present care

## 2022-04-23 NOTE — ASSESSMENT & PLAN NOTE
Patient with Hypoxic Respiratory failure which is Acute.  she is not on home oxygen. Supplemental oxygen was provided and noted- Oxygen Concentration (%):  [32] 32.   Signs/symptoms of respiratory failure include- tachypnea, increased work of breathing and respiratory distress. Contributing diagnoses includes - CHF and renal failure  Labs and images were reviewed. Patient Has not had a recent ABG. Will treat underlying causes and adjust management of respiratory failure as follows-   Nephrology and cardiology consults.  Echocardiogram.    4/20  - was given lasix in ED  - second dose given today with adequate UOP and improvement of symptoms   - echo with EF 70%  - nephrology following- discussed need for dialysis- pt to discuss with family   4/21  - 2L NC   - breathing improved; able to lay back more today   - BLE edema lessened   4/22  - continues to have SOB  - HD cath to be placed today  - HD following cath placement   - should assist with FVO and lessen burden   4/23  - improving

## 2022-04-23 NOTE — DIALYSIS ROUNDING
The patient was seen while on hemodialysis today.She has no complaints.  Plan:  Hemodialysis tomorrow

## 2022-04-23 NOTE — ASSESSMENT & PLAN NOTE
* Telemetry monitoring    * Ekg:  Sr with  Pac's. 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   * sCre: 6.46.    Baseline sCre. 6 since 4/2021  * NA bicarbonate tabs daily  4/20  - nephrology following   - discussed HD- pt will discuss with family   4/21  - renal function remains unchanged  - nephrology following   4/22  - HD cath to be placed today with HD following   4/23  - second day of HD  - tolerating ok

## 2022-04-23 NOTE — PLAN OF CARE
Problem: Adult Inpatient Plan of Care  Goal: Plan of Care Review  Outcome: Ongoing, Progressing  Goal: Patient-Specific Goal (Individualized)  Outcome: Ongoing, Progressing  Goal: Absence of Hospital-Acquired Illness or Injury  Outcome: Ongoing, Progressing  Goal: Optimal Comfort and Wellbeing  Outcome: Ongoing, Progressing  Goal: Readiness for Transition of Care  Outcome: Ongoing, Progressing     Problem: Infection  Goal: Absence of Infection Signs and Symptoms  Outcome: Ongoing, Progressing     Problem: Diabetes Comorbidity  Goal: Blood Glucose Level Within Targeted Range  Outcome: Ongoing, Progressing     Problem: Device-Related Complication Risk (Hemodialysis)  Goal: Safe, Effective Therapy Delivery  Outcome: Ongoing, Progressing     Problem: Hemodynamic Instability (Hemodialysis)  Goal: Effective Tissue Perfusion  Outcome: Ongoing, Progressing     Problem: Infection (Hemodialysis)  Goal: Absence of Infection Signs and Symptoms  Outcome: Ongoing, Progressing     Problem: Skin Injury Risk Increased  Goal: Skin Health and Integrity  Outcome: Ongoing, Progressing

## 2022-04-23 NOTE — SUBJECTIVE & OBJECTIVE
Interval History: This is a late entry note for 4/22/22.  She was seen just as dialysis began.     Review of patient's allergies indicates:  No Known Allergies  Current Facility-Administered Medications   Medication Frequency    0.9%  NaCl infusion (for blood administration) Q24H PRN    0.9%  NaCl infusion PRN    acetaminophen tablet 1,000 mg Q8H PRN    acetaminophen tablet 650 mg Q8H PRN    acetaminophen tablet 650 mg Q4H PRN    allopurinoL tablet 100 mg Daily    aluminum-magnesium hydroxide-simethicone 200-200-20 mg/5 mL suspension 30 mL QID PRN    amLODIPine tablet 10 mg Daily    aspirin EC tablet 81 mg Daily    dextrose 10% bolus 125 mL PRN    dextrose 10% bolus 250 mL PRN    gabapentin capsule 100 mg QHS    glucagon (human recombinant) injection 1 mg PRN    heparin (porcine) injection 4,000 Units PRN    hydrALAZINE injection 10 mg Q6H PRN    hydrALAZINE tablet 100 mg Q8H    insulin aspart U-100 injection 0-5 Units QID (AC & HS)    magnesium oxide tablet 800 mg PRN    magnesium oxide tablet 800 mg PRN    melatonin tablet 6 mg Nightly PRN    metoprolol tartrate (LOPRESSOR) tablet 25 mg BID    naloxone 0.4 mg/mL injection 0.02 mg PRN    ondansetron disintegrating tablet 8 mg Q8H PRN    oxyCODONE immediate release tablet 5 mg Q4H PRN    polyethylene glycol packet 17 g Daily PRN    potassium bicarbonate disintegrating tablet 35 mEq PRN    potassium bicarbonate disintegrating tablet 50 mEq PRN    potassium bicarbonate disintegrating tablet 60 mEq PRN    potassium, sodium phosphates 280-160-250 mg packet 2 packet PRN    potassium, sodium phosphates 280-160-250 mg packet 2 packet PRN    potassium, sodium phosphates 280-160-250 mg packet 2 packet PRN    sodium bicarbonate tablet 325 mg Daily    sodium chloride 0.9% flush 10 mL PRN       Objective:     Vital Signs (Most Recent):  Temp: 98.8 °F (37.1 °C) (04/23/22 1600)  Pulse: 72 (04/23/22 1600)  Resp: 18 (04/23/22 1600)  BP: (!) 158/73 (04/23/22 1600)  SpO2: (!) 93 %  (04/23/22 1600)  O2 Device (Oxygen Therapy): nasal cannula (04/23/22 0002)   Vital Signs (24h Range):  Temp:  [97.6 °F (36.4 °C)-98.8 °F (37.1 °C)] 98.8 °F (37.1 °C)  Pulse:  [61-85] 72  Resp:  [16-18] 18  SpO2:  [92 %-96 %] 93 %  BP: (141-187)/(58-75) 158/73     Weight: 77.6 kg (171 lb) (04/21/22 1016)  Body mass index is 26.78 kg/m².  Body surface area is 1.92 meters squared.    I/O last 3 completed shifts:  In: 1115 [P.O.:740; I.V.:150; Other:200; IV Piggyback:25]  Out: 4200 [Urine:1700; Other:2500]    Physical Exam  Constitutional:       General: She is not in acute distress.  HENT:      Head: Normocephalic and atraumatic.   Eyes:      Pupils: Pupils are equal, round, and reactive to light.   Cardiovascular:      Rate and Rhythm: Normal rate and regular rhythm.      Heart sounds: No murmur heard.    No friction rub. No gallop.   Pulmonary:      Effort: No respiratory distress.      Breath sounds: Rales present.   Abdominal:      General: Bowel sounds are normal.      Tenderness: There is no abdominal tenderness. There is no guarding.   Musculoskeletal:      Right lower leg: No edema.      Left lower leg: No edema.      Comments: Trace lower extremity edema   Neurological:      Mental Status: She is alert and oriented to person, place, and time.   Psychiatric:         Behavior: Behavior normal.       Significant Labs:  BMP:   Recent Labs   Lab 04/22/22  0318 04/23/22  0452   * 127*    143   K 4.5 4.3   * 106   CO2 21 26   BUN 81* 38*   CREATININE 6.40* 3.63*   CALCIUM 7.6* 7.7*   MG 2.0  --         Significant Imaging:

## 2022-04-23 NOTE — ASSESSMENT & PLAN NOTE
Chronic, resume home medications  4/20  - BP trend remains elevated but at pt's baseline   - prn hydralazine   4/21  - BP remains elevated  - increased hydralazine PO and norvasc  4/22  - BP trend improved   - still slightly elevated likely secondary to FVO  - HD following cath placement   4/23  - BP remains elevated  - will optimize meds

## 2022-04-24 NOTE — SUBJECTIVE & OBJECTIVE
"Interval History: Pt resting in bed. States she "still doesn't feel good, but feels a little better". Denies any nausea and was able to eat some breakfast. Denies any CP, SOB, abd pain. Plans for HD tomorrow.     Review of Systems   Constitutional:  Positive for activity change and chills.   Respiratory:  Positive for shortness of breath. Negative for chest tightness.    Cardiovascular:  Positive for leg swelling. Negative for chest pain.   Gastrointestinal:  Negative for abdominal pain, diarrhea and nausea.   Musculoskeletal:  Positive for arthralgias and myalgias.   Neurological:  Positive for weakness. Negative for dizziness, light-headedness and headaches.   All other systems reviewed and are negative.  Objective:     Vital Signs (Most Recent):  Temp: 97.9 °F (36.6 °C) (04/24/22 0807)  Pulse: 60 (04/24/22 0807)  Resp: 18 (04/24/22 0807)  BP: (!) 154/57 (04/24/22 0807)  SpO2: 95 % (04/24/22 0939)   Vital Signs (24h Range):  Temp:  [97.7 °F (36.5 °C)-98.8 °F (37.1 °C)] 97.9 °F (36.6 °C)  Pulse:  [60-72] 60  Resp:  [16-18] 18  SpO2:  [93 %-98 %] 95 %  BP: (134-164)/(57-80) 154/57     Weight: 77.6 kg (171 lb)  Body mass index is 26.78 kg/m².    Intake/Output Summary (Last 24 hours) at 4/24/2022 1219  Last data filed at 4/24/2022 0935  Gross per 24 hour   Intake 470 ml   Output --   Net 470 ml      Physical Exam  Vitals and nursing note reviewed.   Constitutional:       Appearance: She is ill-appearing.   HENT:      Head: Normocephalic.      Nose: Nose normal.      Mouth/Throat:      Mouth: Mucous membranes are moist.   Eyes:      Extraocular Movements: Extraocular movements intact.      Pupils: Pupils are equal, round, and reactive to light.   Cardiovascular:      Rate and Rhythm: Rhythm irregular.      Pulses: Normal pulses.           Radial pulses are 2+ on the right side and 2+ on the left side.        Dorsalis pedis pulses are 2+ on the right side and 2+ on the left side.      Comments: Trace edmea to BLE "   Pulmonary:      Comments: Coarse  Abdominal:      General: Bowel sounds are normal.      Palpations: Abdomen is soft.      Tenderness: There is no abdominal tenderness. There is no guarding.   Musculoskeletal:         General: Normal range of motion.      Cervical back: Normal range of motion and neck supple.   Skin:     General: Skin is warm and dry.      Capillary Refill: Capillary refill takes 2 to 3 seconds.      Comments: R chest wall HD cath    Neurological:      General: No focal deficit present.      Mental Status: She is alert and oriented to person, place, and time. Mental status is at baseline.      Cranial Nerves: No cranial nerve deficit.      Sensory: No sensory deficit.   Psychiatric:         Mood and Affect: Mood normal.         Behavior: Behavior normal.       Significant Labs: All pertinent labs within the past 24 hours have been reviewed.  Recent Lab Results  (Last 5 results in the past 24 hours)        04/24/22  1143   04/24/22  0632   04/24/22  0353   04/23/22  2019   04/23/22  1600        Anion Gap     15           Aniso     1+           Baso #     0.05           Basophil %     1.0                1           BUN     28           BUN/CREAT RATIO     9           Calcium     7.6           Chloride     103           CO2     25           Creatinine     3.01           Differential Type     Manual           eGFR if non      16           Eos #     0.24           Eosinophil %     4.9                2           Glucose     127           Hematocrit     29.4           Hemoglobin     8.9           Hypo     1+           Immature Grans (Abs)     0.02           Immature Granulocytes     0.4           Lymph #     0.61           Lymph %     12.5                10           MCH     30.0           MCHC     30.3           MCV     99.0           Mono #     1.04           Mono %     21.3                15           MPV     10.8           Neutrophils, Abs     2.93           Neutrophils Relative      59.9           nRBC     0.0           NUCLEATED RBC ABSOLUTE     0.00           Ovalocytes     Few           PLATELET MORPHOLOGY     Few Large Platelets  Comment: DECREASED           Platelets     146           POC Glucose 145   126     111   153       Poly     Few           Potassium     4.3           RBC     2.97           RDW     16.5           Segmented Neutrophils, Man %     72           Sodium     139           WBC     4.89                                  Significant Imaging: I have reviewed all pertinent imaging results/findings within the past 24 hours.

## 2022-04-24 NOTE — ASSESSMENT & PLAN NOTE
Low scale SSI.  Accu checks ac/hs   Hold Metformin  4/20  - continue SSI   - accuchecks ACHS   - BG range 137-248  4/21  - BG range   - continue SSI  4/22  - BG range 107-188  - SSI continues   4/24  - BG stable

## 2022-04-24 NOTE — ASSESSMENT & PLAN NOTE
Hg 7.6 and problems with anemia in the past.    Will monitor carefully.    FOBT ordered.   4/20  - FOBT ordered  - was neg on previous admit   - h/h 6/23  - 1u PRBCs to transfuse   4/21  - unable to receive full unit of blood yesterday   - H/H 6/21 this AM  - 1u PRBCs ordered   4/24  - stable

## 2022-04-24 NOTE — PROGRESS NOTES
[facility] Medicine  Progress Note    Patient Name: Bria Ray  MRN: [id number]  Patient Class: IP- Inpatient   Admission Date: 4/19/2022  Length of Stay: 5 days  Attending Physician: Vijay Malcolm MD  Primary Care Provider: Lorena Richardson MD        Subjective:     Principal Problem:Acute hypoxemic respiratory failure        HPI:  85 y/o female admitted today from [facility] ED for SOB. The patient was transferred from [facility] after presenting there for increased SOB over the last week. She was discharged from [facility] one week ago with worsening renal disease. Currently she is followed by Dr. Fields, Nephrologist and not on HD. Since her discharge last week she has increasingly gotten more SOB with orthopnea at night. Her symptoms are exacerbated with exertion and lying flat. There is no reported chest pain, vomiting, diarrhea, diaphoresis, vision change, numbness or recent illness.                     Overview/Hospital Course:  4/20: SOB has improved; renal function remains unchanged; slight decrease in H/H- pending transfusion; nephrology and cardiology following; discussions for HD per nephrology; plans for stress test in AM; repeat labs in AM to monitor H/H and renal function   4/21: SOB continues to improve slightly daily; still not to pt's baseline; has not reached decision regarding HD; stress test for today; H/H 6/21- did not receive full unit of blood yesterday d/t IV issues; PRBCs ordered for today  4/22: unable to lay flat for lexiscan yesterday; has decided to proceed with HD- HD cath to be placed today with HD following; H/H stable 8/26; BP improving   4/23: had R chest wall tunneled HD cath placed yesterday; 1st round of dialysis yesterday- tolerated from a BP standpoint- nauseated and exhausted; dialysis again today; phenergan IVPB to offset the nausea and exhaustion.   4/24: plans for HD tomorrow; feels better today but still doesn't feel back to her  "baseline; continue current plan; SS consulted for assistance with OP HD       Interval History: Pt resting in bed. States she "still doesn't feel good, but feels a little better". Denies any nausea and was able to eat some breakfast. Denies any CP, SOB, abd pain. Plans for HD tomorrow.     Review of Systems   Constitutional:  Positive for activity change and chills.   Respiratory:  Positive for shortness of breath. Negative for chest tightness.    Cardiovascular:  Positive for leg swelling. Negative for chest pain.   Gastrointestinal:  Negative for abdominal pain, diarrhea and nausea.   Musculoskeletal:  Positive for arthralgias and myalgias.   Neurological:  Positive for weakness. Negative for dizziness, light-headedness and headaches.   All other systems reviewed and are negative.  Objective:     Vital Signs (Most Recent):  Temp: 97.9 °F (36.6 °C) (04/24/22 0807)  Pulse: 60 (04/24/22 0807)  Resp: 18 (04/24/22 0807)  BP: (!) 154/57 (04/24/22 0807)  SpO2: 95 % (04/24/22 0939)   Vital Signs (24h Range):  Temp:  [97.7 °F (36.5 °C)-98.8 °F (37.1 °C)] 97.9 °F (36.6 °C)  Pulse:  [60-72] 60  Resp:  [16-18] 18  SpO2:  [93 %-98 %] 95 %  BP: (134-164)/(57-80) 154/57     Weight: 77.6 kg (171 lb)  Body mass index is 26.78 kg/m².    Intake/Output Summary (Last 24 hours) at 4/24/2022 1219  Last data filed at 4/24/2022 0935  Gross per 24 hour   Intake 470 ml   Output --   Net 470 ml      Physical Exam  Vitals and nursing note reviewed.   Constitutional:       Appearance: She is ill-appearing.   HENT:      Head: Normocephalic.      Nose: Nose normal.      Mouth/Throat:      Mouth: Mucous membranes are moist.   Eyes:      Extraocular Movements: Extraocular movements intact.      Pupils: Pupils are equal, round, and reactive to light.   Cardiovascular:      Rate and Rhythm: Rhythm irregular.      Pulses: Normal pulses.           Radial pulses are 2+ on the right side and 2+ on the left side.        Dorsalis pedis pulses are 2+ on " the right side and 2+ on the left side.      Comments: Trace edmea to BLE   Pulmonary:      Comments: Coarse  Abdominal:      General: Bowel sounds are normal.      Palpations: Abdomen is soft.      Tenderness: There is no abdominal tenderness. There is no guarding.   Musculoskeletal:         General: Normal range of motion.      Cervical back: Normal range of motion and neck supple.   Skin:     General: Skin is warm and dry.      Capillary Refill: Capillary refill takes 2 to 3 seconds.      Comments: R chest wall HD cath    Neurological:      General: No focal deficit present.      Mental Status: She is alert and oriented to person, place, and time. Mental status is at baseline.      Cranial Nerves: No cranial nerve deficit.      Sensory: No sensory deficit.   Psychiatric:         Mood and Affect: Mood normal.         Behavior: Behavior normal.       Significant Labs: All pertinent labs within the past 24 hours have been reviewed.  Recent Lab Results  (Last 5 results in the past 24 hours)        04/24/22  1143   04/24/22  0632   04/24/22  0353   04/23/22  2019   04/23/22  1600        Anion Gap     15           Aniso     1+           Baso #     0.05           Basophil %     1.0                1           BUN     28           BUN/CREAT RATIO     9           Calcium     7.6           Chloride     103           CO2     25           Creatinine     3.01           Differential Type     Manual           eGFR if non      16           Eos #     0.24           Eosinophil %     4.9                2           Glucose     127           Hematocrit     29.4           Hemoglobin     8.9           Hypo     1+           Immature Grans (Abs)     0.02           Immature Granulocytes     0.4           Lymph #     0.61           Lymph %     12.5                10           MCH     30.0           MCHC     30.3           MCV     99.0           Mono #     1.04           Mono %     21.3                15           MPV      10.8           Neutrophils, Abs     2.93           Neutrophils Relative     59.9           nRBC     0.0           NUCLEATED RBC ABSOLUTE     0.00           Ovalocytes     Few           PLATELET MORPHOLOGY     Few Large Platelets  Comment: DECREASED           Platelets     146           POC Glucose 145   126     111   153       Poly     Few           Potassium     4.3           RBC     2.97           RDW     16.5           Segmented Neutrophils, Man %     72           Sodium     139           WBC     4.89                                  Significant Imaging: I have reviewed all pertinent imaging results/findings within the past 24 hours.      Assessment/Plan:      * Acute hypoxemic respiratory failure  Patient with Hypoxic Respiratory failure which is Acute.  she is not on home oxygen. Supplemental oxygen was provided and noted-  .   Signs/symptoms of respiratory failure include- tachypnea, increased work of breathing and respiratory distress. Contributing diagnoses includes - CHF and renal failure  Labs and images were reviewed. Patient Has not had a recent ABG. Will treat underlying causes and adjust management of respiratory failure as follows-   Nephrology and cardiology consults.  Echocardiogram.    4/20  - was given lasix in ED  - second dose given today with adequate UOP and improvement of symptoms   - echo with EF 70%  - nephrology following- discussed need for dialysis- pt to discuss with family   4/21  - 2L NC   - breathing improved; able to lay back more today   - BLE edema lessened   4/22  - continues to have SOB  - HD cath to be placed today  - HD following cath placement   - should assist with FVO and lessen burden   4/23  - improving     Macrocytic anemia  Hg 7.6 and problems with anemia in the past.    Will monitor carefully.    FOBT ordered.   4/20  - FOBT ordered  - was neg on previous admit   - h/h 6/23  - 1u PRBCs to transfuse   4/21  - unable to receive full unit of blood yesterday   - H/H 6/21  this AM  - 1u PRBCs ordered   4/24  - stable     Elevated troponin  Likely from chronic renal failure.  Troponin >200  Repeat troponin level  Cardiology consulted  4/20  - trop max at 200 and trending down   - cardiology with plans for stress test in AM   - trop likely elevated secondary to CKD and demand mismatch   4/21  - trending down with no c/o chest pain  - stress test for today   4/22  - unable to lay flat for stress test   - will follow up with cards OP     DVT prophylaxis  * Heparin 5000 units q 8 hours.       Type 2 diabetes mellitus  Low scale SSI.  Accu checks ac/hs   Hold Metformin  4/20  - continue SSI   - accuchecks ACHS   - BG range 137-248  4/21  - BG range   - continue SSI  4/22  - BG range 107-188  - SSI continues   4/24  - BG stable     Anemia of chronic renal failure  Cbc daily  H/H 7/25  Likely anemia from chronic renal failure.  4/20  - H/H 6/23  - 1u PRBCs pending  4/21  - did not receive full unit of blood yesterday  - H/H 6/21  - 1u PRBCs today  - recheck in AM   4/22  - H/H stable 8/26    CKD (chronic kidney disease), stage IV  * Telemetry monitoring    * Ekg:  Sr with  Pac's. 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   * sCre: 6.46.    Baseline sCre. 6 since 4/2021  * NA bicarbonate tabs daily  4/20  - nephrology following   - discussed HD- pt will discuss with family   4/21  - renal function remains unchanged  - nephrology following   4/22  - HD cath to be placed today with HD following   4/23  - second day of HD  - tolerating ok  4/24  - HD planned for tomorrow      Essential hypertension  Chronic, resume home medications  4/20  - BP trend remains elevated but at pt's baseline   - prn hydralazine   4/21  - BP remains elevated  - increased hydralazine PO and norvasc  4/22  - BP trend improved   - still slightly elevated likely secondary to FVO  - HD  following cath placement   4/23  - BP remains elevated  - will optimize meds       VTE Risk Mitigation (From admission, onward)         Ordered     heparin (porcine) injection 4,000 Units  As needed (PRN)         04/23/22 0811     IP VTE HIGH RISK PATIENT  Once         04/19/22 1631     Place sequential compression device  Until discontinued         04/19/22 1631                Discharge Planning   ANGY:      Code Status: Full Code   Is the patient medically ready for discharge?:     Reason for patient still in hospital (select all that apply): Treatment  Discharge Plan A: Home Health          BELA Robbins  Department of Hospital Medicine   [facility]

## 2022-04-24 NOTE — NURSING
Upon observation, HD cath to right chest noted to have blood pooling under dressing. Site not actively bleeding.  Dressing changed per sterile technique. Patient tolerated well. Will continue to monitor for bleeding. Safety measures ongoing.

## 2022-04-24 NOTE — ASSESSMENT & PLAN NOTE
Patient with Hypoxic Respiratory failure which is Acute.  she is not on home oxygen. Supplemental oxygen was provided and noted-  .   Signs/symptoms of respiratory failure include- tachypnea, increased work of breathing and respiratory distress. Contributing diagnoses includes - CHF and renal failure  Labs and images were reviewed. Patient Has not had a recent ABG. Will treat underlying causes and adjust management of respiratory failure as follows-   Nephrology and cardiology consults.  Echocardiogram.    4/20  - was given lasix in ED  - second dose given today with adequate UOP and improvement of symptoms   - echo with EF 70%  - nephrology following- discussed need for dialysis- pt to discuss with family   4/21  - 2L NC   - breathing improved; able to lay back more today   - BLE edema lessened   4/22  - continues to have SOB  - HD cath to be placed today  - HD following cath placement   - should assist with FVO and lessen burden   4/23  - improving

## 2022-04-25 NOTE — ASSESSMENT & PLAN NOTE
* Telemetry monitoring    * Ekg:  Sr with  Pac's. 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   * sCre: 6.46.    Baseline sCre. 6 since 4/2021  * NA bicarbonate tabs daily  4/20  - nephrology following   - discussed HD- pt will discuss with family   4/21  - renal function remains unchanged  - nephrology following   4/22  - HD cath to be placed today with HD following   4/23  - second day of HD  - tolerating ok  4/24  - HD planned for tomorrow    4/25  - ESRD with HD

## 2022-04-25 NOTE — SUBJECTIVE & OBJECTIVE
"Interval History: Pt seen en route to HD. States she had a better night/day, was able to eat some breakfast. Still doesn't feel "good, but does feel better." Denies any CP, SOB, abd pain.     Review of Systems   Constitutional:  Positive for activity change.   Respiratory:  Negative for chest tightness and shortness of breath.    Cardiovascular:  Negative for chest pain and leg swelling.   Gastrointestinal:  Negative for abdominal pain, diarrhea and nausea.   Musculoskeletal:  Positive for arthralgias and myalgias.   Neurological:  Positive for weakness. Negative for dizziness, light-headedness and headaches.   All other systems reviewed and are negative.  Objective:     Vital Signs (Most Recent):  Temp: 97.9 °F (36.6 °C) (04/25/22 0852)  Pulse: 68 (04/25/22 0900)  Resp: 18 (04/25/22 0900)  BP: (!) 170/73 (04/25/22 0900)  SpO2: 97 % (04/25/22 0852)   Vital Signs (24h Range):  Temp:  [97.9 °F (36.6 °C)-98.4 °F (36.9 °C)] 97.9 °F (36.6 °C)  Pulse:  [51-77] 68  Resp:  [16-18] 18  SpO2:  [91 %-97 %] 97 %  BP: (122-180)/(50-86) 170/73     Weight: 77.6 kg (171 lb)  Body mass index is 26.78 kg/m².    Intake/Output Summary (Last 24 hours) at 4/25/2022 0931  Last data filed at 4/24/2022 1800  Gross per 24 hour   Intake 600 ml   Output --   Net 600 ml      Physical Exam  Vitals and nursing note reviewed.   Constitutional:       Appearance: She is ill-appearing.   HENT:      Head: Normocephalic.      Nose: Nose normal.      Mouth/Throat:      Mouth: Mucous membranes are moist.   Eyes:      Extraocular Movements: Extraocular movements intact.      Pupils: Pupils are equal, round, and reactive to light.   Cardiovascular:      Rate and Rhythm: Rhythm irregular.      Pulses: Normal pulses.           Radial pulses are 2+ on the right side and 2+ on the left side.        Dorsalis pedis pulses are 2+ on the right side and 2+ on the left side.      Comments: Trace edmea to BLE   Pulmonary:      Comments: Coarse  Abdominal:      " General: Bowel sounds are normal.      Palpations: Abdomen is soft.      Tenderness: There is no abdominal tenderness. There is no guarding.   Musculoskeletal:         General: Normal range of motion.      Cervical back: Normal range of motion and neck supple.   Skin:     General: Skin is warm and dry.      Capillary Refill: Capillary refill takes 2 to 3 seconds.      Comments: R chest wall HD cath    Neurological:      General: No focal deficit present.      Mental Status: She is alert and oriented to person, place, and time. Mental status is at baseline.      Cranial Nerves: No cranial nerve deficit.      Sensory: No sensory deficit.   Psychiatric:         Mood and Affect: Mood normal.         Behavior: Behavior normal.       Significant Labs: All pertinent labs within the past 24 hours have been reviewed.  Recent Lab Results         04/25/22  0630   04/24/22  2044   04/24/22  1635   04/24/22  1143        POC Glucose 114   140   111   145               Significant Imaging: I have reviewed all pertinent imaging results/findings within the past 24 hours.

## 2022-04-25 NOTE — ASSESSMENT & PLAN NOTE
Cbc daily  H/H 7/25  Likely anemia from chronic renal failure.  4/20  - H/H 6/23  - 1u PRBCs pending  4/21  - did not receive full unit of blood yesterday  - H/H 6/21  - 1u PRBCs today  - recheck in AM   4/22  - H/H stable 8/26 4/25  - labs pending this AM  - has remained stable

## 2022-04-25 NOTE — ASSESSMENT & PLAN NOTE
Patient with Hypoxic Respiratory failure which is Acute.  she is not on home oxygen. Supplemental oxygen was provided and noted-  .   Signs/symptoms of respiratory failure include- tachypnea, increased work of breathing and respiratory distress. Contributing diagnoses includes - CHF and renal failure  Labs and images were reviewed. Patient Has not had a recent ABG. Will treat underlying causes and adjust management of respiratory failure as follows-   Nephrology and cardiology consults.  Echocardiogram.    4/20  - was given lasix in ED  - second dose given today with adequate UOP and improvement of symptoms   - echo with EF 70%  - nephrology following- discussed need for dialysis- pt to discuss with family   4/21  - 2L NC   - breathing improved; able to lay back more today   - BLE edema lessened   4/22  - continues to have SOB  - HD cath to be placed today  - HD following cath placement   - should assist with FVO and lessen burden   4/23  - improving   4/25  - resolved

## 2022-04-25 NOTE — PLAN OF CARE
CATRACHITO spoke with Gilma from the Transitional Care Unit and pt will need to come to unit [id number] 4 days per week. CATRACHITO called and left message for Jacquie' at [facility] [phone]. SW awaiting call back. CATRACHITO following

## 2022-04-25 NOTE — PROGRESS NOTES
[facility] Medicine  Progress Note    Patient Name: Bria Ray  MRN: [id number]  Patient Class: IP- Inpatient   Admission Date: 4/19/2022  Length of Stay: 6 days  Attending Physician: Richi Hoover Jr., MD  Primary Care Provider: Lorena Richardson MD        Subjective:     Principal Problem:Acute hypoxemic respiratory failure        HPI:  85 y/o female admitted today from [facility] ED for SOB. The patient was transferred from [facility] after presenting there for increased SOB over the last week. She was discharged from [facility] one week ago with worsening renal disease. Currently she is followed by Dr. Fields, Nephrologist and not on HD. Since her discharge last week she has increasingly gotten more SOB with orthopnea at night. Her symptoms are exacerbated with exertion and lying flat. There is no reported chest pain, vomiting, diarrhea, diaphoresis, vision change, numbness or recent illness.                     Overview/Hospital Course:  4/20: SOB has improved; renal function remains unchanged; slight decrease in H/H- pending transfusion; nephrology and cardiology following; discussions for HD per nephrology; plans for stress test in AM; repeat labs in AM to monitor H/H and renal function   4/21: SOB continues to improve slightly daily; still not to pt's baseline; has not reached decision regarding HD; stress test for today; H/H 6/21- did not receive full unit of blood yesterday d/t IV issues; PRBCs ordered for today  4/22: unable to lay flat for lexiscan yesterday; has decided to proceed with HD- HD cath to be placed today with HD following; H/H stable 8/26; BP improving   4/23: had R chest wall tunneled HD cath placed yesterday; 1st round of dialysis yesterday- tolerated from a BP standpoint- nauseated and exhausted; dialysis again today; phenergan IVPB to offset the nausea and exhaustion.   4/24: plans for HD tomorrow; feels better today but still doesn't feel back to  "her baseline; continue current plan; SS consulted for assistance with OP HD   4/25: HD today; monitor for HD intolerance; SS to set up HD chair OP       Interval History: Pt seen en route to HD. States she had a better night/day, was able to eat some breakfast. Still doesn't feel "good, but does feel better." Denies any CP, SOB, abd pain.     Review of Systems   Constitutional:  Positive for activity change.   Respiratory:  Negative for chest tightness and shortness of breath.    Cardiovascular:  Negative for chest pain and leg swelling.   Gastrointestinal:  Negative for abdominal pain, diarrhea and nausea.   Musculoskeletal:  Positive for arthralgias and myalgias.   Neurological:  Positive for weakness. Negative for dizziness, light-headedness and headaches.   All other systems reviewed and are negative.  Objective:     Vital Signs (Most Recent):  Temp: 97.9 °F (36.6 °C) (04/25/22 0852)  Pulse: 68 (04/25/22 0900)  Resp: 18 (04/25/22 0900)  BP: (!) 170/73 (04/25/22 0900)  SpO2: 97 % (04/25/22 0852)   Vital Signs (24h Range):  Temp:  [97.9 °F (36.6 °C)-98.4 °F (36.9 °C)] 97.9 °F (36.6 °C)  Pulse:  [51-77] 68  Resp:  [16-18] 18  SpO2:  [91 %-97 %] 97 %  BP: (122-180)/(50-86) 170/73     Weight: 77.6 kg (171 lb)  Body mass index is 26.78 kg/m².    Intake/Output Summary (Last 24 hours) at 4/25/2022 0931  Last data filed at 4/24/2022 1800  Gross per 24 hour   Intake 600 ml   Output --   Net 600 ml      Physical Exam  Vitals and nursing note reviewed.   Constitutional:       Appearance: She is ill-appearing.   HENT:      Head: Normocephalic.      Nose: Nose normal.      Mouth/Throat:      Mouth: Mucous membranes are moist.   Eyes:      Extraocular Movements: Extraocular movements intact.      Pupils: Pupils are equal, round, and reactive to light.   Cardiovascular:      Rate and Rhythm: Rhythm irregular.      Pulses: Normal pulses.           Radial pulses are 2+ on the right side and 2+ on the left side.        Dorsalis " pedis pulses are 2+ on the right side and 2+ on the left side.      Comments: Trace edmea to BLE   Pulmonary:      Comments: Coarse  Abdominal:      General: Bowel sounds are normal.      Palpations: Abdomen is soft.      Tenderness: There is no abdominal tenderness. There is no guarding.   Musculoskeletal:         General: Normal range of motion.      Cervical back: Normal range of motion and neck supple.   Skin:     General: Skin is warm and dry.      Capillary Refill: Capillary refill takes 2 to 3 seconds.      Comments: R chest wall HD cath    Neurological:      General: No focal deficit present.      Mental Status: She is alert and oriented to person, place, and time. Mental status is at baseline.      Cranial Nerves: No cranial nerve deficit.      Sensory: No sensory deficit.   Psychiatric:         Mood and Affect: Mood normal.         Behavior: Behavior normal.       Significant Labs: All pertinent labs within the past 24 hours have been reviewed.  Recent Lab Results         04/25/22  0630   04/24/22  2044   04/24/22  1635   04/24/22  1143        POC Glucose 114   140   111   145               Significant Imaging: I have reviewed all pertinent imaging results/findings within the past 24 hours.      Assessment/Plan:      * Acute hypoxemic respiratory failure  Patient with Hypoxic Respiratory failure which is Acute.  she is not on home oxygen. Supplemental oxygen was provided and noted-  .   Signs/symptoms of respiratory failure include- tachypnea, increased work of breathing and respiratory distress. Contributing diagnoses includes - CHF and renal failure  Labs and images were reviewed. Patient Has not had a recent ABG. Will treat underlying causes and adjust management of respiratory failure as follows-   Nephrology and cardiology consults.  Echocardiogram.    4/20  - was given lasix in ED  - second dose given today with adequate UOP and improvement of symptoms   - echo with EF 70%  - nephrology following-  discussed need for dialysis- pt to discuss with family   4/21  - 2L NC   - breathing improved; able to lay back more today   - BLE edema lessened   4/22  - continues to have SOB  - HD cath to be placed today  - HD following cath placement   - should assist with FVO and lessen burden   4/23  - improving   4/25  - resolved     Macrocytic anemia  Hg 7.6 and problems with anemia in the past.    Will monitor carefully.    FOBT ordered.   4/20  - FOBT ordered  - was neg on previous admit   - h/h 6/23  - 1u PRBCs to transfuse   4/21  - unable to receive full unit of blood yesterday   - H/H 6/21 this AM  - 1u PRBCs ordered   4/24  - stable     Elevated troponin  Likely from chronic renal failure.  Troponin >200  Repeat troponin level  Cardiology consulted  4/20  - trop max at 200 and trending down   - cardiology with plans for stress test in AM   - trop likely elevated secondary to CKD and demand mismatch   4/21  - trending down with no c/o chest pain  - stress test for today   4/22  - unable to lay flat for stress test   - will follow up with cards OP     DVT prophylaxis  * Heparin 5000 units q 8 hours.       Type 2 diabetes mellitus  Low scale SSI.  Accu checks ac/hs   Hold Metformin  4/20  - continue SSI   - accuchecks ACHS   - BG range 137-248  4/21  - BG range   - continue SSI  4/22  - BG range 107-188  - SSI continues   4/24  - BG stable     Anemia of chronic renal failure  Cbc daily  H/H 7/25  Likely anemia from chronic renal failure.  4/20  - H/H 6/23  - 1u PRBCs pending  4/21  - did not receive full unit of blood yesterday  - H/H 6/21  - 1u PRBCs today  - recheck in AM   4/22  - H/H stable 8/26 4/25  - labs pending this AM  - has remained stable     CKD (chronic kidney disease), stage IV  * Telemetry monitoring    * Ekg:  Sr with  Pac's. 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   *  sCre: 6.46.    Baseline sCre. 6 since 4/2021  * NA bicarbonate tabs daily  4/20  - nephrology following   - discussed HD- pt will discuss with family   4/21  - renal function remains unchanged  - nephrology following   4/22  - HD cath to be placed today with HD following   4/23  - second day of HD  - tolerating ok  4/24  - HD planned for tomorrow    4/25  - ESRD with HD     Essential hypertension  Chronic, resume home medications  4/20  - BP trend remains elevated but at pt's baseline   - prn hydralazine   4/21  - BP remains elevated  - increased hydralazine PO and norvasc  4/22  - BP trend improved   - still slightly elevated likely secondary to FVO  - HD following cath placement   4/23  - BP remains elevated  - will optimize meds   4/25  - BP trend improved  - allow some hypertension secondary to HD hypotension       VTE Risk Mitigation (From admission, onward)         Ordered     heparin (porcine) injection 4,000 Units  As needed (PRN)         04/23/22 0811     IP VTE HIGH RISK PATIENT  Once         04/19/22 1631     Place sequential compression device  Until discontinued         04/19/22 1631                Discharge Planning   ANGY:      Code Status: Full Code   Is the patient medically ready for discharge?:     Reason for patient still in hospital (select all that apply): Treatment  Discharge Plan A: Home Health          BELA Robbins  Department of Hospital Medicine   [facility] Orthopedic

## 2022-04-25 NOTE — PROGRESS NOTES
[facility]  Nephrology  Progress Note    Patient Name: Bria Ray  MRN: [id number]  Admission Date: 4/19/2022  Hospital Length of Stay: 5 days  Attending Provider: Richi Hoover Jr., MD   Primary Care Physician: Lorena Richardson MD  Principal Problem:Acute hypoxemic respiratory failure    Consults  Subjective:     Interval History: The patient has no complaints today    Review of patient's allergies indicates:  No Known Allergies  Current Facility-Administered Medications   Medication Frequency    0.9%  NaCl infusion (for blood administration) Q24H PRN    0.9%  NaCl infusion PRN    acetaminophen tablet 1,000 mg Q8H PRN    acetaminophen tablet 650 mg Q8H PRN    acetaminophen tablet 650 mg Q4H PRN    allopurinoL tablet 100 mg Daily    aluminum-magnesium hydroxide-simethicone 200-200-20 mg/5 mL suspension 30 mL QID PRN    amLODIPine tablet 10 mg Daily    aspirin EC tablet 81 mg Daily    dextrose 10% bolus 125 mL PRN    dextrose 10% bolus 250 mL PRN    gabapentin capsule 100 mg QHS    glucagon (human recombinant) injection 1 mg PRN    heparin (porcine) injection 4,000 Units PRN    hydrALAZINE tablet 100 mg Q8H    insulin aspart U-100 injection 0-5 Units QID (AC & HS)    magnesium oxide tablet 800 mg PRN    magnesium oxide tablet 800 mg PRN    melatonin tablet 6 mg Nightly PRN    metoprolol tartrate (LOPRESSOR) tablet 25 mg BID    naloxone 0.4 mg/mL injection 0.02 mg PRN    ondansetron disintegrating tablet 8 mg Q8H PRN    oxyCODONE immediate release tablet 5 mg Q4H PRN    polyethylene glycol packet 17 g Daily PRN    potassium bicarbonate disintegrating tablet 35 mEq PRN    potassium bicarbonate disintegrating tablet 50 mEq PRN    potassium bicarbonate disintegrating tablet 60 mEq PRN    potassium, sodium phosphates 280-160-250 mg packet 2 packet PRN    potassium, sodium phosphates 280-160-250 mg packet 2 packet PRN    potassium, sodium phosphates 280-160-250 mg  packet 2 packet PRN    sodium chloride 0.9% flush 10 mL PRN       Objective:     Vital Signs (Most Recent):  Temp: 98 °F (36.7 °C) (04/24/22 1630)  Pulse: (!) 56 (04/24/22 1630)  Resp: 18 (04/24/22 1630)  BP: 122/83 (04/24/22 1630)  SpO2: (!) 91 % (04/24/22 1959)  O2 Device (Oxygen Therapy): nasal cannula (04/24/22 1959) Vital Signs (24h Range):  Temp:  [97.7 °F (36.5 °C)-98.4 °F (36.9 °C)] 98 °F (36.7 °C)  Pulse:  [56-77] 56  Resp:  [16-18] 18  SpO2:  [91 %-98 %] 91 %  BP: (122-164)/(50-83) 122/83     Weight: 77.6 kg (171 lb) (04/21/22 1016)  Body mass index is 26.78 kg/m².  Body surface area is 1.92 meters squared.    I/O last 3 completed shifts:  In: 1070 [P.O.:1020; IV Piggyback:50]  Out: 1900 [Other:1900]    Physical Exam   Lungs-crackles at the bases  Cor-3/6 systolic murmur no rub  Abd-soft  Ext-no edema    Significant Labs:sure  CBC:   Recent Labs   Lab 04/24/22  0353   WBC 4.89   RBC 2.97*   HGB 8.9*   HCT 29.4*   *   MCV 99.0*   MCH 30.0   MCHC 30.3*     CMP:   Recent Labs   Lab 04/19/22  1207 04/20/22  0420 04/24/22  0353   *   < > 127*   CALCIUM 7.9*   < > 7.6*   ALBUMIN 3.1*  --   --    PROT 7.1  --   --       < > 139   K 4.8   < > 4.3   CO2 24   < > 25      < > 103   BUN 72*   < > 28*   CREATININE 6.46*   < > 3.01*   ALKPHOS 50*  --   --    ALT 21  --   --    AST 30  --   --    BILITOT 0.5  --   --     < > = values in this interval not displayed.     All labs within the past 24 hours have been reviewed.    Significant Imaging:       Assessment/Plan:     Active Diagnoses:    Diagnosis Date Noted POA    PRINCIPAL PROBLEM:  Acute hypoxemic respiratory failure [J96.01] 04/20/2022 Yes    DVT prophylaxis [Z29.9] 04/19/2022 Not Applicable    Elevated troponin [R77.8] 04/19/2022 Yes    Macrocytic anemia [D53.9] 04/19/2022 Yes    Anemia of chronic renal failure [N18.9, D63.1]  Yes    Type 2 diabetes mellitus [E11.9]  Yes    Essential hypertension [I10] 01/18/2022 Yes    CKD  (chronic kidney disease), stage IV [N18.4] 01/18/2022 Yes      Problems Resolved During this Admission:       Plan:  Hemodialysis tomorrow    Thank you for your consult. I will follow-up with patient. Please contact us if you have any additional questions.    Emre Allen MD  Nephrology  [facility] - [facility]

## 2022-04-25 NOTE — PLAN OF CARE
Problem: Adult Inpatient Plan of Care  Goal: Plan of Care Review  4/25/2022 0900 by Uli Urias RN  Outcome: Ongoing, Progressing  4/25/2022 0900 by Uli Urias RN  Outcome: Ongoing, Progressing  Goal: Patient-Specific Goal (Individualized)  4/25/2022 0900 by Uli Urias RN  Outcome: Ongoing, Progressing  4/25/2022 0900 by Uli Urias RN  Outcome: Ongoing, Progressing  Goal: Absence of Hospital-Acquired Illness or Injury  4/25/2022 0900 by Uli Urias RN  Outcome: Ongoing, Progressing  4/25/2022 0900 by Uli Urias RN  Outcome: Ongoing, Progressing  Goal: Optimal Comfort and Wellbeing  4/25/2022 0900 by Uli Urias RN  Outcome: Ongoing, Progressing  4/25/2022 0900 by Uli Urias RN  Outcome: Ongoing, Progressing  Goal: Readiness for Transition of Care  4/25/2022 0900 by Uli Urias RN  Outcome: Ongoing, Progressing  4/25/2022 0900 by Uli Urias RN  Outcome: Ongoing, Progressing     Problem: Infection  Goal: Absence of Infection Signs and Symptoms  4/25/2022 0900 by Uli Urias RN  Outcome: Ongoing, Progressing  4/25/2022 0900 by Uli Urias RN  Outcome: Ongoing, Progressing     Problem: Diabetes Comorbidity  Goal: Blood Glucose Level Within Targeted Range  4/25/2022 0900 by Uli Urias RN  Outcome: Ongoing, Progressing  4/25/2022 0900 by Uli Urias RN  Outcome: Ongoing, Progressing     Problem: Device-Related Complication Risk (Hemodialysis)  Goal: Safe, Effective Therapy Delivery  4/25/2022 0900 by Uli Urias RN  Outcome: Ongoing, Progressing  4/25/2022 0900 by Uli Urias RN  Outcome: Ongoing, Progressing     Problem: Hemodynamic Instability (Hemodialysis)  Goal: Effective Tissue Perfusion  4/25/2022 0900 by Uli Urias RN  Outcome: Ongoing, Progressing  4/25/2022 0900 by Uli Urias RN  Outcome: Ongoing, Progressing     Problem: Infection (Hemodialysis)  Goal: Absence of Infection Signs and Symptoms  4/25/2022 0900 by Uli Urias RN  Outcome: Ongoing,  Progressing  4/25/2022 0900 by Uli Urias RN  Outcome: Ongoing, Progressing     Problem: Skin Injury Risk Increased  Goal: Skin Health and Integrity  4/25/2022 0900 by Uli Urias RN  Outcome: Ongoing, Progressing  4/25/2022 0900 by Uli Urias RN  Outcome: Ongoing, Progressing

## 2022-04-25 NOTE — ASSESSMENT & PLAN NOTE
Chronic, resume home medications  4/20  - BP trend remains elevated but at pt's baseline   - prn hydralazine   4/21  - BP remains elevated  - increased hydralazine PO and norvasc  4/22  - BP trend improved   - still slightly elevated likely secondary to FVO  - HD following cath placement   4/23  - BP remains elevated  - will optimize meds   4/25  - BP trend improved  - allow some hypertension secondary to HD hypotension

## 2022-04-26 PROBLEM — R11.0 NAUSEA: Status: ACTIVE | Noted: 2022-01-01

## 2022-04-26 PROBLEM — D53.9 MACROCYTIC ANEMIA: Status: RESOLVED | Noted: 2022-01-01 | Resolved: 2022-01-01

## 2022-04-26 PROBLEM — R79.89 ELEVATED TROPONIN: Status: RESOLVED | Noted: 2022-01-01 | Resolved: 2022-01-01

## 2022-04-26 PROBLEM — J96.01 ACUTE HYPOXEMIC RESPIRATORY FAILURE: Status: RESOLVED | Noted: 2022-01-01 | Resolved: 2022-01-01

## 2022-04-26 NOTE — PLAN OF CARE
Chart reviewed, pt has been approved for Dialysis at [id number]) Clinic at 0730. Per MD pt is not medically ready for dc. Pt is not tolerating dialysis well and has n/v. SW will cont to follow.

## 2022-04-26 NOTE — SUBJECTIVE & OBJECTIVE
Interval History:  Seen during dialysis.  She denies SOB.  She has had mild intermittent nausea.    Review of patient's allergies indicates:  No Known Allergies  Current Facility-Administered Medications   Medication Frequency    0.9%  NaCl infusion (for blood administration) Q24H PRN    0.9%  NaCl infusion PRN    acetaminophen tablet 1,000 mg Q8H PRN    acetaminophen tablet 650 mg Q8H PRN    acetaminophen tablet 650 mg Q4H PRN    allopurinoL tablet 100 mg Daily    aluminum-magnesium hydroxide-simethicone 200-200-20 mg/5 mL suspension 30 mL QID PRN    amLODIPine tablet 10 mg Daily    aspirin EC tablet 81 mg Daily    dextrose 10% bolus 125 mL PRN    dextrose 10% bolus 250 mL PRN    gabapentin capsule 100 mg QHS    glucagon (human recombinant) injection 1 mg PRN    heparin (porcine) injection 4,000 Units PRN    hydrALAZINE tablet 100 mg Q8H    insulin aspart U-100 injection 0-5 Units QID (AC & HS)    magnesium oxide tablet 800 mg PRN    magnesium oxide tablet 800 mg PRN    melatonin tablet 6 mg Nightly PRN    metoprolol tartrate (LOPRESSOR) tablet 25 mg BID    naloxone 0.4 mg/mL injection 0.02 mg PRN    ondansetron disintegrating tablet 8 mg Q8H PRN    ondansetron injection 4 mg Q6H PRN    oxyCODONE immediate release tablet 5 mg Q4H PRN    polyethylene glycol packet 17 g Daily PRN    potassium bicarbonate disintegrating tablet 35 mEq PRN    potassium bicarbonate disintegrating tablet 50 mEq PRN    potassium bicarbonate disintegrating tablet 60 mEq PRN    potassium, sodium phosphates 280-160-250 mg packet 2 packet PRN    potassium, sodium phosphates 280-160-250 mg packet 2 packet PRN    potassium, sodium phosphates 280-160-250 mg packet 2 packet PRN    sodium chloride 0.9% flush 10 mL PRN       Objective:     Vital Signs (Most Recent):  Temp: 96.6 °F (35.9 °C) (04/25/22 1932)  Pulse: 75 (04/25/22 1932)  Resp: 18 (04/25/22 1932)  BP: (!) 161/73 (04/25/22 1932)  SpO2: 95 % (04/25/22 1932)  O2 Device (Oxygen Therapy):  room air (04/25/22 1932)   Vital Signs (24h Range):  Temp:  [96.6 °F (35.9 °C)-98.4 °F (36.9 °C)] 96.6 °F (35.9 °C)  Pulse:  [51-75] 75  Resp:  [16-18] 18  SpO2:  [93 %-99 %] 95 %  BP: (123-180)/(48-90) 161/73     Weight: 77.6 kg (171 lb) (04/21/22 1016)  Body mass index is 26.78 kg/m².  Body surface area is 1.92 meters squared.    I/O last 3 completed shifts:  In: 600 [P.O.:600]  Out: 2000 [Other:2000]    Physical Exam  Constitutional:       General: She is not in acute distress.  HENT:      Head: Normocephalic and atraumatic.   Eyes:      Pupils: Pupils are equal, round, and reactive to light.   Cardiovascular:      Rate and Rhythm: Normal rate and regular rhythm.      Heart sounds: No murmur heard.    No friction rub. No gallop.   Pulmonary:      Effort: No respiratory distress.   Abdominal:      General: Bowel sounds are normal.      Tenderness: There is no abdominal tenderness. There is no guarding.   Musculoskeletal:      Right lower leg: No edema.      Left lower leg: No edema.      Comments: Trace lower extremity edema   Neurological:      Mental Status: She is alert and oriented to person, place, and time.   Psychiatric:         Behavior: Behavior normal.       Significant Labs:  BMP:   Recent Labs   Lab 04/22/22  0318 04/23/22  0452 04/25/22 1928   *   < > 195*      < > 136   K 4.5   < > 4.1   *   < > 101   CO2 21   < > 25   BUN 81*   < > 16   CREATININE 6.40*   < > 2.21*   CALCIUM 7.6*   < > 7.9*   MG 2.0  --   --     < > = values in this interval not displayed.     CBC:   Recent Labs   Lab 04/25/22 1928   WBC 3.70*   RBC 3.19*   HGB 9.5*   HCT 30.5*   *   MCV 95.6   MCH 29.8   MCHC 31.1*        Significant Imaging:

## 2022-04-26 NOTE — PROGRESS NOTES
[facility], [place] - Orthopedic  Nephrology  Progress Note    Patient Name: Bria Ray  MRN: [id number]  Admission Date: 4/19/2022  Hospital Length of Stay: 6 days  Attending Provider: Richi Hoover Jr., MD   Primary Care Physician: Lorena Richardson MD  Principal Problem:Acute hypoxemic respiratory failure    Subjective:     HPI: 86-year-old woman with CKD 5. Her baseline creatinine was 4 prior to her last admission.  It dilcia to nearly 7, then improved slightly to 6-6.5.  She was volume depleted at the time of that admission.  Creatinine improved slightly with IV fluid.  She was discharged to swing bed with creatinine  6.5.  She developed progressive shortness of breath over the past 3-4 days.  She denies chest pain.  No pleuritic chest pain or hemoptysis.      Interval History:  Seen during dialysis.  She denies SOB.  She has had mild intermittent nausea.    Review of patient's allergies indicates:  No Known Allergies  Current Facility-Administered Medications   Medication Frequency    0.9%  NaCl infusion (for blood administration) Q24H PRN    0.9%  NaCl infusion PRN    acetaminophen tablet 1,000 mg Q8H PRN    acetaminophen tablet 650 mg Q8H PRN    acetaminophen tablet 650 mg Q4H PRN    allopurinoL tablet 100 mg Daily    aluminum-magnesium hydroxide-simethicone 200-200-20 mg/5 mL suspension 30 mL QID PRN    amLODIPine tablet 10 mg Daily    aspirin EC tablet 81 mg Daily    dextrose 10% bolus 125 mL PRN    dextrose 10% bolus 250 mL PRN    gabapentin capsule 100 mg QHS    glucagon (human recombinant) injection 1 mg PRN    heparin (porcine) injection 4,000 Units PRN    hydrALAZINE tablet 100 mg Q8H    insulin aspart U-100 injection 0-5 Units QID (AC & HS)    magnesium oxide tablet 800 mg PRN    magnesium oxide tablet 800 mg PRN    melatonin tablet 6 mg Nightly PRN    metoprolol tartrate (LOPRESSOR) tablet 25 mg BID    naloxone 0.4 mg/mL injection 0.02 mg PRN    ondansetron disintegrating  tablet 8 mg Q8H PRN    ondansetron injection 4 mg Q6H PRN    oxyCODONE immediate release tablet 5 mg Q4H PRN    polyethylene glycol packet 17 g Daily PRN    potassium bicarbonate disintegrating tablet 35 mEq PRN    potassium bicarbonate disintegrating tablet 50 mEq PRN    potassium bicarbonate disintegrating tablet 60 mEq PRN    potassium, sodium phosphates 280-160-250 mg packet 2 packet PRN    potassium, sodium phosphates 280-160-250 mg packet 2 packet PRN    potassium, sodium phosphates 280-160-250 mg packet 2 packet PRN    sodium chloride 0.9% flush 10 mL PRN       Objective:     Vital Signs (Most Recent):  Temp: 96.6 °F (35.9 °C) (04/25/22 1932)  Pulse: 75 (04/25/22 1932)  Resp: 18 (04/25/22 1932)  BP: (!) 161/73 (04/25/22 1932)  SpO2: 95 % (04/25/22 1932)  O2 Device (Oxygen Therapy): room air (04/25/22 1932)   Vital Signs (24h Range):  Temp:  [96.6 °F (35.9 °C)-98.4 °F (36.9 °C)] 96.6 °F (35.9 °C)  Pulse:  [51-75] 75  Resp:  [16-18] 18  SpO2:  [93 %-99 %] 95 %  BP: (123-180)/(48-90) 161/73     Weight: 77.6 kg (171 lb) (04/21/22 1016)  Body mass index is 26.78 kg/m².  Body surface area is 1.92 meters squared.    I/O last 3 completed shifts:  In: 600 [P.O.:600]  Out: 2000 [Other:2000]    Physical Exam  Constitutional:       General: She is not in acute distress.  HENT:      Head: Normocephalic and atraumatic.   Eyes:      Pupils: Pupils are equal, round, and reactive to light.   Cardiovascular:      Rate and Rhythm: Normal rate and regular rhythm.      Heart sounds: No murmur heard.    No friction rub. No gallop.   Pulmonary:      Effort: No respiratory distress.   Abdominal:      General: Bowel sounds are normal.      Tenderness: There is no abdominal tenderness. There is no guarding.   Musculoskeletal:      Right lower leg: No edema.      Left lower leg: No edema.      Comments: Trace lower extremity edema   Neurological:      Mental Status: She is alert and oriented to person, place, and time.    Psychiatric:         Behavior: Behavior normal.       Significant Labs:  BMP:   Recent Labs   Lab 04/22/22  0318 04/23/22  0452 04/25/22 1928   *   < > 195*      < > 136   K 4.5   < > 4.1   *   < > 101   CO2 21   < > 25   BUN 81*   < > 16   CREATININE 6.40*   < > 2.21*   CALCIUM 7.6*   < > 7.9*   MG 2.0  --   --     < > = values in this interval not displayed.     CBC:   Recent Labs   Lab 04/25/22 1928   WBC 3.70*   RBC 3.19*   HGB 9.5*   HCT 30.5*   *   MCV 95.6   MCH 29.8   MCHC 31.1*        Significant Imaging:      Assessment/Plan:     Type 2 diabetes mellitus  Continue current treatment    Anemia of chronic renal failure  Post transfusion    CKD (chronic kidney disease), stage IV  Seen during dialysis.  She appears comfortable on room air.  Metabolic parameters improved.  Her nausea is more likely associated with pain meds than uremia.    Essential hypertension  Controlled        Thank you for your consult.     Beni Fields MD  Nephrology  [facility] - Orthopedic

## 2022-04-26 NOTE — SUBJECTIVE & OBJECTIVE
Interval History:  She had nausea last night which is resolved today.  She denies SOB.    Review of patient's allergies indicates:  No Known Allergies  Current Facility-Administered Medications   Medication Frequency    0.9%  NaCl infusion (for blood administration) Q24H PRN    0.9%  NaCl infusion PRN    acetaminophen tablet 1,000 mg Q8H PRN    allopurinoL tablet 100 mg Daily    aluminum-magnesium hydroxide-simethicone 200-200-20 mg/5 mL suspension 30 mL QID PRN    amLODIPine tablet 10 mg Daily    aspirin EC tablet 81 mg Daily    dextrose 10% bolus 125 mL PRN    dextrose 10% bolus 250 mL PRN    dronabinoL capsule 2.5 mg BID    gabapentin capsule 100 mg QHS    glucagon (human recombinant) injection 1 mg PRN    heparin (porcine) injection 4,000 Units PRN    hydrALAZINE tablet 100 mg Q8H    insulin aspart U-100 injection 0-5 Units QID (AC & HS)    magnesium oxide tablet 800 mg PRN    magnesium oxide tablet 800 mg PRN    melatonin tablet 6 mg Nightly PRN    metoclopramide HCl injection 10 mg Q6H PRN    metoprolol tartrate (LOPRESSOR) tablet 25 mg BID    naloxone 0.4 mg/mL injection 0.02 mg PRN    oxyCODONE immediate release tablet 5 mg Q4H PRN    polyethylene glycol packet 17 g Daily PRN    potassium bicarbonate disintegrating tablet 35 mEq PRN    potassium bicarbonate disintegrating tablet 50 mEq PRN    potassium bicarbonate disintegrating tablet 60 mEq PRN    potassium, sodium phosphates 280-160-250 mg packet 2 packet PRN    potassium, sodium phosphates 280-160-250 mg packet 2 packet PRN    potassium, sodium phosphates 280-160-250 mg packet 2 packet PRN    promethazine (PHENERGAN) 12.5 mg in dextrose 5 % 50 mL IVPB Once    sodium chloride 0.9% flush 10 mL PRN       Objective:     Vital Signs (Most Recent):  Temp: 98.2 °F (36.8 °C) (04/26/22 1200)  Pulse: 65 (04/26/22 1200)  Resp: 19 (04/26/22 1200)  BP: (!) 146/66 (04/26/22 1200)  SpO2: 98 % (04/26/22 1200)  O2 Device (Oxygen Therapy): room air (04/25/22 1932)   Vital  Signs (24h Range):  Temp:  [96.6 °F (35.9 °C)-98.2 °F (36.8 °C)] 98.2 °F (36.8 °C)  Pulse:  [59-75] 65  Resp:  [16-19] 19  SpO2:  [93 %-99 %] 98 %  BP: (146-190)/(63-90) 146/66     Weight: 75.5 kg (166 lb 8 oz) (04/26/22 0550)  Body mass index is 26.08 kg/m².  Body surface area is 1.89 meters squared.    I/O last 3 completed shifts:  In: 0   Out: 2000 [Other:2000]    Physical Exam  Constitutional:       General: She is not in acute distress.  HENT:      Head: Normocephalic and atraumatic.   Eyes:      Pupils: Pupils are equal, round, and reactive to light.   Cardiovascular:      Rate and Rhythm: Normal rate and regular rhythm.      Heart sounds: No murmur heard.    No friction rub. No gallop.   Pulmonary:      Effort: No respiratory distress.   Abdominal:      General: Bowel sounds are normal.      Tenderness: There is no abdominal tenderness. There is no guarding.   Musculoskeletal:      Right lower leg: No edema.      Left lower leg: No edema.      Comments: Trace lower extremity edema   Neurological:      Mental Status: She is alert and oriented to person, place, and time.   Psychiatric:         Behavior: Behavior normal.       Significant Labs:  BMP:   Recent Labs   Lab 04/22/22  0318 04/23/22  0452 04/26/22  0702   *   < > 145*      < > 136   K 4.5   < > 4.0   *   < > 99   CO2 21   < > 26   BUN 81*   < > 27*   CREATININE 6.40*   < > 3.10*   CALCIUM 7.6*   < > 7.7*   MG 2.0  --   --     < > = values in this interval not displayed.     CBC:   Recent Labs   Lab 04/26/22  0514   WBC 4.84   RBC 3.35*   HGB 10.0*   HCT 32.7*   *   MCV 97.6*   MCH 29.9   MCHC 30.6*        Significant Imaging:

## 2022-04-26 NOTE — ASSESSMENT & PLAN NOTE
Seen during dialysis.  She appears comfortable on room air.  Metabolic parameters improved.  Her nausea is more likely associated with pain meds than uremia.

## 2022-04-26 NOTE — PROGRESS NOTES
[facility] - Orthopedic  Nephrology  Progress Note    Patient Name: Bria Ray  MRN: [id number]  Admission Date: 4/19/2022  Hospital Length of Stay: 7 days  Attending Provider: Richi Hoover Jr., MD   Primary Care Physician: Lorena Richardson MD  Principal Problem:Acute hypoxemic respiratory failure    Subjective:     HPI: 86-year-old woman with CKD 5. Her baseline creatinine was 4 prior to her last admission.  It dilcia to nearly 7, then improved slightly to 6-6.5.  She was volume depleted at the time of that admission.  Creatinine improved slightly with IV fluid.  She was discharged to swing bed with creatinine  6.5.  She developed progressive shortness of breath over the past 3-4 days.  She denies chest pain.  No pleuritic chest pain or hemoptysis.      Interval History:  She had nausea last night which is resolved today.  She denies SOB.    Review of patient's allergies indicates:  No Known Allergies  Current Facility-Administered Medications   Medication Frequency    0.9%  NaCl infusion (for blood administration) Q24H PRN    0.9%  NaCl infusion PRN    acetaminophen tablet 1,000 mg Q8H PRN    allopurinoL tablet 100 mg Daily    aluminum-magnesium hydroxide-simethicone 200-200-20 mg/5 mL suspension 30 mL QID PRN    amLODIPine tablet 10 mg Daily    aspirin EC tablet 81 mg Daily    dextrose 10% bolus 125 mL PRN    dextrose 10% bolus 250 mL PRN    dronabinoL capsule 2.5 mg BID    gabapentin capsule 100 mg QHS    glucagon (human recombinant) injection 1 mg PRN    heparin (porcine) injection 4,000 Units PRN    hydrALAZINE tablet 100 mg Q8H    insulin aspart U-100 injection 0-5 Units QID (AC & HS)    magnesium oxide tablet 800 mg PRN    magnesium oxide tablet 800 mg PRN    melatonin tablet 6 mg Nightly PRN    metoclopramide HCl injection 10 mg Q6H PRN    metoprolol tartrate (LOPRESSOR) tablet 25 mg BID    naloxone 0.4 mg/mL injection 0.02 mg PRN    oxyCODONE immediate release tablet 5  mg Q4H PRN    polyethylene glycol packet 17 g Daily PRN    potassium bicarbonate disintegrating tablet 35 mEq PRN    potassium bicarbonate disintegrating tablet 50 mEq PRN    potassium bicarbonate disintegrating tablet 60 mEq PRN    potassium, sodium phosphates 280-160-250 mg packet 2 packet PRN    potassium, sodium phosphates 280-160-250 mg packet 2 packet PRN    potassium, sodium phosphates 280-160-250 mg packet 2 packet PRN    promethazine (PHENERGAN) 12.5 mg in dextrose 5 % 50 mL IVPB Once    sodium chloride 0.9% flush 10 mL PRN       Objective:     Vital Signs (Most Recent):  Temp: 98.2 °F (36.8 °C) (04/26/22 1200)  Pulse: 65 (04/26/22 1200)  Resp: 19 (04/26/22 1200)  BP: (!) 146/66 (04/26/22 1200)  SpO2: 98 % (04/26/22 1200)  O2 Device (Oxygen Therapy): room air (04/25/22 1932)   Vital Signs (24h Range):  Temp:  [96.6 °F (35.9 °C)-98.2 °F (36.8 °C)] 98.2 °F (36.8 °C)  Pulse:  [59-75] 65  Resp:  [16-19] 19  SpO2:  [93 %-99 %] 98 %  BP: (146-190)/(63-90) 146/66     Weight: 75.5 kg (166 lb 8 oz) (04/26/22 0550)  Body mass index is 26.08 kg/m².  Body surface area is 1.89 meters squared.    I/O last 3 completed shifts:  In: 0   Out: 2000 [Other:2000]    Physical Exam  Constitutional:       General: She is not in acute distress.  HENT:      Head: Normocephalic and atraumatic.   Eyes:      Pupils: Pupils are equal, round, and reactive to light.   Cardiovascular:      Rate and Rhythm: Normal rate and regular rhythm.      Heart sounds: No murmur heard.    No friction rub. No gallop.   Pulmonary:      Effort: No respiratory distress.   Abdominal:      General: Bowel sounds are normal.      Tenderness: There is no abdominal tenderness. There is no guarding.   Musculoskeletal:      Right lower leg: No edema.      Left lower leg: No edema.      Comments: Trace lower extremity edema   Neurological:      Mental Status: She is alert and oriented to person, place, and time.   Psychiatric:         Behavior: Behavior  normal.       Significant Labs:  BMP:   Recent Labs   Lab 04/22/22  0318 04/23/22  0452 04/26/22  0702   *   < > 145*      < > 136   K 4.5   < > 4.0   *   < > 99   CO2 21   < > 26   BUN 81*   < > 27*   CREATININE 6.40*   < > 3.10*   CALCIUM 7.6*   < > 7.7*   MG 2.0  --   --     < > = values in this interval not displayed.     CBC:   Recent Labs   Lab 04/26/22  0514   WBC 4.84   RBC 3.35*   HGB 10.0*   HCT 32.7*   *   MCV 97.6*   MCH 29.9   MCHC 30.6*        Significant Imaging:      Assessment/Plan:     Type 2 diabetes mellitus  Continue current treatment    Anemia of chronic renal failure  Post transfusion    CKD (chronic kidney disease), stage IV  Plan dialysis tomorrow.  Outpatient schedule being set up    Essential hypertension  Controlled        Thank you for your consult.     Beni Fields MD  Nephrology  [facility] - Orthopedic

## 2022-04-26 NOTE — SUBJECTIVE & OBJECTIVE
"Interval History: Pt resting in bed. Attempting to eat breakfast- states she cant eat anything and if she is able to eat more than a couple bites it comes back up. She did not sleep well overnight- she denies CP or SOB but does state that she is weak, tired, and just "doesn't feel good".     Review of Systems   Constitutional:  Positive for activity change and fatigue.   Respiratory:  Negative for chest tightness and shortness of breath.    Cardiovascular:  Negative for chest pain and leg swelling.   Gastrointestinal:  Positive for nausea. Negative for abdominal pain and diarrhea.   Musculoskeletal:  Positive for arthralgias and myalgias.   Neurological:  Positive for weakness. Negative for dizziness, light-headedness and headaches.   All other systems reviewed and are negative.  Objective:     Vital Signs (Most Recent):  Temp: 98.2 °F (36.8 °C) (04/26/22 1200)  Pulse: 65 (04/26/22 1200)  Resp: 19 (04/26/22 1200)  BP: (!) 146/66 (04/26/22 1200)  SpO2: 98 % (04/26/22 1200)   Vital Signs (24h Range):  Temp:  [96.6 °F (35.9 °C)-98.2 °F (36.8 °C)] 98.2 °F (36.8 °C)  Pulse:  [59-75] 65  Resp:  [16-19] 19  SpO2:  [93 %-99 %] 98 %  BP: (146-190)/(63-90) 146/66     Weight: 75.5 kg (166 lb 8 oz)  Body mass index is 26.08 kg/m².    Intake/Output Summary (Last 24 hours) at 4/26/2022 1359  Last data filed at 4/26/2022 1240  Gross per 24 hour   Intake 480 ml   Output --   Net 480 ml     Lines/Drains:       Hemodialysis Catheter right subclavian (Active)   Site Assessment No drainage;No redness;No swelling;No warmth 04/25/22 1155   Line Securement Device Secured with sutures 04/25/22 1155   Dressing Type Biopatch in place 04/25/22 1155   Dressing Status Clean;Dry;Intact 04/26/22 0720   Dressing Intervention Integrity maintained 04/25/22 1155   Date on Dressing 04/25/22 04/25/22 1155   Dressing Due to be Changed 04/27/22 04/25/22 1155   Venous Patency/Care flushed w/o difficulty;heparin locked 04/25/22 1155   Arterial Patency/Care " flushed w/o difficulty;heparin locked 04/25/22 1155   Line Necessity Review CRRT/HD 04/25/22 1155   Number of days:             Peripheral IV - Single Lumen 04/20/22 1530 20 G Anterior;Left Forearm (Active)   Site Assessment Clean;Dry;Intact;No redness;No swelling 04/26/22 1138   Extremity Assessment Distal to IV No abnormal discoloration;No redness;No swelling;No warmth 04/24/22 2000   Line Status Saline locked;Flushed 04/24/22 2000   Dressing Status Clean;Dry;Intact 04/24/22 2000   Dressing Intervention Integrity maintained 04/24/22 2000   Reason Not Rotated Not due 04/21/22 1901   Number of days: 5          Physical Exam  Vitals and nursing note reviewed.   Constitutional:       Appearance: She is ill-appearing.   HENT:      Head: Normocephalic.      Nose: Nose normal.      Mouth/Throat:      Mouth: Mucous membranes are moist.   Eyes:      Extraocular Movements: Extraocular movements intact.      Pupils: Pupils are equal, round, and reactive to light.   Cardiovascular:      Rate and Rhythm: Rhythm irregular.      Pulses: Normal pulses.           Radial pulses are 2+ on the right side and 2+ on the left side.        Dorsalis pedis pulses are 2+ on the right side and 2+ on the left side.      Comments: Trace edmea to BLE   Pulmonary:      Comments: Coarse  Abdominal:      General: Bowel sounds are normal.      Palpations: Abdomen is soft.      Tenderness: There is no abdominal tenderness. There is no guarding.   Musculoskeletal:         General: Normal range of motion.      Cervical back: Normal range of motion and neck supple.   Skin:     General: Skin is warm and dry.      Capillary Refill: Capillary refill takes 2 to 3 seconds.      Comments: R chest wall HD cath    Neurological:      General: No focal deficit present.      Mental Status: She is alert and oriented to person, place, and time. Mental status is at baseline.      Cranial Nerves: No cranial nerve deficit.      Sensory: No sensory deficit.    Psychiatric:         Mood and Affect: Mood normal.         Behavior: Behavior normal.       Significant Labs: All pertinent labs within the past 24 hours have been reviewed.  Recent Lab Results  (Last 5 results in the past 24 hours)        04/26/22  1039   04/26/22  0702   04/26/22  0640   04/26/22  0524   04/26/22  0514        Anion Gap   15             Aniso         1+       Baso #         0.04       Basophil %         0.8                2       BUN   27             BUN/CREAT RATIO   9             Calcium   7.7             Chloride   99             CO2   26             Creatinine   3.10             Differential Type         Manual       eGFR if non    15             Eos #         0.12       Eosinophil %         2.5                1       Glucose   145             Hematocrit         32.7       Hemoglobin         10.0       Hypo         1+       Immature Grans (Abs)         0.02       Immature Granulocytes         0.4       Lymph #         0.58       Lymph %         12.0                11       MCH         29.9       MCHC         30.6       MCV         97.6       Mono #         0.83       Mono %         17.1                11       MPV         11.2       Neutrophils, Abs         3.25       Neutrophils Relative         67.2       nRBC         0.0       NUCLEATED RBC ABSOLUTE         0.00       Ovalocytes         Few       PLATELET MORPHOLOGY         Decreased  Comment: RARE FIBRIN STRANDS       Platelets         127       POC Glucose 167     138   145         Potassium   4.0             RBC         3.35       RDW         15.4       Segmented Neutrophils, Man %         75       Sodium   136             WBC         4.84                              Significant Imaging: I have reviewed all pertinent imaging results/findings within the past 24 hours.

## 2022-04-26 NOTE — PROGRESS NOTES
[facility] Medicine  Progress Note    Patient Name: Bria Ray  MRN: [id number]  Patient Class: IP- Inpatient   Admission Date: 4/19/2022  Length of Stay: 7 days  Attending Physician: Richi Hoover Jr., MD  Primary Care Provider: Lorena Richardson MD        Subjective:     Principal Problem:Acute hypoxemic respiratory failure        HPI:  85 y/o female admitted today from [facility] ED for SOB. The patient was transferred from [facility] after presenting there for increased SOB over the last week. She was discharged from [facility] one week ago with worsening renal disease. Currently she is followed by Dr. Fields, Nephrologist and not on HD. Since her discharge last week she has increasingly gotten more SOB with orthopnea at night. Her symptoms are exacerbated with exertion and lying flat. There is no reported chest pain, vomiting, diarrhea, diaphoresis, vision change, numbness or recent illness.                     Overview/Hospital Course:  4/20: SOB has improved; renal function remains unchanged; slight decrease in H/H- pending transfusion; nephrology and cardiology following; discussions for HD per nephrology; plans for stress test in AM; repeat labs in AM to monitor H/H and renal function   4/21: SOB continues to improve slightly daily; still not to pt's baseline; has not reached decision regarding HD; stress test for today; H/H 6/21- did not receive full unit of blood yesterday d/t IV issues; PRBCs ordered for today  4/22: unable to lay flat for lexiscan yesterday; has decided to proceed with HD- HD cath to be placed today with HD following; H/H stable 8/26; BP improving   4/23: had R chest wall tunneled HD cath placed yesterday; 1st round of dialysis yesterday- tolerated from a BP standpoint- nauseated and exhausted; dialysis again today; phenergan IVPB to offset the nausea and exhaustion.   4/24: plans for HD tomorrow; feels better today but still doesn't feel back to  "her baseline; continue current plan; SS consulted for assistance with OP HD   4/25: HD today; monitor for HD intolerance; SS to set up HD chair OP   4/26: pt continues to have nausea and decreased tolerance for HD--- feels terrible- weak, shaky, etc- worse after HD with slow improvement; repeat IVPB phenergan; discussed with Sneha will start on low dose marinol and monitor      Interval History: Pt resting in bed. Attempting to eat breakfast- states she cant eat anything and if she is able to eat more than a couple bites it comes back up. She did not sleep well overnight- she denies CP or SOB but does state that she is weak, tired, and just "doesn't feel good".     Review of Systems   Constitutional:  Positive for activity change and fatigue.   Respiratory:  Negative for chest tightness and shortness of breath.    Cardiovascular:  Negative for chest pain and leg swelling.   Gastrointestinal:  Positive for nausea. Negative for abdominal pain and diarrhea.   Musculoskeletal:  Positive for arthralgias and myalgias.   Neurological:  Positive for weakness. Negative for dizziness, light-headedness and headaches.   All other systems reviewed and are negative.  Objective:     Vital Signs (Most Recent):  Temp: 98.2 °F (36.8 °C) (04/26/22 1200)  Pulse: 65 (04/26/22 1200)  Resp: 19 (04/26/22 1200)  BP: (!) 146/66 (04/26/22 1200)  SpO2: 98 % (04/26/22 1200)   Vital Signs (24h Range):  Temp:  [96.6 °F (35.9 °C)-98.2 °F (36.8 °C)] 98.2 °F (36.8 °C)  Pulse:  [59-75] 65  Resp:  [16-19] 19  SpO2:  [93 %-99 %] 98 %  BP: (146-190)/(63-90) 146/66     Weight: 75.5 kg (166 lb 8 oz)  Body mass index is 26.08 kg/m².    Intake/Output Summary (Last 24 hours) at 4/26/2022 1359  Last data filed at 4/26/2022 1240  Gross per 24 hour   Intake 480 ml   Output --   Net 480 ml     Lines/Drains:       Hemodialysis Catheter right subclavian (Active)   Site Assessment No drainage;No redness;No swelling;No warmth 04/25/22 1153   Line Securement " Device Secured with sutures 04/25/22 1155   Dressing Type Biopatch in place 04/25/22 1155   Dressing Status Clean;Dry;Intact 04/26/22 0720   Dressing Intervention Integrity maintained 04/25/22 1155   Date on Dressing 04/25/22 04/25/22 1155   Dressing Due to be Changed 04/27/22 04/25/22 1155   Venous Patency/Care flushed w/o difficulty;heparin locked 04/25/22 1155   Arterial Patency/Care flushed w/o difficulty;heparin locked 04/25/22 1155   Line Necessity Review CRRT/HD 04/25/22 1155   Number of days:             Peripheral IV - Single Lumen 04/20/22 1530 20 G Anterior;Left Forearm (Active)   Site Assessment Clean;Dry;Intact;No redness;No swelling 04/26/22 1138   Extremity Assessment Distal to IV No abnormal discoloration;No redness;No swelling;No warmth 04/24/22 2000   Line Status Saline locked;Flushed 04/24/22 2000   Dressing Status Clean;Dry;Intact 04/24/22 2000   Dressing Intervention Integrity maintained 04/24/22 2000   Reason Not Rotated Not due 04/21/22 1901   Number of days: 5          Physical Exam  Vitals and nursing note reviewed.   Constitutional:       Appearance: She is ill-appearing.   HENT:      Head: Normocephalic.      Nose: Nose normal.      Mouth/Throat:      Mouth: Mucous membranes are moist.   Eyes:      Extraocular Movements: Extraocular movements intact.      Pupils: Pupils are equal, round, and reactive to light.   Cardiovascular:      Rate and Rhythm: Rhythm irregular.      Pulses: Normal pulses.           Radial pulses are 2+ on the right side and 2+ on the left side.        Dorsalis pedis pulses are 2+ on the right side and 2+ on the left side.      Comments: Trace edmea to BLE   Pulmonary:      Comments: Coarse  Abdominal:      General: Bowel sounds are normal.      Palpations: Abdomen is soft.      Tenderness: There is no abdominal tenderness. There is no guarding.   Musculoskeletal:         General: Normal range of motion.      Cervical back: Normal range of motion and neck supple.    Skin:     General: Skin is warm and dry.      Capillary Refill: Capillary refill takes 2 to 3 seconds.      Comments: R chest wall HD cath    Neurological:      General: No focal deficit present.      Mental Status: She is alert and oriented to person, place, and time. Mental status is at baseline.      Cranial Nerves: No cranial nerve deficit.      Sensory: No sensory deficit.   Psychiatric:         Mood and Affect: Mood normal.         Behavior: Behavior normal.       Significant Labs: All pertinent labs within the past 24 hours have been reviewed.  Recent Lab Results  (Last 5 results in the past 24 hours)        04/26/22  1039   04/26/22  0702   04/26/22  0640   04/26/22  0524   04/26/22  0514        Anion Gap   15             Aniso         1+       Baso #         0.04       Basophil %         0.8                2       BUN   27             BUN/CREAT RATIO   9             Calcium   7.7             Chloride   99             CO2   26             Creatinine   3.10             Differential Type         Manual       eGFR if non    15             Eos #         0.12       Eosinophil %         2.5                1       Glucose   145             Hematocrit         32.7       Hemoglobin         10.0       Hypo         1+       Immature Grans (Abs)         0.02       Immature Granulocytes         0.4       Lymph #         0.58       Lymph %         12.0                11       MCH         29.9       MCHC         30.6       MCV         97.6       Mono #         0.83       Mono %         17.1                11       MPV         11.2       Neutrophils, Abs         3.25       Neutrophils Relative         67.2       nRBC         0.0       NUCLEATED RBC ABSOLUTE         0.00       Ovalocytes         Few       PLATELET MORPHOLOGY         Decreased  Comment: RARE FIBRIN STRANDS       Platelets         127       POC Glucose 167     138   145         Potassium   4.0             RBC         3.35       RDW          15.4       Segmented Neutrophils, Man %         75       Sodium   136             WBC         4.84                              Significant Imaging: I have reviewed all pertinent imaging results/findings within the past 24 hours.      Assessment/Plan:      Nausea  - ongoing nausea after initiation of HD   - zofran does not appear to help per pt report  - phenergan has helped when given over the weekend   - will repeat dose today  - will hold off on reglan d/t renal function  - discussed with Dr Hoover-- will start on low dose marinol       DVT prophylaxis  * Heparin 5000 units q 8 hours.       Type 2 diabetes mellitus  Low scale SSI.  Accu checks ac/hs   Hold Metformin  4/20  - continue SSI   - accuchecks ACHS   - BG range 137-248  4/21  - BG range   - continue SSI  4/22  - BG range 107-188  - SSI continues   4/24  - BG stable     Anemia of chronic renal failure  Cbc daily  H/H 7/25  Likely anemia from chronic renal failure.  4/20  - H/H 6/23  - 1u PRBCs pending  4/21  - did not receive full unit of blood yesterday  - H/H 6/21  - 1u PRBCs today  - recheck in AM   4/22  - H/H stable 8/26 4/25  - labs pending this AM  - has remained stable     CKD (chronic kidney disease), stage IV  * Telemetry monitoring    * Ekg:  Sr with  Pac's. 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   * sCre: 6.46.    Baseline sCre. 6 since 4/2021  * NA bicarbonate tabs daily  4/20  - nephrology following   - discussed HD- pt will discuss with family   4/21  - renal function remains unchanged  - nephrology following   4/22  - HD cath to be placed today with HD following   4/23  - second day of HD  - tolerating ok  4/24  - HD planned for tomorrow    4/25  - ESRD with HD     Essential hypertension  Chronic, resume home medications  4/20  - BP trend remains elevated but at pt's baseline   - prn hydralazine    4/21  - BP remains elevated  - increased hydralazine PO and norvasc  4/22  - BP trend improved   - still slightly elevated likely secondary to FVO  - HD following cath placement   4/23  - BP remains elevated  - will optimize meds   4/25  - BP trend improved  - allow some hypertension secondary to HD hypotension       VTE Risk Mitigation (From admission, onward)         Ordered     heparin (porcine) injection 4,000 Units  As needed (PRN)         04/23/22 0811     IP VTE HIGH RISK PATIENT  Once         04/19/22 1631     Place sequential compression device  Until discontinued         04/19/22 1631                Discharge Planning   ANGY:      Code Status: Full Code   Is the patient medically ready for discharge?:     Reason for patient still in hospital (select all that apply): Treatment  Discharge Plan A: Home Health        BELA Robbins  Department of Hospital Medicine   [facility]

## 2022-04-26 NOTE — NURSING
1803: Patient lying in bed, resting with eyes closed. Respirations even and unlabored on O2 2L NC. NADN. All safety measures intact.

## 2022-04-26 NOTE — ASSESSMENT & PLAN NOTE
- ongoing nausea after initiation of HD   - zofran does not appear to help per pt report  - phenergan has helped when given over the weekend   - will repeat dose today  - will hold off on reglan d/t renal function  - discussed with Dr Hoover-- will start on low dose marinol

## 2022-04-27 NOTE — ASSESSMENT & PLAN NOTE
- ongoing nausea after initiation of HD   - zofran does not appear to help per pt report  - phenergan has helped when given over the weekend   - will repeat dose today  - will hold off on reglan d/t renal function  - discussed with Dr Hoover-- will start on low dose marinol   4/27: Nausea improved. Tolerate CL.

## 2022-04-27 NOTE — SUBJECTIVE & OBJECTIVE
Interval History:  Seen during dialysis today.  No SOB.  No nausea today    Review of patient's allergies indicates:  No Known Allergies  Current Facility-Administered Medications   Medication Frequency    0.9%  NaCl infusion (for blood administration) Q24H PRN    0.9%  NaCl infusion PRN    acetaminophen tablet 1,000 mg Q8H PRN    allopurinoL tablet 100 mg Daily    aluminum-magnesium hydroxide-simethicone 200-200-20 mg/5 mL suspension 30 mL QID PRN    amLODIPine tablet 10 mg Daily    aspirin EC tablet 81 mg Daily    dextrose 10% bolus 125 mL PRN    dextrose 10% bolus 250 mL PRN    dronabinoL capsule 2.5 mg BID    gabapentin capsule 100 mg QHS    glucagon (human recombinant) injection 1 mg PRN    heparin (porcine) injection 4,000 Units PRN    hydrALAZINE tablet 100 mg Q8H    insulin aspart U-100 injection 0-5 Units QID (AC & HS)    magnesium oxide tablet 800 mg PRN    magnesium oxide tablet 800 mg PRN    melatonin tablet 6 mg Nightly PRN    metoclopramide HCl injection 10 mg Q6H PRN    metoprolol tartrate (LOPRESSOR) tablet 25 mg BID    naloxone 0.4 mg/mL injection 0.02 mg PRN    oxyCODONE immediate release tablet 5 mg Q4H PRN    polyethylene glycol packet 17 g Daily PRN    potassium bicarbonate disintegrating tablet 35 mEq PRN    potassium bicarbonate disintegrating tablet 50 mEq PRN    potassium bicarbonate disintegrating tablet 60 mEq PRN    potassium, sodium phosphates 280-160-250 mg packet 2 packet PRN    potassium, sodium phosphates 280-160-250 mg packet 2 packet PRN    potassium, sodium phosphates 280-160-250 mg packet 2 packet PRN    sodium chloride 0.9% flush 10 mL PRN       Objective:     Vital Signs (Most Recent):  Temp: 98.3 °F (36.8 °C) (04/27/22 0810)  Pulse: 71 (04/27/22 1100)  Resp: 17 (04/27/22 1100)  BP: 120/70 (04/27/22 1100)  SpO2: 99 % (04/27/22 0712)  O2 Device (Oxygen Therapy): nasal cannula (04/27/22 1100) Vital Signs (24h Range):  Temp:  [97.7 °F (36.5 °C)-98.3 °F (36.8 °C)] 98.3 °F (36.8  °C)  Pulse:  [48-74] 71  Resp:  [16-19] 17  SpO2:  [96 %-99 %] 99 %  BP: (104-183)/(38-78) 120/70     Weight: 75.4 kg (166 lb 3.2 oz) (04/27/22 0600)  Body mass index is 26.03 kg/m².  Body surface area is 1.89 meters squared.    I/O last 3 completed shifts:  In: 600 [P.O.:600]  Out: -     Physical Exam  Constitutional:       General: She is not in acute distress.  HENT:      Head: Normocephalic and atraumatic.   Eyes:      Pupils: Pupils are equal, round, and reactive to light.   Cardiovascular:      Rate and Rhythm: Normal rate and regular rhythm.      Heart sounds: No murmur heard.    No friction rub. No gallop.   Pulmonary:      Effort: No respiratory distress.   Abdominal:      General: Bowel sounds are normal.      Tenderness: There is no abdominal tenderness. There is no guarding.   Musculoskeletal:      Right lower leg: No edema.      Left lower leg: No edema.   Neurological:      Mental Status: She is alert and oriented to person, place, and time.   Psychiatric:         Behavior: Behavior normal.       Significant Labs:  BMP:   Recent Labs   Lab 04/22/22  0318 04/23/22  0452 04/26/22  0702   *   < > 145*      < > 136   K 4.5   < > 4.0   *   < > 99   CO2 21   < > 26   BUN 81*   < > 27*   CREATININE 6.40*   < > 3.10*   CALCIUM 7.6*   < > 7.7*   MG 2.0  --   --     < > = values in this interval not displayed.     CBC:   Recent Labs   Lab 04/27/22  0722   WBC 5.67   RBC 3.38*   HGB 10.1*   HCT 33.6*   *   MCV 99.4*   MCH 29.9   MCHC 30.1*        Significant Imaging:

## 2022-04-27 NOTE — NURSING
Family members just came to the nursing station to tell me that no one has come in the room to speak with them.

## 2022-04-27 NOTE — SUBJECTIVE & OBJECTIVE
Interval History: NO ACUTE EVENTS OVERNIGHT. N/v IMPROVED.     Review of Systems   Constitutional:  Positive for activity change and fatigue.   Respiratory:  Negative for chest tightness and shortness of breath.    Cardiovascular:  Negative for chest pain and leg swelling.   Gastrointestinal:  Negative for abdominal pain and diarrhea.   Musculoskeletal:  Positive for arthralgias and myalgias.   Neurological:  Positive for weakness. Negative for dizziness, light-headedness and headaches.   All other systems reviewed and are negative.  Objective:     Vital Signs (Most Recent):  Temp: 98 °F (36.7 °C) (04/27/22 1200)  Pulse: 62 (04/27/22 1200)  Resp: 18 (04/27/22 1200)  BP: (!) 126/53 (04/27/22 1200)  SpO2: 98 % (04/27/22 1200)   Vital Signs (24h Range):  Temp:  [97.2 °F (36.2 °C)-98.3 °F (36.8 °C)] 98 °F (36.7 °C)  Pulse:  [48-74] 62  Resp:  [16-19] 18  SpO2:  [96 %-99 %] 98 %  BP: ()/(38-78) 126/53     Weight: 75.4 kg (166 lb 3.2 oz)  Body mass index is 26.03 kg/m².    Intake/Output Summary (Last 24 hours) at 4/27/2022 1411  Last data filed at 4/27/2022 1118  Gross per 24 hour   Intake 620 ml   Output 2312 ml   Net -1692 ml      Physical Exam  Vitals and nursing note reviewed.   Constitutional:       Appearance: She is ill-appearing.   HENT:      Head: Normocephalic.      Nose: Nose normal.      Mouth/Throat:      Mouth: Mucous membranes are moist.   Eyes:      Extraocular Movements: Extraocular movements intact.      Pupils: Pupils are equal, round, and reactive to light.   Cardiovascular:      Rate and Rhythm: Rhythm irregular.      Pulses: Normal pulses.           Radial pulses are 2+ on the right side and 2+ on the left side.        Dorsalis pedis pulses are 2+ on the right side and 2+ on the left side.      Comments: Trace edmea to BLE   Pulmonary:      Comments: Coarse  Abdominal:      General: Bowel sounds are normal.      Palpations: Abdomen is soft.      Tenderness: There is no abdominal tenderness.  There is no guarding.   Musculoskeletal:         General: Normal range of motion.      Cervical back: Normal range of motion and neck supple.   Skin:     General: Skin is warm and dry.      Capillary Refill: Capillary refill takes 2 to 3 seconds.      Comments: R chest wall HD cath    Neurological:      General: No focal deficit present.      Mental Status: She is alert and oriented to person, place, and time. Mental status is at baseline.      Cranial Nerves: No cranial nerve deficit.      Sensory: No sensory deficit.   Psychiatric:         Mood and Affect: Mood normal.         Behavior: Behavior normal.       Significant Labs: All pertinent labs within the past 24 hours have been reviewed.    Significant Imaging: I have reviewed all pertinent imaging results/findings within the past 24 hours.

## 2022-04-27 NOTE — PROGRESS NOTES
[facility] Medicine  Progress Note    Patient Name: Bria Ray  MRN: [id number]  Patient Class: IP- Inpatient   Admission Date: 4/19/2022  Length of Stay: 8 days  Attending Physician: Richi Hoover Jr., MD  Primary Care Provider: Lorena Richardson MD        Subjective:     Principal Problem:Acute hypoxemic respiratory failure        HPI:  85 y/o female admitted today from [facility] for SOB. The patient was transferred from [facility] after presenting there for increased SOB over the last week. She was discharged from [facility] one week ago with worsening renal disease. Currently she is followed by Dr. Fields, Nephrologist and not on HD. Since her discharge last week she has increasingly gotten more SOB with orthopnea at night. Her symptoms are exacerbated with exertion and lying flat. There is no reported chest pain, vomiting, diarrhea, diaphoresis, vision change, numbness or recent illness.                     Overview/Hospital Course:  4/20: SOB has improved; renal function remains unchanged; slight decrease in H/H- pending transfusion; nephrology and cardiology following; discussions for HD per nephrology; plans for stress test in AM; repeat labs in AM to monitor H/H and renal function   4/21: SOB continues to improve slightly daily; still not to pt's baseline; has not reached decision regarding HD; stress test for today; H/H 6/21- did not receive full unit of blood yesterday d/t IV issues; PRBCs ordered for today  4/22: unable to lay flat for lexiscan yesterday; has decided to proceed with HD- HD cath to be placed today with HD following; H/H stable 8/26; BP improving   4/23: had R chest wall tunneled HD cath placed yesterday; 1st round of dialysis yesterday- tolerated from a BP standpoint- nauseated and exhausted; dialysis again today; phenergan IVPB to offset the nausea and exhaustion.   4/24: plans for HD tomorrow; feels better today but still doesn't feel back to  her baseline; continue current plan; SS consulted for assistance with OP HD   4/25: HD today; monitor for HD intolerance; SS to set up HD chair OP   4/26: pt continues to have nausea and decreased tolerance for HD--- feels terrible- weak, shaky, etc- worse after HD with slow improvement; repeat IVPB phenergan; discussed with Sneha will start on low dose marinol and monitor      Interval History: NO ACUTE EVENTS OVERNIGHT. N/v IMPROVED.     Review of Systems   Constitutional:  Positive for activity change and fatigue.   Respiratory:  Negative for chest tightness and shortness of breath.    Cardiovascular:  Negative for chest pain and leg swelling.   Gastrointestinal:  Negative for abdominal pain and diarrhea.   Musculoskeletal:  Positive for arthralgias and myalgias.   Neurological:  Positive for weakness. Negative for dizziness, light-headedness and headaches.   All other systems reviewed and are negative.  Objective:     Vital Signs (Most Recent):  Temp: 98 °F (36.7 °C) (04/27/22 1200)  Pulse: 62 (04/27/22 1200)  Resp: 18 (04/27/22 1200)  BP: (!) 126/53 (04/27/22 1200)  SpO2: 98 % (04/27/22 1200)   Vital Signs (24h Range):  Temp:  [97.2 °F (36.2 °C)-98.3 °F (36.8 °C)] 98 °F (36.7 °C)  Pulse:  [48-74] 62  Resp:  [16-19] 18  SpO2:  [96 %-99 %] 98 %  BP: ()/(38-78) 126/53     Weight: 75.4 kg (166 lb 3.2 oz)  Body mass index is 26.03 kg/m².    Intake/Output Summary (Last 24 hours) at 4/27/2022 1411  Last data filed at 4/27/2022 1118  Gross per 24 hour   Intake 620 ml   Output 2312 ml   Net -1692 ml      Physical Exam  Vitals and nursing note reviewed.   Constitutional:       Appearance: She is ill-appearing.   HENT:      Head: Normocephalic.      Nose: Nose normal.      Mouth/Throat:      Mouth: Mucous membranes are moist.   Eyes:      Extraocular Movements: Extraocular movements intact.      Pupils: Pupils are equal, round, and reactive to light.   Cardiovascular:      Rate and Rhythm: Rhythm irregular.       Pulses: Normal pulses.           Radial pulses are 2+ on the right side and 2+ on the left side.        Dorsalis pedis pulses are 2+ on the right side and 2+ on the left side.      Comments: Trace edmea to BLE   Pulmonary:      Comments: Coarse  Abdominal:      General: Bowel sounds are normal.      Palpations: Abdomen is soft.      Tenderness: There is no abdominal tenderness. There is no guarding.   Musculoskeletal:         General: Normal range of motion.      Cervical back: Normal range of motion and neck supple.   Skin:     General: Skin is warm and dry.      Capillary Refill: Capillary refill takes 2 to 3 seconds.      Comments: R chest wall HD cath    Neurological:      General: No focal deficit present.      Mental Status: She is alert and oriented to person, place, and time. Mental status is at baseline.      Cranial Nerves: No cranial nerve deficit.      Sensory: No sensory deficit.   Psychiatric:         Mood and Affect: Mood normal.         Behavior: Behavior normal.       Significant Labs: All pertinent labs within the past 24 hours have been reviewed.    Significant Imaging: I have reviewed all pertinent imaging results/findings within the past 24 hours.      Assessment/Plan:      Nausea  - ongoing nausea after initiation of HD   - zofran does not appear to help per pt report  - phenergan has helped when given over the weekend   - will repeat dose today  - will hold off on reglan d/t renal function  - discussed with Dr Hoover-- will start on low dose marinol   4/27: Nausea improved. Tolerate CL.      DVT prophylaxis  * Heparin 5000 units q 8 hours.       Type 2 diabetes mellitus  Low scale SSI.  Accu checks ac/hs   Hold Metformin  4/20  - continue SSI   - accuchecks ACHS   - BG range 137-248  4/21  - BG range   - continue SSI  4/22  - BG range 107-188  - SSI continues   4/24  - BG stable     Anemia of chronic renal failure  Cbc daily  H/H 7/25  Likely anemia from chronic renal failure.  4/20  -  H/H 6/23  - 1u PRBCs pending  4/21  - did not receive full unit of blood yesterday  - H/H 6/21  - 1u PRBCs today  - recheck in AM   4/22  - H/H stable 8/26 4/25  - labs pending this AM  - has remained stable     CKD (chronic kidney disease), stage IV  * Telemetry monitoring    * Ekg:  Sr with  Pac's. 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   * sCre: 6.46.    Baseline sCre. 6 since 4/2021  * NA bicarbonate tabs daily  4/20  - nephrology following   - discussed HD- pt will discuss with family   4/21  - renal function remains unchanged  - nephrology following   4/22  - HD cath to be placed today with HD following   4/23  - second day of HD  - tolerating ok  4/24  - HD planned for tomorrow    4/25  - ESRD with HD     Essential hypertension  Chronic, resume home medications  4/20  - BP trend remains elevated but at pt's baseline   - prn hydralazine   4/21  - BP remains elevated  - increased hydralazine PO and norvasc  4/22  - BP trend improved   - still slightly elevated likely secondary to FVO  - HD following cath placement   4/23  - BP remains elevated  - will optimize meds   4/25  - BP trend improved  - allow some hypertension secondary to HD hypotension       VTE Risk Mitigation (From admission, onward)         Ordered     heparin (porcine) injection 4,000 Units  As needed (PRN)         04/23/22 0811     IP VTE HIGH RISK PATIENT  Once         04/19/22 1631     Place sequential compression device  Until discontinued         04/19/22 1631                Discharge Planning   ANGY:      Code Status: Full Code   Is the patient medically ready for discharge?:     Reason for patient still in hospital (select all that apply): Treatment  Discharge Plan A: Home Health                  DEBORAH Figueroa  Department of Hospital Medicine   [facility] - Orthopedic

## 2022-04-27 NOTE — PROGRESS NOTES
[facility] - Orthopedic  Nephrology  Progress Note    Patient Name: Bria Ray  MRN: [id number]  Admission Date: 4/19/2022  Hospital Length of Stay: 8 days  Attending Provider: Richi Hoover Jr., MD   Primary Care Physician: Lorena Richardson MD  Principal Problem:Acute hypoxemic respiratory failure    Subjective:     HPI: 86-year-old woman with CKD 5. Her baseline creatinine was 4 prior to her last admission.  It dilcia to nearly 7, then improved slightly to 6-6.5.  She was volume depleted at the time of that admission.  Creatinine improved slightly with IV fluid.  She was discharged to swing bed with creatinine  6.5.  She developed progressive shortness of breath over the past 3-4 days.  She denies chest pain.  No pleuritic chest pain or hemoptysis.      Interval History:  Seen during dialysis today.  No SOB.  No nausea today    Review of patient's allergies indicates:  No Known Allergies  Current Facility-Administered Medications   Medication Frequency    0.9%  NaCl infusion (for blood administration) Q24H PRN    0.9%  NaCl infusion PRN    acetaminophen tablet 1,000 mg Q8H PRN    allopurinoL tablet 100 mg Daily    aluminum-magnesium hydroxide-simethicone 200-200-20 mg/5 mL suspension 30 mL QID PRN    amLODIPine tablet 10 mg Daily    aspirin EC tablet 81 mg Daily    dextrose 10% bolus 125 mL PRN    dextrose 10% bolus 250 mL PRN    dronabinoL capsule 2.5 mg BID    gabapentin capsule 100 mg QHS    glucagon (human recombinant) injection 1 mg PRN    heparin (porcine) injection 4,000 Units PRN    hydrALAZINE tablet 100 mg Q8H    insulin aspart U-100 injection 0-5 Units QID (AC & HS)    magnesium oxide tablet 800 mg PRN    magnesium oxide tablet 800 mg PRN    melatonin tablet 6 mg Nightly PRN    metoclopramide HCl injection 10 mg Q6H PRN    metoprolol tartrate (LOPRESSOR) tablet 25 mg BID    naloxone 0.4 mg/mL injection 0.02 mg PRN    oxyCODONE immediate release tablet 5 mg Q4H PRN     polyethylene glycol packet 17 g Daily PRN    potassium bicarbonate disintegrating tablet 35 mEq PRN    potassium bicarbonate disintegrating tablet 50 mEq PRN    potassium bicarbonate disintegrating tablet 60 mEq PRN    potassium, sodium phosphates 280-160-250 mg packet 2 packet PRN    potassium, sodium phosphates 280-160-250 mg packet 2 packet PRN    potassium, sodium phosphates 280-160-250 mg packet 2 packet PRN    sodium chloride 0.9% flush 10 mL PRN       Objective:     Vital Signs (Most Recent):  Temp: 98.3 °F (36.8 °C) (04/27/22 0810)  Pulse: 71 (04/27/22 1100)  Resp: 17 (04/27/22 1100)  BP: 120/70 (04/27/22 1100)  SpO2: 99 % (04/27/22 0712)  O2 Device (Oxygen Therapy): nasal cannula (04/27/22 1100) Vital Signs (24h Range):  Temp:  [97.7 °F (36.5 °C)-98.3 °F (36.8 °C)] 98.3 °F (36.8 °C)  Pulse:  [48-74] 71  Resp:  [16-19] 17  SpO2:  [96 %-99 %] 99 %  BP: (104-183)/(38-78) 120/70     Weight: 75.4 kg (166 lb 3.2 oz) (04/27/22 0600)  Body mass index is 26.03 kg/m².  Body surface area is 1.89 meters squared.    I/O last 3 completed shifts:  In: 600 [P.O.:600]  Out: -     Physical Exam  Constitutional:       General: She is not in acute distress.  HENT:      Head: Normocephalic and atraumatic.   Eyes:      Pupils: Pupils are equal, round, and reactive to light.   Cardiovascular:      Rate and Rhythm: Normal rate and regular rhythm.      Heart sounds: No murmur heard.    No friction rub. No gallop.   Pulmonary:      Effort: No respiratory distress.   Abdominal:      General: Bowel sounds are normal.      Tenderness: There is no abdominal tenderness. There is no guarding.   Musculoskeletal:      Right lower leg: No edema.      Left lower leg: No edema.   Neurological:      Mental Status: She is alert and oriented to person, place, and time.   Psychiatric:         Behavior: Behavior normal.       Significant Labs:  BMP:   Recent Labs   Lab 04/22/22  0318 04/23/22  0452 04/26/22  0702   *   < > 145*   NA  140   < > 136   K 4.5   < > 4.0   *   < > 99   CO2 21   < > 26   BUN 81*   < > 27*   CREATININE 6.40*   < > 3.10*   CALCIUM 7.6*   < > 7.7*   MG 2.0  --   --     < > = values in this interval not displayed.     CBC:   Recent Labs   Lab 04/27/22  0722   WBC 5.67   RBC 3.38*   HGB 10.1*   HCT 33.6*   *   MCV 99.4*   MCH 29.9   MCHC 30.1*        Significant Imaging:      Assessment/Plan:     Type 2 diabetes mellitus  Continue current treatment    Anemia of chronic renal failure  Post transfusion    CKD (chronic kidney disease), stage IV  Stable during dialysis today.  Nausea is better.    Essential hypertension  Controlled        Thank you for your consult.     Beni Fields MD  Nephrology  [facility] - Orthopedic

## 2022-04-28 PROBLEM — R11.0 NAUSEA: Status: RESOLVED | Noted: 2022-01-01 | Resolved: 2022-01-01

## 2022-04-28 PROBLEM — Z79.899 DVT PROPHYLAXIS: Status: RESOLVED | Noted: 2022-01-01 | Resolved: 2022-01-01

## 2022-04-28 NOTE — NURSING
Discharge instructions reviewed with patient and family and a copy of discharge summary given to patient, patient will have dialysis tomorrow morning at [address], per dr hernandez request, patient voiced understanding of all discharge instructions and follow up appointment. , Will follow up with patient tomorrow

## 2022-04-28 NOTE — PLAN OF CARE
[facility] - Orthopedic  Discharge Final Note    Primary Care Provider: Lorena Richardson MD    Expected Discharge Date: 4/28/2022    Final Discharge Note (most recent)     Final Note - 04/28/22 1322        Final Note    Assessment Type Final Discharge Note     Anticipated Discharge Disposition Home-Health Care Svc   [facility]    What phone number can be called within the next 1-3 days to see how you are doing after discharge? [phone]        Post-Acute Status    Post-Acute Authorization Dialysis;Home Health     Home Health Status Set-up Complete/Auth obtained     Diaylsis Status Set-up Complete/Auth obtained     Patient choice form signed by patient/caregiver List with quality metrics by geographic area provided;List from CMS Compare     Discharge Delays None known at this time                 Important Message from Medicare  Important Message from Medicare regarding Discharge Appeal Rights: Explained to patient/caregiver     Date IMM was signed: 04/26/22  Time IMM was signed: 1420     Follow-up providers     Lorena Richardson MD   Specialty: Family Medicine   Relationship: PCP - General    [address] [place], [place] [id number]   Phone: [phone]       Next Steps: Follow up in 1 week(s)    Instructions: please follow up on May 5 at 10:30    Kris Chatterjee DO   Specialty: Interventional Cardiology, Cardiology    [address] [place] [address]   Phone: [phone]       Next Steps: Follow up in 4 week(s)    Instructions: please follow up on May 26 at 2:45    Beni Fields MD   Specialty: Nephrology    [address] [address]   Phone: [phone]       Next Steps: Follow up    Instructions: As needed          After-discharge care            Dialysis/Infusion     [facility]   Service: Dialysis    Phone: [phone]             Home Medical Care     [facility]   Service: Home Health Services    [address] [facility] [address]   Phone:  [phone]                       Pt to dc home today. [facility] consulted for . Pt is already current with [facility]. [facility] faxed dc orders to [facility] and left Dialysis schedule with nurse. No other needs.

## 2022-04-28 NOTE — PLAN OF CARE
Problem: Physical Therapy  Goal: Physical Therapy Goal  Description: Short term goals:  1. Sit to stand transfer with Modified [place]  2. Bed to chair transfer with Modified [place] using Rolling Walker  3. Gait  x 150 feet with Supervision using Rolling Walker.     Long term goals:  Pt will return home to prior level of function with all mobility    Outcome: Ongoing, Progressing

## 2022-04-28 NOTE — DISCHARGE INSTRUCTIONS
* Take medications as prescribed.    * If experiencing chest pain, palpitations, or shortness of breath, seek medical attention        * Return to the emergency room if your symptoms worsen or persist.     * Follow up with your primary care doctor in 1 week    * Follow up with Dr. Chatterjee  as scheduled.

## 2022-04-28 NOTE — ASSESSMENT & PLAN NOTE
Patient with Hypoxic Respiratory failure which is Acute.  she is not on home oxygen. Supplemental oxygen was provided and noted- Oxygen Concentration (%):  [32] 32.   Signs/symptoms of respiratory failure include- tachypnea, increased work of breathing and respiratory distress. Contributing diagnoses includes - CHF and renal failure  Labs and images were reviewed. Patient Has not had a recent ABG. Will treat underlying causes and adjust management of respiratory failure as follows-   Nephrology and cardiology consults.  Echocardiogram.    4/20  - was given lasix in ED  - second dose given today with adequate UOP and improvement of symptoms   - echo with EF 70%  - nephrology following- discussed need for dialysis- pt to discuss with family   4/21  - 2L NC   - breathing improved; able to lay back more today   - BLE edema lessened   4/22  - continues to have SOB  - HD cath to be placed today  - HD following cath placement   - should assist with FVO and lessen burden   4/23  - improving   4/25  - resolved

## 2022-04-28 NOTE — PT/OT/SLP EVAL
Occupational Therapy   Evaluation    Name: Bria Ray  MRN: [id number]  Admitting Diagnosis:  Acute hypoxemic respiratory failure  Recent Surgery: Procedure(s) (LRB):  INSERTION, CATHETER, DIALYSIS (Right) 6 Days Post-Op    Recommendations:     Discharge Recommendations: home with home health  Discharge Equipment Recommendations:  none  Barriers to discharge:  None    Assessment:     Bria Ray is a 86 y.o. female with a medical diagnosis of Acute hypoxemic respiratory failure.  She presents with weakness, decreased endurance, and decline in ADLs. Performance deficits affecting function: weakness, impaired endurance, impaired self care skills, impaired functional mobilty, gait instability.      Rehab Prognosis: Good; patient would benefit from acute skilled OT services to address these deficits and reach maximum level of function.       Plan:     Patient to be seen 5 x/week to address the above listed problems via self-care/home management, therapeutic activities, therapeutic exercises  · Plan of Care Expires: 04/28/22  · Plan of Care Reviewed with: patient    Subjective     Chief Complaint: weakness  Patient/Family Comments/goals: pt agreeable to participate in OT eval    Occupational Profile:  Living Environment: pt lives alone in one story home with ramp to enter; pt reports son lives next door  Previous level of function: independent with all ADL tasks and utilized rollator for functional mobility   Roles and Routines: perform self care  Equipment Used at Home:  walker, standard, rollator, bedside commode, shower chair  Assistance upon Discharge: home with home health    Pain/Comfort:  · Pain Rating 1: 0/10    Patients cultural, spiritual, Orthodox conflicts given the current situation: no    Objective:     Communicated with: ANGELINA Doyle prior to session.  Patient found supine with peripheral IV, telemetry, SCD upon OT entry to room.    General Precautions: Standard, fall   Orthopedic Precautions:N/A    Braces: N/A  Respiratory Status: Room air    Occupational Performance:    Bed Mobility:    · Patient completed Supine to Sit with contact guard assistance    Functional Mobility/Transfers:  · Patient completed Sit <> Stand Transfer with contact guard assistance  with  rolling walker   · Patient completed Bed <> Chair Transfer using Step Transfer technique with contact guard assistance with rolling walker  · Functional Mobility: pt performed functional mobility with RW with CGA    Activities of Daily Living:  · Upper Body Dressing: minimum assistance to barry gown    Cognitive/Visual Perceptual:  Cognitive/Psychosocial Skills:     -       Oriented to: Person, Place, Time and Situation   -       Follows Commands/attention:Follows multistep  commands  -       Mood/Affect/Coping skills/emotional control: Cooperative    Physical Exam:  Balance: -       WFL with RW  Upper Extremity Range of Motion:     -       Right Upper Extremity: WFL  -       Left Upper Extremity: WFL  Upper Extremity Strength:    -       Right Upper Extremity: 4/5  -       Left Upper Extremity: 4/5  Gross motor coordination:   WFL    AMPAC 6 Click ADL:  AMPAC Total Score:      Treatment & Education:  · Pt educated on OT role/POC.   · Importance of OOB activity with staff assistance.  · Importance of sitting up in the chair throughout the day as tolerated, especially for meals   · Safety during functional t/f and mobility with use of RW  · Importance of assisting with self-care activities   · All questions/concerns answered within OT scope of practice    Education:    Patient left up in chair with all lines intact, call button in reach and family present    GOALS:   Multidisciplinary Problems     Occupational Therapy Goals     Not on file                History:     Past Medical History:   Diagnosis Date    Anemia of chronic renal failure     Bone cyst     Chronic diastolic (congestive) heart failure     COPD (chronic obstructive pulmonary  disease)     Essential hypertension     GERD (gastroesophageal reflux disease)     Gout     Labyrinthitis     Lumbosacral radiculopathy     Sanchez's neuroma of right foot     Neuropathy     Renal failure syndrome     Right bundle branch block     Type 2 diabetes mellitus     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, without long-term current use of insulin     Vertigo     Vitamin D deficiency        Past Surgical History:   Procedure Laterality Date    BLADDER SUSPENSION      BRONCHOSCOPY       11/2020    CHOLECYSTECTOMY      FRACTURE SURGERY      HERNIA REPAIR      HIP FRACTURE SURGERY Right 2005    HYSTERECTOMY      INSERTION OF DIALYSIS CATHETER Right 4/22/2022    Procedure: INSERTION, CATHETER, DIALYSIS;  Surgeon: Terence Jeffery MD;  Location: [facility];  Service: General;  Laterality: Right;       Time Tracking:     OT Date of Treatment: 04/28/22  OT Start Time: 0926  OT Stop Time: 0940  OT Total Time (min): 14 min    Billable Minutes:Evaluation OT min complexity eval    4/28/2022

## 2022-04-28 NOTE — DISCHARGE SUMMARY
[facility] Medicine  Discharge Summary      Patient Name: Bria Ray  MRN: [id number]  Patient Class: IP- Inpatient  Admission Date: 4/19/2022  Hospital Length of Stay: 9 days  Discharge Date and Time:  04/28/2022 2:33 PM  Attending Physician: Nevin att. providers found   Discharging Provider: DEBORAH Figueroa  Primary Care Provider: Lorena Richardson MD      HPI:   87 y/o female admitted today from [facility] ED for SOB. The patient was transferred from [facility] after presenting there for increased SOB over the last week. She was discharged from [facility] one week ago with worsening renal disease. Currently she is followed by Dr. Fields, Nephrologist and not on HD. Since her discharge last week she has increasingly gotten more SOB with orthopnea at night. Her symptoms are exacerbated with exertion and lying flat. There is no reported chest pain, vomiting, diarrhea, diaphoresis, vision change, numbness or recent illness.                     Procedure(s) (LRB):  INSERTION, CATHETER, DIALYSIS (Right)      Hospital Course:   4/20: SOB has improved; renal function remains unchanged; slight decrease in H/H- pending transfusion; nephrology and cardiology following; discussions for HD per nephrology; plans for stress test in AM; repeat labs in AM to monitor H/H and renal function   4/21: SOB continues to improve slightly daily; still not to pt's baseline; has not reached decision regarding HD; stress test for today; H/H 6/21- did not receive full unit of blood yesterday d/t IV issues; PRBCs ordered for today  4/22: unable to lay flat for lexiscan yesterday; has decided to proceed with HD- HD cath to be placed today with HD following; H/H stable 8/26; BP improving   4/23: had R chest wall tunneled HD cath placed yesterday; 1st round of dialysis yesterday- tolerated from a BP standpoint- nauseated and exhausted; dialysis again today; phenergan IVPB to offset the nausea and exhaustion.    4/24: plans for HD tomorrow; feels better today but still doesn't feel back to her baseline; continue current plan; SS consulted for assistance with OP HD   4/25: HD today; monitor for HD intolerance; SS to set up HD chair OP   4/26: pt continues to have nausea and decreased tolerance for HD--- feels terrible- weak, shaky, etc- worse after HD with slow improvement; repeat IVPB phenergan; discussed with Sneha will start on low dose marinol and monitor  4/28: Pt has met maximum benefit of hospitalization; for full details see progress notes. In short, pt was admitted 4/19/22 for SOB 2/2 CKD. Recent dc from [facility] within one week with worsening of sob that now requires HD. HD cath placed 4/22/22 and HD started with Dr. Fields following. Pt had a hard time adjusting to HD with persistent nausea that improved. Pt is scheduled for HD at [facility] starting tomorrow. DC on Norvasc 10 mg daily. Hydralizine 100 mg q 8 hours. Lopressor reduced to 12.5 mg bid. Glucotrol stopped.  PT saw in consult and HH with PT at home. Pt is able to tolerate oral intake well. Home medications resumed at discharge. Follow up with primary care provider in 1 week.  Follow up with Cardiology and Nephrology as scheduled.                 Goals of Care Treatment Preferences:  Code Status: Full Code      Consults:   Consults (From admission, onward)        Status Ordering Provider     Inpatient consult to Social Work  Once        Provider:  (Not yet assigned)    Completed MAXIMILIANO SOTO     Inpatient consult to Social Work  Once        Provider:  (Not yet assigned)    Completed LAVELL PATEL     Inpatient consult to Nephrology  Once        Provider:  Beni Fields MD    Acknowledged LAVELL PATEL     Inpatient consult to Cardiology  Once        Provider:  Kris Chatterjee DO    Completed LAVELL PATEL          * Acute hypoxemic respiratory failure-resolved as of 4/26/2022  Patient with Hypoxic Respiratory failure which is  Acute.  she is not on home oxygen. Supplemental oxygen was provided and noted- Oxygen Concentration (%):  [32] 32.   Signs/symptoms of respiratory failure include- tachypnea, increased work of breathing and respiratory distress. Contributing diagnoses includes - CHF and renal failure  Labs and images were reviewed. Patient Has not had a recent ABG. Will treat underlying causes and adjust management of respiratory failure as follows-   Nephrology and cardiology consults.  Echocardiogram.    4/20  - was given lasix in ED  - second dose given today with adequate UOP and improvement of symptoms   - echo with EF 70%  - nephrology following- discussed need for dialysis- pt to discuss with family   4/21  - 2L NC   - breathing improved; able to lay back more today   - BLE edema lessened   4/22  - continues to have SOB  - HD cath to be placed today  - HD following cath placement   - should assist with FVO and lessen burden   4/23  - improving   4/25  - resolved     Type 2 diabetes mellitus  Low scale SSI.  Accu checks ac/hs   Hold Metformin  4/20  - continue SSI   - accuchecks ACHS   - BG range 137-248  4/21  - BG range   - continue SSI  4/22  - BG range 107-188  - SSI continues   4/24  - BG stable     Anemia of chronic renal failure  Cbc daily  H/H 7/25  Likely anemia from chronic renal failure.  4/20  - H/H 6/23  - 1u PRBCs pending  4/21  - did not receive full unit of blood yesterday  - H/H 6/21  - 1u PRBCs today  - recheck in AM   4/22  - H/H stable 8/26 4/25  - labs pending this AM  - has remained stable     CKD (chronic kidney disease), stage IV  * Telemetry monitoring    * Ekg:  Sr with  Pac's. 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   * sCre: 6.46.    Baseline sCre. 6 since 4/2021  * NA bicarbonate tabs daily  4/20  - nephrology following   - discussed HD- pt will discuss with family    4/21  - renal function remains unchanged  - nephrology following   4/22  - HD cath to be placed today with HD following   4/23  - second day of HD  - tolerating ok  4/24  - HD planned for tomorrow    4/25  - ESRD with HD     Essential hypertension  Chronic, resume home medications  4/20  - BP trend remains elevated but at pt's baseline   - prn hydralazine   4/21  - BP remains elevated  - increased hydralazine PO and norvasc  4/22  - BP trend improved   - still slightly elevated likely secondary to FVO  - HD following cath placement   4/23  - BP remains elevated  - will optimize meds   4/25  - BP trend improved  - allow some hypertension secondary to HD hypotension     Nausea-resolved as of 4/28/2022  - ongoing nausea after initiation of HD   - zofran does not appear to help per pt report  - phenergan has helped when given over the weekend   - will repeat dose today  - will hold off on reglan d/t renal function  - discussed with Dr Hoover-- will start on low dose marinol   4/27: Nausea improved. Tolerate CL.      Macrocytic anemia-resolved as of 4/26/2022  Hg 7.6 and problems with anemia in the past.    Will monitor carefully.    FOBT ordered.   4/20  - FOBT ordered  - was neg on previous admit   - h/h 6/23  - 1u PRBCs to transfuse   4/21  - unable to receive full unit of blood yesterday   - H/H 6/21 this AM  - 1u PRBCs ordered   4/24  - stable     Elevated troponin-resolved as of 4/26/2022  Likely from chronic renal failure.  Troponin >200  Repeat troponin level  Cardiology consulted  4/20  - trop max at 200 and trending down   - cardiology with plans for stress test in AM   - trop likely elevated secondary to CKD and demand mismatch   4/21  - trending down with no c/o chest pain  - stress test for today   4/22  - unable to lay flat for stress test   - will follow up with cards OP     DVT prophylaxis-resolved as of 4/28/2022  * Heparin 5000 units q 8 hours.         Final Active Diagnoses:    Diagnosis Date Noted  POA    Anemia of chronic renal failure [N18.9, D63.1]  Yes    Type 2 diabetes mellitus [E11.9]  Yes    Essential hypertension [I10] 01/18/2022 Yes    CKD (chronic kidney disease), stage IV [N18.4] 01/18/2022 Yes      Problems Resolved During this Admission:    Diagnosis Date Noted Date Resolved POA    PRINCIPAL PROBLEM:  Acute hypoxemic respiratory failure [J96.01] 04/20/2022 04/26/2022 Yes    Nausea [R11.0] 04/26/2022 04/28/2022 No    DVT prophylaxis [Z29.9] 04/19/2022 04/28/2022 Not Applicable    Elevated troponin [R77.8] 04/19/2022 04/26/2022 Yes    Macrocytic anemia [D53.9] 04/19/2022 04/26/2022 Yes       Discharged Condition: fair    Disposition: Home or Self Care    Follow Up:   Contact information for follow-up providers     Lorena Richardson MD Follow up in 1 week(s).    Specialty: Family Medicine  Why: please follow up on May 5 at 10:30  Contact information:  [address] [facility] [address]  [phone]             Kris Chatterjee DO Follow up in 4 week(s).    Specialties: Interventional Cardiology, Cardiology  Why: please follow up on May 26 at 2:45  Contact information:  [address] [place] [address]  [phone]             Beni Fields MD Follow up.    Specialty: Nephrology  Why: As needed  Contact information:  [address] [place] [address]  [phone]                   Contact information for after-discharge care     Dialysis/Infusion     [facility] .    Service: Dialysis  Contact information:  [phone]                 Home Medical Care     [facility] .    Service: Home Health Services  Contact information:  [address] [facility] [address]  [phone]                           Patient Instructions:      Diet Cardiac     Diet diabetic     Notify your health care provider if you experience any of the following:  temperature >100.4     Notify your health care provider if you experience any of the following:  persistent  nausea and vomiting or diarrhea     Notify your health care provider if you experience any of the following:  severe uncontrolled pain     Notify your health care provider if you experience any of the following:  redness, tenderness, or signs of infection (pain, swelling, redness, odor or green/yellow discharge around incision site)     Notify your health care provider if you experience any of the following:  difficulty breathing or increased cough     Notify your health care provider if you experience any of the following:  severe persistent headache     Notify your health care provider if you experience any of the following:  worsening rash     Notify your health care provider if you experience any of the following:  persistent dizziness, light-headedness, or visual disturbances     Notify your health care provider if you experience any of the following:  increased confusion or weakness     Activity as tolerated       Significant Diagnostic Studies: Labs: All labs within the past 24 hours have been reviewed    Pending Diagnostic Studies:     Procedure Component Value Units Date/Time    EXTRA TUBES [[id number]] Collected: 04/22/22 0501    Order Status: Sent Lab Status: In process Updated: 04/22/22 0507    Specimen: Blood, Venous     Narrative:      The following orders were created for panel order EXTRA TUBES.  Procedure                               Abnormality         Status                     ---------                               -----------         ------                     Light Green Top Hold[[id number]]                             In process                   Please view results for these tests on the individual orders.    EXTRA TUBES [[id number]] Collected: 04/20/22 0716    Order Status: Sent Lab Status: In process Updated: 04/20/22 0716    Specimen: Blood, Venous     Narrative:      The following orders were created for panel order EXTRA TUBES.  Procedure                               Abnormality          Status                     ---------                               -----------         ------                     Lavender Top Hold[[id number]]                                In process                   Please view results for these tests on the individual orders.    Occult blood x 1, stool [[id number]]     Order Status: Sent Lab Status: No result     Specimen: Stool          Medications:  Reconciled Home Medications:      Medication List      CHANGE how you take these medications    amLODIPine 10 MG tablet  Commonly known as: NORVASC  Take 1 tablet (10 mg total) by mouth once daily.  Start taking on: April 29, 2022  What changed:   · medication strength  · how much to take     hydrALAZINE 100 MG tablet  Commonly known as: APRESOLINE  Take 1 tablet (100 mg total) by mouth every 8 (eight) hours.  What changed:   · medication strength  · how much to take     metoprolol tartrate 25 MG tablet  Commonly known as: LOPRESSOR  Take 0.5 tablets (12.5 mg total) by mouth once daily.  What changed:   · how much to take  · when to take this        CONTINUE taking these medications    * albuterol 90 mcg/actuation inhaler  Commonly known as: VENTOLIN HFA  Inhale 2 puffs into the lungs every 6 (six) hours as needed for Wheezing. Rescue     * albuterol 2.5 mg /3 mL (0.083 %) nebulizer solution  Commonly known as: PROVENTIL     allopurinoL 100 MG tablet  Commonly known as: ZYLOPRIM  Take 1 tablet (100 mg total) by mouth once daily.     aspirin 81 MG EC tablet  Commonly known as: ECOTRIN  Take 1 tablet (81 mg total) by mouth once daily.     budesonide-formoterol 160-4.5 mcg 160-4.5 mcg/actuation Hfaa  Commonly known as: SYMBICORT  Inhale 2 puffs into the lungs every 12 (twelve) hours. Controller     calcium carbonate 200 mg calcium (500 mg) chewable tablet  Commonly known as: TUMS  Take 1 tablet (500 mg total) by mouth once daily.     cyanocobalamin 100 MCG tablet  Commonly known as: VITAMIN B-12  Take 1 tablet (100 mcg total) by mouth  once daily.     gabapentin 100 MG capsule  Commonly known as: NEURONTIN  Take 1 capsule (100 mg total) by mouth 2 (two) times daily.     multivitamin with folic acid 400 mcg Tab  Take 1 tablet by mouth once daily.     simvastatin 20 MG tablet  Commonly known as: ZOCOR  Take 1 tablet (20 mg total) by mouth every evening.     SITagliptin 25 MG Tab  Commonly known as: JANUVIA  Take 1 tablet (25 mg total) by mouth once daily.     sodium bicarbonate 325 MG tablet  Take 1 tablet (325 mg total) by mouth once daily.         * This list has 2 medication(s) that are the same as other medications prescribed for you. Read the directions carefully, and ask your doctor or other care provider to review them with you.            STOP taking these medications    glipiZIDE 5 MG Tr24  Commonly known as: GLUCOTROL     HYDROcodone-acetaminophen  mg per tablet  Commonly known as: NORCO            Indwelling Lines/Drains at time of discharge:   Lines/Drains/Airways     Central Venous Catheter Line  Duration                Hemodialysis Catheter right subclavian -- days                Time spent on the discharge of patient: >30 minutes         DEBORAH Figueroa  Department of Hospital Medicine  [facility] - Orthopedic

## 2022-04-28 NOTE — PT/OT/SLP EVAL
Physical Therapy Evaluation    Patient Name:  Bria Ray   MRN:  [id number]    Recommendations:     Discharge Recommendations:  home health PT   Discharge Equipment Recommendations: none   Barriers to discharge: Decreased caregiver support    Assessment:     Bria Ray is a 86 y.o. female admitted with a medical diagnosis of Acute hypoxemic respiratory failure.  She presents with the following impairments/functional limitations:  weakness, impaired functional mobilty, impaired endurance, impaired cardiopulmonary response to activity Pt presents with generalized weakness that limits functional mobility. Pt demonstrates shortness of breath with light ambulation but requires no assistance. Pt should be able to return home at discharge with assistance of family.    Rehab Prognosis: Good; patient would benefit from acute skilled PT services to address these deficits and reach maximum level of function.    Recent Surgery: Procedure(s) (LRB):  INSERTION, CATHETER, DIALYSIS (Right) 6 Days Post-Op    Plan:     During this hospitalization, patient to be seen 5 x/week to address the identified rehab impairments via gait training, therapeutic activities, therapeutic exercises and progress toward the following goals:    · Plan of Care Expires:  05/28/22    Subjective     Chief Complaint: weakness  Patient/Family Comments/goals: Pt hoping to return home today.  Pain/Comfort:  · Pain Rating 1: 0/10  · Pain Rating Post-Intervention 1: 0/10    Patients cultural, spiritual, Jain conflicts given the current situation: no    Living Environment:  Pt lives at home alone with sons next door. She has ramp to enter home.  Prior to admission, patients level of function was ambulatory with rollator.  Equipment used at home: rollator.  DME owned (not currently used): rolling walker and bedside commode.  Upon discharge, patient will have assistance from family.    Objective:     Communicated with MACEY Quintero LPN prior to session.   Patient found supine with peripheral IV, oxygen, telemetry, SCD  upon PT entry to room.    General Precautions: Standard, fall   Orthopedic Precautions:N/A   Braces: N/A  Respiratory Status: Nasal cannula, flow 3 L/min removed by FNP prior to evaluation    Exams:  · Cognitive Exam:  Patient is oriented to Person, Place, Time and Situation  · Gross Motor Coordination:  WFL  · RLE ROM: WFL  · RLE Strength: WFL  · LLE ROM: WFL  · LLE Strength: WFL    Functional Mobility:  · Bed Mobility:     · Scooting: stand by assistance  · Supine to Sit: stand by assistance  · Transfers:     · Sit to Stand:  supervision with rolling walker  · Bed to Chair: stand by assistance with  rolling walker  using  Step Transfer  · Gait: 80 ft CGA with RW, short step length, slow twin, mild SOB  · Balance: good    Therapeutic Activities and Exercises:  ·  Pt educated on PT role/POC.   · Importance of OOB activity with staff assistance.  · Importance of sitting up in the chair throughout the day as tolerated, especially for meals   · Safety during functional t/f and mobility with use of rolling walker   · Multiple self-care tasks/functional mobility completed- assistance level noted above   · All questions/concerns answered within PT scope of practice      AM-PAC 6 CLICK MOBILITY  Total Score:23     Patient left up in chair with all lines intact.    GOALS:   Multidisciplinary Problems     Physical Therapy Goals        Problem: Physical Therapy    Goal Priority Disciplines Outcome Goal Variances Interventions   Physical Therapy Goal     PT, PT/OT Ongoing, Progressing     Description: Short term goals:  1. Sit to stand transfer with Modified [place]  2. Bed to chair transfer with Modified [place] using Rolling Walker  3. Gait  x 150 feet with Supervision using Rolling Walker.     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, without long-term current use of insulin     Vertigo     Vitamin D deficiency        Past Surgical History:   Procedure Laterality Date    BLADDER SUSPENSION      BRONCHOSCOPY       11/2020    CHOLECYSTECTOMY      FRACTURE SURGERY      HERNIA REPAIR      HIP FRACTURE SURGERY Right 2005    HYSTERECTOMY      INSERTION OF DIALYSIS CATHETER Right 4/22/2022    Procedure: INSERTION, CATHETER, DIALYSIS;  Surgeon: Terence Jeffery MD;  Location: [facility];  Service: General;  Laterality: Right;       Time Tracking:     PT Received On: 04/28/22  PT Start Time: 0925     PT Stop Time: 0940  PT Total Time (min): 15 min     Billable Minutes: Evaluation low complexity      04/28/2022

## 2022-04-28 NOTE — PROGRESS NOTES
[facility]  Nephrology  Progress Note    Patient Name: Bria Ray  MRN: [id number]  Admission Date: 4/19/2022  Hospital Length of Stay: 9 days  Attending Provider: Richi Hoover Jr., MD   Primary Care Physician: Lorena Richardson MD  Principal Problem:Acute hypoxemic respiratory failure    Subjective:     HPI: 86-year-old woman with CKD 5. Her baseline creatinine was 4 prior to her last admission.  It dilcia to nearly 7, then improved slightly to 6-6.5.  She was volume depleted at the time of that admission.  Creatinine improved slightly with IV fluid.  She was discharged to swing bed with creatinine  6.5.  She developed progressive shortness of breath over the past 3-4 days.  She denies chest pain.  No pleuritic chest pain or hemoptysis.      Interval History:  No nausea currently.  She is tolerating solids for lunch.    Review of patient's allergies indicates:  No Known Allergies  Current Facility-Administered Medications   Medication Frequency    0.9%  NaCl infusion (for blood administration) Q24H PRN    0.9%  NaCl infusion PRN    acetaminophen tablet 1,000 mg Q8H PRN    allopurinoL tablet 100 mg Daily    aluminum-magnesium hydroxide-simethicone 200-200-20 mg/5 mL suspension 30 mL QID PRN    amLODIPine tablet 10 mg Daily    aspirin EC tablet 81 mg Daily    dextrose 10% bolus 125 mL PRN    dextrose 10% bolus 250 mL PRN    dronabinoL capsule 2.5 mg BID    gabapentin capsule 100 mg QHS    glucagon (human recombinant) injection 1 mg PRN    heparin (porcine) injection 4,000 Units PRN    hydrALAZINE tablet 100 mg Q8H    insulin aspart U-100 injection 0-5 Units QID (AC & HS)    magnesium oxide tablet 800 mg PRN    magnesium oxide tablet 800 mg PRN    melatonin tablet 6 mg Nightly PRN    metoclopramide HCl injection 10 mg Q6H PRN    metoprolol tartrate (LOPRESSOR) tablet 25 mg BID    naloxone 0.4 mg/mL injection 0.02 mg PRN    oxyCODONE immediate release tablet 5 mg Q4H PRN     polyethylene glycol packet 17 g Daily PRN    potassium bicarbonate disintegrating tablet 35 mEq PRN    potassium bicarbonate disintegrating tablet 50 mEq PRN    potassium bicarbonate disintegrating tablet 60 mEq PRN    potassium, sodium phosphates 280-160-250 mg packet 2 packet PRN    potassium, sodium phosphates 280-160-250 mg packet 2 packet PRN    potassium, sodium phosphates 280-160-250 mg packet 2 packet PRN    sodium chloride 0.9% flush 10 mL PRN       Objective:     Vital Signs (Most Recent):  Temp: 97.4 °F (36.3 °C) (04/28/22 0728)  Pulse: (!) 57 (04/28/22 0728)  Resp: 16 (04/28/22 0728)  BP: 131/62 (04/28/22 0728)  SpO2: 100 % (04/28/22 0728)  O2 Device (Oxygen Therapy): nasal cannula (04/28/22 0728)   Vital Signs (24h Range):  Temp:  [97.4 °F (36.3 °C)-98.3 °F (36.8 °C)] 97.4 °F (36.3 °C)  Pulse:  [51-73] 57  Resp:  [16-18] 16  SpO2:  [95 %-100 %] 100 %  BP: (116-131)/(54-84) 131/62     Weight: 75.4 kg (166 lb 3.2 oz) (04/27/22 0600)  Body mass index is 26.03 kg/m².  Body surface area is 1.89 meters squared.    I/O last 3 completed shifts:  In: 500 [Other:500]  Out: 2312 [Other:2312]    Physical Exam  Constitutional:       General: She is not in acute distress.  HENT:      Head: Normocephalic and atraumatic.   Eyes:      Pupils: Pupils are equal, round, and reactive to light.   Cardiovascular:      Rate and Rhythm: Normal rate and regular rhythm.      Heart sounds: No murmur heard.    No friction rub. No gallop.   Pulmonary:      Effort: No respiratory distress.   Abdominal:      General: Bowel sounds are normal.      Tenderness: There is no abdominal tenderness. There is no guarding.   Musculoskeletal:      Right lower leg: No edema.      Left lower leg: No edema.   Neurological:      Mental Status: She is alert and oriented to person, place, and time.   Psychiatric:         Behavior: Behavior normal.       Significant Labs:  BMP:   Recent Labs   Lab 04/22/22  0318 04/23/22  0452 04/28/22  0519    *   < > 128*      < > 134*   K 4.5   < > 4.1   *   < > 97*   CO2 21   < > 25   BUN 81*   < > 33*   CREATININE 6.40*   < > 3.77*   CALCIUM 7.6*   < > 7.6*   MG 2.0  --   --     < > = values in this interval not displayed.     CBC:   Recent Labs   Lab 04/28/22  0519   WBC 5.48   RBC 3.34*   HGB 9.9*   HCT 31.9*   *   MCV 95.5   MCH 29.6   MCHC 31.0*        Significant Imaging:      Assessment/Plan:     Type 2 diabetes mellitus  Continue current treatment    Anemia of chronic renal failure  Post transfusion    CKD (chronic kidney disease), stage IV  ESRD.  Tolerating dialysis better.  Stable for discharge from my standpoint.    Essential hypertension  Controlled        Thank you for your consult.     Beni Fields MD  Nephrology  [facility] - [facility]

## 2022-04-28 NOTE — SUBJECTIVE & OBJECTIVE
Interval History:  No nausea currently.  She is tolerating solids for lunch.    Review of patient's allergies indicates:  No Known Allergies  Current Facility-Administered Medications   Medication Frequency    0.9%  NaCl infusion (for blood administration) Q24H PRN    0.9%  NaCl infusion PRN    acetaminophen tablet 1,000 mg Q8H PRN    allopurinoL tablet 100 mg Daily    aluminum-magnesium hydroxide-simethicone 200-200-20 mg/5 mL suspension 30 mL QID PRN    amLODIPine tablet 10 mg Daily    aspirin EC tablet 81 mg Daily    dextrose 10% bolus 125 mL PRN    dextrose 10% bolus 250 mL PRN    dronabinoL capsule 2.5 mg BID    gabapentin capsule 100 mg QHS    glucagon (human recombinant) injection 1 mg PRN    heparin (porcine) injection 4,000 Units PRN    hydrALAZINE tablet 100 mg Q8H    insulin aspart U-100 injection 0-5 Units QID (AC & HS)    magnesium oxide tablet 800 mg PRN    magnesium oxide tablet 800 mg PRN    melatonin tablet 6 mg Nightly PRN    metoclopramide HCl injection 10 mg Q6H PRN    metoprolol tartrate (LOPRESSOR) tablet 25 mg BID    naloxone 0.4 mg/mL injection 0.02 mg PRN    oxyCODONE immediate release tablet 5 mg Q4H PRN    polyethylene glycol packet 17 g Daily PRN    potassium bicarbonate disintegrating tablet 35 mEq PRN    potassium bicarbonate disintegrating tablet 50 mEq PRN    potassium bicarbonate disintegrating tablet 60 mEq PRN    potassium, sodium phosphates 280-160-250 mg packet 2 packet PRN    potassium, sodium phosphates 280-160-250 mg packet 2 packet PRN    potassium, sodium phosphates 280-160-250 mg packet 2 packet PRN    sodium chloride 0.9% flush 10 mL PRN       Objective:     Vital Signs (Most Recent):  Temp: 97.4 °F (36.3 °C) (04/28/22 0728)  Pulse: (!) 57 (04/28/22 0728)  Resp: 16 (04/28/22 0728)  BP: 131/62 (04/28/22 0728)  SpO2: 100 % (04/28/22 0728)  O2 Device (Oxygen Therapy): nasal cannula (04/28/22 0728)   Vital Signs (24h Range):  Temp:  [97.4 °F (36.3 °C)-98.3 °F (36.8 °C)] 97.4  °F (36.3 °C)  Pulse:  [51-73] 57  Resp:  [16-18] 16  SpO2:  [95 %-100 %] 100 %  BP: (116-131)/(54-84) 131/62     Weight: 75.4 kg (166 lb 3.2 oz) (04/27/22 0600)  Body mass index is 26.03 kg/m².  Body surface area is 1.89 meters squared.    I/O last 3 completed shifts:  In: 500 [Other:500]  Out: 2312 [Other:2312]    Physical Exam  Constitutional:       General: She is not in acute distress.  HENT:      Head: Normocephalic and atraumatic.   Eyes:      Pupils: Pupils are equal, round, and reactive to light.   Cardiovascular:      Rate and Rhythm: Normal rate and regular rhythm.      Heart sounds: No murmur heard.    No friction rub. No gallop.   Pulmonary:      Effort: No respiratory distress.   Abdominal:      General: Bowel sounds are normal.      Tenderness: There is no abdominal tenderness. There is no guarding.   Musculoskeletal:      Right lower leg: No edema.      Left lower leg: No edema.   Neurological:      Mental Status: She is alert and oriented to person, place, and time.   Psychiatric:         Behavior: Behavior normal.       Significant Labs:  BMP:   Recent Labs   Lab 04/22/22  0318 04/23/22  0452 04/28/22  0519   *   < > 128*      < > 134*   K 4.5   < > 4.1   *   < > 97*   CO2 21   < > 25   BUN 81*   < > 33*   CREATININE 6.40*   < > 3.77*   CALCIUM 7.6*   < > 7.6*   MG 2.0  --   --     < > = values in this interval not displayed.     CBC:   Recent Labs   Lab 04/28/22 0519   WBC 5.48   RBC 3.34*   HGB 9.9*   HCT 31.9*   *   MCV 95.5   MCH 29.6   MCHC 31.0*        Significant Imaging:

## 2022-04-29 NOTE — TELEPHONE ENCOUNTER
4/29/22-spoke with patient this afternoon. Reports she is doing pretty good. States she is getting her strength back. Using rollator.with ambulation. Denies shortness of breath or chest pain. States she has not checked her sugar or blood pressure today. States [place]  nurse will be out this afternoon. States she is eating and drinking without problems. Reviewed and discussed all discharge medications.informed of f/u appt with pcp,cardiology and states she has appt set up with Dr. Fields. She states he took her off of the amlodipine today but is taking all other meds as directed.Instructed to bring all meds to follow up visit and to call office for questions/concerns. Also to seek medical attention for any new or worsening sx. Tami TParkmBety

## 2022-04-29 NOTE — TELEPHONE ENCOUNTER
Discharge Call Back Questionnaire   How are you feeling? Any questions or concerns?   When did you get your medications? (if prescribed new medications)   When is your follow-up appointment? Do you have a ride to get to your appointment?   Do you have enough of your daily medications until your next doctor appointment?   Are you weighing yourself at home? Any changes in weight?   Have you experienced any of the following symptoms- more shortness of breath than usual, difficulty breathing when lying down, a dry hacking cough, new or worsening confusion, having difficulty thinking clearly?      No answer.

## 2022-05-04 NOTE — PROGRESS NOTES
Subjective:       Patient ID: Bria Ray is a 86 y.o. female.    Chief Complaint: Hospital Follow Up (From [facility]      Pt. Is about to start home peritoneal dialysis. To get protocols from nephrologist tomorrow. Discussed narcotics. She will discuss this issue with nephrologist tomorrow also.    Review of Systems   Constitutional: Positive for fatigue. Negative for fever.   HENT: Negative for nasal congestion and dental problem.    Eyes: Negative for discharge.   Respiratory: Negative for cough and shortness of breath.    Cardiovascular: Negative for chest pain.   Gastrointestinal: Negative for constipation, diarrhea, nausea and vomiting.   Genitourinary: Negative for bladder incontinence, difficulty urinating and hot flashes.   Musculoskeletal: Positive for arthralgias.   Allergic/Immunologic: Negative for environmental allergies.   Neurological: Negative for headaches.   Psychiatric/Behavioral: Negative for behavioral problems and confusion.         Objective:      Physical Exam  Vitals and nursing note reviewed.   Constitutional:       Appearance: Normal appearance. She is normal weight.   HENT:      Head: Normocephalic and atraumatic.      Right Ear: Tympanic membrane normal.      Left Ear: Tympanic membrane normal.      Nose: Nose normal.      Mouth/Throat:      Mouth: Mucous membranes are moist.   Eyes:      Extraocular Movements: Extraocular movements intact.      Conjunctiva/sclera: Conjunctivae normal.      Pupils: Pupils are equal, round, and reactive to light.   Cardiovascular:      Rate and Rhythm: Normal rate and regular rhythm.      Pulses: Normal pulses.   Pulmonary:      Effort: Pulmonary effort is normal.      Breath sounds: Normal breath sounds.   Abdominal:      General: Abdomen is flat. Bowel sounds are normal.      Palpations: Abdomen is soft.   Musculoskeletal:         General: Normal range of motion.      Cervical back: Normal range of motion and neck supple.      Comments: Multiple site  arthritis   Skin:     General: Skin is warm and dry.   Neurological:      General: No focal deficit present.      Mental Status: She is alert and oriented to person, place, and time.   Psychiatric:         Mood and Affect: Mood normal.         Assessment:       There are no diagnoses linked to this encounter.

## 2022-05-11 PROBLEM — I15.1 HYPERTENSION SECONDARY TO OTHER RENAL DISORDERS: Status: ACTIVE | Noted: 2022-01-01

## 2022-05-11 NOTE — PROGRESS NOTES
Subjective:       Patient ID: Bria Ray is a 86 y.o. female.    Chief Complaint: Hypertension, Diabetes, Shoulder Pain (C/O left shoulder pain. Also c/o left arm pain.), and Dizziness (C/O dizziness)      Pt.'s norco was stopped by her nephrologist. Now she is in pain with left shoulder. Her arthritic meds have been stopped also.    Hypertension  Pertinent negatives include no chest pain, headaches or shortness of breath.   Diabetes  Hypoglycemia symptoms include dizziness. Pertinent negatives for hypoglycemia include no confusion or headaches. Pertinent negatives for diabetes include no chest pain and no fatigue.   Shoulder Pain   Pertinent negatives include no fever or headaches.   Dizziness: no fever, no headaches, no nausea, no vomiting and no chest pain.no environmental allergies.    Review of Systems   Constitutional: Negative for fatigue and fever.   HENT: Negative for nasal congestion and dental problem.    Eyes: Negative for discharge.   Respiratory: Negative for cough and shortness of breath.    Cardiovascular: Negative for chest pain.   Gastrointestinal: Negative for constipation, diarrhea, nausea and vomiting.   Genitourinary: Negative for bladder incontinence, difficulty urinating and hot flashes.   Musculoskeletal: Positive for arthralgias.   Allergic/Immunologic: Negative for environmental allergies.   Neurological: Positive for dizziness. Negative for headaches.   Psychiatric/Behavioral: Negative for behavioral problems and confusion.         Objective:      Physical Exam  Vitals and nursing note reviewed.   Constitutional:       Appearance: Normal appearance. She is normal weight.   HENT:      Head: Normocephalic and atraumatic.      Right Ear: Tympanic membrane normal.      Left Ear: Tympanic membrane normal.      Nose: Nose normal.      Mouth/Throat:      Mouth: Mucous membranes are moist.   Eyes:      Extraocular Movements: Extraocular movements intact.      Conjunctiva/sclera: Conjunctivae  normal.      Pupils: Pupils are equal, round, and reactive to light.   Cardiovascular:      Rate and Rhythm: Normal rate and regular rhythm.      Pulses: Normal pulses.   Pulmonary:      Effort: Pulmonary effort is normal.      Breath sounds: Normal breath sounds.   Abdominal:      General: Abdomen is flat. Bowel sounds are normal.      Palpations: Abdomen is soft.   Musculoskeletal:         General: Normal range of motion.      Cervical back: Normal range of motion and neck supple.      Comments: Multiple site arthritis   Skin:     General: Skin is warm and dry.   Neurological:      General: No focal deficit present.      Mental Status: She is alert and oriented to person, place, and time.   Psychiatric:         Mood and Affect: Mood normal.         Assessment:       Type 2 diabetes mellitus with chronic kidney disease on chronic dialysis, without long-term current use of insulin  -     POCT Glucose, Hand-Held Device

## 2022-05-12 NOTE — PATIENT INSTRUCTIONS
[facility]                                                                                                                                                                                                                        Day of Surgery      Your surgery is scheduled for 05/17/2022 at [facility] on the ground floor of the Ambulatory building.     Your arrival time is 0600am.              DO NOT EAT OR DRINK ANYTHING AFTER MIDNIGHT.          You may take blood pressure medication with a small drink of water the morning of surgery.                                 DO NOT TAKE INSULIN OR ANY OTHER BLOOD SUGAR MEDICATIONS.           The following blood sugar medications have to be stopped 48 hours prior to surgery:                    Metformin        Glucovance          Metaglip             Fortamet           Glucophage                   Riomet             Avandamet          Glimepiride              IF YOU ARE ON ANY OF THESE BLOOD THINNERS, MAKE SURE YOUR PHYSICIAN IS AWARE.                Eliquis/Apixaban             Xarelto/Rivaroxaban             Plavix/Clopidogrel                  Wafarin/Coumadin,Jantoven           Pletal/Cilostazol              Pradaxa/Dibigatran                           PLEASE USE CHLORHEXIDINE WASH THE NIGHT BEFORE SURGERY AND THE MORNING OF SURGERY.             YOU CANNOT DRIVE YOURSELF HOME FROM THE HOSPITAL THE DAY OF SURGERY.        Please have a  with you.           Bring all medications, that you are currently taking, with you to the hospital the morning of your procedure.           Please leave all valuables at home.          Children under the age of 18 must be accompanied by an adult.          PLEASE UNDERSTAND THAT OUR OFFICE DOES NOT GIVE PATHOLOGY RESULTS OR TEST  RESULTS OVER THE PHONE.         THIS WILL BE DISCUSSED WITH YOU ON YOUR FOLLOW UP APPOINTMENT TO DISCUSS IN PERSON.

## 2022-05-12 NOTE — H&P (VIEW-ONLY)
General Surgery History and Physical      Patient ID: Bria Ray is a 86 y.o. female.    Chief Complaint: Other (Discuss permanent dialysis access)      HPI:  86-year-old female a few months out from a tunneled dialysis catheter for acute on chronic renal failure.  Currently getting dialyzed on Monday Tuesdays Thursdays and Fridays.  She has discussed with her nephrologist and would like peritoneal dialysis.  She has had a previous open cholecystectomy many years ago and she claims to have had 5 previous abdominal hernia operations many years ago as well.  Scars on her abdomen do not quite line up with that.  She is very hesitant about hemodialysis.  She does tolerated overall well but would like to have dialysis at home as much as possible.  Currently she complains about 2 hours to her dialysis center 4 times a week.    Review of Systems   Constitutional: Negative for activity change, appetite change, fatigue and fever.   HENT: Negative for trouble swallowing.    Respiratory: Negative for cough and shortness of breath.    Cardiovascular: Negative for chest pain and palpitations.   Gastrointestinal: Negative for abdominal distention, abdominal pain, blood in stool, constipation and diarrhea.   Genitourinary: Negative for flank pain.   Musculoskeletal: Negative for neck pain and neck stiffness.   Neurological: Positive for weakness.       Current Outpatient Medications   Medication Sig Dispense Refill    albuterol (PROVENTIL) 2.5 mg /3 mL (0.083 %) nebulizer solution       albuterol (VENTOLIN HFA) 90 mcg/actuation inhaler Inhale 2 puffs into the lungs every 6 (six) hours as needed for Wheezing. Rescue 8 g 5    allopurinoL (ZYLOPRIM) 100 MG tablet Take 1 tablet (100 mg total) by mouth once daily. 90 tablet 3    amLODIPine (NORVASC) 10 MG tablet Take 1 tablet (10 mg total) by mouth once daily. 30 tablet 11    aspirin  (ECOTRIN) 81 MG EC tablet Take 1 tablet (81 mg total) by mouth once daily. 90 tablet 3    budesonide-formoterol 160-4.5 mcg (SYMBICORT) 160-4.5 mcg/actuation HFAA Inhale 2 puffs into the lungs every 12 (twelve) hours. Controller 10.2 g 5    calcium carbonate (TUMS) 200 mg calcium (500 mg) chewable tablet Take 1 tablet (500 mg total) by mouth once daily. 30 tablet 0    cyanocobalamin (VITAMIN B-12) 100 MCG tablet Take 1 tablet (100 mcg total) by mouth once daily. 30 tablet 0    gabapentin (NEURONTIN) 100 MG capsule Take 1 capsule (100 mg total) by mouth 2 (two) times daily. 60 capsule 2    hydrALAZINE (APRESOLINE) 100 MG tablet Take 1 tablet (100 mg total) by mouth every 8 (eight) hours. 90 tablet 11    metoprolol tartrate (LOPRESSOR) 25 MG tablet Take 0.5 tablets (12.5 mg total) by mouth once daily. 180 tablet 3    multivitamin with folic acid 400 mcg Tab Take 1 tablet by mouth once daily. 90 tablet 3    simvastatin (ZOCOR) 20 MG tablet Take 1 tablet (20 mg total) by mouth every evening. 90 tablet 3    SITagliptin (JANUVIA) 25 MG Tab Take 1 tablet (25 mg total) by mouth once daily. 90 tablet 1    sodium bicarbonate 325 MG tablet Take 1 tablet (325 mg total) by mouth once daily. 14 tablet 0    traMADoL (ULTRAM) 50 mg tablet Take 1 tablet (50 mg total) by mouth every 12 (twelve) hours as needed for Pain. 60 tablet 2     No current facility-administered medications for this visit.       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, without long-term current use of insulin     Vertigo     Vitamin D deficiency        Past Surgical History:   Procedure Laterality Date    BLADDER SUSPENSION      BRONCHOSCOPY       11/2020    CHOLECYSTECTOMY      FRACTURE SURGERY      HERNIA REPAIR      HIP FRACTURE SURGERY Right 2005    HYSTERECTOMY      INSERTION OF DIALYSIS CATHETER Right 4/22/2022    Procedure: INSERTION, CATHETER, DIALYSIS;  Surgeon: Terence Jeffery MD;  Location: [facility];  Service: General;  Laterality: Right;       Family History   Problem Relation Age of Onset    Diabetes Mother     Alcohol abuse Father     Diabetes Sister     Asthma Son     Cancer Other        Social History     Socioeconomic History    Marital status:    Tobacco Use    Smoking status: Never Smoker    Smokeless tobacco: Never Used   Substance and Sexual Activity    Alcohol use: Never    Drug use: Never    Sexual activity: Not Currently       There were no vitals filed for this visit.    Physical Exam  Constitutional:       General: She is not in acute distress.  HENT:      Head: Normocephalic.   Cardiovascular:      Rate and Rhythm: Normal rate and regular rhythm.      Pulses: Normal pulses.   Pulmonary:      Effort: Pulmonary effort is normal. No respiratory distress.      Breath sounds: Normal breath sounds.   Abdominal:      General: Abdomen is flat. There is no distension.      Palpations: Abdomen is soft.      Tenderness: There is no abdominal tenderness.      Comments: Abdomen relatively benign scar wise very soft no obvious large midline incisions very old Kocher incision.  No midline incisions are obvious Pfannenstiel incision   Musculoskeletal:         General: Normal range of motion.   Skin:     General: Skin is warm.   Neurological:      General: No focal deficit present.      Mental Status: She is oriented to person,  place, and time.         Assessment & Plan:    End stage chronic kidney disease  -     US Vein Mapping, Hemodialysis, Bilateral; Future; Expected date: 05/12/2022         patient go for attempted laparoscopic peritoneal dialysis.  Risks and benefits explained to the patient including risk of bleeding, infection, open operation, injury to surrounding organs, possible inability to place a peritoneal dialysis catheter.  She understands that if I am not able do 1 we will go ahead and place a left arm AV graft.  Risks and procedure were explained to that including risk of bleeding, infection, steal syndrome, graft infection, possible failure to mature, possible need for additional operations.  All questions were answered.  s

## 2022-05-12 NOTE — PROGRESS NOTES
General Surgery History and Physical      Patient ID: Bria Ray is a 86 y.o. female.    Chief Complaint: Other (Discuss permanent dialysis access)      HPI:  86-year-old female a few months out from a tunneled dialysis catheter for acute on chronic renal failure.  Currently getting dialyzed on Monday Tuesdays Thursdays and Fridays.  She has discussed with her nephrologist and would like peritoneal dialysis.  She has had a previous open cholecystectomy many years ago and she claims to have had 5 previous abdominal hernia operations many years ago as well.  Scars on her abdomen do not quite line up with that.  She is very hesitant about hemodialysis.  She does tolerated overall well but would like to have dialysis at home as much as possible.  Currently she complains about 2 hours to her dialysis center 4 times a week.    Review of Systems   Constitutional: Negative for activity change, appetite change, fatigue and fever.   HENT: Negative for trouble swallowing.    Respiratory: Negative for cough and shortness of breath.    Cardiovascular: Negative for chest pain and palpitations.   Gastrointestinal: Negative for abdominal distention, abdominal pain, blood in stool, constipation and diarrhea.   Genitourinary: Negative for flank pain.   Musculoskeletal: Negative for neck pain and neck stiffness.   Neurological: Positive for weakness.       Current Outpatient Medications   Medication Sig Dispense Refill    albuterol (PROVENTIL) 2.5 mg /3 mL (0.083 %) nebulizer solution       albuterol (VENTOLIN HFA) 90 mcg/actuation inhaler Inhale 2 puffs into the lungs every 6 (six) hours as needed for Wheezing. Rescue 8 g 5    allopurinoL (ZYLOPRIM) 100 MG tablet Take 1 tablet (100 mg total) by mouth once daily. 90 tablet 3    amLODIPine (NORVASC) 10 MG tablet Take 1 tablet (10 mg total) by mouth once daily. 30 tablet 11    aspirin  (ECOTRIN) 81 MG EC tablet Take 1 tablet (81 mg total) by mouth once daily. 90 tablet 3    budesonide-formoterol 160-4.5 mcg (SYMBICORT) 160-4.5 mcg/actuation HFAA Inhale 2 puffs into the lungs every 12 (twelve) hours. Controller 10.2 g 5    calcium carbonate (TUMS) 200 mg calcium (500 mg) chewable tablet Take 1 tablet (500 mg total) by mouth once daily. 30 tablet 0    cyanocobalamin (VITAMIN B-12) 100 MCG tablet Take 1 tablet (100 mcg total) by mouth once daily. 30 tablet 0    gabapentin (NEURONTIN) 100 MG capsule Take 1 capsule (100 mg total) by mouth 2 (two) times daily. 60 capsule 2    hydrALAZINE (APRESOLINE) 100 MG tablet Take 1 tablet (100 mg total) by mouth every 8 (eight) hours. 90 tablet 11    metoprolol tartrate (LOPRESSOR) 25 MG tablet Take 0.5 tablets (12.5 mg total) by mouth once daily. 180 tablet 3    multivitamin with folic acid 400 mcg Tab Take 1 tablet by mouth once daily. 90 tablet 3    simvastatin (ZOCOR) 20 MG tablet Take 1 tablet (20 mg total) by mouth every evening. 90 tablet 3    SITagliptin (JANUVIA) 25 MG Tab Take 1 tablet (25 mg total) by mouth once daily. 90 tablet 1    sodium bicarbonate 325 MG tablet Take 1 tablet (325 mg total) by mouth once daily. 14 tablet 0    traMADoL (ULTRAM) 50 mg tablet Take 1 tablet (50 mg total) by mouth every 12 (twelve) hours as needed for Pain. 60 tablet 2     No current facility-administered medications for this visit.       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, without long-term current use of insulin     Vertigo     Vitamin D deficiency        Past Surgical History:   Procedure Laterality Date    BLADDER SUSPENSION      BRONCHOSCOPY       11/2020    CHOLECYSTECTOMY      FRACTURE SURGERY      HERNIA REPAIR      HIP FRACTURE SURGERY Right 2005    HYSTERECTOMY      INSERTION OF DIALYSIS CATHETER Right 4/22/2022    Procedure: INSERTION, CATHETER, DIALYSIS;  Surgeon: Terence Jeffery MD;  Location: [facility];  Service: General;  Laterality: Right;       Family History   Problem Relation Age of Onset    Diabetes Mother     Alcohol abuse Father     Diabetes Sister     Asthma Son     Cancer Other        Social History     Socioeconomic History    Marital status:    Tobacco Use    Smoking status: Never Smoker    Smokeless tobacco: Never Used   Substance and Sexual Activity    Alcohol use: Never    Drug use: Never    Sexual activity: Not Currently       There were no vitals filed for this visit.    Physical Exam  Constitutional:       General: She is not in acute distress.  HENT:      Head: Normocephalic.   Cardiovascular:      Rate and Rhythm: Normal rate and regular rhythm.      Pulses: Normal pulses.   Pulmonary:      Effort: Pulmonary effort is normal. No respiratory distress.      Breath sounds: Normal breath sounds.   Abdominal:      General: Abdomen is flat. There is no distension.      Palpations: Abdomen is soft.      Tenderness: There is no abdominal tenderness.      Comments: Abdomen relatively benign scar wise very soft no obvious large midline incisions very old Kocher incision.  No midline incisions are obvious Pfannenstiel incision   Musculoskeletal:         General: Normal range of motion.   Skin:     General: Skin is warm.   Neurological:      General: No focal deficit present.      Mental Status: She is oriented to person,  place, and time.         Assessment & Plan:    End stage chronic kidney disease  -     US Vein Mapping, Hemodialysis, Bilateral; Future; Expected date: 05/12/2022         patient go for attempted laparoscopic peritoneal dialysis.  Risks and benefits explained to the patient including risk of bleeding, infection, open operation, injury to surrounding organs, possible inability to place a peritoneal dialysis catheter.  She understands that if I am not able do 1 we will go ahead and place a left arm AV graft.  Risks and procedure were explained to that including risk of bleeding, infection, steal syndrome, graft infection, possible failure to mature, possible need for additional operations.  All questions were answered.  s

## 2022-05-16 NOTE — ED PROVIDER NOTES
Encounter Date: 5/16/2022       History     Chief Complaint   Patient presents with    Neck Pain    Shortness of Breath     Pt reports for neck pain.  The neck pain has been going on for about a week now.  She says it started in her upper arm and radiated up towards her neck.      Pt states that she has had low heart rate in the past. (ECG with [facility] of 55 bpm confirmed on 19Apr22.)  She is on dialysis, and due for dialysis today, but she says she couldn't go today OR last Friday because of the neck pain.  (Her dialysis schedule is M-Tu-Th-Fr.)  She also has some shortness of breath.  She reports that she still makes a little bit of urine, and usually voids about 2 times daily.         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, without long-term current use of insulin     Vertigo     Vitamin D deficiency      Past Surgical History:   Procedure Laterality Date    BLADDER SUSPENSION      BRONCHOSCOPY       11/2020    CHOLECYSTECTOMY      FRACTURE SURGERY      HERNIA REPAIR      HIP FRACTURE SURGERY Right 2005    HYSTERECTOMY      INSERTION OF DIALYSIS CATHETER Right 4/22/2022    Procedure: INSERTION, CATHETER, DIALYSIS;  Surgeon: Terence Jeffery MD;  Location: [facility];  Service: General;  Laterality: Right;     Family History   Problem Relation Age of Onset    Diabetes Mother     Alcohol abuse Father      Diabetes Sister     Asthma Son     Cancer Other      Social History     Tobacco Use    Smoking status: Never Smoker    Smokeless tobacco: Never Used   Substance Use Topics    Alcohol use: Never    Drug use: Never     Review of Systems   Respiratory: Positive for shortness of breath.    Musculoskeletal: Positive for neck pain (left arm, radiating up into left trapezius muscle and neck.).   All other systems reviewed and are negative.      Physical Exam     Initial Vitals [05/16/22 0828]   BP Pulse Resp Temp SpO2   (!) 114/58 (!) 45 20 97.4 °F (36.3 °C) 96 %      MAP       --         Physical Exam    Nursing note and vitals reviewed.  Constitutional: She appears well-developed and well-nourished.   HENT:   Head: Normocephalic and atraumatic.   Neck: Neck supple. No tracheal deviation present. No JVD present.   TTP in left trapezius muscle into left cervical paraspinal muscles.    Cardiovascular: Regular rhythm, normal heart sounds and intact distal pulses. Exam reveals no gallop and no friction rub.    No murmur heard.  Bradycardic   Pulmonary/Chest: Breath sounds normal. No stridor. No respiratory distress. She has no wheezes. She has no rhonchi. She has no rales. She exhibits no tenderness.   Abdominal: Abdomen is soft. Bowel sounds are normal. She exhibits no distension. There is no abdominal tenderness. There is no rebound and no guarding.   Musculoskeletal:         General: No tenderness or edema. Normal range of motion.      Cervical back: Neck supple.     Lymphadenopathy:     She has no cervical adenopathy.   Neurological: She is alert and oriented to person, place, and time.   Skin: Skin is warm and dry. Capillary refill takes less than 2 seconds.   Psychiatric: She has a normal mood and affect.         Medical Screening Exam   See Full Note    ED Course   Procedures  Labs Reviewed   COMPREHENSIVE METABOLIC PANEL - Abnormal; Notable for the following components:       Result Value    Anion Gap 17 (*)      Glucose 215 (*)     BUN 64 (*)     Creatinine 6.83 (*)     Calcium 7.6 (*)     Total Protein 6.1 (*)     Albumin 2.6 (*)     AST 39 (*)     eGFR 6 (*)     All other components within normal limits   NT-PRO NATRIURETIC PEPTIDE - Abnormal; Notable for the following components:    ProBNP 7,607 (*)     All other components within normal limits   CBC WITH DIFFERENTIAL - Abnormal; Notable for the following components:    RBC 2.70 (*)     Hemoglobin 8.3 (*)     Hematocrit 27.3 (*)     .1 (*)     MCHC 30.4 (*)     RDW 16.6 (*)     Platelet Count 134 (*)     Neutrophils % 75.5 (*)     Lymphocytes % 12.6 (*)     Lymphocytes, Absolute 0.91 (*)     Monocytes % 8.1 (*)     All other components within normal limits   TROPONIN I - Normal   CBC W/ AUTO DIFFERENTIAL    Narrative:     The following orders were created for panel order CBC auto differential.  Procedure                               Abnormality         Status                     ---------                               -----------         ------                     CBC with Differential[[id number]]        Abnormal            Final result                 Please view results for these tests on the individual orders.        ECG Results          EKG 12-lead (In process)  Result time 05/16/22 08:48:59    In process by Interface, Lab In [facility] (05/16/22 08:48:59)                 Narrative:    Test Reason : R07.9,    Vent. Rate : 044 BPM     Atrial Rate : 044 BPM     P-R Int : 172 ms          QRS Dur : 144 ms      QT Int : 542 ms       P-R-T Axes : 051 057 -23 degrees     QTc Int : 463 ms    Marked sinus bradycardia  Right bundle branch block  T wave abnormality, consider inferior ischemia  Abnormal ECG  When compared with ECG of 19-APR-2022 15:26,  PREVIOUS ECG IS PRESENT    Referred By: [email or web address]   SELF           Confirmed By:                             Imaging Results    None          Medications   aspirin tablet 325 mg (325 mg Oral Given 5/16/22 0855)   orphenadrine  injection 60 mg (60 mg Intramuscular Given 5/16/22 0859)                       Clinical Impression:   Final diagnoses:  [R07.9] Chest pain  [M54.2] Neck pain (Primary)  [N18.6, Z99.2] End-stage renal disease needing dialysis  [R00.1] Bradycardia  [N18.9, D63.1] Anemia of chronic renal failure, unspecified CKD stage                 Froylan Cerna MD  05/16/22 1002       Froylan Cerna MD  05/16/22 1025       Froylan Cerna MD  05/16/22 1029

## 2022-05-16 NOTE — DISCHARGE INSTRUCTIONS
Proceed directly to dialysis.  F/u with your PCP to review the bradycardia to make sure it does not need further referral/care/etc.  Return to nearest ER if symptoms of bradycardia come on.

## 2022-05-16 NOTE — ED TRIAGE NOTES
Pt presents to er with c/o neck pain and sob- pt denies injury to her neck - pt dialysis pt last dialyzed last Thursday- pt normal treatment time is mon, tues, thurs , Friday- pt tx time 3 hrs and 20 minutes pt reports she could note dialyze on Friday - pt has her treatments in [place] and uses doctor hernandez- pt pmd dr andrew- pt saw her pmd on last Wednesday and received a cortizone shot for her neck pain- pt has been on dialysis for 2.5 weeks and is supposed to go in am for stress test at [facility] to clear her for surgery for peritoneal catheter for home dialysis- pt stats seh took and ultram and a robaxin for pain with not relief- pt noted taken off her glucotrol, norco 10mg, and norvasc 10 mg  per dr hernandez on 4/29/22- [place] cath to right chest wall intact

## 2022-05-16 NOTE — PROVIDER PROGRESS NOTES - EMERGENCY DEPT.
Encounter Date: 5/16/2022    ED Physician Progress Notes        Physician Note:   Pt reports her dialysis appointment this am was for 0830, but dialysis reported that they can work her in if she goes there now.  Pt has h/o bradycardia at least one time in past, and is having no symptoms from it today.  Recommend f/u with PCP for bradycardia monitoring next available appointment, or prn if sooner.  Return to ED if symptomatic from the bradycardia.

## 2022-05-16 NOTE — PROVIDER PROGRESS NOTES - EMERGENCY DEPT.
Encounter Date: 5/16/2022    ED Physician Progress Notes        Physician Note:   Pt reports significant improvement in pain from the norflex shot.

## 2022-05-17 PROBLEM — Z01.810 ENCOUNTER FOR PRE-OPERATIVE CARDIOVASCULAR CLEARANCE: Status: ACTIVE | Noted: 2022-01-01

## 2022-05-17 NOTE — ASSESSMENT & PLAN NOTE
Patient with normal lexiscan  Multiple co-morbidities  Non-modifiable risk factors  She is low risk for peritoneal dialysis or AV fistula.

## 2022-05-17 NOTE — TELEPHONE ENCOUNTER
Marion Mandel with [facility] called to inform [facility] that patient wants to be discharged from home health due to not having time for their visits due to her dialysis appointments

## 2022-05-17 NOTE — PROGRESS NOTES
"PCP: Lorena Richardson MD    Referring Provider:     Subjective:   Bria Ray is a 87 y.o. female with hx of DM, HTN,ESRD on HD who presents for followup.     5/2022 - Doing well. Patient is in a wheel chair. She saw Dr. Jeffery for insertion of peritoneal dialysis. Here for pre-op risk startification. She was in the ED recently and found to be bradycardic to the 50s on metoprolol.     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 Goodman    Patient was seen in 2/2021 for chronic cough; had an elevated NTproBNP 1200 and normal ECHO.   Today reports chronic shortness of breath and chronic cough. Has not seen pulmonology.   Will give a trial of po lasix 20mg x 5 days (for underlying CKD)    EKG RBBB, NSR  ECHO 2/2021 - normal LV and RV size and fxn. LVEF >60%; no significant valvular abnl  Lexiscan 5/17/22 - normal cardiac perfusion study; normal TID ratio        Lab Results   Component Value Date     05/16/2022    K 3.7 05/16/2022    CL 99 05/16/2022    CO2 25 05/16/2022    BUN 64 (H) 05/16/2022    CREATININE 6.83 (H) 05/16/2022    CALCIUM 7.6 (L) 05/16/2022    ANIONGAP 17 (H) 05/16/2022    ESTGFRAFRICA 18 11/06/2020    EGFRNONAA 6 (L) 05/16/2022       No results found for: CHOL  No results found for: HDL  No results found for: LDLCALC  No results found for: TRIG  No results found for: CHOLHDL    Lab Results   Component Value Date    WBC 7.20 05/16/2022    HGB 8.3 (L) 05/16/2022    HCT 27.3 (L) 05/16/2022    .1 (H) 05/16/2022     (L) 05/16/2022           Review of Systems   Respiratory: Negative for cough and shortness of breath.    Cardiovascular: Negative for chest pain, palpitations, orthopnea, claudication, leg swelling and PND.         Objective:   /70   Pulse 64   Resp 18   Ht 5' 7" (1.702 m)   Wt 68.9 kg (152 lb)   BMI 23.81 kg/m²     Physical Exam  Constitutional:       Appearance: Normal appearance.   Cardiovascular: "      Pulses: Normal pulses.      Heart sounds: Normal heart sounds.   Pulmonary:      Effort: Pulmonary effort is normal.      Breath sounds: Normal breath sounds.   Abdominal:      General: Abdomen is flat.      Palpations: Abdomen is soft.   Musculoskeletal:      Right lower leg: No edema.      Left lower leg: No edema.   Neurological:      Mental Status: She is alert.           Assessment:     1. Encounter for pre-operative cardiovascular clearance           Plan:   Encounter for pre-operative cardiovascular clearance  Patient with normal lexiscan  Multiple co-morbidities  Non-modifiable risk factors  She is low risk for peritoneal dialysis or AV fistula.

## 2022-05-17 NOTE — TELEPHONE ENCOUNTER
Mentioned to dr anguiano that pt would like a muscle relaxant he stated she could f/u with her pcp.

## 2022-05-19 NOTE — PLAN OF CARE
1615 pt to pacu pt resp reg and non labored pt dsg d/i to abd no bleeding noted pt sao2 95% on ra pt awake and follow commands accu check 86    1630 20 g jelco out from r ac intact, restarted 22 g jelco to r ac good blood return noted pt resting well     1645 pt vs stable pt resting well    1700 pt vs stable pt resting no orders or not in computer md notified pt sao2 97% on ra    1710 pt vs stable pt resting well orders to release to room per md    1715 pt released to cody dumont rn b/p 150/87 pulse 83 resp 16 sao2 100% on ra

## 2022-05-19 NOTE — ANESTHESIA POSTPROCEDURE EVALUATION
Anesthesia Post Evaluation    Patient: Bria Ray    Procedure(s) Performed: Procedure(s) (LRB):  INSERTION, CATHETER, DIALYSIS, PERITONEAL, LAPAROSCOPIC LYSIS OF ADHESIONS (N/A)    Final Anesthesia Type: general      Patient location during evaluation: PACU  Patient participation: Yes- Able to Participate  Level of consciousness: awake and alert  Post-procedure vital signs: reviewed and stable  Pain management: adequate  Airway patency: patent  STEFANIE mitigation strategies: Multimodal analgesia  PONV status at discharge: No PONV  Anesthetic complications: no      Cardiovascular status: blood pressure returned to baseline  Respiratory status: unassisted  Hydration status: euvolemic  Follow-up not needed.          Vitals Value Taken Time   BP 98/54 05/19/22 1831   Temp 36.6 °C (97.9 °F) 05/19/22 1628   Pulse 93 05/19/22 1839   Resp 16 05/19/22 1748   SpO2 96 % 05/19/22 1839   Vitals shown include unvalidated device data.      Event Time   Out of Recovery 17:10:00         Pain/Orlin Score: Pain Rating Prior to Med Admin: 10 (5/19/2022  5:48 PM)  Orlin Score: 10 (5/19/2022  5:00 PM)

## 2022-05-19 NOTE — ANESTHESIA PREPROCEDURE EVALUATION
05/19/2022  Bria Ray is a 87 y.o., female.      Pre-op Assessment    I have reviewed the Patient Summary Reports.    I have reviewed the NPO Status.   I have reviewed the Medications.     Review of Systems  Social:  Non-Smoker, No Alcohol Use    Hematology/Oncology:  Hematology Normal   Oncology Normal     EENT/Dental:EENT/Dental Normal   Cardiovascular:   Hypertension ECG has been reviewed.    Pulmonary:   COPD Shortness of breath  Pulmonary Hypertension Echocardiographic Estimated Pulmonary Artery Systolic Pressure >=35 - 45    Renal/:   Chronic Renal Disease, ESRD, CRI    Hepatic/GI:  Hepatic/GI Normal    Musculoskeletal:   Arthritis     Neurological:  Neurology Normal    Endocrine:   Diabetes    Dermatological:  Skin Normal    Psych:  Psychiatric Normal           Physical Exam  General: Well nourished, Cooperative, Alert and Oriented    Airway:  Mallampati: II / II  Mouth Opening: Normal  TM Distance: Normal  Neck ROM: Normal ROM    Dental:  Dentures    Chest/Lungs:  Clear to auscultation    Heart:  Rate: Normal  Rhythm: Regular Rhythm  Sounds: Normal        Chemistry        Component Value Date/Time     05/16/2022 0851    K 3.5 05/19/2022 1136    CL 99 05/16/2022 0851    CO2 25 05/16/2022 0851    BUN 64 (H) 05/16/2022 0851    CREATININE 6.83 (H) 05/16/2022 0851     (H) 05/16/2022 0851        Component Value Date/Time    CALCIUM 7.6 (L) 05/16/2022 0851    ALKPHOS 72 05/16/2022 0851    AST 39 (H) 05/16/2022 0851    ALT 35 05/16/2022 0851    BILITOT 0.4 05/16/2022 0851    ESTGFRAFRICA 18 11/06/2020 0400    EGFRNONAA 6 (L) 05/16/2022 0851        Lab Results   Component Value Date    WBC 7.20 05/16/2022    RBC 2.70 (L) 05/16/2022    HGB 8.3 (L) 05/16/2022    .1 (H) 05/16/2022    MCH 30.7 05/16/2022    MCHC 30.4 (L) 05/16/2022    RDW 16.6 (H) 05/16/2022     (L) 05/16/2022     MPV 10.8 05/16/2022    LYMPH 12.6 (L) 05/16/2022    LYMPH 0.91 (L) 05/16/2022    MONO 8.1 (H) 05/16/2022    EOS 0.21 05/16/2022    BASO 0.04 05/16/2022     Results for orders placed or performed during the hospital encounter of 05/16/22   EKG 12-lead    Collection Time: 05/16/22  8:33 AM    Narrative    Test Reason : R07.9,    Vent. Rate : 044 BPM     Atrial Rate : 044 BPM     P-R Int : 172 ms          QRS Dur : 144 ms      QT Int : 542 ms       P-R-T Axes : 051 057 -23 degrees     QTc Int : 463 ms    Marked sinus bradycardia  Right bundle branch block  T wave abnormality, consider inferior ischemia  Abnormal ECG  When compared with ECG of 19-APR-2022 15:26,  PREVIOUS ECG IS PRESENT    Referred By: [email or web address]   SELF           Confirmed By:      Echo Summary    · The left ventricle is normal in size with mild concentric hypertrophy and normal systolic function.  · The estimated ejection fraction is 70%.  · Normal left ventricular diastolic function.  · Atrial fibrillation not observed.  · Mild right ventricular enlargement.  · Mild right atrial enlargement.  · Mild-to-moderate mitral regurgitation.  · Mild tricuspid regurgitation.  · Mild pulmonic regurgitation.  · Normal central venous pressure (3 mmHg).  · The estimated PA systolic pressure is 41 mmHg.  · There is pulmonary hypertension.          Anesthesia Plan  Type of Anesthesia, risks & benefits discussed:    Anesthesia Type: Gen ETT  Intra-op Monitoring Plan: Standard ASA Monitors  Post Op Pain Control Plan: multimodal analgesia  Induction:  IV  Airway Plan: Direct  Informed Consent: Informed consent signed with the Patient representative and all parties understand the risks and agree with anesthesia plan.  All questions answered. Patient consented to blood products? Yes  ASA Score: 4  Day of Surgery Review of History & Physical: H&P Update referred to the surgeon/provider.    Ready For Surgery From Anesthesia Perspective.     .

## 2022-05-19 NOTE — TRANSFER OF CARE
"Anesthesia Transfer of Care Note    Patient: Bria Ray    Procedure(s) Performed: Procedure(s) (LRB):  INSERTION, CATHETER, DIALYSIS, PERITONEAL, LAPAROSCOPIC LYSIS OF ADHESIONS (N/A)    Patient location: PACU    Anesthesia Type: general    Transport from OR: Transported from OR on room air with adequate spontaneous ventilation    Post pain: adequate analgesia    Post assessment: no apparent anesthetic complications and tolerated procedure well    Post vital signs: stable    Level of consciousness: awake    Nausea/Vomiting: no nausea    Complications: none    Transfer of care protocol was followed      Last vitals:   Visit Vitals  BP (!) 181/98   Pulse 76   Temp 36.6 °C (97.9 °F)   Resp 13   Ht 5' 7" (1.702 m)   Wt 68.9 kg (152 lb)   SpO2 (!) 94%   Breastfeeding No   BMI 23.81 kg/m²     "

## 2022-05-19 NOTE — OP NOTE
[facility], [place] - Periop Services  Surgery Department  Operative Note    SUMMARY     Date of Procedure: 5/19/2022     Procedure: Procedure(s) (LRB):  INSERTION, CATHETER, DIALYSIS, PERITONEAL, LAPAROSCOPIC LYSIS OF ADHESIONS (N/A)     Surgeon(s) and Role:     * Terence Jeffery MD - Primary    Assisting Surgeon: None    Pre-Operative Diagnosis: End stage chronic kidney disease [N18.6]    Post-Operative Diagnosis: Post-Op Diagnosis Codes:     * End stage chronic kidney disease [N18.6]    Anesthesia: General    Procedures Performed: lapariooscopic lysis of adhesions 2) Laparoscopic placement of peritoneal dialysis catheter    Significant Findings of the Procedure: Numerous small bowel and omental adhesions to the anterior abdominal wall where previous hernia surgery was adherent to the mesh    Procedure in Detail: After informed consent patient brought to the OR prepped and draped in the usual sterile fashion.  5 mm trocar was optically inserted in the left upper quadrant and pneumoperitoneum was obtained to 15 mmHg of pressure.  We then under direct visualization placed an additional 5 mm trocar in the left lower abdominal quadrant.  Patient had a numerous amount of omental and small-bowel adhesions stuck to the anterior abdominal wall.  We then visualized the pelvis and noted that it was relatively free of adhesions and patient was a good candidate for peritoneal dialysis catheter insertion.  We spent a good amount of time carefully lysing these adhesions to the anterior abdominal wall to allow for a a good space in the pelvis area for peritoneal dialysis.  Once we lysed adhesions we placed a stab incision the left suprapubic area and guided the catheter over the guidewire down and placed our PD catheter into the pelvis area.  We tunneled the proximal aspect out of the skin more superiorly.  We parked the 1st cuff  within the rectus sheath we then tunneled the catheter superiorly towards the separate stab incision  on the left paramedian area and made sure the 2nd cuff was under the subcu fat.  We then instilled about a L of saline into the pelvis and had good flow.  There was good retrograde flow as well with the bag on the floor.  We then injected local and our incisions closed with 4-0 Monocryl and dressed the area.  Patient tolerated the procedure well.      Complications: No    Estimated Blood Loss (EBL): * No values recorded between 5/19/2022  3:32 PM and 5/19/2022  4:13 PM *           Implants: * No implants in log *    Specimens:   Specimen (24h ago, onward)            None                  Condition: Good    Disposition: PACU - hemodynamically stable.    Attestation: I was present and scrubbed for the entire procedure.

## 2022-05-20 NOTE — NURSING
Daughter notified this nurse lab attempted to obtain blood draw and was unable. Lab would notify dialysis to have dialysis nurse draw blood from pd catheter. This nurse notified supervisor lab draw still needed and daughter requesting dialysis nurse obtain blood from pd catheter. This nurse also notified dialysis nurse to see if she was aware of lab draw need. Supervisor to check with nurse manage and dialysis to see what needs to be done to obtain lab draw to check for next H&H.

## 2022-05-20 NOTE — PLAN OF CARE
1930--Dr. Jeffery Notified of decreased B/P and emesis. See new orders as written. Supervisor notified of need for overnight observation.

## 2022-05-20 NOTE — NURSING
2315 pt grand daughter called out states blood pressure is 59/36.  2320 nurses in room. A Shelby put pt in trendelenberg. RRT was called.  2321 BP 70/48  2325 RRT staff arrive Dr Park states she will put orders in  2328 99/50  2332 91/45 map 59 93% 88 97.4  2342 75/35 LR bolus infusing  2350 90/50

## 2022-05-20 NOTE — PROGRESS NOTES
[facility] Stay Unit  General Surgery  Progress Note    Subjective:     History of Present Illness:  No notes on file    Post-Op Info:  Procedure(s) (LRB):  INSERTION, CATHETER, DIALYSIS, PERITONEAL, LAPAROSCOPIC LYSIS OF ADHESIONS (N/A)   1 Day Post-Op     No new subjective & objective note has been filed under this hospital service since the last note was generated.    Assessment/Plan: h/h 6.3/20.8     Anemia of chronic renal failure  05/20 transfuse had RRT last night due to syncope with hypotension   Recheck h/h after transfusion  Abdomen soft, dsg c/d/i PD cath intact        Nitza Cruz, ACNP  General Surgery  [facility] Stay Unit

## 2022-05-20 NOTE — NURSING
"2245 Called Dr mckinnon and report blood pressure 70s/30s. Complains of nausea and "whooziness". New orders rec'd to 500 ml bolus.   "

## 2022-05-20 NOTE — NURSING
Dr. Park has been notified thru the night about updates on pt bp. H&H has come back and A.M. shift will admin 1u prbc. 250ml NaCL infusing in 250ml bolus over 1hr. Pt c/o not being able to empty her bladder. Nurse will notify MD to see if we can get an in and out cath. Oncoming shift to continue care.

## 2022-05-20 NOTE — ASSESSMENT & PLAN NOTE
05/20 transfuse had RRT last night due to syncope with hypotension   Recheck h/h after transfusion  Abdomen soft, dsg c/d/i PD cath intact

## 2022-05-21 NOTE — NURSING
Dr notified that the HCT was back and what is was. Did not tell me to DC her or not, will report to night shift nurse.

## 2022-05-21 NOTE — PLAN OF CARE
Problem: Infection  Goal: Absence of Infection Signs and Symptoms  Outcome: Ongoing, Progressing     Problem: Adult Inpatient Plan of Care  Goal: Plan of Care Review  Outcome: Ongoing, Progressing  Goal: Patient-Specific Goal (Individualized)  Outcome: Ongoing, Progressing  Goal: Absence of Hospital-Acquired Illness or Injury  Outcome: Ongoing, Progressing  Goal: Optimal Comfort and Wellbeing  Outcome: Ongoing, Progressing

## 2022-05-27 NOTE — TELEPHONE ENCOUNTER
"PATIENT CALLED ON 5/26/22 P.M. WITH "INFECTED PD CATHETER". APPOINTMEN MADE TO SEE DR MONIQUE ON 5/27/22. PATIENT CANCELLED APPT ON MY CHART. I TRIED CALLING NUMBERS IN THE COMPUTER . UNABLE TO REACH, NO ANSWER OR VOICEMAIL.  "

## 2022-06-10 NOTE — DISCHARGE SUMMARY
[facility] - Orthopedic  Discharge Note  Short Stay    Procedure(s) (LRB):  INSERTION, CATHETER, DIALYSIS, PERITONEAL, LAPAROSCOPIC LYSIS OF ADHESIONS (N/A)    OUTCOME: Patient tolerated treatment/procedure well without complication and is now ready for discharge.    DISPOSITION: Home or Self Care    FINAL DIAGNOSIS:  End stage chronic kidney disease    FOLLOWUP: In clinic    DISCHARGE INSTRUCTIONS:    Discharge Procedure Orders   Diet Adult Regular     Remove dressing in 24 hours     Notify your health care provider if you experience any of the following:  temperature >100.4     Notify your health care provider if you experience any of the following:  persistent nausea and vomiting or diarrhea     Notify your health care provider if you experience any of the following:  severe uncontrolled pain     Notify your health care provider if you experience any of the following:  redness, tenderness, or signs of infection (pain, swelling, redness, odor or green/yellow discharge around incision site)     Notify your health care provider if you experience any of the following:  difficulty breathing or increased cough     Notify your health care provider if you experience any of the following:  severe persistent headache     Notify your health care provider if you experience any of the following:  worsening rash     Notify your health care provider if you experience any of the following:  persistent dizziness, light-headedness, or visual disturbances     Notify your health care provider if you experience any of the following:  increased confusion or weakness     Notify your health care provider if you experience any of the following:     Shower on day dressing removed (No bath)         Clinical Reference Documents Added to Patient Instructions       Document    [facility] AVS FACTSHEET    PERITONEAL DIALYSIS DISCHARGE INSTRUCTIONS (ENGLISH)          TIME SPENT ON DISCHARGE: 10   minutes

## 2022-06-21 NOTE — PROGRESS NOTES
Post-operative Note    HPI:  The patient is status post laparoscopic peritoneal dialysis catheter placement she.  She has been doing pretty well it has been functioning and she has not had dialysis through her catheter in about 2 weeks now.  She denies any complications or issues with it.  She would like to get her dialysis catheter out.  She has not seen her nephrologist and will see him next week    PHYSICAL EXAM:    Catheter site clean dry intact    No results found for this or any previous visit (from the past 504 hour(s)).     ASSESSMENT:      The patient is doing well after surgery.       PLAN:      Follow up One week.    Patient come back in a week once her nephrologist Dr. Fields says okay for the catheter come out I will be happy to pull it in my clinic

## 2022-06-29 NOTE — PROCEDURES
Brai Ray is a 87 y.o. female patient.      Preoperative diagnosis:  End-stage renal disease  Postoperative diagnosis: as above  Procedure Performed:  Removal of tunneled dialysis catheter  Surgeon: Dr. Terence Jeffery  Anesthesia: Local 1% lidocaine with epinephrine  Blood loss: Minimal  Complications: None  Specimen:     Procedure in detail:  After informed consent patient's right neck was prepped and draped usual sterile fashion.  We injected local 1% lidocaine with epinephrine in around the cuff site.  We then dissected out the cuff pulled the catheter held pressure for 5 minutes.  Hemostasis was obtained.  Dressing was applied.  Patient tolerated the procedure well.  w         Procedures    6/29/2022

## 2022-07-19 NOTE — TELEPHONE ENCOUNTER
----- Message from Obey Sherwood sent at 7/19/2022  3:27 PM CDT -----  Regarding: Rx Refill  Rx for Gabepentin 100mg and Hydralazine called to [facility]

## 2022-07-27 NOTE — PROGRESS NOTES
Subjective:      Patient ID: Bria Ray is a 87 y.o. female.    Chief Complaint: Back Pain and Leg Pain      Pain  This is a chronic problem. The current episode started more than 1 year ago. The problem occurs daily. The problem has been unchanged. Associated symptoms include arthralgias. Pertinent negatives include no change in bowel habit, chest pain, chills, coughing, diaphoresis, rash, sore throat, vertigo or vomiting.     Review of Systems   Constitutional: Negative for activity change, chills, diaphoresis and unexpected weight change.   HENT: Negative for drooling, ear discharge, ear pain, facial swelling, nosebleeds, sore throat, trouble swallowing, voice change and goiter.    Eyes: Negative for photophobia, pain, discharge, redness and visual disturbance.   Respiratory: Negative for apnea, cough, choking, chest tightness, shortness of breath, wheezing and stridor.    Cardiovascular: Negative for chest pain, palpitations and leg swelling.   Gastrointestinal: Negative for abdominal distention, change in bowel habit, diarrhea, rectal pain, vomiting, fecal incontinence and change in bowel habit.   Endocrine: Negative for cold intolerance, heat intolerance, polydipsia, polyphagia and polyuria.   Genitourinary: Negative for bladder incontinence, dysuria, flank pain, frequency and hot flashes.   Musculoskeletal: Positive for arthralgias, back pain, gait problem, leg pain and neck stiffness.   Integumentary:  Negative for color change, pallor and rash.   Allergic/Immunologic: Negative for immunocompromised state.   Neurological: Negative for dizziness, vertigo, seizures, syncope, facial asymmetry, speech difficulty, light-headedness, disturbances in coordination, memory loss and coordination difficulties.   Hematological: Negative for adenopathy. Does not bruise/bleed easily.   Psychiatric/Behavioral: Negative for agitation, behavioral problems, confusion, decreased concentration, dysphoric mood,  "hallucinations, self-injury and suicidal ideas. The patient is not nervous/anxious and is not hyperactive.             Past Surgical History:   Procedure Laterality Date    BLADDER SUSPENSION      BRONCHOSCOPY       11/2020    CHOLECYSTECTOMY      FRACTURE SURGERY      HERNIA REPAIR      HIP FRACTURE SURGERY Right 2005    HYSTERECTOMY      INSERTION OF DIALYSIS CATHETER Right 4/22/2022    Procedure: INSERTION, CATHETER, DIALYSIS;  Surgeon: Terence Jeffery MD;  Location: [facility];  Service: General;  Laterality: Right;       Objective:  Vitals:    07/27/22 1128   BP: 109/74   Pulse: 97   Resp: 18   SpO2: 98%   Weight: 70.8 kg (156 lb)   Height: 5' 7" (1.702 m)         Physical Exam  Vitals and nursing note reviewed. Exam conducted with a chaperone present.   Constitutional:       General: She is awake. She is not in acute distress.     Appearance: Normal appearance. She is not toxic-appearing or diaphoretic.   HENT:      Head: Normocephalic and atraumatic.      Nose: Nose normal.      Mouth/Throat:      Mouth: Mucous membranes are moist.      Pharynx: Oropharynx is clear.   Eyes:      Conjunctiva/sclera: Conjunctivae normal.      Pupils: Pupils are equal, round, and reactive to light.   Cardiovascular:      Rate and Rhythm: Normal rate.   Pulmonary:      Effort: Pulmonary effort is normal. No respiratory distress.   Abdominal:      Palpations: Abdomen is soft.      Tenderness: There is no guarding.   Musculoskeletal:         General: No signs of injury. Normal range of motion.      Cervical back: Normal range of motion and neck supple. No rigidity.   Skin:     General: Skin is warm and dry.      Coloration: Skin is not jaundiced or pale.   Neurological:      General: No focal deficit present.      Mental Status: She is alert and oriented to person, place, and time. Mental status is at baseline.      Cranial Nerves: No cranial nerve deficit (II-XII).   Psychiatric:         Mood and Affect: Mood " normal.         Behavior: Behavior normal. Behavior is cooperative.         Thought Content: Thought content normal.           Nuclear Stress Test    The EKG portion of this study is negative for ischemia.    The patient reported no chest pain during the stress test.  NM Myocardial Perfusion Spect Multi Pharmacologic  Narrative: EXAMINATION:  NM MYOCARDIAL PERFUSION SPECT MULTI PHARM    CLINICAL HISTORY:  CAD screening, high CAD risk, not treadmill candidate;  Encounter for screening for cardiovascular disorders    TECHNIQUE:  SPECT images in short, vertical and horizontal long axis were acquired 30 minutes after the injection of 11 mCi of Tc-99m STEMI be at rest and 34 mCi during a cardiac stress. The clinical stress and ECG portion of the study is to be read separately.    COMPARISON:  None.    FINDINGS:  The quality of the study is good.    Stress SPECT images demonstrate homogenous distribution of the tracer throughout the left ventricle. On the resting images, there is matched homogenous distribution of the tracer throughout the left ventricle.    The gated post-stress images reveal normal wall motion and systolic wall thickening with an estimated LVEF of 80 %. The LV cavity is not dilated with an end-diastolic volume of 93 ml and an end-systolic volume of 18 ml.    TID ratio is normal 0.79  Impression: 1.  Scintigraphically negative for ischemia or infarct.  2. the global left ventricular systolic function is normal with an LV ejection fraction of 80 % and no evidence of LV dilatation. Wall motion is normal.    Electronically signed by: Abelardo Parker  Date:    05/17/2022  Time:    13:05       Admission on 05/19/2022, Discharged on 05/21/2022   Component Date Value Ref Range Status    POC Glucose 05/19/2022 140 (A) 70 - 105 mg/dL Final    Potassium 05/19/2022 3.5  3.5 - 5.1 mmol/L Final    POC Glucose 05/19/2022 92  70 - 105 mg/dL Final    POC Glucose 05/19/2022 86  70 - 105 mg/dL Final    POC Glucose  05/19/2022 158 (A) 70 - 105 mg/dL Final    Hemoglobin 05/20/2022 6.3 (A) 12.0 - 16.0 g/dL Final    Hematocrit 05/20/2022 20.8 (A) 38.0 - 47.0 % Final    Group & Rh 05/20/2022 A POS   Final    Indirect Sahra 05/20/2022 NEG   Final    Extra Tube 05/20/2022 Hold for add-ons.   Final    UNIT NUMBER 05/20/2022 [id number]   Final    UNIT ABO/RH 05/20/2022 A POS   Final    DISPENSE STATUS 05/20/2022 Transfused   Final    Unit Expiration 05/20/2022 [id number]   Final    Product Code 05/20/2022 [id number]   Final    Unit Blood Type Code 05/20/2022 6200   Final    CROSSMATCH INTERPRETATION 05/20/2022 Compatible   Final    POC Glucose 05/20/2022 159 (A) 70 - 105 mg/dL Final    Hemoglobin 05/20/2022 6.7 (A) 12.0 - 16.0 g/dL Final    Hematocrit 05/20/2022 21.8 (A) 38.0 - 47.0 % Final    POC Glucose 05/20/2022 158 (A) 70 - 105 mg/dL Final    POC Glucose 05/21/2022 128 (A) 70 - 105 mg/dL Final    POC Glucose 05/21/2022 150 (A) 70 - 105 mg/dL Final    Hematocrit 05/21/2022 22.5 (A) 38.0 - 47.0 % Final    Group & Rh 05/21/2022 A POS   Final    Indirect Sahra 05/21/2022 NEG   Final   Hospital Outpatient Visit on 05/17/2022   Component Date Value Ref Range Status    85% Max Predicted HR 05/17/2022 112   Final    Max Predicted HR 05/17/2022 131   Final    OHS CV CPX PATIENT IS MALE 05/17/2022 0.0   Final    OHS CV CPX PATIENT IS FEMALE 05/17/2022 1.0   Final    HR at rest 05/17/2022 44  bpm Final    Systolic blood pressure 05/17/2022 122  mmHg Final    Diastolic blood pressure 05/17/2022 68  mmHg Final    RPP 05/17/2022 5,368   Final    Peak HR 05/17/2022 66  bpm Final    Peak Systolic BP 05/17/2022 127  mmHg Final    Peak Diatolic BP 05/17/2022 43  mmHg Final    Peak RPP 05/17/2022 8,382   Final    % Max HR Achieved 05/17/2022 50   Final    1 Minute Recovery HR 05/17/2022 54  bpm Final   Admission on 05/16/2022, Discharged on 05/16/2022   Component Date Value Ref Range Status    Sodium  05/16/2022 137  136 - 145 mmol/L Final    Potassium 05/16/2022 3.7  3.5 - 5.1 mmol/L Final    Chloride 05/16/2022 99  98 - 107 mmol/L Final    CO2 05/16/2022 25  21 - 32 mmol/L Final    Anion Gap 05/16/2022 17 (A) 7 - 16 mmol/L Final    Glucose 05/16/2022 215 (A) 74 - 106 mg/dL Final    BUN 05/16/2022 64 (A) 7 - 18 mg/dL Final    Creatinine 05/16/2022 6.83 (A) 0.55 - 1.02 mg/dL Final    BUN/Creatinine Ratio 05/16/2022 9  6 - 20 Final    Calcium 05/16/2022 7.6 (A) 8.5 - 10.1 mg/dL Final    Total Protein 05/16/2022 6.1 (A) 6.4 - 8.2 g/dL Final    Albumin 05/16/2022 2.6 (A) 3.5 - 5.0 g/dL Final    Globulin 05/16/2022 3.5  2.0 - 4.0 g/dL Final    A/G Ratio 05/16/2022 0.7   Final    Bilirubin, Total 05/16/2022 0.4  0.0 - 1.2 mg/dL Final    Alk Phos 05/16/2022 72  55 - 142 U/L Final    ALT 05/16/2022 35  13 - 56 U/L Final    AST 05/16/2022 39 (A) 15 - 37 U/L Final    eGFR 05/16/2022 6 (A) >=60 mL/min/1.73m² Final    Troponin I High Sensitivity 05/16/2022 47.1  <=60.4 pg/mL Final    ProBNP 05/16/2022 7,607 (A) 1 - 450 pg/mL Final    WBC 05/16/2022 7.20  4.50 - 11.00 K/uL Final    RBC 05/16/2022 2.70 (A) 4.20 - 5.40 M/uL Final    Hemoglobin 05/16/2022 8.3 (A) 12.0 - 16.0 g/dL Final    Hematocrit 05/16/2022 27.3 (A) 38.0 - 47.0 % Final    MCV 05/16/2022 101.1 (A) 80.0 - 96.0 fL Final    MCH 05/16/2022 30.7  27.0 - 31.0 pg Final    MCHC 05/16/2022 30.4 (A) 32.0 - 36.0 g/dL Final    RDW 05/16/2022 16.6 (A) 11.5 - 14.5 % Final    Platelet Count 05/16/2022 134 (A) 150 - 400 K/uL Final    MPV 05/16/2022 10.8  9.4 - 12.4 fL Final    Neutrophils % 05/16/2022 75.5 (A) 53.0 - 65.0 % Final    Lymphocytes % 05/16/2022 12.6 (A) 27.0 - 41.0 % Final    Neutrophils, Abs 05/16/2022 5.44  1.80 - 7.70 K/uL Final    Lymphocytes, Absolute 05/16/2022 0.91 (A) 1.00 - 4.80 K/uL Final    Diff Type 05/16/2022 Auto   Final    Monocytes % 05/16/2022 8.1 (A) 2.0 - 6.0 % Final    Eosinophils % 05/16/2022 2.9  1.0 -  4.0 % Final    Basophils % 05/16/2022 0.6  0.0 - 1.0 % Final    Immature Granulocytes % 05/16/2022 0.3  0.0 - 0.4 % Final    Monocytes, Absolute 05/16/2022 0.58  0.00 - 0.80 K/uL Final    Eosinophils, Absolute 05/16/2022 0.21  0.00 - 0.50 K/uL Final    Immature Granulocytes, Absolute 05/16/2022 0.02  0.00 - 0.04 K/uL Final    Basophils, Absolute 05/16/2022 0.04  0.00 - 0.20 K/uL Final   Office Visit on 05/11/2022   Component Date Value Ref Range Status    POC Glucose 05/11/2022 142 (A) 70 - 110 MG/DL Final   No results displayed because visit has over 200 results.      Admission on 04/19/2022, Discharged on 04/19/2022   Component Date Value Ref Range Status    Sodium 04/19/2022 141  136 - 145 mmol/L Final    Potassium 04/19/2022 4.8  3.5 - 5.1 mmol/L Final    Chloride 04/19/2022 107  98 - 107 mmol/L Final    CO2 04/19/2022 24  21 - 32 mmol/L Final    Anion Gap 04/19/2022 15  7 - 16 mmol/L Final    Glucose 04/19/2022 176 (A) 74 - 106 mg/dL Final    BUN 04/19/2022 72 (A) 7 - 18 mg/dL Final    Creatinine 04/19/2022 6.46 (A) 0.55 - 1.02 mg/dL Final    BUN/Creatinine Ratio 04/19/2022 11  6 - 20 Final    Calcium 04/19/2022 7.9 (A) 8.5 - 10.1 mg/dL Final    Total Protein 04/19/2022 7.1  6.4 - 8.2 g/dL Final    Albumin 04/19/2022 3.1 (A) 3.5 - 5.0 g/dL Final    Globulin 04/19/2022 4.0  2.0 - 4.0 g/dL Final    A/G Ratio 04/19/2022 0.8   Final    Bilirubin, Total 04/19/2022 0.5  0.0 - 1.2 mg/dL Final    Alk Phos 04/19/2022 50 (A) 55 - 142 U/L Final    ALT 04/19/2022 21  13 - 56 U/L Final    AST 04/19/2022 30  15 - 37 U/L Final    eGFR 04/19/2022 7 (A) >=60 mL/min/1.73m² Final    Magnesium 04/19/2022 1.7  1.7 - 2.3 mg/dL Final    Phosphorus 04/19/2022 6.3 (A) 2.5 - 4.5 mg/dL Final    Troponin I High Sensitivity 04/19/2022 200.8 (A) <=60.4 pg/mL Final    Color, UA 04/19/2022 Yellow  Colorless, Straw, Yellow, Dark Yellow Final    Clarity, UA 04/19/2022 Clear  Clear Final    pH, UA 04/19/2022 7.0   5.0, 5.5, 6.0, 6.5, 7.0, 7.5, 8.0 pH Units Final    Leukocytes, UA 04/19/2022 Negative  Negative Final    Nitrites, UA 04/19/2022 Negative  Negative Final    Protein, UA 04/19/2022 >=300 (A) Negative Final    Glucose, UA 04/19/2022 500  (A) Negative mg/dL Final    Ketones, UA 04/19/2022 Negative  Negative, Trace mg/dL Final    Urobilinogen, UA 04/19/2022 0.2  0.2, 1.0 mg/dL Final    Bilirubin, UA 04/19/2022 Negative  Negative Final    Blood, UA 04/19/2022 Trace-Intact (A) Negative Final    Specific Gravity, UA 04/19/2022 1.020  <=1.005, 1.010, 1.015, 1.020, 1.025, 1.030 Final    WBC 04/19/2022 8.13  4.50 - 11.00 K/uL Final    RBC 04/19/2022 2.57 (A) 4.20 - 5.40 M/uL Final    Hemoglobin 04/19/2022 7.6 (A) 12.0 - 16.0 g/dL Final    Hematocrit 04/19/2022 25.8 (A) 38.0 - 47.0 % Final    MCV 04/19/2022 100.4 (A) 80.0 - 96.0 fL Final    MCH 04/19/2022 29.6  27.0 - 31.0 pg Final    MCHC 04/19/2022 29.5 (A) 32.0 - 36.0 g/dL Final    RDW 04/19/2022 15.3 (A) 11.5 - 14.5 % Final    Platelet Count 04/19/2022 162  150 - 400 K/uL Final    MPV 04/19/2022 11.2  9.4 - 12.4 fL Final    Neutrophils % 04/19/2022 78.3 (A) 53.0 - 65.0 % Final    Lymphocytes % 04/19/2022 8.1 (A) 27.0 - 41.0 % Final    Neutrophils, Abs 04/19/2022 6.36  1.80 - 7.70 K/uL Final    Lymphocytes, Absolute 04/19/2022 0.66 (A) 1.00 - 4.80 K/uL Final    Diff Type 04/19/2022 Auto   Final    Monocytes % 04/19/2022 11.8 (A) 2.0 - 6.0 % Final    Eosinophils % 04/19/2022 0.7 (A) 1.0 - 4.0 % Final    Basophils % 04/19/2022 0.5  0.0 - 1.0 % Final    Immature Granulocytes % 04/19/2022 0.6 (A) 0.0 - 0.4 % Final    Monocytes, Absolute 04/19/2022 0.96 (A) 0.00 - 0.80 K/uL Final    Eosinophils, Absolute 04/19/2022 0.06  0.00 - 0.50 K/uL Final    Immature Granulocytes, Absolute 04/19/2022 0.05 (A) 0.00 - 0.04 K/uL Final    Basophils, Absolute 04/19/2022 0.04  0.00 - 0.20 K/uL Final    ProBNP 04/19/2022 20,806 (A) 1 - 450 pg/mL Final    WBC, UA  04/19/2022 0-5  None Seen, 0-5 /hpf Final    RBC, UA 04/19/2022 0-3  None Seen, 0-3 /hpf Final    Bacteria, UA 04/19/2022 Few (A) None Seen, None Seen To Occasional, Rare /hpf Final    Squamous Epithelial Cells, UA 04/19/2022 Few (A) None Seen, Rare, None Seen To Occasional /lpf Final    Influenza A 04/19/2022 Negative  Negative, Invalid Final    Influenza B 04/19/2022 Negative  Negative, Invalid Final    COVID-19 Ag 04/19/2022 Negative  Negative, Invalid Final   Office Visit on 04/18/2022   Component Date Value Ref Range Status    Sodium 04/18/2022 143  136 - 145 mmol/L Final    Potassium 04/18/2022 4.8  3.5 - 5.1 mmol/L Final    Chloride 04/18/2022 109 (A) 98 - 107 mmol/L Final    CO2 04/18/2022 25  21 - 32 mmol/L Final    Anion Gap 04/18/2022 14  7 - 16 mmol/L Final    Glucose 04/18/2022 248 (A) 74 - 106 mg/dL Final    BUN 04/18/2022 80 (A) 7 - 18 mg/dL Final    Creatinine 04/18/2022 6.65 (A) 0.55 - 1.02 mg/dL Final    BUN/Creatinine Ratio 04/18/2022 12  6 - 20 Final    Calcium 04/18/2022 7.3 (A) 8.5 - 10.1 mg/dL Final    Albumin 04/18/2022 3.0 (A) 3.5 - 5.0 g/dL Final    Phosphorus 04/18/2022 6.6 (A) 2.5 - 4.5 mg/dL Final    eGFR 04/18/2022 6 (A) >=60 mL/min/1.73m² Final   No results displayed because visit has over 200 results.      Admission on 04/07/2022, Discharged on 04/07/2022   Component Date Value Ref Range Status    Sodium 04/07/2022 142  136 - 145 mmol/L Final    Potassium 04/07/2022 4.5  3.5 - 5.1 mmol/L Final    Chloride 04/07/2022 107  98 - 107 mmol/L Final    CO2 04/07/2022 17 (A) 21 - 32 mmol/L Final    Anion Gap 04/07/2022 23 (A) 7 - 16 mmol/L Final    Glucose 04/07/2022 232 (A) 74 - 106 mg/dL Final    BUN 04/07/2022 111 (A) 7 - 18 mg/dL Final    Creatinine 04/07/2022 6.92 (A) 0.55 - 1.02 mg/dL Final    BUN/Creatinine Ratio 04/07/2022 16  6 - 20 Final    Calcium 04/07/2022 7.6 (A) 8.5 - 10.1 mg/dL Final    eGFR 04/07/2022 6 (A) >=60 mL/min/1.73m² Final    Troponin I  High Sensitivity 04/07/2022 50.2  <=60.4 pg/mL Final    Color, UA 04/07/2022 Yellow  Colorless, Straw, Yellow, Dark Yellow Final    Clarity, UA 04/07/2022 Slightly Cloudy (A) Clear Final    pH, UA 04/07/2022 5.5  5.0, 5.5, 6.0, 6.5, 7.0, 7.5, 8.0 pH Units Final    Leukocytes, UA 04/07/2022 Negative  Negative Final    Nitrites, UA 04/07/2022 Negative  Negative Final    Protein, UA 04/07/2022 100  (A) Negative Final    Glucose, UA 04/07/2022 250  (A) Negative mg/dL Final    Ketones, UA 04/07/2022 Negative  Negative, Trace mg/dL Final    Urobilinogen, UA 04/07/2022 0.2  0.2, 1.0 mg/dL Final    Bilirubin, UA 04/07/2022 Negative  Negative Final    Blood, UA 04/07/2022 Small (A) Negative Final    Specific Gravity, UA 04/07/2022 1.015  <=1.005, 1.010, 1.015, 1.020, 1.025, 1.030 Final    WBC 04/07/2022 4.63  4.50 - 11.00 K/uL Final    RBC 04/07/2022 2.67 (A) 4.20 - 5.40 M/uL Final    Hemoglobin 04/07/2022 8.1 (A) 12.0 - 16.0 g/dL Final    Hematocrit 04/07/2022 26.5 (A) 38.0 - 47.0 % Final    MCV 04/07/2022 99.3 (A) 80.0 - 96.0 fL Final    MCH 04/07/2022 30.3  27.0 - 31.0 pg Final    MCHC 04/07/2022 30.6 (A) 32.0 - 36.0 g/dL Final    RDW 04/07/2022 15.6 (A) 11.5 - 14.5 % Final    Platelet Count 04/07/2022 145 (A) 150 - 400 K/uL Final    MPV 04/07/2022 10.7  9.4 - 12.4 fL Final    Neutrophils % 04/07/2022 73.4 (A) 53.0 - 65.0 % Final    Lymphocytes % 04/07/2022 12.5 (A) 27.0 - 41.0 % Final    Neutrophils, Abs 04/07/2022 3.40  1.80 - 7.70 K/uL Final    Lymphocytes, Absolute 04/07/2022 0.58 (A) 1.00 - 4.80 K/uL Final    Diff Type 04/07/2022 Auto   Final    Monocytes % 04/07/2022 11.9 (A) 2.0 - 6.0 % Final    Eosinophils % 04/07/2022 1.1  1.0 - 4.0 % Final    Basophils % 04/07/2022 0.9  0.0 - 1.0 % Final    Immature Granulocytes % 04/07/2022 0.2  0.0 - 0.4 % Final    Monocytes, Absolute 04/07/2022 0.55  0.00 - 0.80 K/uL Final    Eosinophils, Absolute 04/07/2022 0.05  0.00 - 0.50 K/uL Final     Immature Granulocytes, Absolute 04/07/2022 0.01  0.00 - 0.04 K/uL Final    Basophils, Absolute 04/07/2022 0.04  0.00 - 0.20 K/uL Final    POC Glucose 04/07/2022 201 (A) 70 - 105 mg/dL Final    WBC, UA 04/07/2022 5-10 (A) None Seen, 0-5 /hpf Final    RBC, UA 04/07/2022 0-3  None Seen, 0-3 /hpf Final    Bacteria, UA 04/07/2022 Moderate (A) None Seen, None Seen To Occasional, Rare /hpf Final    Squamous Epithelial Cells, UA 04/07/2022 Occasional (A) None Seen, Rare, None Seen To Occasional /lpf Final    Transitional Epithelial Cells, UA 04/07/2022 Rare (A) None Seen /lpf Final    WBC Clumps 04/07/2022 Rare (A) None Seen /hpf Final    Mucus, UA 04/07/2022 Rare (A) None Seen /hpf Final    Fine Granular Casts, UA 04/07/2022 0-2 (A) None Seen /lpf Final    Coarse Granular Casts, UA 04/07/2022 0-2 (A) None Seen /lpf Final    Amorphous Crystals, UA 04/07/2022 Few (A) None Seen /lpf Final    Culture, Urine 04/07/2022 No Growth   Final    POC Glucose 04/07/2022 274 (A) 70 - 105 mg/dL Final    Influenza A 04/07/2022 Negative  Negative, Invalid Final    Influenza B 04/07/2022 Negative  Negative, Invalid Final    COVID-19 Ag 04/07/2022 Negative  Negative, Invalid Final    POC Glucose 04/07/2022 327 (A) 70 - 105 mg/dL Final    POC Glucose 04/07/2022 266 (A) 70 - 105 mg/dL Final           Assessment:      1. Osteoarthrosis multiple sites, not specified as generalized    2. Lumbosacral radiculopathy    3. Neuropathy          No orders of the defined types were placed in this encounter.        A's of Opioid Risk Assessment  Activity:Patient can perform ADL.   Analgesia:Patients pain is partially controlled by current medication. Patient has tried OTC medications such as Tylenol and Ibuprofen with out relief.   Adverse Effects: Patient denies constipation or sedation.  Aberrant Behavior:  reviewed with no aberrant drug seeking/taking behavior.  Overdose reversal drug naloxone discussed    Drug screen  reviewed      Requested Prescriptions     Signed Prescriptions Disp Refills    HYDROcodone-acetaminophen (NORCO)  mg per tablet 60 tablet 0     Sig: Take 1 tablet by mouth every 12 (twelve) hours.    HYDROcodone-acetaminophen (NORCO)  mg per tablet 60 tablet 0     Sig: Take 1 tablet by mouth every 12 (twelve) hours.    HYDROcodone-acetaminophen (NORCO)  mg per tablet 60 tablet 0     Sig: Take 1 tablet by mouth every 12 (twelve) hours.         Plan:    Using 4 point walker assistance with ambulation    Last definitive drug screen positive for benzodiazepine patient was given this medication before her cataract surgery    Complaint back pain buttock and leg pain right greater than left it she has known lumbar radiculopathy    She states current medications helping control her discomfort    Considering lumbar procedures    X-ray lumbar spine [facility] January 2020 multiple level degenerative changes grade 1 spondylolisthesis L1/2 L4/5 no movement during flexion extension    She would like to continue with conservative management    Continue current medication    Follow-up 3 months    Dr. Quintero, October 2022    Bring original prescription medication bottles/container/box with labels to each visit

## 2022-08-11 NOTE — TELEPHONE ENCOUNTER
RN spoke with Julia at Dr. Fields office, they state patient was last seen on 7/27/2022 and norvasc 5mg once daily is still on her med list. Pt should still be taking norvasc.

## 2022-08-24 PROBLEM — J90 PLEURAL EFFUSION ON RIGHT: Status: ACTIVE | Noted: 2022-01-01

## 2022-08-24 PROBLEM — Z99.2 DEPENDENCE ON PERITONEAL DIALYSIS: Status: ACTIVE | Noted: 2022-01-01

## 2022-08-24 NOTE — PROGRESS NOTES
Subjective:       Patient ID: Bria Ray is a 87 y.o. female.    Chief Complaint: Shortness of Breath (Ongoing for 2 days.)    Pt. Is now a dialysis patient. She is a patient of Dr. HOA Bautista. She was told that she cannot be seen for another 2 weeks. She cannot lie down due to smothering. She has a right pleural effusion with loculation/scarring of right lung. O2 sat is 94% at rest.    Shortness of Breath  Pertinent negatives include no chest pain, fever, headaches or vomiting.     Review of Systems   Constitutional: Negative for fatigue and fever.   HENT: Negative for nasal congestion and dental problem.    Eyes: Negative for discharge.   Respiratory: Positive for shortness of breath. Negative for cough.    Cardiovascular: Negative for chest pain.   Gastrointestinal: Negative for constipation, diarrhea, nausea and vomiting.   Genitourinary: Negative for bladder incontinence, difficulty urinating and hot flashes.   Musculoskeletal: Positive for arthralgias.   Allergic/Immunologic: Negative for environmental allergies.   Neurological: Negative for headaches.   Psychiatric/Behavioral: Negative for behavioral problems and confusion.         Objective:      Physical Exam  Vitals and nursing note reviewed.   Constitutional:       Appearance: Normal appearance. She is normal weight.   HENT:      Head: Normocephalic and atraumatic.      Right Ear: Tympanic membrane normal.      Left Ear: Tympanic membrane normal.      Nose: Nose normal.      Mouth/Throat:      Mouth: Mucous membranes are moist.   Eyes:      Extraocular Movements: Extraocular movements intact.      Conjunctiva/sclera: Conjunctivae normal.      Pupils: Pupils are equal, round, and reactive to light.   Cardiovascular:      Rate and Rhythm: Normal rate and regular rhythm.      Pulses: Normal pulses.   Pulmonary:      Effort: Pulmonary effort is normal.      Breath sounds: Normal breath sounds.       Abdominal:      General: Abdomen is flat. Bowel sounds  are normal.      Palpations: Abdomen is soft.   Musculoskeletal:         General: Normal range of motion.      Cervical back: Normal range of motion and neck supple.      Comments: Multiple site arthritis; no leg edema noted   Skin:     General: Skin is warm and dry.   Neurological:      General: No focal deficit present.      Mental Status: She is alert and oriented to person, place, and time.   Psychiatric:         Mood and Affect: Mood normal.         Assessment:       Problem List Items Addressed This Visit        Pulmonary    Chronic obstructive pulmonary disease    Pleural effusion on right       Renal/    Dependence on peritoneal dialysis       Endocrine    Type 2 diabetes mellitus with chronic kidney disease on chronic dialysis, without long-term current use of insulin      Other Visit Diagnoses     SOB (shortness of breath)    -  Primary    Relevant Orders    X-Ray Chest PA And Lateral          Plan:       Called Dr. Bautista. He will see her tomorrow morning

## 2022-08-25 NOTE — ASSESSMENT & PLAN NOTE
I am not sure this is a pleural effusion were going to send her down for CT chest to re-evaluate what is going on.  There is some prominence in the right hilum I am concerned there could be an obstructive lesion here  CT shows right pleural effusion will send for thoracentesis and send for studies will see

## 2022-08-25 NOTE — PROGRESS NOTES
Subjective:       Patient ID: Bria Ray is a 87 y.o. female.    Chief Complaint: Shortness of Breath, Fatigue, and Pleural Effusion    Shortness of Breath  This is a chronic problem. The current episode started 1 to 4 weeks ago. The problem occurs daily. The problem has been gradually worsening. Associated symptoms include leg swelling. Pertinent negatives include no abdominal pain, chest pain, ear pain, headaches or rash. The symptoms are aggravated by exercise. The patient has no known risk factors for DVT/PE. The treatment provided no relief.   Fatigue  Associated symptoms include fatigue. Pertinent negatives include no abdominal pain, arthralgias, chest pain, chills, congestion, headaches or rash.   Pleural Effusion  Associated symptoms include fatigue. Pertinent negatives include no abdominal pain, arthralgias, chest pain, chills, congestion, headaches or rash.     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, without long-term current use of insulin     Vertigo     Vitamin D deficiency      Past Surgical History:   Procedure Laterality Date    BLADDER SUSPENSION      BRONCHOSCOPY       11/2020    CHOLECYSTECTOMY      FRACTURE SURGERY      HERNIA REPAIR      HIP FRACTURE SURGERY Right 2005    HYSTERECTOMY      INSERTION OF DIALYSIS CATHETER Right 4/22/2022    Procedure: INSERTION, CATHETER, DIALYSIS;  Surgeon: Terence Jeffery MD;  Location: [facility];  Service: General;   Laterality: Right;     Family History   Problem Relation Age of Onset    Diabetes Mother     Alcohol abuse Father     Diabetes Sister     Asthma Son     Cancer Other      Review of patient's allergies indicates:  Not on File   Social History     Tobacco Use    Smoking status: Never Smoker    Smokeless tobacco: Never Used   Substance Use Topics    Alcohol use: Never    Drug use: Never      Review of Systems   Constitutional: Positive for fatigue. Negative for chills, activity change and night sweats.   HENT: Negative for congestion and ear pain.    Eyes: Negative for redness and itching.   Respiratory: Positive for shortness of breath.    Cardiovascular: Positive for leg swelling. Negative for chest pain and palpitations.   Musculoskeletal: Negative for arthralgias and back pain.   Skin: Negative for rash.   Gastrointestinal: Negative for abdominal pain and abdominal distention.   Neurological: Negative for dizziness and headaches.   Hematological: Negative for adenopathy. Does not bruise/bleed easily.   Psychiatric/Behavioral: Negative for confusion. The patient is not nervous/anxious.        Objective:      Physical Exam   Constitutional: She is oriented to person, place, and time. She appears well-developed and well-nourished.   HENT:   Head: Normocephalic.   Nose: Nose normal.   Mouth/Throat: Oropharynx is clear and moist.   Neck: No JVD present. No thyromegaly present.   Cardiovascular: Normal rate, regular rhythm, normal heart sounds and intact distal pulses.   Pulmonary/Chest: Normal expansion, hyperinflation, symmetric chest wall expansion, effort normal and breath sounds normal.   Abdominal: Soft. Bowel sounds are normal.   Musculoskeletal:         General: Normal range of motion.      Cervical back: Normal range of motion and neck supple.   Lymphadenopathy: No supraclavicular adenopathy is present.     She has no cervical adenopathy.   Neurological: She is alert and oriented to person, place, and  time. She has normal reflexes.   Skin: Skin is warm and dry.   Psychiatric: She has a normal mood and affect. Her behavior is normal.     Personal Diagnostic Review  none pertinent    No flowsheet data found.      Assessment:       1. Pleural effusion, not elsewhere classified    2. Pleural effusion on right        Outpatient Encounter Medications as of 8/25/2022   Medication Sig Dispense Refill    albuterol (PROVENTIL) 2.5 mg /3 mL (0.083 %) nebulizer solution       albuterol (VENTOLIN HFA) 90 mcg/actuation inhaler Inhale 2 puffs into the lungs every 6 (six) hours as needed for Wheezing. Rescue 8 g 5    allopurinoL (ZYLOPRIM) 100 MG tablet Take 1 tablet (100 mg total) by mouth once daily. 90 tablet 3    amLODIPine (NORVASC) 5 MG tablet Take 1 tablet (5 mg total) by mouth once daily. 90 tablet 1    aspirin (ECOTRIN) 81 MG EC tablet Take 1 tablet (81 mg total) by mouth once daily. 90 tablet 3    blood sugar diagnostic (BLOOD GLUCOSE TEST) Strp Use to check blood glucose 2x daily 200 strip 3    blood-glucose meter Misc Use to check blood glucose 2x daily 1 each 0    budesonide-formoterol 160-4.5 mcg (SYMBICORT) 160-4.5 mcg/actuation HFAA Inhale 2 puffs into the lungs every 12 (twelve) hours. Controller 10.2 g 5    calcitRIOL (ROCALTROL) 0.25 MCG Cap Take 0.25 mcg by mouth once daily.      calcium carbonate (TUMS) 200 mg calcium (500 mg) chewable tablet Take 1 tablet (500 mg total) by mouth once daily. 30 tablet 0    cyanocobalamin (VITAMIN B-12) 100 MCG tablet Take 100 mcg by mouth once daily.      gabapentin (NEURONTIN) 100 MG capsule Take 1 capsule (100 mg total) by mouth 2 (two) times daily. 60 capsule 2    hydrALAZINE (APRESOLINE) 100 MG tablet Take 1 tablet (100 mg total) by mouth every 8 (eight) hours. 90 tablet 11    HYDROcodone-acetaminophen (NORCO)  mg per tablet Take 1 tablet by mouth every 12 (twelve) hours. 60 tablet 0    [START ON 8/26/2022] HYDROcodone-acetaminophen (NORCO)  mg  per tablet Take 1 tablet by mouth every 12 (twelve) hours. 60 tablet 0    [START ON 9/23/2022] HYDROcodone-acetaminophen (NORCO)  mg per tablet Take 1 tablet by mouth every 12 (twelve) hours. 60 tablet 0    sevelamer carbonate (RENVELA) 800 mg Tab       simvastatin (ZOCOR) 20 MG tablet Take 1 tablet (20 mg total) by mouth every evening. 90 tablet 3    SITagliptin (JANUVIA) 25 MG Tab Take 1 tablet (25 mg total) by mouth once daily. 90 tablet 1    [DISCONTINUED] glipiZIDE (GLUCOTROL) 5 MG TR24 Take 1 tablet (5 mg total) by mouth daily with breakfast. 90 tablet 3     No facility-administered encounter medications on file as of 8/25/2022.     No orders of the defined types were placed in this encounter.      Plan:       Problem List Items Addressed This Visit        Pulmonary    Pleural effusion on right     I am not sure this is a pleural effusion were going to send her down for CT chest to re-evaluate what is going on.  There is some prominence in the right hilum I am concerned there could be an obstructive lesion here             Other Visit Diagnoses     Pleural effusion, not elsewhere classified    -  Primary

## 2022-08-25 NOTE — PROCEDURES
Procedure performed by Nick HERNANDEZ with assistance from Dr. Bauer. Patient was prepped with ChloraPrep and draped in a sterile manner.  Formal timeout was called with all present in agreement. 6 cc of lidocaine infused.  Six Welsh thoracentesis catheter advanced  into the right posterior thorax.  A total of 1100 cc of yellow colored fluid obtained and sent to the lab for analysis.  Fluid delivered to the lab by Paula Orta RN. Catheter was withdrawn  with pressure being held or puncture site.  Bandage then placed. Patient tolerated procedure well with no immediate complication.

## 2022-08-26 NOTE — TELEPHONE ENCOUNTER
"Call received from  earlier this afternoon.  On return call to her she reported   "cant breathe", tiesha when laying down, and   "sore" at site of yesterday's thoracentesis.    She confirmed she "felt better" last night after procedure (had 1100 ml fluid removed with Thoracentesis 8/25/22, yesterday)  and said that she slept some.  Reported problems returned today.  She was unable to name current meds:   Though could confirm grey and red inhalers   (Albuterol HFA gray : and Symbicort red).  Reported that she used O2 earlier today   (borrowed from family member) but "oxygen ran out".  Reported that she does not have Neb tx's   (Albuterol Neb listed on chart).  Reported that her O2 Sat was 93% today.    Jerri Michael was reached on return call later.  She confirmed that they borrowed family members O2 and will try replacing supply tonight.  She confirmed pt does have neb Tx's, and she just filled Norco for prn use.  Reported that pt's O2 Sats were 88-89 on room air earlier but 93% on O2.  She said she tried to encourage  to visit ER earlier today but pt declined.  Reinforced with Jerri that Neb tx can be used q 4-6 hr:  Alb HFA could be used q 4-6 hr.  Recliner position rather then flart position may help. Cool room and fan may help.  Reinfroced should  show confiusion or persistent Sats < 89% assessment encouraged.  She was encouraged to call status report, prn.//gp    Return call to  8/27: she reported pt's sats lowest 76-82 highest 87 on room air: "ran out' of borrowed O2.  ER for assessment was encouraged but  reported that again pt declined. She was encouraged to see local or ER should sats remain low.//gp  Call Mon 8/29:  MD receioved call from 's office: pt for admit today//gp   "

## 2022-08-29 PROBLEM — I50.9 CONGESTIVE HEART FAILURE: Status: ACTIVE | Noted: 2022-01-01

## 2022-08-29 PROBLEM — J93.9 PNEUMOTHORAX ON RIGHT: Status: ACTIVE | Noted: 2022-01-01

## 2022-08-29 NOTE — SUBJECTIVE & OBJECTIVE
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, without long-term current use of insulin     Vertigo     Vitamin D deficiency        Past Surgical History:   Procedure Laterality Date    BLADDER SUSPENSION      BRONCHOSCOPY       11/2020    CHOLECYSTECTOMY      FRACTURE SURGERY      HERNIA REPAIR      HIP FRACTURE SURGERY Right 2005    HYSTERECTOMY      INSERTION OF DIALYSIS CATHETER Right 4/22/2022    Procedure: INSERTION, CATHETER, DIALYSIS;  Surgeon: Terence Jeffery MD;  Location: [facility];  Service: General;  Laterality: Right;       Review of patient's allergies indicates:  No Known Allergies    No current facility-administered medications on file prior to encounter.     Current Outpatient Medications on File Prior to Encounter   Medication Sig    albuterol (PROVENTIL) 2.5 mg /3 mL (0.083 %) nebulizer solution     albuterol (VENTOLIN HFA) 90 mcg/actuation inhaler Inhale 2 puffs into the lungs every 6 (six) hours as needed for Wheezing. Rescue    allopurinoL (ZYLOPRIM) 100 MG tablet Take 1 tablet (100 mg total) by mouth once daily.    amLODIPine (NORVASC) 5 MG tablet Take 1 tablet (5 mg total) by mouth once daily.    aspirin (ECOTRIN) 81 MG EC tablet Take 1 tablet (81 mg total) by mouth once daily.    blood sugar diagnostic (BLOOD GLUCOSE TEST) Strp Use to check blood glucose 2x daily    blood-glucose meter Misc Use to check blood glucose 2x daily    budesonide-formoterol 160-4.5 mcg (SYMBICORT)  160-4.5 mcg/actuation HFAA Inhale 2 puffs into the lungs every 12 (twelve) hours. Controller    calcitRIOL (ROCALTROL) 0.25 MCG Cap Take 0.25 mcg by mouth once daily.    calcium carbonate (TUMS) 200 mg calcium (500 mg) chewable tablet Take 1 tablet (500 mg total) by mouth once daily.    cyanocobalamin (VITAMIN B-12) 100 MCG tablet Take 100 mcg by mouth once daily.    gabapentin (NEURONTIN) 100 MG capsule Take 1 capsule (100 mg total) by mouth 2 (two) times daily.    hydrALAZINE (APRESOLINE) 100 MG tablet Take 1 tablet (100 mg total) by mouth every 8 (eight) hours.    HYDROcodone-acetaminophen (NORCO)  mg per tablet Take 1 tablet by mouth every 12 (twelve) hours.    [START ON 9/23/2022] HYDROcodone-acetaminophen (NORCO)  mg per tablet Take 1 tablet by mouth every 12 (twelve) hours.    sevelamer carbonate (RENVELA) 800 mg Tab     simvastatin (ZOCOR) 20 MG tablet Take 1 tablet (20 mg total) by mouth every evening.    SITagliptin (JANUVIA) 25 MG Tab Take 1 tablet (25 mg total) by mouth once daily.    [DISCONTINUED] glipiZIDE (GLUCOTROL) 5 MG TR24 Take 1 tablet (5 mg total) by mouth daily with breakfast.     Family History       Problem Relation (Age of Onset)    Alcohol abuse Father    Asthma Son    Cancer Other    Diabetes Mother, Sister          Tobacco Use    Smoking status: Never    Smokeless tobacco: Never   Substance and Sexual Activity    Alcohol use: Never    Drug use: Never    Sexual activity: Not Currently     Review of Systems   Constitutional:  Positive for unexpected weight change. Negative for chills, fatigue and fever.   HENT: Negative.  Negative for congestion and rhinorrhea.    Respiratory:  Positive for cough and shortness of breath. Negative for apnea and chest tightness.    Cardiovascular: Negative.  Negative for chest pain and leg swelling.   Gastrointestinal: Negative.  Negative for abdominal pain, diarrhea, nausea and vomiting.   Endocrine: Negative.    Genitourinary: Negative.     Musculoskeletal:  Positive for arthralgias.   Skin: Negative.    Allergic/Immunologic: Negative.    Neurological: Negative.    Hematological: Negative.    Psychiatric/Behavioral: Negative.     Objective:     Vital Signs (Most Recent):  Temp: 97 °F (36.1 °C) (08/29/22 1250)  Pulse: 90 (08/29/22 1407)  Resp: 20 (08/29/22 1407)  BP: (!) 144/81 (08/29/22 1250)  SpO2: 100 % (08/29/22 1407)   Vital Signs (24h Range):  Temp:  [97 °F (36.1 °C)-97.8 °F (36.6 °C)] 97 °F (36.1 °C)  Pulse:  [] 90  Resp:  [20-22] 20  SpO2:  [88 %-100 %] 100 %  BP: (122-144)/(68-81) 144/81     Weight: 68.6 kg (151 lb 3.2 oz)  Body mass index is 25.16 kg/m².    Physical Exam  Vitals reviewed.   Constitutional:       Appearance: Normal appearance. She is ill-appearing.   HENT:      Head: Normocephalic and atraumatic.      Nose: Nose normal. No congestion.      Mouth/Throat:      Pharynx: Oropharynx is clear.   Eyes:      Extraocular Movements: Extraocular movements intact.      Conjunctiva/sclera: Conjunctivae normal.      Pupils: Pupils are equal, round, and reactive to light.   Cardiovascular:      Rate and Rhythm: Regular rhythm. Tachycardia present.      Pulses: Normal pulses.      Heart sounds: Murmur heard.   Pulmonary:      Breath sounds: Wheezing present.      Comments: Absent right sided breat sounds.   Abdominal:      General: Bowel sounds are normal. There is no distension.      Palpations: Abdomen is soft.      Tenderness: There is no abdominal tenderness.   Musculoskeletal:      Right lower leg: No edema.      Left lower leg: No edema.   Lymphadenopathy:      Cervical: No cervical adenopathy.   Skin:     General: Skin is warm.   Neurological:      Mental Status: She is alert. Mental status is at baseline.   Psychiatric:         Mood and Affect: Mood normal.         CRANIAL NERVES     CN III, IV, VI   Pupils are equal, round, and reactive to light.     Significant Labs: All pertinent labs within the past 24 hours have been  reviewed.    Significant Imaging: I have reviewed all pertinent imaging results/findings within the past 24 hours.

## 2022-08-29 NOTE — H&P
[facility] Medicine  History & Physical    Patient Name: Bria Ray  MRN: [id number]  Patient Class: IP- Inpatient  Admission Date: 8/29/2022  Attending Physician: Te Conrad MD   Primary Care Provider: Lorena Richardson MD         Patient information was obtained from patient.     Subjective:     Principal Problem:Pneumothorax on right    Chief Complaint: No chief complaint on file.       HPI: Patient is a 87-year-old female with past medical history of hypertension, diabetes mellitus, ESRD, COPD, DJD, presented from home as direct admission for right-sided pneumothorax.  Patient underwent right-sided thoracentesis last Thursday for pleural effusion, however since then she continued to have right-sided chest pain along with shortness of bread for which she got an x-ray revealing pneumothorax on the right side.  Patient was then instructed to come to the hospital for further care.  Patient's primary pulmonologist is Dr. Bautista, and has been seeing General surgery in the past for her dialysis access issues.  Patient is also on peritoneal dialysis at home.  Patient is accompanied by her granddaughter, who is trying to get her hospice setup outpatient due to chronic conditions and mostly how her health has been declining steadily for the past few months.  I had a long discussion about goals of care for the patient as of now the patient has not made her mind about her code status and would like to be full code until she has a discussion with the rest of her family.  General surgery and Nephrology consulted and made aware of the patient's arrival.  Patient denies any fevers chills nausea vomiting or diarrhea does have cough and shortness of breath along with right-sided chest pain.      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, without long-term current use of insulin     Vertigo     Vitamin D deficiency        Past Surgical History:   Procedure Laterality Date    BLADDER SUSPENSION      BRONCHOSCOPY       11/2020    CHOLECYSTECTOMY      FRACTURE SURGERY      HERNIA REPAIR      HIP FRACTURE SURGERY Right 2005    HYSTERECTOMY      INSERTION OF DIALYSIS CATHETER Right 4/22/2022    Procedure: INSERTION, CATHETER, DIALYSIS;  Surgeon: Terence Jeffery MD;  Location: [facility];  Service: General;  Laterality: Right;       Review of patient's allergies indicates:  No Known Allergies    No current facility-administered medications on file prior to encounter.     Current Outpatient Medications on File Prior to Encounter   Medication Sig    albuterol (PROVENTIL) 2.5 mg /3 mL (0.083 %) nebulizer solution     albuterol (VENTOLIN HFA) 90 mcg/actuation inhaler Inhale 2 puffs into the lungs every 6 (six) hours as needed for Wheezing. Rescue    allopurinoL (ZYLOPRIM) 100 MG tablet Take 1 tablet (100 mg total) by mouth once daily.    amLODIPine (NORVASC) 5 MG tablet Take 1 tablet (5 mg total) by mouth once daily.    aspirin (ECOTRIN) 81 MG EC tablet Take 1 tablet (81 mg total) by mouth once daily.    blood sugar diagnostic (BLOOD GLUCOSE TEST) Strp Use to check blood glucose 2x daily    blood-glucose meter Misc Use to check blood glucose 2x daily    budesonide-formoterol 160-4.5 mcg (SYMBICORT) 160-4.5 mcg/actuation HFAA Inhale 2 puffs into the lungs every 12 (twelve) hours. Controller    calcitRIOL (ROCALTROL) 0.25 MCG Cap Take 0.25  mcg by mouth once daily.    calcium carbonate (TUMS) 200 mg calcium (500 mg) chewable tablet Take 1 tablet (500 mg total) by mouth once daily.    cyanocobalamin (VITAMIN B-12) 100 MCG tablet Take 100 mcg by mouth once daily.    gabapentin (NEURONTIN) 100 MG capsule Take 1 capsule (100 mg total) by mouth 2 (two) times daily.    hydrALAZINE (APRESOLINE) 100 MG tablet Take 1 tablet (100 mg total) by mouth every 8 (eight) hours.    HYDROcodone-acetaminophen (NORCO)  mg per tablet Take 1 tablet by mouth every 12 (twelve) hours.    [START ON 9/23/2022] HYDROcodone-acetaminophen (NORCO)  mg per tablet Take 1 tablet by mouth every 12 (twelve) hours.    sevelamer carbonate (RENVELA) 800 mg Tab     simvastatin (ZOCOR) 20 MG tablet Take 1 tablet (20 mg total) by mouth every evening.    SITagliptin (JANUVIA) 25 MG Tab Take 1 tablet (25 mg total) by mouth once daily.    [DISCONTINUED] glipiZIDE (GLUCOTROL) 5 MG TR24 Take 1 tablet (5 mg total) by mouth daily with breakfast.     Family History       Problem Relation (Age of Onset)    Alcohol abuse Father    Asthma Son    Cancer Other    Diabetes Mother, Sister          Tobacco Use    Smoking status: Never    Smokeless tobacco: Never   Substance and Sexual Activity    Alcohol use: Never    Drug use: Never    Sexual activity: Not Currently     Review of Systems   Constitutional:  Positive for unexpected weight change. Negative for chills, fatigue and fever.   HENT: Negative.  Negative for congestion and rhinorrhea.    Respiratory:  Positive for cough and shortness of breath. Negative for apnea and chest tightness.    Cardiovascular: Negative.  Negative for chest pain and leg swelling.   Gastrointestinal: Negative.  Negative for abdominal pain, diarrhea, nausea and vomiting.   Endocrine: Negative.    Genitourinary: Negative.    Musculoskeletal:  Positive for arthralgias.   Skin: Negative.    Allergic/Immunologic: Negative.    Neurological: Negative.     Hematological: Negative.    Psychiatric/Behavioral: Negative.     Objective:     Vital Signs (Most Recent):  Temp: 97 °F (36.1 °C) (08/29/22 1250)  Pulse: 90 (08/29/22 1407)  Resp: 20 (08/29/22 1407)  BP: (!) 144/81 (08/29/22 1250)  SpO2: 100 % (08/29/22 1407)   Vital Signs (24h Range):  Temp:  [97 °F (36.1 °C)-97.8 °F (36.6 °C)] 97 °F (36.1 °C)  Pulse:  [] 90  Resp:  [20-22] 20  SpO2:  [88 %-100 %] 100 %  BP: (122-144)/(68-81) 144/81     Weight: 68.6 kg (151 lb 3.2 oz)  Body mass index is 25.16 kg/m².    Physical Exam  Vitals reviewed.   Constitutional:       Appearance: Normal appearance. She is ill-appearing.   HENT:      Head: Normocephalic and atraumatic.      Nose: Nose normal. No congestion.      Mouth/Throat:      Pharynx: Oropharynx is clear.   Eyes:      Extraocular Movements: Extraocular movements intact.      Conjunctiva/sclera: Conjunctivae normal.      Pupils: Pupils are equal, round, and reactive to light.   Cardiovascular:      Rate and Rhythm: Regular rhythm. Tachycardia present.      Pulses: Normal pulses.      Heart sounds: Murmur heard.   Pulmonary:      Breath sounds: Wheezing present.      Comments: Absent right sided breat sounds.   Abdominal:      General: Bowel sounds are normal. There is no distension.      Palpations: Abdomen is soft.      Tenderness: There is no abdominal tenderness.   Musculoskeletal:      Right lower leg: No edema.      Left lower leg: No edema.   Lymphadenopathy:      Cervical: No cervical adenopathy.   Skin:     General: Skin is warm.   Neurological:      Mental Status: She is alert. Mental status is at baseline.   Psychiatric:         Mood and Affect: Mood normal.         CRANIAL NERVES     CN III, IV, VI   Pupils are equal, round, and reactive to light.     Significant Labs: All pertinent labs within the past 24 hours have been reviewed.    Significant Imaging: I have reviewed all pertinent imaging results/findings within the past 24  hours.    Assessment/Plan:     * Pneumothorax on right  Face mask oxygen, 100%  Consult general surgery for Chest tube  Likely a result of thoracentesis  Monitor on tele  She is HDS    ESRD (end stage renal disease)    Cont IP PD  Consult nephrology     DM (diabetes mellitus)  Patient's FSGs are controlled on current medication regimen.  Last A1c reviewed-   Lab Results   Component Value Date    HGBA1C 5.5 04/07/2022     Most recent fingerstick glucose reviewed- No results for input(s): POCTGLUCOSE in the last 24 hours.  Current correctional scale  Medium  Maintain anti-hyperglycemic dose as follows-   Antihyperglycemics (From admission, onward)    Start     Stop Route Frequency Ordered    08/29/22 1432  insulin aspart U-100 injection 1-10 Units         -- SubQ Before meals & nightly PRN 08/29/22 1333        Hold Oral hypoglycemics while patient is in the hospital.    Chronic obstructive pulmonary disease  Inhalers.   Oxygen support      Primary hypertension  Cont home medications.       Chronic pain syndrome  Cont gabapentin  Prn norco        VTE Risk Mitigation (From admission, onward)         Ordered     heparin (porcine) injection 5,000 Units  Every 12 hours         08/29/22 1333     IP VTE HIGH RISK PATIENT  Once         08/29/22 1333     Place sequential compression device  Until discontinued         08/29/22 1333               Pt and grand daughter requested hospice on d/c. Will consult ROBBIE Conrad MD  Department of [facility] Medicine   [facility]

## 2022-08-29 NOTE — PROGRESS NOTES
Subjective:       Patient ID: Bria Ray is a 87 y.o. female.    Chief Complaint: Shortness of Breath    Pt. Is back. Having severe SOB. CXR demonstrated a pneumothorax with effusion on the right. Dr. HOA Bautista was called. He accepted pt. To room [facility] at [facility]. She had a procedure last week.    Shortness of Breath  Pertinent negatives include no chest pain, fever, headaches or vomiting.   Review of Systems   Constitutional:  Negative for fatigue and fever.   HENT:  Negative for nasal congestion and dental problem.    Eyes:  Negative for discharge.   Respiratory:  Positive for shortness of breath. Negative for cough.    Cardiovascular:  Negative for chest pain.   Gastrointestinal:  Negative for constipation, diarrhea, nausea and vomiting.   Genitourinary:  Negative for bladder incontinence, difficulty urinating and hot flashes.   Allergic/Immunologic: Negative for environmental allergies.   Neurological:  Negative for headaches.   Psychiatric/Behavioral:  Negative for behavioral problems and confusion.        Objective:      Physical Exam  Vitals and nursing note reviewed.   Constitutional:       Appearance: Normal appearance. She is normal weight.   HENT:      Head: Normocephalic and atraumatic.      Right Ear: Tympanic membrane normal.      Left Ear: Tympanic membrane normal.      Nose: Nose normal.      Mouth/Throat:      Mouth: Mucous membranes are moist.   Eyes:      Extraocular Movements: Extraocular movements intact.      Conjunctiva/sclera: Conjunctivae normal.      Pupils: Pupils are equal, round, and reactive to light.   Cardiovascular:      Rate and Rhythm: Normal rate and regular rhythm.      Pulses: Normal pulses.   Pulmonary:      Effort: Pulmonary effort is normal.      Breath sounds: Normal breath sounds.      Comments: No air movement on the right  Abdominal:      General: Abdomen is flat. Bowel sounds are normal.      Palpations: Abdomen is soft.   Musculoskeletal:         General: Normal  range of motion.      Cervical back: Normal range of motion and neck supple.   Skin:     General: Skin is warm and dry.   Neurological:      General: No focal deficit present.      Mental Status: She is alert and oriented to person, place, and time.   Psychiatric:         Mood and Affect: Mood normal.       Assessment:       Problem List Items Addressed This Visit          Pulmonary    Pleural effusion on right - Primary    Pneumothorax on right         Plan:    Pt. Has been sent to [facility] by ambulance to be under the care of Dr. HOA Bautista

## 2022-08-29 NOTE — ASSESSMENT & PLAN NOTE
Face mask oxygen, 100%  Consult general surgery for Chest tube  Likely a result of thoracentesis  Monitor on tele  She is HDS

## 2022-08-29 NOTE — HPI
Patient is a 87-year-old female with past medical history of hypertension, diabetes mellitus, ESRD, COPD, DJD, presented from home as direct admission for right-sided pneumothorax.  Patient underwent right-sided thoracentesis last Thursday for pleural effusion, however since then she continued to have right-sided chest pain along with shortness of bread for which she got an x-ray revealing pneumothorax on the right side.  Patient was then instructed to come to the hospital for further care.  Patient's primary pulmonologist is Dr. Bautista, and has been seeing General surgery in the past for her dialysis access issues.  Patient is also on peritoneal dialysis at home.  Patient is accompanied by her granddaughter, who is trying to get her hospice setup outpatient due to chronic conditions and mostly how her health has been declining steadily for the past few months.  I had a long discussion about goals of care for the patient as of now the patient has not made her mind about her code status and would like to be full code until she has a discussion with the rest of her family.  General surgery and Nephrology consulted and made aware of the patient's arrival.  Patient denies any fevers chills nausea vomiting or diarrhea does have cough and shortness of breath along with right-sided chest pain.

## 2022-08-29 NOTE — ASSESSMENT & PLAN NOTE
Patient's FSGs are controlled on current medication regimen.  Last A1c reviewed-   Lab Results   Component Value Date    HGBA1C 5.5 04/07/2022     Most recent fingerstick glucose reviewed- No results for input(s): POCTGLUCOSE in the last 24 hours.  Current correctional scale  Medium  Maintain anti-hyperglycemic dose as follows-   Antihyperglycemics (From admission, onward)    Start     Stop Route Frequency Ordered    08/29/22 1432  insulin aspart U-100 injection 1-10 Units         -- SubQ Before meals & nightly PRN 08/29/22 1333        Hold Oral hypoglycemics while patient is in the hospital.

## 2022-08-29 NOTE — PLAN OF CARE
[facility]  Initial Discharge Assessment       Primary Care Provider: Lorena Richardson MD    Admission Diagnosis: Pneumothorax [J93.9]    Admission Date: 8/29/2022  Expected Discharge Date:     Discharge Barriers Identified: None    Payor: HU[employer] MANAGED MEDICARE / Plan: HU[employer] MEDICARE PPO / Product Type: Medicare Advantage /     Extended Emergency Contact Information  Primary Emergency Contact: Jacky Pierce  Home Phone: [phone]  Mobile Phone: [phone]  Relation: Son  Preferred language: English   needed? No  Secondary Emergency Contact: EverardoJerri borjas  Address: [address]  Work Phone: [phone]  Mobile Phone: [phone]  Relation: Grandchild  Preferred language: English   needed? No    Discharge Plan A: Home, Hospice/home  Discharge Plan B: Home with family, Hospice/home       [facility] [place] [address] [place]  [address] [facility] [place] [address]  Phone: [phone] Fax: [phone]    [facility] ([facility]) - [facility] [address] [facility] [facility]  [address] [facility] [address]  Phone: [phone] Fax: [phone]    [facility] - [place], [place] - [address]  [address]  [place] [address]  Phone: [phone] Fax: [phone]    [facility] - [place], [place] - [address]  [address]  [place] [address]  Phone: [phone] Fax: [phone]    [id number] - [place], [place] - [address] [facility] [address]  [address]  [facility] [place] [address]  Phone: [phone] Fax: [phone]      Initial Assessment (most recent)       Adult Discharge Assessment - 08/29/22 1612          Discharge Assessment    Assessment Type Discharge Planning Assessment     Source of Information patient;family     Communicated ANGY with patient/caregiver  Date not available/Unable to determine     Lives With alone     Do you expect to return to your current living situation? Yes     Do you have help at home or someone to help you manage your care at home? Yes     Who are your caregiver(s) and their phone number(s)? Jacky Pierce (son) [phone]     Prior to hospitilization cognitive status: Alert/Oriented     Current cognitive status: Alert/Oriented     Walking or Climbing Stairs Difficulty ambulation difficulty, requires equipment     Mobility Management walker     Dressing/Bathing Difficulty none     Home Accessibility wheelchair accessible     Home Layout Able to live on 1st floor     Equipment Currently Used at Home walker, rolling   home PD machine    Readmission within 30 days? No     Patient currently being followed by outpatient case management? No     Do you currently have service(s) that help you manage your care at home? No     Do you take prescription medications? Yes     Do you have prescription coverage? Yes     Do you have any problems affording any of your prescribed medications? No     Is the patient taking medications as prescribed? yes     Who is going to help you get home at discharge? Jacky Pierce (son) [phone]     How do you get to doctors appointments? family or friend will provide     Are you on dialysis? Yes     Dialysis Name and Scheduled days Home PD     Do you take coumadin? No     Discharge Plan A Home;Hospice/home     Discharge Plan B Home with family;Hospice/home     DME Needed Upon Discharge  none     Discharge Plan discussed with: Patient;Adult children     Discharge Barriers Identified None                      SS received consult for hospice. SS spoke with patient and family. Family has already been working on setting up hospice through [facility]. Choice obtained for [facility]. Referral faxed. Patient lives at home alone. Her son, Jacky and her granddaughter, Jerri, provide support. Patient has a walker for home use.  She is not current with home health. IMM reviewed and signature obtained. SS following.

## 2022-08-29 NOTE — CONSULTS
[facility] - [address] [place] [address]  General Surgery  Consult Note    Inpatient consult to General Surgery  Consult performed by: Terence Jeffery MD  Consult ordered by: Te Conrad MD  Reason for consult: Right-sided pneumothorax  Assessment/Recommendations: Chest tube placed at the bedside.  Patient tolerated the procedure well.  See the procedure note for more details.      Subjective:     Chief Complaint/Reason for Admission:  Right-sided pneumothorax    History of Present Illness:  87-year-old female familiar to my service she had a recent peritoneal dialysis catheter placed which has been functioning well.  She has a history of recurrent right-sided pleural effusions which have been tapped before in the past.  She had a right-sided thoracentesis last week and had initially felt better but then had progressively worsening shortness of breath over the weekend.  X-ray this morning showed almost complete collapse of the right lung.  Patient has been short of breath on home O2 over the weekend.    No current facility-administered medications on file prior to encounter.     Current Outpatient Medications on File Prior to Encounter   Medication Sig    albuterol (PROVENTIL) 2.5 mg /3 mL (0.083 %) nebulizer solution     albuterol (VENTOLIN HFA) 90 mcg/actuation inhaler Inhale 2 puffs into the lungs every 6 (six) hours as needed for Wheezing. Rescue    allopurinoL (ZYLOPRIM) 100 MG tablet Take 1 tablet (100 mg total) by mouth once daily.    amLODIPine (NORVASC) 5 MG tablet Take 1 tablet (5 mg total) by mouth once daily.    aspirin (ECOTRIN) 81 MG EC tablet Take 1 tablet (81 mg total) by mouth once daily.    blood sugar diagnostic (BLOOD GLUCOSE TEST) Strp Use to check blood glucose 2x daily    blood-glucose meter Misc Use to check blood glucose 2x daily    budesonide-formoterol 160-4.5 mcg (SYMBICORT) 160-4.5 mcg/actuation HFAA Inhale 2 puffs into the lungs every 12 (twelve) hours. Controller     calcitRIOL (ROCALTROL) 0.25 MCG Cap Take 0.25 mcg by mouth once daily.    calcium carbonate (TUMS) 200 mg calcium (500 mg) chewable tablet Take 1 tablet (500 mg total) by mouth once daily.    cyanocobalamin (VITAMIN B-12) 100 MCG tablet Take 100 mcg by mouth once daily.    gabapentin (NEURONTIN) 100 MG capsule Take 1 capsule (100 mg total) by mouth 2 (two) times daily.    hydrALAZINE (APRESOLINE) 100 MG tablet Take 1 tablet (100 mg total) by mouth every 8 (eight) hours.    HYDROcodone-acetaminophen (NORCO)  mg per tablet Take 1 tablet by mouth every 12 (twelve) hours.    [START ON 9/23/2022] HYDROcodone-acetaminophen (NORCO)  mg per tablet Take 1 tablet by mouth every 12 (twelve) hours.    sevelamer carbonate (RENVELA) 800 mg Tab     simvastatin (ZOCOR) 20 MG tablet Take 1 tablet (20 mg total) by mouth every evening.    SITagliptin (JANUVIA) 25 MG Tab Take 1 tablet (25 mg total) by mouth once daily.    [DISCONTINUED] glipiZIDE (GLUCOTROL) 5 MG TR24 Take 1 tablet (5 mg total) by mouth daily with breakfast.       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, without long-term current use of insulin     Vertigo     Vitamin D deficiency      Past Surgical History:   Procedure Laterality Date    BLADDER SUSPENSION      BRONCHOSCOPY       11/2020    CHOLECYSTECTOMY      FRACTURE SURGERY      HERNIA REPAIR      HIP FRACTURE SURGERY Right  2005    HYSTERECTOMY      INSERTION OF DIALYSIS CATHETER Right 4/22/2022    Procedure: INSERTION, CATHETER, DIALYSIS;  Surgeon: Terence Jeffery MD;  Location: [facility];  Service: General;  Laterality: Right;     Family History       Problem Relation (Age of Onset)    Alcohol abuse Father    Asthma Son    Cancer Other    Diabetes Mother, Sister          Tobacco Use    Smoking status: Never    Smokeless tobacco: Never   Substance and Sexual Activity    Alcohol use: Never    Drug use: Never    Sexual activity: Not Currently     Review of Systems   Constitutional:  Positive for activity change and chills.   Respiratory:  Positive for cough, chest tightness and shortness of breath.    Cardiovascular:  Negative for chest pain.   Gastrointestinal:  Negative for abdominal pain.   Genitourinary:  Negative for difficulty urinating.   Skin:  Negative for color change.   Psychiatric/Behavioral:  Negative for behavioral problems.    Objective:     Vital Signs (Most Recent):  Temp: 97 °F (36.1 °C) (08/29/22 1600)  Pulse: 103 (08/29/22 1600)  Resp: 19 (08/29/22 1600)  BP: 125/68 (08/29/22 1600)  SpO2: 99 % (08/29/22 1600) Vital Signs (24h Range):  Temp:  [97 °F (36.1 °C)-97.8 °F (36.6 °C)] 97 °F (36.1 °C)  Pulse:  [] 103  Resp:  [19-22] 19  SpO2:  [88 %-100 %] 99 %  BP: (122-144)/(68-81) 125/68     Weight: 68.6 kg (151 lb 3.2 oz)  Body mass index is 25.16 kg/m².    No intake or output data in the 24 hours ending 08/29/22 1748    Physical Exam  Constitutional:       Appearance: She is ill-appearing.   Cardiovascular:      Rate and Rhythm: Normal rate.   Pulmonary:      Comments: Decreased breath sounds on the right  Abdominal:      General: There is no distension.   Musculoskeletal:      Cervical back: Normal range of motion.       Significant Labs:  CBC:   Recent Labs   Lab 08/29/22  1459   WBC 8.29   RBC 3.44*   HGB 11.3*   HCT 34.3*   *   MCV 99.7*   MCH 32.8*   MCHC 32.9     CMP:   Recent Labs   Lab  08/29/22  1459   *   CALCIUM 8.8   ALBUMIN 2.5*   PROT 6.8      K 3.4*   CO2 29   CL 99   BUN 36*   CREATININE 6.91*   ALKPHOS 74   ALT 9*   AST 16   BILITOT 0.4       Significant Diagnostics:  CXR: I have reviewed all pertinent results/findings within the past 24 hours.  Right-sided pneumothorax which improved after chest tube placement    Assessment/Plan:     Active Diagnoses:    Diagnosis Date Noted POA    PRINCIPAL PROBLEM:  Pneumothorax on right [J93.9] 08/29/2022 Yes    ESRD (end stage renal disease) [N18.6]  Unknown    Chronic obstructive pulmonary disease [J44.9]  Yes    DM (diabetes mellitus) [E11.9]  Unknown    Primary hypertension [I10] 01/18/2022 Yes    Chronic pain syndrome [G89.4]  Yes     Chronic      Problems Resolved During this Admission:       Thank you for your consult. I will follow-up with patient. Please contact us if you have any additional questions.    After informed consent right chest was prepped and draped in the usual sterile fashion.  5th intercostal space in the midaxillary line was prepped and draped out.  Local was injected.  Stab incision was made and 28 Lao chest tube was inserted and guided towards the apex.  Secured in place at 12 cm of the skin.  We then very carefully and slowly over the next 45 minutes slowly reinflated the lung and drained out the fluid.  Given the nature of the collapsed lung we did not want to cause any flash pulmonary edema.  Patient tolerated the procedure well.  Approximately 1500 cc of straw-colored fluid came out.    Terence Jeffery MD  General Surgery  [facility] - [address] [place] [address]

## 2022-08-29 NOTE — H&P (VIEW-ONLY)
[facility] - [address] [place] [address]  General Surgery  Consult Note    Inpatient consult to General Surgery  Consult performed by: Terence Jeffery MD  Consult ordered by: Te Conrad MD  Reason for consult: Right-sided pneumothorax  Assessment/Recommendations: Chest tube placed at the bedside.  Patient tolerated the procedure well.  See the procedure note for more details.      Subjective:     Chief Complaint/Reason for Admission:  Right-sided pneumothorax    History of Present Illness:  87-year-old female familiar to my service she had a recent peritoneal dialysis catheter placed which has been functioning well.  She has a history of recurrent right-sided pleural effusions which have been tapped before in the past.  She had a right-sided thoracentesis last week and had initially felt better but then had progressively worsening shortness of breath over the weekend.  X-ray this morning showed almost complete collapse of the right lung.  Patient has been short of breath on home O2 over the weekend.    No current facility-administered medications on file prior to encounter.     Current Outpatient Medications on File Prior to Encounter   Medication Sig    albuterol (PROVENTIL) 2.5 mg /3 mL (0.083 %) nebulizer solution     albuterol (VENTOLIN HFA) 90 mcg/actuation inhaler Inhale 2 puffs into the lungs every 6 (six) hours as needed for Wheezing. Rescue    allopurinoL (ZYLOPRIM) 100 MG tablet Take 1 tablet (100 mg total) by mouth once daily.    amLODIPine (NORVASC) 5 MG tablet Take 1 tablet (5 mg total) by mouth once daily.    aspirin (ECOTRIN) 81 MG EC tablet Take 1 tablet (81 mg total) by mouth once daily.    blood sugar diagnostic (BLOOD GLUCOSE TEST) Strp Use to check blood glucose 2x daily    blood-glucose meter Misc Use to check blood glucose 2x daily    budesonide-formoterol 160-4.5 mcg (SYMBICORT) 160-4.5 mcg/actuation HFAA Inhale 2 puffs into the lungs every 12 (twelve) hours. Controller     calcitRIOL (ROCALTROL) 0.25 MCG Cap Take 0.25 mcg by mouth once daily.    calcium carbonate (TUMS) 200 mg calcium (500 mg) chewable tablet Take 1 tablet (500 mg total) by mouth once daily.    cyanocobalamin (VITAMIN B-12) 100 MCG tablet Take 100 mcg by mouth once daily.    gabapentin (NEURONTIN) 100 MG capsule Take 1 capsule (100 mg total) by mouth 2 (two) times daily.    hydrALAZINE (APRESOLINE) 100 MG tablet Take 1 tablet (100 mg total) by mouth every 8 (eight) hours.    HYDROcodone-acetaminophen (NORCO)  mg per tablet Take 1 tablet by mouth every 12 (twelve) hours.    [START ON 9/23/2022] HYDROcodone-acetaminophen (NORCO)  mg per tablet Take 1 tablet by mouth every 12 (twelve) hours.    sevelamer carbonate (RENVELA) 800 mg Tab     simvastatin (ZOCOR) 20 MG tablet Take 1 tablet (20 mg total) by mouth every evening.    SITagliptin (JANUVIA) 25 MG Tab Take 1 tablet (25 mg total) by mouth once daily.    [DISCONTINUED] glipiZIDE (GLUCOTROL) 5 MG TR24 Take 1 tablet (5 mg total) by mouth daily with breakfast.       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, without long-term current use of insulin     Vertigo     Vitamin D deficiency      Past Surgical History:   Procedure Laterality Date    BLADDER SUSPENSION      BRONCHOSCOPY       11/2020    CHOLECYSTECTOMY      FRACTURE SURGERY      HERNIA REPAIR      HIP FRACTURE SURGERY Right  2005    HYSTERECTOMY      INSERTION OF DIALYSIS CATHETER Right 4/22/2022    Procedure: INSERTION, CATHETER, DIALYSIS;  Surgeon: Terence Jeffery MD;  Location: [facility];  Service: General;  Laterality: Right;     Family History       Problem Relation (Age of Onset)    Alcohol abuse Father    Asthma Son    Cancer Other    Diabetes Mother, Sister          Tobacco Use    Smoking status: Never    Smokeless tobacco: Never   Substance and Sexual Activity    Alcohol use: Never    Drug use: Never    Sexual activity: Not Currently     Review of Systems   Constitutional:  Positive for activity change and chills.   Respiratory:  Positive for cough, chest tightness and shortness of breath.    Cardiovascular:  Negative for chest pain.   Gastrointestinal:  Negative for abdominal pain.   Genitourinary:  Negative for difficulty urinating.   Skin:  Negative for color change.   Psychiatric/Behavioral:  Negative for behavioral problems.    Objective:     Vital Signs (Most Recent):  Temp: 97 °F (36.1 °C) (08/29/22 1600)  Pulse: 103 (08/29/22 1600)  Resp: 19 (08/29/22 1600)  BP: 125/68 (08/29/22 1600)  SpO2: 99 % (08/29/22 1600) Vital Signs (24h Range):  Temp:  [97 °F (36.1 °C)-97.8 °F (36.6 °C)] 97 °F (36.1 °C)  Pulse:  [] 103  Resp:  [19-22] 19  SpO2:  [88 %-100 %] 99 %  BP: (122-144)/(68-81) 125/68     Weight: 68.6 kg (151 lb 3.2 oz)  Body mass index is 25.16 kg/m².    No intake or output data in the 24 hours ending 08/29/22 1748    Physical Exam  Constitutional:       Appearance: She is ill-appearing.   Cardiovascular:      Rate and Rhythm: Normal rate.   Pulmonary:      Comments: Decreased breath sounds on the right  Abdominal:      General: There is no distension.   Musculoskeletal:      Cervical back: Normal range of motion.       Significant Labs:  CBC:   Recent Labs   Lab 08/29/22  1459   WBC 8.29   RBC 3.44*   HGB 11.3*   HCT 34.3*   *   MCV 99.7*   MCH 32.8*   MCHC 32.9     CMP:   Recent Labs   Lab  08/29/22  1459   *   CALCIUM 8.8   ALBUMIN 2.5*   PROT 6.8      K 3.4*   CO2 29   CL 99   BUN 36*   CREATININE 6.91*   ALKPHOS 74   ALT 9*   AST 16   BILITOT 0.4       Significant Diagnostics:  CXR: I have reviewed all pertinent results/findings within the past 24 hours.  Right-sided pneumothorax which improved after chest tube placement    Assessment/Plan:     Active Diagnoses:    Diagnosis Date Noted POA    PRINCIPAL PROBLEM:  Pneumothorax on right [J93.9] 08/29/2022 Yes    ESRD (end stage renal disease) [N18.6]  Unknown    Chronic obstructive pulmonary disease [J44.9]  Yes    DM (diabetes mellitus) [E11.9]  Unknown    Primary hypertension [I10] 01/18/2022 Yes    Chronic pain syndrome [G89.4]  Yes     Chronic      Problems Resolved During this Admission:       Thank you for your consult. I will follow-up with patient. Please contact us if you have any additional questions.    After informed consent right chest was prepped and draped in the usual sterile fashion.  5th intercostal space in the midaxillary line was prepped and draped out.  Local was injected.  Stab incision was made and 28 Kinyarwanda chest tube was inserted and guided towards the apex.  Secured in place at 12 cm of the skin.  We then very carefully and slowly over the next 45 minutes slowly reinflated the lung and drained out the fluid.  Given the nature of the collapsed lung we did not want to cause any flash pulmonary edema.  Patient tolerated the procedure well.  Approximately 1500 cc of straw-colored fluid came out.    Terence Jeffery MD  General Surgery  [facility] - [address] [place] [address]

## 2022-08-30 NOTE — PROGRESS NOTES
[facility]  Nephrology  Progress Note    Patient Name: Bria Ray  MRN: [id number]  Admission Date: 8/29/2022  Hospital Length of Stay: 1 days  Attending Provider: Te Conrad MD   Primary Care Physician: Lorena Richardson MD  Principal Problem:Pneumothorax on right    Subjective:     HPI: 87-year-old woman with ESRD secondary to diabetes.  She is on peritoneal dialysis.  She had a thoracentesis for right pleural effusion Thursday of last week.  She developed more shortness of breath over the weekend.  Chest x-ray today shows hydrothorax with collapse of the right lung.      Interval History:  Chest tube output increased immediately when PD fluid was instilled.  Fluid has been drained via PD catheter.  Shortness of breath has resolved.    Review of patient's allergies indicates:  No Known Allergies  Current Facility-Administered Medications   Medication Frequency    acetaminophen tablet 1,000 mg Q6H PRN    albuterol-ipratropium 2.5 mg-0.5 mg/3 mL nebulizer solution 3 mL Q6H    allopurinoL tablet 100 mg Daily    amLODIPine tablet 5 mg Daily    aspirin EC tablet 81 mg Daily    atorvastatin tablet 40 mg Daily    budesonide nebulizer solution 0.5 mg Q12H    calcitRIOL capsule 0.25 mcg Daily    calcium carbonate 200 mg calcium (500 mg) chewable tablet 500 mg Daily    cyanocobalamin tablet 100 mcg Daily    dextrose 50% injection 12.5 g PRN    dextrose 50% injection 25 g PRN    gabapentin capsule 100 mg BID    glucagon (human recombinant) injection 1 mg PRN    heparin (porcine) injection 5,000 Units Q12H    hydrALAZINE injection 5 mg Q6H PRN    hydrALAZINE tablet 100 mg Q8H    insulin aspart U-100 injection 1-10 Units QID (AC + HS) PRN    magnesium oxide tablet 800 mg PRN    magnesium oxide tablet 800 mg PRN    melatonin tablet 9 mg Nightly PRN    morphine injection 2 mg Once    mupirocin 2 % ointment Daily    naloxone 0.4 mg/mL injection 0.02 mg PRN     oxyCODONE-acetaminophen 7.5-325 mg per tablet 1 tablet Q4H PRN    potassium bicarbonate disintegrating tablet 35 mEq PRN    potassium bicarbonate disintegrating tablet 50 mEq PRN    potassium bicarbonate disintegrating tablet 60 mEq PRN    senna-docusate 8.6-50 mg per tablet 1 tablet BID    sevelamer carbonate tablet 800 mg TID AC    sodium chloride 0.9% flush 10 mL Q12H PRN       Objective:     Vital Signs (Most Recent):  Temp: 98 °F (36.7 °C) (08/30/22 0731)  Pulse: 86 (08/30/22 0731)  Resp: 18 (08/30/22 0910)  BP: 110/62 (08/30/22 1151)  SpO2: 100 % (08/30/22 0731)  O2 Device (Oxygen Therapy): nasal cannula (08/30/22 0731) Vital Signs (24h Range):  Temp:  [97 °F (36.1 °C)-98.1 °F (36.7 °C)] 98 °F (36.7 °C)  Pulse:  [] 86  Resp:  [17-22] 18  SpO2:  [96 %-100 %] 100 %  BP: ()/(57-81) 110/62     Weight: 68.6 kg (151 lb 3.2 oz) (08/29/22 1250)  Body mass index is 25.16 kg/m².  Body surface area is 1.77 meters squared.    I/O last 3 completed shifts:  In: -   Out: 1000 [Chest Tube:1000]    Physical Exam  Eyes:      Pupils: Pupils are equal, round, and reactive to light.   Cardiovascular:      Rate and Rhythm: Normal rate and regular rhythm.   Pulmonary:      Breath sounds: No wheezing or rales.      Comments: Right chest tube present.  Dullness right base  Abdominal:      Tenderness: There is no abdominal tenderness. There is no guarding.   Musculoskeletal:      Cervical back: Neck supple.      Right lower leg: No edema.      Left lower leg: No edema.   Neurological:      Mental Status: She is alert.       Significant Labs:  BMP:   Recent Labs   Lab 08/29/22  1459   *      K 3.4*   CL 99   CO2 29   BUN 36*   CREATININE 6.91*   CALCIUM 8.8   MG 1.7     CBC:   Recent Labs   Lab 08/30/22  0424   WBC 6.72   RBC 3.24*   HGB 10.6*   HCT 31.0*   PLT 97*   MCV 95.7   MCH 32.7*   MCHC 34.2        Significant Imaging:      Assessment/Plan:     * Pneumothorax on right  Hydropneumothorax.  She  had thoracentesis done Thursday of last week.  This may have resulted in pneumothorax.  However, she had large volume of pleural fluid today.  Fluid is not bloody and does not appear to be an empyema.  Pleural fluid for cytology was negative.  Possibility of diaphragmatic leak of peritoneal fluid should be ruled out.  08/30/2022: See notes under ESRD above    ESRD (end stage renal disease)  It appears she has communication between peritoneal and pleural spaces.  This is likely due to damage to the diaphragm during recent thoracentesis.  I have discussed case with Dr. Jeffery.  She is to have a nuclear medicine scan with tracer in her PD fluid to confirm leak.  Further plans will be based on this result.  Peritoneal dialysis will be suspended while this is being investigated.    Chronic obstructive pulmonary disease  No wheezing    Primary hypertension  Controlled        Thank you for your consult.     Beni Fields MD  Nephrology  [facility] - [address] [place] [address]

## 2022-08-30 NOTE — SUBJECTIVE & OBJECTIVE
Interval History:   Pt breathing better, pain is also improved  Pt wanting her home bp meds restarted though her bps are soft, will hold of to those for now until bp is >130/70  Unalbe to get renal us d/t chest tube.     Review of Systems   Constitutional:  Positive for unexpected weight change. Negative for chills, fatigue and fever.   HENT: Negative.  Negative for congestion and rhinorrhea.    Respiratory:  Negative for apnea, cough, chest tightness and shortness of breath.    Cardiovascular: Negative.  Negative for chest pain and leg swelling.   Gastrointestinal: Negative.  Negative for abdominal pain, diarrhea, nausea and vomiting.   Endocrine: Negative.    Genitourinary: Negative.    Musculoskeletal:  Positive for arthralgias.   Skin: Negative.    Allergic/Immunologic: Negative.    Neurological: Negative.    Hematological: Negative.    Psychiatric/Behavioral: Negative.     Objective:     Vital Signs (Most Recent):  Temp: 98 °F (36.7 °C) (08/30/22 0731)  Pulse: 86 (08/30/22 0731)  Resp: 18 (08/30/22 0910)  BP: 109/64 (08/30/22 0731)  SpO2: 100 % (08/30/22 0731) Vital Signs (24h Range):  Temp:  [97 °F (36.1 °C)-98.1 °F (36.7 °C)] 98 °F (36.7 °C)  Pulse:  [] 86  Resp:  [17-22] 18  SpO2:  [88 %-100 %] 100 %  BP: ()/(57-81) 109/64     Weight: 68.6 kg (151 lb 3.2 oz)  Body mass index is 25.16 kg/m².    Intake/Output Summary (Last 24 hours) at 8/30/2022 0938  Last data filed at 8/30/2022 0500  Gross per 24 hour   Intake --   Output 1000 ml   Net -1000 ml      Physical Exam  Vitals reviewed.   Constitutional:       Appearance: Normal appearance. She is ill-appearing.   HENT:      Head: Normocephalic and atraumatic.      Nose: Nose normal. No congestion.      Mouth/Throat:      Pharynx: Oropharynx is clear.   Eyes:      Extraocular Movements: Extraocular movements intact.      Conjunctiva/sclera: Conjunctivae normal.      Pupils: Pupils are equal, round, and reactive to light.   Cardiovascular:      Rate and  Rhythm: Regular rhythm. Tachycardia present.      Pulses: Normal pulses.      Heart sounds: Murmur heard.   Pulmonary:      Breath sounds: No wheezing.      Comments: Absent right sided breat sounds.   Abdominal:      General: Bowel sounds are normal. There is no distension.      Palpations: Abdomen is soft.      Tenderness: There is no abdominal tenderness.   Musculoskeletal:      Right lower leg: No edema.      Left lower leg: No edema.   Lymphadenopathy:      Cervical: No cervical adenopathy.   Skin:     General: Skin is warm.   Neurological:      Mental Status: She is alert. Mental status is at baseline.   Psychiatric:         Mood and Affect: Mood normal.       Significant Labs: All pertinent labs within the past 24 hours have been reviewed.    Significant Imaging: I have reviewed all pertinent imaging results/findings within the past 24 hours.

## 2022-08-30 NOTE — SUBJECTIVE & OBJECTIVE
Interval History:  Chest tube output increased immediately when PD fluid was instilled.  Fluid has been drained via PD catheter.  Shortness of breath has resolved.    Review of patient's allergies indicates:  No Known Allergies  Current Facility-Administered Medications   Medication Frequency    acetaminophen tablet 1,000 mg Q6H PRN    albuterol-ipratropium 2.5 mg-0.5 mg/3 mL nebulizer solution 3 mL Q6H    allopurinoL tablet 100 mg Daily    amLODIPine tablet 5 mg Daily    aspirin EC tablet 81 mg Daily    atorvastatin tablet 40 mg Daily    budesonide nebulizer solution 0.5 mg Q12H    calcitRIOL capsule 0.25 mcg Daily    calcium carbonate 200 mg calcium (500 mg) chewable tablet 500 mg Daily    cyanocobalamin tablet 100 mcg Daily    dextrose 50% injection 12.5 g PRN    dextrose 50% injection 25 g PRN    gabapentin capsule 100 mg BID    glucagon (human recombinant) injection 1 mg PRN    heparin (porcine) injection 5,000 Units Q12H    hydrALAZINE injection 5 mg Q6H PRN    hydrALAZINE tablet 100 mg Q8H    insulin aspart U-100 injection 1-10 Units QID (AC + HS) PRN    magnesium oxide tablet 800 mg PRN    magnesium oxide tablet 800 mg PRN    melatonin tablet 9 mg Nightly PRN    morphine injection 2 mg Once    mupirocin 2 % ointment Daily    naloxone 0.4 mg/mL injection 0.02 mg PRN    oxyCODONE-acetaminophen 7.5-325 mg per tablet 1 tablet Q4H PRN    potassium bicarbonate disintegrating tablet 35 mEq PRN    potassium bicarbonate disintegrating tablet 50 mEq PRN    potassium bicarbonate disintegrating tablet 60 mEq PRN    senna-docusate 8.6-50 mg per tablet 1 tablet BID    sevelamer carbonate tablet 800 mg TID AC    sodium chloride 0.9% flush 10 mL Q12H PRN       Objective:     Vital Signs (Most Recent):  Temp: 98 °F (36.7 °C) (08/30/22 0731)  Pulse: 86 (08/30/22 0731)  Resp: 18 (08/30/22 0910)  BP: 110/62 (08/30/22 1151)  SpO2: 100 % (08/30/22 0731)  O2 Device (Oxygen Therapy): nasal cannula (08/30/22 0731) Vital Signs (24h  Range):  Temp:  [97 °F (36.1 °C)-98.1 °F (36.7 °C)] 98 °F (36.7 °C)  Pulse:  [] 86  Resp:  [17-22] 18  SpO2:  [96 %-100 %] 100 %  BP: ()/(57-81) 110/62     Weight: 68.6 kg (151 lb 3.2 oz) (08/29/22 1250)  Body mass index is 25.16 kg/m².  Body surface area is 1.77 meters squared.    I/O last 3 completed shifts:  In: -   Out: 1000 [Chest Tube:1000]    Physical Exam  Eyes:      Pupils: Pupils are equal, round, and reactive to light.   Cardiovascular:      Rate and Rhythm: Normal rate and regular rhythm.   Pulmonary:      Breath sounds: No wheezing or rales.      Comments: Right chest tube present.  Dullness right base  Abdominal:      Tenderness: There is no abdominal tenderness. There is no guarding.   Musculoskeletal:      Cervical back: Neck supple.      Right lower leg: No edema.      Left lower leg: No edema.   Neurological:      Mental Status: She is alert.       Significant Labs:  BMP:   Recent Labs   Lab 08/29/22  1459   *      K 3.4*   CL 99   CO2 29   BUN 36*   CREATININE 6.91*   CALCIUM 8.8   MG 1.7     CBC:   Recent Labs   Lab 08/30/22  0424   WBC 6.72   RBC 3.24*   HGB 10.6*   HCT 31.0*   PLT 97*   MCV 95.7   MCH 32.7*   MCHC 34.2        Significant Imaging:

## 2022-08-30 NOTE — ASSESSMENT & PLAN NOTE
Hydropneumothorax.  She had thoracentesis done Thursday of last week.  This may have resulted in pneumothorax.  However, she had large volume of pleural fluid today.  Fluid is not bloody and does not appear to be an empyema.  Pleural fluid for cytology was negative.  Possibility of diaphragmatic leak of peritoneal fluid should be ruled out.  08/30/2022: See notes under ESRD above

## 2022-08-30 NOTE — PLAN OF CARE
SUNITA spoke with Hanna at [facility]. She is requesting additional documentation in order to qualify patient for hospice. SUNITA relayed documentation needs to Dr. Conrad. SS to fax to Hanna once complete.

## 2022-08-30 NOTE — ASSESSMENT & PLAN NOTE
08/30/2022 increased serous output on chest tube during peritoneal dialysis concern for diaphragmatic leak, will hold peritoneal dialysis as dr Mckinnon discussed with Dr. Fields, continue chest tube to suction, if confirms leak possible vats later in week per dr mckinnon

## 2022-08-30 NOTE — CONSULTS
[facility] [address] [place] [address]  Nephrology  Consult Note    Patient Name: Bria Ray  MRN: [id number]  Admission Date: 8/29/2022  Hospital Length of Stay: 0 days  Attending Provider: Te Conrad MD   Primary Care Physician: Lorena Richardson MD  Principal Problem:Pneumothorax on right    Consults  Subjective:     HPI: 87-year-old woman with ESRD secondary to diabetes.  She is on peritoneal dialysis.  She had a thoracentesis for right pleural effusion Thursday of last week.  She developed more shortness of breath over the weekend.  Chest x-ray today shows hydrothorax with collapse of the right lung.      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, without long-term current use of insulin     Vertigo     Vitamin D deficiency        Past Surgical History:   Procedure Laterality Date    BLADDER SUSPENSION      BRONCHOSCOPY       11/2020    CHOLECYSTECTOMY      FRACTURE SURGERY      HERNIA REPAIR      HIP FRACTURE SURGERY Right 2005    HYSTERECTOMY      INSERTION OF DIALYSIS CATHETER Right 4/22/2022    Procedure: INSERTION, CATHETER, DIALYSIS;  Surgeon: Terence Jeffery MD;  Location: [facility];  Service: General;  Laterality: Right;       Review of patient's allergies indicates:  No Known Allergies  Current Facility-Administered Medications   Medication Frequency    acetaminophen tablet 1,000 mg Q6H PRN    albuterol-ipratropium 2.5 mg-0.5 mg/3 mL  nebulizer solution 3 mL Q6H    allopurinoL tablet 100 mg Daily    amLODIPine tablet 5 mg Daily    aspirin EC tablet 81 mg Daily    atorvastatin tablet 40 mg Daily    budesonide nebulizer solution 0.5 mg Q12H    calcitRIOL capsule 0.25 mcg Daily    calcium carbonate 200 mg calcium (500 mg) chewable tablet 500 mg Daily    cyanocobalamin tablet 100 mcg Daily    dextrose 50% injection 12.5 g PRN    dextrose 50% injection 25 g PRN    gabapentin capsule 100 mg BID    glucagon (human recombinant) injection 1 mg PRN    heparin (porcine) injection 5,000 Units Q12H    hydrALAZINE tablet 100 mg Q8H    insulin aspart U-100 injection 1-10 Units QID (AC + HS) PRN    magnesium oxide tablet 800 mg PRN    magnesium oxide tablet 800 mg PRN    melatonin tablet 9 mg Nightly PRN    morphine injection 2 mg Once    naloxone 0.4 mg/mL injection 0.02 mg PRN    oxyCODONE-acetaminophen 7.5-325 mg per tablet 1 tablet Q4H PRN    potassium bicarbonate disintegrating tablet 35 mEq PRN    potassium bicarbonate disintegrating tablet 50 mEq PRN    potassium bicarbonate disintegrating tablet 60 mEq PRN    senna-docusate 8.6-50 mg per tablet 1 tablet BID    sevelamer carbonate tablet 800 mg TID WM    sodium chloride 0.9% flush 10 mL Q12H PRN     Family History       Problem Relation (Age of Onset)    Alcohol abuse Father    Asthma Son    Cancer Other    Diabetes Mother, Sister          Tobacco Use    Smoking status: Never    Smokeless tobacco: Never   Substance and Sexual Activity    Alcohol use: Never    Drug use: Never    Sexual activity: Not Currently     Review of Systems   Constitutional:  Positive for fatigue. Negative for fever.   HENT: Negative.     Respiratory:  Positive for cough and shortness of breath. Negative for wheezing.    Cardiovascular:  Negative for chest pain and leg swelling.   Gastrointestinal:  Negative for abdominal pain, diarrhea, nausea and vomiting.   Genitourinary:  Negative for dysuria and  hematuria.   Objective:     Vital Signs (Most Recent):  Temp: 97.2 °F (36.2 °C) (08/29/22 1905)  Pulse: 98 (08/29/22 1958)  Resp: 20 (08/29/22 2042)  BP: 135/70 (08/29/22 1905)  SpO2: 99 % (08/29/22 2000)  O2 Device (Oxygen Therapy): nasal cannula (08/29/22 2000) Vital Signs (24h Range):  Temp:  [97 °F (36.1 °C)-97.8 °F (36.6 °C)] 97.2 °F (36.2 °C)  Pulse:  [] 98  Resp:  [17-22] 20  SpO2:  [88 %-100 %] 99 %  BP: (122-144)/(68-81) 135/70     Weight: 68.6 kg (151 lb 3.2 oz) (08/29/22 1250)  Body mass index is 25.16 kg/m².  Body surface area is 1.77 meters squared.    No intake/output data recorded.    Physical Exam  Eyes:      Pupils: Pupils are equal, round, and reactive to light.   Cardiovascular:      Rate and Rhythm: Normal rate and regular rhythm.   Pulmonary:      Breath sounds: No wheezing or rales.      Comments: Right chest tube present.  Dullness right base  Abdominal:      Tenderness: There is no abdominal tenderness. There is no guarding.   Musculoskeletal:      Cervical back: Neck supple.      Right lower leg: No edema.      Left lower leg: No edema.   Neurological:      Mental Status: She is alert.       Significant Labs:  BMP:   Recent Labs   Lab 08/29/22  1459   *      K 3.4*   CL 99   CO2 29   BUN 36*   CREATININE 6.91*   CALCIUM 8.8   MG 1.7     CBC:   Recent Labs   Lab 08/29/22  1459   WBC 8.29   RBC 3.44*   HGB 11.3*   HCT 34.3*   *   MCV 99.7*   MCH 32.8*   MCHC 32.9     CMP:   Recent Labs   Lab 08/29/22  1459   *   CALCIUM 8.8   ALBUMIN 2.5*   PROT 6.8      K 3.4*   CO2 29   CL 99   BUN 36*   CREATININE 6.91*   ALKPHOS 74   ALT 9*   AST 16   BILITOT 0.4       Significant Imaging:      Assessment/Plan:     * Pneumothorax on right  Hydropneumothorax.  She had thoracentesis done Thursday of last week.  This may have resulted in pneumothorax.  However, she had large volume of pleural fluid today.  Fluid is not bloody and does not appear to be an empyema.   Pleural fluid for cytology was negative.  Possibility of diaphragmatic leak of peritoneal fluid should be ruled out.    ESRD (end stage renal disease)  Continue peritoneal dialysis with 1.5% exchanges    DM (diabetes mellitus)  Underlying condition    Chronic obstructive pulmonary disease  No wheezing    Primary hypertension  Controlled        Thank you for your consult.     Beni Fielsd MD  Nephrology  [facility] - [address] [place] [address]

## 2022-08-30 NOTE — HPI
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  She is on peritoneal dialysis.  She had a thoracentesis for right pleural effusion Thursday of last week.  She developed more shortness of breath over the weekend.  Chest x-ray on day of admission shows hydrothorax with collapse of the right lung.

## 2022-08-30 NOTE — PROGRESS NOTES
[facility] Hospital Medicine  Progress Note    Patient Name: Bria Ray  MRN: [id number]  Patient Class: IP- Inpatient   Admission Date: 8/29/2022  Length of Stay: 1 days  Attending Physician: Te Conrad MD  Primary Care Provider: Lorena Richardson MD        Subjective:     Principal Problem:Pneumothorax on right        HPI:  Patient is a 87-year-old female with past medical history of hypertension, diabetes mellitus, ESRD, COPD, DJD, presented from home as direct admission for right-sided pneumothorax.  Patient underwent right-sided thoracentesis last Thursday for pleural effusion, however since then she continued to have right-sided chest pain along with shortness of bread for which she got an x-ray revealing pneumothorax on the right side.  Patient was then instructed to come to the hospital for further care.  Patient's primary pulmonologist is Dr. Bautista, and has been seeing General surgery in the past for her dialysis access issues.  Patient is also on peritoneal dialysis at home.  Patient is accompanied by her granddaughter, who is trying to get her hospice setup outpatient due to chronic conditions and mostly how her health has been declining steadily for the past few months.  I had a long discussion about goals of care for the patient as of now the patient has not made her mind about her code status and would like to be full code until she has a discussion with the rest of her family.  General surgery and Nephrology consulted and made aware of the patient's arrival.  Patient denies any fevers chills nausea vomiting or diarrhea does have cough and shortness of breath along with right-sided chest pain.      Overview/Hospital Course:  No notes on file    Interval History:   Pt breathing better, pain is also improved  Pt wanting her home bp meds restarted though her bps are soft, will hold of to those for now until bp is >130/70  Unalbe to get renal us d/t chest  tube.     Review of Systems   Constitutional:  Positive for unexpected weight change. Negative for chills, fatigue and fever.   HENT: Negative.  Negative for congestion and rhinorrhea.    Respiratory:  Negative for apnea, cough, chest tightness and shortness of breath.    Cardiovascular: Negative.  Negative for chest pain and leg swelling.   Gastrointestinal: Negative.  Negative for abdominal pain, diarrhea, nausea and vomiting.   Endocrine: Negative.    Genitourinary: Negative.    Musculoskeletal:  Positive for arthralgias.   Skin: Negative.    Allergic/Immunologic: Negative.    Neurological: Negative.    Hematological: Negative.    Psychiatric/Behavioral: Negative.     Objective:     Vital Signs (Most Recent):  Temp: 98 °F (36.7 °C) (08/30/22 0731)  Pulse: 86 (08/30/22 0731)  Resp: 18 (08/30/22 0910)  BP: 109/64 (08/30/22 0731)  SpO2: 100 % (08/30/22 0731) Vital Signs (24h Range):  Temp:  [97 °F (36.1 °C)-98.1 °F (36.7 °C)] 98 °F (36.7 °C)  Pulse:  [] 86  Resp:  [17-22] 18  SpO2:  [88 %-100 %] 100 %  BP: ()/(57-81) 109/64     Weight: 68.6 kg (151 lb 3.2 oz)  Body mass index is 25.16 kg/m².    Intake/Output Summary (Last 24 hours) at 8/30/2022 0938  Last data filed at 8/30/2022 0500  Gross per 24 hour   Intake --   Output 1000 ml   Net -1000 ml      Physical Exam  Vitals reviewed.   Constitutional:       Appearance: Normal appearance. She is ill-appearing.   HENT:      Head: Normocephalic and atraumatic.      Nose: Nose normal. No congestion.      Mouth/Throat:      Pharynx: Oropharynx is clear.   Eyes:      Extraocular Movements: Extraocular movements intact.      Conjunctiva/sclera: Conjunctivae normal.      Pupils: Pupils are equal, round, and reactive to light.   Cardiovascular:      Rate and Rhythm: Regular rhythm. Tachycardia present.      Pulses: Normal pulses.      Heart sounds: Murmur heard.   Pulmonary:      Breath sounds: No wheezing.      Comments: Absent right sided breat sounds.    Abdominal:      General: Bowel sounds are normal. There is no distension.      Palpations: Abdomen is soft.      Tenderness: There is no abdominal tenderness.   Musculoskeletal:      Right lower leg: No edema.      Left lower leg: No edema.   Lymphadenopathy:      Cervical: No cervical adenopathy.   Skin:     General: Skin is warm.   Neurological:      Mental Status: She is alert. Mental status is at baseline.   Psychiatric:         Mood and Affect: Mood normal.       Significant Labs: All pertinent labs within the past 24 hours have been reviewed.    Significant Imaging: I have reviewed all pertinent imaging results/findings within the past 24 hours.      Assessment/Plan:      * Pneumothorax on right  Face mask oxygen, 100%  Consult general surgery for Chest tube  Likely a result of thoracentesis  Monitor on tele  She is HDS    ESRD (end stage renal disease)    Cont IP PD  Consult nephrology     DM (diabetes mellitus)  Patient's FSGs are controlled on current medication regimen.  Last A1c reviewed-   Lab Results   Component Value Date    HGBA1C 5.5 04/07/2022     Most recent fingerstick glucose reviewed- No results for input(s): POCTGLUCOSE in the last 24 hours.  Current correctional scale  Medium  Maintain anti-hyperglycemic dose as follows-   Antihyperglycemics (From admission, onward)    Start     Stop Route Frequency Ordered    08/29/22 1432  insulin aspart U-100 injection 1-10 Units         -- SubQ Before meals & nightly PRN 08/29/22 1333        Hold Oral hypoglycemics while patient is in the hospital.    Chronic obstructive pulmonary disease  Inhalers.   Oxygen support      Primary hypertension  Cont home medications.       Chronic pain syndrome  Cont gabapentin  Prn norco        VTE Risk Mitigation (From admission, onward)         Ordered     heparin (porcine) injection 5,000 Units  Every 12 hours         08/29/22 1333     IP VTE HIGH RISK PATIENT  Once         08/29/22 1333     Place sequential  compression device  Until discontinued         08/29/22 1333                Discharge Planning   ANGY:      Code Status: Full Code   Is the patient medically ready for discharge?:     Reason for patient still in hospital (select all that apply): Treatment  Discharge Plan A: Home, Hospice/home                  Rehmat U MD [place]  Department of Hospital Medicine   [facility]

## 2022-08-30 NOTE — ASSESSMENT & PLAN NOTE
It appears she has communication between peritoneal and pleural spaces.  This is likely due to damage to the diaphragm during recent thoracentesis.  I have discussed case with Dr. Jeffery.  She is to have a nuclear medicine scan with tracer in her PD fluid to confirm leak.  Further plans will be based on this result.  Peritoneal dialysis will be suspended while this is being investigated.

## 2022-08-30 NOTE — NURSING
0002- daughter at nursing station stating the dialysis isn't draining correctly. Provided with extra drainage bags to allow daughter to troubleshoot PD.    0003- chest tube atrium noted to be full with liquid consistent with dialysis drainage. Discussed with daughter and both agreeable to stop peritoneal dialysis now as it is likely it is draining into chest tube. Daughter is not getting as much as usual from PD drainage. Will hold all PD dialysis tonight for MD bhandari in am.

## 2022-08-30 NOTE — PLAN OF CARE
Problem: Infection  Goal: Absence of Infection Signs and Symptoms  Outcome: Ongoing, Progressing  Intervention: Prevent or Manage Infection  Flowsheets (Taken 8/29/2022 1908)  Fever Reduction/Comfort Measures: lightweight bedding  Infection Management: aseptic technique maintained  Isolation Precautions: precautions maintained     Problem: Diabetes Comorbidity  Goal: Blood Glucose Level Within Targeted Range  Outcome: Ongoing, Progressing  Intervention: Monitor and Manage Glycemia  Flowsheets (Taken 8/29/2022 1908)  Glycemic Management:   blood glucose monitored   oral hydration promoted

## 2022-08-30 NOTE — NURSING
1200 attempted to drain from the pd cath per Dr. Fields request. No drainage came out. Dr. Fields came in the room during that time. Explained that during the exchange that the granddaughter did last night she put 2000ml and drained 1200ml out. He explained not todo any more pd until the problem is resolved

## 2022-08-30 NOTE — PT/OT/SLP PROGRESS
Occupational Therapy      Patient Name:  Bria Ray   MRN:  [id number]    Patient not seen today secondary to Therapist assessment, Patient unwilling to participate (OT attempted eval this AM and PM. Per RN pt peritoneal dialysis catheter malfunctioning with fluid draining into chest tube; pt reports feeling too bad to participate in OT eval). Will follow-up 8/31/22.    8/30/2022

## 2022-08-30 NOTE — PROGRESS NOTES
[facility] - [facility]  General Surgery  Progress Note    Subjective:     History of Present Illness:  No notes on file    Post-Op Info:  * No surgery found *         Interval History: increased chest tube output    Medications:  Continuous Infusions:  Scheduled Meds:   albuterol-ipratropium  3 mL Nebulization Q6H    allopurinoL  100 mg Oral Daily    amLODIPine  5 mg Oral Daily    aspirin  81 mg Oral Daily    atorvastatin  40 mg Oral Daily    budesonide  0.5 mg Nebulization Q12H    calcitRIOL  0.25 mcg Oral Daily    calcium carbonate  1 tablet Oral Daily    cyanocobalamin  100 mcg Oral Daily    gabapentin  100 mg Oral BID    heparin (porcine)  5,000 Units Subcutaneous Q12H    hydrALAZINE  100 mg Oral Q8H    morphine  2 mg Intravenous Once    mupirocin   Topical (Top) Daily    senna-docusate 8.6-50 mg  1 tablet Oral BID    sevelamer carbonate  800 mg Oral TID AC     PRN Meds:acetaminophen, dextrose 50%, dextrose 50%, glucagon (human recombinant), hydrALAZINE, insulin aspart U-100, magnesium oxide, magnesium oxide, melatonin, naloxone, oxyCODONE-acetaminophen, potassium bicarbonate, potassium bicarbonate, potassium bicarbonate, sodium chloride 0.9%     Review of patient's allergies indicates:  No Known Allergies  Objective:     Vital Signs (Most Recent):  Temp: 98 °F (36.7 °C) (08/30/22 0731)  Pulse: 92 (08/30/22 1244)  Resp: 20 (08/30/22 1244)  BP: 110/62 (08/30/22 1151)  SpO2: 99 % (08/30/22 1244) Vital Signs (24h Range):  Temp:  [97 °F (36.1 °C)-98.1 °F (36.7 °C)] 98 °F (36.7 °C)  Pulse:  [] 92  Resp:  [17-20] 20  SpO2:  [98 %-100 %] 99 %  BP: ()/(57-70) 110/62     Weight: 68.6 kg (151 lb 3.2 oz)  Body mass index is 25.16 kg/m².    Intake/Output - Last 3 Shifts         08/28 0700 08/29 0659 08/29 0700 08/30 0659 08/30 0700 08/31 0659    Urine (mL/kg/hr)  0     Chest Tube  1000     Total Output  1000     Net  -1000            Urine Occurrence  1 x              Physical Exam  Vitals and nursing note reviewed. Exam conducted with a chaperone present.   HENT:      Head: Normocephalic.   Cardiovascular:      Comments: Chest tube dressing c/d/i  Pulmonary:      Effort: Pulmonary effort is normal.   Abdominal:      Palpations: Abdomen is soft.      Comments: PD cath intact without s/s infection   Neurological:      Mental Status: She is alert.       Significant Labs:  I have reviewed all pertinent lab results within the past 24 hours.  CBC:   Recent Labs   Lab 08/30/22  0424   WBC 6.72   RBC 3.24*   HGB 10.6*   HCT 31.0*   PLT 97*   MCV 95.7   MCH 32.7*   MCHC 34.2     BMP:   Recent Labs   Lab 08/29/22  1459   *      K 3.4*   CL 99   CO2 29   BUN 36*   CREATININE 6.91*   CALCIUM 8.8   MG 1.7     CMP:   Recent Labs   Lab 08/29/22  1459   *   CALCIUM 8.8   ALBUMIN 2.5*   PROT 6.8      K 3.4*   CO2 29   CL 99   BUN 36*   CREATININE 6.91*   ALKPHOS 74   ALT 9*   AST 16   BILITOT 0.4     LFTs:   Recent Labs   Lab 08/29/22  1459   ALT 9*   AST 16   ALKPHOS 74   BILITOT 0.4   PROT 6.8   ALBUMIN 2.5*       Significant Diagnostics:  I have reviewed all pertinent imaging results/findings within the past 24 hours.    Assessment/Plan:     * Pneumothorax on right  08/30/2022 increased serous output on chest tube during peritoneal dialysis concern for diaphragmatic leak, will hold peritoneal dialysis as dr Jeffery discussed with Dr. Fields, continue chest tube to suction, if confirms leak possible vats later in week [facility]  General Surgery  [facility] - [facility]

## 2022-08-30 NOTE — PLAN OF CARE
Problem: Infection  Goal: Absence of Infection Signs and Symptoms  Outcome: Ongoing, Not Progressing     Problem: Adult Inpatient Plan of Care  Goal: Plan of Care Review  Outcome: Ongoing, Not Progressing  Goal: Patient-Specific Goal (Individualized)  Outcome: Ongoing, Not Progressing  Goal: Absence of Hospital-Acquired Illness or Injury  Outcome: Ongoing, Not Progressing  Goal: Optimal Comfort and Wellbeing  Outcome: Ongoing, Not Progressing  Goal: Readiness for Transition of Care  Outcome: Ongoing, Not Progressing     Problem: Diabetes Comorbidity  Goal: Blood Glucose Level Within Targeted Range  Outcome: Ongoing, Progressing     Problem: Fall Injury Risk  Goal: Absence of Fall and Fall-Related Injury  Outcome: Ongoing, Progressing     Problem: Skin Injury Risk Increased  Goal: Skin Health and Integrity  Outcome: Ongoing, Progressing

## 2022-08-30 NOTE — ASSESSMENT & PLAN NOTE
Hydropneumothorax.  She had thoracentesis done Thursday of last week.  This may have resulted in pneumothorax.  However, she had large volume of pleural fluid today.  Fluid is not bloody and does not appear to be an empyema.  Pleural fluid for cytology was negative.  Possibility of diaphragmatic leak of peritoneal fluid should be ruled out.

## 2022-08-30 NOTE — SUBJECTIVE & OBJECTIVE
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, without long-term current use of insulin     Vertigo     Vitamin D deficiency        Past Surgical History:   Procedure Laterality Date    BLADDER SUSPENSION      BRONCHOSCOPY       11/2020    CHOLECYSTECTOMY      FRACTURE SURGERY      HERNIA REPAIR      HIP FRACTURE SURGERY Right 2005    HYSTERECTOMY      INSERTION OF DIALYSIS CATHETER Right 4/22/2022    Procedure: INSERTION, CATHETER, DIALYSIS;  Surgeon: Terence Jeffery MD;  Location: [facility];  Service: General;  Laterality: Right;       Review of patient's allergies indicates:  No Known Allergies  Current Facility-Administered Medications   Medication Frequency    acetaminophen tablet 1,000 mg Q6H PRN    albuterol-ipratropium 2.5 mg-0.5 mg/3 mL nebulizer solution 3 mL Q6H    allopurinoL tablet 100 mg Daily    amLODIPine tablet 5 mg Daily    aspirin EC tablet 81 mg Daily    atorvastatin tablet 40 mg Daily    budesonide nebulizer solution 0.5 mg Q12H    calcitRIOL capsule 0.25 mcg Daily    calcium carbonate 200 mg calcium (500 mg) chewable tablet 500 mg Daily    cyanocobalamin tablet 100 mcg Daily    dextrose 50% injection 12.5 g PRN    dextrose 50% injection 25 g PRN    gabapentin capsule 100 mg BID    glucagon (human recombinant) injection 1 mg PRN    heparin (porcine) injection 5,000 Units Q12H    hydrALAZINE tablet 100 mg Q8H    insulin aspart U-100 injection 1-10 Units QID (AC + HS) PRN    magnesium  oxide tablet 800 mg PRN    magnesium oxide tablet 800 mg PRN    melatonin tablet 9 mg Nightly PRN    morphine injection 2 mg Once    naloxone 0.4 mg/mL injection 0.02 mg PRN    oxyCODONE-acetaminophen 7.5-325 mg per tablet 1 tablet Q4H PRN    potassium bicarbonate disintegrating tablet 35 mEq PRN    potassium bicarbonate disintegrating tablet 50 mEq PRN    potassium bicarbonate disintegrating tablet 60 mEq PRN    senna-docusate 8.6-50 mg per tablet 1 tablet BID    sevelamer carbonate tablet 800 mg TID WM    sodium chloride 0.9% flush 10 mL Q12H PRN     Family History       Problem Relation (Age of Onset)    Alcohol abuse Father    Asthma Son    Cancer Other    Diabetes Mother, Sister          Tobacco Use    Smoking status: Never    Smokeless tobacco: Never   Substance and Sexual Activity    Alcohol use: Never    Drug use: Never    Sexual activity: Not Currently     Review of Systems   Constitutional:  Positive for fatigue. Negative for fever.   HENT: Negative.     Respiratory:  Positive for cough and shortness of breath. Negative for wheezing.    Cardiovascular:  Negative for chest pain and leg swelling.   Gastrointestinal:  Negative for abdominal pain, diarrhea, nausea and vomiting.   Genitourinary:  Negative for dysuria and hematuria.   Objective:     Vital Signs (Most Recent):  Temp: 97.2 °F (36.2 °C) (08/29/22 1905)  Pulse: 98 (08/29/22 1958)  Resp: 20 (08/29/22 2042)  BP: 135/70 (08/29/22 1905)  SpO2: 99 % (08/29/22 2000)  O2 Device (Oxygen Therapy): nasal cannula (08/29/22 2000) Vital Signs (24h Range):  Temp:  [97 °F (36.1 °C)-97.8 °F (36.6 °C)] 97.2 °F (36.2 °C)  Pulse:  [] 98  Resp:  [17-22] 20  SpO2:  [88 %-100 %] 99 %  BP: (122-144)/(68-81) 135/70     Weight: 68.6 kg (151 lb 3.2 oz) (08/29/22 1250)  Body mass index is 25.16 kg/m².  Body surface area is 1.77 meters squared.    No intake/output data recorded.    Physical Exam  Eyes:      Pupils: Pupils are equal, round, and reactive to light.    Cardiovascular:      Rate and Rhythm: Normal rate and regular rhythm.   Pulmonary:      Breath sounds: No wheezing or rales.      Comments: Right chest tube present.  Dullness right base  Abdominal:      Tenderness: There is no abdominal tenderness. There is no guarding.   Musculoskeletal:      Cervical back: Neck supple.      Right lower leg: No edema.      Left lower leg: No edema.   Neurological:      Mental Status: She is alert.       Significant Labs:  BMP:   Recent Labs   Lab 08/29/22  1459   *      K 3.4*   CL 99   CO2 29   BUN 36*   CREATININE 6.91*   CALCIUM 8.8   MG 1.7     CBC:   Recent Labs   Lab 08/29/22  1459   WBC 8.29   RBC 3.44*   HGB 11.3*   HCT 34.3*   *   MCV 99.7*   MCH 32.8*   MCHC 32.9     CMP:   Recent Labs   Lab 08/29/22  1459   *   CALCIUM 8.8   ALBUMIN 2.5*   PROT 6.8      K 3.4*   CO2 29   CL 99   BUN 36*   CREATININE 6.91*   ALKPHOS 74   ALT 9*   AST 16   BILITOT 0.4       Significant Imaging:

## 2022-08-30 NOTE — PT/OT/SLP PROGRESS
Physical Therapy      Patient Name:  Bria Ray   MRN:  [id number]    Patient not seen today secondary to Patient unwilling to participate, Therapist assessment. PT attempted am/pm. Patient with suspected malfunction of peritoneal dialysis with some of the fluid ending up in the chest tube reservoir per RN. Patient states she was feeling too badly to participate with PT evaluation today. Will follow-up tomorrow.

## 2022-08-30 NOTE — SUBJECTIVE & OBJECTIVE
Interval History: increased chest tube output    Medications:  Continuous Infusions:  Scheduled Meds:   albuterol-ipratropium  3 mL Nebulization Q6H    allopurinoL  100 mg Oral Daily    amLODIPine  5 mg Oral Daily    aspirin  81 mg Oral Daily    atorvastatin  40 mg Oral Daily    budesonide  0.5 mg Nebulization Q12H    calcitRIOL  0.25 mcg Oral Daily    calcium carbonate  1 tablet Oral Daily    cyanocobalamin  100 mcg Oral Daily    gabapentin  100 mg Oral BID    heparin (porcine)  5,000 Units Subcutaneous Q12H    hydrALAZINE  100 mg Oral Q8H    morphine  2 mg Intravenous Once    mupirocin   Topical (Top) Daily    senna-docusate 8.6-50 mg  1 tablet Oral BID    sevelamer carbonate  800 mg Oral TID AC     PRN Meds:acetaminophen, dextrose 50%, dextrose 50%, glucagon (human recombinant), hydrALAZINE, insulin aspart U-100, magnesium oxide, magnesium oxide, melatonin, naloxone, oxyCODONE-acetaminophen, potassium bicarbonate, potassium bicarbonate, potassium bicarbonate, sodium chloride 0.9%     Review of patient's allergies indicates:  No Known Allergies  Objective:     Vital Signs (Most Recent):  Temp: 98 °F (36.7 °C) (08/30/22 0731)  Pulse: 92 (08/30/22 1244)  Resp: 20 (08/30/22 1244)  BP: 110/62 (08/30/22 1151)  SpO2: 99 % (08/30/22 1244) Vital Signs (24h Range):  Temp:  [97 °F (36.1 °C)-98.1 °F (36.7 °C)] 98 °F (36.7 °C)  Pulse:  [] 92  Resp:  [17-20] 20  SpO2:  [98 %-100 %] 99 %  BP: ()/(57-70) 110/62     Weight: 68.6 kg (151 lb 3.2 oz)  Body mass index is 25.16 kg/m².    Intake/Output - Last 3 Shifts         08/28 0700 08/29 0659 08/29 0700 08/30 0659 08/30 0700 08/31 0659    Urine (mL/kg/hr)  0     Chest Tube  1000     Total Output  1000     Net  -1000            Urine Occurrence  1 x             Physical Exam  Vitals and nursing note reviewed. Exam conducted with a chaperone present.   HENT:      Head: Normocephalic.   Cardiovascular:      Comments: Chest tube dressing c/d/i  Pulmonary:      Effort:  Pulmonary effort is normal.   Abdominal:      Palpations: Abdomen is soft.      Comments: PD cath intact without s/s infection   Neurological:      Mental Status: She is alert.       Significant Labs:  I have reviewed all pertinent lab results within the past 24 hours.  CBC:   Recent Labs   Lab 08/30/22  0424   WBC 6.72   RBC 3.24*   HGB 10.6*   HCT 31.0*   PLT 97*   MCV 95.7   MCH 32.7*   MCHC 34.2     BMP:   Recent Labs   Lab 08/29/22  1459   *      K 3.4*   CL 99   CO2 29   BUN 36*   CREATININE 6.91*   CALCIUM 8.8   MG 1.7     CMP:   Recent Labs   Lab 08/29/22  1459   *   CALCIUM 8.8   ALBUMIN 2.5*   PROT 6.8      K 3.4*   CO2 29   CL 99   BUN 36*   CREATININE 6.91*   ALKPHOS 74   ALT 9*   AST 16   BILITOT 0.4     LFTs:   Recent Labs   Lab 08/29/22  1459   ALT 9*   AST 16   ALKPHOS 74   BILITOT 0.4   PROT 6.8   ALBUMIN 2.5*       Significant Diagnostics:  I have reviewed all pertinent imaging results/findings within the past 24 hours.

## 2022-08-31 PROBLEM — I47.10 SVT (SUPRAVENTRICULAR TACHYCARDIA): Status: ACTIVE | Noted: 2022-01-01

## 2022-08-31 NOTE — SUBJECTIVE & OBJECTIVE
Interval History:   Gen surg consult for possible diaphragmatic leak  Pt went into SVT this am, requiring adenosine 6x1 and 12x2, with 250 cc fluid, cardiology consulted  Pulm following.     Review of Systems   Constitutional:  Positive for unexpected weight change. Negative for chills, fatigue and fever.   HENT: Negative.  Negative for congestion and rhinorrhea.    Respiratory:  Negative for apnea, cough, chest tightness and shortness of breath.    Cardiovascular: Negative.  Negative for chest pain and leg swelling.   Gastrointestinal: Negative.  Negative for abdominal pain, diarrhea, nausea and vomiting.   Endocrine: Negative.    Genitourinary: Negative.    Musculoskeletal:  Positive for arthralgias.   Skin: Negative.    Allergic/Immunologic: Negative.    Neurological: Negative.    Hematological: Negative.    Psychiatric/Behavioral: Negative.     Objective:     Vital Signs (Most Recent):  Temp: 98.3 °F (36.8 °C) (08/31/22 0725)  Pulse: 90 (08/31/22 0731)  Resp: 20 (08/31/22 1128)  BP: 109/63 (08/31/22 0900)  SpO2: 99 % (08/31/22 0725) Vital Signs (24h Range):  Temp:  [97.9 °F (36.6 °C)-98.7 °F (37.1 °C)] 98.3 °F (36.8 °C)  Pulse:  [30-98] 90  Resp:  [18-24] 20  SpO2:  [96 %-100 %] 99 %  BP: ()/(52-70) 109/63     Weight: 72.2 kg (159 lb 2.8 oz)  Body mass index is 26.49 kg/m².  No intake or output data in the 24 hours ending 08/31/22 1135     Physical Exam  Vitals reviewed.   Constitutional:       Appearance: Normal appearance. She is ill-appearing.   HENT:      Head: Normocephalic and atraumatic.      Nose: Nose normal. No congestion.      Mouth/Throat:      Pharynx: Oropharynx is clear.   Eyes:      Extraocular Movements: Extraocular movements intact.      Conjunctiva/sclera: Conjunctivae normal.      Pupils: Pupils are equal, round, and reactive to light.   Cardiovascular:      Rate and Rhythm: Regular rhythm. Tachycardia present.      Pulses: Normal pulses.      Heart sounds: Murmur heard.   Pulmonary:       Breath sounds: No wheezing.      Comments: Absent right sided breat sounds.   Abdominal:      General: Bowel sounds are normal. There is no distension.      Palpations: Abdomen is soft.      Tenderness: There is no abdominal tenderness.   Musculoskeletal:      Right lower leg: No edema.      Left lower leg: No edema.   Lymphadenopathy:      Cervical: No cervical adenopathy.   Skin:     General: Skin is warm.   Neurological:      Mental Status: She is alert. Mental status is at baseline.   Psychiatric:         Mood and Affect: Mood normal.       Significant Labs: All pertinent labs within the past 24 hours have been reviewed.    Significant Imaging: I have reviewed all pertinent imaging results/findings within the past 24 hours.

## 2022-08-31 NOTE — ASSESSMENT & PLAN NOTE
- EKG exhibited SVT with RVR at rate of 176 bpm. Likely a physiologic response. Adenosine and 250cc NS bolus administered with current rate of 95 bpm. Diltiazem was not initiated due to concern for hypotension. Continue monitoring via Telemetry and repeat EKG.    9/1/22:  - pt is in and out of SVT vs afib, she is asymptomatic  - start amiodarone gtt with bolus  - continue tele monitoring

## 2022-08-31 NOTE — ASSESSMENT & PLAN NOTE
Patient status post a pneumothorax for arm a thoracentesis has a transudative effusion the question is whether not this is due to peritoneal dialysis.  The options are to consider surgery to fix a defect in the diaphragm or to proceed with hemodialysis will follow.  Currently draining a L the last 24 hours at a chest tube her x-ray this morning looked clear

## 2022-08-31 NOTE — HPI
Patient was admitted to the hospital for evaluation of shortness of breath found to have a hydropneumothorax after having a previous thoracentesis the pleural effusion was transudative with lymphocytic shift.  Patient is now on hospital chest tube in place and there is a question this could be due to her peritoneal dialysis and whether not there may be some kind of diaphragmatic leak

## 2022-08-31 NOTE — SUBJECTIVE & OBJECTIVE
Interval History:  Patient without complaints this morning    Objective:     Vital Signs (Most Recent):  Temp: 98.3 °F (36.8 °C) (08/31/22 0400)  Pulse: 98 (08/31/22 0400)  Resp: (!) 22 (08/31/22 0400)  BP: (!) 91/52 (08/31/22 0400)  SpO2: 98 % (08/31/22 0400)   Vital Signs (24h Range):  Temp:  [97.9 °F (36.6 °C)-98.7 °F (37.1 °C)] 98.3 °F (36.8 °C)  Pulse:  [30-98] 98  Resp:  [18-24] 22  SpO2:  [96 %-100 %] 98 %  BP: ()/(52-72) 91/52     Weight: 72.2 kg (159 lb 2.8 oz)  Body mass index is 26.49 kg/m².    No intake or output data in the 24 hours ending 08/31/22 0602    Physical Exam  Vitals reviewed.   Constitutional:       Appearance: Normal appearance.      Interventions: She is not intubated.  HENT:      Head: Normocephalic and atraumatic.      Nose: Nose normal.      Mouth/Throat:      Mouth: Mucous membranes are dry.      Pharynx: Oropharynx is clear.   Eyes:      Extraocular Movements: Extraocular movements intact.      Conjunctiva/sclera: Conjunctivae normal.      Pupils: Pupils are equal, round, and reactive to light.   Cardiovascular:      Rate and Rhythm: Normal rate.      Heart sounds: Normal heart sounds. No murmur heard.  Pulmonary:      Effort: Pulmonary effort is normal. She is not intubated.      Breath sounds: Normal breath sounds.   Abdominal:      General: Abdomen is flat. Bowel sounds are normal.      Palpations: Abdomen is soft.   Musculoskeletal:         General: Normal range of motion.      Cervical back: Normal range of motion and neck supple.      Right lower leg: No edema.      Left lower leg: No edema.   Skin:     General: Skin is warm and dry.      Capillary Refill: Capillary refill takes less than 2 seconds.   Neurological:      General: No focal deficit present.      Mental Status: She is alert and oriented to person, place, and time.   Psychiatric:         Mood and Affect: Mood normal.         Behavior: Behavior normal.       Vents:  Oxygen Concentration (%): 36 (08/30/22  1244)    Lines/Drains/Airways       Central Venous Catheter Line  Duration                  Hemodialysis Catheter right subclavian -- days              Drain  Duration                  Chest Tube 08/29/22 1300 1 Right Midaxillary 1 day              Peripheral Intravenous Line  Duration                  Peripheral IV - Single Lumen 08/29/22 1300 22 G Distal;Posterior;Right Forearm 1 day                    Significant Labs:    CBC/Anemia Profile:  Recent Labs   Lab 08/29/22  1459 08/30/22  0424   WBC 8.29 6.72   HGB 11.3* 10.6*   HCT 34.3* 31.0*   * 97*   MCV 99.7* 95.7   RDW 13.7 13.5        Chemistries:  Recent Labs   Lab 08/29/22  1459      K 3.4*   CL 99   CO2 29   BUN 36*   CREATININE 6.91*   CALCIUM 8.8   ALBUMIN 2.5*   PROT 6.8   BILITOT 0.4   ALKPHOS 74   ALT 9*   AST 16   MG 1.7   PHOS 5.8*       Recent Lab Results         08/31/22  0450   08/30/22  2020   08/30/22  1535   08/30/22  1017        POC Glucose 126   187   83   247               Significant Imaging:  I have reviewed all pertinent imaging results/findings within the past 24 hours.

## 2022-08-31 NOTE — PROGRESS NOTES
[facility] - [facility] Telemetry  Pulmonology  Progress Note    Patient Name: Bria Ray  MRN: [id number]  Admission Date: 8/29/2022  Hospital Length of Stay: 2 days  Code Status: Full Code  Attending Provider: Te Conrad MD  Primary Care Provider: Lorena Richardson MD   Principal Problem: Pneumothorax on right    Subjective:     Interval History:  Patient without complaints this morning    Objective:     Vital Signs (Most Recent):  Temp: 98.3 °F (36.8 °C) (08/31/22 0400)  Pulse: 98 (08/31/22 0400)  Resp: (!) 22 (08/31/22 0400)  BP: (!) 91/52 (08/31/22 0400)  SpO2: 98 % (08/31/22 0400)   Vital Signs (24h Range):  Temp:  [97.9 °F (36.6 °C)-98.7 °F (37.1 °C)] 98.3 °F (36.8 °C)  Pulse:  [30-98] 98  Resp:  [18-24] 22  SpO2:  [96 %-100 %] 98 %  BP: ()/(52-72) 91/52     Weight: 72.2 kg (159 lb 2.8 oz)  Body mass index is 26.49 kg/m².    No intake or output data in the 24 hours ending 08/31/22 0602    Physical Exam  Vitals reviewed.   Constitutional:       Appearance: Normal appearance.      Interventions: She is not intubated.  HENT:      Head: Normocephalic and atraumatic.      Nose: Nose normal.      Mouth/Throat:      Mouth: Mucous membranes are dry.      Pharynx: Oropharynx is clear.   Eyes:      Extraocular Movements: Extraocular movements intact.      Conjunctiva/sclera: Conjunctivae normal.      Pupils: Pupils are equal, round, and reactive to light.   Cardiovascular:      Rate and Rhythm: Normal rate.      Heart sounds: Normal heart sounds. No murmur heard.  Pulmonary:      Effort: Pulmonary effort is normal. She is not intubated.      Breath sounds: Normal breath sounds.   Abdominal:      General: Abdomen is flat. Bowel sounds are normal.      Palpations: Abdomen is soft.   Musculoskeletal:         General: Normal range of motion.      Cervical back: Normal range of motion and neck supple.      Right lower leg: No edema.      Left lower leg: No edema.   Skin:     General: Skin is  warm and dry.      Capillary Refill: Capillary refill takes less than 2 seconds.   Neurological:      General: No focal deficit present.      Mental Status: She is alert and oriented to person, place, and time.   Psychiatric:         Mood and Affect: Mood normal.         Behavior: Behavior normal.       Vents:  Oxygen Concentration (%): 36 (08/30/22 1244)    Lines/Drains/Airways       Central Venous Catheter Line  Duration                  Hemodialysis Catheter right subclavian -- days              Drain  Duration                  Chest Tube 08/29/22 1300 1 Right Midaxillary 1 day              Peripheral Intravenous Line  Duration                  Peripheral IV - Single Lumen 08/29/22 1300 22 G Distal;Posterior;Right Forearm 1 day                    Significant Labs:    CBC/Anemia Profile:  Recent Labs   Lab 08/29/22  1459 08/30/22  0424   WBC 8.29 6.72   HGB 11.3* 10.6*   HCT 34.3* 31.0*   * 97*   MCV 99.7* 95.7   RDW 13.7 13.5        Chemistries:  Recent Labs   Lab 08/29/22  1459      K 3.4*   CL 99   CO2 29   BUN 36*   CREATININE 6.91*   CALCIUM 8.8   ALBUMIN 2.5*   PROT 6.8   BILITOT 0.4   ALKPHOS 74   ALT 9*   AST 16   MG 1.7   PHOS 5.8*       Recent Lab Results         08/31/22  0450   08/30/22  2020   08/30/22  1535   08/30/22  1017        POC Glucose 126   187   83   247               Significant Imaging:  I have reviewed all pertinent imaging results/findings within the past 24 hours.    Assessment/Plan:     * Pneumothorax on right  Patient status post a pneumothorax for arm a thoracentesis has a transudative effusion the question is whether not this is due to peritoneal dialysis.  The options are to consider surgery to fix a defect in the diaphragm or to proceed with hemodialysis will follow.  Currently draining a L the last 24 hours at a chest tube her x-ray this morning looked clear                 Mauro Bautista MD  Pulmonology  [facility] - [address] [place] [address]

## 2022-08-31 NOTE — PT/OT/SLP PROGRESS
Occupational Therapy      Patient Name:  Bria Ray   MRN:  [id number]    Patient not seen today secondary to Nurse/ EB hold (per TACOS Perkins pt in SVT). Will follow-up 9/1/22.    8/31/2022

## 2022-08-31 NOTE — CONSULTS
[facility] Tele[facility]  Cardiology  Consult Note    Patient Name: Bria Ray  MRN: [id number]  Admission Date: 8/29/2022  Hospital Length of Stay: 2 days  Code Status: Full Code   Attending Provider: Te Conrad MD   Consulting Provider: Monique Saul DO  Primary Care Physician: Lorena Richardson MD  Principal Problem:<principal problem not specified>    Patient information was obtained from patient and past medical records.     Inpatient consult to Cardiology  Consult performed by: Monique Saul DO  Consult ordered by: Te Conrad MD  Reason for consult: SVT      Subjective:       HPI:   88 yo F who presented with right pneumothorax after thoracentesis performed outpatient, found to be in SVT with RVR. Patient received Adenosine and 250cc NS bolus with rate at 95 bpm. Diltiazem was recommended, but not given due to concern for hypotension.       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, without long-term current use of insulin     Vertigo     Vitamin D deficiency        Past Surgical History:   Procedure Laterality Date    BLADDER SUSPENSION      BRONCHOSCOPY       11/2020    CHOLECYSTECTOMY      FRACTURE SURGERY      HERNIA REPAIR      HIP FRACTURE SURGERY Right 2005    HYSTERECTOMY      INSERTION OF DIALYSIS CATHETER Right 4/22/2022    Procedure: INSERTION, CATHETER, DIALYSIS;  Surgeon: Terence Jeffery MD;  Location: [facility];  Service: General;   Laterality: Right;       Review of patient's allergies indicates:  No Known Allergies    No current facility-administered medications on file prior to encounter.     Current Outpatient Medications on File Prior to Encounter   Medication Sig    albuterol (PROVENTIL) 2.5 mg /3 mL (0.083 %) nebulizer solution     albuterol (VENTOLIN HFA) 90 mcg/actuation inhaler Inhale 2 puffs into the lungs every 6 (six) hours as needed for Wheezing. Rescue    allopurinoL (ZYLOPRIM) 100 MG tablet Take 1 tablet (100 mg total) by mouth once daily.    amLODIPine (NORVASC) 5 MG tablet Take 1 tablet (5 mg total) by mouth once daily.    aspirin (ECOTRIN) 81 MG EC tablet Take 1 tablet (81 mg total) by mouth once daily.    blood sugar diagnostic (BLOOD GLUCOSE TEST) Strp Use to check blood glucose 2x daily    blood-glucose meter Misc Use to check blood glucose 2x daily    budesonide-formoterol 160-4.5 mcg (SYMBICORT) 160-4.5 mcg/actuation HFAA Inhale 2 puffs into the lungs every 12 (twelve) hours. Controller    calcitRIOL (ROCALTROL) 0.25 MCG Cap Take 0.25 mcg by mouth once daily.    calcium carbonate (TUMS) 200 mg calcium (500 mg) chewable tablet Take 1 tablet (500 mg total) by mouth once daily.    cyanocobalamin (VITAMIN B-12) 100 MCG tablet Take 100 mcg by mouth once daily.    gabapentin (NEURONTIN) 100 MG capsule Take 1 capsule (100 mg total) by mouth 2 (two) times daily.    hydrALAZINE (APRESOLINE) 100 MG tablet Take 1 tablet (100 mg total) by mouth every 8 (eight) hours.    HYDROcodone-acetaminophen (NORCO)  mg per tablet Take 1 tablet by mouth every 12 (twelve) hours.    [START ON 9/23/2022] HYDROcodone-acetaminophen (NORCO)  mg per tablet Take 1 tablet by mouth every 12 (twelve) hours.    sevelamer carbonate (RENVELA) 800 mg Tab     simvastatin (ZOCOR) 20 MG tablet Take 1 tablet (20 mg total) by mouth every evening.    SITagliptin (JANUVIA) 25 MG Tab Take 1 tablet (25 mg total) by mouth once daily.    [DISCONTINUED]  glipiZIDE (GLUCOTROL) 5 MG TR24 Take 1 tablet (5 mg total) by mouth daily with breakfast.     Family History       Problem Relation (Age of Onset)    Alcohol abuse Father    Asthma Son    Cancer Other    Diabetes Mother, Sister          Tobacco Use    Smoking status: Never    Smokeless tobacco: Never   Substance and Sexual Activity    Alcohol use: Never    Drug use: Never    Sexual activity: Not Currently     Review of Systems   Cardiovascular:  Negative for chest pain, cyanosis, palpitations and syncope.   Respiratory:  Negative for shortness of breath.    Objective:     Vital Signs (Most Recent):  Temp: 98.3 °F (36.8 °C) (08/31/22 0725)  Pulse: 90 (08/31/22 0731)  Resp: 20 (08/31/22 0731)  BP: 109/63 (08/31/22 0900)  SpO2: 99 % (08/31/22 0725) Vital Signs (24h Range):  Temp:  [97.9 °F (36.6 °C)-98.7 °F (37.1 °C)] 98.3 °F (36.8 °C)  Pulse:  [30-98] 90  Resp:  [18-24] 20  SpO2:  [96 %-100 %] 99 %  BP: ()/(52-70) 109/63     Weight: 72.2 kg (159 lb 2.8 oz)  Body mass index is 26.49 kg/m².    SpO2: 99 %  O2 Device (Oxygen Therapy): nasal cannula    No intake or output data in the 24 hours ending 08/31/22 1103    Lines/Drains/Airways       Central Venous Catheter Line  Duration                  Hemodialysis Catheter right subclavian -- days              Drain  Duration                  Chest Tube 08/29/22 1300 1 Right Midaxillary 1 day         Urethral Catheter 08/31/22 1000 Non-latex 16 Fr. <1 day              Peripheral Intravenous Line  Duration                  Peripheral IV - Single Lumen 08/29/22 1300 22 G Distal;Posterior;Right Forearm 1 day         Peripheral IV - Single Lumen 08/31/22 0845 22 G Anterior;Proximal;Right Forearm <1 day                    Physical Exam  Vitals reviewed.   Constitutional:       Appearance: She is ill-appearing.   HENT:      Head: Normocephalic.      Right Ear: External ear normal.      Left Ear: External ear normal.      Nose: Nose normal. No congestion or rhinorrhea.       Mouth/Throat:      Pharynx: Oropharynx is clear. No oropharyngeal exudate or posterior oropharyngeal erythema.   Eyes:      Pupils: Pupils are equal, round, and reactive to light.   Cardiovascular:      Rate and Rhythm: Normal rate and regular rhythm.      Pulses: Normal pulses.   Pulmonary:      Effort: Pulmonary effort is normal.   Musculoskeletal:      Cervical back: Normal range of motion.   Skin:     General: Skin is warm and dry.      Capillary Refill: Capillary refill takes less than 2 seconds.   Neurological:      Mental Status: She is alert. Mental status is at baseline.   Psychiatric:         Mood and Affect: Mood normal.         Behavior: Behavior normal.       Significant Labs: All pertinent lab results from the last 24 hours have been reviewed.      Assessment and Plan:     SVT (supraventricular tachycardia)  EKG exhibited SVT with RVR at rate of 176 bpm. Likely a physiologic response. Adenosine and 250cc NS bolus administered with current rate of 95 bpm. Diltiazem was not initiated due to concern for hypotension. Continue monitoring via Telemetry and repeat EKG.      VTE Risk Mitigation (From admission, onward)           Ordered     heparin (porcine) injection 5,000 Units  Every 12 hours         08/29/22 1333     IP VTE HIGH RISK PATIENT  Once         08/29/22 1333     Place sequential compression device  Until discontinued         08/29/22 1333                    Thank you for your consult. I will follow-up with patient. Please contact us if you have any additional questions.    Monique Saul, DO  Cardiology   [facility] - [address] [place] [address]

## 2022-08-31 NOTE — NURSING
Dr. Conrad at bedside.    Patient hooked up to monitor, blood pressure cuff, and O2 sat monitor.    Heart rate 170-178.      0830 Adenosine 6mg given IVP heart rate down to 50-55 but returned to 180.    0833 Adenosine 12mg given IVP.  HR to 100-105.    0835 Adensoine 12mg given IVP.  No change in HR.    Dr. Conrad on the phone with Dr. Chatterjee.      NS bolus 250 ml started.

## 2022-08-31 NOTE — PROGRESS NOTES
[facility] Hospital Medicine  Progress Note    Patient Name: Bria Ray  MRN: [id number]  Patient Class: IP- Inpatient   Admission Date: 8/29/2022  Length of Stay: 2 days  Attending Physician: Te Conrad MD  Primary Care Provider: Lorena Richardson MD        Subjective:     Principal Problem:Pneumothorax on right        HPI:  Patient is a 87-year-old female with past medical history of hypertension, diabetes mellitus, ESRD, COPD, DJD, presented from home as direct admission for right-sided pneumothorax.  Patient underwent right-sided thoracentesis last Thursday for pleural effusion, however since then she continued to have right-sided chest pain along with shortness of bread for which she got an x-ray revealing pneumothorax on the right side.  Patient was then instructed to come to the hospital for further care.  Patient's primary pulmonologist is Dr. Bautista, and has been seeing General surgery in the past for her dialysis access issues.  Patient is also on peritoneal dialysis at home.  Patient is accompanied by her granddaughter, who is trying to get her hospice setup outpatient due to chronic conditions and mostly how her health has been declining steadily for the past few months.  I had a long discussion about goals of care for the patient as of now the patient has not made her mind about her code status and would like to be full code until she has a discussion with the rest of her family.  General surgery and Nephrology consulted and made aware of the patient's arrival.  Patient denies any fevers chills nausea vomiting or diarrhea does have cough and shortness of breath along with right-sided chest pain.      Overview/Hospital Course:  No notes on file    Interval History:   Gen surg consult for possible diaphragmatic leak  Pt went into SVT this am, requiring adenosine 6x1 and 12x2, with 250 cc fluid, cardiology consulted  Pulm following.     Review of Systems    Constitutional:  Positive for unexpected weight change. Negative for chills, fatigue and fever.   HENT: Negative.  Negative for congestion and rhinorrhea.    Respiratory:  Negative for apnea, cough, chest tightness and shortness of breath.    Cardiovascular: Negative.  Negative for chest pain and leg swelling.   Gastrointestinal: Negative.  Negative for abdominal pain, diarrhea, nausea and vomiting.   Endocrine: Negative.    Genitourinary: Negative.    Musculoskeletal:  Positive for arthralgias.   Skin: Negative.    Allergic/Immunologic: Negative.    Neurological: Negative.    Hematological: Negative.    Psychiatric/Behavioral: Negative.     Objective:     Vital Signs (Most Recent):  Temp: 98.3 °F (36.8 °C) (08/31/22 0725)  Pulse: 90 (08/31/22 0731)  Resp: 20 (08/31/22 1128)  BP: 109/63 (08/31/22 0900)  SpO2: 99 % (08/31/22 0725) Vital Signs (24h Range):  Temp:  [97.9 °F (36.6 °C)-98.7 °F (37.1 °C)] 98.3 °F (36.8 °C)  Pulse:  [30-98] 90  Resp:  [18-24] 20  SpO2:  [96 %-100 %] 99 %  BP: ()/(52-70) 109/63     Weight: 72.2 kg (159 lb 2.8 oz)  Body mass index is 26.49 kg/m².  No intake or output data in the 24 hours ending 08/31/22 1135     Physical Exam  Vitals reviewed.   Constitutional:       Appearance: Normal appearance. She is ill-appearing.   HENT:      Head: Normocephalic and atraumatic.      Nose: Nose normal. No congestion.      Mouth/Throat:      Pharynx: Oropharynx is clear.   Eyes:      Extraocular Movements: Extraocular movements intact.      Conjunctiva/sclera: Conjunctivae normal.      Pupils: Pupils are equal, round, and reactive to light.   Cardiovascular:      Rate and Rhythm: Regular rhythm. Tachycardia present.      Pulses: Normal pulses.      Heart sounds: Murmur heard.   Pulmonary:      Breath sounds: No wheezing.      Comments: Absent right sided breat sounds.   Abdominal:      General: Bowel sounds are normal. There is no distension.      Palpations: Abdomen is soft.      Tenderness:  There is no abdominal tenderness.   Musculoskeletal:      Right lower leg: No edema.      Left lower leg: No edema.   Lymphadenopathy:      Cervical: No cervical adenopathy.   Skin:     General: Skin is warm.   Neurological:      Mental Status: She is alert. Mental status is at baseline.   Psychiatric:         Mood and Affect: Mood normal.       Significant Labs: All pertinent labs within the past 24 hours have been reviewed.    Significant Imaging: I have reviewed all pertinent imaging results/findings within the past 24 hours.      Assessment/Plan:      * Pneumothorax on right  Face mask oxygen, 100%  Consult general surgery for Chest tube  Likely a result of thoracentesis  Monitor on tele  She is HDS    SVT (supraventricular tachycardia)  S/p adenosine x3, responded  Cardiology consulted.   Cont to monitor on tele      ESRD (end stage renal disease)    Cont IP PD  Consult nephrology     DM (diabetes mellitus)  Patient's FSGs are controlled on current medication regimen.  Last A1c reviewed-   Lab Results   Component Value Date    HGBA1C 5.5 04/07/2022     Most recent fingerstick glucose reviewed- No results for input(s): POCTGLUCOSE in the last 24 hours.  Current correctional scale  Medium  Maintain anti-hyperglycemic dose as follows-   Antihyperglycemics (From admission, onward)    Start     Stop Route Frequency Ordered    08/29/22 1432  insulin aspart U-100 injection 1-10 Units         -- SubQ Before meals & nightly PRN 08/29/22 1333        Hold Oral hypoglycemics while patient is in the hospital.    Chronic obstructive pulmonary disease  Inhalers.   Oxygen support      Primary hypertension  Cont home medications.       Chronic pain syndrome  Cont gabapentin  Prn norco        VTE Risk Mitigation (From admission, onward)         Ordered     heparin (porcine) injection 5,000 Units  Every 12 hours         08/29/22 1333     IP VTE HIGH RISK PATIENT  Once         08/29/22 1333     Place sequential compression device   Until discontinued         08/29/22 1333                Discharge Planning   ANGY:      Code Status: Full Code   Is the patient medically ready for discharge?:     Reason for patient still in hospital (select all that apply): Treatment  Discharge Plan A: Home, Hospice/home                  Rehmat ALEJANDRO Conrad MD  Department of Hospital Medicine   [facility]

## 2022-08-31 NOTE — PROCEDURES
Procedure performed by Nick HERNANDEZ under the supervision of Dr. Guerrero . 5 [place] PICC line placed in basilic vein in left upper extremity. Informed consent obtained including risks and possible complications. Patient pepped with chloro prep and draped in a sterile fashion. 2cc 1% lidocaine infused. Utilizing U/S guidance a 50cm 5 [place] PICC line placed and position verified by fluoroscopy. PICC line flushed with heparinized saline.Statlock and bandage applied. Patient tolerated procedure well with no immediate complication.

## 2022-08-31 NOTE — PLAN OF CARE
Problem: Infection  Goal: Absence of Infection Signs and Symptoms  Outcome: Ongoing, Progressing     Problem: Adult Inpatient Plan of Care  Goal: Plan of Care Review  Outcome: Ongoing, Progressing  Goal: Patient-Specific Goal (Individualized)  Outcome: Ongoing, Progressing  Goal: Absence of Hospital-Acquired Illness or Injury  Outcome: Ongoing, Progressing  Goal: Optimal Comfort and Wellbeing  Outcome: Ongoing, Progressing  Goal: Readiness for Transition of Care  Outcome: Ongoing, Progressing     Problem: Diabetes Comorbidity  Goal: Blood Glucose Level Within Targeted Range  Outcome: Ongoing, Progressing     Problem: Fall Injury Risk  Goal: Absence of Fall and Fall-Related Injury  Outcome: Ongoing, Progressing     Problem: Skin Injury Risk Increased  Goal: Skin Health and Integrity  Outcome: Ongoing, Progressing       POC reviewed and continues

## 2022-08-31 NOTE — PT/OT/SLP PROGRESS
Physical Therapy      Patient Name:  Bria Ray   MRN:  [id number]    Patient not seen today secondary to Nurse/ EB hold. RN Gregorio Rosales reports pt in SVT. Will follow-up tomorrow.

## 2022-08-31 NOTE — SUBJECTIVE & OBJECTIVE
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, without long-term current use of insulin     Vertigo     Vitamin D deficiency        Past Surgical History:   Procedure Laterality Date    BLADDER SUSPENSION      BRONCHOSCOPY       11/2020    CHOLECYSTECTOMY      FRACTURE SURGERY      HERNIA REPAIR      HIP FRACTURE SURGERY Right 2005    HYSTERECTOMY      INSERTION OF DIALYSIS CATHETER Right 4/22/2022    Procedure: INSERTION, CATHETER, DIALYSIS;  Surgeon: Terence Jeffery MD;  Location: [facility];  Service: General;  Laterality: Right;       Review of patient's allergies indicates:  No Known Allergies    No current facility-administered medications on file prior to encounter.     Current Outpatient Medications on File Prior to Encounter   Medication Sig    albuterol (PROVENTIL) 2.5 mg /3 mL (0.083 %) nebulizer solution     albuterol (VENTOLIN HFA) 90 mcg/actuation inhaler Inhale 2 puffs into the lungs every 6 (six) hours as needed for Wheezing. Rescue    allopurinoL (ZYLOPRIM) 100 MG tablet Take 1 tablet (100 mg total) by mouth once daily.    amLODIPine (NORVASC) 5 MG tablet Take 1 tablet (5 mg total) by mouth once daily.    aspirin (ECOTRIN) 81 MG EC tablet Take 1 tablet (81 mg total) by mouth once daily.    blood sugar diagnostic (BLOOD GLUCOSE TEST) Strp Use to check blood glucose 2x daily    blood-glucose meter Misc Use to check blood glucose 2x daily    budesonide-formoterol 160-4.5 mcg (SYMBICORT)  160-4.5 mcg/actuation HFAA Inhale 2 puffs into the lungs every 12 (twelve) hours. Controller    calcitRIOL (ROCALTROL) 0.25 MCG Cap Take 0.25 mcg by mouth once daily.    calcium carbonate (TUMS) 200 mg calcium (500 mg) chewable tablet Take 1 tablet (500 mg total) by mouth once daily.    cyanocobalamin (VITAMIN B-12) 100 MCG tablet Take 100 mcg by mouth once daily.    gabapentin (NEURONTIN) 100 MG capsule Take 1 capsule (100 mg total) by mouth 2 (two) times daily.    hydrALAZINE (APRESOLINE) 100 MG tablet Take 1 tablet (100 mg total) by mouth every 8 (eight) hours.    HYDROcodone-acetaminophen (NORCO)  mg per tablet Take 1 tablet by mouth every 12 (twelve) hours.    [START ON 9/23/2022] HYDROcodone-acetaminophen (NORCO)  mg per tablet Take 1 tablet by mouth every 12 (twelve) hours.    sevelamer carbonate (RENVELA) 800 mg Tab     simvastatin (ZOCOR) 20 MG tablet Take 1 tablet (20 mg total) by mouth every evening.    SITagliptin (JANUVIA) 25 MG Tab Take 1 tablet (25 mg total) by mouth once daily.    [DISCONTINUED] glipiZIDE (GLUCOTROL) 5 MG TR24 Take 1 tablet (5 mg total) by mouth daily with breakfast.     Family History       Problem Relation (Age of Onset)    Alcohol abuse Father    Asthma Son    Cancer Other    Diabetes Mother, Sister          Tobacco Use    Smoking status: Never    Smokeless tobacco: Never   Substance and Sexual Activity    Alcohol use: Never    Drug use: Never    Sexual activity: Not Currently     Review of Systems   Cardiovascular:  Negative for chest pain, cyanosis, palpitations and syncope.   Respiratory:  Negative for shortness of breath.    Objective:     Vital Signs (Most Recent):  Temp: 98.3 °F (36.8 °C) (08/31/22 0725)  Pulse: 90 (08/31/22 0731)  Resp: 20 (08/31/22 0731)  BP: 109/63 (08/31/22 0900)  SpO2: 99 % (08/31/22 0725) Vital Signs (24h Range):  Temp:  [97.9 °F (36.6 °C)-98.7 °F (37.1 °C)] 98.3 °F (36.8 °C)  Pulse:  [30-98] 90  Resp:  [18-24] 20  SpO2:  [96 %-100 %] 99  %  BP: ()/(52-70) 109/63     Weight: 72.2 kg (159 lb 2.8 oz)  Body mass index is 26.49 kg/m².    SpO2: 99 %  O2 Device (Oxygen Therapy): nasal cannula    No intake or output data in the 24 hours ending 08/31/22 1103    Lines/Drains/Airways       Central Venous Catheter Line  Duration                  Hemodialysis Catheter right subclavian -- days              Drain  Duration                  Chest Tube 08/29/22 1300 1 Right Midaxillary 1 day         Urethral Catheter 08/31/22 1000 Non-latex 16 Fr. <1 day              Peripheral Intravenous Line  Duration                  Peripheral IV - Single Lumen 08/29/22 1300 22 G Distal;Posterior;Right Forearm 1 day         Peripheral IV - Single Lumen 08/31/22 0845 22 G Anterior;Proximal;Right Forearm <1 day                    Physical Exam  Vitals reviewed.   Constitutional:       Appearance: She is ill-appearing.   HENT:      Head: Normocephalic.      Right Ear: External ear normal.      Left Ear: External ear normal.      Nose: Nose normal. No congestion or rhinorrhea.      Mouth/Throat:      Pharynx: Oropharynx is clear. No oropharyngeal exudate or posterior oropharyngeal erythema.   Eyes:      Pupils: Pupils are equal, round, and reactive to light.   Cardiovascular:      Rate and Rhythm: Normal rate and regular rhythm.      Pulses: Normal pulses.   Pulmonary:      Effort: Pulmonary effort is normal.   Musculoskeletal:      Cervical back: Normal range of motion.   Skin:     General: Skin is warm and dry.      Capillary Refill: Capillary refill takes less than 2 seconds.   Neurological:      Mental Status: She is alert. Mental status is at baseline.   Psychiatric:         Mood and Affect: Mood normal.         Behavior: Behavior normal.       Significant Labs: All pertinent lab results from the last 24 hours have been reviewed.

## 2022-08-31 NOTE — ASSESSMENT & PLAN NOTE
EKG exhibited SVT with RVR at rate of 176 bpm. Likely a physiologic response. Adenosine and 250cc NS bolus administered with current rate of 95 bpm. Diltiazem was not initiated due to concern for hypotension. Continue monitoring via Telemetry.

## 2022-08-31 NOTE — HPI
Bria Ray is a 87-year-old female with PMHx of HTN, DM, ESRD, COPD, DJD, Hip fracture 9/2020 - s/p nailing. Pt presented from home as direct admission for right-sided pneumothorax. Patient underwent right-sided thoracentesis on 8/25/22 for pleural effusion, however she continued to have right-sided chest pain along with SOB, CXR was obtained on 8/29/22 revealing pneumothorax on the right side. Patient was then instructed to come to the hospital for further care. Pt follows with Dr. Bautista for pulmonary. The patient was found unresponsive yesterday (9/18/22) on the 4th floor yesterday and code blue was called. She was coded, intubated, and transferred to ICU where she is currently requiring IV pressor support and is being transcutaneously paced. Pt currently receiving HD in her ICU room, was on peritoneal dialysis at home. Family has now made pt a DNR.    Three of her children are present in room and state pt has a history of bradycardia. Also state pt is wheelchair/chair bound at home and requires assistance with ADLs due to weakness/SOB. Pt has been bed-bound for the last 3 weeks while she has been in the hospital. ECHO 04/2022 shows EF 70%, mild to moderate MR, pulmonary hypertension with estimated PASP 41mmHg. Lexiscan 5/17/22 - normal cardiac perfusion study; normal TID ratio.

## 2022-09-01 NOTE — PT/OT/SLP PROGRESS
Physical Therapy      Patient Name:  Bria Ray   MRN:  [id number]    Patient not seen today secondary to Patient ill (Comment), Therapist assessment. Still awaiting results of testing to determine if pt appropriate for PT eval. Will follow-up tomorrow.

## 2022-09-01 NOTE — PROGRESS NOTES
[facility] - [facility] Tele[facility]  Nephrology  Progress Note    Patient Name: Bria Ray  MRN: [id number]  Admission Date: 8/29/2022  Hospital Length of Stay: 2 days  Attending Provider: Te Conrad MD   Primary Care Physician: Lorena Richardson MD  Principal Problem:Pneumothorax on right    Subjective:     HPI: 87-year-old woman with ESRD secondary to diabetes.  She is on peritoneal dialysis.  She had a thoracentesis for right pleural effusion Thursday of last week.  She developed more shortness of breath over the weekend.  Chest x-ray today shows hydrothorax with collapse of the right lung.      Interval History:  She denies shortness of breath.  She has some pain at chest tube insertion site.    Review of patient's allergies indicates:  No Known Allergies  Current Facility-Administered Medications   Medication Frequency    acetaminophen tablet 1,000 mg Q6H PRN    albuterol-ipratropium 2.5 mg-0.5 mg/3 mL nebulizer solution 3 mL Q6H    allopurinoL tablet 100 mg Daily    amLODIPine tablet 5 mg Daily    aspirin EC tablet 81 mg Daily    atorvastatin tablet 40 mg Daily    budesonide nebulizer solution 0.5 mg Q12H    calcitRIOL capsule 0.25 mcg Daily    calcium carbonate 200 mg calcium (500 mg) chewable tablet 500 mg Daily    cyanocobalamin tablet 100 mcg Daily    dextrose 50% injection 12.5 g PRN    dextrose 50% injection 25 g PRN    gabapentin capsule 100 mg BID    glucagon (human recombinant) injection 1 mg PRN    heparin (porcine) injection 5,000 Units Q12H    hydrALAZINE injection 5 mg Q6H PRN    insulin aspart U-100 injection 1-10 Units QID (AC + HS) PRN    magnesium oxide tablet 800 mg PRN    magnesium oxide tablet 800 mg PRN    melatonin tablet 9 mg Nightly PRN    mupirocin 2 % ointment Daily    naloxone 0.4 mg/mL injection 0.02 mg PRN    oxyCODONE-acetaminophen 7.5-325 mg per tablet 1 tablet Q4H PRN    potassium bicarbonate disintegrating tablet 35 mEq PRN     potassium bicarbonate disintegrating tablet 50 mEq PRN    potassium bicarbonate disintegrating tablet 60 mEq PRN    senna-docusate 8.6-50 mg per tablet 1 tablet BID    sevelamer carbonate tablet 800 mg TID AC    sodium chloride 0.9% flush 10 mL Q12H PRN    sodium chloride 0.9% flush 10 mL Q6H    And    sodium chloride 0.9% flush 10 mL PRN       Objective:     Vital Signs (Most Recent):  Temp: 98.4 °F (36.9 °C) (08/31/22 1901)  Pulse: 98 (08/31/22 2012)  Resp: 20 (08/31/22 2012)  BP: 124/86 (08/31/22 1901)  SpO2: 98 % (08/31/22 2012)  O2 Device (Oxygen Therapy): nasal cannula w/ humidification (08/31/22 2012) Vital Signs (24h Range):  Temp:  [97.2 °F (36.2 °C)-98.7 °F (37.1 °C)] 98.4 °F (36.9 °C)  Pulse:  [90-98] 98  Resp:  [18-24] 20  SpO2:  [95 %-99 %] 98 %  BP: ()/(52-86) 124/86     Weight: 72.2 kg (159 lb 2.8 oz) (08/31/22 0600)  Body mass index is 26.49 kg/m².  Body surface area is 1.82 meters squared.    I/O last 3 completed shifts:  In: -   Out: 800 [Urine:650; Chest Tube:150]    Physical Exam  Eyes:      Pupils: Pupils are equal, round, and reactive to light.   Cardiovascular:      Rate and Rhythm: Normal rate and regular rhythm.   Pulmonary:      Breath sounds: No wheezing or rales.      Comments: Right chest tube present.  Dullness right base  Abdominal:      Tenderness: There is no abdominal tenderness. There is no guarding.   Musculoskeletal:      Cervical back: Neck supple.      Right lower leg: No edema.      Left lower leg: No edema.   Neurological:      Mental Status: She is alert.       Significant Labs:  BMP:   Recent Labs   Lab 08/29/22  1459 08/31/22  0854   * 130*    139   K 3.4* 3.7   CL 99 100   CO2 29 26   BUN 36* 46*   CREATININE 6.91* 7.75*   CALCIUM 8.8 8.4*   MG 1.7  --      CBC:   Recent Labs   Lab 08/31/22  0854   WBC 6.77   RBC 3.30*   HGB 10.5*   HCT 33.8*   *   .4*   MCH 31.8*   MCHC 31.1*        Significant Imaging:      Assessment/Plan:     *  Pneumothorax on right  Hydropneumothorax.  She had thoracentesis done Thursday of last week.  This may have resulted in pneumothorax.  However, she had large volume of pleural fluid today.  Fluid is not bloody and does not appear to be an empyema.  Pleural fluid for cytology was negative.  Possibility of diaphragmatic leak of peritoneal fluid should be ruled out.  08/30/2022: See notes under ESRD above  08/31/2022: She was supposed to have a nuclear medicine study to test for diaphragmatic leak.  I can not find results.  Surgery is following    ESRD (end stage renal disease)  It appears she has communication between peritoneal and pleural spaces.  This is likely due to damage to the diaphragm during recent thoracentesis.  I have discussed case with Dr. Jeffery.  She is to have a nuclear medicine scan with tracer in her PD fluid to confirm leak.  Further plans will be based on this result.  Peritoneal dialysis will be suspended while this is being investigated.    Chronic obstructive pulmonary disease  No wheezing    Primary hypertension  Controlled        Thank you for your consult.     Beni Fields MD  Nephrology  [facility] - [address] [place] [address]

## 2022-09-01 NOTE — SUBJECTIVE & OBJECTIVE
Interval History:  Patient without complaints except for chest pain    Objective:     Vital Signs (Most Recent):  Temp: 98 °F (36.7 °C) (09/01/22 0400)  Pulse: 94 (09/01/22 0400)  Resp: 18 (09/01/22 0516)  BP: (!) 120/56 (09/01/22 0400)  SpO2: 99 % (09/01/22 0400)   Vital Signs (24h Range):  Temp:  [97.2 °F (36.2 °C)-98.4 °F (36.9 °C)] 98 °F (36.7 °C)  Pulse:  [90-98] 94  Resp:  [18-20] 18  SpO2:  [95 %-99 %] 99 %  BP: (109-146)/(48-86) 120/56     Weight: 72.9 kg (160 lb 11.5 oz)  Body mass index is 26.74 kg/m².      Intake/Output Summary (Last 24 hours) at 9/1/2022 0537  Last data filed at 8/31/2022 1400  Gross per 24 hour   Intake --   Output 800 ml   Net -800 ml       Physical Exam  Vitals reviewed.   Constitutional:       Appearance: Normal appearance.      Interventions: She is not intubated.  HENT:      Head: Normocephalic and atraumatic.      Nose: Nose normal.      Mouth/Throat:      Mouth: Mucous membranes are dry.      Pharynx: Oropharynx is clear.   Eyes:      Extraocular Movements: Extraocular movements intact.      Conjunctiva/sclera: Conjunctivae normal.      Pupils: Pupils are equal, round, and reactive to light.   Cardiovascular:      Rate and Rhythm: Normal rate.      Heart sounds: Normal heart sounds. No murmur heard.  Pulmonary:      Effort: Pulmonary effort is normal. She is not intubated.      Breath sounds: Normal breath sounds.   Abdominal:      General: Abdomen is flat. Bowel sounds are normal.      Palpations: Abdomen is soft.   Musculoskeletal:         General: Normal range of motion.      Cervical back: Normal range of motion and neck supple.      Right lower leg: No edema.      Left lower leg: No edema.   Skin:     General: Skin is warm and dry.      Capillary Refill: Capillary refill takes less than 2 seconds.   Neurological:      General: No focal deficit present.      Mental Status: She is alert and oriented to person, place, and time.   Psychiatric:         Mood and Affect: Mood  normal.         Behavior: Behavior normal.       Vents:  Oxygen Concentration (%): 36 (08/31/22 2012)    Lines/Drains/Airways       Central Venous Catheter Line  Duration                  Hemodialysis Catheter right subclavian -- days              Drain  Duration                  Chest Tube 08/29/22 1300 1 Right Midaxillary 2 days         Urethral Catheter 08/31/22 1000 Non-latex 16 Fr. <1 day              Peripheral Intravenous Line  Duration                  Peripheral IV - Single Lumen 08/29/22 1300 22 G Distal;Posterior;Right Forearm 2 days         Peripheral IV - Single Lumen 08/31/22 0845 22 G Anterior;Proximal;Right Forearm <1 day                    Significant Labs:    CBC/Anemia Profile:  Recent Labs   Lab 08/31/22  0854   WBC 6.77   HGB 10.5*   HCT 33.8*   *   .4*   RDW 13.2        Chemistries:  Recent Labs   Lab 08/31/22  0854      K 3.7      CO2 26   BUN 46*   CREATININE 7.75*   CALCIUM 8.4*   ALBUMIN 2.1*   PROT 6.2*   BILITOT 0.5   ALKPHOS 67   ALT 6*   AST 15       Recent Lab Results  (Last 5 results in the past 24 hours)        09/01/22  0442   08/31/22  2021   08/31/22  2006   08/31/22  1327   08/31/22  1315        Lactate, Ascencion       1.6         POC Glucose 134     198           Troponin I High Sensitivity   53.0       50.6                              Significant Imaging:  I have reviewed all pertinent imaging results/findings within the past 24 hours.

## 2022-09-01 NOTE — SUBJECTIVE & OBJECTIVE
Interval History: Intermittent SVT. Pt is asymptomatic.     Review of Systems   Cardiovascular:  Negative for chest pain, cyanosis, palpitations and syncope.   Respiratory:  Negative for shortness of breath.    Objective:     Vital Signs (Most Recent):  Temp: 97.3 °F (36.3 °C) (09/01/22 1110)  Pulse: 83 (09/01/22 1240)  Resp: 20 (09/01/22 1240)  BP: 91/61 (09/01/22 1350)  SpO2: 95 % (09/01/22 1110) Vital Signs (24h Range):  Temp:  [97.3 °F (36.3 °C)-98.4 °F (36.9 °C)] 97.3 °F (36.3 °C)  Pulse:  [83-98] 83  Resp:  [16-20] 20  SpO2:  [95 %-99 %] 95 %  BP: ()/(47-86) 91/61     Weight: 72.9 kg (160 lb 11.5 oz)  Body mass index is 26.74 kg/m².     SpO2: 95 %  O2 Device (Oxygen Therapy): nasal cannula    No intake or output data in the 24 hours ending 09/01/22 1517    Lines/Drains/Airways       Central Venous Catheter Line  Duration                  Hemodialysis Catheter right subclavian -- days              Drain  Duration                  Chest Tube 08/29/22 1300 1 Right Midaxillary 3 days         Urethral Catheter 08/31/22 1000 Non-latex 16 Fr. 1 day              Peripheral Intravenous Line  Duration                  Peripheral IV - Single Lumen 08/29/22 1300 22 G Distal;Posterior;Right Forearm 3 days         Peripheral IV - Single Lumen 08/31/22 0845 22 G Anterior;Proximal;Right Forearm 1 day                    Physical Exam  Vitals reviewed.   Constitutional:       General: She is not in acute distress.     Appearance: She is obese. She is ill-appearing.   Cardiovascular:      Rate and Rhythm: Regular rhythm. Tachycardia present.      Pulses: Normal pulses.      Heart sounds: Murmur heard.   Pulmonary:      Effort: Pulmonary effort is normal.   Skin:     General: Skin is warm and dry.   Neurological:      Mental Status: She is alert. Mental status is at baseline.   Psychiatric:         Mood and Affect: Mood normal.         Behavior: Behavior normal.       Significant Labs: All pertinent lab results from the  last 24 hours have been reviewed. and   Recent Lab Results  (Last 5 results in the past 24 hours)        09/01/22  1134   09/01/22  0551   09/01/22  0442   08/31/22 2021 08/31/22 2006        Anion Gap   13             Baso #   0.03             Basophil %   0.5             BUN   50             BUN/CREAT RATIO   6             Calcium   8.4             Chloride   102             CO2   29             Creatinine   7.84             Differential Type   Auto             eGFR   5             Eos #   0.21             Eosinophil %   3.6             Glucose   134             Hematocrit   28.7             Hemoglobin   8.7             Immature Grans (Abs)   0.02             Immature Granulocytes   0.3             Lymph #   0.86             Lymph %   14.7             MCH   31.6             MCHC   30.3             MCV   104.4             Mono #   0.75             Mono %   12.8             MPV   10.8             Neutrophils, Abs   3.97             Neutrophils Relative   68.1             nRBC   0.0             NUCLEATED RBC ABSOLUTE   0.00             Platelets   101             POC Glucose 135     134     198       Potassium   4.0             RBC   2.75             RDW   13.4             Sodium   140             Troponin I High Sensitivity       53.0         WBC   5.84                                    Significant Imaging: Echocardiogram: Transthoracic echo (TTE) complete (Cupid Only):   Results for orders placed or performed during the hospital encounter of 04/19/22   Echo   Result Value Ref Range    BSA 1.91 m2    Right Atrial Pressure (from IVC) 3 mmHg    EF 70 %    Left Ventricular Outflow Tract Mean Gradient 3.00 mmHg    AORTIC VALVE CUSP SEPERATION 18.023640033798908 cm    LVIDd 4.72 3.5 - 6.0 cm    IVS 1.37 (A) 0.6 - 1.1 cm    Posterior Wall 1.26 (A) 0.6 - 1.1 cm    Ao root annulus 2.20 cm    LVIDs 2.62 2.1 - 4.0 cm    FS 44 28 - 44 %    IVC ostium 1.5 cm    LV mass 243.47 g    RVDD 4.60 cm    TAPSE 2.00 cm    Left  Ventricle Relative Wall Thickness 0.53 cm    AV mean gradient 6 mmHg    AV valve area 1.48 cm2    AV Velocity Ratio 0.75     AV index (prosthetic) 0.74     MV mean gradient 2 mmHg    MV valve area p 1/2 method 2.82 cm2    MV valve area by continuity eq 1.21 cm2    E wave deceleration time 163 msec    LVOT diameter 1.60 cm    LVOT area 2.0 cm2    LVOT peak aren 1.2 m/s    LVOT peak VTI 31.00 cm    Ao peak aren 1.6 m/s    Ao VTI 42.00 cm    LVOT stroke volume 62.30 cm3    AV peak gradient 10 mmHg    MV peak gradient 6 mmHg    TV rest pulmonary artery pressure 41 mmHg    MV Peak E Aren 1.06 m/s    TR Max Aren 3.10 m/s    MV VTI 51.6 cm    MV stenosis pressure 1/2 time 78 ms    LV Systolic Volume 25.10 mL    LV Systolic Volume Index 13.3 mL/m2    LV Diastolic Volume 103.40 mL    LV Diastolic Volume Index 54.71 mL/m2    LV Mass Index 129 g/m2    Echo EF Estimated 70 %    RA Major Axis 4.30 cm    Triscuspid Valve Regurgitation Peak Gradient 38 mmHg    LA size 3.40 cm    Narrative    · The left ventricle is normal in size with mild concentric hypertrophy   and normal systolic function.  · The estimated ejection fraction is 70%.  · Normal left ventricular diastolic function.  · Atrial fibrillation not observed.  · Mild right ventricular enlargement.  · Mild right atrial enlargement.  · Mild-to-moderate mitral regurgitation.  · Mild tricuspid regurgitation.  · Mild pulmonic regurgitation.  · Normal central venous pressure (3 mmHg).  · The estimated PA systolic pressure is 41 mmHg.  · There is pulmonary hypertension.

## 2022-09-01 NOTE — ASSESSMENT & PLAN NOTE
Cont IP PD  Consult nephrology   pts ESRD is due to medical illnesses such as diabetes and hypertension

## 2022-09-01 NOTE — PROGRESS NOTES
Per cardiology c/f stroke, pt states the L arm weakness has been there for weeks now and hasnt gotten worse, got mri head to make sure nothing new is going on, her HR still in the 150s range, though she is asymptomatic, she is now started on amio drip, got x1 lopressor and x1 of digoxin, and 250 cc bolus, pt to go to icu now for closer monitoring. Verbal report given to Dr Bautista who accepted the pt. NS notified of transfer orders.

## 2022-09-01 NOTE — PLAN OF CARE
Problem: Infection  Goal: Absence of Infection Signs and Symptoms  Outcome: Ongoing, Progressing     Problem: Adult Inpatient Plan of Care  Goal: Plan of Care Review  Outcome: Ongoing, Progressing  Goal: Optimal Comfort and Wellbeing  Outcome: Ongoing, Progressing     Problem: Fall Injury Risk  Goal: Absence of Fall and Fall-Related Injury  Outcome: Ongoing, Progressing     Problem: Skin Injury Risk Increased  Goal: Skin Health and Integrity  Outcome: Ongoing, Progressing

## 2022-09-01 NOTE — PT/OT/SLP PROGRESS
Occupational Therapy      Patient Name:  Bria Ray   MRN:  [id number]    Patient not seen today secondary to Therapist assessment (awaiting test results to determine if pt is medically stable to participate in OT eval). Will follow-up 9/2/22.    9/1/2022

## 2022-09-01 NOTE — PROGRESS NOTES
[facility] - [facility] Tele[facility]  Cardiology  Progress Note    Patient Name: Bria Ray  MRN: [id number]  Admission Date: 8/29/2022  Hospital Length of Stay: 3 days  Code Status: Full Code   Attending Physician: Te Conrad MD   Primary Care Physician: Lorena Richardson MD  Expected Discharge Date:   Principal Problem:Pneumothorax on right    Subjective:     Interval History: Intermittent SVT. Pt is asymptomatic.     Review of Systems   Cardiovascular:  Negative for chest pain, cyanosis, palpitations and syncope.   Respiratory:  Negative for shortness of breath.    Objective:     Vital Signs (Most Recent):  Temp: 97.3 °F (36.3 °C) (09/01/22 1110)  Pulse: 83 (09/01/22 1240)  Resp: 20 (09/01/22 1240)  BP: 91/61 (09/01/22 1350)  SpO2: 95 % (09/01/22 1110) Vital Signs (24h Range):  Temp:  [97.3 °F (36.3 °C)-98.4 °F (36.9 °C)] 97.3 °F (36.3 °C)  Pulse:  [83-98] 83  Resp:  [16-20] 20  SpO2:  [95 %-99 %] 95 %  BP: ()/(47-86) 91/61     Weight: 72.9 kg (160 lb 11.5 oz)  Body mass index is 26.74 kg/m².     SpO2: 95 %  O2 Device (Oxygen Therapy): nasal cannula    No intake or output data in the 24 hours ending 09/01/22 1517    Lines/Drains/Airways       Central Venous Catheter Line  Duration                  Hemodialysis Catheter right subclavian -- days              Drain  Duration                  Chest Tube 08/29/22 1300 1 Right Midaxillary 3 days         Urethral Catheter 08/31/22 1000 Non-latex 16 Fr. 1 day              Peripheral Intravenous Line  Duration                  Peripheral IV - Single Lumen 08/29/22 1300 22 G Distal;Posterior;Right Forearm 3 days         Peripheral IV - Single Lumen 08/31/22 0845 22 G Anterior;Proximal;Right Forearm 1 day                    Physical Exam  Vitals reviewed.   Constitutional:       General: She is not in acute distress.     Appearance: She is obese. She is ill-appearing.   Cardiovascular:      Rate and Rhythm: Regular rhythm. Tachycardia present.       Pulses: Normal pulses.      Heart sounds: Murmur heard.   Pulmonary:      Effort: Pulmonary effort is normal.   Skin:     General: Skin is warm and dry.   Neurological:      Mental Status: She is alert. Mental status is at baseline.   Psychiatric:         Mood and Affect: Mood normal.         Behavior: Behavior normal.       Significant Labs: All pertinent lab results from the last 24 hours have been reviewed. and   Recent Lab Results  (Last 5 results in the past 24 hours)        09/01/22  1134   09/01/22  0551   09/01/22  0442   08/31/22 2021 08/31/22 2006        Anion Gap   13             Baso #   0.03             Basophil %   0.5             BUN   50             BUN/CREAT RATIO   6             Calcium   8.4             Chloride   102             CO2   29             Creatinine   7.84             Differential Type   Auto             eGFR   5             Eos #   0.21             Eosinophil %   3.6             Glucose   134             Hematocrit   28.7             Hemoglobin   8.7             Immature Grans (Abs)   0.02             Immature Granulocytes   0.3             Lymph #   0.86             Lymph %   14.7             MCH   31.6             MCHC   30.3             MCV   104.4             Mono #   0.75             Mono %   12.8             MPV   10.8             Neutrophils, Abs   3.97             Neutrophils Relative   68.1             nRBC   0.0             NUCLEATED RBC ABSOLUTE   0.00             Platelets   101             POC Glucose 135     134     198       Potassium   4.0             RBC   2.75             RDW   13.4             Sodium   140             Troponin I High Sensitivity       53.0         WBC   5.84                                    Significant Imaging: Echocardiogram: Transthoracic echo (TTE) complete (Cupid Only):   Results for orders placed or performed during the hospital encounter of 04/19/22   Echo   Result Value Ref Range    BSA 1.91 m2    Right Atrial Pressure (from IVC) 3  mmHg    EF 70 %    Left Ventricular Outflow Tract Mean Gradient 3.00 mmHg    AORTIC VALVE CUSP SEPERATION 18.627219462122268 cm    LVIDd 4.72 3.5 - 6.0 cm    IVS 1.37 (A) 0.6 - 1.1 cm    Posterior Wall 1.26 (A) 0.6 - 1.1 cm    Ao root annulus 2.20 cm    LVIDs 2.62 2.1 - 4.0 cm    FS 44 28 - 44 %    IVC ostium 1.5 cm    LV mass 243.47 g    RVDD 4.60 cm    TAPSE 2.00 cm    Left Ventricle Relative Wall Thickness 0.53 cm    AV mean gradient 6 mmHg    AV valve area 1.48 cm2    AV Velocity Ratio 0.75     AV index (prosthetic) 0.74     MV mean gradient 2 mmHg    MV valve area p 1/2 method 2.82 cm2    MV valve area by continuity eq 1.21 cm2    E wave deceleration time 163 msec    LVOT diameter 1.60 cm    LVOT area 2.0 cm2    LVOT peak aren 1.2 m/s    LVOT peak VTI 31.00 cm    Ao peak aren 1.6 m/s    Ao VTI 42.00 cm    LVOT stroke volume 62.30 cm3    AV peak gradient 10 mmHg    MV peak gradient 6 mmHg    TV rest pulmonary artery pressure 41 mmHg    MV Peak E Aren 1.06 m/s    TR Max Aren 3.10 m/s    MV VTI 51.6 cm    MV stenosis pressure 1/2 time 78 ms    LV Systolic Volume 25.10 mL    LV Systolic Volume Index 13.3 mL/m2    LV Diastolic Volume 103.40 mL    LV Diastolic Volume Index 54.71 mL/m2    LV Mass Index 129 g/m2    Echo EF Estimated 70 %    RA Major Axis 4.30 cm    Triscuspid Valve Regurgitation Peak Gradient 38 mmHg    LA size 3.40 cm    Narrative    · The left ventricle is normal in size with mild concentric hypertrophy   and normal systolic function.  · The estimated ejection fraction is 70%.  · Normal left ventricular diastolic function.  · Atrial fibrillation not observed.  · Mild right ventricular enlargement.  · Mild right atrial enlargement.  · Mild-to-moderate mitral regurgitation.  · Mild tricuspid regurgitation.  · Mild pulmonic regurgitation.  · Normal central venous pressure (3 mmHg).  · The estimated PA systolic pressure is 41 mmHg.  · There is pulmonary hypertension.        Assessment and Plan:     Brief  HPI: 86 yo F who presented with right pneumothorax after thoracentesis performed outpatient, found to be in SVT with RVR. Patient received Adenosine and 250cc NS bolus with rate at 95 bpm. Diltiazem was recommended, but not given due to concern for hypotension.         * Pneumothorax on right  - primary team managing    SVT (supraventricular tachycardia)  - EKG exhibited SVT with RVR at rate of 176 bpm. Likely a physiologic response. Adenosine and 250cc NS bolus administered with current rate of 95 bpm. Diltiazem was not initiated due to concern for hypotension. Continue monitoring via Telemetry and repeat EKG.    9/1/22:  - pt is in and out of SVT vs afib, she is asymptomatic  - BP is too low for cardizem  - start amiodarone gtt with bolus  - continue tele monitoring    ESRD (end stage renal disease)  - peritoneal dialysis        VTE Risk Mitigation (From admission, onward)           Ordered     heparin (porcine) injection 5,000 Units  Every 12 hours         08/29/22 1333     IP VTE HIGH RISK PATIENT  Once         08/29/22 1333     Place sequential compression device  Until discontinued         08/29/22 1333                  PT seen and examined, chart reviewed, essentially agree with findings as documented,    CC: palpitations  HPI: Palpitations have improved, asymptomatic at heart rates less than 180.  Futher conversation with pts family at bedside, reports patient has history of atrial fib, was not currently being treated for a fib.  PE: agree with above    Labs, Xrays, EKGs reviewed    IMP/Plan:  Recurrent episodes of SVT, likely A flutter with 2:1 block, hx A fib, agree with amiodarone load  Left arm weakness, unclear etiology, recommend MRI, will discuss with primary service.  Daniela Sparks, LINNP  Cardiology  [facility] - [address] [place] [address]

## 2022-09-01 NOTE — PLAN OF CARE
Problem: Breathing Pattern Ineffective  Goal: Effective Breathing Pattern  Outcome: Ongoing, Progressing     Problem: Gas Exchange Impaired  Goal: Optimal Gas Exchange  Outcome: Ongoing, Progressing

## 2022-09-01 NOTE — PROGRESS NOTES
[facility] Tele[facility]  Pulmonology  Progress Note    Patient Name: Bria Ray  MRN: [id number]  Admission Date: 8/29/2022  Hospital Length of Stay: 3 days  Code Status: Full Code  Attending Provider: Te Conrad MD  Primary Care Provider: Lorena Richardson MD   Principal Problem: Pneumothorax on right    Subjective:     Interval History:  Patient without complaints except for chest pain    Objective:     Vital Signs (Most Recent):  Temp: 98 °F (36.7 °C) (09/01/22 0400)  Pulse: 94 (09/01/22 0400)  Resp: 18 (09/01/22 0516)  BP: (!) 120/56 (09/01/22 0400)  SpO2: 99 % (09/01/22 0400)   Vital Signs (24h Range):  Temp:  [97.2 °F (36.2 °C)-98.4 °F (36.9 °C)] 98 °F (36.7 °C)  Pulse:  [90-98] 94  Resp:  [18-20] 18  SpO2:  [95 %-99 %] 99 %  BP: (109-146)/(48-86) 120/56     Weight: 72.9 kg (160 lb 11.5 oz)  Body mass index is 26.74 kg/m².      Intake/Output Summary (Last 24 hours) at 9/1/2022 0537  Last data filed at 8/31/2022 1400  Gross per 24 hour   Intake --   Output 800 ml   Net -800 ml       Physical Exam  Vitals reviewed.   Constitutional:       Appearance: Normal appearance.      Interventions: She is not intubated.  HENT:      Head: Normocephalic and atraumatic.      Nose: Nose normal.      Mouth/Throat:      Mouth: Mucous membranes are dry.      Pharynx: Oropharynx is clear.   Eyes:      Extraocular Movements: Extraocular movements intact.      Conjunctiva/sclera: Conjunctivae normal.      Pupils: Pupils are equal, round, and reactive to light.   Cardiovascular:      Rate and Rhythm: Normal rate.      Heart sounds: Normal heart sounds. No murmur heard.  Pulmonary:      Effort: Pulmonary effort is normal. She is not intubated.      Breath sounds: Normal breath sounds.   Abdominal:      General: Abdomen is flat. Bowel sounds are normal.      Palpations: Abdomen is soft.   Musculoskeletal:         General: Normal range of motion.      Cervical back: Normal range of motion and neck  supple.      Right lower leg: No edema.      Left lower leg: No edema.   Skin:     General: Skin is warm and dry.      Capillary Refill: Capillary refill takes less than 2 seconds.   Neurological:      General: No focal deficit present.      Mental Status: She is alert and oriented to person, place, and time.   Psychiatric:         Mood and Affect: Mood normal.         Behavior: Behavior normal.       Vents:  Oxygen Concentration (%): 36 (08/31/22 2012)    Lines/Drains/Airways       Central Venous Catheter Line  Duration                  Hemodialysis Catheter right subclavian -- days              Drain  Duration                  Chest Tube 08/29/22 1300 1 Right Midaxillary 2 days         Urethral Catheter 08/31/22 1000 Non-latex 16 Fr. <1 day              Peripheral Intravenous Line  Duration                  Peripheral IV - Single Lumen 08/29/22 1300 22 G Distal;Posterior;Right Forearm 2 days         Peripheral IV - Single Lumen 08/31/22 0845 22 G Anterior;Proximal;Right Forearm <1 day                    Significant Labs:    CBC/Anemia Profile:  Recent Labs   Lab 08/31/22  0854   WBC 6.77   HGB 10.5*   HCT 33.8*   *   .4*   RDW 13.2        Chemistries:  Recent Labs   Lab 08/31/22  0854      K 3.7      CO2 26   BUN 46*   CREATININE 7.75*   CALCIUM 8.4*   ALBUMIN 2.1*   PROT 6.2*   BILITOT 0.5   ALKPHOS 67   ALT 6*   AST 15       Recent Lab Results  (Last 5 results in the past 24 hours)        09/01/22  0442   08/31/22  2021   08/31/22  2006   08/31/22  1327   08/31/22  1315        Lactate, Ascencion       1.6         POC Glucose 134     198           Troponin I High Sensitivity   53.0       50.6                              Significant Imaging:  I have reviewed all pertinent imaging results/findings within the past 24 hours.    Assessment/Plan:     Pleural effusion on right  Waiting results of nuclear scan to see if there is a leak between the peritoneal and pleural spaces.  Chest tube put out  150 cc                 Mauro Bautista MD  Pulmonology  [facility] - [facility]

## 2022-09-01 NOTE — SUBJECTIVE & OBJECTIVE
Interval History:   NAEO  No SVT events  Pt feeling better tdoay, awaiting gen surg and renal recs,     Review of Systems   Constitutional:  Positive for unexpected weight change. Negative for chills, fatigue and fever.   HENT: Negative.  Negative for congestion and rhinorrhea.    Respiratory:  Negative for apnea, cough, chest tightness and shortness of breath.    Cardiovascular: Negative.  Negative for chest pain and leg swelling.   Gastrointestinal: Negative.  Negative for abdominal pain, diarrhea, nausea and vomiting.   Endocrine: Negative.    Genitourinary: Negative.    Musculoskeletal:  Positive for arthralgias.   Skin: Negative.    Allergic/Immunologic: Negative.    Neurological: Negative.    Hematological: Negative.    Psychiatric/Behavioral: Negative.     Objective:     Vital Signs (Most Recent):  Temp: 98.1 °F (36.7 °C) (09/01/22 0701)  Pulse: 90 (09/01/22 0724)  Resp: 20 (09/01/22 0724)  BP: (!) 114/47 (09/01/22 0702)  SpO2: 99 % (09/01/22 0712) Vital Signs (24h Range):  Temp:  [97.2 °F (36.2 °C)-98.4 °F (36.9 °C)] 98.1 °F (36.7 °C)  Pulse:  [90-98] 90  Resp:  [16-20] 20  SpO2:  [95 %-99 %] 99 %  BP: (107-146)/(47-86) 114/47     Weight: 72.9 kg (160 lb 11.5 oz)  Body mass index is 26.74 kg/m².    Intake/Output Summary (Last 24 hours) at 9/1/2022 1019  Last data filed at 8/31/2022 1400  Gross per 24 hour   Intake --   Output 800 ml   Net -800 ml        Physical Exam  Vitals reviewed.   Constitutional:       Appearance: Normal appearance. She is ill-appearing.   HENT:      Head: Normocephalic and atraumatic.      Nose: Nose normal. No congestion.      Mouth/Throat:      Pharynx: Oropharynx is clear.   Eyes:      Extraocular Movements: Extraocular movements intact.      Conjunctiva/sclera: Conjunctivae normal.      Pupils: Pupils are equal, round, and reactive to light.   Cardiovascular:      Rate and Rhythm: Regular rhythm. Tachycardia present.      Pulses: Normal pulses.      Heart sounds: Murmur heard.    Pulmonary:      Breath sounds: No wheezing.      Comments: Absent right sided breat sounds.   Abdominal:      General: Bowel sounds are normal. There is no distension.      Palpations: Abdomen is soft.      Tenderness: There is no abdominal tenderness.   Musculoskeletal:      Right lower leg: No edema.      Left lower leg: No edema.   Lymphadenopathy:      Cervical: No cervical adenopathy.   Skin:     General: Skin is warm.   Neurological:      Mental Status: She is alert. Mental status is at baseline.   Psychiatric:         Mood and Affect: Mood normal.       Significant Labs: All pertinent labs within the past 24 hours have been reviewed.    Significant Imaging: I have reviewed all pertinent imaging results/findings within the past 24 hours.

## 2022-09-01 NOTE — NURSING TRANSFER
Nursing Transfer Note      9/1/2022     Reason patient is being transferred: closer monitoring    Transfer To: ICU 9    Transfer via bed    Transfer with cardiac monitoring    Transported by TACOS fraser and TACOS Lemus    Medicines sent: n/a      Any special needs or follow-up needed: n/a    Chart send with patient: Yes    Notified: daughter    Patient reassessed at: n/  a (date, time)    Upon arrival to floor: cardiac monitor applied. TACOS Mcgrath rec'd pt at bedside.

## 2022-09-01 NOTE — ASSESSMENT & PLAN NOTE
Hydropneumothorax.  She had thoracentesis done Thursday of last week.  This may have resulted in pneumothorax.  However, she had large volume of pleural fluid today.  Fluid is not bloody and does not appear to be an empyema.  Pleural fluid for cytology was negative.  Possibility of diaphragmatic leak of peritoneal fluid should be ruled out.  08/30/2022: See notes under ESRD above  08/31/2022: She was supposed to have a nuclear medicine study to test for diaphragmatic leak.  I can not find results.  Surgery is following

## 2022-09-01 NOTE — SUBJECTIVE & OBJECTIVE
Interval History:  She denies shortness of breath.  She has some pain at chest tube insertion site.    Review of patient's allergies indicates:  No Known Allergies  Current Facility-Administered Medications   Medication Frequency    acetaminophen tablet 1,000 mg Q6H PRN    albuterol-ipratropium 2.5 mg-0.5 mg/3 mL nebulizer solution 3 mL Q6H    allopurinoL tablet 100 mg Daily    amLODIPine tablet 5 mg Daily    aspirin EC tablet 81 mg Daily    atorvastatin tablet 40 mg Daily    budesonide nebulizer solution 0.5 mg Q12H    calcitRIOL capsule 0.25 mcg Daily    calcium carbonate 200 mg calcium (500 mg) chewable tablet 500 mg Daily    cyanocobalamin tablet 100 mcg Daily    dextrose 50% injection 12.5 g PRN    dextrose 50% injection 25 g PRN    gabapentin capsule 100 mg BID    glucagon (human recombinant) injection 1 mg PRN    heparin (porcine) injection 5,000 Units Q12H    hydrALAZINE injection 5 mg Q6H PRN    insulin aspart U-100 injection 1-10 Units QID (AC + HS) PRN    magnesium oxide tablet 800 mg PRN    magnesium oxide tablet 800 mg PRN    melatonin tablet 9 mg Nightly PRN    mupirocin 2 % ointment Daily    naloxone 0.4 mg/mL injection 0.02 mg PRN    oxyCODONE-acetaminophen 7.5-325 mg per tablet 1 tablet Q4H PRN    potassium bicarbonate disintegrating tablet 35 mEq PRN    potassium bicarbonate disintegrating tablet 50 mEq PRN    potassium bicarbonate disintegrating tablet 60 mEq PRN    senna-docusate 8.6-50 mg per tablet 1 tablet BID    sevelamer carbonate tablet 800 mg TID AC    sodium chloride 0.9% flush 10 mL Q12H PRN    sodium chloride 0.9% flush 10 mL Q6H    And    sodium chloride 0.9% flush 10 mL PRN       Objective:     Vital Signs (Most Recent):  Temp: 98.4 °F (36.9 °C) (08/31/22 1901)  Pulse: 98 (08/31/22 2012)  Resp: 20 (08/31/22 2012)  BP: 124/86 (08/31/22 1901)  SpO2: 98 % (08/31/22 2012)  O2 Device (Oxygen Therapy): nasal cannula w/ humidification (08/31/22 2012) Vital Signs (24h Range):  Temp:  [97.2 °F  (36.2 °C)-98.7 °F (37.1 °C)] 98.4 °F (36.9 °C)  Pulse:  [90-98] 98  Resp:  [18-24] 20  SpO2:  [95 %-99 %] 98 %  BP: ()/(52-86) 124/86     Weight: 72.2 kg (159 lb 2.8 oz) (08/31/22 0600)  Body mass index is 26.49 kg/m².  Body surface area is 1.82 meters squared.    I/O last 3 completed shifts:  In: -   Out: 800 [Urine:650; Chest Tube:150]    Physical Exam  Eyes:      Pupils: Pupils are equal, round, and reactive to light.   Cardiovascular:      Rate and Rhythm: Normal rate and regular rhythm.   Pulmonary:      Breath sounds: No wheezing or rales.      Comments: Right chest tube present.  Dullness right base  Abdominal:      Tenderness: There is no abdominal tenderness. There is no guarding.   Musculoskeletal:      Cervical back: Neck supple.      Right lower leg: No edema.      Left lower leg: No edema.   Neurological:      Mental Status: She is alert.       Significant Labs:  BMP:   Recent Labs   Lab 08/29/22  1459 08/31/22  0854   * 130*    139   K 3.4* 3.7   CL 99 100   CO2 29 26   BUN 36* 46*   CREATININE 6.91* 7.75*   CALCIUM 8.8 8.4*   MG 1.7  --      CBC:   Recent Labs   Lab 08/31/22  0854   WBC 6.77   RBC 3.30*   HGB 10.5*   HCT 33.8*   *   .4*   MCH 31.8*   MCHC 31.1*        Significant Imaging:

## 2022-09-01 NOTE — ASSESSMENT & PLAN NOTE
Waiting results of nuclear scan to see if there is a leak between the peritoneal and pleural spaces.  Chest tube put out 150 cc

## 2022-09-01 NOTE — NURSING
"Pt resting in bed. Per Dr. Conrad's order, amiodarone bolus started. States pt will not have MRI or CT scan.     1430: Dr. Conrad on floor to see patient.   Orders placed by Dr. Conrad for lopressor 5 mg IV push.    1438: lopressor 5 mg IV push administered.    1506: ct staff up here to stating "we're here to get her for CT"    1511: spoke with Dr. Conrad and he states "no CT or MRI until heart rate is better"           "

## 2022-09-01 NOTE — PROGRESS NOTES
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

## 2022-09-01 NOTE — PROGRESS NOTES
[facility] Hospital Medicine  Progress Note    Patient Name: Bria Ray  MRN: [id number]  Patient Class: IP- Inpatient   Admission Date: 8/29/2022  Length of Stay: 3 days  Attending Physician: Te Conrad MD  Primary Care Provider: Lorena Richardson MD        Subjective:     Principal Problem:Pneumothorax on right        HPI:  Patient is a 87-year-old female with past medical history of hypertension, diabetes mellitus, ESRD, COPD, DJD, presented from home as direct admission for right-sided pneumothorax.  Patient underwent right-sided thoracentesis last Thursday for pleural effusion, however since then she continued to have right-sided chest pain along with shortness of bread for which she got an x-ray revealing pneumothorax on the right side.  Patient was then instructed to come to the hospital for further care.  Patient's primary pulmonologist is Dr. Bautista, and has been seeing General surgery in the past for her dialysis access issues.  Patient is also on peritoneal dialysis at home.  Patient is accompanied by her granddaughter, who is trying to get her hospice setup outpatient due to chronic conditions and mostly how her health has been declining steadily for the past few months.  I had a long discussion about goals of care for the patient as of now the patient has not made her mind about her code status and would like to be full code until she has a discussion with the rest of her family.  General surgery and Nephrology consulted and made aware of the patient's arrival.  Patient denies any fevers chills nausea vomiting or diarrhea does have cough and shortness of breath along with right-sided chest pain.      Overview/Hospital Course:  No notes on file    Interval History:   NAEO  No SVT events  Pt feeling better tdoay, awaiting gen surg and renal recs  Duet to esrd 2/2 dm and htn, along with orther co morbidities such as COPD and pulmonary hypertension, patient  and family want to pursue hospice, considering her over all decline in health and prognosis she will benefit from it.      Review of Systems   Constitutional:  Positive for unexpected weight change. Negative for chills, fatigue and fever.   HENT: Negative.  Negative for congestion and rhinorrhea.    Respiratory:  Negative for apnea, cough, chest tightness and shortness of breath.    Cardiovascular: Negative.  Negative for chest pain and leg swelling.   Gastrointestinal: Negative.  Negative for abdominal pain, diarrhea, nausea and vomiting.   Endocrine: Negative.    Genitourinary: Negative.    Musculoskeletal:  Positive for arthralgias.   Skin: Negative.    Allergic/Immunologic: Negative.    Neurological: Negative.    Hematological: Negative.    Psychiatric/Behavioral: Negative.     Objective:     Vital Signs (Most Recent):  Temp: 98.1 °F (36.7 °C) (09/01/22 0701)  Pulse: 90 (09/01/22 0724)  Resp: 20 (09/01/22 0724)  BP: (!) 114/47 (09/01/22 0702)  SpO2: 99 % (09/01/22 0712) Vital Signs (24h Range):  Temp:  [97.2 °F (36.2 °C)-98.4 °F (36.9 °C)] 98.1 °F (36.7 °C)  Pulse:  [90-98] 90  Resp:  [16-20] 20  SpO2:  [95 %-99 %] 99 %  BP: (107-146)/(47-86) 114/47     Weight: 72.9 kg (160 lb 11.5 oz)  Body mass index is 26.74 kg/m².    Intake/Output Summary (Last 24 hours) at 9/1/2022 1019  Last data filed at 8/31/2022 1400  Gross per 24 hour   Intake --   Output 800 ml   Net -800 ml        Physical Exam  Vitals reviewed.   Constitutional:       Appearance: Normal appearance. She is ill-appearing.   HENT:      Head: Normocephalic and atraumatic.      Nose: Nose normal. No congestion.      Mouth/Throat:      Pharynx: Oropharynx is clear.   Eyes:      Extraocular Movements: Extraocular movements intact.      Conjunctiva/sclera: Conjunctivae normal.      Pupils: Pupils are equal, round, and reactive to light.   Cardiovascular:      Rate and Rhythm: Regular rhythm. Tachycardia present.      Pulses: Normal pulses.      Heart  sounds: Murmur heard.   Pulmonary:      Breath sounds: No wheezing.      Comments: Absent right sided breat sounds.   Abdominal:      General: Bowel sounds are normal. There is no distension.      Palpations: Abdomen is soft.      Tenderness: There is no abdominal tenderness.   Musculoskeletal:      Right lower leg: No edema.      Left lower leg: No edema.   Lymphadenopathy:      Cervical: No cervical adenopathy.   Skin:     General: Skin is warm.   Neurological:      Mental Status: She is alert. Mental status is at baseline.   Psychiatric:         Mood and Affect: Mood normal.       Significant Labs: All pertinent labs within the past 24 hours have been reviewed.    Significant Imaging: I have reviewed all pertinent imaging results/findings within the past 24 hours.      Assessment/Plan:      * Pneumothorax on right  Face mask oxygen, 100%  Consult general surgery for Chest tube  Likely a result of thoracentesis  Monitor on tele  She is HDS    SVT (supraventricular tachycardia)  S/p adenosine x3, responded  Cardiology consulted.   Cont to monitor on tele      ESRD (end stage renal disease)    Cont IP PD  Consult nephrology   pts ESRD is due to medical illnesses such as diabetes and hypertension    DM (diabetes mellitus)  Patient's FSGs are controlled on current medication regimen.  Last A1c reviewed-   Lab Results   Component Value Date    HGBA1C 5.5 04/07/2022     Most recent fingerstick glucose reviewed- No results for input(s): POCTGLUCOSE in the last 24 hours.  Current correctional scale  Medium  Maintain anti-hyperglycemic dose as follows-   Antihyperglycemics (From admission, onward)    Start     Stop Route Frequency Ordered    08/29/22 1432  insulin aspart U-100 injection 1-10 Units         -- SubQ Before meals & nightly PRN 08/29/22 1333        Hold Oral hypoglycemics while patient is in the hospital.    Chronic obstructive pulmonary disease  Inhalers.   Oxygen support      Primary hypertension  Cont home  medications.       Chronic pain syndrome  Cont gabapentin  Prn norco        VTE Risk Mitigation (From admission, onward)         Ordered     heparin (porcine) injection 5,000 Units  Every 12 hours         08/29/22 1333     IP VTE HIGH RISK PATIENT  Once         08/29/22 1333     Place sequential compression device  Until discontinued         08/29/22 1333                Discharge Planning   ANGY:      Code Status: Full Code   Is the patient medically ready for discharge?:     Reason for patient still in hospital (select all that apply): Treatment  Discharge Plan A: Home, Hospice/home                  Rehmat U MD [place]  Department of Hospital Medicine   [facility]

## 2022-09-02 PROBLEM — I48.91 ATRIAL FIBRILLATION: Status: ACTIVE | Noted: 2022-01-01

## 2022-09-02 NOTE — ANESTHESIA POSTPROCEDURE EVALUATION
Anesthesia Post Evaluation    Patient: Bria Ray    Procedure(s) Performed: Procedure(s) (LRB):  INSERTION, CATHETER, HEMODIALYSIS, DUAL LUMEN (Right)    Final Anesthesia Type: MAC      Patient location during evaluation: ICU  Post-procedure vital signs: reviewed and stable  Pain management: adequate  Airway patency: patent  STEFANIE mitigation strategies: Extubation and recovery carried out in lateral, semiupright, or other nonsupine position  PONV status at discharge: No PONV  Anesthetic complications: no      Cardiovascular status: hemodynamically stable  Respiratory status: unassisted  Hydration status: euvolemic  Follow-up not needed.          Vitals Value Taken Time   /62 09/02/22 1256   Temp 36.9 °C (98.5 °F) 09/02/22 0850   Pulse 85 09/02/22 1256   Resp 12 09/02/22 1256   SpO2 96 % 09/02/22 1256   Vitals shown include unvalidated device data.      No case tracking events are documented in the log.      Pain/Orlin Score: Pain Rating Prior to Med Admin: 8 (9/1/2022  3:32 PM)  Pain Rating Post Med Admin: 2 (9/1/2022  6:12 AM)

## 2022-09-02 NOTE — ASSESSMENT & PLAN NOTE
- EKG exhibited SVT with RVR at rate of 176 bpm. Likely a physiologic response. Adenosine and 250cc NS bolus administered with current rate of 95 bpm. Diltiazem was not initiated due to concern for hypotension. Continue monitoring via Telemetry and repeat EKG.    9/1/22:  - pt is in and out of SVT vs afib, she is asymptomatic  - start amiodarone gtt with bolus  - continue tele monitoring    9/2/22:  - pt is now NSR with HR in the 80s  - transition to PO amiodarone 400mg po bid x 7 days then 200mg po daily  - ASA only for AC given pt's age and high fall risk with unsteady gait  - pt to f/u with Dr. Parker in cardiology clinic in 2 weeks

## 2022-09-02 NOTE — ASSESSMENT & PLAN NOTE
It appears she has communication between peritoneal and pleural spaces.  This is likely due to damage to the diaphragm during recent thoracentesis.  I have discussed case with Dr. Jeffery.  She is to have a nuclear medicine scan with tracer in her PD fluid to confirm leak.  Further plans will be based on this result.  Peritoneal dialysis will be suspended while this is being investigated.  09/01/2022:  Peritoneal pleural communication confirmed.  She is to have a temporary HD catheter placed tomorrow.  Plan HD tomorrow.

## 2022-09-02 NOTE — SUBJECTIVE & OBJECTIVE
Interval History:  Patient without complaints    Objective:     Vital Signs (Most Recent):  Temp: 97.7 °F (36.5 °C) (09/02/22 0330)  Pulse: 75 (09/02/22 0530)  Resp: 11 (09/02/22 0530)  BP: (!) 115/56 (09/02/22 0530)  SpO2: 97 % (09/02/22 0530)   Vital Signs (24h Range):  Temp:  [97.3 °F (36.3 °C)-98.1 °F (36.7 °C)] 97.7 °F (36.5 °C)  Pulse:  [] 75  Resp:  [9-21] 11  SpO2:  [91 %-100 %] 97 %  BP: ()/() 115/56     Weight: 74.3 kg (163 lb 12.8 oz)  Body mass index is 27.26 kg/m².      Intake/Output Summary (Last 24 hours) at 9/2/2022 0602  Last data filed at 9/2/2022 0501  Gross per 24 hour   Intake 167 ml   Output 590 ml   Net -423 ml       Physical Exam  Vitals reviewed.   Constitutional:       Appearance: Normal appearance.      Interventions: She is not intubated.  HENT:      Head: Normocephalic and atraumatic.      Nose: Nose normal.      Mouth/Throat:      Mouth: Mucous membranes are dry.      Pharynx: Oropharynx is clear.   Eyes:      Extraocular Movements: Extraocular movements intact.      Conjunctiva/sclera: Conjunctivae normal.      Pupils: Pupils are equal, round, and reactive to light.   Cardiovascular:      Rate and Rhythm: Normal rate.      Heart sounds: Normal heart sounds. No murmur heard.  Pulmonary:      Effort: Pulmonary effort is normal. She is not intubated.      Breath sounds: Normal breath sounds.   Abdominal:      General: Abdomen is flat. Bowel sounds are normal.      Palpations: Abdomen is soft.   Musculoskeletal:         General: Normal range of motion.      Cervical back: Normal range of motion and neck supple.      Right lower leg: No edema.      Left lower leg: No edema.   Skin:     General: Skin is warm and dry.      Capillary Refill: Capillary refill takes less than 2 seconds.   Neurological:      General: No focal deficit present.      Mental Status: She is alert and oriented to person, place, and time.   Psychiatric:         Mood and Affect: Mood normal.          Behavior: Behavior normal.       Vents:  Oxygen Concentration (%): 28 (09/02/22 0026)    Lines/Drains/Airways       Peripherally Inserted Central Catheter Line  Duration             PICC Double Lumen 08/31/22 0700 left basilic 1 day              Central Venous Catheter Line  Duration                  Hemodialysis Catheter right subclavian -- days              Drain  Duration                  Chest Tube 08/29/22 1300 1 Right Midaxillary 3 days         Urethral Catheter 08/31/22 1000 Non-latex 16 Fr. 1 day                    Significant Labs:    CBC/Anemia Profile:  Recent Labs   Lab 08/31/22  0854 09/01/22  0551   WBC 6.77 5.84   HGB 10.5* 8.7*   HCT 33.8* 28.7*   * 101*   .4* 104.4*   RDW 13.2 13.4        Chemistries:  Recent Labs   Lab 08/31/22  0854 09/01/22  0551    140   K 3.7 4.0    102   CO2 26 29   BUN 46* 50*   CREATININE 7.75* 7.84*   CALCIUM 8.4* 8.4*   ALBUMIN 2.1*  --    PROT 6.2*  --    BILITOT 0.5  --    ALKPHOS 67  --    ALT 6*  --    AST 15  --        Recent Lab Results         09/02/22  0457   09/01/22  2256   09/01/22  1630   09/01/22  1134        POC Glucose 123   153   158   135               Significant Imaging:  I have reviewed all pertinent imaging results/findings within the past 24 hours.

## 2022-09-02 NOTE — PLAN OF CARE
Problem: Infection  Goal: Absence of Infection Signs and Symptoms  Outcome: Ongoing, Progressing  Intervention: Prevent or Manage Infection  Flowsheets (Taken 9/2/2022 0544)  Infection Management: aseptic technique maintained  Isolation Precautions: precautions maintained     Problem: Adult Inpatient Plan of Care  Goal: Plan of Care Review  Outcome: Ongoing, Progressing  Goal: Patient-Specific Goal (Individualized)  Outcome: Ongoing, Progressing  Goal: Absence of Hospital-Acquired Illness or Injury  Outcome: Ongoing, Progressing  Intervention: Identify and Manage Fall Risk  Flowsheets (Taken 9/2/2022 0544)  Safety Promotion/Fall Prevention:   assistive device/personal item within reach   bed alarm set   side rails raised x 2   pulse ox  Intervention: Prevent Skin Injury  Flowsheets (Taken 9/2/2022 0544)  Body Position: turned  Skin Protection: adhesive use limited  Intervention: Prevent and Manage VTE (Venous Thromboembolism) Risk  Flowsheets (Taken 9/2/2022 0544)  VTE Prevention/Management:   remove, assess skin, and reapply sequential compression device   dorsiflexion/plantar flexion performed  Goal: Optimal Comfort and Wellbeing  Outcome: Ongoing, Progressing  Intervention: Monitor Pain and Promote Comfort  Flowsheets (Taken 9/2/2022 0544)  Pain Management Interventions:   position adjusted   pillow support provided  Intervention: Provide Person-Centered Care  Flowsheets (Taken 9/2/2022 0544)  Trust Relationship/Rapport:   care explained   choices provided   emotional support provided   empathic listening provided   questions answered   thoughts/feelings acknowledged   reassurance provided   questions encouraged  Goal: Readiness for Transition of Care  Outcome: Ongoing, Progressing     Problem: Breathing Pattern Ineffective  Goal: Effective Breathing Pattern  Outcome: Ongoing, Progressing  Intervention: Promote Improved Breathing Pattern  Flowsheets (Taken 9/2/2022 0544)  Airway/Ventilation Management:   airway  patency maintained   pulmonary hygiene promoted  Supportive Measures: active listening utilized  Head of Bed (HOB) Positioning: HOB at 20-30 degrees

## 2022-09-02 NOTE — TRANSFER OF CARE
"Anesthesia Transfer of Care Note    Patient: Bria Ray    Procedure(s) Performed: Procedure(s) (LRB):  INSERTION, CATHETER, HEMODIALYSIS, DUAL LUMEN (Right)    Patient location: ICU    Anesthesia Type: MAC    Transport from OR: Transported from OR on 2-3 L/min O2 by NC with adequate spontaneous ventilation    Post pain: adequate analgesia    Post assessment: no apparent anesthetic complications    Post vital signs: stable    Level of consciousness: responds to stimulation    Nausea/Vomiting: no nausea/vomiting    Complications: none    Transfer of care protocol was followedComments: Pt transferred back to ICU-9 via portable monitor and portable O2. VSS. Nurse at bedside.      Last vitals:   Visit Vitals  BP (!) 122/58 (BP Location: Right arm, Patient Position: Lying)   Pulse 81   Temp 36.9 °C (98.5 °F) (Oral)   Resp 16   Ht 5' 5" (1.651 m)   Wt 74.3 kg (163 lb 12.8 oz)   SpO2 99%   Breastfeeding No   BMI 27.26 kg/m²     "

## 2022-09-02 NOTE — ASSESSMENT & PLAN NOTE
Low scale SSI.  Accu checks ac/hs   Hold Metformin     Message left for patient regarding referral entered for  [facility]      Contact information, hours of operation and VM instructions left  Nurse requested patient to CB if needed and leave Full Name &  on VM  Referral closed and will await possible CB from pt  This is 2nd attempt to reach pt

## 2022-09-02 NOTE — PLAN OF CARE
Problem: Infection  Goal: Absence of Infection Signs and Symptoms  Outcome: Ongoing, Progressing     Problem: Adult Inpatient Plan of Care  Goal: Plan of Care Review  Outcome: Ongoing, Progressing  Goal: Patient-Specific Goal (Individualized)  Outcome: Ongoing, Progressing  Goal: Absence of Hospital-Acquired Illness or Injury  Outcome: Ongoing, Progressing  Goal: Optimal Comfort and Wellbeing  Outcome: Ongoing, Progressing  Goal: Readiness for Transition of Care  Outcome: Ongoing, Progressing     Problem: Diabetes Comorbidity  Goal: Blood Glucose Level Within Targeted Range  Outcome: Ongoing, Progressing     Problem: Fall Injury Risk  Goal: Absence of Fall and Fall-Related Injury  Outcome: Ongoing, Progressing     Problem: Skin Injury Risk Increased  Goal: Skin Health and Integrity  Outcome: Ongoing, Progressing     Problem: Breathing Pattern Ineffective  Goal: Effective Breathing Pattern  Outcome: Ongoing, Progressing     Problem: Gas Exchange Impaired  Goal: Optimal Gas Exchange  Outcome: Ongoing, Progressing     Problem: Device-Related Complication Risk (Hemodialysis)  Goal: Safe, Effective Therapy Delivery  Outcome: Ongoing, Progressing     Problem: Hemodynamic Instability (Hemodialysis)  Goal: Effective Tissue Perfusion  Outcome: Ongoing, Progressing     Problem: Infection (Hemodialysis)  Goal: Absence of Infection Signs and Symptoms  Outcome: Ongoing, Progressing

## 2022-09-02 NOTE — SUBJECTIVE & OBJECTIVE
Interval History: Pt is now NSR. MRI showed nothing acute.    Review of Systems   Constitutional: Negative for decreased appetite and diaphoresis.   Cardiovascular:  Negative for chest pain, cyanosis, palpitations and syncope.   Respiratory:  Negative for shortness of breath.    Objective:     Vital Signs (Most Recent):  Temp: 98.2 °F (36.8 °C) (09/02/22 1702)  Pulse: 83 (09/02/22 1702)  Resp: 12 (09/02/22 1702)  BP: 132/62 (09/02/22 1702)  SpO2: 99 % (09/02/22 1700) Vital Signs (24h Range):  Temp:  [97.7 °F (36.5 °C)-98.5 °F (36.9 °C)] 98.2 °F (36.8 °C)  Pulse:  [61-88] 83  Resp:  [9-21] 12  SpO2:  [91 %-100 %] 99 %  BP: ()/() 132/62     Weight: 74.3 kg (163 lb 12.8 oz)  Body mass index is 27.26 kg/m².     SpO2: 99 %  O2 Device (Oxygen Therapy): nasal cannula      Intake/Output Summary (Last 24 hours) at 9/2/2022 1719  Last data filed at 9/2/2022 1702  Gross per 24 hour   Intake 344.58 ml   Output 1590 ml   Net -1245.42 ml       Lines/Drains/Airways       Peripherally Inserted Central Catheter Line  Duration             PICC Double Lumen 08/31/22 0700 left basilic 2 days              Central Venous Catheter Line  Duration                  Hemodialysis Catheter 09/02/22 0815 right subclavian <1 day              Drain  Duration                  Chest Tube 08/29/22 1300 1 Right Midaxillary 4 days         Urethral Catheter 08/31/22 1000 Non-latex 16 Fr. 2 days                    Physical Exam  Vitals reviewed.   Constitutional:       General: She is not in acute distress.     Appearance: She is obese.   Cardiovascular:      Rate and Rhythm: Normal rate and regular rhythm.      Pulses: Normal pulses.      Heart sounds: Murmur heard.   Pulmonary:      Effort: Pulmonary effort is normal.   Skin:     General: Skin is warm and dry.   Neurological:      Mental Status: She is alert. Mental status is at baseline.   Psychiatric:         Mood and Affect: Mood normal.         Behavior: Behavior normal.        Significant Labs: All pertinent lab results from the last 24 hours have been reviewed. and   Recent Lab Results  (Last 5 results in the past 24 hours)        09/02/22  1550   09/02/22  1246   09/02/22  1214   09/02/22  0849   09/02/22  0457        Hep A IgM   Non-Reactive             Hep B C IgM   Non-Reactive             Hepatitis B Surface Ag   Non-Reactive             Hepatitis C Ab   Non-Reactive             POC Glucose 147     134   134   123                              Significant Imaging: Echocardiogram: Transthoracic echo (TTE) complete (Cupid Only):   Results for orders placed or performed during the hospital encounter of 04/19/22   Echo   Result Value Ref Range    BSA 1.91 m2    Right Atrial Pressure (from IVC) 3 mmHg    EF 70 %    Left Ventricular Outflow Tract Mean Gradient 3.00 mmHg    AORTIC VALVE CUSP SEPERATION 18.409994998866820 cm    LVIDd 4.72 3.5 - 6.0 cm    IVS 1.37 (A) 0.6 - 1.1 cm    Posterior Wall 1.26 (A) 0.6 - 1.1 cm    Ao root annulus 2.20 cm    LVIDs 2.62 2.1 - 4.0 cm    FS 44 28 - 44 %    IVC ostium 1.5 cm    LV mass 243.47 g    RVDD 4.60 cm    TAPSE 2.00 cm    Left Ventricle Relative Wall Thickness 0.53 cm    AV mean gradient 6 mmHg    AV valve area 1.48 cm2    AV Velocity Ratio 0.75     AV index (prosthetic) 0.74     MV mean gradient 2 mmHg    MV valve area p 1/2 method 2.82 cm2    MV valve area by continuity eq 1.21 cm2    E wave deceleration time 163 msec    LVOT diameter 1.60 cm    LVOT area 2.0 cm2    LVOT peak aren 1.2 m/s    LVOT peak VTI 31.00 cm    Ao peak aren 1.6 m/s    Ao VTI 42.00 cm    LVOT stroke volume 62.30 cm3    AV peak gradient 10 mmHg    MV peak gradient 6 mmHg    TV rest pulmonary artery pressure 41 mmHg    MV Peak E Aren 1.06 m/s    TR Max Aren 3.10 m/s    MV VTI 51.6 cm    MV stenosis pressure 1/2 time 78 ms    LV Systolic Volume 25.10 mL    LV Systolic Volume Index 13.3 mL/m2    LV Diastolic Volume 103.40 mL    LV Diastolic Volume Index 54.71 mL/m2    LV Mass  Index 129 g/m2    Echo EF Estimated 70 %    RA Major Axis 4.30 cm    Triscuspid Valve Regurgitation Peak Gradient 38 mmHg    LA size 3.40 cm    Narrative    · The left ventricle is normal in size with mild concentric hypertrophy   and normal systolic function.  · The estimated ejection fraction is 70%.  · Normal left ventricular diastolic function.  · Atrial fibrillation not observed.  · Mild right ventricular enlargement.  · Mild right atrial enlargement.  · Mild-to-moderate mitral regurgitation.  · Mild tricuspid regurgitation.  · Mild pulmonic regurgitation.  · Normal central venous pressure (3 mmHg).  · The estimated PA systolic pressure is 41 mmHg.  · There is pulmonary hypertension.

## 2022-09-02 NOTE — ANESTHESIA PREPROCEDURE EVALUATION
09/02/2022  Bria Ray is a 87 y.o., female.      Pre-op Assessment    I have reviewed the Patient Summary Reports.    I have reviewed the NPO Status.   I have reviewed the Medications.     Review of Systems         Anesthesia Plan  Type of Anesthesia, risks & benefits discussed:    Anesthesia Type: MAC  Intra-op Monitoring Plan: Standard ASA Monitors  Post Op Pain Control Plan: IV/PO Opioids PRN  Induction:  IV  Informed Consent: Informed consent signed with the Patient and all parties understand the risks and agree with anesthesia plan.  All questions answered.   ASA Score: 4    Ready For Surgery From Anesthesia Perspective.     .  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    Plan is MAC

## 2022-09-02 NOTE — PROGRESS NOTES
[facility]  Cardiology  Progress Note    Patient Name: Bria Ray  MRN: [id number]  Admission Date: 8/29/2022  Hospital Length of Stay: 4 days  Code Status: Full Code   Attending Physician: Mauro Bautista MD   Primary Care Physician: Lorena Richardson MD  Expected Discharge Date:   Principal Problem:Pneumothorax on right    Subjective:     Interval History: Pt is now NSR. MRI showed nothing acute.    Review of Systems   Constitutional: Negative for decreased appetite and diaphoresis.   Cardiovascular:  Negative for chest pain, cyanosis, palpitations and syncope.   Respiratory:  Negative for shortness of breath.    Objective:     Vital Signs (Most Recent):  Temp: 98.2 °F (36.8 °C) (09/02/22 1702)  Pulse: 83 (09/02/22 1702)  Resp: 12 (09/02/22 1702)  BP: 132/62 (09/02/22 1702)  SpO2: 99 % (09/02/22 1700) Vital Signs (24h Range):  Temp:  [97.7 °F (36.5 °C)-98.5 °F (36.9 °C)] 98.2 °F (36.8 °C)  Pulse:  [61-88] 83  Resp:  [9-21] 12  SpO2:  [91 %-100 %] 99 %  BP: ()/() 132/62     Weight: 74.3 kg (163 lb 12.8 oz)  Body mass index is 27.26 kg/m².     SpO2: 99 %  O2 Device (Oxygen Therapy): nasal cannula      Intake/Output Summary (Last 24 hours) at 9/2/2022 1719  Last data filed at 9/2/2022 1702  Gross per 24 hour   Intake 344.58 ml   Output 1590 ml   Net -1245.42 ml       Lines/Drains/Airways       Peripherally Inserted Central Catheter Line  Duration             PICC Double Lumen 08/31/22 0700 left basilic 2 days              Central Venous Catheter Line  Duration                  Hemodialysis Catheter 09/02/22 0815 right subclavian <1 day              Drain  Duration                  Chest Tube 08/29/22 1300 1 Right Midaxillary 4 days         Urethral Catheter 08/31/22 1000 Non-latex 16 Fr. 2 days                    Physical Exam  Vitals reviewed.   Constitutional:       General: She is not in acute distress.     Appearance: She is obese.   Cardiovascular:      Rate and  Rhythm: Normal rate and regular rhythm.      Pulses: Normal pulses.      Heart sounds: Murmur heard.   Pulmonary:      Effort: Pulmonary effort is normal.   Skin:     General: Skin is warm and dry.   Neurological:      Mental Status: She is alert. Mental status is at baseline.   Psychiatric:         Mood and Affect: Mood normal.         Behavior: Behavior normal.       Significant Labs: All pertinent lab results from the last 24 hours have been reviewed. and   Recent Lab Results  (Last 5 results in the past 24 hours)        09/02/22  1550   09/02/22  1246   09/02/22  1214   09/02/22  0849   09/02/22  0457        Hep A IgM   Non-Reactive             Hep B C IgM   Non-Reactive             Hepatitis B Surface Ag   Non-Reactive             Hepatitis C Ab   Non-Reactive             POC Glucose 147     134   134   123                              Significant Imaging: Echocardiogram: Transthoracic echo (TTE) complete (Cupid Only):   Results for orders placed or performed during the hospital encounter of 04/19/22   Echo   Result Value Ref Range    BSA 1.91 m2    Right Atrial Pressure (from IVC) 3 mmHg    EF 70 %    Left Ventricular Outflow Tract Mean Gradient 3.00 mmHg    AORTIC VALVE CUSP SEPERATION 18.564212323743491 cm    LVIDd 4.72 3.5 - 6.0 cm    IVS 1.37 (A) 0.6 - 1.1 cm    Posterior Wall 1.26 (A) 0.6 - 1.1 cm    Ao root annulus 2.20 cm    LVIDs 2.62 2.1 - 4.0 cm    FS 44 28 - 44 %    IVC ostium 1.5 cm    LV mass 243.47 g    RVDD 4.60 cm    TAPSE 2.00 cm    Left Ventricle Relative Wall Thickness 0.53 cm    AV mean gradient 6 mmHg    AV valve area 1.48 cm2    AV Velocity Ratio 0.75     AV index (prosthetic) 0.74     MV mean gradient 2 mmHg    MV valve area p 1/2 method 2.82 cm2    MV valve area by continuity eq 1.21 cm2    E wave deceleration time 163 msec    LVOT diameter 1.60 cm    LVOT area 2.0 cm2    LVOT peak jeanna 1.2 m/s    LVOT peak VTI 31.00 cm    Ao peak jeanna 1.6 m/s    Ao VTI 42.00 cm    LVOT stroke volume  62.30 cm3    AV peak gradient 10 mmHg    MV peak gradient 6 mmHg    TV rest pulmonary artery pressure 41 mmHg    MV Peak E Aren 1.06 m/s    TR Max Aren 3.10 m/s    MV VTI 51.6 cm    MV stenosis pressure 1/2 time 78 ms    LV Systolic Volume 25.10 mL    LV Systolic Volume Index 13.3 mL/m2    LV Diastolic Volume 103.40 mL    LV Diastolic Volume Index 54.71 mL/m2    LV Mass Index 129 g/m2    Echo EF Estimated 70 %    RA Major Axis 4.30 cm    Triscuspid Valve Regurgitation Peak Gradient 38 mmHg    LA size 3.40 cm    Narrative    · The left ventricle is normal in size with mild concentric hypertrophy   and normal systolic function.  · The estimated ejection fraction is 70%.  · Normal left ventricular diastolic function.  · Atrial fibrillation not observed.  · Mild right ventricular enlargement.  · Mild right atrial enlargement.  · Mild-to-moderate mitral regurgitation.  · Mild tricuspid regurgitation.  · Mild pulmonic regurgitation.  · Normal central venous pressure (3 mmHg).  · The estimated PA systolic pressure is 41 mmHg.  · There is pulmonary hypertension.        Assessment and Plan:     Brief HPI: 88 yo F who presented with right pneumothorax after thoracentesis performed outpatient, found to be in SVT with RVR. Patient received Adenosine and 250cc NS bolus with rate at 95 bpm. Diltiazem was recommended, but not given due to concern for hypotension.         * Pneumothorax on right  - primary team managing    Atrial fibrillation  - EKG exhibited SVT with RVR at rate of 176 bpm. Likely a physiologic response. Adenosine and 250cc NS bolus administered with current rate of 95 bpm. Diltiazem was not initiated due to concern for hypotension. Continue monitoring via Telemetry and repeat EKG.    9/1/22:  - pt is in and out of SVT vs afib, she is asymptomatic  - start amiodarone gtt with bolus  - continue tele monitoring    9/2/22:  - pt is now NSR with HR in the 80s  - transition to PO amiodarone 400mg po bid x 7 days then  200mg po daily  - ASA only for AC given pt's age and high fall risk with unsteady gait  - pt to f/u with Dr. Parker in cardiology clinic in 4 weeks    SVT (supraventricular tachycardia)  - EKG exhibited SVT with RVR at rate of 176 bpm. Likely a physiologic response. Adenosine and 250cc NS bolus administered with current rate of 95 bpm. Diltiazem was not initiated due to concern for hypotension. Continue monitoring via Telemetry and repeat EKG.    9/1/22:  - pt is in and out of SVT vs afib, she is asymptomatic  - start amiodarone gtt with bolus  - continue tele monitoring    9/2/22:  - pt is now NSR with HR in the 80s  - transition to PO amiodarone 400mg po bid x 7 days then 200mg po daily  - ASA only for AC given pt's age and high fall risk with unsteady gait  - pt to f/u with Dr. Parker in cardiology clinic in 4 weeks    ESRD (end stage renal disease)  - peritoneal dialysis  - primary and nephrology managing        VTE Risk Mitigation (From admission, onward)           Ordered     heparin (porcine) injection 4,000 Units  As needed (PRN)         09/02/22 1357     heparin (porcine) injection 5,000 Units  Every 12 hours         08/29/22 1333     IP VTE HIGH RISK PATIENT  Once         08/29/22 1333     Place sequential compression device  Until discontinued         08/29/22 1333                  PT seen and examined, chart reviewed, essentially agree with findings as documented    CC: Palpitations  HPI: Pt reports palpitations have improved  PE: agree with findings as above  Labs, tele reviewed,     IMP/Plan:  A flutter, maintaining NSR on amiodarone, continue on oral tx.  Pt is not a candidate for systemic anticoagulation due to fall risk, will continue on ASA only.   Daniela Sparks, FNP  Cardiology  [facility]

## 2022-09-02 NOTE — ASSESSMENT & PLAN NOTE
Hydropneumothorax.  She had thoracentesis done Thursday of last week.  This may have resulted in pneumothorax.  However, she had large volume of pleural fluid today.  Fluid is not bloody and does not appear to be an empyema.  Pleural fluid for cytology was negative.  Possibility of diaphragmatic leak of peritoneal fluid should be ruled out.  08/30/2022: See notes under ESRD above  08/31/2022: She was supposed to have a nuclear medicine study to test for diaphragmatic leak.  I can not find results.  Surgery is following  09/01/2022:  Pleural-peritoneal communication confirmed.

## 2022-09-02 NOTE — PROGRESS NOTES
[facility]  Pulmonology  Progress Note    Patient Name: Bria Ray  MRN: [id number]  Admission Date: 8/29/2022  Hospital Length of Stay: 4 days  Code Status: Full Code  Attending Provider: Te Conrad MD  Primary Care Provider: Lorena Richardson MD   Principal Problem: Pneumothorax on right    Subjective:     Interval History:  Patient without complaints    Objective:     Vital Signs (Most Recent):  Temp: 97.7 °F (36.5 °C) (09/02/22 0330)  Pulse: 75 (09/02/22 0530)  Resp: 11 (09/02/22 0530)  BP: (!) 115/56 (09/02/22 0530)  SpO2: 97 % (09/02/22 0530)   Vital Signs (24h Range):  Temp:  [97.3 °F (36.3 °C)-98.1 °F (36.7 °C)] 97.7 °F (36.5 °C)  Pulse:  [] 75  Resp:  [9-21] 11  SpO2:  [91 %-100 %] 97 %  BP: ()/() 115/56     Weight: 74.3 kg (163 lb 12.8 oz)  Body mass index is 27.26 kg/m².      Intake/Output Summary (Last 24 hours) at 9/2/2022 0602  Last data filed at 9/2/2022 0501  Gross per 24 hour   Intake 167 ml   Output 590 ml   Net -423 ml       Physical Exam  Vitals reviewed.   Constitutional:       Appearance: Normal appearance.      Interventions: She is not intubated.  HENT:      Head: Normocephalic and atraumatic.      Nose: Nose normal.      Mouth/Throat:      Mouth: Mucous membranes are dry.      Pharynx: Oropharynx is clear.   Eyes:      Extraocular Movements: Extraocular movements intact.      Conjunctiva/sclera: Conjunctivae normal.      Pupils: Pupils are equal, round, and reactive to light.   Cardiovascular:      Rate and Rhythm: Normal rate.      Heart sounds: Normal heart sounds. No murmur heard.  Pulmonary:      Effort: Pulmonary effort is normal. She is not intubated.      Breath sounds: Normal breath sounds.   Abdominal:      General: Abdomen is flat. Bowel sounds are normal.      Palpations: Abdomen is soft.   Musculoskeletal:         General: Normal range of motion.      Cervical back: Normal range of motion and neck supple.      Right lower leg: No  edema.      Left lower leg: No edema.   Skin:     General: Skin is warm and dry.      Capillary Refill: Capillary refill takes less than 2 seconds.   Neurological:      General: No focal deficit present.      Mental Status: She is alert and oriented to person, place, and time.   Psychiatric:         Mood and Affect: Mood normal.         Behavior: Behavior normal.       Vents:  Oxygen Concentration (%): 28 (09/02/22 0026)    Lines/Drains/Airways       Peripherally Inserted Central Catheter Line  Duration             PICC Double Lumen 08/31/22 0700 left basilic 1 day              Central Venous Catheter Line  Duration                  Hemodialysis Catheter right subclavian -- days              Drain  Duration                  Chest Tube 08/29/22 1300 1 Right Midaxillary 3 days         Urethral Catheter 08/31/22 1000 Non-latex 16 Fr. 1 day                    Significant Labs:    CBC/Anemia Profile:  Recent Labs   Lab 08/31/22  0854 09/01/22  0551   WBC 6.77 5.84   HGB 10.5* 8.7*   HCT 33.8* 28.7*   * 101*   .4* 104.4*   RDW 13.2 13.4        Chemistries:  Recent Labs   Lab 08/31/22  0854 09/01/22  0551    140   K 3.7 4.0    102   CO2 26 29   BUN 46* 50*   CREATININE 7.75* 7.84*   CALCIUM 8.4* 8.4*   ALBUMIN 2.1*  --    PROT 6.2*  --    BILITOT 0.5  --    ALKPHOS 67  --    ALT 6*  --    AST 15  --        Recent Lab Results         09/02/22  0457   09/01/22  2256   09/01/22  1630   09/01/22  1134        POC Glucose 123   153   158   135               Significant Imaging:  I have reviewed all pertinent imaging results/findings within the past 24 hours.    Assessment/Plan:     Pleural effusion on right  Related to peritoneal dialysis,will see if will resolve with hemodialysis    ESRD (end stage renal disease)  Will switch to hemodialysis,see if effusion will resolve    DM (diabetes mellitus)  Well controlled                 Mauro Bautista MD  Pulmonology  [facility]

## 2022-09-02 NOTE — SUBJECTIVE & OBJECTIVE
Interval History:  She has had intermittent SVT today.  She denies shortness of breath chest pain or palpitations.    Review of patient's allergies indicates:  No Known Allergies  Current Facility-Administered Medications   Medication Frequency    acetaminophen tablet 1,000 mg Q6H PRN    albuterol-ipratropium 2.5 mg-0.5 mg/3 mL nebulizer solution 3 mL Q6H    allopurinoL tablet 100 mg Daily    amiodarone (CORDARONE) 450 mg in dextrose 5 % 250 mL infusion Continuous    aspirin EC tablet 81 mg Daily    atorvastatin tablet 40 mg Daily    budesonide nebulizer solution 0.5 mg Q12H    calcitRIOL capsule 0.25 mcg Daily    calcium carbonate 200 mg calcium (500 mg) chewable tablet 500 mg Daily    cyanocobalamin tablet 100 mcg Daily    dextrose 50% injection 12.5 g PRN    dextrose 50% injection 25 g PRN    gabapentin capsule 100 mg BID    glucagon (human recombinant) injection 1 mg PRN    heparin (porcine) injection 5,000 Units Q12H    hydrALAZINE injection 5 mg Q6H PRN    insulin aspart U-100 injection 1-10 Units QID (AC + HS) PRN    magnesium oxide tablet 800 mg PRN    magnesium oxide tablet 800 mg PRN    melatonin tablet 9 mg Nightly PRN    mupirocin 2 % ointment Daily    naloxone 0.4 mg/mL injection 0.02 mg PRN    oxyCODONE-acetaminophen 7.5-325 mg per tablet 1 tablet Q4H PRN    potassium bicarbonate disintegrating tablet 35 mEq PRN    potassium bicarbonate disintegrating tablet 50 mEq PRN    potassium bicarbonate disintegrating tablet 60 mEq PRN    senna-docusate 8.6-50 mg per tablet 1 tablet BID    sevelamer carbonate tablet 800 mg TID AC    sodium chloride 0.9% flush 10 mL Q12H PRN    sodium chloride 0.9% flush 10 mL Q6H    And    sodium chloride 0.9% flush 10 mL PRN       Objective:     Vital Signs (Most Recent):  Temp: 97.7 °F (36.5 °C) (09/01/22 1840)  Pulse: 69 (09/01/22 1958)  Resp: 16 (09/01/22 1958)  BP: (!) 140/72 (09/01/22 1900)  SpO2: 98 % (09/01/22 1958)  O2 Device (Oxygen Therapy): nasal cannula (09/01/22  1840)   Vital Signs (24h Range):  Temp:  [97.3 °F (36.3 °C)-98.2 °F (36.8 °C)] 97.7 °F (36.5 °C)  Pulse:  [] 69  Resp:  [12-20] 16  SpO2:  [95 %-99 %] 98 %  BP: ()/(47-75) 140/72     Weight: 72.9 kg (160 lb 11.5 oz) (09/01/22 0400)  Body mass index is 26.74 kg/m².  Body surface area is 1.83 meters squared.    I/O last 3 completed shifts:  In: -   Out: 800 [Urine:650; Chest Tube:150]    Physical Exam  Eyes:      Pupils: Pupils are equal, round, and reactive to light.   Cardiovascular:      Rate and Rhythm: Normal rate and regular rhythm.   Pulmonary:      Breath sounds: No wheezing or rales.      Comments: Right chest tube present.  Dullness right base  Abdominal:      Tenderness: There is no abdominal tenderness. There is no guarding.   Musculoskeletal:      Cervical back: Neck supple.      Right lower leg: No edema.      Left lower leg: No edema.   Neurological:      Mental Status: She is alert.       Significant Labs:  BMP:   Recent Labs   Lab 08/29/22  1459 08/31/22  0854 09/01/22  0551   *   < > 134*      < > 140   K 3.4*   < > 4.0   CL 99   < > 102   CO2 29   < > 29   BUN 36*   < > 50*   CREATININE 6.91*   < > 7.84*   CALCIUM 8.8   < > 8.4*   MG 1.7  --   --     < > = values in this interval not displayed.     CBC:   Recent Labs   Lab 09/01/22  0551   WBC 5.84   RBC 2.75*   HGB 8.7*   HCT 28.7*   *   .4*   MCH 31.6*   MCHC 30.3*        Significant Imaging:

## 2022-09-02 NOTE — OP NOTE
[facility]  Surgery Department  Operative Note    SUMMARY     Date of Procedure: 9/2/2022     Procedure: Procedure(s) (LRB):  INSERTION, CATHETER, HEMODIALYSIS, DUAL LUMEN (Right)     Surgeon(s) and Role:     * Terence Jeffery MD - Primary    Assisting Surgeon: None    Pre-Operative Diagnosis: Renal failure, unspecified chronicity [N19]    Post-Operative Diagnosis: Post-Op Diagnosis Codes:     * Renal failure, unspecified chronicity [N19]    Anesthesia: General/MAC    Procedures Performed: US guided fluoroscopy confirmed tunneled right IJ hemodialysis catheter    Significant Findings of the Procedure:      Procedure in Detail: After informed consent was obtained patient brought to the OR prepped and draped in the usual sterile fashion. We started out by using an ultrasound to scan the right IJ. We accessed the vein on the first attempt.  We then fluoroscopy to confirm in the SVC. We placed a separate incision for our infraclavicular catheter insertion with tunneling. We then using the Seldinger method dilated over the wire under fluoroscopic visualization. We then placed our catheter parking tip at the atriocaval junction. We flushed and aspirated good venous blood and the port and heparin locked it. We then secured the catheter in place. Patient tolerated the procedure well.     Complications: No    Estimated Blood Loss (EBL): * No values recorded between 9/2/2022  8:11 AM and 9/2/2022  8:40 AM *           Implants:   Implant Name Type Inv. Item Serial No.  Lot No. LRB No. Used Action   CATH GLIDEPATH 14.5F 19CM 24CM - [id number] Dialysis Catheter CATH GLIDEPATH 14.5F 19CM 24CM  [employer] [id number] 1 Implanted       Specimens:   Specimen (24h ago, onward)      None                    Condition: Good    Disposition: PACU - hemodynamically stable.    Attestation: I was present and scrubbed for the entire procedure.

## 2022-09-02 NOTE — INTERVAL H&P NOTE
The patient has been examined and the H&P has been reviewed:    I concur with the findings and no changes have occurred since H&P was written.    Surgery risks, benefits and alternative options discussed and understood by patient/family.    Pt with radiology findings of peritoneal plueral fistula.  Will need hemodialysis for time and attempt spontaneous closure given age, co-morbidities and hopefully small size of fistula.  Will need hemodialysis in the mean time.   PT sent to ICU yesterday after afib RVR which is currently controlled on cardizem drip.  Patient OR for tunneled HD catheter.  Risks and benefits explained.  All questions answered      Active Hospital Problems    Diagnosis  POA    *Pneumothorax on right [J93.9]  Yes    SVT (supraventricular tachycardia) [I47.1]  Yes    Pleural effusion on right [J90]  Yes    ESRD (end stage renal disease) [N18.6]  Unknown    Chronic obstructive pulmonary disease [J44.9]  Yes    DM (diabetes mellitus) [E11.9]  Unknown    Primary hypertension [I10]  Yes    Chronic pain syndrome [G89.4]  Yes     Chronic      Resolved Hospital Problems   No resolved problems to display.

## 2022-09-03 NOTE — ASSESSMENT & PLAN NOTE
Patient status post a pneumothorax for arm a thoracentesis has a transudative effusion the question is whether not this is due to peritoneal dialysis.  The options are to consider surgery to fix a defect in the diaphragm or to proceed with hemodialysis will follow.  Currently draining a L the last 24 hours at a chest tube her x-ray this morning looked clear    - CT continues   - CXR in AM

## 2022-09-03 NOTE — PLAN OF CARE
Problem: Fall Injury Risk  Goal: Absence of Fall and Fall-Related Injury  Outcome: Ongoing, Progressing     Problem: Skin Injury Risk Increased  Goal: Skin Health and Integrity  Outcome: Ongoing, Progressing     Problem: Breathing Pattern Ineffective  Goal: Effective Breathing Pattern  Outcome: Ongoing, Progressing     Problem: Gas Exchange Impaired  Goal: Optimal Gas Exchange  Outcome: Ongoing, Progressing     Problem: Device-Related Complication Risk (Hemodialysis)  Goal: Safe, Effective Therapy Delivery  Outcome: Ongoing, Progressing     Problem: Hemodynamic Instability (Hemodialysis)  Goal: Effective Tissue Perfusion  Outcome: Ongoing, Progressing     Problem: Infection (Hemodialysis)  Goal: Absence of Infection Signs and Symptoms  Outcome: Ongoing, Progressing

## 2022-09-03 NOTE — PROGRESS NOTES
[facility]  Nephrology  Progress Note    Patient Name: Bria Ray  MRN: [id number]  Admission Date: 8/29/2022  Hospital Length of Stay: 4 days  Attending Provider: Mauro Bautista MD   Primary Care Physician: Lorena Richardson MD  Principal Problem:Pneumothorax on right    Subjective:     HPI: 87-year-old woman with ESRD secondary to diabetes.  She is on peritoneal dialysis.  She had a thoracentesis for right pleural effusion Thursday of last week.  She developed more shortness of breath over the weekend.  Chest x-ray today shows hydrothorax with collapse of the right lung.      Interval History:  She had a tunneled hemodialysis catheter placed this a.m..  She currently denies SOB.  She is in NSR.    Review of patient's allergies indicates:  No Known Allergies  Current Facility-Administered Medications   Medication Frequency    0.9%  NaCl infusion PRN    acetaminophen tablet 1,000 mg Q6H PRN    albuterol-ipratropium 2.5 mg-0.5 mg/3 mL nebulizer solution 3 mL Q6H    allopurinoL tablet 100 mg Daily    amiodarone tablet 400 mg BID    aspirin EC tablet 81 mg Daily    atorvastatin tablet 40 mg Daily    budesonide nebulizer solution 0.5 mg Q12H    calcitRIOL capsule 0.25 mcg Daily    calcium carbonate 200 mg calcium (500 mg) chewable tablet 500 mg Daily    cyanocobalamin tablet 100 mcg Daily    dextrose 50% injection 12.5 g PRN    dextrose 50% injection 25 g PRN    gabapentin capsule 100 mg BID    glucagon (human recombinant) injection 1 mg PRN    heparin (porcine) injection 4,000 Units PRN    heparin (porcine) injection 5,000 Units Q12H    hydrALAZINE injection 5 mg Q6H PRN    insulin aspart U-100 injection 1-10 Units QID (AC + HS) PRN    magnesium oxide tablet 800 mg PRN    magnesium oxide tablet 800 mg PRN    melatonin tablet 9 mg Nightly PRN    mupirocin 2 % ointment Daily    naloxone 0.4 mg/mL injection 0.02 mg PRN    oxyCODONE-acetaminophen 7.5-325 mg per  tablet 1 tablet Q4H PRN    potassium bicarbonate disintegrating tablet 35 mEq PRN    potassium bicarbonate disintegrating tablet 50 mEq PRN    potassium bicarbonate disintegrating tablet 60 mEq PRN    senna-docusate 8.6-50 mg per tablet 1 tablet BID    sevelamer carbonate tablet 800 mg TID AC    sodium chloride 0.9% flush 10 mL Q12H PRN    sodium chloride 0.9% flush 10 mL Q6H    And    sodium chloride 0.9% flush 10 mL PRN       Objective:     Vital Signs (Most Recent):  Temp: 98 °F (36.7 °C) (09/02/22 2245)  Pulse: 86 (09/02/22 2230)  Resp: (!) 22 (09/02/22 2230)  BP: 116/61 (09/02/22 2230)  SpO2: 95 % (09/02/22 2230)  O2 Device (Oxygen Therapy): nasal cannula (09/02/22 1942)   Vital Signs (24h Range):  Temp:  [97.6 °F (36.4 °C)-98.5 °F (36.9 °C)] 98 °F (36.7 °C)  Pulse:  [61-92] 86  Resp:  [9-22] 22  SpO2:  [94 %-100 %] 95 %  BP: ()/(39-76) 116/61     Weight: 74.3 kg (163 lb 12.8 oz) (09/02/22 0330)  Body mass index is 27.26 kg/m².  Body surface area is 1.85 meters squared.    I/O last 3 completed shifts:  In: 344.6 [I.V.:344.6]  Out: 1840 [Urine:700; Other:1000; Chest Tube:140]    Physical Exam  Eyes:      Pupils: Pupils are equal, round, and reactive to light.   Cardiovascular:      Rate and Rhythm: Normal rate and regular rhythm.   Pulmonary:      Breath sounds: No wheezing or rales.      Comments: Right chest tube present.  Dullness right base  Abdominal:      Tenderness: There is no abdominal tenderness. There is no guarding.   Musculoskeletal:      Cervical back: Neck supple.      Right lower leg: No edema.      Left lower leg: No edema.   Neurological:      Mental Status: She is alert.       Significant Labs:  BMP:   Recent Labs   Lab 08/29/22  1459 08/31/22  0854 09/01/22  0551   *   < > 134*      < > 140   K 3.4*   < > 4.0   CL 99   < > 102   CO2 29   < > 29   BUN 36*   < > 50*   CREATININE 6.91*   < > 7.84*   CALCIUM 8.8   < > 8.4*   MG 1.7  --   --     < > = values in this  interval not displayed.     CBC:   Recent Labs   Lab 09/01/22  0551   WBC 5.84   RBC 2.75*   HGB 8.7*   HCT 28.7*   *   .4*   MCH 31.6*   MCHC 30.3*        Significant Imaging:      Assessment/Plan:     * Pneumothorax on right  Hydropneumothorax.  She had thoracentesis done Thursday of last week.  This may have resulted in pneumothorax.  However, she had large volume of pleural fluid today.  Fluid is not bloody and does not appear to be an empyema.  Pleural fluid for cytology was negative.  Possibility of diaphragmatic leak of peritoneal fluid should be ruled out.  08/30/2022: See notes under ESRD above  08/31/2022: She was supposed to have a nuclear medicine study to test for diaphragmatic leak.  I can not find results.  Surgery is following  09/01/2022:  Pleural-peritoneal communication confirmed.    ESRD (end stage renal disease)  It appears she has communication between peritoneal and pleural spaces.  This is likely due to damage to the diaphragm during recent thoracentesis.  I have discussed case with Dr. Jeffery.  She is to have a nuclear medicine scan with tracer in her PD fluid to confirm leak.  Further plans will be based on this result.  Peritoneal dialysis will be suspended while this is being investigated.  09/01/2022:  Peritoneal pleural communication confirmed.  She is to have a temporary HD catheter placed tomorrow.  Plan HD tomorrow.  09/02/2022:  Stable during hemodialysis.    DM (diabetes mellitus)  Underlying condition    Primary hypertension  Controlled        Thank you for your consult.     Beni Fields MD  Nephrology  [facility]

## 2022-09-03 NOTE — PROGRESS NOTES
[facility]  Pulmonology  Progress Note    Patient Name: Bria Ray  MRN: [id number]  Admission Date: 8/29/2022  Hospital Length of Stay: 5 days  Code Status: Full Code  Attending Provider: Mauro Bautista MD  Primary Care Provider: Lorena Richardson MD   Principal Problem: Pneumothorax on right    Subjective:     Interval History: Pt resting in bed. No acute events overnight. Tolerated HD well yesterday. Attempted to clarify code status per family's request- pt states she has too much going on to decide that right now. VSS, afebrile.     Objective:     Vital Signs (Most Recent):  Temp: 98.1 °F (36.7 °C) (09/03/22 0700)  Pulse: 76 (09/03/22 0754)  Resp: 18 (09/03/22 0754)  BP: (!) 118/53 (09/03/22 0700)  SpO2: (!) 91 % (09/03/22 0754)   Vital Signs (24h Range):  Temp:  [97.6 °F (36.4 °C)-98.2 °F (36.8 °C)] 98.1 °F (36.7 °C)  Pulse:  [65-92] 76  Resp:  [9-23] 18  SpO2:  [88 %-100 %] 91 %  BP: ()/(43-76) 118/53     Weight: 74.6 kg (164 lb 7.4 oz)  Body mass index is 27.37 kg/m².      Intake/Output Summary (Last 24 hours) at 9/3/2022 1118  Last data filed at 9/3/2022 0500  Gross per 24 hour   Intake 177.58 ml   Output 1400 ml   Net -1222.42 ml       Physical Exam  Vitals reviewed.   Constitutional:       Appearance: Normal appearance.      Interventions: She is not intubated.  HENT:      Head: Normocephalic and atraumatic.      Nose: Nose normal.      Mouth/Throat:      Mouth: Mucous membranes are dry.      Pharynx: Oropharynx is clear.   Eyes:      Extraocular Movements: Extraocular movements intact.      Conjunctiva/sclera: Conjunctivae normal.      Pupils: Pupils are equal, round, and reactive to light.   Cardiovascular:      Rate and Rhythm: Normal rate.      Heart sounds: Normal heart sounds. No murmur heard.  Pulmonary:      Effort: Pulmonary effort is normal. She is not intubated.      Breath sounds: Normal breath sounds.   Abdominal:      General: Abdomen is flat. Bowel  sounds are normal.      Palpations: Abdomen is soft.   Musculoskeletal:         General: Normal range of motion.      Cervical back: Normal range of motion and neck supple.      Right lower leg: No edema.      Left lower leg: No edema.   Skin:     General: Skin is warm and dry.      Capillary Refill: Capillary refill takes less than 2 seconds.   Neurological:      General: No focal deficit present.      Mental Status: She is alert and oriented to person, place, and time.   Psychiatric:         Mood and Affect: Mood normal.         Behavior: Behavior normal.       Vents:  Oxygen Concentration (%): 21 (09/03/22 0754)    Lines/Drains/Airways       Peripherally Inserted Central Catheter Line  Duration             PICC Double Lumen 08/31/22 0700 left basilic 3 days              Central Venous Catheter Line  Duration                  Hemodialysis Catheter 09/02/22 0815 right subclavian 1 day              Drain  Duration                  Chest Tube 08/29/22 1300 1 Right Midaxillary 4 days         Urethral Catheter 08/31/22 1000 Non-latex 16 Fr. 3 days                    Significant Labs:    CBC/Anemia Profile:  Recent Labs   Lab 09/03/22  0808   WBC 4.82   HGB 8.3*   HCT 27.1*   PLT 95*   .4*   RDW 13.0        Chemistries:  Recent Labs   Lab 09/03/22  0808      K 4.5      CO2 27   BUN 29*   CREATININE 4.73*   CALCIUM 8.1*       All pertinent labs within the past 24 hours have been reviewed.    Significant Imaging:  I have reviewed all pertinent imaging results/findings within the past 24 hours.    Assessment/Plan:     * Pneumothorax on right  Patient status post a pneumothorax for arm a thoracentesis has a transudative effusion the question is whether not this is due to peritoneal dialysis.  The options are to consider surgery to fix a defect in the diaphragm or to proceed with hemodialysis will follow.  Currently draining a L the last 24 hours at a chest tube her x-ray this morning looked clear    - CT  continues   - CXR in AM     Atrial fibrillation  - continue current meds   - controlled at present     SVT (supraventricular tachycardia)  - no issues since transfer to ICU  - continue on amiodarone PO     Pleural effusion on right  Related to peritoneal dialysis,will see if will resolve with hemodialysis    - HD cath placed and has resumed HD     DM (diabetes mellitus)  Well controlled    Chronic obstructive pulmonary disease  - chronic, controlled with current meds         Tolerating HD well. No issues with SVT- will transfer back to floor with hospitalist to assume care in the AM.        BELA Robbins/CASSI  Pulmonology  [facility]

## 2022-09-03 NOTE — PLAN OF CARE
Problem: Gas Exchange Impaired  Goal: Optimal Gas Exchange  Outcome: Ongoing, Progressing     Problem: Device-Related Complication Risk (Hemodialysis)  Goal: Safe, Effective Therapy Delivery  Outcome: Ongoing, Progressing

## 2022-09-03 NOTE — PROGRESS NOTES
[facility]  General Surgery  Progress Note    Subjective:     History of Present Illness:  No notes on file    Post-Op Info:  Procedure(s) (LRB):  INSERTION, CATHETER, HEMODIALYSIS, DUAL LUMEN (Right)   1 Day Post-Op     Interval History:  Stable, no acute events overnight    Medications:  Continuous Infusions:  Scheduled Meds:   albuterol-ipratropium  3 mL Nebulization Q6H    allopurinoL  100 mg Oral Daily    amiodarone  400 mg Oral BID    aspirin  81 mg Oral Daily    atorvastatin  40 mg Oral Daily    budesonide  0.5 mg Nebulization Q12H    calcitRIOL  0.25 mcg Oral Daily    calcium carbonate  1 tablet Oral Daily    cyanocobalamin  100 mcg Oral Daily    gabapentin  100 mg Oral BID    heparin (porcine)  5,000 Units Subcutaneous Q12H    mupirocin   Topical (Top) Daily    senna-docusate 8.6-50 mg  1 tablet Oral BID    sevelamer carbonate  800 mg Oral TID AC    sodium chloride 0.9%  10 mL Intravenous Q6H     PRN Meds:sodium chloride 0.9%, acetaminophen, dextrose 50%, dextrose 50%, glucagon (human recombinant), heparin (porcine), hydrALAZINE, insulin aspart U-100, magnesium oxide, magnesium oxide, melatonin, naloxone, oxyCODONE-acetaminophen, potassium bicarbonate, potassium bicarbonate, potassium bicarbonate, sodium chloride 0.9%, Flushing PICC Protocol **AND** sodium chloride 0.9% **AND** sodium chloride 0.9%     Review of patient's allergies indicates:  No Known Allergies  Objective:     Vital Signs (Most Recent):  Temp: 98.1 °F (36.7 °C) (09/03/22 0700)  Pulse: 76 (09/03/22 0754)  Resp: 18 (09/03/22 0754)  BP: (!) 118/53 (09/03/22 0700)  SpO2: (!) 91 % (09/03/22 0754)   Vital Signs (24h Range):  Temp:  [97.6 °F (36.4 °C)-98.4 °F (36.9 °C)] 98.1 °F (36.7 °C)  Pulse:  [65-92] 76  Resp:  [9-23] 18  SpO2:  [88 %-100 %] 91 %  BP: ()/(43-76) 118/53     Weight: 74.6 kg (164 lb 7.4 oz)  Body mass index is 27.37 kg/m².    Intake/Output - Last 3 Shifts         09/01 0700  09/02 0659  09/02 0700  09/03 0659 09/03 0700  09/04 0659    I.V. (mL/kg) 167 (2.2) 177.6 (2.4)     Total Intake(mL/kg) 167 (2.2) 177.6 (2.4)     Urine (mL/kg/hr) 450 (0.3) 400 (0.2)     Other  1000     Chest Tube 140      Total Output 590 1400     Net -423 -1222.4                    Physical Exam  Constitutional:       General: She is not in acute distress.     Appearance: Normal appearance.   HENT:      Head: Normocephalic.   Cardiovascular:      Rate and Rhythm: Normal rate.   Pulmonary:      Effort: Pulmonary effort is normal. No respiratory distress.      Comments: Right chest tube with minimal serous drainage; to suction  Abdominal:      General: There is no distension.      Tenderness: There is no abdominal tenderness.   Musculoskeletal:         General: Normal range of motion.   Skin:     General: Skin is warm.      Coloration: Skin is not jaundiced.   Neurological:      General: No focal deficit present.      Mental Status: She is alert and oriented to person, place, and time.      Cranial Nerves: No cranial nerve deficit.       Significant Labs:  I have reviewed all pertinent lab results within the past 24 hours.  CBC:   Recent Labs   Lab 09/01/22  0551   WBC 5.84   RBC 2.75*   HGB 8.7*   HCT 28.7*   *   .4*   MCH 31.6*   MCHC 30.3*     BMP:   Recent Labs   Lab 08/29/22  1459 08/31/22  0854 09/01/22  0551   *   < > 134*      < > 140   K 3.4*   < > 4.0   CL 99   < > 102   CO2 29   < > 29   BUN 36*   < > 50*   CREATININE 6.91*   < > 7.84*   CALCIUM 8.8   < > 8.4*   MG 1.7  --   --     < > = values in this interval not displayed.       Significant Diagnostics:  I have reviewed all pertinent imaging results/findings within the past 24 hours.    Assessment/Plan:     * Pneumothorax on right  08/30/2022 increased serous output on chest tube during peritoneal dialysis concern for diaphragmatic leak, will hold peritoneal dialysis as dr Jeffery discussed with Dr. 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  Continue chest tube to suction over the weekend.  Continue hemodialysis.  We will possibly remove chest tube within the next week if patient is doing well.  If the fistula does not close and ascites reaccumulates, patient will need a VATS for closure of diaphragmatic fistula        Doyle Grande, DO  General Surgery  [facility]

## 2022-09-03 NOTE — SUBJECTIVE & OBJECTIVE
Interval History:  She had a tunneled hemodialysis catheter placed this a.m..  She currently denies SOB.  She is in NSR.    Review of patient's allergies indicates:  No Known Allergies  Current Facility-Administered Medications   Medication Frequency    0.9%  NaCl infusion PRN    acetaminophen tablet 1,000 mg Q6H PRN    albuterol-ipratropium 2.5 mg-0.5 mg/3 mL nebulizer solution 3 mL Q6H    allopurinoL tablet 100 mg Daily    amiodarone tablet 400 mg BID    aspirin EC tablet 81 mg Daily    atorvastatin tablet 40 mg Daily    budesonide nebulizer solution 0.5 mg Q12H    calcitRIOL capsule 0.25 mcg Daily    calcium carbonate 200 mg calcium (500 mg) chewable tablet 500 mg Daily    cyanocobalamin tablet 100 mcg Daily    dextrose 50% injection 12.5 g PRN    dextrose 50% injection 25 g PRN    gabapentin capsule 100 mg BID    glucagon (human recombinant) injection 1 mg PRN    heparin (porcine) injection 4,000 Units PRN    heparin (porcine) injection 5,000 Units Q12H    hydrALAZINE injection 5 mg Q6H PRN    insulin aspart U-100 injection 1-10 Units QID (AC + HS) PRN    magnesium oxide tablet 800 mg PRN    magnesium oxide tablet 800 mg PRN    melatonin tablet 9 mg Nightly PRN    mupirocin 2 % ointment Daily    naloxone 0.4 mg/mL injection 0.02 mg PRN    oxyCODONE-acetaminophen 7.5-325 mg per tablet 1 tablet Q4H PRN    potassium bicarbonate disintegrating tablet 35 mEq PRN    potassium bicarbonate disintegrating tablet 50 mEq PRN    potassium bicarbonate disintegrating tablet 60 mEq PRN    senna-docusate 8.6-50 mg per tablet 1 tablet BID    sevelamer carbonate tablet 800 mg TID AC    sodium chloride 0.9% flush 10 mL Q12H PRN    sodium chloride 0.9% flush 10 mL Q6H    And    sodium chloride 0.9% flush 10 mL PRN       Objective:     Vital Signs (Most Recent):  Temp: 98 °F (36.7 °C) (09/02/22 2245)  Pulse: 86 (09/02/22 2230)  Resp: (!) 22 (09/02/22 2230)  BP: 116/61 (09/02/22 2230)  SpO2: 95 % (09/02/22 2230)  O2 Device (Oxygen  Therapy): nasal cannula (09/02/22 1942)   Vital Signs (24h Range):  Temp:  [97.6 °F (36.4 °C)-98.5 °F (36.9 °C)] 98 °F (36.7 °C)  Pulse:  [61-92] 86  Resp:  [9-22] 22  SpO2:  [94 %-100 %] 95 %  BP: ()/(39-76) 116/61     Weight: 74.3 kg (163 lb 12.8 oz) (09/02/22 0330)  Body mass index is 27.26 kg/m².  Body surface area is 1.85 meters squared.    I/O last 3 completed shifts:  In: 344.6 [I.V.:344.6]  Out: 1840 [Urine:700; Other:1000; Chest Tube:140]    Physical Exam  Eyes:      Pupils: Pupils are equal, round, and reactive to light.   Cardiovascular:      Rate and Rhythm: Normal rate and regular rhythm.   Pulmonary:      Breath sounds: No wheezing or rales.      Comments: Right chest tube present.  Dullness right base  Abdominal:      Tenderness: There is no abdominal tenderness. There is no guarding.   Musculoskeletal:      Cervical back: Neck supple.      Right lower leg: No edema.      Left lower leg: No edema.   Neurological:      Mental Status: She is alert.       Significant Labs:  BMP:   Recent Labs   Lab 08/29/22  1459 08/31/22  0854 09/01/22  0551   *   < > 134*      < > 140   K 3.4*   < > 4.0   CL 99   < > 102   CO2 29   < > 29   BUN 36*   < > 50*   CREATININE 6.91*   < > 7.84*   CALCIUM 8.8   < > 8.4*   MG 1.7  --   --     < > = values in this interval not displayed.     CBC:   Recent Labs   Lab 09/01/22  0551   WBC 5.84   RBC 2.75*   HGB 8.7*   HCT 28.7*   *   .4*   MCH 31.6*   MCHC 30.3*        Significant Imaging:

## 2022-09-03 NOTE — SUBJECTIVE & OBJECTIVE
Interval History:  Stable, no acute events overnight    Medications:  Continuous Infusions:  Scheduled Meds:   albuterol-ipratropium  3 mL Nebulization Q6H    allopurinoL  100 mg Oral Daily    amiodarone  400 mg Oral BID    aspirin  81 mg Oral Daily    atorvastatin  40 mg Oral Daily    budesonide  0.5 mg Nebulization Q12H    calcitRIOL  0.25 mcg Oral Daily    calcium carbonate  1 tablet Oral Daily    cyanocobalamin  100 mcg Oral Daily    gabapentin  100 mg Oral BID    heparin (porcine)  5,000 Units Subcutaneous Q12H    mupirocin   Topical (Top) Daily    senna-docusate 8.6-50 mg  1 tablet Oral BID    sevelamer carbonate  800 mg Oral TID AC    sodium chloride 0.9%  10 mL Intravenous Q6H     PRN Meds:sodium chloride 0.9%, acetaminophen, dextrose 50%, dextrose 50%, glucagon (human recombinant), heparin (porcine), hydrALAZINE, insulin aspart U-100, magnesium oxide, magnesium oxide, melatonin, naloxone, oxyCODONE-acetaminophen, potassium bicarbonate, potassium bicarbonate, potassium bicarbonate, sodium chloride 0.9%, Flushing PICC Protocol **AND** sodium chloride 0.9% **AND** sodium chloride 0.9%     Review of patient's allergies indicates:  No Known Allergies  Objective:     Vital Signs (Most Recent):  Temp: 98.1 °F (36.7 °C) (09/03/22 0700)  Pulse: 76 (09/03/22 0754)  Resp: 18 (09/03/22 0754)  BP: (!) 118/53 (09/03/22 0700)  SpO2: (!) 91 % (09/03/22 0754)   Vital Signs (24h Range):  Temp:  [97.6 °F (36.4 °C)-98.4 °F (36.9 °C)] 98.1 °F (36.7 °C)  Pulse:  [65-92] 76  Resp:  [9-23] 18  SpO2:  [88 %-100 %] 91 %  BP: ()/(43-76) 118/53     Weight: 74.6 kg (164 lb 7.4 oz)  Body mass index is 27.37 kg/m².    Intake/Output - Last 3 Shifts         09/01 0700 09/02 0659 09/02 0700 09/03 0659 09/03 0700 09/04 0659    I.V. (mL/kg) 167 (2.2) 177.6 (2.4)     Total Intake(mL/kg) 167 (2.2) 177.6 (2.4)     Urine (mL/kg/hr) 450 (0.3) 400 (0.2)     Other  1000     Chest Tube 140      Total Output 590 1400     Net -423 -1222.4                     Physical Exam  Constitutional:       General: She is not in acute distress.     Appearance: Normal appearance.   HENT:      Head: Normocephalic.   Cardiovascular:      Rate and Rhythm: Normal rate.   Pulmonary:      Effort: Pulmonary effort is normal. No respiratory distress.      Comments: Right chest tube with minimal serous drainage; to suction  Abdominal:      General: There is no distension.      Tenderness: There is no abdominal tenderness.   Musculoskeletal:         General: Normal range of motion.   Skin:     General: Skin is warm.      Coloration: Skin is not jaundiced.   Neurological:      General: No focal deficit present.      Mental Status: She is alert and oriented to person, place, and time.      Cranial Nerves: No cranial nerve deficit.       Significant Labs:  I have reviewed all pertinent lab results within the past 24 hours.  CBC:   Recent Labs   Lab 09/01/22  0551   WBC 5.84   RBC 2.75*   HGB 8.7*   HCT 28.7*   *   .4*   MCH 31.6*   MCHC 30.3*     BMP:   Recent Labs   Lab 08/29/22  1459 08/31/22  0854 09/01/22  0551   *   < > 134*      < > 140   K 3.4*   < > 4.0   CL 99   < > 102   CO2 29   < > 29   BUN 36*   < > 50*   CREATININE 6.91*   < > 7.84*   CALCIUM 8.8   < > 8.4*   MG 1.7  --   --     < > = values in this interval not displayed.       Significant Diagnostics:  I have reviewed all pertinent imaging results/findings within the past 24 hours.

## 2022-09-03 NOTE — ASSESSMENT & PLAN NOTE
It appears she has communication between peritoneal and pleural spaces.  This is likely due to damage to the diaphragm during recent thoracentesis.  I have discussed case with Dr. Jeffery.  She is to have a nuclear medicine scan with tracer in her PD fluid to confirm leak.  Further plans will be based on this result.  Peritoneal dialysis will be suspended while this is being investigated.  09/01/2022:  Peritoneal pleural communication confirmed.  She is to have a temporary HD catheter placed tomorrow.  Plan HD tomorrow.  09/02/2022:  Stable during hemodialysis.

## 2022-09-03 NOTE — ASSESSMENT & PLAN NOTE
Related to peritoneal dialysis,will see if will resolve with hemodialysis    - HD cath placed and has resumed HD

## 2022-09-03 NOTE — SUBJECTIVE & OBJECTIVE
Interval History: Pt resting in bed. No acute events overnight. Tolerated HD well yesterday. Attempted to clarify code status per family's request- pt states she has too much going on to decide that right now. VSS, afebrile.     Objective:     Vital Signs (Most Recent):  Temp: 98.1 °F (36.7 °C) (09/03/22 0700)  Pulse: 76 (09/03/22 0754)  Resp: 18 (09/03/22 0754)  BP: (!) 118/53 (09/03/22 0700)  SpO2: (!) 91 % (09/03/22 0754)   Vital Signs (24h Range):  Temp:  [97.6 °F (36.4 °C)-98.2 °F (36.8 °C)] 98.1 °F (36.7 °C)  Pulse:  [65-92] 76  Resp:  [9-23] 18  SpO2:  [88 %-100 %] 91 %  BP: ()/(43-76) 118/53     Weight: 74.6 kg (164 lb 7.4 oz)  Body mass index is 27.37 kg/m².      Intake/Output Summary (Last 24 hours) at 9/3/2022 1118  Last data filed at 9/3/2022 0500  Gross per 24 hour   Intake 177.58 ml   Output 1400 ml   Net -1222.42 ml       Physical Exam  Vitals reviewed.   Constitutional:       Appearance: Normal appearance.      Interventions: She is not intubated.  HENT:      Head: Normocephalic and atraumatic.      Nose: Nose normal.      Mouth/Throat:      Mouth: Mucous membranes are dry.      Pharynx: Oropharynx is clear.   Eyes:      Extraocular Movements: Extraocular movements intact.      Conjunctiva/sclera: Conjunctivae normal.      Pupils: Pupils are equal, round, and reactive to light.   Cardiovascular:      Rate and Rhythm: Normal rate.      Heart sounds: Normal heart sounds. No murmur heard.  Pulmonary:      Effort: Pulmonary effort is normal. She is not intubated.      Breath sounds: Normal breath sounds.   Abdominal:      General: Abdomen is flat. Bowel sounds are normal.      Palpations: Abdomen is soft.   Musculoskeletal:         General: Normal range of motion.      Cervical back: Normal range of motion and neck supple.      Right lower leg: No edema.      Left lower leg: No edema.   Skin:     General: Skin is warm and dry.      Capillary Refill: Capillary refill takes less than 2 seconds.    Neurological:      General: No focal deficit present.      Mental Status: She is alert and oriented to person, place, and time.   Psychiatric:         Mood and Affect: Mood normal.         Behavior: Behavior normal.       Vents:  Oxygen Concentration (%): 21 (09/03/22 0754)    Lines/Drains/Airways       Peripherally Inserted Central Catheter Line  Duration             PICC Double Lumen 08/31/22 0700 left basilic 3 days              Central Venous Catheter Line  Duration                  Hemodialysis Catheter 09/02/22 0815 right subclavian 1 day              Drain  Duration                  Chest Tube 08/29/22 1300 1 Right Midaxillary 4 days         Urethral Catheter 08/31/22 1000 Non-latex 16 Fr. 3 days                    Significant Labs:    CBC/Anemia Profile:  Recent Labs   Lab 09/03/22  0808   WBC 4.82   HGB 8.3*   HCT 27.1*   PLT 95*   .4*   RDW 13.0        Chemistries:  Recent Labs   Lab 09/03/22  0808      K 4.5      CO2 27   BUN 29*   CREATININE 4.73*   CALCIUM 8.1*       All pertinent labs within the past 24 hours have been reviewed.    Significant Imaging:  I have reviewed all pertinent imaging results/findings within the past 24 hours.

## 2022-09-03 NOTE — NURSING
Report called to TACOS conner at 11:10. Pt transferred to [id number] by bed at 11:15. Care released at this time.

## 2022-09-03 NOTE — ASSESSMENT & PLAN NOTE
08/30/2022 increased serous output on chest tube during peritoneal dialysis concern for diaphragmatic leak, will hold peritoneal dialysis as dr Mckinnon discussed with Dr. 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  Continue chest tube to suction over the weekend.  Continue hemodialysis.  We will possibly remove chest tube within the next week if patient is doing well.  If the fistula does not close and ascites reaccumulates, patient will need a VATS for closure of diaphragmatic fistula

## 2022-09-04 NOTE — ASSESSMENT & PLAN NOTE
Patient with Paroxysmal (<7 days) atrial fibrillation which is controlled currently with Beta Blocker and Amiodarone. Patient is currently in sinus rhythm.[id number] Score: 4. HASBLED Score: Unable to calculate. Anticoagulation indicated. Anticoagulation done with apixaban, renally dose adjusted by pharmacy.    Detailed conversation in the presence of daughter, patient's decision was to go for AC.

## 2022-09-04 NOTE — ASSESSMENT & PLAN NOTE
Patient with Paroxysmal (<7 days) atrial fibrillation which is controlled currently with Amiodarone. Patient is currently in sinus rhythm.[id number] Score: 4. HASBLED Score: Unable to calculate. Anticoagulation indicated. Anticoagulation done with apixaban, renally dose adjusted by pharmacy.    Detailed conversation in the presence of daughter, patient's decision was to go for AC.

## 2022-09-04 NOTE — PROGRESS NOTES
[facility] Hospital Medicine  Progress Note    Patient Name: Bria Ray  MRN: [id number]  Patient Class: IP- Inpatient   Admission Date: 8/29/2022  Length of Stay: 6 days  Attending Physician: Te Conrad MD  Primary Care Provider: Lorena Richardson MD        Subjective:     Principal Problem:Pneumothorax on right        HPI:  Patient is a 87-year-old female with past medical history of hypertension, diabetes mellitus, ESRD, COPD, DJD, presented from home as direct admission for right-sided pneumothorax.  Patient underwent right-sided thoracentesis last Thursday for pleural effusion, however since then she continued to have right-sided chest pain along with shortness of bread for which she got an x-ray revealing pneumothorax on the right side.  Patient was then instructed to come to the hospital for further care.  Patient's primary pulmonologist is Dr. Bautista, and has been seeing General surgery in the past for her dialysis access issues.  Patient is also on peritoneal dialysis at home.  Patient is accompanied by her granddaughter, who is trying to get her hospice setup outpatient due to chronic conditions and mostly how her health has been declining steadily for the past few months.  I had a long discussion about goals of care for the patient as of now the patient has not made her mind about her code status and would like to be full code until she has a discussion with the rest of her family.  General surgery and Nephrology consulted and made aware of the patient's arrival.  Patient denies any fevers chills nausea vomiting or diarrhea does have cough and shortness of breath along with right-sided chest pain.      Overview/Hospital Course:  No notes on file    Interval History:     Pt out of icu  Long discussion about whether she wanted AC d/t paroxysmal afib, daughter was also present there, I went over risks and benefits in great detail especialy bleeding if she were to fall and  lead to potential bleeding in the brain, pt says that she would like to try it and see how she does with it and want to continue with it. Called pharmcy and per pharmacy her dose will be 2.5 bid since she is on HD and is 88 y/o old.   Pt has no new complain, per xray ptx moderate in size, per surgery if repeat cxr shows worsening then Cts will have to be repositioned.     Review of Systems   Constitutional:  Positive for unexpected weight change. Negative for chills, fatigue and fever.   HENT: Negative.  Negative for congestion and rhinorrhea.    Respiratory:  Negative for apnea, cough, chest tightness and shortness of breath.    Cardiovascular: Negative.  Negative for chest pain and leg swelling.   Gastrointestinal: Negative.  Negative for abdominal pain, diarrhea, nausea and vomiting.   Endocrine: Negative.    Genitourinary: Negative.    Musculoskeletal:  Positive for arthralgias.   Skin: Negative.    Allergic/Immunologic: Negative.    Neurological: Negative.    Hematological: Negative.    Psychiatric/Behavioral: Negative.     Objective:     Vital Signs (Most Recent):  Temp: 99.1 °F (37.3 °C) (09/04/22 0729)  Pulse: 71 (09/04/22 0729)  Resp: 18 (09/04/22 0729)  BP: (!) 151/62 (09/04/22 0729)  SpO2: 97 % (09/04/22 0729)   Vital Signs (24h Range):  Temp:  [97.4 °F (36.3 °C)-99.1 °F (37.3 °C)] 99.1 °F (37.3 °C)  Pulse:  [68-90] 71  Resp:  [14-20] 18  SpO2:  [89 %-99 %] 97 %  BP: (114-157)/(52-70) 151/62     Weight: 74.6 kg (164 lb 7.4 oz)  Body mass index is 27.37 kg/m².    Intake/Output Summary (Last 24 hours) at 9/4/2022 1110  Last data filed at 9/4/2022 1020  Gross per 24 hour   Intake 10 ml   Output 300 ml   Net -290 ml      Physical Exam  Vitals reviewed.   Constitutional:       Appearance: Normal appearance.      Interventions: She is not intubated.  HENT:      Head: Normocephalic and atraumatic.      Nose: Nose normal.      Mouth/Throat:      Mouth: Mucous membranes are dry.      Pharynx: Oropharynx is clear.    Eyes:      Extraocular Movements: Extraocular movements intact.      Conjunctiva/sclera: Conjunctivae normal.      Pupils: Pupils are equal, round, and reactive to light.   Cardiovascular:      Rate and Rhythm: Normal rate.      Heart sounds: Normal heart sounds. No murmur heard.  Pulmonary:      Effort: Pulmonary effort is normal. She is not intubated.      Breath sounds: Normal breath sounds.   Abdominal:      General: Abdomen is flat. Bowel sounds are normal.      Palpations: Abdomen is soft.   Musculoskeletal:         General: Normal range of motion.      Cervical back: Normal range of motion and neck supple.      Right lower leg: No edema.      Left lower leg: No edema.   Skin:     General: Skin is warm and dry.      Capillary Refill: Capillary refill takes less than 2 seconds.   Neurological:      General: No focal deficit present.      Mental Status: She is alert and oriented to person, place, and time.   Psychiatric:         Mood and Affect: Mood normal.         Behavior: Behavior normal.       Significant Labs: All pertinent labs within the past 24 hours have been reviewed.    Significant Imaging: I have reviewed all pertinent imaging results/findings within the past 24 hours.      Assessment/Plan:      * Pneumothorax on right  Likely result of thoracentesis PTA.   Has CT placed by surgery  Pt asymptomatic now  Surgery following, appreciate recs.     Atrial fibrillation  Patient with Paroxysmal (<7 days) atrial fibrillation which is controlled currently with  Amiodarone. Patient is currently in sinus rhythm.[id number] Score: 4. HASBLED Score: Unable to calculate. Anticoagulation indicated. Anticoagulation done with apixaban, renally dose adjusted by pharmacy.    Detailed conversation in the presence of daughter, patient's decision was to go for AC.         SVT (supraventricular tachycardia)  S/p adenosine x3, responded  Cardiology consulted.   Cont to monitor on tele    Now resolved      Pleural effusion  on right  Likely d/t diaphragmatic defect.       ESRD (end stage renal disease)  Pt w/ diaghragmatic defet, hence PD has been stopped and HD initiated.     DM (diabetes mellitus)  Patient's FSGs are controlled on current medication regimen.  Last A1c reviewed-   Lab Results   Component Value Date    HGBA1C 5.5 04/07/2022     Most recent fingerstick glucose reviewed- No results for input(s): POCTGLUCOSE in the last 24 hours.  Current correctional scale  Medium  Maintain anti-hyperglycemic dose as follows-   Antihyperglycemics (From admission, onward)      Start     Stop Route Frequency Ordered    08/29/22 1432  insulin aspart U-100 injection 1-10 Units         -- SubQ Before meals & nightly PRN 08/29/22 1333          Hold Oral hypoglycemics while patient is in the hospital.    Chronic obstructive pulmonary disease  Inhalers.   Oxygen support      Primary hypertension  Titrate as needed      Chronic pain syndrome  Cont gabapentin  Prn norco        VTE Risk Mitigation (From admission, onward)           Ordered     apixaban tablet 2.5 mg  2 times daily         09/04/22 0804     heparin (porcine) injection 4,000 Units  As needed (PRN)         09/02/22 1357     IP VTE HIGH RISK PATIENT  Once         08/29/22 1333     Place sequential compression device  Until discontinued         08/29/22 1333                    Discharge Planning   ANGY:      Code Status: Full Code   Is the patient medically ready for discharge?:     Reason for patient still in hospital (select all that apply): Treatment  Discharge Plan A: Home, Hospice/home                  Rehmat ALEJANDRO Conrad MD  Department of Hospital Medicine   [facility]

## 2022-09-04 NOTE — ASSESSMENT & PLAN NOTE
Likely result of thoracentesis PTA.   Has CT placed by surgery  Pt asymptomatic now  Surgery following, appreciate recs.

## 2022-09-04 NOTE — PLAN OF CARE
Problem: Diabetes Comorbidity  Goal: Blood Glucose Level Within Targeted Range  Outcome: Ongoing, Progressing     Problem: Fall Injury Risk  Goal: Absence of Fall and Fall-Related Injury  Outcome: Ongoing, Progressing     Problem: Skin Injury Risk Increased  Goal: Skin Health and Integrity  Outcome: Ongoing, Progressing     Problem: Breathing Pattern Ineffective  Goal: Effective Breathing Pattern  Outcome: Ongoing, Progressing     Problem: Gas Exchange Impaired  Goal: Optimal Gas Exchange  Outcome: Ongoing, Progressing     Problem: Device-Related Complication Risk (Hemodialysis)  Goal: Safe, Effective Therapy Delivery  Outcome: Ongoing, Progressing     Problem: Hemodynamic Instability (Hemodialysis)  Goal: Effective Tissue Perfusion  Outcome: Ongoing, Progressing     Problem: Infection (Hemodialysis)  Goal: Absence of Infection Signs and Symptoms  Outcome: Ongoing, Progressing

## 2022-09-04 NOTE — ASSESSMENT & PLAN NOTE
08/30/2022 increased serous output on chest tube during peritoneal dialysis concern for diaphragmatic leak, will hold peritoneal dialysis as dr Mckinnon discussed with Dr. 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  Continue chest tube to suction over the weekend.  Continue hemodialysis.  We will possibly remove chest tube within the next week if patient is doing well.  If the fistula does not close and ascites reaccumulates, patient will need a VATS for closure of diaphragmatic fistula    9/4: pneumothorax small to moderate size; Repeat CXR tomorrow; if getting larger, may have to reposition chest tube in OR; continue medical management

## 2022-09-04 NOTE — PROGRESS NOTES
[facility]  Nephrology  Progress Note    Patient Name: Bria Ray  MRN: [id number]  Admission Date: 8/29/2022  Hospital Length of Stay: 5 days  Attending Provider: Mauro Bautista MD   Primary Care Physician: Lorena Richardson MD  Principal Problem:Pneumothorax on right    Consults  Subjective:     Interval History:The patient has  no complaints today    Review of patient's allergies indicates:  No Known Allergies  Current Facility-Administered Medications   Medication Frequency    0.9%  NaCl infusion PRN    acetaminophen tablet 1,000 mg Q6H PRN    albuterol-ipratropium 2.5 mg-0.5 mg/3 mL nebulizer solution 3 mL Q6H    allopurinoL tablet 100 mg Daily    amiodarone tablet 400 mg BID    aspirin EC tablet 81 mg Daily    atorvastatin tablet 40 mg Daily    budesonide nebulizer solution 0.5 mg Q12H    calcitRIOL capsule 0.25 mcg Daily    calcium carbonate 200 mg calcium (500 mg) chewable tablet 500 mg Daily    cyanocobalamin tablet 100 mcg Daily    dextrose 50% injection 12.5 g PRN    dextrose 50% injection 25 g PRN    diphenhydrAMINE injection 25 mg Q6H PRN    gabapentin capsule 100 mg BID    glucagon (human recombinant) injection 1 mg PRN    heparin (porcine) injection 4,000 Units PRN    heparin (porcine) injection 5,000 Units Q12H    hydrALAZINE injection 5 mg Q6H PRN    HYDROmorphone (PF) injection 0.5 mg Q5 Min PRN    insulin aspart U-100 injection 1-10 Units QID (AC + HS) PRN    magnesium oxide tablet 800 mg PRN    magnesium oxide tablet 800 mg PRN    melatonin tablet 9 mg Nightly PRN    meperidine (PF) injection 25 mg Q10 Min PRN    morphine injection 4 mg Q5 Min PRN    mupirocin 2 % ointment Daily    naloxone 0.4 mg/mL injection 0.02 mg PRN    ondansetron injection 4 mg Daily PRN    oxyCODONE-acetaminophen 7.5-325 mg per tablet 1 tablet Q4H PRN    potassium bicarbonate disintegrating tablet 35 mEq PRN    potassium bicarbonate disintegrating tablet 50 mEq PRN    potassium  bicarbonate disintegrating tablet 60 mEq PRN    senna-docusate 8.6-50 mg per tablet 1 tablet BID    sevelamer carbonate tablet 800 mg TID AC    sodium chloride 0.9% flush 10 mL Q12H PRN    sodium chloride 0.9% flush 10 mL Q6H    And    sodium chloride 0.9% flush 10 mL PRN       Objective:     Vital Signs (Most Recent):  Temp: 98.1 °F (36.7 °C) (09/03/22 1710)  Pulse: 79 (09/03/22 1946)  Resp: 20 (09/03/22 1946)  BP: 114/61 (09/03/22 1710)  SpO2: 99 % (09/03/22 1946)  O2 Device (Oxygen Therapy): room air (09/03/22 1946) Vital Signs (24h Range):  Temp:  [97.7 °F (36.5 °C)-98.2 °F (36.8 °C)] 98.1 °F (36.7 °C)  Pulse:  [65-90] 79  Resp:  [9-23] 20  SpO2:  [86 %-100 %] 99 %  BP: ()/(43-70) 114/61     Weight: 74.6 kg (164 lb 7.4 oz) (09/03/22 0245)  Body mass index is 27.37 kg/m².  Body surface area is 1.85 meters squared.    I/O last 3 completed shifts:  In: 187.6 [I.V.:187.6]  Out: 1550 [Urine:550; Other:1000]    Physical Exam  Lungs-crackles  Cor-2/6 systolic murmur or rub  Abd-soft    Significant Labs:sure    Significant Imaging:      Assessment/Plan:     Active Diagnoses:    Diagnosis Date Noted POA    PRINCIPAL PROBLEM:  Pneumothorax on right [J93.9] 08/29/2022 Yes    Atrial fibrillation [I48.91] 09/02/2022 Unknown    SVT (supraventricular tachycardia) [I47.1] 08/31/2022 Yes    Pleural effusion on right [J90] 08/24/2022 Yes    ESRD (end stage renal disease) [N18.6]  Unknown    Chronic obstructive pulmonary disease [J44.9]  Yes    DM (diabetes mellitus) [E11.9]  Unknown    Primary hypertension [I10] 01/18/2022 Yes    Chronic pain syndrome [G89.4]  Yes     Chronic      Problems Resolved During this Admission:       Plan:  Continue the present care    Thank you for your consult. I will follow-up with patient. Please contact us if you have any additional questions.    Emre Allen MD  Nephrology  [facility]

## 2022-09-04 NOTE — SUBJECTIVE & OBJECTIVE
Interval History:  Stable, no acute events overnight    Medications:  Continuous Infusions:  Scheduled Meds:   albuterol-ipratropium  3 mL Nebulization Q6H    allopurinoL  100 mg Oral Daily    amiodarone  400 mg Oral BID    apixaban  2.5 mg Oral BID    aspirin  81 mg Oral Daily    atorvastatin  40 mg Oral Daily    budesonide  0.5 mg Nebulization Q12H    calcitRIOL  0.25 mcg Oral Daily    calcium carbonate  1 tablet Oral Daily    cyanocobalamin  100 mcg Oral Daily    gabapentin  100 mg Oral BID    mupirocin   Topical (Top) Daily    polyethylene glycol  17 g Oral BID    senna-docusate 8.6-50 mg  1 tablet Oral BID    senna-docusate 8.6-50 mg  1 tablet Oral Daily    sevelamer carbonate  800 mg Oral TID AC    sodium chloride 0.9%  10 mL Intravenous Q6H     PRN Meds:sodium chloride 0.9%, acetaminophen, dextrose 50%, dextrose 50%, diphenhydrAMINE, glucagon (human recombinant), heparin (porcine), hydrALAZINE, HYDROmorphone, insulin aspart U-100, magnesium oxide, magnesium oxide, melatonin, morphine, naloxone, ondansetron, oxyCODONE-acetaminophen, potassium bicarbonate, potassium bicarbonate, potassium bicarbonate, sodium chloride 0.9%, Flushing PICC Protocol **AND** sodium chloride 0.9% **AND** sodium chloride 0.9%     Review of patient's allergies indicates:  No Known Allergies  Objective:     Vital Signs (Most Recent):  Temp: 99.1 °F (37.3 °C) (09/04/22 0729)  Pulse: 71 (09/04/22 0729)  Resp: 18 (09/04/22 0729)  BP: (!) 151/62 (09/04/22 0729)  SpO2: 97 % (09/04/22 0729)   Vital Signs (24h Range):  Temp:  [97.4 °F (36.3 °C)-99.1 °F (37.3 °C)] 99.1 °F (37.3 °C)  Pulse:  [68-90] 71  Resp:  [11-20] 18  SpO2:  [89 %-99 %] 97 %  BP: (114-157)/(52-70) 151/62     Weight: 74.6 kg (164 lb 7.4 oz)  Body mass index is 27.37 kg/m².    Intake/Output - Last 3 Shifts         09/02 0700 09/03 0659 09/03 0700 09/04 0659 09/04 0700 09/05 0659    I.V. (mL/kg) 177.6 (2.4) 10 (0.1)     Total Intake(mL/kg) 177.6 (2.4) 10 (0.1)     Urine  (mL/kg/hr) 400 (0.2) 150 (0.1)     Other 1000      Chest Tube  0 50    Total Output 1400 150 50    Net -1222.4 -140 -50                   Physical Exam  Constitutional:       General: She is not in acute distress.     Appearance: Normal appearance.   HENT:      Head: Normocephalic.   Cardiovascular:      Rate and Rhythm: Normal rate.   Pulmonary:      Effort: Pulmonary effort is normal. No respiratory distress.      Comments: Right chest tube with minimal serous drainage; to suction; questionable small air leak  Abdominal:      General: There is no distension.      Tenderness: There is no abdominal tenderness.   Musculoskeletal:         General: Normal range of motion.   Skin:     General: Skin is warm.      Coloration: Skin is not jaundiced.   Neurological:      General: No focal deficit present.      Mental Status: She is alert and oriented to person, place, and time.      Cranial Nerves: No cranial nerve deficit.       Significant Labs:  I have reviewed all pertinent lab results within the past 24 hours.  CBC:   Recent Labs   Lab 09/04/22  0455   WBC 4.53   RBC 2.47*   HGB 7.9*   HCT 25.0*   PLT 87*   .2*   MCH 32.0*   MCHC 31.6*       BMP:   Recent Labs   Lab 08/29/22  1459 08/31/22  0854 09/04/22  0455   *   < > 102      < > 140   K 3.4*   < > 4.9   CL 99   < > 105   CO2 29   < > 26   BUN 36*   < > 37*   CREATININE 6.91*   < > 6.08*   CALCIUM 8.8   < > 8.2*   MG 1.7  --   --     < > = values in this interval not displayed.         Significant Diagnostics:  I have reviewed all pertinent imaging results/findings within the past 24 hours.

## 2022-09-04 NOTE — PROGRESS NOTES
[facility]  General Surgery  Progress Note    Subjective:     History of Present Illness:  No notes on file    Post-Op Info:  Procedure(s) (LRB):  INSERTION, CATHETER, HEMODIALYSIS, DUAL LUMEN (Right)   2 Days Post-Op     Interval History:  Stable, no acute events overnight    Medications:  Continuous Infusions:  Scheduled Meds:   albuterol-ipratropium  3 mL Nebulization Q6H    allopurinoL  100 mg Oral Daily    amiodarone  400 mg Oral BID    apixaban  2.5 mg Oral BID    aspirin  81 mg Oral Daily    atorvastatin  40 mg Oral Daily    budesonide  0.5 mg Nebulization Q12H    calcitRIOL  0.25 mcg Oral Daily    calcium carbonate  1 tablet Oral Daily    cyanocobalamin  100 mcg Oral Daily    gabapentin  100 mg Oral BID    mupirocin   Topical (Top) Daily    polyethylene glycol  17 g Oral BID    senna-docusate 8.6-50 mg  1 tablet Oral BID    senna-docusate 8.6-50 mg  1 tablet Oral Daily    sevelamer carbonate  800 mg Oral TID AC    sodium chloride 0.9%  10 mL Intravenous Q6H     PRN Meds:sodium chloride 0.9%, acetaminophen, dextrose 50%, dextrose 50%, diphenhydrAMINE, glucagon (human recombinant), heparin (porcine), hydrALAZINE, HYDROmorphone, insulin aspart U-100, magnesium oxide, magnesium oxide, melatonin, morphine, naloxone, ondansetron, oxyCODONE-acetaminophen, potassium bicarbonate, potassium bicarbonate, potassium bicarbonate, sodium chloride 0.9%, Flushing PICC Protocol **AND** sodium chloride 0.9% **AND** sodium chloride 0.9%     Review of patient's allergies indicates:  No Known Allergies  Objective:     Vital Signs (Most Recent):  Temp: 99.1 °F (37.3 °C) (09/04/22 0729)  Pulse: 71 (09/04/22 0729)  Resp: 18 (09/04/22 0729)  BP: (!) 151/62 (09/04/22 0729)  SpO2: 97 % (09/04/22 0729)   Vital Signs (24h Range):  Temp:  [97.4 °F (36.3 °C)-99.1 °F (37.3 °C)] 99.1 °F (37.3 °C)  Pulse:  [68-90] 71  Resp:  [11-20] 18  SpO2:  [89 %-99 %] 97 %  BP: (114-157)/(52-70) 151/62     Weight:  74.6 kg (164 lb 7.4 oz)  Body mass index is 27.37 kg/m².    Intake/Output - Last 3 Shifts         09/02 0700 09/03 0659 09/03 0700 09/04 0659 09/04 0700 09/05 0659    I.V. (mL/kg) 177.6 (2.4) 10 (0.1)     Total Intake(mL/kg) 177.6 (2.4) 10 (0.1)     Urine (mL/kg/hr) 400 (0.2) 150 (0.1)     Other 1000      Chest Tube  0 50    Total Output 1400 150 50    Net -1222.4 -140 -50                   Physical Exam  Constitutional:       General: She is not in acute distress.     Appearance: Normal appearance.   HENT:      Head: Normocephalic.   Cardiovascular:      Rate and Rhythm: Normal rate.   Pulmonary:      Effort: Pulmonary effort is normal. No respiratory distress.      Comments: Right chest tube with minimal serous drainage; to suction; questionable small air leak  Abdominal:      General: There is no distension.      Tenderness: There is no abdominal tenderness.   Musculoskeletal:         General: Normal range of motion.   Skin:     General: Skin is warm.      Coloration: Skin is not jaundiced.   Neurological:      General: No focal deficit present.      Mental Status: She is alert and oriented to person, place, and time.      Cranial Nerves: No cranial nerve deficit.       Significant Labs:  I have reviewed all pertinent lab results within the past 24 hours.  CBC:   Recent Labs   Lab 09/04/22  0455   WBC 4.53   RBC 2.47*   HGB 7.9*   HCT 25.0*   PLT 87*   .2*   MCH 32.0*   MCHC 31.6*       BMP:   Recent Labs   Lab 08/29/22  1459 08/31/22  0854 09/04/22  0455   *   < > 102      < > 140   K 3.4*   < > 4.9   CL 99   < > 105   CO2 29   < > 26   BUN 36*   < > 37*   CREATININE 6.91*   < > 6.08*   CALCIUM 8.8   < > 8.2*   MG 1.7  --   --     < > = values in this interval not displayed.         Significant Diagnostics:  I have reviewed all pertinent imaging results/findings within the past 24 hours.    Assessment/Plan:     * Pneumothorax on right  08/30/2022 increased serous output on chest tube  during peritoneal dialysis concern for diaphragmatic leak, will hold peritoneal dialysis as dr Mckinnon discussed with Dr. 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  Continue chest tube to suction over the weekend.  Continue hemodialysis.  We will possibly remove chest tube within the next week if patient is doing well.  If the fistula does not close and ascites reaccumulates, patient will need a VATS for closure of diaphragmatic fistula    9/4: pneumothorax small to moderate size; Repeat CXR tomorrow; if getting larger, may have to reposition chest tube in OR; continue medical management        Doyle Grande, DO  General Surgery  [facility]

## 2022-09-04 NOTE — PLAN OF CARE
Problem: Infection  Goal: Absence of Infection Signs and Symptoms  Outcome: Ongoing, Progressing  Intervention: Prevent or Manage Infection  Flowsheets (Taken 9/4/2022 0155)  Fever Reduction/Comfort Measures: lightweight bedding  Infection Management: aseptic technique maintained  Isolation Precautions:   protective   precautions maintained     Problem: Adult Inpatient Plan of Care  Goal: Plan of Care Review  Outcome: Ongoing, Progressing  Flowsheets (Taken 9/4/2022 0155)  Plan of Care Reviewed With:   patient   daughter  Goal: Patient-Specific Goal (Individualized)  Outcome: Ongoing, Progressing  Flowsheets (Taken 9/4/2022 0155)  Individualized Care Needs: Oxygen management  Patient-Specific Goals (Include Timeframe): Oxygen management  Goal: Absence of Hospital-Acquired Illness or Injury  Outcome: Ongoing, Progressing  Intervention: Identify and Manage Fall Risk  Flowsheets (Taken 9/4/2022 0155)  Safety Promotion/Fall Prevention:   assistive device/personal item within reach   diversional activities provided   family to remain at bedside   medications reviewed   side rails raised x 3   instructed to call staff for mobility  Intervention: Prevent Skin Injury  Flowsheets (Taken 9/4/2022 0155)  Body Position:   position changed independently   supine   position maintained   turned   weight shifting  Skin Protection:   adhesive use limited   incontinence pads utilized  Intervention: Prevent and Manage VTE (Venous Thromboembolism) Risk  Flowsheets (Taken 9/4/2022 0155)  Activity Management:   Arm raise - L1   Rolling - L1  VTE Prevention/Management:   remove, assess skin, and reapply sequential compression device   fluids promoted   ROM (active) performed  Range of Motion: ROM (range of motion) performed  Intervention: Prevent Infection  Flowsheets (Taken 9/4/2022 0155)  Infection Prevention:   equipment surfaces disinfected   hand hygiene promoted   rest/sleep promoted   single patient room provided  Goal: Optimal  Comfort and Wellbeing  Outcome: Ongoing, Progressing  Intervention: Monitor Pain and Promote Comfort  Flowsheets (Taken 9/4/2022 0155)  Pain Management Interventions:   care clustered   diversional activity provided   position adjusted   quiet environment facilitated   relaxation techniques promoted  Intervention: Provide Person-Centered Care  Flowsheets (Taken 9/4/2022 0155)  Trust Relationship/Rapport:   care explained   choices provided   questions answered   questions encouraged  Goal: Readiness for Transition of Care  Outcome: Ongoing, Progressing  Intervention: Mutually Develop Transition Plan  Flowsheets (Taken 9/4/2022 0155)  Transportation Anticipated: family or friend will provide  Communicated ANGY with patient/caregiver: Date not available/Unable to determine  Do you expect to return to your current living situation?: Yes  Do you have help at home or someone to help you manage your care at home?: Yes     Problem: Diabetes Comorbidity  Goal: Blood Glucose Level Within Targeted Range  Outcome: Ongoing, Progressing  Intervention: Monitor and Manage Glycemia  Flowsheets (Taken 9/4/2022 0155)  Glycemic Management: blood glucose monitored     Problem: Fall Injury Risk  Goal: Absence of Fall and Fall-Related Injury  Outcome: Ongoing, Progressing  Intervention: Identify and Manage Contributors  Flowsheets (Taken 9/4/2022 0155)  Medication Review/Management: medications reviewed  Intervention: Promote Injury-Free Environment  Flowsheets (Taken 9/4/2022 0155)  Safety Promotion/Fall Prevention:   assistive device/personal item within reach   diversional activities provided   family to remain at bedside   medications reviewed   side rails raised x 3   instructed to call staff for mobility     Problem: Skin Injury Risk Increased  Goal: Skin Health and Integrity  Outcome: Ongoing, Progressing  Intervention: Optimize Skin Protection  Flowsheets (Taken 9/4/2022 0155)  Pressure Reduction Techniques: frequent weight shift  encouraged  Pressure Reduction Devices: positioning supports utilized  Skin Protection:   adhesive use limited   incontinence pads utilized  Head of Bed (HOB) Positioning:   HOB at 20-30 degrees   HOB at 30-45 degrees  Intervention: Promote and Optimize Oral Intake  Flowsheets (Taken 9/4/2022 0155)  Oral Nutrition Promotion: rest periods promoted     Problem: Breathing Pattern Ineffective  Goal: Effective Breathing Pattern  Outcome: Ongoing, Progressing  Intervention: Promote Improved Breathing Pattern  Flowsheets (Taken 9/4/2022 0155)  Airway/Ventilation Management: calming measures promoted  Supportive Measures: relaxation techniques promoted  Head of Bed (HOB) Positioning:   HOB at 20-30 degrees   HOB at 30-45 degrees     Problem: Gas Exchange Impaired  Goal: Optimal Gas Exchange  Outcome: Ongoing, Progressing  Intervention: Optimize Oxygenation and Ventilation  Flowsheets (Taken 9/4/2022 0155)  Airway/Ventilation Management: calming measures promoted  Head of Bed (HOB) Positioning:   HOB at 20-30 degrees   HOB at 30-45 degrees     Problem: Device-Related Complication Risk (Hemodialysis)  Goal: Safe, Effective Therapy Delivery  Outcome: Ongoing, Progressing  Intervention: Optimize Device Care and Function  Flowsheets (Taken 9/4/2022 0155)  Medication Review/Management: medications reviewed     Problem: Hemodynamic Instability (Hemodialysis)  Goal: Effective Tissue Perfusion  Outcome: Ongoing, Progressing     Problem: Infection (Hemodialysis)  Goal: Absence of Infection Signs and Symptoms  Outcome: Ongoing, Progressing

## 2022-09-04 NOTE — SUBJECTIVE & OBJECTIVE
Interval History:     Pt out of icu  Long discussion about whether she wanted AC d/t paroxysmal afib, daughter was also present there, I went over risks and benefits in great detail especialy bleeding if she were to fall and lead to potential bleeding in the brain, pt says that she would like to try it and see how she does with it and want to continue with it. Called pharmcy and per pharmacy her dose will be 2.5 bid since she is on HD and is 88 y/o old.   Pt has no new complain, per xray ptx moderate in size, per surgery if repeat cxr shows worsening then Cts will have to be repositioned.     Review of Systems   Constitutional:  Positive for unexpected weight change. Negative for chills, fatigue and fever.   HENT: Negative.  Negative for congestion and rhinorrhea.    Respiratory:  Negative for apnea, cough, chest tightness and shortness of breath.    Cardiovascular: Negative.  Negative for chest pain and leg swelling.   Gastrointestinal: Negative.  Negative for abdominal pain, diarrhea, nausea and vomiting.   Endocrine: Negative.    Genitourinary: Negative.    Musculoskeletal:  Positive for arthralgias.   Skin: Negative.    Allergic/Immunologic: Negative.    Neurological: Negative.    Hematological: Negative.    Psychiatric/Behavioral: Negative.     Objective:     Vital Signs (Most Recent):  Temp: 99.1 °F (37.3 °C) (09/04/22 0729)  Pulse: 71 (09/04/22 0729)  Resp: 18 (09/04/22 0729)  BP: (!) 151/62 (09/04/22 0729)  SpO2: 97 % (09/04/22 0729)   Vital Signs (24h Range):  Temp:  [97.4 °F (36.3 °C)-99.1 °F (37.3 °C)] 99.1 °F (37.3 °C)  Pulse:  [68-90] 71  Resp:  [14-20] 18  SpO2:  [89 %-99 %] 97 %  BP: (114-157)/(52-70) 151/62     Weight: 74.6 kg (164 lb 7.4 oz)  Body mass index is 27.37 kg/m².    Intake/Output Summary (Last 24 hours) at 9/4/2022 1110  Last data filed at 9/4/2022 1020  Gross per 24 hour   Intake 10 ml   Output 300 ml   Net -290 ml      Physical Exam  Vitals reviewed.   Constitutional:       Appearance:  Normal appearance.      Interventions: She is not intubated.  HENT:      Head: Normocephalic and atraumatic.      Nose: Nose normal.      Mouth/Throat:      Mouth: Mucous membranes are dry.      Pharynx: Oropharynx is clear.   Eyes:      Extraocular Movements: Extraocular movements intact.      Conjunctiva/sclera: Conjunctivae normal.      Pupils: Pupils are equal, round, and reactive to light.   Cardiovascular:      Rate and Rhythm: Normal rate.      Heart sounds: Normal heart sounds. No murmur heard.  Pulmonary:      Effort: Pulmonary effort is normal. She is not intubated.      Breath sounds: Normal breath sounds.   Abdominal:      General: Abdomen is flat. Bowel sounds are normal.      Palpations: Abdomen is soft.   Musculoskeletal:         General: Normal range of motion.      Cervical back: Normal range of motion and neck supple.      Right lower leg: No edema.      Left lower leg: No edema.   Skin:     General: Skin is warm and dry.      Capillary Refill: Capillary refill takes less than 2 seconds.   Neurological:      General: No focal deficit present.      Mental Status: She is alert and oriented to person, place, and time.   Psychiatric:         Mood and Affect: Mood normal.         Behavior: Behavior normal.       Significant Labs: All pertinent labs within the past 24 hours have been reviewed.    Significant Imaging: I have reviewed all pertinent imaging results/findings within the past 24 hours.

## 2022-09-05 NOTE — PROGRESS NOTES
[facility]  Nephrology  Progress Note    Patient Name: Bira Ray  MRN: [id number]  Admission Date: 8/29/2022  Hospital Length of Stay: 6 days  Attending Provider: Neymar Glass DO   Primary Care Physician: Lorena Richardson MD  Principal Problem:Pneumothorax on right    Consults  Subjective:     Interval History: The patient has no complaints today    Review of patient's allergies indicates:  No Known Allergies  Current Facility-Administered Medications   Medication Frequency    0.9%  NaCl infusion PRN    acetaminophen tablet 1,000 mg Q6H PRN    albuterol-ipratropium 2.5 mg-0.5 mg/3 mL nebulizer solution 3 mL Q6H    allopurinoL tablet 100 mg Daily    amiodarone tablet 400 mg BID    apixaban tablet 2.5 mg BID    aspirin EC tablet 81 mg Daily    atorvastatin tablet 40 mg Daily    budesonide nebulizer solution 0.5 mg Q12H    calcitRIOL capsule 0.25 mcg Daily    calcium carbonate 200 mg calcium (500 mg) chewable tablet 500 mg Daily    cyanocobalamin tablet 100 mcg Daily    dextrose 50% injection 12.5 g PRN    dextrose 50% injection 25 g PRN    diphenhydrAMINE injection 25 mg Q6H PRN    gabapentin capsule 100 mg BID    glucagon (human recombinant) injection 1 mg PRN    heparin (porcine) injection 4,000 Units PRN    hydrALAZINE injection 5 mg Q6H PRN    HYDROmorphone (PF) injection 0.5 mg Q5 Min PRN    insulin aspart U-100 injection 1-10 Units QID (AC + HS) PRN    magnesium oxide tablet 800 mg PRN    magnesium oxide tablet 800 mg PRN    melatonin tablet 9 mg Nightly PRN    morphine injection 4 mg Q5 Min PRN    mupirocin 2 % ointment Daily    naloxone 0.4 mg/mL injection 0.02 mg PRN    ondansetron injection 4 mg Daily PRN    oxyCODONE-acetaminophen 7.5-325 mg per tablet 1 tablet Q4H PRN    polyethylene glycol packet 17 g BID    potassium bicarbonate disintegrating tablet 35 mEq PRN    potassium bicarbonate disintegrating tablet 50 mEq PRN    potassium bicarbonate disintegrating tablet 60 mEq  PRN    senna-docusate 8.6-50 mg per tablet 1 tablet BID    senna-docusate 8.6-50 mg per tablet 1 tablet Daily    sevelamer carbonate tablet 800 mg TID AC    sodium chloride 0.9% flush 10 mL Q12H PRN    sodium chloride 0.9% flush 10 mL Q6H    And    sodium chloride 0.9% flush 10 mL PRN       Objective:     Vital Signs (Most Recent):  Temp: 98.4 °F (36.9 °C) (09/04/22 1500)  Pulse: (!) 51 (09/04/22 1944)  Resp: 17 (09/04/22 1944)  BP: (!) 155/66 (09/04/22 1500)  SpO2: 100 % (09/04/22 1944)  O2 Device (Oxygen Therapy): nasal cannula (09/04/22 1944)   Vital Signs (24h Range):  Temp:  [97.4 °F (36.3 °C)-99.1 °F (37.3 °C)] 98.4 °F (36.9 °C)  Pulse:  [51-89] 51  Resp:  [16-18] 17  SpO2:  [89 %-100 %] 100 %  BP: (121-157)/(51-66) 155/66     Weight: 74.6 kg (164 lb 7.4 oz) (09/03/22 0245)  Body mass index is 27.37 kg/m².  Body surface area is 1.85 meters squared.    I/O last 3 completed shifts:  In: 10 [I.V.:10]  Out: 300 [Urine:250; Chest Tube:50]    Physical Exam  Lungs-crackles at the bases  Cor-no rub  Abd-soft      Significant Labs:sureCMP:   Recent Labs   Lab 08/31/22  0854 09/01/22  0551 09/04/22  0455   *   < > 102   CALCIUM 8.4*   < > 8.2*   ALBUMIN 2.1*  --   --    PROT 6.2*  --   --       < > 140   K 3.7   < > 4.9   CO2 26   < > 26      < > 105   BUN 46*   < > 37*   CREATININE 7.75*   < > 6.08*   ALKPHOS 67  --   --    ALT 6*  --   --    AST 15  --   --    BILITOT 0.5  --   --     < > = values in this interval not displayed.     All labs within the past 24 hours have been reviewed.    Significant Imaging      Assessment/Plan:     Active Diagnoses:    Diagnosis Date Noted POA    PRINCIPAL PROBLEM:  Pneumothorax on right [J93.9] 08/29/2022 Yes    Atrial fibrillation [I48.91] 09/02/2022 Unknown    SVT (supraventricular tachycardia) [I47.1] 08/31/2022 Yes    Pleural effusion on right [J90] 08/24/2022 Yes    ESRD (end stage renal disease) [N18.6]  Unknown    Chronic obstructive pulmonary disease  [J44.9]  Yes    DM (diabetes mellitus) [E11.9]  Unknown    Primary hypertension [I10] 01/18/2022 Yes    Chronic pain syndrome [G89.4]  Yes     Chronic      Problems Resolved During this Admission:     Plan:  Hemodialysis tomorrow    Thank you for your consult. I will follow-up with patient. Please contact us if you have any additional questions.    Emre Allen MD  Nephrology  [facility]

## 2022-09-05 NOTE — SUBJECTIVE & OBJECTIVE
Interval History:  Stable, no acute events overnight; a continuous air leak did develop and nursing evaluated and set all connections were properly connected.  Upon evaluation, I found the chest tube to be loose from the tubing that goes to the Pleur-evac and was causing a continuous air leak; when area was secured and air was cleared from Pleur-evac, no leak appreciated    Medications:  Continuous Infusions:  Scheduled Meds:   albuterol-ipratropium  3 mL Nebulization Q6H    allopurinoL  100 mg Oral Daily    amiodarone  400 mg Oral BID    apixaban  2.5 mg Oral BID    aspirin  81 mg Oral Daily    atorvastatin  40 mg Oral Daily    budesonide  0.5 mg Nebulization Q12H    calcitRIOL  0.25 mcg Oral Daily    calcium carbonate  1 tablet Oral Daily    cyanocobalamin  100 mcg Oral Daily    gabapentin  100 mg Oral BID    mupirocin   Topical (Top) Daily    polyethylene glycol  17 g Oral BID    senna-docusate 8.6-50 mg  1 tablet Oral BID    senna-docusate 8.6-50 mg  1 tablet Oral Daily    sevelamer carbonate  800 mg Oral TID AC    sodium chloride 0.9%  10 mL Intravenous Q6H     PRN Meds:sodium chloride 0.9%, acetaminophen, dextrose 50%, dextrose 50%, diphenhydrAMINE, glucagon (human recombinant), heparin (porcine), hydrALAZINE, HYDROmorphone, insulin aspart U-100, magnesium oxide, magnesium oxide, melatonin, morphine, naloxone, ondansetron, oxyCODONE-acetaminophen, potassium bicarbonate, potassium bicarbonate, potassium bicarbonate, sodium chloride 0.9%, Flushing PICC Protocol **AND** sodium chloride 0.9% **AND** sodium chloride 0.9%     Review of patient's allergies indicates:  No Known Allergies  Objective:     Vital Signs (Most Recent):  Temp: 97.5 °F (36.4 °C) (09/05/22 0713)  Pulse: 60 (09/05/22 0742)  Resp: 20 (09/05/22 0742)  BP: (!) 156/91 (09/05/22 0713)  SpO2: 95 % (09/05/22 0742)   Vital Signs (24h Range):  Temp:  [97.5 °F (36.4 °C)-98.5 °F (36.9 °C)] 97.5 °F (36.4 °C)  Pulse:  [] 60  Resp:  [16-20] 20  SpO2:   [95 %-100 %] 95 %  BP: (130-156)/(51-91) 156/91     Weight: 78.3 kg (172 lb 11.2 oz)  Body mass index is 28.74 kg/m².    Intake/Output - Last 3 Shifts         09/03 0700  09/04 0659 09/04 0700 09/05 0659 09/05 0700 09/06 0659    P.O.  120     I.V. (mL/kg) 10 (0.1)      Total Intake(mL/kg) 10 (0.1) 120 (1.5)     Urine (mL/kg/hr) 150 (0.1) 100 (0.1) 100 (0.3)    Other       Stool  0     Chest Tube 0 50     Total Output 150 150 100    Net -140 -30 -100           Urine Occurrence  0 x     Stool Occurrence  0 x             Physical Exam  Constitutional:       General: She is not in acute distress.     Appearance: Normal appearance.   HENT:      Head: Normocephalic.   Cardiovascular:      Rate and Rhythm: Normal rate.   Pulmonary:      Effort: Pulmonary effort is normal. No respiratory distress.      Comments: Right chest tube with minimal serous drainage; to suction; continuous leak which resolved after securing chest tube to the Pleur-evac tubing; dressing replaced  Abdominal:      General: There is no distension.      Tenderness: There is no abdominal tenderness.   Musculoskeletal:         General: Normal range of motion.   Skin:     General: Skin is warm.      Coloration: Skin is not jaundiced.   Neurological:      General: No focal deficit present.      Mental Status: She is alert and oriented to person, place, and time.      Cranial Nerves: No cranial nerve deficit.       Significant Labs:  I have reviewed all pertinent lab results within the past 24 hours.  CBC:   Recent Labs   Lab 09/05/22  0700   WBC 4.42*   RBC 2.44*   HGB 7.9*   HCT 25.8*   PLT 98*   .7*   MCH 32.4*   MCHC 30.6*       BMP:   Recent Labs   Lab 08/29/22  1459 08/31/22  0854 09/05/22  0700   *   < > 95      < > 140   K 3.4*   < > 5.3*   CL 99   < > 104   CO2 29   < > 25   BUN 36*   < > 46*   CREATININE 6.91*   < > 7.16*   CALCIUM 8.8   < > 8.5   MG 1.7  --   --     < > = values in this interval not displayed.          Significant Diagnostics:  I have reviewed all pertinent imaging results/findings within the past 24 hours.

## 2022-09-05 NOTE — PROGRESS NOTES
[facility] Hospital Medicine  Progress Note    Patient Name: Bria Ray  MRN: [id number]  Patient Class: IP- Inpatient   Admission Date: 8/29/2022  Length of Stay: 7 days  Attending Physician: Neymar Glass DO  Primary Care Provider: Lorena Richardson MD        Subjective:     Principal Problem:Pneumothorax on right        HPI:  Patient is a 87-year-old female with past medical history of hypertension, diabetes mellitus, ESRD, COPD, DJD, presented from home as direct admission for right-sided pneumothorax.  Patient underwent right-sided thoracentesis last Thursday for pleural effusion, however since then she continued to have right-sided chest pain along with shortness of bread for which she got an x-ray revealing pneumothorax on the right side.  Patient was then instructed to come to the hospital for further care.  Patient's primary pulmonologist is Dr. Bautista, and has been seeing General surgery in the past for her dialysis access issues.  Patient is also on peritoneal dialysis at home.  Patient is accompanied by her granddaughter, who is trying to get her hospice setup outpatient due to chronic conditions and mostly how her health has been declining steadily for the past few months.  I had a long discussion about goals of care for the patient as of now the patient has not made her mind about her code status and would like to be full code until she has a discussion with the rest of her family.  General surgery and Nephrology consulted and made aware of the patient's arrival.  Patient denies any fevers chills nausea vomiting or diarrhea does have cough and shortness of breath along with right-sided chest pain.      Overview/Hospital Course:  9/5-pneumo unchanged. Resealed per surgery. Re-eval in am. Plan for discharge home with hospice once medically stable      Interval History: HD today.    Review of Systems   Constitutional:  Positive for fatigue. Negative for chills, fever and  unexpected weight change.   HENT:  Negative for congestion, mouth sores and sore throat.    Eyes:  Negative for photophobia and visual disturbance.   Respiratory:  Positive for shortness of breath. Negative for cough, chest tightness and wheezing.    Cardiovascular:  Positive for chest pain. Negative for palpitations and leg swelling.        At ct insertion site   Gastrointestinal:  Negative for abdominal pain, diarrhea, nausea and vomiting.   Endocrine: Negative for cold intolerance and heat intolerance.   Genitourinary:  Negative for difficulty urinating, dysuria, frequency and urgency.   Musculoskeletal:  Negative for arthralgias, back pain and myalgias.   Skin:  Negative for pallor and rash.   Neurological:  Positive for weakness. Negative for tremors, seizures, syncope, numbness and headaches.   Hematological:  Does not bruise/bleed easily.   Psychiatric/Behavioral:  Negative for agitation, confusion, hallucinations and suicidal ideas.    Objective:     Vital Signs (Most Recent):  Temp: 98.5 °F (36.9 °C) (09/05/22 1300)  Pulse: 73 (09/05/22 1500)  Resp: 18 (09/05/22 1300)  BP: (!) 110/57 (09/05/22 1500)  SpO2: (!) 93 % (09/05/22 1100)   Vital Signs (24h Range):  Temp:  [97.5 °F (36.4 °C)-98.5 °F (36.9 °C)] 98.5 °F (36.9 °C)  Pulse:  [] 73  Resp:  [16-20] 18  SpO2:  [93 %-100 %] 93 %  BP: (102-183)/(51-91) 110/57     Weight: 78.3 kg (172 lb 11.2 oz)  Body mass index is 28.74 kg/m².    Intake/Output Summary (Last 24 hours) at 9/5/2022 1546  Last data filed at 9/5/2022 1000  Gross per 24 hour   Intake 120 ml   Output 100 ml   Net 20 ml      Physical Exam  Vitals reviewed.   Constitutional:       Appearance: Normal appearance.   HENT:      Head: Normocephalic and atraumatic.   Eyes:      Extraocular Movements: Extraocular movements intact.   Cardiovascular:      Rate and Rhythm: Normal rate and regular rhythm.      Pulses: Normal pulses.      Heart sounds: Normal heart sounds.   Pulmonary:      Effort:  Pulmonary effort is normal.      Breath sounds: Normal breath sounds.   Abdominal:      General: Abdomen is flat.      Palpations: Abdomen is soft.   Musculoskeletal:      Comments: Normal tone, moves all extremities   Skin:     General: Skin is warm and dry.      Capillary Refill: Capillary refill takes less than 2 seconds.   Neurological:      General: No focal deficit present.      Mental Status: She is alert and oriented to person, place, and time.   Psychiatric:         Mood and Affect: Mood normal.         Behavior: Behavior normal.       Significant Labs: All pertinent labs within the past 24 hours have been reviewed.    Significant Imaging: I have reviewed all pertinent imaging results/findings within the past 24 hours.      Assessment/Plan:      * Pneumothorax on right  Not resolving, air leak today  Has CT placed by surgery  Pt asymptomatic now  Surgery following, appreciate recs.     Atrial fibrillation  Patient with Paroxysmal (<7 days) atrial fibrillation which is controlled currently with Amiodarone. Patient is currently in sinus rhythm.[id number] Score: 4. HASBLED Score: Unable to calculate. Anticoagulation indicated. Anticoagulation done with apixaban, renally dose adjusted by pharmacy.    Detailed conversation in the presence of daughter, patient's decision was to go for AC.         SVT (supraventricular tachycardia)  S/p adenosine x3, responded  Cardiology consulted.   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       ESRD (end stage renal disease)  Pt w/ diaghragmatic defet, hence PD has been stopped and HD initiated.     DM (diabetes mellitus)  Patient's FSGs are controlled on current medication regimen.  Last A1c reviewed-   Lab Results   Component Value Date    HGBA1C 5.5 04/07/2022     Most recent fingerstick glucose reviewed- No results for input(s): POCTGLUCOSE in the last 24 hours.  Current correctional scale  Medium  Maintain  anti-hyperglycemic dose as follows-   Antihyperglycemics (From admission, onward)    Start     Stop Route Frequency Ordered    08/29/22 1432  insulin aspart U-100 injection 1-10 Units         -- SubQ Before meals & nightly PRN 08/29/22 1333        Hold Oral hypoglycemics while patient is in the hospital.    Chronic obstructive pulmonary disease  Inhalers.   Oxygen support      Primary hypertension  Titrate as needed      Chronic pain syndrome  Cont gabapentin  Prn norco        VTE Risk Mitigation (From admission, onward)         Ordered     apixaban tablet 2.5 mg  2 times daily         09/04/22 0804     heparin (porcine) injection 4,000 Units  As needed (PRN)         09/02/22 1357     IP VTE HIGH RISK PATIENT  Once         08/29/22 1333     Place sequential compression device  Until discontinued         08/29/22 1333                Discharge Planning   ANGY:      Code Status: Full Code   Is the patient medically ready for discharge?:     Reason for patient still in hospital (select all that apply): Treatment  Discharge Plan A: Home, Hospice/home                  Neymar Glass DO  Department of Hospital Medicine   [facility]

## 2022-09-05 NOTE — HOSPITAL COURSE
9/5-pneumo unchanged. Resealed per surgery. Re-eval in am. Plan for discharge home with hospice once medically stable  9/6-small pneumo persists  9/7- no events overnight, NM study today to eval fistula. 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  Will re-image for fistula closure in 2 weeks  9/9- will need swingbed for 2 weeks for management of chest tube  9/10- will need swingbed, CM assisting. Pneumo resolved  9/11- no hospice since continuing dialysis, will need swingbed. CM following  9/12-CM assisting with placement  9/13-no changes  9/14-dialysis today, awaiting placement  9/15- awating placement. Some pain around CT site today.

## 2022-09-05 NOTE — ASSESSMENT & PLAN NOTE
Not resolving, air leak today  Has CT placed by surgery  Pt asymptomatic now  Surgery following, appreciate recs.

## 2022-09-05 NOTE — PLAN OF CARE
Problem: Infection  Goal: Absence of Infection Signs and Symptoms  Outcome: Ongoing, Progressing  Intervention: Prevent or Manage Infection  Flowsheets (Taken 9/5/2022 1550)  Fever Reduction/Comfort Measures:   lightweight clothing   lightweight bedding  Infection Management: aseptic technique maintained     Problem: Adult Inpatient Plan of Care  Goal: Plan of Care Review  Outcome: Ongoing, Progressing  Goal: Patient-Specific Goal (Individualized)  Outcome: Ongoing, Progressing  Goal: Absence of Hospital-Acquired Illness or Injury  Outcome: Ongoing, Progressing  Intervention: Prevent and Manage VTE (Venous Thromboembolism) Risk  Flowsheets (Taken 9/5/2022 1550)  Range of Motion: active ROM (range of motion) encouraged  Goal: Optimal Comfort and Wellbeing  Outcome: Ongoing, Progressing  Intervention: Monitor Pain and Promote Comfort  Flowsheets (Taken 9/5/2022 1550)  Pain Management Interventions:   position adjusted   pillow support provided  Goal: Readiness for Transition of Care  Outcome: Ongoing, Progressing     Problem: Diabetes Comorbidity  Goal: Blood Glucose Level Within Targeted Range  Outcome: Ongoing, Progressing  Intervention: Monitor and Manage Glycemia  Flowsheets (Taken 9/5/2022 1550)  Glycemic Management: blood glucose monitored     Problem: Fall Injury Risk  Goal: Absence of Fall and Fall-Related Injury  Outcome: Ongoing, Progressing     Problem: Skin Injury Risk Increased  Goal: Skin Health and Integrity  Outcome: Ongoing, Progressing     Problem: Breathing Pattern Ineffective  Goal: Effective Breathing Pattern  Outcome: Ongoing, Progressing     Problem: Gas Exchange Impaired  Goal: Optimal Gas Exchange  Outcome: Ongoing, Progressing     Problem: Device-Related Complication Risk (Hemodialysis)  Goal: Safe, Effective Therapy Delivery  Outcome: Ongoing, Progressing     Problem: Hemodynamic Instability (Hemodialysis)  Goal: Effective Tissue Perfusion  Outcome: Ongoing, Progressing     Problem:  Infection (Hemodialysis)  Goal: Absence of Infection Signs and Symptoms  Outcome: Ongoing, Progressing

## 2022-09-05 NOTE — PLAN OF CARE
Problem: Infection  Goal: Absence of Infection Signs and Symptoms  9/5/2022 1702 by Nancy Huerta RN  Outcome: Ongoing, Progressing  9/5/2022 1550 by Nancy Huerta RN  Outcome: Ongoing, Progressing  Intervention: Prevent or Manage Infection  Flowsheets (Taken 9/5/2022 1550)  Fever Reduction/Comfort Measures:   lightweight clothing   lightweight bedding  Infection Management: aseptic technique maintained     Problem: Adult Inpatient Plan of Care  Goal: Plan of Care Review  9/5/2022 1702 by Nancy Huerta RN  Outcome: Ongoing, Progressing  9/5/2022 1550 by Nancy Huerta RN  Outcome: Ongoing, Progressing  Goal: Patient-Specific Goal (Individualized)  9/5/2022 1702 by Nancy Huerta RN  Outcome: Ongoing, Progressing  9/5/2022 1550 by Nancy Huerta RN  Outcome: Ongoing, Progressing  Goal: Absence of Hospital-Acquired Illness or Injury  9/5/2022 1702 by Nancy Huerta RN  Outcome: Ongoing, Progressing  9/5/2022 1550 by Nancy Huerta RN  Outcome: Ongoing, Progressing  Intervention: Prevent and Manage VTE (Venous Thromboembolism) Risk  Flowsheets (Taken 9/5/2022 1550)  Range of Motion: active ROM (range of motion) encouraged  Goal: Optimal Comfort and Wellbeing  9/5/2022 1702 by Nancy Huerta RN  Outcome: Ongoing, Progressing  9/5/2022 1550 by Nancy Huerta RN  Outcome: Ongoing, Progressing  Intervention: Monitor Pain and Promote Comfort  Flowsheets (Taken 9/5/2022 1550)  Pain Management Interventions:   position adjusted   pillow support provided  Goal: Readiness for Transition of Care  9/5/2022 1702 by Nancy Huerta RN  Outcome: Ongoing, Progressing  9/5/2022 1550 by Nancy Huerta RN  Outcome: Ongoing, Progressing     Problem: Diabetes Comorbidity  Goal: Blood Glucose Level Within Targeted Range  9/5/2022 1702 by Nancy Huerta RN  Outcome: Ongoing, Progressing  9/5/2022 1550 by Nancy Huerta RN  Outcome: Ongoing, Progressing  Intervention: Monitor and  Manage Glycemia  Flowsheets (Taken 9/5/2022 1550)  Glycemic Management: blood glucose monitored     Problem: Fall Injury Risk  Goal: Absence of Fall and Fall-Related Injury  9/5/2022 1702 by Nancy Huerta RN  Outcome: Ongoing, Progressing  9/5/2022 1550 by Nancy Huerta RN  Outcome: Ongoing, Progressing     Problem: Skin Injury Risk Increased  Goal: Skin Health and Integrity  9/5/2022 1702 by Nancy Huerta, TACOS  Outcome: Ongoing, Progressing  9/5/2022 1550 by Nancy Huerta RN  Outcome: Ongoing, Progressing     Problem: Breathing Pattern Ineffective  Goal: Effective Breathing Pattern  9/5/2022 1702 by Nancy Huerta RN  Outcome: Ongoing, Progressing  9/5/2022 1550 by Nancy Huerta RN  Outcome: Ongoing, Progressing     Problem: Gas Exchange Impaired  Goal: Optimal Gas Exchange  9/5/2022 1702 by Nancy Huerta RN  Outcome: Ongoing, Progressing  9/5/2022 1550 by Nancy Huerta RN  Outcome: Ongoing, Progressing     Problem: Device-Related Complication Risk (Hemodialysis)  Goal: Safe, Effective Therapy Delivery  9/5/2022 1702 by Nancy Huerta RN  Outcome: Ongoing, Progressing  9/5/2022 1550 by Nancy Huerta RN  Outcome: Ongoing, Progressing     Problem: Hemodynamic Instability (Hemodialysis)  Goal: Effective Tissue Perfusion  9/5/2022 1702 by Nancy Huerta RN  Outcome: Ongoing, Progressing  9/5/2022 1550 by Nancy Huerta RN  Outcome: Ongoing, Progressing     Problem: Infection (Hemodialysis)  Goal: Absence of Infection Signs and Symptoms  9/5/2022 1702 by Nancy Huerta RN  Outcome: Ongoing, Progressing  9/5/2022 1550 by Nancy Huerta RN  Outcome: Ongoing, Progressing

## 2022-09-05 NOTE — ASSESSMENT & PLAN NOTE
08/30/2022 increased serous output on chest tube during peritoneal dialysis concern for diaphragmatic leak, will hold peritoneal dialysis as dr Mckinnon discussed with Dr. 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  Continue chest tube to suction over the weekend.  Continue hemodialysis.  We will possibly remove chest tube within the next week if patient is doing well.  If the fistula does not close and ascites reaccumulates, patient will need a VATS for closure of diaphragmatic fistula    9/4: pneumothorax small to moderate size; Repeat CXR tomorrow; if getting larger, may have to reposition chest tube in OR; continue medical management    9/5:  Pneumothorax the same size; continuous air leak found and chest tube secured tubing with resolution of air leak.  We will repeat chest x-ray today; also get a chest x-ray tomorrow a.m..  I will make the patient NPO after midnight in case patient may require a procedure or reinsertion of chest tube/VATS.

## 2022-09-05 NOTE — SUBJECTIVE & OBJECTIVE
Interval History: HD today.    Review of Systems   Constitutional:  Positive for fatigue. Negative for chills, fever and unexpected weight change.   HENT:  Negative for congestion, mouth sores and sore throat.    Eyes:  Negative for photophobia and visual disturbance.   Respiratory:  Positive for shortness of breath. Negative for cough, chest tightness and wheezing.    Cardiovascular:  Positive for chest pain. Negative for palpitations and leg swelling.        At ct insertion site   Gastrointestinal:  Negative for abdominal pain, diarrhea, nausea and vomiting.   Endocrine: Negative for cold intolerance and heat intolerance.   Genitourinary:  Negative for difficulty urinating, dysuria, frequency and urgency.   Musculoskeletal:  Negative for arthralgias, back pain and myalgias.   Skin:  Negative for pallor and rash.   Neurological:  Positive for weakness. Negative for tremors, seizures, syncope, numbness and headaches.   Hematological:  Does not bruise/bleed easily.   Psychiatric/Behavioral:  Negative for agitation, confusion, hallucinations and suicidal ideas.    Objective:     Vital Signs (Most Recent):  Temp: 98.5 °F (36.9 °C) (09/05/22 1300)  Pulse: 73 (09/05/22 1500)  Resp: 18 (09/05/22 1300)  BP: (!) 110/57 (09/05/22 1500)  SpO2: (!) 93 % (09/05/22 1100)   Vital Signs (24h Range):  Temp:  [97.5 °F (36.4 °C)-98.5 °F (36.9 °C)] 98.5 °F (36.9 °C)  Pulse:  [] 73  Resp:  [16-20] 18  SpO2:  [93 %-100 %] 93 %  BP: (102-183)/(51-91) 110/57     Weight: 78.3 kg (172 lb 11.2 oz)  Body mass index is 28.74 kg/m².    Intake/Output Summary (Last 24 hours) at 9/5/2022 1546  Last data filed at 9/5/2022 1000  Gross per 24 hour   Intake 120 ml   Output 100 ml   Net 20 ml      Physical Exam  Vitals reviewed.   Constitutional:       Appearance: Normal appearance.   HENT:      Head: Normocephalic and atraumatic.   Eyes:      Extraocular Movements: Extraocular movements intact.   Cardiovascular:      Rate and Rhythm: Normal rate  and regular rhythm.      Pulses: Normal pulses.      Heart sounds: Normal heart sounds.   Pulmonary:      Effort: Pulmonary effort is normal.      Breath sounds: Normal breath sounds.   Abdominal:      General: Abdomen is flat.      Palpations: Abdomen is soft.   Musculoskeletal:      Comments: Normal tone, moves all extremities   Skin:     General: Skin is warm and dry.      Capillary Refill: Capillary refill takes less than 2 seconds.   Neurological:      General: No focal deficit present.      Mental Status: She is alert and oriented to person, place, and time.   Psychiatric:         Mood and Affect: Mood normal.         Behavior: Behavior normal.       Significant Labs: All pertinent labs within the past 24 hours have been reviewed.    Significant Imaging: I have reviewed all pertinent imaging results/findings within the past 24 hours.

## 2022-09-05 NOTE — PROGRESS NOTES
[facility]  General Surgery  Progress Note    Subjective:     History of Present Illness:  No notes on file    Post-Op Info:  Procedure(s) (LRB):  INSERTION, CATHETER, HEMODIALYSIS, DUAL LUMEN (Right)   3 Days Post-Op     Interval History:  Stable, no acute events overnight; a continuous air leak did develop and nursing evaluated and set all connections were properly connected.  Upon evaluation, I found the chest tube to be loose from the tubing that goes to the Pleur-evac and was causing a continuous air leak; when area was secured and air was cleared from Pleur-evac, no leak appreciated.   Patient denies any shortness of breath and in no respiratory distress      Medications:  Continuous Infusions:  Scheduled Meds:   albuterol-ipratropium  3 mL Nebulization Q6H    allopurinoL  100 mg Oral Daily    amiodarone  400 mg Oral BID    apixaban  2.5 mg Oral BID    aspirin  81 mg Oral Daily    atorvastatin  40 mg Oral Daily    budesonide  0.5 mg Nebulization Q12H    calcitRIOL  0.25 mcg Oral Daily    calcium carbonate  1 tablet Oral Daily    cyanocobalamin  100 mcg Oral Daily    gabapentin  100 mg Oral BID    mupirocin   Topical (Top) Daily    polyethylene glycol  17 g Oral BID    senna-docusate 8.6-50 mg  1 tablet Oral BID    senna-docusate 8.6-50 mg  1 tablet Oral Daily    sevelamer carbonate  800 mg Oral TID AC    sodium chloride 0.9%  10 mL Intravenous Q6H     PRN Meds:sodium chloride 0.9%, acetaminophen, dextrose 50%, dextrose 50%, diphenhydrAMINE, glucagon (human recombinant), heparin (porcine), hydrALAZINE, HYDROmorphone, insulin aspart U-100, magnesium oxide, magnesium oxide, melatonin, morphine, naloxone, ondansetron, oxyCODONE-acetaminophen, potassium bicarbonate, potassium bicarbonate, potassium bicarbonate, sodium chloride 0.9%, Flushing PICC Protocol **AND** sodium chloride 0.9% **AND** sodium chloride 0.9%     Review of patient's allergies indicates:  No Known  Allergies  Objective:     Vital Signs (Most Recent):  Temp: 97.5 °F (36.4 °C) (09/05/22 0713)  Pulse: 60 (09/05/22 0742)  Resp: 20 (09/05/22 0742)  BP: (!) 156/91 (09/05/22 0713)  SpO2: 95 % (09/05/22 0742)   Vital Signs (24h Range):  Temp:  [97.5 °F (36.4 °C)-98.5 °F (36.9 °C)] 97.5 °F (36.4 °C)  Pulse:  [] 60  Resp:  [16-20] 20  SpO2:  [95 %-100 %] 95 %  BP: (130-156)/(51-91) 156/91     Weight: 78.3 kg (172 lb 11.2 oz)  Body mass index is 28.74 kg/m².    Intake/Output - Last 3 Shifts         09/03 0700 09/04 0659 09/04 0700 09/05 0659 09/05 0700 09/06 0659    P.O.  120     I.V. (mL/kg) 10 (0.1)      Total Intake(mL/kg) 10 (0.1) 120 (1.5)     Urine (mL/kg/hr) 150 (0.1) 100 (0.1) 100 (0.3)    Other       Stool  0     Chest Tube 0 50     Total Output 150 150 100    Net -140 -30 -100           Urine Occurrence  0 x     Stool Occurrence  0 x             Physical Exam  Constitutional:       General: She is not in acute distress.     Appearance: Normal appearance.   HENT:      Head: Normocephalic.   Cardiovascular:      Rate and Rhythm: Normal rate.   Pulmonary:      Effort: Pulmonary effort is normal. No respiratory distress.      Comments: Right chest tube with minimal serous drainage; to suction; continuous leak which resolved after securing chest tube to the Pleur-evac tubing; dressing replaced  Abdominal:      General: There is no distension.      Tenderness: There is no abdominal tenderness.   Musculoskeletal:         General: Normal range of motion.   Skin:     General: Skin is warm.      Coloration: Skin is not jaundiced.   Neurological:      General: No focal deficit present.      Mental Status: She is alert and oriented to person, place, and time.      Cranial Nerves: No cranial nerve deficit.       Significant Labs:  I have reviewed all pertinent lab results within the past 24 hours.  CBC:   Recent Labs   Lab 09/05/22  0700   WBC 4.42*   RBC 2.44*   HGB 7.9*   HCT 25.8*   PLT 98*   .7*   MCH  32.4*   MCHC 30.6*       BMP:   Recent Labs   Lab 08/29/22  1459 08/31/22  0854 09/05/22  0700   *   < > 95      < > 140   K 3.4*   < > 5.3*   CL 99   < > 104   CO2 29   < > 25   BUN 36*   < > 46*   CREATININE 6.91*   < > 7.16*   CALCIUM 8.8   < > 8.5   MG 1.7  --   --     < > = values in this interval not displayed.         Significant Diagnostics:  I have reviewed all pertinent imaging results/findings within the past 24 hours.    Assessment/Plan:     * Pneumothorax on right  08/30/2022 increased serous output on chest tube during peritoneal dialysis concern for diaphragmatic leak, will hold peritoneal dialysis as dr Mckinnon discussed with Dr. 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  Continue chest tube to suction over the weekend.  Continue hemodialysis.  We will possibly remove chest tube within the next week if patient is doing well.  If the fistula does not close and ascites reaccumulates, patient will need a VATS for closure of diaphragmatic fistula    9/4: pneumothorax small to moderate size; Repeat CXR tomorrow; if getting larger, may have to reposition chest tube in OR; continue medical management    9/5:  Pneumothorax the same size; continuous air leak found and chest tube secured tubing with resolution of air leak.  We will repeat chest x-ray today; also get a chest x-ray tomorrow a.m..  I will make the patient NPO after midnight in case patient may require a procedure or reinsertion of chest tube/VATS.          Doyle Grande, DO  General Surgery  [facility]

## 2022-09-06 NOTE — ASSESSMENT & PLAN NOTE
It appears she has communication between peritoneal and pleural spaces.  This is likely due to damage to the diaphragm during recent thoracentesis.  I have discussed case with Dr. Jeffery.  She is to have a nuclear medicine scan with tracer in her PD fluid to confirm leak.  Further plans will be based on this result.  Peritoneal dialysis will be suspended while this is being investigated.  09/01/2022:  Peritoneal pleural communication confirmed.  She is to have a temporary HD catheter placed tomorrow.  Plan HD tomorrow.  09/02/2022:  Stable during hemodialysis.  09/06/2022:  Continue hemodialysis MWF.

## 2022-09-06 NOTE — PLAN OF CARE
Chart reviewed, stable small R PTX. Reconnected chest tube to suction, initially had small air leak but has resolved. Pt oxygenating well. SW following.

## 2022-09-06 NOTE — SUBJECTIVE & OBJECTIVE
Interval History: NAEO    Review of Systems   Constitutional:  Positive for fatigue. Negative for chills, fever and unexpected weight change.   HENT:  Negative for congestion, mouth sores and sore throat.    Eyes:  Negative for photophobia and visual disturbance.   Respiratory:  Positive for shortness of breath. Negative for cough, chest tightness and wheezing.    Cardiovascular:  Positive for chest pain. Negative for palpitations and leg swelling.        At ct insertion site   Gastrointestinal:  Negative for abdominal pain, diarrhea, nausea and vomiting.   Endocrine: Negative for cold intolerance and heat intolerance.   Genitourinary:  Negative for difficulty urinating, dysuria, frequency and urgency.   Musculoskeletal:  Negative for arthralgias, back pain and myalgias.   Skin:  Negative for pallor and rash.   Neurological:  Positive for weakness. Negative for tremors, seizures, syncope, numbness and headaches.   Hematological:  Does not bruise/bleed easily.   Psychiatric/Behavioral:  Negative for agitation, confusion, hallucinations and suicidal ideas.    Objective:     Vital Signs (Most Recent):  Temp: 97.8 °F (36.6 °C) (09/06/22 1100)  Pulse: (!) 57 (09/06/22 1221)  Resp: 16 (09/06/22 1221)  BP: (!) 149/47 (09/06/22 1100)  SpO2: 97 % (09/06/22 1221)   Vital Signs (24h Range):  Temp:  [97.8 °F (36.6 °C)-98.6 °F (37 °C)] 97.8 °F (36.6 °C)  Pulse:  [57-88] 57  Resp:  [16-20] 16  SpO2:  [95 %-100 %] 97 %  BP: (107-152)/() 149/47     Weight: 76.4 kg (168 lb 6.4 oz)  Body mass index is 28.02 kg/m².    Intake/Output Summary (Last 24 hours) at 9/6/2022 1408  Last data filed at 9/6/2022 1110  Gross per 24 hour   Intake 770 ml   Output 2375 ml   Net -1605 ml        Physical Exam  Vitals reviewed.   Constitutional:       Appearance: Normal appearance.   HENT:      Head: Normocephalic and atraumatic.   Eyes:      Extraocular Movements: Extraocular movements intact.   Cardiovascular:      Rate and Rhythm: Normal rate  and regular rhythm.      Pulses: Normal pulses.      Heart sounds: Normal heart sounds.   Pulmonary:      Effort: Pulmonary effort is normal.      Breath sounds: Normal breath sounds.   Abdominal:      General: Abdomen is flat.      Palpations: Abdomen is soft.   Musculoskeletal:      Comments: Normal tone, moves all extremities   Skin:     General: Skin is warm and dry.      Capillary Refill: Capillary refill takes less than 2 seconds.   Neurological:      General: No focal deficit present.      Mental Status: She is alert and oriented to person, place, and time.   Psychiatric:         Mood and Affect: Mood normal.         Behavior: Behavior normal.       Significant Labs: All pertinent labs within the past 24 hours have been reviewed.    Significant Imaging: I have reviewed all pertinent imaging results/findings within the past 24 hours.

## 2022-09-06 NOTE — DIALYSIS ROUNDING
The  patient was dialyzed today without complication.She tolerated the procedure well  PE  Lungs-crackles with diminished bs R>L  Cor-2/6 systolic murmur  Abd-soft    Plan:  Continue the present care

## 2022-09-06 NOTE — PROGRESS NOTES
[facility]  Nephrology  Progress Note    Patient Name: Bria Ray  MRN: [id number]  Admission Date: 8/29/2022  Hospital Length of Stay: 8 days  Attending Provider: Neymar Glass DO   Primary Care Physician: Lorena Richardson MD  Principal Problem:Pneumothorax on right    Subjective:     HPI: 87-year-old woman with ESRD secondary to diabetes.  She is on peritoneal dialysis.  She had a thoracentesis for right pleural effusion Thursday of last week.  She developed more shortness of breath over the weekend.  Chest x-ray today shows hydrothorax with collapse of the right lung.      Interval History:  She denies SOB.  No new symptoms.    Review of patient's allergies indicates:  No Known Allergies  Current Facility-Administered Medications   Medication Frequency    0.9%  NaCl infusion PRN    acetaminophen tablet 1,000 mg Q6H PRN    albuterol-ipratropium 2.5 mg-0.5 mg/3 mL nebulizer solution 3 mL Q6H    allopurinoL tablet 100 mg Daily    amiodarone tablet 400 mg BID    apixaban tablet 2.5 mg BID    aspirin EC tablet 81 mg Daily    atorvastatin tablet 40 mg Daily    budesonide nebulizer solution 0.5 mg Q12H    calcitRIOL capsule 0.25 mcg Daily    calcium carbonate 200 mg calcium (500 mg) chewable tablet 500 mg Daily    cyanocobalamin tablet 100 mcg Daily    dextrose 50% injection 12.5 g PRN    dextrose 50% injection 25 g PRN    diphenhydrAMINE injection 25 mg Q6H PRN    gabapentin capsule 100 mg BID    glucagon (human recombinant) injection 1 mg PRN    heparin (porcine) injection 4,000 Units PRN    hydrALAZINE injection 5 mg Q6H PRN    HYDROmorphone (PF) injection 0.5 mg Q5 Min PRN    insulin aspart U-100 injection 1-10 Units QID (AC + HS) PRN    magnesium oxide tablet 800 mg PRN    magnesium oxide tablet 800 mg PRN    melatonin tablet 9 mg Nightly PRN    morphine injection 4 mg Q5 Min PRN    mupirocin 2 % ointment Daily    naloxone 0.4 mg/mL injection 0.02 mg PRN     ondansetron injection 4 mg Daily PRN    oxyCODONE-acetaminophen 7.5-325 mg per tablet 1 tablet Q4H PRN    polyethylene glycol packet 17 g BID    potassium bicarbonate disintegrating tablet 35 mEq PRN    potassium bicarbonate disintegrating tablet 50 mEq PRN    potassium bicarbonate disintegrating tablet 60 mEq PRN    senna-docusate 8.6-50 mg per tablet 1 tablet BID    senna-docusate 8.6-50 mg per tablet 1 tablet Daily    sevelamer carbonate tablet 800 mg TID AC    sodium chloride 0.9% flush 10 mL Q12H PRN    sodium chloride 0.9% flush 10 mL Q6H    And    sodium chloride 0.9% flush 10 mL PRN       Objective:     Vital Signs (Most Recent):  Temp: 97.8 °F (36.6 °C) (09/06/22 1100)  Pulse: (!) 57 (09/06/22 1221)  Resp: 16 (09/06/22 1221)  BP: (!) 149/47 (09/06/22 1100)  SpO2: 97 % (09/06/22 1221)  O2 Device (Oxygen Therapy): nasal cannula w/ humidification (09/06/22 1221)   Vital Signs (24h Range):  Temp:  [97.8 °F (36.6 °C)-98.6 °F (37 °C)] 97.8 °F (36.6 °C)  Pulse:  [57-88] 57  Resp:  [16-20] 16  SpO2:  [95 %-100 %] 97 %  BP: (107-152)/() 149/47     Weight: 76.4 kg (168 lb 6.4 oz) (09/06/22 1200)  Body mass index is 28.02 kg/m².  Body surface area is 1.87 meters squared.    I/O last 3 completed shifts:  In: 770 [P.O.:270; Other:500]  Out: 2475 [Urine:475; Other:1800; Chest Tube:200]    Physical Exam  Eyes:      Pupils: Pupils are equal, round, and reactive to light.   Cardiovascular:      Rate and Rhythm: Normal rate and regular rhythm.   Pulmonary:      Breath sounds: No wheezing or rales.      Comments: Right chest tube present.  Dullness right base  Abdominal:      Tenderness: There is no abdominal tenderness. There is no guarding.   Musculoskeletal:      Cervical back: Neck supple.      Right lower leg: No edema.      Left lower leg: No edema.   Neurological:      Mental Status: She is alert.       Significant Labs:  BMP:   Recent Labs   Lab 09/06/22  0220   GLU 94      K 4.8       CO2 27   BUN 19*   CREATININE 3.60*   CALCIUM 8.0*        Significant Imaging:      Assessment/Plan:     * Pneumothorax on right  Hydropneumothorax.  She had thoracentesis done Thursday of last week.  This may have resulted in pneumothorax.  However, she had large volume of pleural fluid today.  Fluid is not bloody and does not appear to be an empyema.  Pleural fluid for cytology was negative.  Possibility of diaphragmatic leak of peritoneal fluid should be ruled out.  08/30/2022: See notes under ESRD above  08/31/2022: She was supposed to have a nuclear medicine study to test for diaphragmatic leak.  I can not find results.  Surgery is following  09/01/2022:  Pleural-peritoneal communication confirmed.  09/06/2022:  Chest tube managed by surgery    ESRD (end stage renal disease)  It appears she has communication between peritoneal and pleural spaces.  This is likely due to damage to the diaphragm during recent thoracentesis.  I have discussed case with Dr. Jeffery.  She is to have a nuclear medicine scan with tracer in her PD fluid to confirm leak.  Further plans will be based on this result.  Peritoneal dialysis will be suspended while this is being investigated.  09/01/2022:  Peritoneal pleural communication confirmed.  She is to have a temporary HD catheter placed tomorrow.  Plan HD tomorrow.  09/02/2022:  Stable during hemodialysis.  09/06/2022:  Continue hemodialysis MWF.    Chronic obstructive pulmonary disease  No wheezing    Primary hypertension  Controlled        Thank you for your consult.     Beni Fileds MD  Nephrology  [facility]

## 2022-09-06 NOTE — PLAN OF CARE
SW consulted for wc. Hospice will set up any dme that pt will need at [facility]. CATRACHITO will inform Hanna at Hospice that family requests a wc. CATRACHITO following.

## 2022-09-06 NOTE — ASSESSMENT & PLAN NOTE
Patient with Paroxysmal (<7 days) atrial fibrillation which is controlled currently with Amiodarone. Patient is currently in sinus rhythm.[id number] Score: 4. HASBLED Score: Unable to calculate. Anticoagulation indicated. Anticoagulation done with apixaban, renally dose adjusted by pharmacy.    Detailed conversation in the presence of daughter, patient's decision was to go for AC.

## 2022-09-06 NOTE — PROGRESS NOTES
[facility] Hospital Medicine  Progress Note    Patient Name: Bria Ray  MRN: [id number]  Patient Class: IP- Inpatient   Admission Date: 8/29/2022  Length of Stay: 8 days  Attending Physician: Neymar Glass DO  Primary Care Provider: Lorena Richardson MD        Subjective:     Principal Problem:Pneumothorax on right        HPI:  Patient is a 87-year-old female with past medical history of hypertension, diabetes mellitus, ESRD, COPD, DJD, presented from home as direct admission for right-sided pneumothorax.  Patient underwent right-sided thoracentesis last Thursday for pleural effusion, however since then she continued to have right-sided chest pain along with shortness of bread for which she got an x-ray revealing pneumothorax on the right side.  Patient was then instructed to come to the hospital for further care.  Patient's primary pulmonologist is Dr. Bautista, and has been seeing General surgery in the past for her dialysis access issues.  Patient is also on peritoneal dialysis at home.  Patient is accompanied by her granddaughter, who is trying to get her hospice setup outpatient due to chronic conditions and mostly how her health has been declining steadily for the past few months.  I had a long discussion about goals of care for the patient as of now the patient has not made her mind about her code status and would like to be full code until she has a discussion with the rest of her family.  General surgery and Nephrology consulted and made aware of the patient's arrival.  Patient denies any fevers chills nausea vomiting or diarrhea does have cough and shortness of breath along with right-sided chest pain.      Overview/Hospital Course:  9/5-pneumo unchanged. Resealed per surgery. Re-eval in am. Plan for discharge home with hospice once medically stable  9/6-small pneumo persists      Interval History: NAEO    Review of Systems   Constitutional:  Positive for fatigue. Negative  for chills, fever and unexpected weight change.   HENT:  Negative for congestion, mouth sores and sore throat.    Eyes:  Negative for photophobia and visual disturbance.   Respiratory:  Positive for shortness of breath. Negative for cough, chest tightness and wheezing.    Cardiovascular:  Positive for chest pain. Negative for palpitations and leg swelling.        At ct insertion site   Gastrointestinal:  Negative for abdominal pain, diarrhea, nausea and vomiting.   Endocrine: Negative for cold intolerance and heat intolerance.   Genitourinary:  Negative for difficulty urinating, dysuria, frequency and urgency.   Musculoskeletal:  Negative for arthralgias, back pain and myalgias.   Skin:  Negative for pallor and rash.   Neurological:  Positive for weakness. Negative for tremors, seizures, syncope, numbness and headaches.   Hematological:  Does not bruise/bleed easily.   Psychiatric/Behavioral:  Negative for agitation, confusion, hallucinations and suicidal ideas.    Objective:     Vital Signs (Most Recent):  Temp: 97.8 °F (36.6 °C) (09/06/22 1100)  Pulse: (!) 57 (09/06/22 1221)  Resp: 16 (09/06/22 1221)  BP: (!) 149/47 (09/06/22 1100)  SpO2: 97 % (09/06/22 1221)   Vital Signs (24h Range):  Temp:  [97.8 °F (36.6 °C)-98.6 °F (37 °C)] 97.8 °F (36.6 °C)  Pulse:  [57-88] 57  Resp:  [16-20] 16  SpO2:  [95 %-100 %] 97 %  BP: (107-152)/() 149/47     Weight: 76.4 kg (168 lb 6.4 oz)  Body mass index is 28.02 kg/m².    Intake/Output Summary (Last 24 hours) at 9/6/2022 1408  Last data filed at 9/6/2022 1110  Gross per 24 hour   Intake 770 ml   Output 2375 ml   Net -1605 ml        Physical Exam  Vitals reviewed.   Constitutional:       Appearance: Normal appearance.   HENT:      Head: Normocephalic and atraumatic.   Eyes:      Extraocular Movements: Extraocular movements intact.   Cardiovascular:      Rate and Rhythm: Normal rate and regular rhythm.      Pulses: Normal pulses.      Heart sounds: Normal heart sounds.    Pulmonary:      Effort: Pulmonary effort is normal.      Breath sounds: Normal breath sounds.   Abdominal:      General: Abdomen is flat.      Palpations: Abdomen is soft.   Musculoskeletal:      Comments: Normal tone, moves all extremities   Skin:     General: Skin is warm and dry.      Capillary Refill: Capillary refill takes less than 2 seconds.   Neurological:      General: No focal deficit present.      Mental Status: She is alert and oriented to person, place, and time.   Psychiatric:         Mood and Affect: Mood normal.         Behavior: Behavior normal.       Significant Labs: All pertinent labs within the past 24 hours have been reviewed.    Significant Imaging: I have reviewed all pertinent imaging results/findings within the past 24 hours.      Assessment/Plan:      * Pneumothorax on right  Persistent, no air leak today  Has CT placed by surgery  Pt asymptomatic now  Surgery following, appreciate recs.     Atrial fibrillation  Patient with Paroxysmal (<7 days) atrial fibrillation which is controlled currently with Amiodarone. Patient is currently in sinus rhythm.[id number] Score: 4. HASBLED Score: Unable to calculate. Anticoagulation indicated. Anticoagulation done with apixaban, renally dose adjusted by pharmacy.    Detailed conversation in the presence of daughter, patient's decision was to go for AC.         SVT (supraventricular tachycardia)  S/p adenosine x3, responded  Cardiology consulted.   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       ESRD (end stage renal disease)  Pt w/ diaghragmatic defet, hence PD has been stopped and HD initiated.   Plan to resume PD once defect heals    DM (diabetes mellitus)  Patient's FSGs are controlled on current medication regimen.  Last A1c reviewed-   Lab Results   Component Value Date    HGBA1C 5.5 04/07/2022     Most recent fingerstick glucose reviewed- No results for input(s): POCTGLUCOSE in the  last 24 hours.  Current correctional scale  Medium  Maintain anti-hyperglycemic dose as follows-   Antihyperglycemics (From admission, onward)    Start     Stop Route Frequency Ordered    08/29/22 1432  insulin aspart U-100 injection 1-10 Units         -- SubQ Before meals & nightly PRN 08/29/22 1333        Hold Oral hypoglycemics while patient is in the hospital.    Chronic obstructive pulmonary disease  Inhalers.   Oxygen support      Primary hypertension  Adjust medications as needed      Chronic pain syndrome  Cont gabapentin  Prn norco        VTE Risk Mitigation (From admission, onward)         Ordered     apixaban tablet 2.5 mg  2 times daily         09/04/22 0804     heparin (porcine) injection 4,000 Units  As needed (PRN)         09/02/22 1357     IP VTE HIGH RISK PATIENT  Once         08/29/22 1333     Place sequential compression device  Until discontinued         08/29/22 1333                Discharge Planning   ANGY:      Code Status: Full Code   Is the patient medically ready for discharge?:     Reason for patient still in hospital (select all that apply): Treatment  Discharge Plan A: Home, Hospice/home                  Neymar Glass DO  Department of Hospital Medicine   [facility]

## 2022-09-06 NOTE — PROGRESS NOTES
"[facility]  Adult Nutrition  First Assessment Note         Reason for Assessment  Reason For Assessment: length of stay   Nutrition Risk Screen: no indicators present    Assessment and Plan  Pt is an 87 yoF with PMH of hypertension, diabetes mellitus, ESRD, COPD, DJD, presented from home as direct admission for right-sided pneumothorax.     MD notes:   08/30/2022 increased serous output on chest tube during peritoneal dialysis concern for diaphragmatic leak, will hold peritoneal dialysis as dr Mckinnon discussed with Dr. Fields, continue chest tube to suction, if confirms leak possible vats later in week per dr mckinnon  9/6: Stable small right PTX  Reconnected chest tube to suction, initially had small air leak that resolved  Oxygenating well      Briefly in ICU from 9/1-9/3  9/2- s/p tunneled hemodialysis catheter placement     RD assessment of pt due to LOS. Pt seen today and reports at baseline she has a good appetite, no recent weight loss. She reports she does not like the food in the hospital but typically has a great appetite when the food is "good". RD encouraged her to provide feedback but the majority of feedback was the food was cold, didn't taste good, etc. RD will pass information on to dietary department. Currently, will continue current diet. She reports she will not drink supplements(does not like them), but if her intakes do not improve, then will recommend addition of nutrition supplements to promote adequate calorie and protein intakes. Will monitor labs, meds, weights, po intakes, updates in pt condition.     Malnutrition  Is Patient Malnourished: No  Skin Integrity  Omar Risk Assessment  Sensory Perception: 3-->slightly limited  Moisture: 3-->occasionally moist  Activity: 2-->chairfast  Mobility: 3-->slightly limited  Nutrition: 3-->adequate  Friction and Shear: 3-->no apparent problem  Omar Score: 17    Nutrition Diagnosis  Altered Nutrition Related Lab Values   related to " Renal dysfunction as evidenced by renal lab abnormalities    Nutrition Diagnosis Status: Chronic/ continues  Comments: ESRD on PD at home, HD currently while inpatient     Nutrition Risk  Level of Risk/Frequency of Follow-up: low    Recent Labs   Lab 09/06/22  0220 09/06/22  0610 09/06/22  1116   GLU 94  --   --    POCGLU  --    < > 118*    < > = values in this interval not displayed.     Comments on Glucose: GLU elevated,pt with DM  Nutrition Prescription / Recommendations  Recommendation/Intervention: Recommend continuing current diet order and encourage po intakes. Pt reports a dislike of oral nutrition supplements so will not add any at this time, but if intakes decline, recommend addition of Nepro supplements BID to increase kcal/PRO. Monitor intakes, labs, weights  Goals: PO Intakes of 50-75% of meals  Nutrition Goal Status: new  Current Diet Order: Consistent carbohydrate 1800 calories  Chewing or Swallowing Difficulty?: No Chewing or swallowing difficulty  Recommended Diet: Consistent Carbohydrate 1800 (60g Carbs)continue current diet, consider adjustment if pt not eating well   Recommended Oral Supplement: No Oral Supplements  Is Nutrition Support Recommended: No  Is Education Recommended: No  Monitor and Evaluation  % current Intake: P.O. intake of 50 - 75 %  % intake to meet estimated needs: 75 - 100 %  Food and Nutrient Intake: energy intake  Food and Nutrient Adminstration: diet order  Anthropometric Measurements: weight, weight change  Biochemical Data, Medical Tests and Procedures: electrolyte and renal panel, gastrointestinal profile, glucose/endocrine profile, inflammatory profile, lipid profile  Nutrition-Focused Physical Findings: overall appearance, extremities, muscles and bones, head and eyes, skin     Current Medical Diagnosis and Past Medical History  Diagnosis: other (see comments) (Pneumothorax on right)  Past Medical History:   Diagnosis Date    Anemia of chronic renal failure     Bone  "cyst     Chronic diastolic (congestive) heart failure     COPD (chronic obstructive pulmonary disease)     Essential hypertension     GERD (gastroesophageal reflux disease)     Gout     Labyrinthitis     Lumbosacral radiculopathy     Sanchez's neuroma of right foot     Neuropathy     Renal failure syndrome     Right bundle branch block     Type 2 diabetes mellitus     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, without long-term current use of insulin     Vertigo     Vitamin D deficiency      Nutrition/Diet History  Typical Food/Fluid Intake: reports she typically eats well, B/L/D at home  Lab/Procedures/Meds  Recent Labs   Lab 22  0220      K 4.8   BUN 19*   CREATININE 3.60*   CALCIUM 8.0*        Last A1c:   Lab Results   Component Value Date    HGBA1C 5.5 2022     Lab Results   Component Value Date    RBC 2.67 (L) 2022    HGB 8.7 (L) 2022    HCT 26.8 (L) 2022    .4 (H) 2022    MCH 32.6 (H) 2022    MCHC 32.5 2022    TIBC 239 (L) 2022     Pertinent Labs Reviewed: reviewed  Pertinent Labs Comments: BUN 19(H), Creat 3.60(H), BUN/Creat 5(L), GFR 12(L), Ca 8.0(L), Phos 5.8(H), total PRO 6.2(L), Alb 2.1(L)-all likely r/t ESRD on dialysis  Pertinent Medications Reviewed: reviewed  Pertinent Medications Comments: albuterol nebulizer, allopurinol, amiodarone, apixiban, aspirin, atorvastatin, budesonide nebulizer, calcitriol, calcium carbonate, cyanocobalamin, gabapentin, polyethylene glycol, senna-docusate, sevelamer, sodium chloride  Anthropometrics  Temp: 97.8 °F (36.6 °C)  Height: 5' 5" (165.1 cm)  Height (inches): 65 in  Weight Method: Bed Scale  Weight: 76.4 kg (168 lb 6.4 oz)  Weight (lb): 168.4 lb  Ideal Body Weight (IBW), Female: 125 lb  % Ideal Body Weight, Female (lb): 134.72 %  BMI (Calculated): 28  Usual Body Weight (UBW), k.4 kg  % Usual Body Weight: 108.73  % Weight Change From " Usual Weight: 8.5 %     Estimated/Assessed Needs  Weight Used For Calorie Calculations: 70.4 kg (155 lb 3.3 oz) (Usual Body Weigh (UBW))   Energy Need Method: Kcal/kg Energy Calorie Requirements (kcal): 9015-4739 kcal (25-30 kcal/kg)  Weight Used For Protein Calculations: 76.4 kg (168 lb 6.9 oz)  Protein Requirements:  g PRO (1.2-1.5 g PRO/kg CBW r/tPD, increased PRO needs)       RDA Method (mL): 1760     Nutrition by Nursing  Diet/Nutrition Received: regular  Intake (%): 50%        Last Bowel Movement: 09/02/22    Nutrition Follow-Up  RD Follow-up?: Yes

## 2022-09-06 NOTE — PLAN OF CARE
Problem: Infection  Goal: Absence of Infection Signs and Symptoms  Outcome: Ongoing, Progressing  Intervention: Prevent or Manage Infection  Flowsheets (Taken 9/5/2022 2251)  Fever Reduction/Comfort Measures: lightweight bedding  Infection Management: aseptic technique maintained  Isolation Precautions:   protective   precautions maintained     Problem: Adult Inpatient Plan of Care  Goal: Patient-Specific Goal (Individualized)  Outcome: Ongoing, Progressing  Flowsheets (Taken 9/5/2022 2251)  Individualized Care Needs: oxygen management  Patient-Specific Goals (Include Timeframe): oxygen management  Goal: Absence of Hospital-Acquired Illness or Injury  Outcome: Ongoing, Progressing  Intervention: Identify and Manage Fall Risk  Flowsheets (Taken 9/5/2022 2251)  Safety Promotion/Fall Prevention:   assistive device/personal item within reach   diversional activities provided   lighting adjusted   medications reviewed   side rails raised x 3   instructed to call staff for mobility  Intervention: Prevent Skin Injury  Flowsheets (Taken 9/5/2022 2251)  Body Position:   position changed independently   turned   weight shifting   supine  Skin Protection:   adhesive use limited   incontinence pads utilized  Intervention: Prevent and Manage VTE (Venous Thromboembolism) Risk  Flowsheets (Taken 9/5/2022 2251)  Activity Management: Arm raise - L1  VTE Prevention/Management:   remove, assess skin, and reapply sequential compression device   fluids promoted   ROM (active) performed  Range of Motion: ROM (range of motion) performed  Intervention: Prevent Infection  Flowsheets (Taken 9/5/2022 2251)  Infection Prevention:   equipment surfaces disinfected   hand hygiene promoted   rest/sleep promoted   single patient room provided  Goal: Optimal Comfort and Wellbeing  Outcome: Ongoing, Progressing  Intervention: Monitor Pain and Promote Comfort  Flowsheets (Taken 9/5/2022 2251)  Pain Management Interventions:   care clustered    diversional activity provided   pillow support provided   position adjusted   quiet environment facilitated   relaxation techniques promoted  Intervention: Provide Person-Centered Care  Flowsheets (Taken 9/5/2022 2251)  Trust Relationship/Rapport:   care explained   choices provided   questions answered   questions encouraged  Goal: Readiness for Transition of Care  Outcome: Ongoing, Progressing  Intervention: Mutually Develop Transition Plan  Flowsheets (Taken 9/5/2022 2251)  Transportation Anticipated: family or friend will provide  Communicated ANGY with patient/caregiver: Date not available/Unable to determine  Do you expect to return to your current living situation?: Yes  Readmission within 30 days?: No  Do you currently have service(s) that help you manage your care at home?: No     Problem: Diabetes Comorbidity  Goal: Blood Glucose Level Within Targeted Range  Outcome: Ongoing, Progressing  Intervention: Monitor and Manage Glycemia  Flowsheets (Taken 9/5/2022 2251)  Glycemic Management: blood glucose monitored     Problem: Fall Injury Risk  Goal: Absence of Fall and Fall-Related Injury  Outcome: Ongoing, Progressing  Intervention: Identify and Manage Contributors  Flowsheets (Taken 9/5/2022 2251)  Medication Review/Management: medications reviewed  Intervention: Promote Injury-Free Environment  Flowsheets (Taken 9/5/2022 2251)  Safety Promotion/Fall Prevention:   assistive device/personal item within reach   diversional activities provided   lighting adjusted   medications reviewed   side rails raised x 3   instructed to call staff for mobility     Problem: Skin Injury Risk Increased  Goal: Skin Health and Integrity  Outcome: Ongoing, Progressing  Intervention: Optimize Skin Protection  Flowsheets (Taken 9/5/2022 2251)  Pressure Reduction Techniques:   frequent weight shift encouraged   weight shift assistance provided  Pressure Reduction Devices: positioning supports utilized  Skin Protection:   adhesive use  limited   incontinence pads utilized  Head of Bed (HOB) Positioning: HOB at 30-45 degrees  Intervention: Promote and Optimize Oral Intake  Flowsheets (Taken 9/5/2022 2251)  Oral Nutrition Promotion: rest periods promoted     Problem: Breathing Pattern Ineffective  Goal: Effective Breathing Pattern  Outcome: Ongoing, Progressing  Intervention: Promote Improved Breathing Pattern  Flowsheets (Taken 9/5/2022 2251)  Airway/Ventilation Management: calming measures promoted  Supportive Measures: relaxation techniques promoted  Head of Bed (HOB) Positioning: HOB at 30-45 degrees     Problem: Gas Exchange Impaired  Goal: Optimal Gas Exchange  Outcome: Ongoing, Progressing  Intervention: Optimize Oxygenation and Ventilation  Flowsheets (Taken 9/5/2022 2251)  Airway/Ventilation Management: calming measures promoted  Head of Bed (HOB) Positioning: [facility] at 30-45 degrees     Problem: Device-Related Complication Risk (Hemodialysis)  Goal: Safe, Effective Therapy Delivery  Outcome: Ongoing, Progressing  Intervention: Optimize Device Care and Function  Flowsheets (Taken 9/5/2022 2251)  Medication Review/Management: medications reviewed     Problem: Hemodynamic Instability (Hemodialysis)  Goal: Effective Tissue Perfusion  Outcome: Ongoing, Progressing     Problem: Infection (Hemodialysis)  Goal: Absence of Infection Signs and Symptoms  Outcome: Ongoing, Progressing

## 2022-09-06 NOTE — ASSESSMENT & PLAN NOTE
Persistent, no air leak today  Has CT placed by surgery  Pt asymptomatic now  Surgery following, appreciate recs.

## 2022-09-06 NOTE — ASSESSMENT & PLAN NOTE
Pt w/ diaghragmatic defet, hence PD has been stopped and HD initiated.   Plan to resume PD once defect heals

## 2022-09-06 NOTE — ASSESSMENT & PLAN NOTE
Hydropneumothorax.  She had thoracentesis done Thursday of last week.  This may have resulted in pneumothorax.  However, she had large volume of pleural fluid today.  Fluid is not bloody and does not appear to be an empyema.  Pleural fluid for cytology was negative.  Possibility of diaphragmatic leak of peritoneal fluid should be ruled out.  08/30/2022: See notes under ESRD above  08/31/2022: She was supposed to have a nuclear medicine study to test for diaphragmatic leak.  I can not find results.  Surgery is following  09/01/2022:  Pleural-peritoneal communication confirmed.  09/06/2022:  Chest tube managed by surgery

## 2022-09-06 NOTE — SUBJECTIVE & OBJECTIVE
Interval History:  She denies SOB.  No new symptoms.    Review of patient's allergies indicates:  No Known Allergies  Current Facility-Administered Medications   Medication Frequency    0.9%  NaCl infusion PRN    acetaminophen tablet 1,000 mg Q6H PRN    albuterol-ipratropium 2.5 mg-0.5 mg/3 mL nebulizer solution 3 mL Q6H    allopurinoL tablet 100 mg Daily    amiodarone tablet 400 mg BID    apixaban tablet 2.5 mg BID    aspirin EC tablet 81 mg Daily    atorvastatin tablet 40 mg Daily    budesonide nebulizer solution 0.5 mg Q12H    calcitRIOL capsule 0.25 mcg Daily    calcium carbonate 200 mg calcium (500 mg) chewable tablet 500 mg Daily    cyanocobalamin tablet 100 mcg Daily    dextrose 50% injection 12.5 g PRN    dextrose 50% injection 25 g PRN    diphenhydrAMINE injection 25 mg Q6H PRN    gabapentin capsule 100 mg BID    glucagon (human recombinant) injection 1 mg PRN    heparin (porcine) injection 4,000 Units PRN    hydrALAZINE injection 5 mg Q6H PRN    HYDROmorphone (PF) injection 0.5 mg Q5 Min PRN    insulin aspart U-100 injection 1-10 Units QID (AC + HS) PRN    magnesium oxide tablet 800 mg PRN    magnesium oxide tablet 800 mg PRN    melatonin tablet 9 mg Nightly PRN    morphine injection 4 mg Q5 Min PRN    mupirocin 2 % ointment Daily    naloxone 0.4 mg/mL injection 0.02 mg PRN    ondansetron injection 4 mg Daily PRN    oxyCODONE-acetaminophen 7.5-325 mg per tablet 1 tablet Q4H PRN    polyethylene glycol packet 17 g BID    potassium bicarbonate disintegrating tablet 35 mEq PRN    potassium bicarbonate disintegrating tablet 50 mEq PRN    potassium bicarbonate disintegrating tablet 60 mEq PRN    senna-docusate 8.6-50 mg per tablet 1 tablet BID    senna-docusate 8.6-50 mg per tablet 1 tablet Daily    sevelamer carbonate tablet 800 mg TID AC    sodium chloride 0.9% flush 10 mL Q12H PRN    sodium chloride 0.9% flush 10 mL Q6H    And    sodium chloride 0.9% flush 10 mL PRN       Objective:     Vital Signs (Most  Recent):  Temp: 97.8 °F (36.6 °C) (09/06/22 1100)  Pulse: (!) 57 (09/06/22 1221)  Resp: 16 (09/06/22 1221)  BP: (!) 149/47 (09/06/22 1100)  SpO2: 97 % (09/06/22 1221)  O2 Device (Oxygen Therapy): nasal cannula w/ humidification (09/06/22 1221)   Vital Signs (24h Range):  Temp:  [97.8 °F (36.6 °C)-98.6 °F (37 °C)] 97.8 °F (36.6 °C)  Pulse:  [57-88] 57  Resp:  [16-20] 16  SpO2:  [95 %-100 %] 97 %  BP: (107-152)/() 149/47     Weight: 76.4 kg (168 lb 6.4 oz) (09/06/22 1200)  Body mass index is 28.02 kg/m².  Body surface area is 1.87 meters squared.    I/O last 3 completed shifts:  In: 770 [P.O.:270; Other:500]  Out: 2475 [Urine:475; Other:1800; Chest Tube:200]    Physical Exam  Eyes:      Pupils: Pupils are equal, round, and reactive to light.   Cardiovascular:      Rate and Rhythm: Normal rate and regular rhythm.   Pulmonary:      Breath sounds: No wheezing or rales.      Comments: Right chest tube present.  Dullness right base  Abdominal:      Tenderness: There is no abdominal tenderness. There is no guarding.   Musculoskeletal:      Cervical back: Neck supple.      Right lower leg: No edema.      Left lower leg: No edema.   Neurological:      Mental Status: She is alert.       Significant Labs:  BMP:   Recent Labs   Lab 09/06/22  0220   GLU 94      K 4.8      CO2 27   BUN 19*   CREATININE 3.60*   CALCIUM 8.0*        Significant Imaging:

## 2022-09-06 NOTE — NURSING
At 0750 Dr. Jeffery is here to see the pt and he looked at the CXR from this am and said the pt could eat this am, she has a small pneumothorax that we will watch, thereafter a diabetic diet is placed in computer per myself so pt could get a tray.

## 2022-09-06 NOTE — PROGRESS NOTES
GEN SURGERY    Stable small right PTX  Reconnected chest tube to suction, initially had small air leak that resolved  Oxygenating well

## 2022-09-07 PROBLEM — I47.10 SVT (SUPRAVENTRICULAR TACHYCARDIA): Status: RESOLVED | Noted: 2022-01-01 | Resolved: 2022-01-01

## 2022-09-07 NOTE — ASSESSMENT & PLAN NOTE
08/30/2022 increased serous output on chest tube during peritoneal dialysis concern for diaphragmatic leak, will hold peritoneal dialysis as dr Mckinnon discussed with Dr. 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  Continue chest tube to suction over the weekend.  Continue hemodialysis.  We will possibly remove chest tube within the next week if patient is doing well.  If the fistula does not close and ascites reaccumulates, patient will need a VATS for closure of diaphragmatic fistula    9/4: pneumothorax small to moderate size; Repeat CXR tomorrow; if getting larger, may have to reposition chest tube in OR; continue medical management    9/5:  Pneumothorax the same size; continuous air leak found and chest tube secured tubing with resolution of air leak.  We will repeat chest x-ray today; also get a chest x-ray tomorrow a.m..  I will make the patient NPO after midnight in case patient may require a procedure or reinsertion of chest tube/VATS.      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

## 2022-09-07 NOTE — SUBJECTIVE & OBJECTIVE
Interval History:  Seen during dialysis.  She denies shortness of breath.    Review of patient's allergies indicates:  No Known Allergies  Current Facility-Administered Medications   Medication Frequency    0.9%  NaCl infusion PRN    acetaminophen tablet 1,000 mg Q6H PRN    albuterol-ipratropium 2.5 mg-0.5 mg/3 mL nebulizer solution 3 mL Q6H    allopurinoL tablet 100 mg Daily    amiodarone tablet 400 mg BID    apixaban tablet 2.5 mg BID    aspirin EC tablet 81 mg Daily    atorvastatin tablet 40 mg Daily    budesonide nebulizer solution 0.5 mg Q12H    calcitRIOL capsule 0.25 mcg Daily    calcium carbonate 200 mg calcium (500 mg) chewable tablet 500 mg Daily    cyanocobalamin tablet 100 mcg Daily    dextrose 50% injection 12.5 g PRN    dextrose 50% injection 25 g PRN    diphenhydrAMINE injection 25 mg Q6H PRN    gabapentin capsule 100 mg BID    glucagon (human recombinant) injection 1 mg PRN    heparin (porcine) injection 4,000 Units PRN    hydrALAZINE injection 5 mg Q6H PRN    HYDROmorphone (PF) injection 0.5 mg Q5 Min PRN    insulin aspart U-100 injection 1-10 Units QID (AC + HS) PRN    magnesium oxide tablet 800 mg PRN    magnesium oxide tablet 800 mg PRN    melatonin tablet 9 mg Nightly PRN    morphine injection 4 mg Q5 Min PRN    mupirocin 2 % ointment Daily    naloxone 0.4 mg/mL injection 0.02 mg PRN    ondansetron injection 4 mg Daily PRN    oxyCODONE-acetaminophen 7.5-325 mg per tablet 1 tablet Q4H PRN    polyethylene glycol packet 17 g BID    potassium bicarbonate disintegrating tablet 35 mEq PRN    potassium bicarbonate disintegrating tablet 50 mEq PRN    potassium bicarbonate disintegrating tablet 60 mEq PRN    senna-docusate 8.6-50 mg per tablet 1 tablet BID    senna-docusate 8.6-50 mg per tablet 1 tablet Daily    sevelamer carbonate tablet 800 mg TID AC    sodium chloride 0.9% flush 10 mL Q12H PRN    sodium chloride 0.9% flush 10 mL Q6H    And    sodium chloride 0.9% flush 10 mL PRN       Objective:      Vital Signs (Most Recent):  Temp: 97.9 °F (36.6 °C) (09/07/22 1305)  Pulse: 75 (09/07/22 1305)  Resp: 20 (09/07/22 1225)  BP: (!) 142/70 (09/07/22 1305)  SpO2: 99 % (09/07/22 1225)  O2 Device (Oxygen Therapy): nasal cannula (09/07/22 0938)   Vital Signs (24h Range):  Temp:  [97.5 °F (36.4 °C)-98.4 °F (36.9 °C)] 97.9 °F (36.6 °C)  Pulse:  [48-79] 75  Resp:  [16-20] 20  SpO2:  [96 %-100 %] 99 %  BP: (102-148)/(43-70) 142/70     Weight: 76.7 kg (169 lb 1.6 oz) (09/07/22 0600)  Body mass index is 28.14 kg/m².  Body surface area is 1.88 meters squared.    I/O last 3 completed shifts:  In: 710 [P.O.:710]  Out: 575 [Urine:375; Chest Tube:200]    Physical Exam  Eyes:      Pupils: Pupils are equal, round, and reactive to light.   Cardiovascular:      Rate and Rhythm: Normal rate and regular rhythm.   Pulmonary:      Breath sounds: No wheezing or rales.      Comments: Right chest tube present.  Dullness right base  Abdominal:      Tenderness: There is no abdominal tenderness. There is no guarding.   Musculoskeletal:      Cervical back: Neck supple.      Right lower leg: No edema.      Left lower leg: No edema.   Neurological:      Mental Status: She is alert.       Significant Labs:  BMP:   Recent Labs   Lab 09/07/22  0655         K 5.4*      CO2 26   BUN 30*   CREATININE 5.32*   CALCIUM 8.4*     CBC:   Recent Labs   Lab 09/07/22  0655   WBC 4.83   RBC 2.37*   HGB 7.6*   HCT 25.6*   *   .0*   MCH 32.1*   MCHC 29.7*        Significant Imaging:

## 2022-09-07 NOTE — PLAN OF CARE
CATRACHITO spoke with Hanna from [facility] and she will call SW back re: status of pt hospice referral. Per Hanna if pt continues dialysis pt may not qualify for hospice. SW following.

## 2022-09-07 NOTE — PLAN OF CARE
Problem: Infection  Goal: Absence of Infection Signs and Symptoms  Outcome: Ongoing, Progressing  Intervention: Prevent or Manage Infection  Flowsheets (Taken 9/7/2022 0241)  Fever Reduction/Comfort Measures: lightweight bedding  Infection Management: aseptic technique maintained  Isolation Precautions:   protective   precautions maintained     Problem: Adult Inpatient Plan of Care  Goal: Plan of Care Review  Outcome: Ongoing, Progressing  Flowsheets (Taken 9/7/2022 0241)  Plan of Care Reviewed With: patient  Goal: Patient-Specific Goal (Individualized)  Outcome: Ongoing, Progressing  Flowsheets (Taken 9/7/2022 0241)  Individualized Care Needs: oxygen management  Patient-Specific Goals (Include Timeframe): oxygen management  Goal: Absence of Hospital-Acquired Illness or Injury  Outcome: Ongoing, Progressing  Intervention: Identify and Manage Fall Risk  Flowsheets (Taken 9/7/2022 0241)  Safety Promotion/Fall Prevention:   assistive device/personal item within reach   diversional activities provided   lighting adjusted   medications reviewed   side rails raised x 3  Intervention: Prevent Skin Injury  Flowsheets (Taken 9/7/2022 0241)  Body Position:   supine   turned   position changed independently   weight shifting  Skin Protection:   adhesive use limited   incontinence pads utilized  Intervention: Prevent and Manage VTE (Venous Thromboembolism) Risk  Flowsheets (Taken 9/7/2022 0241)  Activity Management:   Arm raise - L1   Rolling - L1  VTE Prevention/Management:   remove, assess skin, and reapply sequential compression device   fluids promoted   ROM (active) performed  Range of Motion: ROM (range of motion) performed  Intervention: Prevent Infection  Flowsheets (Taken 9/7/2022 0241)  Infection Prevention:   equipment surfaces disinfected   hand hygiene promoted   rest/sleep promoted   single patient room provided  Goal: Optimal Comfort and Wellbeing  Outcome: Ongoing, Progressing  Intervention: Monitor Pain and  Promote Comfort  Flowsheets (Taken 9/7/2022 0241)  Pain Management Interventions:   care clustered   diversional activity provided   quiet environment facilitated   position adjusted   relaxation techniques promoted  Intervention: Provide Person-Centered Care  Flowsheets (Taken 9/7/2022 0241)  Trust Relationship/Rapport:   care explained   choices provided   questions answered   questions encouraged  Goal: Readiness for Transition of Care  Outcome: Ongoing, Progressing  Intervention: Mutually Develop Transition Plan  Flowsheets (Taken 9/7/2022 0241)  Transportation Anticipated: family or friend will provide  Communicated ANGY with patient/caregiver: Date not available/Unable to determine  Do you expect to return to your current living situation?: Yes  Do you have help at home or someone to help you manage your care at home?: Yes     Problem: Diabetes Comorbidity  Goal: Blood Glucose Level Within Targeted Range  Outcome: Ongoing, Progressing  Intervention: Monitor and Manage Glycemia  Flowsheets (Taken 9/7/2022 0241)  Glycemic Management: blood glucose monitored     Problem: Fall Injury Risk  Goal: Absence of Fall and Fall-Related Injury  Outcome: Ongoing, Progressing  Intervention: Identify and Manage Contributors  Flowsheets (Taken 9/7/2022 0241)  Medication Review/Management: medications reviewed  Intervention: Promote Injury-Free Environment  Flowsheets (Taken 9/7/2022 0241)  Safety Promotion/Fall Prevention:   assistive device/personal item within reach   diversional activities provided   lighting adjusted   medications reviewed   side rails raised x 3     Problem: Skin Injury Risk Increased  Goal: Skin Health and Integrity  Outcome: Ongoing, Progressing  Intervention: Optimize Skin Protection  Flowsheets (Taken 9/7/2022 0241)  Pressure Reduction Techniques: weight shift assistance provided  Pressure Reduction Devices: positioning supports utilized  Skin Protection:   adhesive use limited   incontinence pads  utilized  Head of Bed (HOB) Positioning:   HOB at 20-30 degrees   HOB at 30-45 degrees  Intervention: Promote and Optimize Oral Intake  Flowsheets (Taken 9/7/2022 0241)  Oral Nutrition Promotion: rest periods promoted     Problem: Breathing Pattern Ineffective  Goal: Effective Breathing Pattern  Outcome: Ongoing, Progressing  Intervention: Promote Improved Breathing Pattern  Flowsheets (Taken 9/7/2022 0241)  Airway/Ventilation Management: calming measures promoted  Supportive Measures: relaxation techniques promoted  Head of Bed (HOB) Positioning:   HOB at 20-30 degrees   HOB at 30-45 degrees     Problem: Gas Exchange Impaired  Goal: Optimal Gas Exchange  Outcome: Ongoing, Progressing  Intervention: Optimize Oxygenation and Ventilation  Flowsheets (Taken 9/7/2022 0241)  Airway/Ventilation Management: calming measures promoted  Head of Bed (HOB) Positioning:   HOB at 20-30 degrees   HOB at 30-45 degrees     Problem: Device-Related Complication Risk (Hemodialysis)  Goal: Safe, Effective Therapy Delivery  Outcome: Ongoing, Progressing  Intervention: Optimize Device Care and Function  Flowsheets (Taken 9/7/2022 0241)  Medication Review/Management: medications reviewed     Problem: Hemodynamic Instability (Hemodialysis)  Goal: Effective Tissue Perfusion  Outcome: Ongoing, Progressing     Problem: Infection (Hemodialysis)  Goal: Absence of Infection Signs and Symptoms  Outcome: Ongoing, Progressing

## 2022-09-07 NOTE — PROGRESS NOTES
[facility] Hospital Medicine  Progress Note    Patient Name: Bria Ray  MRN: [id number]  Patient Class: IP- Inpatient   Admission Date: 8/29/2022  Length of Stay: 9 days  Attending Physician: Neymar Glass DO  Primary Care Provider: Lorena Richardson MD        Subjective:     Principal Problem:Pneumothorax on right        HPI:  Patient is a 87-year-old female with past medical history of hypertension, diabetes mellitus, ESRD, COPD, DJD, presented from home as direct admission for right-sided pneumothorax.  Patient underwent right-sided thoracentesis last Thursday for pleural effusion, however since then she continued to have right-sided chest pain along with shortness of bread for which she got an x-ray revealing pneumothorax on the right side.  Patient was then instructed to come to the hospital for further care.  Patient's primary pulmonologist is Dr. Bautista, and has been seeing General surgery in the past for her dialysis access issues.  Patient is also on peritoneal dialysis at home.  Patient is accompanied by her granddaughter, who is trying to get her hospice setup outpatient due to chronic conditions and mostly how her health has been declining steadily for the past few months.  I had a long discussion about goals of care for the patient as of now the patient has not made her mind about her code status and would like to be full code until she has a discussion with the rest of her family.  General surgery and Nephrology consulted and made aware of the patient's arrival.  Patient denies any fevers chills nausea vomiting or diarrhea does have cough and shortness of breath along with right-sided chest pain.      Overview/Hospital Course:  9/5-pneumo unchanged. Resealed per surgery. Re-eval in am. Plan for discharge home with hospice once medically stable  9/6-small pneumo persists  9/7- no events overnight, NM study today to eval fistula. May not qualify for hospice if continues  dialysis      Interval History: NAEO    Review of Systems   Constitutional:  Positive for fatigue. Negative for chills, fever and unexpected weight change.   HENT:  Negative for congestion, mouth sores and sore throat.    Eyes:  Negative for photophobia and visual disturbance.   Respiratory:  Positive for shortness of breath. Negative for cough, chest tightness and wheezing.    Cardiovascular:  Positive for chest pain. Negative for palpitations and leg swelling.        At ct insertion site   Gastrointestinal:  Negative for abdominal pain, diarrhea, nausea and vomiting.   Endocrine: Negative for cold intolerance and heat intolerance.   Genitourinary:  Negative for difficulty urinating, dysuria, frequency and urgency.   Musculoskeletal:  Negative for arthralgias, back pain and myalgias.   Skin:  Negative for pallor and rash.   Neurological:  Positive for weakness. Negative for tremors, seizures, syncope, numbness and headaches.   Hematological:  Does not bruise/bleed easily.   Psychiatric/Behavioral:  Negative for agitation, confusion, hallucinations and suicidal ideas.    Objective:     Vital Signs (Most Recent):  Temp: 97.5 °F (36.4 °C) (09/07/22 0938)  Pulse: 70 (09/07/22 1225)  Resp: 20 (09/07/22 1225)  BP: 132/66 (09/07/22 1200)  SpO2: 99 % (09/07/22 1225)   Vital Signs (24h Range):  Temp:  [97.5 °F (36.4 °C)-98.4 °F (36.9 °C)] 97.5 °F (36.4 °C)  Pulse:  [48-70] 70  Resp:  [16-20] 20  SpO2:  [96 %-100 %] 99 %  BP: (102-137)/(43-66) 132/66     Weight: 76.7 kg (169 lb 1.6 oz)  Body mass index is 28.14 kg/m².    Intake/Output Summary (Last 24 hours) at 9/7/2022 1258  Last data filed at 9/7/2022 0643  Gross per 24 hour   Intake 440 ml   Output 0 ml   Net 440 ml        Physical Exam  Vitals reviewed.   Constitutional:       Appearance: Normal appearance.   HENT:      Head: Normocephalic and atraumatic.   Eyes:      Extraocular Movements: Extraocular movements intact.   Cardiovascular:      Rate and Rhythm: Normal rate  and regular rhythm.      Pulses: Normal pulses.      Heart sounds: Normal heart sounds.   Pulmonary:      Effort: Pulmonary effort is normal.      Breath sounds: Normal breath sounds.   Abdominal:      General: Abdomen is flat.      Palpations: Abdomen is soft.   Musculoskeletal:      Comments: Normal tone, moves all extremities   Skin:     General: Skin is warm and dry.      Capillary Refill: Capillary refill takes less than 2 seconds.   Neurological:      General: No focal deficit present.      Mental Status: She is alert and oriented to person, place, and time.   Psychiatric:         Mood and Affect: Mood normal.         Behavior: Behavior normal.       Significant Labs: All pertinent labs within the past 24 hours have been reviewed.    Significant Imaging: I have reviewed all pertinent imaging results/findings within the past 24 hours.      Assessment/Plan:      * Pneumothorax on right  Persistent  Has CT placed by surgery  Pt asymptomatic now  Surgery following, appreciate recs.     Atrial fibrillation  Patient with Paroxysmal (<7 days) atrial fibrillation which is controlled currently with Amiodarone. Patient is currently in sinus rhythm.[id number] Score: 4. HASBLED Score: Unable to calculate. Anticoagulation indicated. Anticoagulation done with apixaban, renally dose adjusted by pharmacy.    Detailed conversation in the presence of daughter, patient's decision was to go for AC.         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   Repeat NM study today to evaluate      ESRD (end stage renal disease)  Pt w/ diaghragmatic defet, hence PD has been stopped and HD initiated.   Plan to resume PD once defect heals    DM (diabetes mellitus)  Patient's FSGs are controlled on current medication regimen.  Last A1c reviewed-   Lab Results   Component Value Date    HGBA1C 5.5 04/07/2022     Most recent fingerstick glucose reviewed- No results for input(s): POCTGLUCOSE in the last 24 hours.  Current  correctional scale  Medium  Maintain anti-hyperglycemic dose as follows-   Antihyperglycemics (From admission, onward)    Start     Stop Route Frequency Ordered    08/29/22 1432  insulin aspart U-100 injection 1-10 Units         -- SubQ Before meals & nightly PRN 08/29/22 1333        Hold Oral hypoglycemics while patient is in the hospital.    Chronic obstructive pulmonary disease  Inhalers.   Oxygen support      Primary hypertension  Adjust medications as needed      Chronic pain syndrome  Cont gabapentin  Prn norco        VTE Risk Mitigation (From admission, onward)         Ordered     apixaban tablet 2.5 mg  2 times daily         09/04/22 0804     heparin (porcine) injection 4,000 Units  As needed (PRN)         09/02/22 1357     IP VTE HIGH RISK PATIENT  Once         08/29/22 1333     Place sequential compression device  Until discontinued         08/29/22 1333                Discharge Planning   ANGY:      Code Status: Full Code   Is the patient medically ready for discharge?:     Reason for patient still in hospital (select all that apply): Treatment  Discharge Plan A: Home, Hospice/home                  Neymar Glass DO  Department of Hospital Medicine   [facility]

## 2022-09-07 NOTE — SUBJECTIVE & OBJECTIVE
Interval History: NAEO    Review of Systems   Constitutional:  Positive for fatigue. Negative for chills, fever and unexpected weight change.   HENT:  Negative for congestion, mouth sores and sore throat.    Eyes:  Negative for photophobia and visual disturbance.   Respiratory:  Positive for shortness of breath. Negative for cough, chest tightness and wheezing.    Cardiovascular:  Positive for chest pain. Negative for palpitations and leg swelling.        At ct insertion site   Gastrointestinal:  Negative for abdominal pain, diarrhea, nausea and vomiting.   Endocrine: Negative for cold intolerance and heat intolerance.   Genitourinary:  Negative for difficulty urinating, dysuria, frequency and urgency.   Musculoskeletal:  Negative for arthralgias, back pain and myalgias.   Skin:  Negative for pallor and rash.   Neurological:  Positive for weakness. Negative for tremors, seizures, syncope, numbness and headaches.   Hematological:  Does not bruise/bleed easily.   Psychiatric/Behavioral:  Negative for agitation, confusion, hallucinations and suicidal ideas.    Objective:     Vital Signs (Most Recent):  Temp: 97.5 °F (36.4 °C) (09/07/22 0938)  Pulse: 70 (09/07/22 1225)  Resp: 20 (09/07/22 1225)  BP: 132/66 (09/07/22 1200)  SpO2: 99 % (09/07/22 1225)   Vital Signs (24h Range):  Temp:  [97.5 °F (36.4 °C)-98.4 °F (36.9 °C)] 97.5 °F (36.4 °C)  Pulse:  [48-70] 70  Resp:  [16-20] 20  SpO2:  [96 %-100 %] 99 %  BP: (102-137)/(43-66) 132/66     Weight: 76.7 kg (169 lb 1.6 oz)  Body mass index is 28.14 kg/m².    Intake/Output Summary (Last 24 hours) at 9/7/2022 1258  Last data filed at 9/7/2022 0643  Gross per 24 hour   Intake 440 ml   Output 0 ml   Net 440 ml        Physical Exam  Vitals reviewed.   Constitutional:       Appearance: Normal appearance.   HENT:      Head: Normocephalic and atraumatic.   Eyes:      Extraocular Movements: Extraocular movements intact.   Cardiovascular:      Rate and Rhythm: Normal rate and regular  rhythm.      Pulses: Normal pulses.      Heart sounds: Normal heart sounds.   Pulmonary:      Effort: Pulmonary effort is normal.      Breath sounds: Normal breath sounds.   Abdominal:      General: Abdomen is flat.      Palpations: Abdomen is soft.   Musculoskeletal:      Comments: Normal tone, moves all extremities   Skin:     General: Skin is warm and dry.      Capillary Refill: Capillary refill takes less than 2 seconds.   Neurological:      General: No focal deficit present.      Mental Status: She is alert and oriented to person, place, and time.   Psychiatric:         Mood and Affect: Mood normal.         Behavior: Behavior normal.       Significant Labs: All pertinent labs within the past 24 hours have been reviewed.    Significant Imaging: I have reviewed all pertinent imaging results/findings within the past 24 hours.

## 2022-09-07 NOTE — ASSESSMENT & PLAN NOTE
Patient with Paroxysmal (<7 days) atrial fibrillation which is controlled currently with Amiodarone. Patient is currently in sinus rhythm.[id number] Score: 4. HASBLED Score: Unable to calculate. Anticoagulation indicated. Anticoagulation done with apixaban, renally dose adjusted by pharmacy.    Detailed conversation in the presence of daughter, patient's decision was to go for AC.

## 2022-09-07 NOTE — NURSING
1433: Spoke to Viola with Dr. Fields office. She spoke to Dr. Fields regarding allowing us to use 1.5% Dextrose PD fluid for nuclear study test, which he approved. Notified Nuclear Med and took pt down for intervention. No complaints/requests from pt.

## 2022-09-07 NOTE — PROGRESS NOTES
[facility]  Nephrology  Progress Note    Patient Name: Bria Ray  MRN: [id number]  Admission Date: 8/29/2022  Hospital Length of Stay: 9 days  Attending Provider: Neymar Glass DO   Primary Care Physician: Lorena Richardson MD  Principal Problem:Pneumothorax on right    Subjective:     HPI: 87-year-old woman with ESRD secondary to diabetes.  She is on peritoneal dialysis.  She had a thoracentesis for right pleural effusion Thursday of last week.  She developed more shortness of breath over the weekend.  Chest x-ray today shows hydrothorax with collapse of the right lung.      Interval History:  Seen during dialysis.  She denies shortness of breath.    Review of patient's allergies indicates:  No Known Allergies  Current Facility-Administered Medications   Medication Frequency    0.9%  NaCl infusion PRN    acetaminophen tablet 1,000 mg Q6H PRN    albuterol-ipratropium 2.5 mg-0.5 mg/3 mL nebulizer solution 3 mL Q6H    allopurinoL tablet 100 mg Daily    amiodarone tablet 400 mg BID    apixaban tablet 2.5 mg BID    aspirin EC tablet 81 mg Daily    atorvastatin tablet 40 mg Daily    budesonide nebulizer solution 0.5 mg Q12H    calcitRIOL capsule 0.25 mcg Daily    calcium carbonate 200 mg calcium (500 mg) chewable tablet 500 mg Daily    cyanocobalamin tablet 100 mcg Daily    dextrose 50% injection 12.5 g PRN    dextrose 50% injection 25 g PRN    diphenhydrAMINE injection 25 mg Q6H PRN    gabapentin capsule 100 mg BID    glucagon (human recombinant) injection 1 mg PRN    heparin (porcine) injection 4,000 Units PRN    hydrALAZINE injection 5 mg Q6H PRN    HYDROmorphone (PF) injection 0.5 mg Q5 Min PRN    insulin aspart U-100 injection 1-10 Units QID (AC + HS) PRN    magnesium oxide tablet 800 mg PRN    magnesium oxide tablet 800 mg PRN    melatonin tablet 9 mg Nightly PRN    morphine injection 4 mg Q5 Min PRN    mupirocin 2 % ointment Daily    naloxone 0.4 mg/mL  injection 0.02 mg PRN    ondansetron injection 4 mg Daily PRN    oxyCODONE-acetaminophen 7.5-325 mg per tablet 1 tablet Q4H PRN    polyethylene glycol packet 17 g BID    potassium bicarbonate disintegrating tablet 35 mEq PRN    potassium bicarbonate disintegrating tablet 50 mEq PRN    potassium bicarbonate disintegrating tablet 60 mEq PRN    senna-docusate 8.6-50 mg per tablet 1 tablet BID    senna-docusate 8.6-50 mg per tablet 1 tablet Daily    sevelamer carbonate tablet 800 mg TID AC    sodium chloride 0.9% flush 10 mL Q12H PRN    sodium chloride 0.9% flush 10 mL Q6H    And    sodium chloride 0.9% flush 10 mL PRN       Objective:     Vital Signs (Most Recent):  Temp: 97.9 °F (36.6 °C) (09/07/22 1305)  Pulse: 75 (09/07/22 1305)  Resp: 20 (09/07/22 1225)  BP: (!) 142/70 (09/07/22 1305)  SpO2: 99 % (09/07/22 1225)  O2 Device (Oxygen Therapy): nasal cannula (09/07/22 0938)   Vital Signs (24h Range):  Temp:  [97.5 °F (36.4 °C)-98.4 °F (36.9 °C)] 97.9 °F (36.6 °C)  Pulse:  [48-79] 75  Resp:  [16-20] 20  SpO2:  [96 %-100 %] 99 %  BP: (102-148)/(43-70) 142/70     Weight: 76.7 kg (169 lb 1.6 oz) (09/07/22 0600)  Body mass index is 28.14 kg/m².  Body surface area is 1.88 meters squared.    I/O last 3 completed shifts:  In: 710 [P.O.:710]  Out: 575 [Urine:375; Chest Tube:200]    Physical Exam  Eyes:      Pupils: Pupils are equal, round, and reactive to light.   Cardiovascular:      Rate and Rhythm: Normal rate and regular rhythm.   Pulmonary:      Breath sounds: No wheezing or rales.      Comments: Right chest tube present.  Dullness right base  Abdominal:      Tenderness: There is no abdominal tenderness. There is no guarding.   Musculoskeletal:      Cervical back: Neck supple.      Right lower leg: No edema.      Left lower leg: No edema.   Neurological:      Mental Status: She is alert.       Significant Labs:  BMP:   Recent Labs   Lab 09/07/22  0655         K 5.4*      CO2 26   BUN 30*    CREATININE 5.32*   CALCIUM 8.4*     CBC:   Recent Labs   Lab 09/07/22  0655   WBC 4.83   RBC 2.37*   HGB 7.6*   HCT 25.6*   *   .0*   MCH 32.1*   MCHC 29.7*        Significant Imaging:      Assessment/Plan:     * Pneumothorax on right  Hydropneumothorax.  She had thoracentesis done Thursday of last week.  This may have resulted in pneumothorax.  However, she had large volume of pleural fluid today.  Fluid is not bloody and does not appear to be an empyema.  Pleural fluid for cytology was negative.  Possibility of diaphragmatic leak of peritoneal fluid should be ruled out.  08/30/2022: See notes under ESRD above  08/31/2022: She was supposed to have a nuclear medicine study to test for diaphragmatic leak.  I can not find results.  Surgery is following  09/01/2022:  Pleural-peritoneal communication confirmed.  09/06/2022:  Chest tube managed by surgery  09/07/2022:  Repeat nuclear medicine evaluation for leak    ESRD (end stage renal disease)  It appears she has communication between peritoneal and pleural spaces.  This is likely due to damage to the diaphragm during recent thoracentesis.  I have discussed case with Dr. Jeffery.  She is to have a nuclear medicine scan with tracer in her PD fluid to confirm leak.  Further plans will be based on this result.  Peritoneal dialysis will be suspended while this is being investigated.  09/01/2022:  Peritoneal pleural communication confirmed.  She is to have a temporary HD catheter placed tomorrow.  Plan HD tomorrow.  09/02/2022:  Stable during hemodialysis.  09/06/2022:  Continue hemodialysis MWF.  09/07/2022:  Stable during dialysis today    DM (diabetes mellitus)  Underlying condition    Chronic obstructive pulmonary disease  No wheezing    Primary hypertension  Controlled        Thank you for your consult.     Beni Fields MD  Nephrology  [facility]

## 2022-09-07 NOTE — PROGRESS NOTES
[facility]  General Surgery  Progress Note    Subjective:     History of Present Illness:  No notes on file    Post-Op Info:  Procedure(s) (LRB):  INSERTION, CATHETER, HEMODIALYSIS, DUAL LUMEN (Right)   5 Days Post-Op     No new subjective & objective note has been filed under this hospital service since the last note was generated.    Assessment/Plan:     * Pneumothorax on right  08/30/2022 increased serous output on chest tube during peritoneal dialysis concern for diaphragmatic leak, will hold peritoneal dialysis as dr Mckinnon discussed with Dr. 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  Continue chest tube to suction over the weekend.  Continue hemodialysis.  We will possibly remove chest tube within the next week if patient is doing well.  If the fistula does not close and ascites reaccumulates, patient will need a VATS for closure of diaphragmatic fistula    9/4: pneumothorax small to moderate size; Repeat CXR tomorrow; if getting larger, may have to reposition chest tube in OR; continue medical management    9/5:  Pneumothorax the same size; continuous air leak found and chest tube secured tubing with resolution of air leak.  We will repeat chest x-ray today; also get a chest x-ray tomorrow a.m..  I will make the patient NPO after midnight in case patient may require a procedure or reinsertion of chest tube/VATS.      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

## 2022-09-07 NOTE — ASSESSMENT & PLAN NOTE
It appears she has communication between peritoneal and pleural spaces.  This is likely due to damage to the diaphragm during recent thoracentesis.  I have discussed case with Dr. Jeffery.  She is to have a nuclear medicine scan with tracer in her PD fluid to confirm leak.  Further plans will be based on this result.  Peritoneal dialysis will be suspended while this is being investigated.  09/01/2022:  Peritoneal pleural communication confirmed.  She is to have a temporary HD catheter placed tomorrow.  Plan HD tomorrow.  09/02/2022:  Stable during hemodialysis.  09/06/2022:  Continue hemodialysis MWF.  09/07/2022:  Stable during dialysis today

## 2022-09-07 NOTE — ASSESSMENT & PLAN NOTE
Hydropneumothorax.  She had thoracentesis done Thursday of last week.  This may have resulted in pneumothorax.  However, she had large volume of pleural fluid today.  Fluid is not bloody and does not appear to be an empyema.  Pleural fluid for cytology was negative.  Possibility of diaphragmatic leak of peritoneal fluid should be ruled out.  08/30/2022: See notes under ESRD above  08/31/2022: She was supposed to have a nuclear medicine study to test for diaphragmatic leak.  I can not find results.  Surgery is following  09/01/2022:  Pleural-peritoneal communication confirmed.  09/06/2022:  Chest tube managed by surgery  09/07/2022:  Repeat nuclear medicine evaluation for leak

## 2022-09-08 NOTE — PROGRESS NOTES
[facility]  General Surgery  Progress Note    Subjective:     Interval History:  Patient's study yesterday showed persistent peritoneal pleural fistula with nuclear medicine contrast in the right chest.  Not much out the chest tube overall pain      Post-Op Info:  Procedure(s) (LRB):  INSERTION, CATHETER, HEMODIALYSIS, DUAL LUMEN (Right)   6 Days Post-Op      Medications:  Continuous Infusions:  Scheduled Meds:   albuterol-ipratropium  3 mL Nebulization Q6H    allopurinoL  100 mg Oral Daily    amiodarone  400 mg Oral BID    atorvastatin  40 mg Oral Daily    budesonide  0.5 mg Nebulization Q12H    calcitRIOL  0.25 mcg Oral Daily    calcium carbonate  1 tablet Oral Daily    cyanocobalamin  100 mcg Oral Daily    gabapentin  100 mg Oral BID    mupirocin   Topical (Top) Daily    pantoprazole  40 mg Oral BID AC    polyethylene glycol  17 g Oral BID    senna-docusate 8.6-50 mg  1 tablet Oral BID    senna-docusate 8.6-50 mg  1 tablet Oral Daily    sevelamer carbonate  800 mg Oral TID AC    sodium chloride 0.9%  10 mL Intravenous Q6H     PRN Meds:sodium chloride 0.9%, acetaminophen, dextrose 50%, dextrose 50%, diphenhydrAMINE, glucagon (human recombinant), heparin (porcine), hydrALAZINE, HYDROmorphone, insulin aspart U-100, magnesium oxide, magnesium oxide, melatonin, morphine, naloxone, ondansetron, oxyCODONE-acetaminophen, potassium bicarbonate, potassium bicarbonate, potassium bicarbonate, sodium chloride 0.9%, Flushing PICC Protocol **AND** sodium chloride 0.9% **AND** sodium chloride 0.9%     Objective:     Vital Signs (Most Recent):  Temp: 97.3 °F (36.3 °C) (09/08/22 0800)  Pulse: 62 (09/08/22 0800)  Resp: 18 (09/08/22 0800)  BP: (!) 147/56 (09/08/22 0800)  SpO2: 97 % (09/08/22 0800)   Vital Signs (24h Range):  Temp:  [97.3 °F (36.3 °C)-98.9 °F (37.2 °C)] 97.3 °F (36.3 °C)  Pulse:  [55-79] 62  Resp:  [16-20] 18  SpO2:  [96 %-100 %] 97 %  BP: (129-157)/(56-70) 147/56       Intake/Output Summary  (Last 24 hours) at 9/8/2022 1116  Last data filed at 9/8/2022 0700  Gross per 24 hour   Intake 600 ml   Output 1000 ml   Net -400 ml       Physical Exam  Constitutional:       General: She is not in acute distress.  Pulmonary:      Effort: No respiratory distress.   Abdominal:      General: There is no distension.       Significant Labs:  CBC:   Recent Labs   Lab 09/07/22  0655   WBC 4.83   RBC 2.37*   HGB 7.6*   HCT 25.6*   *   .0*   MCH 32.1*   MCHC 29.7*     CMP:   Recent Labs   Lab 09/07/22  0655      CALCIUM 8.4*      K 5.4*   CO2 26      BUN 30*   CREATININE 5.32*       Significant Diagnostics:  CXR: I have reviewed all pertinent results/findings within the past 24 hours.      Assessment/Plan:     Active Diagnoses:    Diagnosis Date Noted POA    PRINCIPAL PROBLEM:  Pneumothorax on right [J93.9] 08/29/2022 Yes    Atrial fibrillation [I48.91] 09/02/2022 Unknown    Pleural effusion on right [J90] 08/24/2022 Yes    ESRD (end stage renal disease) [N18.6]  Unknown    Chronic obstructive pulmonary disease [J44.9]  Yes    DM (diabetes mellitus) [E11.9]  Unknown    Primary hypertension [I10] 01/18/2022 Yes    Chronic pain syndrome [G89.4]  Yes     Chronic      Problems Resolved During this Admission:    Diagnosis Date Noted Date Resolved POA    SVT (supraventricular tachycardia) [I47.1] 08/31/2022 09/07/2022 Yes     Given patient's age and everything going on will try to rig up patient's chest tube so she can go home with it.  Will go home with hemodialysis as well.  Will repeat the perfusion study as an outpatient possibly in 2 weeks time.  To see if that hole closes.  Will try to avoid surgery  if possible given her age and comorbidities.    Terence Jeffery MD  General Surgery  [facility]

## 2022-09-08 NOTE — PROGRESS NOTES
[facility] Hospital Medicine  Progress Note    Patient Name: Bria Ray  MRN: [id number]  Patient Class: IP- Inpatient   Admission Date: 8/29/2022  Length of Stay: 10 days  Attending Physician: Neymar Glass DO  Primary Care Provider: Lorena Richardson MD        Subjective:     Principal Problem:Pneumothorax on right        HPI:  Patient is a 87-year-old female with past medical history of hypertension, diabetes mellitus, ESRD, COPD, DJD, presented from home as direct admission for right-sided pneumothorax.  Patient underwent right-sided thoracentesis last Thursday for pleural effusion, however since then she continued to have right-sided chest pain along with shortness of bread for which she got an x-ray revealing pneumothorax on the right side.  Patient was then instructed to come to the hospital for further care.  Patient's primary pulmonologist is Dr. Bautista, and has been seeing General surgery in the past for her dialysis access issues.  Patient is also on peritoneal dialysis at home.  Patient is accompanied by her granddaughter, who is trying to get her hospice setup outpatient due to chronic conditions and mostly how her health has been declining steadily for the past few months.  I had a long discussion about goals of care for the patient as of now the patient has not made her mind about her code status and would like to be full code until she has a discussion with the rest of her family.  General surgery and Nephrology consulted and made aware of the patient's arrival.  Patient denies any fevers chills nausea vomiting or diarrhea does have cough and shortness of breath along with right-sided chest pain.      Overview/Hospital Course:  9/5-pneumo unchanged. Resealed per surgery. Re-eval in am. Plan for discharge home with hospice once medically stable  9/6-small pneumo persists  9/7- no events overnight, NM study today to eval fistula. 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  Will re-image for fistula closure in 2 weeks      Interval History: NAEO. Constipated    Review of Systems   Constitutional:  Positive for fatigue. Negative for chills, fever and unexpected weight change.   HENT:  Negative for congestion, mouth sores and sore throat.    Eyes:  Negative for photophobia and visual disturbance.   Respiratory:  Negative for cough, chest tightness and wheezing.    Cardiovascular:  Negative for palpitations and leg swelling.        At ct insertion site   Gastrointestinal:  Negative for abdominal pain, diarrhea, nausea and vomiting.   Endocrine: Negative for cold intolerance and heat intolerance.   Genitourinary:  Negative for difficulty urinating, dysuria, frequency and urgency.   Musculoskeletal:  Negative for arthralgias, back pain and myalgias.   Skin:  Negative for pallor and rash.   Neurological:  Positive for weakness. Negative for tremors, seizures, syncope, numbness and headaches.   Hematological:  Does not bruise/bleed easily.   Psychiatric/Behavioral:  Negative for agitation, confusion, hallucinations and suicidal ideas.    Objective:     Vital Signs (Most Recent):  Temp: 97.6 °F (36.4 °C) (09/08/22 1100)  Pulse: 63 (09/08/22 1210)  Resp: 20 (09/08/22 1210)  BP: 127/63 (09/08/22 1100)  SpO2: 99 % (09/08/22 1210)   Vital Signs (24h Range):  Temp:  [97.3 °F (36.3 °C)-98.9 °F (37.2 °C)] 97.6 °F (36.4 °C)  Pulse:  [55-74] 63  Resp:  [16-20] 20  SpO2:  [94 %-100 %] 99 %  BP: (127-157)/(56-67) 127/63     Weight: 76.7 kg (169 lb 1.6 oz)  Body mass index is 28.14 kg/m².    Intake/Output Summary (Last 24 hours) at 9/8/2022 1320  Last data filed at 9/8/2022 0700  Gross per 24 hour   Intake 100 ml   Output 0 ml   Net 100 ml        Physical Exam  Vitals reviewed.   Constitutional:       Appearance: Normal appearance.   HENT:      Head: Normocephalic and atraumatic.   Eyes:      Extraocular  Movements: Extraocular movements intact.   Cardiovascular:      Rate and Rhythm: Normal rate and regular rhythm.      Pulses: Normal pulses.      Heart sounds: Normal heart sounds.   Pulmonary:      Effort: Pulmonary effort is normal.      Breath sounds: Normal breath sounds.   Abdominal:      General: Abdomen is flat.      Palpations: Abdomen is soft.   Musculoskeletal:      Comments: Normal tone, moves all extremities   Skin:     General: Skin is warm and dry.      Capillary Refill: Capillary refill takes less than 2 seconds.   Neurological:      General: No focal deficit present.      Mental Status: She is alert and oriented to person, place, and time.   Psychiatric:         Mood and Affect: Mood normal.         Behavior: Behavior normal.       Significant Labs: All pertinent labs within the past 24 hours have been reviewed.    Significant Imaging: I have reviewed all pertinent imaging results/findings within the past 24 hours.      Assessment/Plan:      * Pneumothorax on right  Persistent  Has CT placed by surgery  Pt asymptomatic now  Surgery following, appreciate recs.     Atrial fibrillation  Patient with Paroxysmal (<7 days) atrial fibrillation which is controlled currently with Amiodarone. Patient is currently in sinus rhythm.[id number] Score: 4. HASBLED Score: Unable to calculate. Anticoagulation indicated. Anticoagulation done with apixaban, renally dose adjusted by pharmacy.    Detailed conversation in the presence of daughter, patient's decision was to go for AC.         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   Repeat NM study show peristent fistula      ESRD (end stage renal disease)  Pt w/ diaghragmatic defet, hence PD has been stopped and HD initiated.   Persistent fistula, will send home with CT and plan for HD. Re-image in 2 weeks to see if it has healed    DM (diabetes mellitus)  Patient's FSGs are controlled on current medication regimen.  Last A1c reviewed-   Lab Results    Component Value Date    HGBA1C 5.5 04/07/2022     Most recent fingerstick glucose reviewed- No results for input(s): POCTGLUCOSE in the last 24 hours.  Current correctional scale  Medium  Maintain anti-hyperglycemic dose as follows-   Antihyperglycemics (From admission, onward)    Start     Stop Route Frequency Ordered    08/29/22 1432  insulin aspart U-100 injection 1-10 Units         -- SubQ Before meals & nightly PRN 08/29/22 1333        Hold Oral hypoglycemics while patient is in the hospital.    Chronic obstructive pulmonary disease  Inhalers.   Oxygen support      Primary hypertension  Adjust medications as needed      Chronic pain syndrome  Cont gabapentin  Prn norco        VTE Risk Mitigation (From admission, onward)         Ordered     heparin (porcine) injection 4,000 Units  As needed (PRN)         09/02/22 1357     IP VTE HIGH RISK PATIENT  Once         08/29/22 1333     Place sequential compression device  Until discontinued         08/29/22 1333                Discharge Planning   ANGY:      Code Status: Full Code   Is the patient medically ready for discharge?:     Reason for patient still in hospital (select all that apply): Treatment  Discharge Plan A: Home, Hospice/home                  Neymar Glass DO  Department of Hospital Medicine   [facility]

## 2022-09-08 NOTE — ASSESSMENT & PLAN NOTE
Pt w/ diaghragmatic defet, hence PD has been stopped and HD initiated.   Persistent fistula, will send home with CT and plan for HD. Re-image in 2 weeks to see if it has healed

## 2022-09-08 NOTE — PLAN OF CARE
Problem: Infection  Goal: Absence of Infection Signs and Symptoms  Outcome: Ongoing, Progressing     Problem: Adult Inpatient Plan of Care  Goal: Plan of Care Review  Outcome: Ongoing, Progressing  Goal: Patient-Specific Goal (Individualized)  Outcome: Ongoing, Progressing  Goal: Absence of Hospital-Acquired Illness or Injury  Outcome: Ongoing, Progressing  Goal: Optimal Comfort and Wellbeing  Outcome: Ongoing, Progressing  Goal: Readiness for Transition of Care  Outcome: Ongoing, Progressing     Problem: Diabetes Comorbidity  Goal: Blood Glucose Level Within Targeted Range  Outcome: Ongoing, Progressing     Problem: Fall Injury Risk  Goal: Absence of Fall and Fall-Related Injury  Outcome: Ongoing, Progressing     Problem: Skin Injury Risk Increased  Goal: Skin Health and Integrity  Outcome: Ongoing, Progressing     Problem: Gas Exchange Impaired  Goal: Optimal Gas Exchange  Outcome: Ongoing, Progressing

## 2022-09-08 NOTE — SUBJECTIVE & OBJECTIVE
Interval History:  No SOB.  No GI symptoms.    Review of patient's allergies indicates:  No Known Allergies  Current Facility-Administered Medications   Medication Frequency    0.9%  NaCl infusion PRN    acetaminophen tablet 1,000 mg Q6H PRN    albuterol-ipratropium 2.5 mg-0.5 mg/3 mL nebulizer solution 3 mL Q6H    allopurinoL tablet 100 mg Daily    amiodarone tablet 400 mg BID    atorvastatin tablet 40 mg Daily    budesonide nebulizer solution 0.5 mg Q12H    calcitRIOL capsule 0.25 mcg Daily    calcium carbonate 200 mg calcium (500 mg) chewable tablet 500 mg Daily    cyanocobalamin tablet 100 mcg Daily    dextrose 50% injection 12.5 g PRN    dextrose 50% injection 25 g PRN    diphenhydrAMINE injection 25 mg Q6H PRN    gabapentin capsule 100 mg BID    glucagon (human recombinant) injection 1 mg PRN    heparin (porcine) injection 4,000 Units PRN    hydrALAZINE injection 5 mg Q6H PRN    HYDROmorphone (PF) injection 0.5 mg Q5 Min PRN    insulin aspart U-100 injection 1-10 Units QID (AC + HS) PRN    magnesium oxide tablet 800 mg PRN    magnesium oxide tablet 800 mg PRN    melatonin tablet 9 mg Nightly PRN    morphine injection 4 mg Q5 Min PRN    mupirocin 2 % ointment Daily    naloxone 0.4 mg/mL injection 0.02 mg PRN    ondansetron injection 4 mg Daily PRN    oxyCODONE-acetaminophen 7.5-325 mg per tablet 1 tablet Q4H PRN    pantoprazole EC tablet 40 mg BID AC    polyethylene glycol packet 34 g BID    potassium bicarbonate disintegrating tablet 35 mEq PRN    potassium bicarbonate disintegrating tablet 50 mEq PRN    potassium bicarbonate disintegrating tablet 60 mEq PRN    senna-docusate 8.6-50 mg per tablet 1 tablet BID    sevelamer carbonate tablet 800 mg TID AC    sodium chloride 0.9% flush 10 mL Q12H PRN    sodium chloride 0.9% flush 10 mL Q6H    And    sodium chloride 0.9% flush 10 mL PRN       Objective:     Vital Signs (Most Recent):  Temp: 97.6 °F (36.4 °C) (09/08/22 1100)  Pulse: 63 (09/08/22 1210)  Resp: 20  (09/08/22 1210)  BP: 127/63 (09/08/22 1100)  SpO2: 99 % (09/08/22 1210)  O2 Device (Oxygen Therapy): nasal cannula (09/08/22 0728) Vital Signs (24h Range):  Temp:  [97.3 °F (36.3 °C)-98.9 °F (37.2 °C)] 97.6 °F (36.4 °C)  Pulse:  [55-74] 63  Resp:  [16-20] 20  SpO2:  [94 %-100 %] 99 %  BP: (127-157)/(56-67) 127/63     Weight: 76.7 kg (169 lb 1.6 oz) (09/07/22 0600)  Body mass index is 28.14 kg/m².  Body surface area is 1.88 meters squared.    I/O last 3 completed shifts:  In: 800 [P.O.:300; Other:500]  Out: 1000 [Other:1000]    Physical Exam  Eyes:      Pupils: Pupils are equal, round, and reactive to light.   Cardiovascular:      Rate and Rhythm: Normal rate and regular rhythm.   Pulmonary:      Breath sounds: No wheezing or rales.      Comments: Right chest tube present.  Dullness right base  Abdominal:      Tenderness: There is no abdominal tenderness. There is no guarding.   Musculoskeletal:      Cervical back: Neck supple.      Right lower leg: No edema.      Left lower leg: No edema.   Neurological:      Mental Status: She is alert.       Significant Labs:  BMP:   Recent Labs   Lab 09/07/22  0655         K 5.4*      CO2 26   BUN 30*   CREATININE 5.32*   CALCIUM 8.4*     CBC:   Recent Labs   Lab 09/07/22  0655   WBC 4.83   RBC 2.37*   HGB 7.6*   HCT 25.6*   *   .0*   MCH 32.1*   MCHC 29.7*        Significant Imaging:

## 2022-09-08 NOTE — PLAN OF CARE
Problem: Gas Exchange Impaired  Goal: Optimal Gas Exchange  9/7/2022 1902 by Martha Crespo, RRT  Outcome: Ongoing, Progressing

## 2022-09-08 NOTE — PLAN OF CARE
CATRACHITO spoke with Hanna at [facility] and she has called and spoke with Viola with Dr. Fields and per Viola Fields is waiting on test results and once the results come back, Don at [facility] will be updated. CATRACHITO following.

## 2022-09-08 NOTE — PLAN OF CARE
Problem: Gas Exchange Impaired  Goal: Optimal Gas Exchange  9/8/2022 1803 by Martha Crespo, RRT  Outcome: Ongoing, Progressing  9/8/2022 1802 by Martha Crespo, RRT  Outcome: Ongoing, Progressing

## 2022-09-08 NOTE — PLAN OF CARE
Problem: Physical Therapy  Goal: Physical Therapy Goal  Description: Short term goals:  1. Supine to sit with Contact Guard Assistance  2. Sit to stand transfer with Contact Guard Assistance  3. Bed to chair transfer with Contact Guard Assistance using Rolling Walker  4. Gait  x 50 feet with Contact Guard Assistance using Rolling Walker.     Long term goals:  1. Supine to sit with Modified [place]  2. Sit to stand transfer with Modified [place]  3. Bed to chair transfer with Modified [place] using Rolling Walker  4. Gait  x 100 feet with Modified [place] using Rolling Walker.     Outcome: Ongoing, Progressing

## 2022-09-08 NOTE — PROGRESS NOTES
[facility]  Nephrology  Progress Note    Patient Name: Bria Ray  MRN: [id number]  Admission Date: 8/29/2022  Hospital Length of Stay: 10 days  Attending Provider: Neymar Glass DO   Primary Care Physician: Lorena Richardson MD  Principal Problem:Pneumothorax on right    Subjective:     HPI: 87-year-old woman with ESRD secondary to diabetes.  She is on peritoneal dialysis.  She had a thoracentesis for right pleural effusion Thursday of last week.  She developed more shortness of breath over the weekend.  Chest x-ray today shows hydrothorax with collapse of the right lung.      Interval History:  No SOB.  No GI symptoms.    Review of patient's allergies indicates:  No Known Allergies  Current Facility-Administered Medications   Medication Frequency    0.9%  NaCl infusion PRN    acetaminophen tablet 1,000 mg Q6H PRN    albuterol-ipratropium 2.5 mg-0.5 mg/3 mL nebulizer solution 3 mL Q6H    allopurinoL tablet 100 mg Daily    amiodarone tablet 400 mg BID    atorvastatin tablet 40 mg Daily    budesonide nebulizer solution 0.5 mg Q12H    calcitRIOL capsule 0.25 mcg Daily    calcium carbonate 200 mg calcium (500 mg) chewable tablet 500 mg Daily    cyanocobalamin tablet 100 mcg Daily    dextrose 50% injection 12.5 g PRN    dextrose 50% injection 25 g PRN    diphenhydrAMINE injection 25 mg Q6H PRN    gabapentin capsule 100 mg BID    glucagon (human recombinant) injection 1 mg PRN    heparin (porcine) injection 4,000 Units PRN    hydrALAZINE injection 5 mg Q6H PRN    HYDROmorphone (PF) injection 0.5 mg Q5 Min PRN    insulin aspart U-100 injection 1-10 Units QID (AC + HS) PRN    magnesium oxide tablet 800 mg PRN    magnesium oxide tablet 800 mg PRN    melatonin tablet 9 mg Nightly PRN    morphine injection 4 mg Q5 Min PRN    mupirocin 2 % ointment Daily    naloxone 0.4 mg/mL injection 0.02 mg PRN    ondansetron injection 4 mg Daily PRN    oxyCODONE-acetaminophen  7.5-325 mg per tablet 1 tablet Q4H PRN    pantoprazole EC tablet 40 mg BID AC    polyethylene glycol packet 34 g BID    potassium bicarbonate disintegrating tablet 35 mEq PRN    potassium bicarbonate disintegrating tablet 50 mEq PRN    potassium bicarbonate disintegrating tablet 60 mEq PRN    senna-docusate 8.6-50 mg per tablet 1 tablet BID    sevelamer carbonate tablet 800 mg TID AC    sodium chloride 0.9% flush 10 mL Q12H PRN    sodium chloride 0.9% flush 10 mL Q6H    And    sodium chloride 0.9% flush 10 mL PRN       Objective:     Vital Signs (Most Recent):  Temp: 97.6 °F (36.4 °C) (09/08/22 1100)  Pulse: 63 (09/08/22 1210)  Resp: 20 (09/08/22 1210)  BP: 127/63 (09/08/22 1100)  SpO2: 99 % (09/08/22 1210)  O2 Device (Oxygen Therapy): nasal cannula (09/08/22 0728) Vital Signs (24h Range):  Temp:  [97.3 °F (36.3 °C)-98.9 °F (37.2 °C)] 97.6 °F (36.4 °C)  Pulse:  [55-74] 63  Resp:  [16-20] 20  SpO2:  [94 %-100 %] 99 %  BP: (127-157)/(56-67) 127/63     Weight: 76.7 kg (169 lb 1.6 oz) (09/07/22 0600)  Body mass index is 28.14 kg/m².  Body surface area is 1.88 meters squared.    I/O last 3 completed shifts:  In: 800 [P.O.:300; Other:500]  Out: 1000 [Other:1000]    Physical Exam  Eyes:      Pupils: Pupils are equal, round, and reactive to light.   Cardiovascular:      Rate and Rhythm: Normal rate and regular rhythm.   Pulmonary:      Breath sounds: No wheezing or rales.      Comments: Right chest tube present.  Dullness right base  Abdominal:      Tenderness: There is no abdominal tenderness. There is no guarding.   Musculoskeletal:      Cervical back: Neck supple.      Right lower leg: No edema.      Left lower leg: No edema.   Neurological:      Mental Status: She is alert.       Significant Labs:  BMP:   Recent Labs   Lab 09/07/22  0655         K 5.4*      CO2 26   BUN 30*   CREATININE 5.32*   CALCIUM 8.4*     CBC:   Recent Labs   Lab 09/07/22  0655   WBC 4.83   RBC 2.37*   HGB 7.6*   HCT  25.6*   *   .0*   MCH 32.1*   MCHC 29.7*        Significant Imaging:      Assessment/Plan:     * Pneumothorax on right  Hydropneumothorax.  She had thoracentesis done Thursday of last week.  This may have resulted in pneumothorax.  However, she had large volume of pleural fluid today.  Fluid is not bloody and does not appear to be an empyema.  Pleural fluid for cytology was negative.  Possibility of diaphragmatic leak of peritoneal fluid should be ruled out.  08/30/2022: See notes under ESRD above  08/31/2022: She was supposed to have a nuclear medicine study to test for diaphragmatic leak.  I can not find results.  Surgery is following  09/01/2022:  Pleural-peritoneal communication confirmed.  09/06/2022:  Chest tube managed by surgery  09/07/2022:  Repeat nuclear medicine evaluation for leak  09/08/2022: Persistent pleural peritoneal leak    ESRD (end stage renal disease)  Continue hemodialysis 3 times weekly.  Plan to resume peritoneal dialysis after fistula seals.    DM (diabetes mellitus)  Underlying condition    Chronic obstructive pulmonary disease  No wheezing    Primary hypertension  Controlled        Thank you for your consult.     Beni Fields MD  Nephrology  [facility]

## 2022-09-08 NOTE — SUBJECTIVE & OBJECTIVE
Interval History: NAEO. Constipated    Review of Systems   Constitutional:  Positive for fatigue. Negative for chills, fever and unexpected weight change.   HENT:  Negative for congestion, mouth sores and sore throat.    Eyes:  Negative for photophobia and visual disturbance.   Respiratory:  Negative for cough, chest tightness and wheezing.    Cardiovascular:  Negative for palpitations and leg swelling.        At ct insertion site   Gastrointestinal:  Negative for abdominal pain, diarrhea, nausea and vomiting.   Endocrine: Negative for cold intolerance and heat intolerance.   Genitourinary:  Negative for difficulty urinating, dysuria, frequency and urgency.   Musculoskeletal:  Negative for arthralgias, back pain and myalgias.   Skin:  Negative for pallor and rash.   Neurological:  Positive for weakness. Negative for tremors, seizures, syncope, numbness and headaches.   Hematological:  Does not bruise/bleed easily.   Psychiatric/Behavioral:  Negative for agitation, confusion, hallucinations and suicidal ideas.    Objective:     Vital Signs (Most Recent):  Temp: 97.6 °F (36.4 °C) (09/08/22 1100)  Pulse: 63 (09/08/22 1210)  Resp: 20 (09/08/22 1210)  BP: 127/63 (09/08/22 1100)  SpO2: 99 % (09/08/22 1210)   Vital Signs (24h Range):  Temp:  [97.3 °F (36.3 °C)-98.9 °F (37.2 °C)] 97.6 °F (36.4 °C)  Pulse:  [55-74] 63  Resp:  [16-20] 20  SpO2:  [94 %-100 %] 99 %  BP: (127-157)/(56-67) 127/63     Weight: 76.7 kg (169 lb 1.6 oz)  Body mass index is 28.14 kg/m².    Intake/Output Summary (Last 24 hours) at 9/8/2022 1320  Last data filed at 9/8/2022 0700  Gross per 24 hour   Intake 100 ml   Output 0 ml   Net 100 ml        Physical Exam  Vitals reviewed.   Constitutional:       Appearance: Normal appearance.   HENT:      Head: Normocephalic and atraumatic.   Eyes:      Extraocular Movements: Extraocular movements intact.   Cardiovascular:      Rate and Rhythm: Normal rate and regular rhythm.      Pulses: Normal pulses.      Heart  sounds: Normal heart sounds.   Pulmonary:      Effort: Pulmonary effort is normal.      Breath sounds: Normal breath sounds.   Abdominal:      General: Abdomen is flat.      Palpations: Abdomen is soft.   Musculoskeletal:      Comments: Normal tone, moves all extremities   Skin:     General: Skin is warm and dry.      Capillary Refill: Capillary refill takes less than 2 seconds.   Neurological:      General: No focal deficit present.      Mental Status: She is alert and oriented to person, place, and time.   Psychiatric:         Mood and Affect: Mood normal.         Behavior: Behavior normal.       Significant Labs: All pertinent labs within the past 24 hours have been reviewed.    Significant Imaging: I have reviewed all pertinent imaging results/findings within the past 24 hours.

## 2022-09-08 NOTE — ASSESSMENT & PLAN NOTE
Patient with Paroxysmal (<7 days) atrial fibrillation which is controlled currently with Amiodarone. Patient is currently in sinus rhythm.[id number] Score: 4. HASBLED Score: Unable to calculate. Anticoagulation indicated. Anticoagulation done with apixaban, renally dose adjusted by pharmacy.    Detailed conversation in the presence of daughter, patient's decision was to go for AC.

## 2022-09-08 NOTE — PROGRESS NOTES
After talking with family they do not feel comfortable taking care of the patient.  She will likely need to go to a swing bed and will need to order the repeat test of the fistula in 2 weeks time.    Consulted SS for LTAC or critical access [facility]

## 2022-09-08 NOTE — ASSESSMENT & PLAN NOTE
Hydropneumothorax.  She had thoracentesis done Thursday of last week.  This may have resulted in pneumothorax.  However, she had large volume of pleural fluid today.  Fluid is not bloody and does not appear to be an empyema.  Pleural fluid for cytology was negative.  Possibility of diaphragmatic leak of peritoneal fluid should be ruled out.  08/30/2022: See notes under ESRD above  08/31/2022: She was supposed to have a nuclear medicine study to test for diaphragmatic leak.  I can not find results.  Surgery is following  09/01/2022:  Pleural-peritoneal communication confirmed.  09/06/2022:  Chest tube managed by surgery  09/07/2022:  Repeat nuclear medicine evaluation for leak  09/08/2022: Persistent pleural peritoneal leak

## 2022-09-08 NOTE — PT/OT/SLP EVAL
Physical Therapy Evaluation    Patient Name:  Bria Ray   MRN:  [id number]    Recommendations:     Discharge Recommendations:  home health PT, home with hospice   Discharge Equipment Recommendations: none   Barriers to discharge: Inaccessible home    Assessment:     Bria Ray is a 87 y.o. female admitted with a medical diagnosis of Pneumothorax on right.  She presents with the following impairments/functional limitations:  weakness, impaired endurance, impaired functional mobility, gait instability, decreased lower extremity function, decreased upper extremity function Pt presents with chest tube on her right side. She requires only minimal assistance for mobility tasks but does report some lightheadedness with change in position. She was limited in ambulation due to chest tube hooked to wall suction. She states she will have someone at home with her if she returns home at d/c. PT to follow to maximize functional potential prior to discharge.    Rehab Prognosis: Fair; patient would benefit from acute skilled PT services to address these deficits and reach maximum level of function.    Recent Surgery: Procedure(s) (LRB):  INSERTION, CATHETER, HEMODIALYSIS, DUAL LUMEN (Right) 6 Days Post-Op    Plan:     During this hospitalization, patient to be seen 5 x/week to address the identified rehab impairments via gait training, therapeutic activities, therapeutic exercises and progress toward the following goals:    Plan of Care Expires:  10/08/22    Subjective     Chief Complaint: weakness  Patient/Family Comments/goals: Pt states she has been out of bed to bedside commode twice today. She reports lightheadedness with change in position  Pain/Comfort:  Pain Rating 1: 4/10  Location - Side 1: Right  Location - Orientation 1: lateral  Location 1: chest  Pain Addressed 1: Distraction  Pain Rating Post-Intervention 1: 4/10    Patients cultural, spiritual, Congregation conflicts given the current situation: no    Living  Environment:  Pt lives at home alone and has a rampt to enter her home.  Prior to admission, patients level of function was ambulatory with rollator.  Equipment used at home: rollator, bedside commode.  DME owned (not currently used): rolling walker.  Upon discharge, patient will have assistance from family.    Objective:     Communicated with EJ Miguel RN prior to session.  Patient found supine with chest tube, telemetry, PICC line  upon PT entry to room.    General Precautions: Standard, fall   Orthopedic Precautions:N/A   Braces: N/A  Respiratory Status: Nasal cannula, flow 2 L/min    Exams:  Cognitive Exam:  Patient is oriented to Person, Place, Time, and Situation  Gross Motor Coordination:  WFL  RLE ROM: WFL  RLE Strength: Deficits: 4/5  LLE ROM: WFL  LLE Strength: Deficits: 4/5    Functional Mobility:  Bed Mobility:     Scooting: minimum assistance  Supine to Sit: minimum assistance  Sit to Supine: minimum assistance  Transfers:     Sit to Stand:  minimum assistance with rolling walker  Gait: 5 ft contact guard assistance to minimal assistance with rolling walker, assist with walker in small area, slow twin  Balance: fair    Therapeutic Activities and Exercises:   Pt educated on PT role/POC.   Importance of OOB activity with staff assistance.  Importance of sitting up in the chair throughout the day as tolerated, especially for meals   Safety during functional t/f and mobility with use of rolling walker   Multiple self-care tasks/functional mobility completed- assistance level noted above   All questions/concerns answered within PT scope of practice      AM-PAC 6 CLICK MOBILITY  Total Score:17     Patient left supine with all lines intact and call button in reach.    GOALS:   Multidisciplinary Problems       Physical Therapy Goals          Problem: Physical Therapy    Goal Priority Disciplines Outcome Goal Variances Interventions   Physical Therapy Goal     PT, PT/OT Ongoing, Progressing     Description:  Short term goals:  1. Supine to sit with Contact Guard Assistance  2. Sit to stand transfer with Contact Guard Assistance  3. Bed to chair transfer with Contact Guard Assistance using Rolling Walker  4. Gait  x 50 feet with Contact Guard Assistance using Rolling Walker.     Long term goals:  1. Supine to sit with Modified [place]  2. Sit to stand transfer with Modified [place]  3. Bed to chair transfer with Modified [place] using Rolling Walker  4. Gait  x 100 feet with Modified [place] using Rolling Walker.                          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, without long-term current use of insulin     Vertigo     Vitamin D deficiency        Past Surgical History:   Procedure Laterality Date    BLADDER SUSPENSION      BRONCHOSCOPY       11/2020    CHOLECYSTECTOMY      FRACTURE SURGERY      HERNIA REPAIR      HIP FRACTURE SURGERY Right 2005    HYSTERECTOMY      INSERTION OF DIALYSIS CATHETER Right 4/22/2022    Procedure: INSERTION, CATHETER, DIALYSIS;  Surgeon: Terence Jeffery MD;  Location: [facility] OR;  Service: General;  Laterality: Right;    INSERTION OF TUNNELED CENTRAL VENOUS HEMODIALYSIS CATHETER Right 9/2/2022    Procedure: INSERTION, CATHETER, HEMODIALYSIS, DUAL LUMEN;  Surgeon: Terence Jeffery MD;  Location: [facility] OR;  Service: General;  Laterality: Right;       Time Tracking:     PT Received  On: 09/08/22  PT Start Time: 1348     PT Stop Time: 1406  PT Total Time (min): 18 min     Billable Minutes: Evaluation moderate complexity      09/08/2022

## 2022-09-09 NOTE — PLAN OF CARE
Per Joana, pt does not qualify for LTAC. Pt will not be able to dc to [facility] for swb due to having dialysis, NP informed, SW following.

## 2022-09-09 NOTE — PLAN OF CARE
[facility]  Discharge Reassessment    Primary Care Provider: Lorena Richardson MD    Expected Discharge Date:     Reassessment (most recent)       Discharge Reassessment - 09/09/22 1112          Discharge Reassessment    Assessment Type Discharge Planning Reassessment     Discharge Plan discussed with: Adult children   Granddaughter    Discharge Plan A Long-term acute care facility (LTAC)     Discharge Plan B Hospice/home     DME Needed Upon Discharge  none     Discharge Barriers Identified None     Why the patient remains in the hospital Requires continued medical care        Post-Acute Status    Post-Acute Authorization Placement     Post-Acute Placement Status Referrals Sent     Patient choice form signed by patient/caregiver List with quality metrics by geographic area provided;List from CMS Compare     Discharge Delays None known at this time                   CATRACHITO consulted for LTAC. CATRACHITO spoke with pts daughter and granddaughter and received choice. Referral called to Joana. CATRACHITO will awaiting insurance approval. CATRACHITO following.

## 2022-09-09 NOTE — PROGRESS NOTES
[facility] Hospital Medicine  Progress Note    Patient Name: Bria Ray  MRN: [id number]  Patient Class: IP- Inpatient   Admission Date: 8/29/2022  Length of Stay: 11 days  Attending Physician: Neymar Glass DO  Primary Care Provider: Lorena Richardson MD        Subjective:     Principal Problem:Pneumothorax on right        HPI:  Patient is a 87-year-old female with past medical history of hypertension, diabetes mellitus, ESRD, COPD, DJD, presented from home as direct admission for right-sided pneumothorax.  Patient underwent right-sided thoracentesis last Thursday for pleural effusion, however since then she continued to have right-sided chest pain along with shortness of bread for which she got an x-ray revealing pneumothorax on the right side.  Patient was then instructed to come to the hospital for further care.  Patient's primary pulmonologist is Dr. Bautista, and has been seeing General surgery in the past for her dialysis access issues.  Patient is also on peritoneal dialysis at home.  Patient is accompanied by her granddaughter, who is trying to get her hospice setup outpatient due to chronic conditions and mostly how her health has been declining steadily for the past few months.  I had a long discussion about goals of care for the patient as of now the patient has not made her mind about her code status and would like to be full code until she has a discussion with the rest of her family.  General surgery and Nephrology consulted and made aware of the patient's arrival.  Patient denies any fevers chills nausea vomiting or diarrhea does have cough and shortness of breath along with right-sided chest pain.      Overview/Hospital Course:  9/5-pneumo unchanged. Resealed per surgery. Re-eval in am. Plan for discharge home with hospice once medically stable  9/6-small pneumo persists  9/7- no events overnight, NM study today to eval fistula. 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  Will re-image for fistula closure in 2 weeks  9/9- will need swingbed for 2 weeks for management of chest tube      Interval History: NAEO.     Review of Systems   Constitutional:  Positive for fatigue. Negative for chills, fever and unexpected weight change.   HENT:  Negative for congestion, mouth sores and sore throat.    Eyes:  Negative for photophobia and visual disturbance.   Respiratory:  Negative for cough, chest tightness and wheezing.    Cardiovascular:  Negative for palpitations and leg swelling.        At ct insertion site   Gastrointestinal:  Negative for abdominal pain, diarrhea, nausea and vomiting.   Endocrine: Negative for cold intolerance and heat intolerance.   Genitourinary:  Negative for difficulty urinating, dysuria, frequency and urgency.   Musculoskeletal:  Negative for arthralgias, back pain and myalgias.   Skin:  Negative for pallor and rash.   Neurological:  Positive for weakness. Negative for tremors, seizures, syncope, numbness and headaches.   Hematological:  Does not bruise/bleed easily.   Psychiatric/Behavioral:  Negative for agitation, confusion, hallucinations and suicidal ideas.    Objective:     Vital Signs (Most Recent):  Temp: 98 °F (36.7 °C) (09/09/22 0945)  Pulse: 61 (09/09/22 1352)  Resp: 20 (09/09/22 1352)  BP: (!) 149/78 (09/09/22 1230)  SpO2: 99 % (09/09/22 0749)   Vital Signs (24h Range):  Temp:  [97.2 °F (36.2 °C)-98.3 °F (36.8 °C)] 98 °F (36.7 °C)  Pulse:  [55-67] 61  Resp:  [16-20] 20  SpO2:  [97 %-100 %] 99 %  BP: (122-178)/(44-81) 149/78     Weight: 75.2 kg (165 lb 11.2 oz)  Body mass index is 27.57 kg/m².  No intake or output data in the 24 hours ending 09/09/22 1355     Physical Exam  Vitals reviewed.   Constitutional:       Appearance: Normal appearance.   HENT:      Head: Normocephalic and atraumatic.   Eyes:      Extraocular Movements: Extraocular movements intact.    Cardiovascular:      Rate and Rhythm: Normal rate and regular rhythm.      Pulses: Normal pulses.      Heart sounds: Normal heart sounds.   Pulmonary:      Effort: Pulmonary effort is normal.      Breath sounds: Normal breath sounds.   Abdominal:      General: Abdomen is flat.      Palpations: Abdomen is soft.   Musculoskeletal:      Comments: Normal tone, moves all extremities   Skin:     General: Skin is warm and dry.      Capillary Refill: Capillary refill takes less than 2 seconds.   Neurological:      General: No focal deficit present.      Mental Status: She is alert and oriented to person, place, and time.   Psychiatric:         Mood and Affect: Mood normal.         Behavior: Behavior normal.       Significant Labs: All pertinent labs within the past 24 hours have been reviewed.    Significant Imaging: I have reviewed all pertinent imaging results/findings within the past 24 hours.      Assessment/Plan:      * Pneumothorax on right  Persistent  Has CT placed by surgery  Pt asymptomatic now  Surgery following, appreciate recs.     Atrial fibrillation  Patient with Paroxysmal (<7 days) atrial fibrillation which is controlled currently with Amiodarone. Patient is currently in sinus rhythm.[id number] Score: 4. HASBLED Score: Unable to calculate. Anticoagulation indicated. Anticoagulation done with apixaban, renally dose adjusted by pharmacy.    Detailed conversation in the presence of daughter, patient's decision was to go for AC.         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   Repeat NM study show peristent fistula      ESRD (end stage renal disease)  Pt w/ diaghragmatic defet, hence PD has been stopped and HD initiated.   Persistent fistula, will send home with CT and plan for HD. Re-image in 2 weeks to see if it has healed    DM (diabetes mellitus)  Patient's FSGs are controlled on current medication regimen.  Last A1c reviewed-   Lab Results   Component Value Date    HGBA1C 5.5  04/07/2022     Most recent fingerstick glucose reviewed- No results for input(s): POCTGLUCOSE in the last 24 hours.  Current correctional scale  Medium  Maintain anti-hyperglycemic dose as follows-   Antihyperglycemics (From admission, onward)    Start     Stop Route Frequency Ordered    08/29/22 1432  insulin aspart U-100 injection 1-10 Units         -- SubQ Before meals & nightly PRN 08/29/22 1333        Hold Oral hypoglycemics while patient is in the hospital.    Chronic obstructive pulmonary disease  Inhalers.   Oxygen support      Primary hypertension  Adjust medications as needed      Chronic pain syndrome  Cont gabapentin  Prn norco        VTE Risk Mitigation (From admission, onward)         Ordered     heparin (porcine) injection 4,000 Units  As needed (PRN)         09/02/22 1357     IP VTE HIGH RISK PATIENT  Once         08/29/22 1333     Place sequential compression device  Until discontinued         08/29/22 1333                Discharge Planning   ANGY:      Code Status: Full Code   Is the patient medically ready for discharge?:     Reason for patient still in hospital (select all that apply): Treatment  Discharge Plan A: Long-term acute care facility (LTAC)   Discharge Delays: None known at this time              Neymar Glass DO  Department of Hospital Medicine   [facility]

## 2022-09-09 NOTE — PT/OT/SLP PROGRESS
Physical Therapy Treatment    Patient Name:  Bria Ray   MRN:  [id number]    Recommendations:     Discharge Recommendations:  home health PT, home with hospice   Discharge Equipment Recommendations: none   Barriers to discharge: Inaccessible home    Assessment:     Bria Ray is a 87 y.o. female admitted with a medical diagnosis of Pneumothorax on right.  She presents with the following impairments/functional limitations:  weakness, impaired endurance, impaired functional mobility, gait instability, decreased lower extremity function, decreased upper extremity function Pt demonstrates good bed mobility despite chest tube. Pt reported fatigue following dialysis this morning and complained of pain in right upper trapezius. She is limited in ambulation due to chest tube connection to wall suction.    Rehab Prognosis: Fair; patient would benefit from acute skilled PT services to address these deficits and reach maximum level of function.    Recent Surgery: Procedure(s) (LRB):  INSERTION, CATHETER, HEMODIALYSIS, DUAL LUMEN (Right) 7 Days Post-Op    Plan:     During this hospitalization, patient to be seen 5 x/week to address the identified rehab impairments via gait training, therapeutic activities, therapeutic exercises and progress toward the following goals:    Plan of Care Expires:  10/08/22    Subjective     Chief Complaint: pneumothorax  Patient/Family Comments/goals: Pt reports pain to right upper trap  Pain/Comfort:  Pain Rating 1: 5/10  Location - Side 1: Right  Location 1: neck  Pain Addressed 1: Reposition  Pain Rating Post-Intervention 1: 3/10      Objective:     Communicated with EJ Miguel RN prior to session.  Patient found supine with chest tube, telemetry, PICC line, SCD upon PT entry to room.     General Precautions: Standard, fall   Orthopedic Precautions:N/A   Braces: N/A  Respiratory Status: Nasal cannula, flow 2 L/min     Functional Mobility:  Bed Mobility:     Scooting: contact guard  assistance  Supine to Sit: contact guard assistance  Sit to Supine: minimum assistance  Transfers:     Sit to Stand:  stand by assistance with rolling walker  Balance: good      AM-PAC 6 CLICK MOBILITY  Turning over in bed (including adjusting bedclothes, sheets and blankets)?: 3  Sitting down on and standing up from a chair with arms (e.g., wheelchair, bedside commode, etc.): 3  Moving from lying on back to sitting on the side of the bed?: 3  Moving to and from a bed to a chair (including a wheelchair)?: 3  Need to walk in hospital room?: 3  Climbing 3-5 steps with a railing?: 1  Basic Mobility Total Score: 16       Therapeutic Activities and Exercises:   Pt performed bilateral LE: ankle pumps and Long arc quads x 30 each  Sit to stand x 5  Gentle massage of right upper trap    Patient left supine with all lines intact and call button in reach..    GOALS:   Multidisciplinary Problems       Physical Therapy Goals          Problem: Physical Therapy    Goal Priority Disciplines Outcome Goal Variances Interventions   Physical Therapy Goal     PT, PT/OT Ongoing, Progressing     Description: Short term goals:  1. Supine to sit with Contact Guard Assistance  2. Sit to stand transfer with Contact Guard Assistance  3. Bed to chair transfer with Contact Guard Assistance using Rolling Walker  4. Gait  x 50 feet with Contact Guard Assistance using Rolling Walker.     Long term goals:  1. Supine to sit with Modified [place]  2. Sit to stand transfer with Modified [place]  3. Bed to chair transfer with Modified [place] using Rolling Walker  4. Gait  x 100 feet with Modified [place] using Rolling Walker.                          Time Tracking:     PT Received On: 09/09/22  PT Start Time: 1436     PT Stop Time: 1456  PT Total Time (min): 20 min     Billable Minutes: Therapeutic Exercise 15    Treatment Type: Treatment  PT/PTA: PT     PTA Visit Number: 0     09/09/2022

## 2022-09-09 NOTE — PROGRESS NOTES
[facility]  Nephrology  Progress Note    Patient Name: Bria Ray  MRN: [id number]  Admission Date: 8/29/2022  Hospital Length of Stay: 11 days  Attending Provider: Neymar Glass DO   Primary Care Physician: Lorena Richardson MD  Principal Problem:Pneumothorax on right    Subjective:     HPI: 87-year-old woman with ESRD secondary to diabetes.  She is on peritoneal dialysis.  She had a thoracentesis for right pleural effusion Thursday of last week.  She developed more shortness of breath over the weekend.  Chest x-ray today shows hydrothorax with collapse of the right lung.      Interval History:  She is seen during dialysis.  Blood pressure is stable.  No new symptoms    Review of patient's allergies indicates:  No Known Allergies  Current Facility-Administered Medications   Medication Frequency    0.9%  NaCl infusion PRN    acetaminophen tablet 1,000 mg Q6H PRN    albuterol-ipratropium 2.5 mg-0.5 mg/3 mL nebulizer solution 3 mL Q6H    allopurinoL tablet 100 mg Daily    amiodarone tablet 400 mg BID    atorvastatin tablet 40 mg Daily    budesonide nebulizer solution 0.5 mg Q12H    calcitRIOL capsule 0.25 mcg Daily    calcium carbonate 200 mg calcium (500 mg) chewable tablet 500 mg Daily    cyanocobalamin tablet 100 mcg Daily    dextrose 50% injection 12.5 g PRN    dextrose 50% injection 25 g PRN    diphenhydrAMINE injection 25 mg Q6H PRN    gabapentin capsule 100 mg BID    glucagon (human recombinant) injection 1 mg PRN    heparin (porcine) injection 4,000 Units PRN    hydrALAZINE injection 5 mg Q6H PRN    HYDROmorphone (PF) injection 0.5 mg Q5 Min PRN    insulin aspart U-100 injection 1-10 Units QID (AC + HS) PRN    magnesium oxide tablet 800 mg PRN    magnesium oxide tablet 800 mg PRN    melatonin tablet 9 mg Nightly PRN    morphine injection 4 mg Q5 Min PRN    mupirocin 2 % ointment Daily    naloxone 0.4 mg/mL injection 0.02 mg PRN    ondansetron injection 4  mg Daily PRN    oxyCODONE-acetaminophen 7.5-325 mg per tablet 1 tablet Q4H PRN    pantoprazole EC tablet 40 mg BID AC    polyethylene glycol packet 34 g BID    potassium bicarbonate disintegrating tablet 35 mEq PRN    potassium bicarbonate disintegrating tablet 50 mEq PRN    potassium bicarbonate disintegrating tablet 60 mEq PRN    senna-docusate 8.6-50 mg per tablet 1 tablet BID    sevelamer carbonate tablet 800 mg TID AC    sodium chloride 0.9% flush 10 mL Q12H PRN    sodium chloride 0.9% flush 10 mL Q6H    And    sodium chloride 0.9% flush 10 mL PRN       Objective:     Vital Signs (Most Recent):  Temp: 98 °F (36.7 °C) (09/09/22 0945)  Pulse: (!) 58 (09/09/22 1030)  Resp: 20 (09/09/22 1030)  BP: (!) 143/64 (09/09/22 1030)  SpO2: 99 % (09/09/22 0749)  O2 Device (Oxygen Therapy): nasal cannula (09/09/22 1000)   Vital Signs (24h Range):  Temp:  [97.2 °F (36.2 °C)-98.3 °F (36.8 °C)] 98 °F (36.7 °C)  Pulse:  [55-67] 58  Resp:  [16-20] 20  SpO2:  [97 %-100 %] 99 %  BP: (122-178)/(44-81) 143/64     Weight: 75.2 kg (165 lb 11.2 oz) (09/09/22 0600)  Body mass index is 27.57 kg/m².  Body surface area is 1.86 meters squared.    I/O last 3 completed shifts:  In: 100 [P.O.:100]  Out: 0     Physical Exam  Eyes:      Pupils: Pupils are equal, round, and reactive to light.   Cardiovascular:      Rate and Rhythm: Normal rate and regular rhythm.   Pulmonary:      Breath sounds: No wheezing or rales.      Comments: Right chest tube present.  Dullness right base  Abdominal:      Tenderness: There is no abdominal tenderness. There is no guarding.   Musculoskeletal:      Cervical back: Neck supple.      Right lower leg: No edema.      Left lower leg: No edema.   Neurological:      Mental Status: She is alert.       Significant Labs:  BMP:   Recent Labs   Lab 09/07/22  0655         K 5.4*      CO2 26   BUN 30*   CREATININE 5.32*   CALCIUM 8.4*     CBC:   Recent Labs   Lab 09/07/22  0655   WBC 4.83   RBC  2.37*   HGB 7.6*   HCT 25.6*   *   .0*   MCH 32.1*   MCHC 29.7*        Significant Imaging:      Assessment/Plan:     * Pneumothorax on right  Hydropneumothorax.  She had thoracentesis done Thursday of last week.  This may have resulted in pneumothorax.  However, she had large volume of pleural fluid today.  Fluid is not bloody and does not appear to be an empyema.  Pleural fluid for cytology was negative.  Possibility of diaphragmatic leak of peritoneal fluid should be ruled out.  08/30/2022: See notes under ESRD above  08/31/2022: She was supposed to have a nuclear medicine study to test for diaphragmatic leak.  I can not find results.  Surgery is following  09/01/2022:  Pleural-peritoneal communication confirmed.  09/06/2022:  Chest tube managed by surgery  09/07/2022:  Repeat nuclear medicine evaluation for leak  09/08/2022: Persistent pleural peritoneal leak    ESRD (end stage renal disease)  Stable during dialysis    DM (diabetes mellitus)  Underlying condition    Chronic obstructive pulmonary disease  No wheezing    Primary hypertension  Controlled        Thank you for your consult.     Beni Fields MD  Nephrology  [facility]

## 2022-09-09 NOTE — ASSESSMENT & PLAN NOTE
Controlled   Graft Cartilage Fenestration Text: The cartilage was fenestrated with a 2mm punch biopsy to help facilitate graft survival and healing.

## 2022-09-09 NOTE — PLAN OF CARE
Problem: Infection  Goal: Absence of Infection Signs and Symptoms  Outcome: Ongoing, Progressing     Problem: Adult Inpatient Plan of Care  Goal: Plan of Care Review  Outcome: Ongoing, Progressing  Goal: Patient-Specific Goal (Individualized)  Outcome: Ongoing, Progressing  Goal: Absence of Hospital-Acquired Illness or Injury  Outcome: Ongoing, Progressing  Goal: Optimal Comfort and Wellbeing  Outcome: Ongoing, Progressing  Goal: Readiness for Transition of Care  Outcome: Ongoing, Progressing     Problem: Diabetes Comorbidity  Goal: Blood Glucose Level Within Targeted Range  Outcome: Ongoing, Progressing     Problem: Fall Injury Risk  Goal: Absence of Fall and Fall-Related Injury  Outcome: Ongoing, Progressing     Problem: Skin Injury Risk Increased  Goal: Skin Health and Integrity  Outcome: Ongoing, Progressing     Problem: Breathing Pattern Ineffective  Goal: Effective Breathing Pattern  Outcome: Ongoing, Progressing     Problem: Gas Exchange Impaired  Goal: Optimal Gas Exchange  Outcome: Ongoing, Progressing     Problem: Device-Related Complication Risk (Hemodialysis)  Goal: Safe, Effective Therapy Delivery  Outcome: Ongoing, Progressing     Problem: Hemodynamic Instability (Hemodialysis)  Goal: Effective Tissue Perfusion  Outcome: Ongoing, Progressing     Problem: Infection (Hemodialysis)  Goal: Absence of Infection Signs and Symptoms  Outcome: Ongoing, Progressing      36.7

## 2022-09-09 NOTE — SUBJECTIVE & OBJECTIVE
Interval History:  She is seen during dialysis.  Blood pressure is stable.  No new symptoms    Review of patient's allergies indicates:  No Known Allergies  Current Facility-Administered Medications   Medication Frequency    0.9%  NaCl infusion PRN    acetaminophen tablet 1,000 mg Q6H PRN    albuterol-ipratropium 2.5 mg-0.5 mg/3 mL nebulizer solution 3 mL Q6H    allopurinoL tablet 100 mg Daily    amiodarone tablet 400 mg BID    atorvastatin tablet 40 mg Daily    budesonide nebulizer solution 0.5 mg Q12H    calcitRIOL capsule 0.25 mcg Daily    calcium carbonate 200 mg calcium (500 mg) chewable tablet 500 mg Daily    cyanocobalamin tablet 100 mcg Daily    dextrose 50% injection 12.5 g PRN    dextrose 50% injection 25 g PRN    diphenhydrAMINE injection 25 mg Q6H PRN    gabapentin capsule 100 mg BID    glucagon (human recombinant) injection 1 mg PRN    heparin (porcine) injection 4,000 Units PRN    hydrALAZINE injection 5 mg Q6H PRN    HYDROmorphone (PF) injection 0.5 mg Q5 Min PRN    insulin aspart U-100 injection 1-10 Units QID (AC + HS) PRN    magnesium oxide tablet 800 mg PRN    magnesium oxide tablet 800 mg PRN    melatonin tablet 9 mg Nightly PRN    morphine injection 4 mg Q5 Min PRN    mupirocin 2 % ointment Daily    naloxone 0.4 mg/mL injection 0.02 mg PRN    ondansetron injection 4 mg Daily PRN    oxyCODONE-acetaminophen 7.5-325 mg per tablet 1 tablet Q4H PRN    pantoprazole EC tablet 40 mg BID AC    polyethylene glycol packet 34 g BID    potassium bicarbonate disintegrating tablet 35 mEq PRN    potassium bicarbonate disintegrating tablet 50 mEq PRN    potassium bicarbonate disintegrating tablet 60 mEq PRN    senna-docusate 8.6-50 mg per tablet 1 tablet BID    sevelamer carbonate tablet 800 mg TID AC    sodium chloride 0.9% flush 10 mL Q12H PRN    sodium chloride 0.9% flush 10 mL Q6H    And    sodium chloride 0.9% flush 10 mL PRN       Objective:     Vital Signs (Most Recent):  Temp: 98 °F (36.7 °C) (09/09/22  0945)  Pulse: (!) 58 (09/09/22 1030)  Resp: 20 (09/09/22 1030)  BP: (!) 143/64 (09/09/22 1030)  SpO2: 99 % (09/09/22 0749)  O2 Device (Oxygen Therapy): nasal cannula (09/09/22 1000)   Vital Signs (24h Range):  Temp:  [97.2 °F (36.2 °C)-98.3 °F (36.8 °C)] 98 °F (36.7 °C)  Pulse:  [55-67] 58  Resp:  [16-20] 20  SpO2:  [97 %-100 %] 99 %  BP: (122-178)/(44-81) 143/64     Weight: 75.2 kg (165 lb 11.2 oz) (09/09/22 0600)  Body mass index is 27.57 kg/m².  Body surface area is 1.86 meters squared.    I/O last 3 completed shifts:  In: 100 [P.O.:100]  Out: 0     Physical Exam  Eyes:      Pupils: Pupils are equal, round, and reactive to light.   Cardiovascular:      Rate and Rhythm: Normal rate and regular rhythm.   Pulmonary:      Breath sounds: No wheezing or rales.      Comments: Right chest tube present.  Dullness right base  Abdominal:      Tenderness: There is no abdominal tenderness. There is no guarding.   Musculoskeletal:      Cervical back: Neck supple.      Right lower leg: No edema.      Left lower leg: No edema.   Neurological:      Mental Status: She is alert.       Significant Labs:  BMP:   Recent Labs   Lab 09/07/22  0655         K 5.4*      CO2 26   BUN 30*   CREATININE 5.32*   CALCIUM 8.4*     CBC:   Recent Labs   Lab 09/07/22  0655   WBC 4.83   RBC 2.37*   HGB 7.6*   HCT 25.6*   *   .0*   MCH 32.1*   MCHC 29.7*        Significant Imaging:

## 2022-09-09 NOTE — SUBJECTIVE & OBJECTIVE
Interval History: NAEO.     Review of Systems   Constitutional:  Positive for fatigue. Negative for chills, fever and unexpected weight change.   HENT:  Negative for congestion, mouth sores and sore throat.    Eyes:  Negative for photophobia and visual disturbance.   Respiratory:  Negative for cough, chest tightness and wheezing.    Cardiovascular:  Negative for palpitations and leg swelling.        At ct insertion site   Gastrointestinal:  Negative for abdominal pain, diarrhea, nausea and vomiting.   Endocrine: Negative for cold intolerance and heat intolerance.   Genitourinary:  Negative for difficulty urinating, dysuria, frequency and urgency.   Musculoskeletal:  Negative for arthralgias, back pain and myalgias.   Skin:  Negative for pallor and rash.   Neurological:  Positive for weakness. Negative for tremors, seizures, syncope, numbness and headaches.   Hematological:  Does not bruise/bleed easily.   Psychiatric/Behavioral:  Negative for agitation, confusion, hallucinations and suicidal ideas.    Objective:     Vital Signs (Most Recent):  Temp: 98 °F (36.7 °C) (09/09/22 0945)  Pulse: 61 (09/09/22 1352)  Resp: 20 (09/09/22 1352)  BP: (!) 149/78 (09/09/22 1230)  SpO2: 99 % (09/09/22 0749)   Vital Signs (24h Range):  Temp:  [97.2 °F (36.2 °C)-98.3 °F (36.8 °C)] 98 °F (36.7 °C)  Pulse:  [55-67] 61  Resp:  [16-20] 20  SpO2:  [97 %-100 %] 99 %  BP: (122-178)/(44-81) 149/78     Weight: 75.2 kg (165 lb 11.2 oz)  Body mass index is 27.57 kg/m².  No intake or output data in the 24 hours ending 09/09/22 1355     Physical Exam  Vitals reviewed.   Constitutional:       Appearance: Normal appearance.   HENT:      Head: Normocephalic and atraumatic.   Eyes:      Extraocular Movements: Extraocular movements intact.   Cardiovascular:      Rate and Rhythm: Normal rate and regular rhythm.      Pulses: Normal pulses.      Heart sounds: Normal heart sounds.   Pulmonary:      Effort: Pulmonary effort is normal.      Breath sounds:  Normal breath sounds.   Abdominal:      General: Abdomen is flat.      Palpations: Abdomen is soft.   Musculoskeletal:      Comments: Normal tone, moves all extremities   Skin:     General: Skin is warm and dry.      Capillary Refill: Capillary refill takes less than 2 seconds.   Neurological:      General: No focal deficit present.      Mental Status: She is alert and oriented to person, place, and time.   Psychiatric:         Mood and Affect: Mood normal.         Behavior: Behavior normal.       Significant Labs: All pertinent labs within the past 24 hours have been reviewed.    Significant Imaging: I have reviewed all pertinent imaging results/findings within the past 24 hours.   [General Appearance - Well Developed] : well developed [Normal Appearance] : normal appearance [Well Groomed] : well groomed [General Appearance - Well Nourished] : well nourished [No Deformities] : no deformities [General Appearance - In No Acute Distress] : no acute distress [Normal Conjunctiva] : the conjunctiva exhibited no abnormalities [Eyelids - No Xanthelasma] : the eyelids demonstrated no xanthelasmas [Normal Oral Mucosa] : normal oral mucosa [No Oral Pallor] : no oral pallor [No Oral Cyanosis] : no oral cyanosis [Normal Jugular Venous A Waves Present] : normal jugular venous A waves present [Normal Jugular Venous V Waves Present] : normal jugular venous V waves present [No Jugular Venous Villanueva A Waves] : no jugular venous villanueva A waves [Respiration, Rhythm And Depth] : normal respiratory rhythm and effort [Exaggerated Use Of Accessory Muscles For Inspiration] : no accessory muscle use [Auscultation Breath Sounds / Voice Sounds] : lungs were clear to auscultation bilaterally [Heart Sounds] : normal S1 and S2 [Murmurs] : no murmurs present [Irregularly Irregular] : the rhythm was irregularly irregular [Abdomen Soft] : soft [Abdomen Tenderness] : non-tender [Abdomen Mass (___ Cm)] : no abdominal mass palpated [Abnormal Walk] : normal gait [Gait - Sufficient For Exercise Testing] : the gait was sufficient for exercise testing [Nail Clubbing] : no clubbing of the fingernails [Cyanosis, Localized] : no localized cyanosis [Petechial Hemorrhages (___cm)] : no petechial hemorrhages [Skin Color & Pigmentation] : normal skin color and pigmentation [] : no rash [No Venous Stasis] : no venous stasis [Skin Lesions] : no skin lesions [No Skin Ulcers] : no skin ulcer [No Xanthoma] : no  xanthoma was observed [Oriented To Time, Place, And Person] : oriented to person, place, and time [Affect] : the affect was normal [Mood] : the mood was normal [No Anxiety] : not feeling anxious

## 2022-09-10 NOTE — SUBJECTIVE & OBJECTIVE
Interval History: The patient is sitting up in bed.  She mentions having some chest discomfort on last evening.  No pain today.  She has been hemodynamically stable.    Review of patient's allergies indicates:  No Known Allergies  Current Facility-Administered Medications   Medication Frequency    0.9%  NaCl infusion PRN    acetaminophen tablet 1,000 mg Q6H PRN    albuterol-ipratropium 2.5 mg-0.5 mg/3 mL nebulizer solution 3 mL Q6H    allopurinoL tablet 100 mg Daily    amiodarone tablet 400 mg BID    atorvastatin tablet 40 mg Daily    budesonide nebulizer solution 0.5 mg Q12H    calcitRIOL capsule 0.25 mcg Daily    calcium carbonate 200 mg calcium (500 mg) chewable tablet 500 mg Daily    cyanocobalamin tablet 100 mcg Daily    dextrose 50% injection 12.5 g PRN    dextrose 50% injection 25 g PRN    diphenhydrAMINE injection 25 mg Q6H PRN    gabapentin capsule 100 mg BID    glucagon (human recombinant) injection 1 mg PRN    heparin (porcine) injection 4,000 Units PRN    hydrALAZINE injection 5 mg Q6H PRN    HYDROmorphone (PF) injection 0.5 mg Q5 Min PRN    insulin aspart U-100 injection 1-10 Units QID (AC + HS) PRN    magnesium oxide tablet 800 mg PRN    magnesium oxide tablet 800 mg PRN    melatonin tablet 9 mg Nightly PRN    morphine injection 4 mg Q5 Min PRN    mupirocin 2 % ointment Daily    naloxone 0.4 mg/mL injection 0.02 mg PRN    ondansetron injection 4 mg Daily PRN    oxyCODONE-acetaminophen 7.5-325 mg per tablet 1 tablet Q4H PRN    pantoprazole EC tablet 40 mg BID AC    polyethylene glycol packet 34 g BID    potassium bicarbonate disintegrating tablet 35 mEq PRN    potassium bicarbonate disintegrating tablet 50 mEq PRN    potassium bicarbonate disintegrating tablet 60 mEq PRN    senna-docusate 8.6-50 mg per tablet 1 tablet BID    sevelamer carbonate tablet 800 mg TID AC    sodium chloride 0.9% flush 10 mL Q12H PRN    sodium chloride 0.9% flush 10 mL Q6H    And    sodium chloride 0.9% flush 10 mL PRN        Objective:     Vital Signs (Most Recent):  Temp: 97.3 °F (36.3 °C) (09/10/22 1100)  Pulse: (!) 58 (09/10/22 1252)  Resp: 20 (09/10/22 1252)  BP: (!) 144/59 (09/10/22 1100)  SpO2: 100 % (09/10/22 1100)  O2 Device (Oxygen Therapy): nasal cannula (09/10/22 0031)   Vital Signs (24h Range):  Temp:  [97.3 °F (36.3 °C)-99 °F (37.2 °C)] 97.3 °F (36.3 °C)  Pulse:  [] 58  Resp:  [16-20] 20  SpO2:  [97 %-100 %] 100 %  BP: (113-177)/(47-76) 144/59     Weight: 74 kg (163 lb 2.3 oz) (09/10/22 0620)  Body mass index is 27.15 kg/m².  Body surface area is 1.84 meters squared.    I/O last 3 completed shifts:  In: 330 [P.O.:280; I.V.:50]  Out: 5 [Chest Tube:5]    Physical Exam  Vitals reviewed.   HENT:      Head: Normocephalic and atraumatic.   Eyes:      Pupils: Pupils are equal, round, and reactive to light.   Cardiovascular:      Rate and Rhythm: Regular rhythm.   Pulmonary:      Breath sounds: Normal breath sounds.   Abdominal:      Palpations: Abdomen is soft.   Neurological:      Mental Status: She is alert.   Psychiatric:         Mood and Affect: Mood normal.       Significant Labs:  BMP:   Recent Labs   Lab 09/10/22  0426   GLU 93      K 4.5      CO2 27   BUN 22*   CREATININE 4.07*   CALCIUM 8.3*     CBC:   Recent Labs   Lab 09/10/22  0426   WBC 5.86   RBC 2.35*   HGB 7.6*   HCT 24.2*   *   .0*   MCH 32.3*   MCHC 31.4*        Significant Imaging:  Labs: Reviewed

## 2022-09-10 NOTE — PROGRESS NOTES
[facility]  Nephrology  Progress Note    Patient Name: Bria Ray  MRN: [id number]  Admission Date: 8/29/2022  Hospital Length of Stay: 12 days  Attending Provider: Neymar Glass DO   Primary Care Physician: Lorena Richardson MD  Principal Problem:Pneumothorax on right    Subjective:     HPI: 87-year-old woman with ESRD secondary to diabetes.  She is on peritoneal dialysis.  She had a thoracentesis for right pleural effusion Thursday of last week.  She developed more shortness of breath over the weekend.  Chest x-ray today shows hydrothorax with collapse of the right lung.      Interval History: The patient is sitting up in bed.  She mentions having some chest discomfort on last evening.  No pain today.  She has been hemodynamically stable.    Review of patient's allergies indicates:  No Known Allergies  Current Facility-Administered Medications   Medication Frequency    0.9%  NaCl infusion PRN    acetaminophen tablet 1,000 mg Q6H PRN    albuterol-ipratropium 2.5 mg-0.5 mg/3 mL nebulizer solution 3 mL Q6H    allopurinoL tablet 100 mg Daily    amiodarone tablet 400 mg BID    atorvastatin tablet 40 mg Daily    budesonide nebulizer solution 0.5 mg Q12H    calcitRIOL capsule 0.25 mcg Daily    calcium carbonate 200 mg calcium (500 mg) chewable tablet 500 mg Daily    cyanocobalamin tablet 100 mcg Daily    dextrose 50% injection 12.5 g PRN    dextrose 50% injection 25 g PRN    diphenhydrAMINE injection 25 mg Q6H PRN    gabapentin capsule 100 mg BID    glucagon (human recombinant) injection 1 mg PRN    heparin (porcine) injection 4,000 Units PRN    hydrALAZINE injection 5 mg Q6H PRN    HYDROmorphone (PF) injection 0.5 mg Q5 Min PRN    insulin aspart U-100 injection 1-10 Units QID (AC + HS) PRN    magnesium oxide tablet 800 mg PRN    magnesium oxide tablet 800 mg PRN    melatonin tablet 9 mg Nightly PRN    morphine injection 4 mg Q5 Min PRN    mupirocin 2 % ointment  Daily    naloxone 0.4 mg/mL injection 0.02 mg PRN    ondansetron injection 4 mg Daily PRN    oxyCODONE-acetaminophen 7.5-325 mg per tablet 1 tablet Q4H PRN    pantoprazole EC tablet 40 mg BID AC    polyethylene glycol packet 34 g BID    potassium bicarbonate disintegrating tablet 35 mEq PRN    potassium bicarbonate disintegrating tablet 50 mEq PRN    potassium bicarbonate disintegrating tablet 60 mEq PRN    senna-docusate 8.6-50 mg per tablet 1 tablet BID    sevelamer carbonate tablet 800 mg TID AC    sodium chloride 0.9% flush 10 mL Q12H PRN    sodium chloride 0.9% flush 10 mL Q6H    And    sodium chloride 0.9% flush 10 mL PRN       Objective:     Vital Signs (Most Recent):  Temp: 97.3 °F (36.3 °C) (09/10/22 1100)  Pulse: (!) 58 (09/10/22 1252)  Resp: 20 (09/10/22 1252)  BP: (!) 144/59 (09/10/22 1100)  SpO2: 100 % (09/10/22 1100)  O2 Device (Oxygen Therapy): nasal cannula (09/10/22 0031)   Vital Signs (24h Range):  Temp:  [97.3 °F (36.3 °C)-99 °F (37.2 °C)] 97.3 °F (36.3 °C)  Pulse:  [] 58  Resp:  [16-20] 20  SpO2:  [97 %-100 %] 100 %  BP: (113-177)/(47-76) 144/59     Weight: 74 kg (163 lb 2.3 oz) (09/10/22 0620)  Body mass index is 27.15 kg/m².  Body surface area is 1.84 meters squared.    I/O last 3 completed shifts:  In: 330 [P.O.:280; I.V.:50]  Out: 5 [Chest Tube:5]    Physical Exam  Vitals reviewed.   HENT:      Head: Normocephalic and atraumatic.   Eyes:      Pupils: Pupils are equal, round, and reactive to light.   Cardiovascular:      Rate and Rhythm: Regular rhythm.   Pulmonary:      Breath sounds: Normal breath sounds.   Abdominal:      Palpations: Abdomen is soft.   Neurological:      Mental Status: She is alert.   Psychiatric:         Mood and Affect: Mood normal.       Significant Labs:  BMP:   Recent Labs   Lab 09/10/22  0426   GLU 93      K 4.5      CO2 27   BUN 22*   CREATININE 4.07*   CALCIUM 8.3*     CBC:   Recent Labs   Lab 09/10/22  0426   WBC 5.86   RBC 2.35*    HGB 7.6*   HCT 24.2*   *   .0*   MCH 32.3*   MCHC 31.4*        Significant Imaging:  Labs: Reviewed    Assessment/Plan:     ESRD (end stage renal disease)  Stable during dialysis    DM (diabetes mellitus)  Underlying condition    Chronic obstructive pulmonary disease  No wheezing    Primary hypertension  Controlled        Thank you for your consult. I will follow-up with patient. Please contact us if you have any additional questions.    Valdemar Be Jr, MD  Nephrology  [facility]

## 2022-09-10 NOTE — SUBJECTIVE & OBJECTIVE
Interval History: NAEO.     Review of Systems   Constitutional:  Positive for fatigue. Negative for chills, fever and unexpected weight change.   HENT:  Negative for congestion, mouth sores and sore throat.    Eyes:  Negative for photophobia and visual disturbance.   Respiratory:  Negative for cough, chest tightness and wheezing.    Cardiovascular:  Negative for palpitations and leg swelling.        At ct insertion site   Gastrointestinal:  Negative for abdominal pain, diarrhea, nausea and vomiting.   Endocrine: Negative for cold intolerance and heat intolerance.   Genitourinary:  Negative for difficulty urinating, dysuria, frequency and urgency.   Musculoskeletal:  Negative for arthralgias, back pain and myalgias.   Skin:  Negative for pallor and rash.   Neurological:  Positive for weakness. Negative for tremors, seizures, syncope, numbness and headaches.   Hematological:  Does not bruise/bleed easily.   Psychiatric/Behavioral:  Negative for agitation, confusion, hallucinations and suicidal ideas.    Objective:     Vital Signs (Most Recent):  Temp: 98.4 °F (36.9 °C) (09/10/22 0400)  Pulse: 62 (09/10/22 0806)  Resp: 20 (09/10/22 0806)  BP: 113/65 (09/10/22 0400)  SpO2: 97 % (09/10/22 0756)   Vital Signs (24h Range):  Temp:  [97.4 °F (36.3 °C)-98.4 °F (36.9 °C)] 98.4 °F (36.9 °C)  Pulse:  [] 62  Resp:  [16-20] 20  SpO2:  [97 %-100 %] 97 %  BP: (113-177)/(47-78) 113/65     Weight: 74 kg (163 lb 2.3 oz)  Body mass index is 27.15 kg/m².    Intake/Output Summary (Last 24 hours) at 9/10/2022 1028  Last data filed at 9/10/2022 0623  Gross per 24 hour   Intake 330 ml   Output 5 ml   Net 325 ml        Physical Exam  Vitals reviewed.   Constitutional:       Appearance: Normal appearance.   HENT:      Head: Normocephalic and atraumatic.   Eyes:      Extraocular Movements: Extraocular movements intact.   Cardiovascular:      Rate and Rhythm: Normal rate and regular rhythm.      Pulses: Normal pulses.      Heart sounds:  Normal heart sounds.   Pulmonary:      Effort: Pulmonary effort is normal.      Breath sounds: Normal breath sounds.   Abdominal:      General: Abdomen is flat.      Palpations: Abdomen is soft.   Musculoskeletal:      Comments: Normal tone, moves all extremities   Skin:     General: Skin is warm and dry.      Capillary Refill: Capillary refill takes less than 2 seconds.   Neurological:      General: No focal deficit present.      Mental Status: She is alert and oriented to person, place, and time.   Psychiatric:         Mood and Affect: Mood normal.         Behavior: Behavior normal.       Significant Labs: All pertinent labs within the past 24 hours have been reviewed.    Significant Imaging: I have reviewed all pertinent imaging results/findings within the past 24 hours.

## 2022-09-10 NOTE — PLAN OF CARE
Problem: Infection  Goal: Absence of Infection Signs and Symptoms  Outcome: Ongoing, Progressing  Intervention: Prevent or Manage Infection  Flowsheets (Taken 9/10/2022 7498)  Fever Reduction/Comfort Measures: lightweight bedding  Infection Management: aseptic technique maintained  Isolation Precautions: precautions maintained

## 2022-09-10 NOTE — PROGRESS NOTES
C/o pain in epigastrium and right chest  States she is having trouble with BM    No air leak noted in chest tube  Mild epigastric tenderness    Bowel stimulation  Continue chest tube, retaped to chest wall to see if this helps her pain.

## 2022-09-10 NOTE — ASSESSMENT & PLAN NOTE
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  Repeat images in 2 weeks. Dc to swingbed with CT

## 2022-09-10 NOTE — PROGRESS NOTES
[facility] Hospital Medicine  Progress Note    Patient Name: Bria Ray  MRN: [id number]  Patient Class: IP- Inpatient   Admission Date: 8/29/2022  Length of Stay: 12 days  Attending Physician: Neymar Glass DO  Primary Care Provider: Lorena Richardson MD        Subjective:     Principal Problem:Pneumothorax on right        HPI:  Patient is a 87-year-old female with past medical history of hypertension, diabetes mellitus, ESRD, COPD, DJD, presented from home as direct admission for right-sided pneumothorax.  Patient underwent right-sided thoracentesis last Thursday for pleural effusion, however since then she continued to have right-sided chest pain along with shortness of bread for which she got an x-ray revealing pneumothorax on the right side.  Patient was then instructed to come to the hospital for further care.  Patient's primary pulmonologist is Dr. Bautista, and has been seeing General surgery in the past for her dialysis access issues.  Patient is also on peritoneal dialysis at home.  Patient is accompanied by her granddaughter, who is trying to get her hospice setup outpatient due to chronic conditions and mostly how her health has been declining steadily for the past few months.  I had a long discussion about goals of care for the patient as of now the patient has not made her mind about her code status and would like to be full code until she has a discussion with the rest of her family.  General surgery and Nephrology consulted and made aware of the patient's arrival.  Patient denies any fevers chills nausea vomiting or diarrhea does have cough and shortness of breath along with right-sided chest pain.      Overview/Hospital Course:  9/5-pneumo unchanged. Resealed per surgery. Re-eval in am. Plan for discharge home with hospice once medically stable  9/6-small pneumo persists  9/7- no events overnight, NM study today to eval fistula. 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  Will re-image for fistula closure in 2 weeks  9/9- will need swingbed for 2 weeks for management of chest tube  9/10- will need swingbed, CM assisting. Pneumo resolved      Interval History: NAEO.     Review of Systems   Constitutional:  Positive for fatigue. Negative for chills, fever and unexpected weight change.   HENT:  Negative for congestion, mouth sores and sore throat.    Eyes:  Negative for photophobia and visual disturbance.   Respiratory:  Negative for cough, chest tightness and wheezing.    Cardiovascular:  Negative for palpitations and leg swelling.        At ct insertion site   Gastrointestinal:  Negative for abdominal pain, diarrhea, nausea and vomiting.   Endocrine: Negative for cold intolerance and heat intolerance.   Genitourinary:  Negative for difficulty urinating, dysuria, frequency and urgency.   Musculoskeletal:  Negative for arthralgias, back pain and myalgias.   Skin:  Negative for pallor and rash.   Neurological:  Positive for weakness. Negative for tremors, seizures, syncope, numbness and headaches.   Hematological:  Does not bruise/bleed easily.   Psychiatric/Behavioral:  Negative for agitation, confusion, hallucinations and suicidal ideas.    Objective:     Vital Signs (Most Recent):  Temp: 98.4 °F (36.9 °C) (09/10/22 0400)  Pulse: 62 (09/10/22 0806)  Resp: 20 (09/10/22 0806)  BP: 113/65 (09/10/22 0400)  SpO2: 97 % (09/10/22 0756)   Vital Signs (24h Range):  Temp:  [97.4 °F (36.3 °C)-98.4 °F (36.9 °C)] 98.4 °F (36.9 °C)  Pulse:  [] 62  Resp:  [16-20] 20  SpO2:  [97 %-100 %] 97 %  BP: (113-177)/(47-78) 113/65     Weight: 74 kg (163 lb 2.3 oz)  Body mass index is 27.15 kg/m².    Intake/Output Summary (Last 24 hours) at 9/10/2022 1028  Last data filed at 9/10/2022 0623  Gross per 24 hour   Intake 330 ml   Output 5 ml   Net 325 ml        Physical Exam  Vitals reviewed.   Constitutional:        Appearance: Normal appearance.   HENT:      Head: Normocephalic and atraumatic.   Eyes:      Extraocular Movements: Extraocular movements intact.   Cardiovascular:      Rate and Rhythm: Normal rate and regular rhythm.      Pulses: Normal pulses.      Heart sounds: Normal heart sounds.   Pulmonary:      Effort: Pulmonary effort is normal.      Breath sounds: Normal breath sounds.   Abdominal:      General: Abdomen is flat.      Palpations: Abdomen is soft.   Musculoskeletal:      Comments: Normal tone, moves all extremities   Skin:     General: Skin is warm and dry.      Capillary Refill: Capillary refill takes less than 2 seconds.   Neurological:      General: No focal deficit present.      Mental Status: She is alert and oriented to person, place, and time.   Psychiatric:         Mood and Affect: Mood normal.         Behavior: Behavior normal.       Significant Labs: All pertinent labs within the past 24 hours have been reviewed.    Significant Imaging: I have reviewed all pertinent imaging results/findings within the past 24 hours.      Assessment/Plan:      * Pneumothorax on right  Resolved    Atrial fibrillation  Patient with Paroxysmal (<7 days) atrial fibrillation which is controlled currently with Amiodarone. Patient is currently in sinus rhythm.[id number] Score: 4. HASBLED Score: Unable to calculate. Anticoagulation indicated. Anticoagulation done with apixaban, renally dose adjusted by pharmacy.    Detailed conversation in the presence of daughter, patient's decision was to go for AC.         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  Repeat images in 2 weeks. Dc to swingbed with CT      ESRD (end stage renal disease)  Pt w/ diaghragmatic defet, hence PD has been stopped and HD initiated.   Persistent fistula, will send home with CT and plan for HD. Re-image in 2 weeks to see if it has healed    DM (diabetes mellitus)  Patient's FSGs are controlled on current  medication regimen.  Last A1c reviewed-   Lab Results   Component Value Date    HGBA1C 5.5 04/07/2022     Most recent fingerstick glucose reviewed- No results for input(s): POCTGLUCOSE in the last 24 hours.  Current correctional scale  Medium  Maintain anti-hyperglycemic dose as follows-   Antihyperglycemics (From admission, onward)    Start     Stop Route Frequency Ordered    08/29/22 1432  insulin aspart U-100 injection 1-10 Units         -- SubQ Before meals & nightly PRN 08/29/22 1333        Hold Oral hypoglycemics while patient is in the hospital.    Chronic obstructive pulmonary disease  Inhalers.   Oxygen support      Primary hypertension  Adjust medications as needed      Chronic pain syndrome  Cont gabapentin  Prn norco        VTE Risk Mitigation (From admission, onward)         Ordered     heparin (porcine) injection 4,000 Units  As needed (PRN)         09/02/22 1357     IP VTE HIGH RISK PATIENT  Once         08/29/22 1333     Place sequential compression device  Until discontinued         08/29/22 1333                Discharge Planning   ANGY:      Code Status: Full Code   Is the patient medically ready for discharge?:     Reason for patient still in hospital (select all that apply): Treatment  Discharge Plan A: Long-term acute care facility (LTAC)   Discharge Delays: None known at this time              Neymar Glass DO  Department of Hospital Medicine   [facility]

## 2022-09-11 NOTE — PROGRESS NOTES
[facility] Medicine  Progress Note    Patient Name: Bria Ray  MRN: [id number]  Patient Class: IP- Inpatient   Admission Date: 8/29/2022  Length of Stay: 13 days  Attending Physician: Neymar Glass DO  Primary Care Provider: Lorena Richardson MD        Subjective:     Principal Problem:Pneumothorax on right        HPI:  Patient is a 87-year-old female with past medical history of hypertension, diabetes mellitus, ESRD, COPD, DJD, presented from home as direct admission for right-sided pneumothorax.  Patient underwent right-sided thoracentesis last Thursday for pleural effusion, however since then she continued to have right-sided chest pain along with shortness of bread for which she got an x-ray revealing pneumothorax on the right side.  Patient was then instructed to come to the hospital for further care.  Patient's primary pulmonologist is Dr. Bautista, and has been seeing General surgery in the past for her dialysis access issues.  Patient is also on peritoneal dialysis at home.  Patient is accompanied by her granddaughter, who is trying to get her hospice setup outpatient due to chronic conditions and mostly how her health has been declining steadily for the past few months.  I had a long discussion about goals of care for the patient as of now the patient has not made her mind about her code status and would like to be full code until she has a discussion with the rest of her family.  General surgery and Nephrology consulted and made aware of the patient's arrival.  Patient denies any fevers chills nausea vomiting or diarrhea does have cough and shortness of breath along with right-sided chest pain.      Overview/Hospital Course:  9/5-pneumo unchanged. Resealed per surgery. Re-eval in am. Plan for discharge home with hospice once medically stable  9/6-small pneumo persists  9/7- no events overnight, NM study today to eval fistula. 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  Will re-image for fistula closure in 2 weeks  9/9- will need swingbed for 2 weeks for management of chest tube  9/10- will need swingbed, CM assisting. Pneumo resolved  9/11- no hospice since continuing dialysis, will need swingbed. CM following      Interval History: NAEO.     Review of Systems   Constitutional:  Positive for fatigue. Negative for chills, fever and unexpected weight change.   HENT:  Negative for congestion, mouth sores and sore throat.    Eyes:  Negative for photophobia and visual disturbance.   Respiratory:  Negative for cough, chest tightness and wheezing.    Cardiovascular:  Negative for palpitations and leg swelling.        At ct insertion site   Gastrointestinal:  Negative for abdominal pain, diarrhea, nausea and vomiting.   Endocrine: Negative for cold intolerance and heat intolerance.   Genitourinary:  Negative for difficulty urinating, dysuria, frequency and urgency.   Musculoskeletal:  Negative for arthralgias, back pain and myalgias.   Skin:  Negative for pallor and rash.   Neurological:  Positive for weakness. Negative for tremors, seizures, syncope, numbness and headaches.   Hematological:  Does not bruise/bleed easily.   Psychiatric/Behavioral:  Negative for agitation, confusion, hallucinations and suicidal ideas.    Objective:     Vital Signs (Most Recent):  Temp: 97.8 °F (36.6 °C) (09/11/22 1200)  Pulse: 88 (09/11/22 1300)  Resp: 15 (09/11/22 1300)  BP: (!) 148/34 (09/11/22 1200)  SpO2: 100 % (09/11/22 1200)   Vital Signs (24h Range):  Temp:  [97.3 °F (36.3 °C)-98.6 °F (37 °C)] 97.8 °F (36.6 °C)  Pulse:  [] 88  Resp:  [15-18] 15  SpO2:  [96 %-100 %] 100 %  BP: (121-159)/(34-82) 148/34     Weight: 75.4 kg (166 lb 3.2 oz)  Body mass index is 27.66 kg/m².    Intake/Output Summary (Last 24 hours) at 9/11/2022 9817  Last data filed at 9/10/2022 2041  Gross per 24 hour   Intake 358 ml    Output 0 ml   Net 358 ml        Physical Exam  Vitals reviewed.   Constitutional:       Appearance: Normal appearance.   HENT:      Head: Normocephalic and atraumatic.   Eyes:      Extraocular Movements: Extraocular movements intact.   Cardiovascular:      Rate and Rhythm: Normal rate and regular rhythm.      Pulses: Normal pulses.      Heart sounds: Normal heart sounds.   Pulmonary:      Effort: Pulmonary effort is normal.      Breath sounds: Normal breath sounds.   Abdominal:      General: Abdomen is flat.      Palpations: Abdomen is soft.   Musculoskeletal:      Comments: Normal tone, moves all extremities   Skin:     General: Skin is warm and dry.      Capillary Refill: Capillary refill takes less than 2 seconds.   Neurological:      General: No focal deficit present.      Mental Status: She is alert and oriented to person, place, and time.   Psychiatric:         Mood and Affect: Mood normal.         Behavior: Behavior normal.       Significant Labs: All pertinent labs within the past 24 hours have been reviewed.    Significant Imaging: I have reviewed all pertinent imaging results/findings within the past 24 hours.      Assessment/Plan:      * Pneumothorax on right  Resolved    Atrial fibrillation  Patient with Paroxysmal (<7 days) atrial fibrillation which is controlled currently with Amiodarone. Patient is currently in sinus rhythm.[id number] Score: 4. HASBLED Score: Unable to calculate. Anticoagulation indicated. Anticoagulation done with apixaban, renally dose adjusted by pharmacy.    Detailed conversation in the presence of daughter, patient's decision was to go for AC.         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  Repeat images in 2 weeks. Dc to swingbed with CT      ESRD (end stage renal disease)  Pt w/ diaghragmatic defet, hence PD has been stopped and HD initiated.   Persistent fistula, will send home with CT and plan for HD. Re-image in 2 weeks to see if  it has healed    DM (diabetes mellitus)  Patient's FSGs are controlled on current medication regimen.  Last A1c reviewed-   Lab Results   Component Value Date    HGBA1C 5.5 04/07/2022     Most recent fingerstick glucose reviewed- No results for input(s): POCTGLUCOSE in the last 24 hours.  Current correctional scale  Medium  Maintain anti-hyperglycemic dose as follows-   Antihyperglycemics (From admission, onward)    Start     Stop Route Frequency Ordered    08/29/22 1432  insulin aspart U-100 injection 1-10 Units         -- SubQ Before meals & nightly PRN 08/29/22 1333        Hold Oral hypoglycemics while patient is in the hospital.    Chronic obstructive pulmonary disease  Inhalers.   Oxygen support      Primary hypertension  Adjust medications as needed      Chronic pain syndrome  Cont gabapentin  Prn norco        VTE Risk Mitigation (From admission, onward)         Ordered     heparin (porcine) injection 4,000 Units  As needed (PRN)         09/02/22 1357     IP VTE HIGH RISK PATIENT  Once         08/29/22 1333     Place sequential compression device  Until discontinued         08/29/22 1333                Discharge Planning   ANGY:      Code Status: Full Code   Is the patient medically ready for discharge?:     Reason for patient still in hospital (select all that apply): Treatment  Discharge Plan A: Long-term acute care facility (LTAC)   Discharge Delays: None known at this time              Neymar Glass DO  Department of Hospital Medicine   [facility]

## 2022-09-11 NOTE — PROGRESS NOTES
[facility]  Nephrology  Progress Note    Patient Name: Bria Ray  MRN: [id number]  Admission Date: 8/29/2022  Hospital Length of Stay: 13 days  Attending Provider: Neymar Glass DO   Primary Care Physician: Lorena Richardson MD  Principal Problem:Pneumothorax on right    Subjective:     HPI: 87-year-old woman with ESRD secondary to diabetes.  She is on peritoneal dialysis.  She had a thoracentesis for right pleural effusion Thursday of last week.  She developed more shortness of breath over the weekend.  Chest x-ray today shows hydrothorax with collapse of the right lung.      Interval History: The patient complains of right sided pain.  Chest tube is in place    Review of patient's allergies indicates:  No Known Allergies  Current Facility-Administered Medications   Medication Frequency    0.9%  NaCl infusion PRN    acetaminophen tablet 1,000 mg Q6H PRN    albuterol-ipratropium 2.5 mg-0.5 mg/3 mL nebulizer solution 3 mL Q6H    allopurinoL tablet 100 mg Daily    amiodarone tablet 400 mg BID    atorvastatin tablet 40 mg Daily    budesonide nebulizer solution 0.5 mg Q12H    calcitRIOL capsule 0.25 mcg Daily    calcium carbonate 200 mg calcium (500 mg) chewable tablet 500 mg Daily    cyanocobalamin tablet 100 mcg Daily    dextrose 50% injection 12.5 g PRN    dextrose 50% injection 25 g PRN    diphenhydrAMINE injection 25 mg Q6H PRN    gabapentin capsule 100 mg BID    glucagon (human recombinant) injection 1 mg PRN    heparin (porcine) injection 4,000 Units PRN    hydrALAZINE injection 5 mg Q6H PRN    HYDROmorphone (PF) injection 0.5 mg Q5 Min PRN    insulin aspart U-100 injection 1-10 Units QID (AC + HS) PRN    magnesium oxide tablet 800 mg PRN    magnesium oxide tablet 800 mg PRN    melatonin tablet 9 mg Nightly PRN    morphine injection 4 mg Q5 Min PRN    mupirocin 2 % ointment Daily    naloxone 0.4 mg/mL injection 0.02 mg PRN    ondansetron injection 4 mg  Daily PRN    oxyCODONE-acetaminophen 7.5-325 mg per tablet 1 tablet Q4H PRN    pantoprazole EC tablet 40 mg BID AC    polyethylene glycol packet 34 g BID    potassium bicarbonate disintegrating tablet 35 mEq PRN    potassium bicarbonate disintegrating tablet 50 mEq PRN    potassium bicarbonate disintegrating tablet 60 mEq PRN    senna-docusate 8.6-50 mg per tablet 1 tablet BID    sevelamer carbonate tablet 800 mg TID AC    sodium chloride 0.9% flush 10 mL Q12H PRN    sodium chloride 0.9% flush 10 mL Q6H    And    sodium chloride 0.9% flush 10 mL PRN       Objective:     Vital Signs (Most Recent):  Temp: 97.9 °F (36.6 °C) (09/11/22 0400)  Pulse: 79 (09/11/22 0719)  Resp: 17 (09/11/22 0719)  BP: (!) 128/54 (09/11/22 0400)  SpO2: 97 % (09/11/22 0719)  O2 Device (Oxygen Therapy): nasal cannula (09/11/22 0719)   Vital Signs (24h Range):  Temp:  [97.3 °F (36.3 °C)-98.6 °F (37 °C)] 97.9 °F (36.6 °C)  Pulse:  [] 79  Resp:  [16-20] 17  SpO2:  [96 %-100 %] 97 %  BP: (128-159)/(54-82) 128/54     Weight: 75.4 kg (166 lb 3.2 oz) (09/11/22 0545)  Body mass index is 27.66 kg/m².  Body surface area is 1.86 meters squared.    I/O last 3 completed shifts:  In: 1408 [P.O.:1358; I.V.:50]  Out: 6 [Urine:1; Chest Tube:5]    Physical Exam  Vitals reviewed.   HENT:      Head: Normocephalic.      Mouth/Throat:      Mouth: Mucous membranes are moist.   Cardiovascular:      Rate and Rhythm: Regular rhythm.   Pulmonary:      Effort: Pulmonary effort is normal.      Comments: Chest tube on right  Abdominal:      Palpations: Abdomen is soft.   Neurological:      Mental Status: She is alert.   Psychiatric:         Mood and Affect: Mood normal.       Significant Labs:  BMP:   Recent Labs   Lab 09/10/22  0426   GLU 93      K 4.5      CO2 27   BUN 22*   CREATININE 4.07*   CALCIUM 8.3*     CBC:   Recent Labs   Lab 09/10/22  0426   WBC 5.86   RBC 2.35*   HGB 7.6*   HCT 24.2*   *   .0*   MCH 32.3*   MCHC 31.4*         Significant Imaging:  Labs: Reviewed    Assessment/Plan:     ESRD (end stage renal disease)  Stable during dialysis    Chronic obstructive pulmonary disease  No wheezing    Primary hypertension  Controlled        Thank you for your consult. I will follow-up with patient. Please contact us if you have any additional questions.    Valdemar Be Jr, MD  Nephrology  [facility]

## 2022-09-11 NOTE — SUBJECTIVE & OBJECTIVE
Interval History: NAEO.     Review of Systems   Constitutional:  Positive for fatigue. Negative for chills, fever and unexpected weight change.   HENT:  Negative for congestion, mouth sores and sore throat.    Eyes:  Negative for photophobia and visual disturbance.   Respiratory:  Negative for cough, chest tightness and wheezing.    Cardiovascular:  Negative for palpitations and leg swelling.        At ct insertion site   Gastrointestinal:  Negative for abdominal pain, diarrhea, nausea and vomiting.   Endocrine: Negative for cold intolerance and heat intolerance.   Genitourinary:  Negative for difficulty urinating, dysuria, frequency and urgency.   Musculoskeletal:  Negative for arthralgias, back pain and myalgias.   Skin:  Negative for pallor and rash.   Neurological:  Positive for weakness. Negative for tremors, seizures, syncope, numbness and headaches.   Hematological:  Does not bruise/bleed easily.   Psychiatric/Behavioral:  Negative for agitation, confusion, hallucinations and suicidal ideas.    Objective:     Vital Signs (Most Recent):  Temp: 97.8 °F (36.6 °C) (09/11/22 1200)  Pulse: 88 (09/11/22 1300)  Resp: 15 (09/11/22 1300)  BP: (!) 148/34 (09/11/22 1200)  SpO2: 100 % (09/11/22 1200)   Vital Signs (24h Range):  Temp:  [97.3 °F (36.3 °C)-98.6 °F (37 °C)] 97.8 °F (36.6 °C)  Pulse:  [] 88  Resp:  [15-18] 15  SpO2:  [96 %-100 %] 100 %  BP: (121-159)/(34-82) 148/34     Weight: 75.4 kg (166 lb 3.2 oz)  Body mass index is 27.66 kg/m².    Intake/Output Summary (Last 24 hours) at 9/11/2022 1454  Last data filed at 9/10/2022 2041  Gross per 24 hour   Intake 358 ml   Output 0 ml   Net 358 ml        Physical Exam  Vitals reviewed.   Constitutional:       Appearance: Normal appearance.   HENT:      Head: Normocephalic and atraumatic.   Eyes:      Extraocular Movements: Extraocular movements intact.   Cardiovascular:      Rate and Rhythm: Normal rate and regular rhythm.      Pulses: Normal pulses.      Heart  sounds: Normal heart sounds.   Pulmonary:      Effort: Pulmonary effort is normal.      Breath sounds: Normal breath sounds.   Abdominal:      General: Abdomen is flat.      Palpations: Abdomen is soft.   Musculoskeletal:      Comments: Normal tone, moves all extremities   Skin:     General: Skin is warm and dry.      Capillary Refill: Capillary refill takes less than 2 seconds.   Neurological:      General: No focal deficit present.      Mental Status: She is alert and oriented to person, place, and time.   Psychiatric:         Mood and Affect: Mood normal.         Behavior: Behavior normal.       Significant Labs: All pertinent labs within the past 24 hours have been reviewed.    Significant Imaging: I have reviewed all pertinent imaging results/findings within the past 24 hours.

## 2022-09-11 NOTE — PROGRESS NOTES
Patient reports persistent and potentially worsening right upper quadrant epigastric pain.  She has had previous cholecystectomy    Abdomen mildly tender in the right upper quadrant.  Chest tube in place without obvious air leak    Check abdominal x-ray   Planning d/c to [facility] at some point soon.

## 2022-09-11 NOTE — SUBJECTIVE & OBJECTIVE
Interval History: The patient complains of right sided pain.  Chest tube is in place    Review of patient's allergies indicates:  No Known Allergies  Current Facility-Administered Medications   Medication Frequency    0.9%  NaCl infusion PRN    acetaminophen tablet 1,000 mg Q6H PRN    albuterol-ipratropium 2.5 mg-0.5 mg/3 mL nebulizer solution 3 mL Q6H    allopurinoL tablet 100 mg Daily    amiodarone tablet 400 mg BID    atorvastatin tablet 40 mg Daily    budesonide nebulizer solution 0.5 mg Q12H    calcitRIOL capsule 0.25 mcg Daily    calcium carbonate 200 mg calcium (500 mg) chewable tablet 500 mg Daily    cyanocobalamin tablet 100 mcg Daily    dextrose 50% injection 12.5 g PRN    dextrose 50% injection 25 g PRN    diphenhydrAMINE injection 25 mg Q6H PRN    gabapentin capsule 100 mg BID    glucagon (human recombinant) injection 1 mg PRN    heparin (porcine) injection 4,000 Units PRN    hydrALAZINE injection 5 mg Q6H PRN    HYDROmorphone (PF) injection 0.5 mg Q5 Min PRN    insulin aspart U-100 injection 1-10 Units QID (AC + HS) PRN    magnesium oxide tablet 800 mg PRN    magnesium oxide tablet 800 mg PRN    melatonin tablet 9 mg Nightly PRN    morphine injection 4 mg Q5 Min PRN    mupirocin 2 % ointment Daily    naloxone 0.4 mg/mL injection 0.02 mg PRN    ondansetron injection 4 mg Daily PRN    oxyCODONE-acetaminophen 7.5-325 mg per tablet 1 tablet Q4H PRN    pantoprazole EC tablet 40 mg BID AC    polyethylene glycol packet 34 g BID    potassium bicarbonate disintegrating tablet 35 mEq PRN    potassium bicarbonate disintegrating tablet 50 mEq PRN    potassium bicarbonate disintegrating tablet 60 mEq PRN    senna-docusate 8.6-50 mg per tablet 1 tablet BID    sevelamer carbonate tablet 800 mg TID AC    sodium chloride 0.9% flush 10 mL Q12H PRN    sodium chloride 0.9% flush 10 mL Q6H    And    sodium chloride 0.9% flush 10 mL PRN       Objective:     Vital Signs (Most Recent):  Temp: 97.9 °F (36.6 °C) (09/11/22  0400)  Pulse: 79 (09/11/22 0719)  Resp: 17 (09/11/22 0719)  BP: (!) 128/54 (09/11/22 0400)  SpO2: 97 % (09/11/22 0719)  O2 Device (Oxygen Therapy): nasal cannula (09/11/22 0719)   Vital Signs (24h Range):  Temp:  [97.3 °F (36.3 °C)-98.6 °F (37 °C)] 97.9 °F (36.6 °C)  Pulse:  [] 79  Resp:  [16-20] 17  SpO2:  [96 %-100 %] 97 %  BP: (128-159)/(54-82) 128/54     Weight: 75.4 kg (166 lb 3.2 oz) (09/11/22 0545)  Body mass index is 27.66 kg/m².  Body surface area is 1.86 meters squared.    I/O last 3 completed shifts:  In: 1408 [P.O.:1358; I.V.:50]  Out: 6 [Urine:1; Chest Tube:5]    Physical Exam  Vitals reviewed.   HENT:      Head: Normocephalic.      Mouth/Throat:      Mouth: Mucous membranes are moist.   Cardiovascular:      Rate and Rhythm: Regular rhythm.   Pulmonary:      Effort: Pulmonary effort is normal.      Comments: Chest tube on right  Abdominal:      Palpations: Abdomen is soft.   Neurological:      Mental Status: She is alert.   Psychiatric:         Mood and Affect: Mood normal.       Significant Labs:  BMP:   Recent Labs   Lab 09/10/22  0426   GLU 93      K 4.5      CO2 27   BUN 22*   CREATININE 4.07*   CALCIUM 8.3*     CBC:   Recent Labs   Lab 09/10/22  0426   WBC 5.86   RBC 2.35*   HGB 7.6*   HCT 24.2*   *   .0*   MCH 32.3*   MCHC 31.4*        Significant Imaging:  Labs: Reviewed

## 2022-09-11 NOTE — PLAN OF CARE
Problem: Infection  Goal: Absence of Infection Signs and Symptoms  9/10/2022 2222 by Rosales Franco RN  Outcome: Ongoing, Progressing  9/10/2022 2222 by Rosales Franco RN  Outcome: Ongoing, Progressing     Problem: Adult Inpatient Plan of Care  Goal: Plan of Care Review  9/10/2022 2222 by Rosales Franco RN  Outcome: Ongoing, Progressing  9/10/2022 2222 by Rosales Franco RN  Outcome: Ongoing, Progressing  Goal: Patient-Specific Goal (Individualized)  9/10/2022 2222 by Rosales Franco RN  Outcome: Ongoing, Progressing  9/10/2022 2222 by Rosales Franco RN  Outcome: Ongoing, Progressing  Goal: Absence of Hospital-Acquired Illness or Injury  9/10/2022 2222 by Rosales Franco RN  Outcome: Ongoing, Progressing  9/10/2022 2222 by Rosales Franco RN  Outcome: Ongoing, Progressing  Goal: Optimal Comfort and Wellbeing  9/10/2022 2222 by Rosales Franco RN  Outcome: Ongoing, Progressing  9/10/2022 2222 by Rosales Franco RN  Outcome: Ongoing, Progressing  Goal: Readiness for Transition of Care  Outcome: Ongoing, Progressing     Problem: Diabetes Comorbidity  Goal: Blood Glucose Level Within Targeted Range  Outcome: Ongoing, Progressing     Problem: Fall Injury Risk  Goal: Absence of Fall and Fall-Related Injury  Outcome: Ongoing, Progressing     Problem: Skin Injury Risk Increased  Goal: Skin Health and Integrity  Outcome: Ongoing, Progressing     Problem: Breathing Pattern Ineffective  Goal: Effective Breathing Pattern  Outcome: Ongoing, Progressing     Problem: Gas Exchange Impaired  Goal: Optimal Gas Exchange  Outcome: Ongoing, Progressing     Problem: Device-Related Complication Risk (Hemodialysis)  Goal: Safe, Effective Therapy Delivery  Outcome: Ongoing, Progressing     Problem: Hemodynamic Instability (Hemodialysis)  Goal: Effective Tissue Perfusion  Outcome: Ongoing, Progressing     Problem: Infection (Hemodialysis)  Goal: Absence of Infection Signs and Symptoms  Outcome: Ongoing, Progressing

## 2022-09-12 NOTE — SUBJECTIVE & OBJECTIVE
Interval History:  Seen during dialysis.  No SOB.  No GI symptoms.    Review of patient's allergies indicates:  No Known Allergies  Current Facility-Administered Medications   Medication Frequency    0.9%  NaCl infusion PRN    acetaminophen tablet 1,000 mg Q6H PRN    albuterol-ipratropium 2.5 mg-0.5 mg/3 mL nebulizer solution 3 mL Q6H    allopurinoL tablet 100 mg Daily    amiodarone tablet 400 mg BID    atorvastatin tablet 40 mg Daily    bisacodyL suppository 10 mg Daily PRN    budesonide nebulizer solution 0.5 mg Q12H    calcitRIOL capsule 0.25 mcg Daily    calcium carbonate 200 mg calcium (500 mg) chewable tablet 500 mg Daily    cyanocobalamin tablet 100 mcg Daily    dextrose 50% injection 12.5 g PRN    dextrose 50% injection 25 g PRN    diphenhydrAMINE injection 25 mg Q6H PRN    gabapentin capsule 100 mg BID    glucagon (human recombinant) injection 1 mg PRN    heparin (porcine) injection 4,000 Units PRN    hydrALAZINE injection 5 mg Q6H PRN    HYDROmorphone (PF) injection 0.5 mg Q5 Min PRN    insulin aspart U-100 injection 1-10 Units QID (AC + HS) PRN    lactulose 20 gram/30 mL solution Soln 20 g Daily    magnesium oxide tablet 800 mg PRN    magnesium oxide tablet 800 mg PRN    melatonin tablet 9 mg Nightly PRN    morphine injection 4 mg Q5 Min PRN    mupirocin 2 % ointment Daily    naloxone 0.4 mg/mL injection 0.02 mg PRN    ondansetron injection 4 mg Daily PRN    oxyCODONE-acetaminophen  mg per tablet 1 tablet Q4H PRN    pantoprazole EC tablet 40 mg BID AC    polyethylene glycol packet 34 g BID    potassium bicarbonate disintegrating tablet 35 mEq PRN    potassium bicarbonate disintegrating tablet 50 mEq PRN    potassium bicarbonate disintegrating tablet 60 mEq PRN    senna-docusate 8.6-50 mg per tablet 1 tablet BID    sevelamer carbonate tablet 800 mg TID AC    sodium chloride 0.9% flush 10 mL Q12H PRN    sodium chloride 0.9% flush 10 mL Q6H       Objective:     Vital Signs (Most Recent):  Temp: 97.9 °F  (36.6 °C) (09/12/22 1100)  Pulse: 70 (09/12/22 1300)  Resp: 20 (09/12/22 1300)  BP: (!) 200/93 (09/12/22 1300)  SpO2: 99 % (09/12/22 1212)  O2 Device (Oxygen Therapy): nasal cannula (09/12/22 1300)   Vital Signs (24h Range):  Temp:  [96.4 °F (35.8 °C)-98.3 °F (36.8 °C)] 97.9 °F (36.6 °C)  Pulse:  [55-72] 70  Resp:  [14-20] 20  SpO2:  [98 %-100 %] 99 %  BP: (126-200)/(45-93) 200/93     Weight: 75.3 kg (166 lb 1.6 oz) (09/12/22 0600)  Body mass index is 27.64 kg/m².  Body surface area is 1.86 meters squared.    I/O last 3 completed shifts:  In: 118 [P.O.:118]  Out: -     Physical Exam  Eyes:      Pupils: Pupils are equal, round, and reactive to light.   Cardiovascular:      Rate and Rhythm: Normal rate and regular rhythm.   Pulmonary:      Breath sounds: No wheezing or rales.      Comments: Right chest tube present.  Dullness right base  Abdominal:      Tenderness: There is no abdominal tenderness. There is no guarding.   Musculoskeletal:      Cervical back: Neck supple.      Right lower leg: No edema.      Left lower leg: No edema.   Neurological:      Mental Status: She is alert.       Significant Labs:  BMP:   Recent Labs   Lab 09/12/22 0227   *   *   K 5.4*      CO2 27   BUN 41*   CREATININE 6.25*   CALCIUM 8.5     CBC:   Recent Labs   Lab 09/12/22 0227   WBC 6.17   RBC 2.35*   HGB 7.6*   HCT 24.3*      .4*   MCH 32.3*   MCHC 31.3*        Significant Imaging:

## 2022-09-12 NOTE — PROGRESS NOTES
GEN Surgery  Oxygenating well  Chest tube placed to low wall suction initially minimum air leak that resolved  Dressing c/d/I  Follow cxr  laxative

## 2022-09-12 NOTE — PT/OT/SLP PROGRESS
Physical Therapy Treatment    Patient Name:  Bria Ray   MRN:  [id number]    Recommendations:     Discharge Recommendations:  home health PT, home with hospice   Discharge Equipment Recommendations: none   Barriers to discharge: Inaccessible home    Assessment:     Bria Ray is a 87 y.o. female admitted with a medical diagnosis of Pneumothorax on right.  She presents with the following impairments/functional limitations:  weakness, impaired endurance, impaired functional mobility, gait instability, decreased lower extremity function, decreased upper extremity function Pt required more assistance with mobility today and was unable to attempt ambulation. She also complained of dizziness while sitting at edge of bed. Will continue to increase mobility as able.    Rehab Prognosis: Fair; patient would benefit from acute skilled PT services to address these deficits and reach maximum level of function.    Recent Surgery: Procedure(s) (LRB):  INSERTION, CATHETER, HEMODIALYSIS, DUAL LUMEN (Right) 10 Days Post-Op    Plan:     During this hospitalization, patient to be seen 5 x/week to address the identified rehab impairments via gait training, therapeutic activities, therapeutic exercises and progress toward the following goals:    Plan of Care Expires:  10/08/22    Subjective     Chief Complaint: weakness  Patient/Family Comments/goals: Pt agreeable to PT  Pain/Comfort:  Pain Rating 1: 2/10  Location - Side 1: Right  Location - Orientation 1: lateral  Location 1: chest  Pain Addressed 1: Reposition  Pain Rating Post-Intervention 1: 2/10      Objective:     Communicated with CATE Mixon RN prior to session.  Patient found supine with chest tube, telemetry, PICC line, oxygen upon PT entry to room.     General Precautions: Standard, fall   Orthopedic Precautions:N/A   Braces: N/A  Respiratory Status: Nasal cannula, flow 2 L/min     Functional Mobility:  Bed Mobility:     Scooting: contact guard assistance  Supine to  Sit: contact guard assistance  Sit to Supine: contact guard assistance  Transfers:     Sit to Stand:  minimum assistance with rolling walker  Gait: unable  Balance: fair      AM-PAC 6 CLICK MOBILITY  Turning over in bed (including adjusting bedclothes, sheets and blankets)?: 4  Sitting down on and standing up from a chair with arms (e.g., wheelchair, bedside commode, etc.): 3  Moving from lying on back to sitting on the side of the bed?: 3  Moving to and from a bed to a chair (including a wheelchair)?: 3  Need to walk in hospital room?: 3  Climbing 3-5 steps with a railing?: 1  Basic Mobility Total Score: 17       Therapeutic Activities and Exercises:   Pt performed bilateral LE: ankle pumps, Long arc quads, and Marching x 30 each  Sit to stand x 3 attempts with minimal assistance each attempt    Patient left supine with all lines intact and call button in reach..    GOALS:   Multidisciplinary Problems       Physical Therapy Goals          Problem: Physical Therapy    Goal Priority Disciplines Outcome Goal Variances Interventions   Physical Therapy Goal     PT, PT/OT Ongoing, Progressing     Description: Short term goals:  1. Supine to sit with Contact Guard Assistance  2. Sit to stand transfer with Contact Guard Assistance  3. Bed to chair transfer with Contact Guard Assistance using Rolling Walker  4. Gait  x 50 feet with Contact Guard Assistance using Rolling Walker.     Long term goals:  1. Supine to sit with Modified [place]  2. Sit to stand transfer with Modified [place]  3. Bed to chair transfer with Modified [place] using Rolling Walker  4. Gait  x 100 feet with Modified [place] using Rolling Walker.                          Time Tracking:     PT Received On: 09/12/22  PT Start Time: 1027     PT Stop Time: 1048  PT Total Time (min): 21 min     Billable Minutes: Therapeutic Exercise 20    Treatment Type: Treatment  PT/PTA: PT     PTA Visit Number: 0     09/12/2022

## 2022-09-12 NOTE — PROGRESS NOTES
[facility]  Nephrology  Progress Note    Patient Name: Bria Ray  MRN: [id number]  Admission Date: 8/29/2022  Hospital Length of Stay: 14 days  Attending Provider: Neymar Glass DO   Primary Care Physician: Lorena Richardson MD  Principal Problem:Pneumothorax on right    Subjective:     HPI: 87-year-old woman with ESRD secondary to diabetes.  She is on peritoneal dialysis.  She had a thoracentesis for right pleural effusion Thursday of last week.  She developed more shortness of breath over the weekend.  Chest x-ray today shows hydrothorax with collapse of the right lung.      Interval History:  Seen during dialysis.  No SOB.  No GI symptoms.    Review of patient's allergies indicates:  No Known Allergies  Current Facility-Administered Medications   Medication Frequency    0.9%  NaCl infusion PRN    acetaminophen tablet 1,000 mg Q6H PRN    albuterol-ipratropium 2.5 mg-0.5 mg/3 mL nebulizer solution 3 mL Q6H    allopurinoL tablet 100 mg Daily    amiodarone tablet 400 mg BID    atorvastatin tablet 40 mg Daily    bisacodyL suppository 10 mg Daily PRN    budesonide nebulizer solution 0.5 mg Q12H    calcitRIOL capsule 0.25 mcg Daily    calcium carbonate 200 mg calcium (500 mg) chewable tablet 500 mg Daily    cyanocobalamin tablet 100 mcg Daily    dextrose 50% injection 12.5 g PRN    dextrose 50% injection 25 g PRN    diphenhydrAMINE injection 25 mg Q6H PRN    gabapentin capsule 100 mg BID    glucagon (human recombinant) injection 1 mg PRN    heparin (porcine) injection 4,000 Units PRN    hydrALAZINE injection 5 mg Q6H PRN    HYDROmorphone (PF) injection 0.5 mg Q5 Min PRN    insulin aspart U-100 injection 1-10 Units QID (AC + HS) PRN    lactulose 20 gram/30 mL solution Soln 20 g Daily    magnesium oxide tablet 800 mg PRN    magnesium oxide tablet 800 mg PRN    melatonin tablet 9 mg Nightly PRN    morphine injection 4 mg Q5 Min PRN    mupirocin 2 % ointment Daily     naloxone 0.4 mg/mL injection 0.02 mg PRN    ondansetron injection 4 mg Daily PRN    oxyCODONE-acetaminophen  mg per tablet 1 tablet Q4H PRN    pantoprazole EC tablet 40 mg BID AC    polyethylene glycol packet 34 g BID    potassium bicarbonate disintegrating tablet 35 mEq PRN    potassium bicarbonate disintegrating tablet 50 mEq PRN    potassium bicarbonate disintegrating tablet 60 mEq PRN    senna-docusate 8.6-50 mg per tablet 1 tablet BID    sevelamer carbonate tablet 800 mg TID AC    sodium chloride 0.9% flush 10 mL Q12H PRN    sodium chloride 0.9% flush 10 mL Q6H       Objective:     Vital Signs (Most Recent):  Temp: 97.9 °F (36.6 °C) (09/12/22 1100)  Pulse: 70 (09/12/22 1300)  Resp: 20 (09/12/22 1300)  BP: (!) 200/93 (09/12/22 1300)  SpO2: 99 % (09/12/22 1212)  O2 Device (Oxygen Therapy): nasal cannula (09/12/22 1300)   Vital Signs (24h Range):  Temp:  [96.4 °F (35.8 °C)-98.3 °F (36.8 °C)] 97.9 °F (36.6 °C)  Pulse:  [55-72] 70  Resp:  [14-20] 20  SpO2:  [98 %-100 %] 99 %  BP: (126-200)/(45-93) 200/93     Weight: 75.3 kg (166 lb 1.6 oz) (09/12/22 0600)  Body mass index is 27.64 kg/m².  Body surface area is 1.86 meters squared.    I/O last 3 completed shifts:  In: 118 [P.O.:118]  Out: -     Physical Exam  Eyes:      Pupils: Pupils are equal, round, and reactive to light.   Cardiovascular:      Rate and Rhythm: Normal rate and regular rhythm.   Pulmonary:      Breath sounds: No wheezing or rales.      Comments: Right chest tube present.  Dullness right base  Abdominal:      Tenderness: There is no abdominal tenderness. There is no guarding.   Musculoskeletal:      Cervical back: Neck supple.      Right lower leg: No edema.      Left lower leg: No edema.   Neurological:      Mental Status: She is alert.       Significant Labs:  BMP:   Recent Labs   Lab 09/12/22 0227   *   *   K 5.4*      CO2 27   BUN 41*   CREATININE 6.25*   CALCIUM 8.5     CBC:   Recent Labs   Lab  09/12/22  0227   WBC 6.17   RBC 2.35*   HGB 7.6*   HCT 24.3*      .4*   MCH 32.3*   MCHC 31.3*        Significant Imaging:      Assessment/Plan:     * Pneumothorax on right  Chest tube present.  PD on hold    ESRD (end stage renal disease)  Stable during dialysis    DM (diabetes mellitus)  Underlying condition    Chronic obstructive pulmonary disease  No wheezing    Primary hypertension  Controlled        Thank you for your consult.     Beni Fields MD  Nephrology  [facility]

## 2022-09-12 NOTE — PROGRESS NOTES
[facility] Hospital Medicine  Progress Note    Patient Name: Bria Ray  MRN: [id number]  Patient Class: IP- Inpatient   Admission Date: 8/29/2022  Length of Stay: 14 days  Attending Physician: Neymar Glass DO  Primary Care Provider: Lorena Richardson MD        Subjective:     Principal Problem:Pneumothorax on right        HPI:  Patient is a 87-year-old female with past medical history of hypertension, diabetes mellitus, ESRD, COPD, DJD, presented from home as direct admission for right-sided pneumothorax.  Patient underwent right-sided thoracentesis last Thursday for pleural effusion, however since then she continued to have right-sided chest pain along with shortness of bread for which she got an x-ray revealing pneumothorax on the right side.  Patient was then instructed to come to the hospital for further care.  Patient's primary pulmonologist is Dr. Bautista, and has been seeing General surgery in the past for her dialysis access issues.  Patient is also on peritoneal dialysis at home.  Patient is accompanied by her granddaughter, who is trying to get her hospice setup outpatient due to chronic conditions and mostly how her health has been declining steadily for the past few months.  I had a long discussion about goals of care for the patient as of now the patient has not made her mind about her code status and would like to be full code until she has a discussion with the rest of her family.  General surgery and Nephrology consulted and made aware of the patient's arrival.  Patient denies any fevers chills nausea vomiting or diarrhea does have cough and shortness of breath along with right-sided chest pain.      Overview/Hospital Course:  9/5-pneumo unchanged. Resealed per surgery. Re-eval in am. Plan for discharge home with hospice once medically stable  9/6-small pneumo persists  9/7- no events overnight, NM study today to eval fistula. 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  Will re-image for fistula closure in 2 weeks  9/9- will need swingbed for 2 weeks for management of chest tube  9/10- will need swingbed, CM assisting. Pneumo resolved  9/11- no hospice since continuing dialysis, will need swingbed. CM following  9/12-CM assisting with placement      Interval History: NAEO.     Review of Systems   Constitutional:  Positive for fatigue. Negative for chills, fever and unexpected weight change.   HENT:  Negative for congestion, mouth sores and sore throat.    Eyes:  Negative for photophobia and visual disturbance.   Respiratory:  Negative for cough, chest tightness and wheezing.    Cardiovascular:  Negative for palpitations and leg swelling.        At ct insertion site   Gastrointestinal:  Negative for abdominal pain, diarrhea, nausea and vomiting.   Endocrine: Negative for cold intolerance and heat intolerance.   Genitourinary:  Negative for difficulty urinating, dysuria, frequency and urgency.   Musculoskeletal:  Negative for arthralgias, back pain and myalgias.   Skin:  Negative for pallor and rash.   Neurological:  Positive for weakness. Negative for tremors, seizures, syncope, numbness and headaches.   Hematological:  Does not bruise/bleed easily.   Psychiatric/Behavioral:  Negative for agitation, confusion, hallucinations and suicidal ideas.    Objective:     Vital Signs (Most Recent):  Temp: 97.9 °F (36.6 °C) (09/12/22 1100)  Pulse: 78 (09/12/22 1400)  Resp: 18 (09/12/22 1400)  BP: (!) 97/53 (09/12/22 1400)  SpO2: 99 % (09/12/22 1212)   Vital Signs (24h Range):  Temp:  [96.4 °F (35.8 °C)-98.3 °F (36.8 °C)] 97.9 °F (36.6 °C)  Pulse:  [55-81] 78  Resp:  [14-20] 18  SpO2:  [98 %-100 %] 99 %  BP: ()/(45-93) 97/53     Weight: 75.3 kg (166 lb 1.6 oz)  Body mass index is 27.64 kg/m².    Intake/Output Summary (Last 24 hours) at 9/12/2022 1423  Last data filed at 9/12/2022 1154  Gross  per 24 hour   Intake 10 ml   Output --   Net 10 ml        Physical Exam  Vitals reviewed.   Constitutional:       Appearance: Normal appearance.   HENT:      Head: Normocephalic and atraumatic.   Eyes:      Extraocular Movements: Extraocular movements intact.   Cardiovascular:      Rate and Rhythm: Normal rate and regular rhythm.      Pulses: Normal pulses.      Heart sounds: Normal heart sounds.   Pulmonary:      Effort: Pulmonary effort is normal.      Breath sounds: Normal breath sounds.   Abdominal:      General: Abdomen is flat.      Palpations: Abdomen is soft.   Musculoskeletal:      Comments: Normal tone, moves all extremities   Skin:     General: Skin is warm and dry.      Capillary Refill: Capillary refill takes less than 2 seconds.   Neurological:      General: No focal deficit present.      Mental Status: She is alert and oriented to person, place, and time.   Psychiatric:         Mood and Affect: Mood normal.         Behavior: Behavior normal.       Significant Labs: All pertinent labs within the past 24 hours have been reviewed.    Significant Imaging: I have reviewed all pertinent imaging results/findings within the past 24 hours.      Assessment/Plan:      * Pneumothorax on right  Resolved    Atrial fibrillation  Patient with Paroxysmal (<7 days) atrial fibrillation which is controlled currently with Amiodarone. Patient is currently in sinus rhythm.[id number] Score: 4. HASBLED Score: Unable to calculate. Anticoagulation indicated. Anticoagulation done with apixaban, renally dose adjusted by pharmacy.    Detailed conversation in the presence of daughter, patient's decision was to go for AC.         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  Repeat images in 2 weeks. Dc to swingbed with CT      ESRD (end stage renal disease)  Pt w/ diaghragmatic defet, hence PD has been stopped and HD initiated.   Persistent fistula, will send home with CT and plan for HD.  Re-image in 2 weeks to see if it has healed    DM (diabetes mellitus)  Patient's FSGs are controlled on current medication regimen.  Last A1c reviewed-   Lab Results   Component Value Date    HGBA1C 5.5 04/07/2022     Most recent fingerstick glucose reviewed- No results for input(s): POCTGLUCOSE in the last 24 hours.  Current correctional scale  Medium  Maintain anti-hyperglycemic dose as follows-   Antihyperglycemics (From admission, onward)    Start     Stop Route Frequency Ordered    08/29/22 1432  insulin aspart U-100 injection 1-10 Units         -- SubQ Before meals & nightly PRN 08/29/22 1333        Hold Oral hypoglycemics while patient is in the hospital.    Chronic obstructive pulmonary disease  Inhalers.   Oxygen support      Primary hypertension  Adjust medications as needed      Chronic pain syndrome  Cont gabapentin  Prn norco        VTE Risk Mitigation (From admission, onward)         Ordered     heparin (porcine) injection 4,000 Units  As needed (PRN)         09/02/22 1357     IP VTE HIGH RISK PATIENT  Once         08/29/22 1333     Place sequential compression device  Until discontinued         08/29/22 1333                Discharge Planning   ANGY:      Code Status: Full Code   Is the patient medically ready for discharge?:     Reason for patient still in hospital (select all that apply): Treatment  Discharge Plan A: Rehab ([facility])   Discharge Delays: None known at this time              Neymar Glass DO  Department of Hospital Medicine   [facility]

## 2022-09-12 NOTE — SUBJECTIVE & OBJECTIVE
Interval History: NAEO.     Review of Systems   Constitutional:  Positive for fatigue. Negative for chills, fever and unexpected weight change.   HENT:  Negative for congestion, mouth sores and sore throat.    Eyes:  Negative for photophobia and visual disturbance.   Respiratory:  Negative for cough, chest tightness and wheezing.    Cardiovascular:  Negative for palpitations and leg swelling.        At ct insertion site   Gastrointestinal:  Negative for abdominal pain, diarrhea, nausea and vomiting.   Endocrine: Negative for cold intolerance and heat intolerance.   Genitourinary:  Negative for difficulty urinating, dysuria, frequency and urgency.   Musculoskeletal:  Negative for arthralgias, back pain and myalgias.   Skin:  Negative for pallor and rash.   Neurological:  Positive for weakness. Negative for tremors, seizures, syncope, numbness and headaches.   Hematological:  Does not bruise/bleed easily.   Psychiatric/Behavioral:  Negative for agitation, confusion, hallucinations and suicidal ideas.    Objective:     Vital Signs (Most Recent):  Temp: 97.9 °F (36.6 °C) (09/12/22 1100)  Pulse: 78 (09/12/22 1400)  Resp: 18 (09/12/22 1400)  BP: (!) 97/53 (09/12/22 1400)  SpO2: 99 % (09/12/22 1212)   Vital Signs (24h Range):  Temp:  [96.4 °F (35.8 °C)-98.3 °F (36.8 °C)] 97.9 °F (36.6 °C)  Pulse:  [55-81] 78  Resp:  [14-20] 18  SpO2:  [98 %-100 %] 99 %  BP: ()/(45-93) 97/53     Weight: 75.3 kg (166 lb 1.6 oz)  Body mass index is 27.64 kg/m².    Intake/Output Summary (Last 24 hours) at 9/12/2022 1423  Last data filed at 9/12/2022 1154  Gross per 24 hour   Intake 10 ml   Output --   Net 10 ml        Physical Exam  Vitals reviewed.   Constitutional:       Appearance: Normal appearance.   HENT:      Head: Normocephalic and atraumatic.   Eyes:      Extraocular Movements: Extraocular movements intact.   Cardiovascular:      Rate and Rhythm: Normal rate and regular rhythm.      Pulses: Normal pulses.      Heart sounds:  Normal heart sounds.   Pulmonary:      Effort: Pulmonary effort is normal.      Breath sounds: Normal breath sounds.   Abdominal:      General: Abdomen is flat.      Palpations: Abdomen is soft.   Musculoskeletal:      Comments: Normal tone, moves all extremities   Skin:     General: Skin is warm and dry.      Capillary Refill: Capillary refill takes less than 2 seconds.   Neurological:      General: No focal deficit present.      Mental Status: She is alert and oriented to person, place, and time.   Psychiatric:         Mood and Affect: Mood normal.         Behavior: Behavior normal.       Significant Labs: All pertinent labs within the past 24 hours have been reviewed.    Significant Imaging: I have reviewed all pertinent imaging results/findings within the past 24 hours.

## 2022-09-12 NOTE — PLAN OF CARE
[facility]  Discharge Reassessment    Primary Care Provider: Lorena Richardson MD    Expected Discharge Date:     Reassessment (most recent)       Discharge Reassessment - 09/12/22 1205          Discharge Reassessment    Assessment Type Discharge Planning Reassessment     Discharge Plan discussed with: Adult children     Discharge Plan A Rehab   [facility]    Discharge Plan B Skilled Nursing Facility   [facility]    DME Needed Upon Discharge  none     Discharge Barriers Identified None        Post-Acute Status    Post-Acute Authorization Placement;Dialysis     Post-Acute Placement Status Patient List Provided     Diaylsis Status Referrals Sent     Patient choice form signed by patient/caregiver List with quality metrics by geographic area provided;List from CMS Compare     Discharge Delays None known at this time                   SW consulted for swb. SW spoke with pt and she informed SW to speak with son. SW spoke with pts sonJacky and received choice for [facility], [facility] and [facility]. SW will also have to arranged HD due to pt needing HD now and can no longer have peritoneal dialysis. SW will enter pt into [facility] portal and faxed referral to [facility]. If [facility] cannot accept, SW will fax referral to [facility] and will enter pt into [facility] intake portal. SW following.

## 2022-09-12 NOTE — PROGRESS NOTES
[facility]  Adult Nutrition  Follow-up Note         Reason for Assessment  Reason For Assessment: RD follow-up   Nutrition Risk Screen: no indicators present    Assessment and Plan  RD assessment of pt for follow up. Current weight is 166 lbs 1.6 oz.  Fluctuant weights given ESRD. Will continue to follow weight trends.     Pt po intakes appear decreased from last week at this time. K level also elevated at this time(last BM noted on 9/7 so constipation may be contributing to hyperkalemia). Recommend adjusting to renal high protein diet due to lab abnormalities(if K not normalized in the next several days). Also recommend addition of nutrition supplements to promote adequate calorie and protein intakes. Adding Nepro BID. Will monitor labs, meds, weights, po intakes, updates in pt condition.          Nutrition Diagnosis  Altered Nutrition Related Lab Values   related to Renal dysfunction as evidenced by renal lab abnormalities     Nutrition Diagnosis Status: Chronic/ continues  Comments: ESRD on PD at home, HD currently while inpatient     Nutrition Risk  Level of Risk/Frequency of Follow-up: moderate    Recent Labs   Lab 09/12/22  0227 09/12/22  1138   *  --    POCGLU  --  135*     Comments on Glucose: GLU elevated, pt with DM   Nutrition Prescription / Recommendations  Recommendation/Intervention: Recommend adjusting to renal diet due to eleavated K at this time. Renal high protein diet recommended. Will also add Nepro BID to supplement kcal/PRO given continued suboptomal intakes. Pt has stated she does not like supplements, but was discussed last week if she did not eat better, supplements would be added.  Goals: PO intakes % meals/supplements.  Nutrition Goal Status: goal not met  Current Diet Order: Consistent Carbohydrate 1800 kcal diet  Chewing or Swallowing Difficulty?: No Chewing or swallowing difficulty  Recommended Diet: Renal (High Protein)Diabetic diet  Recommended Oral  Supplement: Nepro [420 kcals, 19g Protein, 38g Carbs(6g Fiber, 8g Sugar), 23g Fat] twice daily  Is Nutrition Support Recommended: No  Is Education Recommended: No  Monitor and Evaluation  % current Intake: P.O. intake of 25 - 50 %  % intake to meet estimated needs: 75 - 100 %  Food and Nutrient Intake: energy intake  Food and Nutrient Adminstration: diet order  Anthropometric Measurements: weight, weight change  Biochemical Data, Medical Tests and Procedures: electrolyte and renal panel, gastrointestinal profile, glucose/endocrine profile, inflammatory profile, lipid profile  Nutrition-Focused Physical Findings: overall appearance, skin     Current Medical Diagnosis and Past Medical History  Diagnosis: other (see comments) (Pneumothorax on right)  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, without long-term current use of insulin     Vertigo     Vitamin D deficiency      Nutrition/Diet History  Typical Food/Fluid Intake: reports she typically eats well, B/L/D at home  Spiritual, Cultural Beliefs, Nondenominational Practices, Values that Affect Care: no  Lab/Procedures/Meds  Recent Labs   Lab 09/12/22 0227   *   K 5.4*   BUN 41*   CREATININE 6.25*   CALCIUM 8.5   ALBUMIN 2.0*      PHOS 5.5*     Last A1c:   Lab Results   Component Value Date    HGBA1C 5.5 04/07/2022     Lab Results   Component Value Date    RBC 2.35 (L) 09/12/2022    HGB 7.6 (L) 09/12/2022    HCT  "24.3 (L) 2022    .4 (H) 2022    MCH 32.3 (H) 2022    MCHC 31.3 (L) 2022    TIBC 171 (L) 2022     Pertinent Labs Reviewed: reviewed  Pertinent Labs Comments: Na 133(L), K 5.4(H), BUN 41(H), Creat 6.25(H), GFR 6(L), Phos 5.5(H), Alb 2.0(L)-all likely secondary to ESRD on HD  Pertinent Medications Reviewed: reviewed  Pertinent Medications Comments: albuterol, allopurinol, amiodarone, atorvastatin, budesonide, calcitriol, calcium carbonate, cyancobalamin, gabapentin, lactulose, minerol oil enema, pantoprazole, polyethylene glycol, senna-docusate, sevelamer, NaCl  Anthropometrics  Temp: 97.9 °F (36.6 °C)  Height: 5' 5" (165.1 cm)  Height (inches): 65 in  Weight Method: Bed Scale  Weight: 75.3 kg (166 lb 1.6 oz)  Weight (lb): 166.1 lb  Ideal Body Weight (IBW), Female: 125 lb  % Ideal Body Weight, Female (lb): 134.72 %  BMI (Calculated): 27.6  Usual Body Weight (UBW), k.4 kg  % Usual Body Weight: 108.73  % Weight Change From Usual Weight: 8.5 %     Estimated/Assessed Needs  Weight Used For Calorie Calculations: 70.4 kg (155 lb 3.3 oz) (Usual Body Weigh (UBW))   Energy Need Method: Kcal/kg Energy Calorie Requirements (kcal): 0839-1880 kcal (25-30 kcal/kg)  Weight Used For Protein Calculations: 76.4 kg (168 lb 6.9 oz)  Protein Requirements:  g PRO (1.2-1.5 g PRO/kg CBW r/tPD, increased PRO needs)       RDA Method (mL): 1760     Nutrition by Nursing  Diet/Nutrition Received: consistent carb/diabetic diet  Intake (%): 25%     Diet/Feeding Tolerance: fair  Last Bowel Movement: 22              Nutrition Follow-Up  RD Follow-up?: Yes    "

## 2022-09-13 NOTE — PLAN OF CARE
CATRACHITO spoke with Hanna and referral for outpt HD needs to be arranged before pt can be admitted. CATRACHITO sent referral to [facility]. SW following.

## 2022-09-13 NOTE — PLAN OF CARE
Problem: Infection  Goal: Absence of Infection Signs and Symptoms  Outcome: Ongoing, Progressing     Problem: Adult Inpatient Plan of Care  Goal: Plan of Care Review  Outcome: Ongoing, Progressing  Goal: Patient-Specific Goal (Individualized)  Outcome: Ongoing, Progressing  Goal: Absence of Hospital-Acquired Illness or Injury  Outcome: Ongoing, Progressing  Goal: Optimal Comfort and Wellbeing  Outcome: Ongoing, Progressing     Problem: Fall Injury Risk  Goal: Absence of Fall and Fall-Related Injury  Outcome: Ongoing, Progressing     Problem: Skin Injury Risk Increased  Goal: Skin Health and Integrity  Outcome: Ongoing, Progressing     Problem: Breathing Pattern Ineffective  Goal: Effective Breathing Pattern  Outcome: Ongoing, Progressing     Problem: Gas Exchange Impaired  Goal: Optimal Gas Exchange  Outcome: Ongoing, Progressing

## 2022-09-13 NOTE — SUBJECTIVE & OBJECTIVE
Interval History: NAEO.     Review of Systems   Constitutional:  Positive for fatigue. Negative for chills, fever and unexpected weight change.   HENT:  Negative for congestion, mouth sores and sore throat.    Eyes:  Negative for photophobia and visual disturbance.   Respiratory:  Negative for cough, chest tightness and wheezing.    Cardiovascular:  Negative for palpitations and leg swelling.        At ct insertion site   Gastrointestinal:  Negative for abdominal pain, diarrhea, nausea and vomiting.   Endocrine: Negative for cold intolerance and heat intolerance.   Genitourinary:  Negative for difficulty urinating, dysuria, frequency and urgency.   Musculoskeletal:  Negative for arthralgias, back pain and myalgias.   Skin:  Negative for pallor and rash.   Neurological:  Positive for weakness. Negative for tremors, seizures, syncope, numbness and headaches.   Hematological:  Does not bruise/bleed easily.   Psychiatric/Behavioral:  Negative for agitation, confusion, hallucinations and suicidal ideas.    Objective:     Vital Signs (Most Recent):  Temp: 98.5 °F (36.9 °C) (09/13/22 0730)  Pulse: 64 (09/13/22 0739)  Resp: 20 (09/13/22 0739)  BP: (!) 126/58 (09/13/22 0730)  SpO2: 99 % (09/13/22 0739)   Vital Signs (24h Range):  Temp:  [97.7 °F (36.5 °C)-98.5 °F (36.9 °C)] 98.5 °F (36.9 °C)  Pulse:  [63-84] 64  Resp:  [17-20] 20  SpO2:  [98 %-100 %] 99 %  BP: ()/(47-93) 126/58     Weight: 74.3 kg (163 lb 12.8 oz)  Body mass index is 27.26 kg/m².    Intake/Output Summary (Last 24 hours) at 9/13/2022 1148  Last data filed at 9/13/2022 0836  Gross per 24 hour   Intake 368 ml   Output 1500 ml   Net -1132 ml        Physical Exam  Vitals reviewed.   Constitutional:       Appearance: Normal appearance.   HENT:      Head: Normocephalic and atraumatic.   Eyes:      Extraocular Movements: Extraocular movements intact.   Cardiovascular:      Rate and Rhythm: Normal rate and regular rhythm.      Pulses: Normal pulses.      Heart  sounds: Normal heart sounds.   Pulmonary:      Effort: Pulmonary effort is normal.      Breath sounds: Normal breath sounds.   Abdominal:      General: Abdomen is flat.      Palpations: Abdomen is soft.   Musculoskeletal:      Comments: Normal tone, moves all extremities   Skin:     General: Skin is warm and dry.      Capillary Refill: Capillary refill takes less than 2 seconds.   Neurological:      General: No focal deficit present.      Mental Status: She is alert and oriented to person, place, and time.   Psychiatric:         Mood and Affect: Mood normal.         Behavior: Behavior normal.       Significant Labs: All pertinent labs within the past 24 hours have been reviewed.    Significant Imaging: I have reviewed all pertinent imaging results/findings within the past 24 hours.

## 2022-09-13 NOTE — PROGRESS NOTES
[facility] Hospital Medicine  Progress Note    Patient Name: Bria Ray  MRN: [id number]  Patient Class: IP- Inpatient   Admission Date: 8/29/2022  Length of Stay: 15 days  Attending Physician: Neymar Glass DO  Primary Care Provider: Lorena Richardson MD        Subjective:     Principal Problem:Pneumothorax on right        HPI:  Patient is a 87-year-old female with past medical history of hypertension, diabetes mellitus, ESRD, COPD, DJD, presented from home as direct admission for right-sided pneumothorax.  Patient underwent right-sided thoracentesis last Thursday for pleural effusion, however since then she continued to have right-sided chest pain along with shortness of bread for which she got an x-ray revealing pneumothorax on the right side.  Patient was then instructed to come to the hospital for further care.  Patient's primary pulmonologist is Dr. Bautista, and has been seeing General surgery in the past for her dialysis access issues.  Patient is also on peritoneal dialysis at home.  Patient is accompanied by her granddaughter, who is trying to get her hospice setup outpatient due to chronic conditions and mostly how her health has been declining steadily for the past few months.  I had a long discussion about goals of care for the patient as of now the patient has not made her mind about her code status and would like to be full code until she has a discussion with the rest of her family.  General surgery and Nephrology consulted and made aware of the patient's arrival.  Patient denies any fevers chills nausea vomiting or diarrhea does have cough and shortness of breath along with right-sided chest pain.      Overview/Hospital Course:  9/5-pneumo unchanged. Resealed per surgery. Re-eval in am. Plan for discharge home with hospice once medically stable  9/6-small pneumo persists  9/7- no events overnight, NM study today to eval fistula. 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  Will re-image for fistula closure in 2 weeks  9/9- will need swingbed for 2 weeks for management of chest tube  9/10- will need swingbed, CM assisting. Pneumo resolved  9/11- no hospice since continuing dialysis, will need swingbed. CM following  9/12-CM assisting with placement  9/13-no changes      Interval History: NAEO.     Review of Systems   Constitutional:  Positive for fatigue. Negative for chills, fever and unexpected weight change.   HENT:  Negative for congestion, mouth sores and sore throat.    Eyes:  Negative for photophobia and visual disturbance.   Respiratory:  Negative for cough, chest tightness and wheezing.    Cardiovascular:  Negative for palpitations and leg swelling.        At ct insertion site   Gastrointestinal:  Negative for abdominal pain, diarrhea, nausea and vomiting.   Endocrine: Negative for cold intolerance and heat intolerance.   Genitourinary:  Negative for difficulty urinating, dysuria, frequency and urgency.   Musculoskeletal:  Negative for arthralgias, back pain and myalgias.   Skin:  Negative for pallor and rash.   Neurological:  Positive for weakness. Negative for tremors, seizures, syncope, numbness and headaches.   Hematological:  Does not bruise/bleed easily.   Psychiatric/Behavioral:  Negative for agitation, confusion, hallucinations and suicidal ideas.    Objective:     Vital Signs (Most Recent):  Temp: 98.5 °F (36.9 °C) (09/13/22 0730)  Pulse: 64 (09/13/22 0739)  Resp: 20 (09/13/22 0739)  BP: (!) 126/58 (09/13/22 0730)  SpO2: 99 % (09/13/22 0739)   Vital Signs (24h Range):  Temp:  [97.7 °F (36.5 °C)-98.5 °F (36.9 °C)] 98.5 °F (36.9 °C)  Pulse:  [63-84] 64  Resp:  [17-20] 20  SpO2:  [98 %-100 %] 99 %  BP: ()/(47-93) 126/58     Weight: 74.3 kg (163 lb 12.8 oz)  Body mass index is 27.26 kg/m².    Intake/Output Summary (Last 24 hours) at 9/13/2022 1148  Last data filed at  9/13/2022 0836  Gross per 24 hour   Intake 368 ml   Output 1500 ml   Net -1132 ml        Physical Exam  Vitals reviewed.   Constitutional:       Appearance: Normal appearance.   HENT:      Head: Normocephalic and atraumatic.   Eyes:      Extraocular Movements: Extraocular movements intact.   Cardiovascular:      Rate and Rhythm: Normal rate and regular rhythm.      Pulses: Normal pulses.      Heart sounds: Normal heart sounds.   Pulmonary:      Effort: Pulmonary effort is normal.      Breath sounds: Normal breath sounds.   Abdominal:      General: Abdomen is flat.      Palpations: Abdomen is soft.   Musculoskeletal:      Comments: Normal tone, moves all extremities   Skin:     General: Skin is warm and dry.      Capillary Refill: Capillary refill takes less than 2 seconds.   Neurological:      General: No focal deficit present.      Mental Status: She is alert and oriented to person, place, and time.   Psychiatric:         Mood and Affect: Mood normal.         Behavior: Behavior normal.       Significant Labs: All pertinent labs within the past 24 hours have been reviewed.    Significant Imaging: I have reviewed all pertinent imaging results/findings within the past 24 hours.      Assessment/Plan:      * Pneumothorax on right  Resolved    Atrial fibrillation  Patient with Paroxysmal (<7 days) atrial fibrillation which is controlled currently with Amiodarone. Patient is currently in sinus rhythm.[id number] Score: 4. HASBLED Score: Unable to calculate. Anticoagulation indicated. Anticoagulation done with apixaban, renally dose adjusted by pharmacy.    Detailed conversation in the presence of daughter, patient's decision was to go for AC.         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  Repeat images in 2 weeks. Dc to swingbed with CT      ESRD (end stage renal disease)  Pt w/ diaghragmatic defet, hence PD has been stopped and HD initiated.   Persistent fistula, will send home  with CT and plan for HD. Re-image in 2 weeks to see if it has healed    DM (diabetes mellitus)  Patient's FSGs are controlled on current medication regimen.  Last A1c reviewed-   Lab Results   Component Value Date    HGBA1C 5.5 04/07/2022     Most recent fingerstick glucose reviewed- No results for input(s): POCTGLUCOSE in the last 24 hours.  Current correctional scale  Medium  Maintain anti-hyperglycemic dose as follows-   Antihyperglycemics (From admission, onward)    Start     Stop Route Frequency Ordered    08/29/22 1432  insulin aspart U-100 injection 1-10 Units         -- SubQ Before meals & nightly PRN 08/29/22 1333        Hold Oral hypoglycemics while patient is in the hospital.    Chronic obstructive pulmonary disease  Inhalers.   Oxygen support      Primary hypertension  Adjust medications as needed      Chronic pain syndrome  Cont gabapentin  Prn norco        VTE Risk Mitigation (From admission, onward)         Ordered     heparin (porcine) injection 4,000 Units  As needed (PRN)         09/02/22 1357     IP VTE HIGH RISK PATIENT  Once         08/29/22 1333     Place sequential compression device  Until discontinued         08/29/22 1333                Discharge Planning   ANGY:      Code Status: Full Code   Is the patient medically ready for discharge?:     Reason for patient still in hospital (select all that apply): Treatment  Discharge Plan A: Rehab (Jus Aggarwal)   Discharge Delays: None known at this time              Neymar Glass DO  Department of Hospital Medicine   [facility]

## 2022-09-13 NOTE — PLAN OF CARE
CATRACHITO spoke with NP, Jill and pts chest is not to suction and was taken off this a.m. CATRACHITO informed Hanna at [facility]. SW following.

## 2022-09-13 NOTE — PT/OT/SLP PROGRESS
Physical Therapy Treatment    Patient Name:  Bria Ray   MRN:  [id number]    Recommendations:     Discharge Recommendations:  home health PT, home with hospice   Discharge Equipment Recommendations: none   Barriers to discharge: Inaccessible home    Assessment:     Bria Ray is a 87 y.o. female admitted with a medical diagnosis of Pneumothorax on right.  She presents with the following impairments/functional limitations:  weakness, impaired endurance, impaired functional mobility, gait instability, decreased lower extremity function, decreased upper extremity function Pt limited in exercises due to complaints of lightheadedness. BP measured while sitting measured 87/52. Pt was unable to attempt standing due to this but reports transferring to bedside commode today with assistance. Pt is not ready for home and will require assistance at discharge.    Rehab Prognosis: Fair; patient would benefit from acute skilled PT services to address these deficits and reach maximum level of function.    Recent Surgery: Procedure(s) (LRB):  INSERTION, CATHETER, HEMODIALYSIS, DUAL LUMEN (Right) 11 Days Post-Op    Plan:     During this hospitalization, patient to be seen 5 x/week to address the identified rehab impairments via gait training, therapeutic activities, therapeutic exercises and progress toward the following goals:    Plan of Care Expires:  10/08/22    Subjective     Chief Complaint: pneumothorax  Patient/Family Comments/goals: Pt states she is not feeling well today  Pain/Comfort:  Pain Rating 1: 4/10  Location - Side 1: Right  Location 1: chest  Pain Addressed 1: Pre-medicate for activity  Pain Rating Post-Intervention 1: 3/10      Objective:     Communicated with EJ Ruiz RN prior to session.  Patient found supine with chest tube, telemetry, PICC line, oxygen upon PT entry to room.     General Precautions: Standard, fall   Orthopedic Precautions:N/A   Braces: N/A  Respiratory Status: Room air     Functional  Mobility:  Bed Mobility:     Scooting: minimum assistance  Supine to Sit: contact guard assistance  Sit to Supine: contact guard assistance  Balance: good sitting balance      AM-PAC 6 CLICK MOBILITY  Turning over in bed (including adjusting bedclothes, sheets and blankets)?: 4  Sitting down on and standing up from a chair with arms (e.g., wheelchair, bedside commode, etc.): 3  Moving from lying on back to sitting on the side of the bed?: 3  Moving to and from a bed to a chair (including a wheelchair)?: 3  Need to walk in hospital room?: 2  Climbing 3-5 steps with a railing?: 1  Basic Mobility Total Score: 16       Therapeutic Activities and Exercises:   Pt performed bilateral LE: ankle pumps, hip abduction/adduction, Long arc quads, and Marching x 20 each      Patient left supine with all lines intact and call button in reach..    GOALS:   Multidisciplinary Problems       Physical Therapy Goals          Problem: Physical Therapy    Goal Priority Disciplines Outcome Goal Variances Interventions   Physical Therapy Goal     PT, PT/OT Ongoing, Progressing     Description: Short term goals:  1. Supine to sit with Contact Guard Assistance  2. Sit to stand transfer with Contact Guard Assistance  3. Bed to chair transfer with Contact Guard Assistance using Rolling Walker  4. Gait  x 50 feet with Contact Guard Assistance using Rolling Walker.     Long term goals:  1. Supine to sit with Modified [place]  2. Sit to stand transfer with Modified [place]  3. Bed to chair transfer with Modified [place] using Rolling Walker  4. Gait  x 100 feet with Modified E[place] using Rolling Walker.                          Time Tracking:     PT Received On: 09/13/22  PT Start Time: 1344     PT Stop Time: 1407  PT Total Time (min): 23 min     Billable Minutes: Therapeutic Exercise 15    Treatment Type: Treatment  PT/PTA: PT     PTA Visit Number: 0     09/13/2022

## 2022-09-13 NOTE — PROGRESS NOTES
[facility]  Nephrology  Progress Note    Patient Name: Bria Ray  MRN: [id number]  Admission Date: 8/29/2022  Hospital Length of Stay: 15 days  Attending Provider: Neymar Glass DO   Primary Care Physician: Lorena Richardson MD  Principal Problem:Pneumothorax on right    Subjective:     HPI: 87-year-old woman with ESRD secondary to diabetes.  She is on peritoneal dialysis.  She had a thoracentesis for right pleural effusion Thursday of last week.  She developed more shortness of breath over the weekend.  Chest x-ray today shows hydrothorax with collapse of the right lung.      Interval History:  She is up in the chair today.  She denies shortness of breath.  No GI symptoms.    Review of patient's allergies indicates:  No Known Allergies  Current Facility-Administered Medications   Medication Frequency    0.9%  NaCl infusion PRN    acetaminophen tablet 1,000 mg Q6H PRN    albuterol-ipratropium 2.5 mg-0.5 mg/3 mL nebulizer solution 3 mL Q6H    allopurinoL tablet 100 mg Daily    amiodarone tablet 400 mg BID    atorvastatin tablet 40 mg Daily    bisacodyL suppository 10 mg Daily PRN    budesonide nebulizer solution 0.5 mg Q12H    calcitRIOL capsule 0.25 mcg Daily    calcium carbonate 200 mg calcium (500 mg) chewable tablet 500 mg Daily    cyanocobalamin tablet 100 mcg Daily    dextrose 50% injection 12.5 g PRN    dextrose 50% injection 25 g PRN    diphenhydrAMINE injection 25 mg Q6H PRN    epoetin rosa-epbx injection 10,000 Units Every Mon, Wed, Fri    gabapentin capsule 100 mg BID    glucagon (human recombinant) injection 1 mg PRN    heparin (porcine) injection 4,000 Units PRN    hydrALAZINE injection 5 mg Q6H PRN    HYDROmorphone (PF) injection 0.5 mg Q5 Min PRN    insulin aspart U-100 injection 1-10 Units QID (AC + HS) PRN    lactulose 20 gram/30 mL solution Soln 20 g Daily    magnesium oxide tablet 800 mg PRN    magnesium oxide tablet 800 mg PRN    melatonin  tablet 9 mg Nightly PRN    morphine injection 4 mg Q5 Min PRN    mupirocin 2 % ointment Daily    naloxone 0.4 mg/mL injection 0.02 mg PRN    ondansetron injection 4 mg Daily PRN    oxyCODONE-acetaminophen  mg per tablet 1 tablet Q4H PRN    pantoprazole EC tablet 40 mg BID AC    polyethylene glycol packet 34 g BID    potassium bicarbonate disintegrating tablet 35 mEq PRN    potassium bicarbonate disintegrating tablet 50 mEq PRN    potassium bicarbonate disintegrating tablet 60 mEq PRN    senna-docusate 8.6-50 mg per tablet 1 tablet BID    sevelamer carbonate tablet 800 mg TID AC    sodium chloride 0.9% flush 10 mL Q12H PRN    sodium chloride 0.9% flush 10 mL Q6H       Objective:     Vital Signs (Most Recent):  Temp: 98.5 °F (36.9 °C) (09/13/22 0730)  Pulse: 64 (09/13/22 0739)  Resp: 20 (09/13/22 0739)  BP: (!) 126/58 (09/13/22 0730)  SpO2: 99 % (09/13/22 0739)  O2 Device (Oxygen Therapy): nasal cannula (09/13/22 0739)   Vital Signs (24h Range):  Temp:  [97.7 °F (36.5 °C)-98.5 °F (36.9 °C)] 98.5 °F (36.9 °C)  Pulse:  [63-84] 64  Resp:  [17-20] 20  SpO2:  [98 %-100 %] 99 %  BP: ()/(47-93) 126/58     Weight: 74.3 kg (163 lb 12.8 oz) (09/13/22 0600)  Body mass index is 27.26 kg/m².  Body surface area is 1.85 meters squared.    I/O last 3 completed shifts:  In: 128 [P.O.:118; I.V.:10]  Out: 1500 [Other:1500]    Physical Exam  Eyes:      Pupils: Pupils are equal, round, and reactive to light.   Cardiovascular:      Rate and Rhythm: Normal rate and regular rhythm.   Pulmonary:      Breath sounds: No wheezing or rales.      Comments: Right chest tube present.  Dullness right base  Abdominal:      Tenderness: There is no abdominal tenderness. There is no guarding.   Musculoskeletal:      Cervical back: Neck supple.      Right lower leg: No edema.      Left lower leg: No edema.   Neurological:      Mental Status: She is alert.       Significant Labs:  BMP:   Recent Labs   Lab 09/12/22  0227   *    *   K 5.4*      CO2 27   BUN 41*   CREATININE 6.25*   CALCIUM 8.5        Significant Imaging:      Assessment/Plan:     * Pneumothorax on right  Chest tube present.  PD on hold    ESRD (end stage renal disease)  Dialysis MWF    DM (diabetes mellitus)  Underlying condition    Chronic obstructive pulmonary disease  No wheezing    Primary hypertension  Controlled        Thank you for your consult.     Beni Fields MD  Nephrology  [facility]

## 2022-09-13 NOTE — PLAN OF CARE
CATRACHITO spoke with Hanna at [facility] and she has received auth for admission. Pts dialysis needs to be set up first. CATRACHITO submitted referral today. Hanna also needs chest tube orders and to find out if pts chest tube. CATRACHITO called [facility] and spoke with Homa and CATRACHITO would need to call back in a.m. find out if the referral has been processed in system yet. CATRACHITO following.       1604: Per Hanna if pts chest tubes are to suction they cannot accept. CATRACHITO left message with Jill. CATRACHITO following.

## 2022-09-13 NOTE — SUBJECTIVE & OBJECTIVE
Interval History:  She is up in the chair today.  She denies shortness of breath.  No GI symptoms.    Review of patient's allergies indicates:  No Known Allergies  Current Facility-Administered Medications   Medication Frequency    0.9%  NaCl infusion PRN    acetaminophen tablet 1,000 mg Q6H PRN    albuterol-ipratropium 2.5 mg-0.5 mg/3 mL nebulizer solution 3 mL Q6H    allopurinoL tablet 100 mg Daily    amiodarone tablet 400 mg BID    atorvastatin tablet 40 mg Daily    bisacodyL suppository 10 mg Daily PRN    budesonide nebulizer solution 0.5 mg Q12H    calcitRIOL capsule 0.25 mcg Daily    calcium carbonate 200 mg calcium (500 mg) chewable tablet 500 mg Daily    cyanocobalamin tablet 100 mcg Daily    dextrose 50% injection 12.5 g PRN    dextrose 50% injection 25 g PRN    diphenhydrAMINE injection 25 mg Q6H PRN    epoetin rosa-epbx injection 10,000 Units Every Mon, Wed, Fri    gabapentin capsule 100 mg BID    glucagon (human recombinant) injection 1 mg PRN    heparin (porcine) injection 4,000 Units PRN    hydrALAZINE injection 5 mg Q6H PRN    HYDROmorphone (PF) injection 0.5 mg Q5 Min PRN    insulin aspart U-100 injection 1-10 Units QID (AC + HS) PRN    lactulose 20 gram/30 mL solution Soln 20 g Daily    magnesium oxide tablet 800 mg PRN    magnesium oxide tablet 800 mg PRN    melatonin tablet 9 mg Nightly PRN    morphine injection 4 mg Q5 Min PRN    mupirocin 2 % ointment Daily    naloxone 0.4 mg/mL injection 0.02 mg PRN    ondansetron injection 4 mg Daily PRN    oxyCODONE-acetaminophen  mg per tablet 1 tablet Q4H PRN    pantoprazole EC tablet 40 mg BID AC    polyethylene glycol packet 34 g BID    potassium bicarbonate disintegrating tablet 35 mEq PRN    potassium bicarbonate disintegrating tablet 50 mEq PRN    potassium bicarbonate disintegrating tablet 60 mEq PRN    senna-docusate 8.6-50 mg per tablet 1 tablet BID    sevelamer carbonate tablet 800 mg TID AC    sodium chloride 0.9% flush 10 mL Q12H PRN     sodium chloride 0.9% flush 10 mL Q6H       Objective:     Vital Signs (Most Recent):  Temp: 98.5 °F (36.9 °C) (09/13/22 0730)  Pulse: 64 (09/13/22 0739)  Resp: 20 (09/13/22 0739)  BP: (!) 126/58 (09/13/22 0730)  SpO2: 99 % (09/13/22 0739)  O2 Device (Oxygen Therapy): nasal cannula (09/13/22 0739)   Vital Signs (24h Range):  Temp:  [97.7 °F (36.5 °C)-98.5 °F (36.9 °C)] 98.5 °F (36.9 °C)  Pulse:  [63-84] 64  Resp:  [17-20] 20  SpO2:  [98 %-100 %] 99 %  BP: ()/(47-93) 126/58     Weight: 74.3 kg (163 lb 12.8 oz) (09/13/22 0600)  Body mass index is 27.26 kg/m².  Body surface area is 1.85 meters squared.    I/O last 3 completed shifts:  In: 128 [P.O.:118; I.V.:10]  Out: 1500 [Other:1500]    Physical Exam  Eyes:      Pupils: Pupils are equal, round, and reactive to light.   Cardiovascular:      Rate and Rhythm: Normal rate and regular rhythm.   Pulmonary:      Breath sounds: No wheezing or rales.      Comments: Right chest tube present.  Dullness right base  Abdominal:      Tenderness: There is no abdominal tenderness. There is no guarding.   Musculoskeletal:      Cervical back: Neck supple.      Right lower leg: No edema.      Left lower leg: No edema.   Neurological:      Mental Status: She is alert.       Significant Labs:  BMP:   Recent Labs   Lab 09/12/22  0227   *   *   K 5.4*      CO2 27   BUN 41*   CREATININE 6.25*   CALCIUM 8.5        Significant Imaging:

## 2022-09-13 NOTE — PLAN OF CARE
Hanna at [facility] wanted to know if pt still has chest tube. CATRACHITO informed Hanna that as of yesterday at 1023 pt still had chest tube to low wall suction. SW following.

## 2022-09-14 NOTE — PROGRESS NOTES
[facility] Hospital Medicine  Progress Note    Patient Name: Bria Ray  MRN: [id number]  Patient Class: IP- Inpatient   Admission Date: 8/29/2022  Length of Stay: 16 days  Attending Physician: Neymar Glass DO  Primary Care Provider: Lorena Richardson MD        Subjective:     Principal Problem:Pneumothorax on right        HPI:  Patient is a 87-year-old female with past medical history of hypertension, diabetes mellitus, ESRD, COPD, DJD, presented from home as direct admission for right-sided pneumothorax.  Patient underwent right-sided thoracentesis last Thursday for pleural effusion, however since then she continued to have right-sided chest pain along with shortness of bread for which she got an x-ray revealing pneumothorax on the right side.  Patient was then instructed to come to the hospital for further care.  Patient's primary pulmonologist is Dr. Bautista, and has been seeing General surgery in the past for her dialysis access issues.  Patient is also on peritoneal dialysis at home.  Patient is accompanied by her granddaughter, who is trying to get her hospice setup outpatient due to chronic conditions and mostly how her health has been declining steadily for the past few months.  I had a long discussion about goals of care for the patient as of now the patient has not made her mind about her code status and would like to be full code until she has a discussion with the rest of her family.  General surgery and Nephrology consulted and made aware of the patient's arrival.  Patient denies any fevers chills nausea vomiting or diarrhea does have cough and shortness of breath along with right-sided chest pain.      Overview/Hospital Course:  9/5-pneumo unchanged. Resealed per surgery. Re-eval in am. Plan for discharge home with hospice once medically stable  9/6-small pneumo persists  9/7- no events overnight, NM study today to eval fistula. 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  Will re-image for fistula closure in 2 weeks  9/9- will need swingbed for 2 weeks for management of chest tube  9/10- will need swingbed, CM assisting. Pneumo resolved  9/11- no hospice since continuing dialysis, will need swingbed. CM following  9/12-CM assisting with placement  9/13-no changes  9/14-dialysis today, awaiting placement      Interval History: NAEO.     Review of Systems   Constitutional:  Positive for fatigue. Negative for chills, fever and unexpected weight change.   HENT:  Negative for congestion, mouth sores and sore throat.    Eyes:  Negative for photophobia and visual disturbance.   Respiratory:  Negative for cough, chest tightness and wheezing.    Cardiovascular:  Negative for palpitations and leg swelling.        At ct insertion site   Gastrointestinal:  Negative for abdominal pain, diarrhea, nausea and vomiting.   Endocrine: Negative for cold intolerance and heat intolerance.   Genitourinary:  Negative for difficulty urinating, dysuria, frequency and urgency.   Musculoskeletal:  Negative for arthralgias, back pain and myalgias.   Skin:  Negative for pallor and rash.   Neurological:  Positive for weakness. Negative for tremors, seizures, syncope, numbness and headaches.   Hematological:  Does not bruise/bleed easily.   Psychiatric/Behavioral:  Negative for agitation, confusion, hallucinations and suicidal ideas.    Objective:     Vital Signs (Most Recent):  Temp: 98 °F (36.7 °C) (09/14/22 0832)  Pulse: 72 (09/14/22 0930)  Resp: 18 (09/14/22 0930)  BP: (!) 117/56 (09/14/22 0930)  SpO2: 97 % (09/14/22 0735)   Vital Signs (24h Range):  Temp:  [97.8 °F (36.6 °C)-98.7 °F (37.1 °C)] 98 °F (36.7 °C)  Pulse:  [60-76] 72  Resp:  [18-20] 18  SpO2:  [95 %-100 %] 97 %  BP: (106-148)/(49-72) 117/56     Weight: 74.3 kg (163 lb 12.8 oz)  Body mass index is 27.26 kg/m².    Intake/Output Summary (Last 24 hours) at  9/14/2022 1046  Last data filed at 9/13/2022 1813  Gross per 24 hour   Intake 240 ml   Output --   Net 240 ml        Physical Exam  Vitals reviewed.   Constitutional:       Appearance: Normal appearance.   HENT:      Head: Normocephalic and atraumatic.   Eyes:      Extraocular Movements: Extraocular movements intact.   Cardiovascular:      Rate and Rhythm: Normal rate and regular rhythm.      Pulses: Normal pulses.      Heart sounds: Normal heart sounds.   Pulmonary:      Effort: Pulmonary effort is normal.      Breath sounds: Normal breath sounds.   Abdominal:      General: Abdomen is flat.      Palpations: Abdomen is soft.   Musculoskeletal:      Comments: Normal tone, moves all extremities   Skin:     General: Skin is warm and dry.      Capillary Refill: Capillary refill takes less than 2 seconds.   Neurological:      General: No focal deficit present.      Mental Status: She is alert and oriented to person, place, and time.   Psychiatric:         Mood and Affect: Mood normal.         Behavior: Behavior normal.       Significant Labs: All pertinent labs within the past 24 hours have been reviewed.    Significant Imaging: I have reviewed all pertinent imaging results/findings within the past 24 hours.      Assessment/Plan:      * Pneumothorax on right  Resolved    Atrial fibrillation  Patient with Paroxysmal (<7 days) atrial fibrillation which is controlled currently with Amiodarone. Patient is currently in sinus rhythm.[id number] Score: 4. HASBLED Score: Unable to calculate. Anticoagulation indicated. Anticoagulation done with apixaban, renally dose adjusted by pharmacy.    Detailed conversation in the presence of daughter, patient's decision was to go for AC.         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  Repeat images in 2 weeks. Dc to swingbed with CT      ESRD (end stage renal disease)  Pt w/ diaghragmatic defet, hence PD has been stopped and HD initiated.    Persistent fistula, will send home with CT and plan for HD. Re-image in 2 weeks to see if it has healed    DM (diabetes mellitus)  Patient's FSGs are controlled on current medication regimen.  Last A1c reviewed-   Lab Results   Component Value Date    HGBA1C 5.5 04/07/2022     Most recent fingerstick glucose reviewed- No results for input(s): POCTGLUCOSE in the last 24 hours.  Current correctional scale  Medium  Maintain anti-hyperglycemic dose as follows-   Antihyperglycemics (From admission, onward)    Start     Stop Route Frequency Ordered    08/29/22 1432  insulin aspart U-100 injection 1-10 Units         -- SubQ Before meals & nightly PRN 08/29/22 1333        Hold Oral hypoglycemics while patient is in the hospital.    Chronic obstructive pulmonary disease  Inhalers.   Oxygen support      Primary hypertension  Adjust medications as needed      Chronic pain syndrome  Cont gabapentin  Prn norco        VTE Risk Mitigation (From admission, onward)         Ordered     heparin (porcine) injection 4,000 Units  As needed (PRN)         09/02/22 1357     IP VTE HIGH RISK PATIENT  Once         08/29/22 1333     Place sequential compression device  Until discontinued         08/29/22 1333                Discharge Planning   ANGY:      Code Status: Full Code   Is the patient medically ready for discharge?:     Reason for patient still in hospital (select all that apply): Treatment  Discharge Plan A: Rehab (Jus Aggarwal)   Discharge Delays: None known at this time              Neymar Glass DO  Department of Hospital Medicine   [facility]

## 2022-09-14 NOTE — SUBJECTIVE & OBJECTIVE
Interval History:  Seen during dialysis.  No SOB.    Review of patient's allergies indicates:  No Known Allergies  Current Facility-Administered Medications   Medication Frequency    0.9%  NaCl infusion PRN    acetaminophen tablet 1,000 mg Q6H PRN    albuterol-ipratropium 2.5 mg-0.5 mg/3 mL nebulizer solution 3 mL Q6H    allopurinoL tablet 100 mg Daily    amiodarone tablet 400 mg BID    atorvastatin tablet 40 mg Daily    bisacodyL suppository 10 mg Daily PRN    budesonide nebulizer solution 0.5 mg Q12H    calcitRIOL capsule 0.25 mcg Daily    calcium carbonate 200 mg calcium (500 mg) chewable tablet 500 mg Daily    cyanocobalamin tablet 100 mcg Daily    dextrose 50% injection 12.5 g PRN    dextrose 50% injection 25 g PRN    diphenhydrAMINE injection 25 mg Q6H PRN    [START ON 9/16/2022] epoetin rosa-epbx injection 10,000 Units Every Mon, Wed, Fri    gabapentin capsule 100 mg BID    glucagon (human recombinant) injection 1 mg PRN    heparin (porcine) injection 4,000 Units PRN    hydrALAZINE injection 5 mg Q6H PRN    HYDROmorphone (PF) injection 0.5 mg Q5 Min PRN    insulin aspart U-100 injection 1-10 Units QID (AC + HS) PRN    lactulose 20 gram/30 mL solution Soln 20 g Daily    magnesium hydroxide 400 mg/5 ml suspension 2,400 mg Daily    magnesium oxide tablet 800 mg PRN    magnesium oxide tablet 800 mg PRN    melatonin tablet 9 mg Nightly PRN    morphine injection 4 mg Q5 Min PRN    mupirocin 2 % ointment Daily    naloxone 0.4 mg/mL injection 0.02 mg PRN    ondansetron injection 4 mg Daily PRN    oxyCODONE-acetaminophen  mg per tablet 1 tablet Q4H PRN    pantoprazole EC tablet 40 mg BID AC    polyethylene glycol packet 34 g BID    potassium bicarbonate disintegrating tablet 35 mEq PRN    potassium bicarbonate disintegrating tablet 50 mEq PRN    potassium bicarbonate disintegrating tablet 60 mEq PRN    senna-docusate 8.6-50 mg per tablet 1 tablet BID    sevelamer carbonate tablet 800 mg TID AC    sodium chloride  0.9% flush 10 mL Q12H PRN    sodium chloride 0.9% flush 10 mL Q6H       Objective:     Vital Signs (Most Recent):  Temp: 98 °F (36.7 °C) (09/14/22 0832)  Pulse: 83 (09/14/22 1100)  Resp: 17 (09/14/22 1100)  BP: 112/65 (09/14/22 1100)  SpO2: 97 % (09/14/22 0735)  O2 Device (Oxygen Therapy): nasal cannula w/ humidification (09/14/22 0735) Vital Signs (24h Range):  Temp:  [97.8 °F (36.6 °C)-98.7 °F (37.1 °C)] 98 °F (36.7 °C)  Pulse:  [60-83] 83  Resp:  [17-20] 17  SpO2:  [95 %-100 %] 97 %  BP: (106-148)/(49-72) 112/65     Weight: 74.3 kg (163 lb 12.8 oz) (09/13/22 0600)  Body mass index is 27.26 kg/m².  Body surface area is 1.85 meters squared.    I/O last 3 completed shifts:  In: 598 [P.O.:598]  Out: 0     Physical Exam  Eyes:      Pupils: Pupils are equal, round, and reactive to light.   Cardiovascular:      Rate and Rhythm: Normal rate and regular rhythm.   Pulmonary:      Breath sounds: No wheezing or rales.      Comments: Right chest tube present.  Dullness right base  Abdominal:      Tenderness: There is no abdominal tenderness. There is no guarding.   Musculoskeletal:      Cervical back: Neck supple.      Right lower leg: No edema.      Left lower leg: No edema.   Neurological:      Mental Status: She is alert.       Significant Labs:  BMP:   Recent Labs   Lab 09/14/22  0455   *   *   K 4.4   CL 98   CO2 29   BUN 26*   CREATININE 5.33*   CALCIUM 8.6     CBC:   Recent Labs   Lab 09/12/22  0227   WBC 6.17   RBC 2.35*   HGB 7.6*   HCT 24.3*      .4*   MCH 32.3*   MCHC 31.3*        Significant Imaging:

## 2022-09-14 NOTE — PT/OT/SLP PROGRESS
Physical Therapy      Patient Name:  Bria Ray   MRN:  [id number]    Patient not seen today secondary to Patient unwilling to participate (Pt reports feeling bad following dialysis. Declined PT). Will follow-up 9/15/22.

## 2022-09-14 NOTE — PLAN OF CARE
Problem: Infection  Goal: Absence of Infection Signs and Symptoms  Outcome: Ongoing, Progressing     Problem: Adult Inpatient Plan of Care  Goal: Plan of Care Review  Outcome: Ongoing, Progressing  Flowsheets (Taken 9/14/2022 1604)  Plan of Care Reviewed With: patient  Goal: Patient-Specific Goal (Individualized)  Outcome: Ongoing, Progressing  Flowsheets (Taken 9/14/2022 1604)  Anxieties, Fears or Concerns: none voiced  Individualized Care Needs: chest tube remoeved  Patient-Specific Goals (Include Timeframe): chest tube removed  Goal: Absence of Hospital-Acquired Illness or Injury  Outcome: Ongoing, Progressing  Goal: Optimal Comfort and Wellbeing  Outcome: Ongoing, Progressing  Goal: Readiness for Transition of Care  Outcome: Ongoing, Progressing

## 2022-09-14 NOTE — SUBJECTIVE & OBJECTIVE
Interval History: NAEO.     Review of Systems   Constitutional:  Positive for fatigue. Negative for chills, fever and unexpected weight change.   HENT:  Negative for congestion, mouth sores and sore throat.    Eyes:  Negative for photophobia and visual disturbance.   Respiratory:  Negative for cough, chest tightness and wheezing.    Cardiovascular:  Negative for palpitations and leg swelling.        At ct insertion site   Gastrointestinal:  Negative for abdominal pain, diarrhea, nausea and vomiting.   Endocrine: Negative for cold intolerance and heat intolerance.   Genitourinary:  Negative for difficulty urinating, dysuria, frequency and urgency.   Musculoskeletal:  Negative for arthralgias, back pain and myalgias.   Skin:  Negative for pallor and rash.   Neurological:  Positive for weakness. Negative for tremors, seizures, syncope, numbness and headaches.   Hematological:  Does not bruise/bleed easily.   Psychiatric/Behavioral:  Negative for agitation, confusion, hallucinations and suicidal ideas.    Objective:     Vital Signs (Most Recent):  Temp: 98 °F (36.7 °C) (09/14/22 0832)  Pulse: 72 (09/14/22 0930)  Resp: 18 (09/14/22 0930)  BP: (!) 117/56 (09/14/22 0930)  SpO2: 97 % (09/14/22 0735)   Vital Signs (24h Range):  Temp:  [97.8 °F (36.6 °C)-98.7 °F (37.1 °C)] 98 °F (36.7 °C)  Pulse:  [60-76] 72  Resp:  [18-20] 18  SpO2:  [95 %-100 %] 97 %  BP: (106-148)/(49-72) 117/56     Weight: 74.3 kg (163 lb 12.8 oz)  Body mass index is 27.26 kg/m².    Intake/Output Summary (Last 24 hours) at 9/14/2022 1046  Last data filed at 9/13/2022 1813  Gross per 24 hour   Intake 240 ml   Output --   Net 240 ml        Physical Exam  Vitals reviewed.   Constitutional:       Appearance: Normal appearance.   HENT:      Head: Normocephalic and atraumatic.   Eyes:      Extraocular Movements: Extraocular movements intact.   Cardiovascular:      Rate and Rhythm: Normal rate and regular rhythm.      Pulses: Normal pulses.      Heart sounds:  Normal heart sounds.   Pulmonary:      Effort: Pulmonary effort is normal.      Breath sounds: Normal breath sounds.   Abdominal:      General: Abdomen is flat.      Palpations: Abdomen is soft.   Musculoskeletal:      Comments: Normal tone, moves all extremities   Skin:     General: Skin is warm and dry.      Capillary Refill: Capillary refill takes less than 2 seconds.   Neurological:      General: No focal deficit present.      Mental Status: She is alert and oriented to person, place, and time.   Psychiatric:         Mood and Affect: Mood normal.         Behavior: Behavior normal.       Significant Labs: All pertinent labs within the past 24 hours have been reviewed.    Significant Imaging: I have reviewed all pertinent imaging results/findings within the past 24 hours.

## 2022-09-14 NOTE — PROGRESS NOTES
[facility]  Nephrology  Progress Note    Patient Name: Bria Ray  MRN: [id number]  Admission Date: 8/29/2022  Hospital Length of Stay: 16 days  Attending Provider: Neymar Glass DO   Primary Care Physician: Lorena Richardson MD  Principal Problem:Pneumothorax on right    Subjective:     HPI: 87-year-old woman with ESRD secondary to diabetes.  She is on peritoneal dialysis.  She had a thoracentesis for right pleural effusion Thursday of last week.  She developed more shortness of breath over the weekend.  Chest x-ray today shows hydrothorax with collapse of the right lung.      Interval History:  Seen during dialysis.  No SOB.    Review of patient's allergies indicates:  No Known Allergies  Current Facility-Administered Medications   Medication Frequency    0.9%  NaCl infusion PRN    acetaminophen tablet 1,000 mg Q6H PRN    albuterol-ipratropium 2.5 mg-0.5 mg/3 mL nebulizer solution 3 mL Q6H    allopurinoL tablet 100 mg Daily    amiodarone tablet 400 mg BID    atorvastatin tablet 40 mg Daily    bisacodyL suppository 10 mg Daily PRN    budesonide nebulizer solution 0.5 mg Q12H    calcitRIOL capsule 0.25 mcg Daily    calcium carbonate 200 mg calcium (500 mg) chewable tablet 500 mg Daily    cyanocobalamin tablet 100 mcg Daily    dextrose 50% injection 12.5 g PRN    dextrose 50% injection 25 g PRN    diphenhydrAMINE injection 25 mg Q6H PRN    [START ON 9/16/2022] epoetin rosa-epbx injection 10,000 Units Every Mon, Wed, Fri    gabapentin capsule 100 mg BID    glucagon (human recombinant) injection 1 mg PRN    heparin (porcine) injection 4,000 Units PRN    hydrALAZINE injection 5 mg Q6H PRN    HYDROmorphone (PF) injection 0.5 mg Q5 Min PRN    insulin aspart U-100 injection 1-10 Units QID (AC + HS) PRN    lactulose 20 gram/30 mL solution Soln 20 g Daily    magnesium hydroxide 400 mg/5 ml suspension 2,400 mg Daily    magnesium oxide tablet 800 mg PRN    magnesium oxide  tablet 800 mg PRN    melatonin tablet 9 mg Nightly PRN    morphine injection 4 mg Q5 Min PRN    mupirocin 2 % ointment Daily    naloxone 0.4 mg/mL injection 0.02 mg PRN    ondansetron injection 4 mg Daily PRN    oxyCODONE-acetaminophen  mg per tablet 1 tablet Q4H PRN    pantoprazole EC tablet 40 mg BID AC    polyethylene glycol packet 34 g BID    potassium bicarbonate disintegrating tablet 35 mEq PRN    potassium bicarbonate disintegrating tablet 50 mEq PRN    potassium bicarbonate disintegrating tablet 60 mEq PRN    senna-docusate 8.6-50 mg per tablet 1 tablet BID    sevelamer carbonate tablet 800 mg TID AC    sodium chloride 0.9% flush 10 mL Q12H PRN    sodium chloride 0.9% flush 10 mL Q6H       Objective:     Vital Signs (Most Recent):  Temp: 98 °F (36.7 °C) (09/14/22 0832)  Pulse: 83 (09/14/22 1100)  Resp: 17 (09/14/22 1100)  BP: 112/65 (09/14/22 1100)  SpO2: 97 % (09/14/22 0735)  O2 Device (Oxygen Therapy): nasal cannula w/ humidification (09/14/22 0735) Vital Signs (24h Range):  Temp:  [97.8 °F (36.6 °C)-98.7 °F (37.1 °C)] 98 °F (36.7 °C)  Pulse:  [60-83] 83  Resp:  [17-20] 17  SpO2:  [95 %-100 %] 97 %  BP: (106-148)/(49-72) 112/65     Weight: 74.3 kg (163 lb 12.8 oz) (09/13/22 0600)  Body mass index is 27.26 kg/m².  Body surface area is 1.85 meters squared.    I/O last 3 completed shifts:  In: 598 [P.O.:598]  Out: 0     Physical Exam  Eyes:      Pupils: Pupils are equal, round, and reactive to light.   Cardiovascular:      Rate and Rhythm: Normal rate and regular rhythm.   Pulmonary:      Breath sounds: No wheezing or rales.      Comments: Right chest tube present.  Dullness right base  Abdominal:      Tenderness: There is no abdominal tenderness. There is no guarding.   Musculoskeletal:      Cervical back: Neck supple.      Right lower leg: No edema.      Left lower leg: No edema.   Neurological:      Mental Status: She is alert.       Significant Labs:  BMP:   Recent Labs   Lab  09/14/22  0455   *   *   K 4.4   CL 98   CO2 29   BUN 26*   CREATININE 5.33*   CALCIUM 8.6     CBC:   Recent Labs   Lab 09/12/22  0227   WBC 6.17   RBC 2.35*   HGB 7.6*   HCT 24.3*      .4*   MCH 32.3*   MCHC 31.3*        Significant Imaging:      Assessment/Plan:     * Pneumothorax on right  Chest tube present.  PD on hold    ESRD (end stage renal disease)  Seen during dialysis.  Blood pressure stable    DM (diabetes mellitus)  Underlying condition    Primary hypertension  Controlled        Thank you for your consult.     Beni Fields MD  Nephrology  [facility]

## 2022-09-14 NOTE — PROGRESS NOTES
GEN SURGERY    No air leak, no ptx on cxr, minimum chest tube output  Dressing c/d/I  Chest tube off suction to gravity  Abd distention nontender, +Flatus, no BM, no vomit  kub with stool  Enema/milk of magnesia

## 2022-09-14 NOTE — PLAN OF CARE
CATRACHITO spoke with Rose at [facility] and she informed SW to check back in about an hour to follow up on status of referral. CATRACHITO spoke with Hanna, once dialysis has been set up, pt can be accepted. SW following.

## 2022-09-15 NOTE — PT/OT/SLP PROGRESS
"Physical Therapy Treatment    Patient Name:  Bria Ray   MRN:  [id number]    Recommendations:     Discharge Recommendations:  home health PT, home with hospice   Discharge Equipment Recommendations: none   Barriers to discharge: Decreased caregiver support    Assessment:     Bria Ray is a 87 y.o. female admitted with a medical diagnosis of Pneumothorax on right.  She presents with the following impairments/functional limitations:  weakness, impaired endurance, impaired functional mobility, gait instability, decreased lower extremity function, decreased upper extremity function Pt complaining of increased pain at site of chest tube which limits all mobility. Pt was able to complete some exercises sitting at edge of bed but was limited by pain. Pt is not able to care for herself at home and will require assistance at discharge.    Rehab Prognosis: Fair; patient would benefit from acute skilled PT services to address these deficits and reach maximum level of function.    Recent Surgery: Procedure(s) (LRB):  INSERTION, CATHETER, HEMODIALYSIS, DUAL LUMEN (Right) 13 Days Post-Op    Plan:     During this hospitalization, patient to be seen 5 x/week to address the identified rehab impairments via gait training, therapeutic activities, therapeutic exercises and progress toward the following goals:    Plan of Care Expires:  10/08/22    Subjective     Chief Complaint: lateral chest pain  Patient/Family Comments/goals: "I'm hurting, but I need to do my therapy to get stronger"  Pain/Comfort:  Pain Rating 1: 7/10  Location - Side 1: Right  Location - Orientation 1: lateral  Location 1: chest  Pain Addressed 1: Reposition, Cessation of Activity  Pain Rating Post-Intervention 1: 7/10      Objective:     Communicated with RN prior to session.  Patient found supine with chest tube, PICC line upon PT entry to room.     General Precautions: Standard, fall   Orthopedic Precautions:N/A   Braces: N/A  Respiratory Status: Nasal " cannula, flow 2 L/min     Functional Mobility:  Bed Mobility:     Scooting: contact guard assistance  Supine to Sit: minimum assistance  Transfers:     Sit to Stand:  minimum assistance with no AD  Balance: fair      AM-PAC 6 CLICK MOBILITY  Turning over in bed (including adjusting bedclothes, sheets and blankets)?: 3  Sitting down on and standing up from a chair with arms (e.g., wheelchair, bedside commode, etc.): 3  Moving from lying on back to sitting on the side of the bed?: 3  Moving to and from a bed to a chair (including a wheelchair)?: 3  Need to walk in hospital room?: 2  Climbing 3-5 steps with a railing?: 1  Basic Mobility Total Score: 15       Therapeutic Activities and Exercises:   Pt performed bilateral LE: ankle pumps, hip abduction/adduction, and Long arc quads x 30 each, Bilateral UE reaching activity x 15      Patient left supine with all lines intact and call button in reach..    GOALS:   Multidisciplinary Problems       Physical Therapy Goals          Problem: Physical Therapy    Goal Priority Disciplines Outcome Goal Variances Interventions   Physical Therapy Goal     PT, PT/OT Ongoing, Progressing     Description: Short term goals:  1. Supine to sit with Contact Guard Assistance  2. Sit to stand transfer with Contact Guard Assistance  3. Bed to chair transfer with Contact Guard Assistance using Rolling Walker  4. Gait  x 50 feet with Contact Guard Assistance using Rolling Walker.     Long term goals:  1. Supine to sit with Modified [place]  2. Sit to stand transfer with Modified [place]  3. Bed to chair transfer with Modified [place] using Rolling Walker  4. Gait  x 100 feet with Modified [place] using Rolling Walker.                          Time Tracking:     PT Received On: 09/15/22  PT Start Time: 1433     PT Stop Time: 1452  PT Total Time (min): 19 min     Billable Minutes: Therapeutic Exercise 18    Treatment Type: Treatment  PT/PTA: PT     PTA Visit Number: 0      09/15/2022

## 2022-09-15 NOTE — ASSESSMENT & PLAN NOTE
Patient with Paroxysmal (<7 days) atrial fibrillation which is controlled currently with Amiodarone. Patient is currently in sinus rhythm.[id number] Score: 4.     Detailed conversation in the presence of daughter, patient's decision was to go for AC.   Holding for now given downtrending Hb

## 2022-09-15 NOTE — SUBJECTIVE & OBJECTIVE
Interval History: NAEO.     Review of Systems   Constitutional:  Positive for fatigue. Negative for chills, fever and unexpected weight change.   HENT:  Negative for congestion, mouth sores and sore throat.    Eyes:  Negative for photophobia and visual disturbance.   Respiratory:  Negative for cough, chest tightness and wheezing.    Cardiovascular:  Negative for palpitations and leg swelling.        At ct insertion site   Gastrointestinal:  Negative for abdominal pain, diarrhea, nausea and vomiting.   Endocrine: Negative for cold intolerance and heat intolerance.   Genitourinary:  Negative for difficulty urinating, dysuria, frequency and urgency.   Musculoskeletal:  Negative for arthralgias, back pain and myalgias.   Skin:  Negative for pallor and rash.   Neurological:  Positive for weakness. Negative for tremors, seizures, syncope, numbness and headaches.   Hematological:  Does not bruise/bleed easily.   Psychiatric/Behavioral:  Negative for agitation, confusion, hallucinations and suicidal ideas.    Objective:     Vital Signs (Most Recent):  Temp: 98.2 °F (36.8 °C) (09/15/22 0722)  Pulse: 63 (09/15/22 0745)  Resp: 18 (09/15/22 0745)  BP: (!) 111/51 (09/15/22 0722)  SpO2: 100 % (09/15/22 0745)   Vital Signs (24h Range):  Temp:  [97 °F (36.1 °C)-98.2 °F (36.8 °C)] 98.2 °F (36.8 °C)  Pulse:  [63-87] 63  Resp:  [16-20] 18  SpO2:  [91 %-100 %] 100 %  BP: (109-168)/(51-90) 111/51     Weight: 74.3 kg (163 lb 12.8 oz)  Body mass index is 27.26 kg/m².    Intake/Output Summary (Last 24 hours) at 9/15/2022 1016  Last data filed at 9/15/2022 0841  Gross per 24 hour   Intake 240 ml   Output 2482 ml   Net -2242 ml        Physical Exam  Vitals reviewed.   Constitutional:       Appearance: Normal appearance.   HENT:      Head: Normocephalic and atraumatic.   Eyes:      Extraocular Movements: Extraocular movements intact.   Cardiovascular:      Rate and Rhythm: Normal rate and regular rhythm.      Pulses: Normal pulses.      Heart  sounds: Normal heart sounds.   Pulmonary:      Effort: Pulmonary effort is normal.      Breath sounds: Normal breath sounds.   Abdominal:      General: Abdomen is flat.      Palpations: Abdomen is soft.   Musculoskeletal:      Comments: Normal tone, moves all extremities   Skin:     General: Skin is warm and dry.      Capillary Refill: Capillary refill takes less than 2 seconds.   Neurological:      General: No focal deficit present.      Mental Status: She is alert and oriented to person, place, and time.   Psychiatric:         Mood and Affect: Mood normal.         Behavior: Behavior normal.       Significant Labs: All pertinent labs within the past 24 hours have been reviewed.    Significant Imaging: I have reviewed all pertinent imaging results/findings within the past 24 hours.

## 2022-09-15 NOTE — PROGRESS NOTES
[facility] Hospital Medicine  Progress Note    Patient Name: Bria Ray  MRN: [id number]  Patient Class: IP- Inpatient   Admission Date: 8/29/2022  Length of Stay: 17 days  Attending Physician: Neymar Glass DO  Primary Care Provider: Lorena Richardson MD        Subjective:     Principal Problem:Pneumothorax on right        HPI:  Patient is a 87-year-old female with past medical history of hypertension, diabetes mellitus, ESRD, COPD, DJD, presented from home as direct admission for right-sided pneumothorax.  Patient underwent right-sided thoracentesis last Thursday for pleural effusion, however since then she continued to have right-sided chest pain along with shortness of bread for which she got an x-ray revealing pneumothorax on the right side.  Patient was then instructed to come to the hospital for further care.  Patient's primary pulmonologist is Dr. Bautista, and has been seeing General surgery in the past for her dialysis access issues.  Patient is also on peritoneal dialysis at home.  Patient is accompanied by her granddaughter, who is trying to get her hospice setup outpatient due to chronic conditions and mostly how her health has been declining steadily for the past few months.  I had a long discussion about goals of care for the patient as of now the patient has not made her mind about her code status and would like to be full code until she has a discussion with the rest of her family.  General surgery and Nephrology consulted and made aware of the patient's arrival.  Patient denies any fevers chills nausea vomiting or diarrhea does have cough and shortness of breath along with right-sided chest pain.      Overview/Hospital Course:  9/5-pneumo unchanged. Resealed per surgery. Re-eval in am. Plan for discharge home with hospice once medically stable  9/6-small pneumo persists  9/7- no events overnight, NM study today to eval fistula. 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  Will re-image for fistula closure in 2 weeks  9/9- will need swingbed for 2 weeks for management of chest tube  9/10- will need swingbed, CM assisting. Pneumo resolved  9/11- no hospice since continuing dialysis, will need swingbed. CM following  9/12-CM assisting with placement  9/13-no changes  9/14-dialysis today, awaiting placement  9/15- awating placement. Some pain around CT site today.      Interval History: NAEO.     Review of Systems   Constitutional:  Positive for fatigue. Negative for chills, fever and unexpected weight change.   HENT:  Negative for congestion, mouth sores and sore throat.    Eyes:  Negative for photophobia and visual disturbance.   Respiratory:  Negative for cough, chest tightness and wheezing.    Cardiovascular:  Negative for palpitations and leg swelling.        At ct insertion site   Gastrointestinal:  Negative for abdominal pain, diarrhea, nausea and vomiting.   Endocrine: Negative for cold intolerance and heat intolerance.   Genitourinary:  Negative for difficulty urinating, dysuria, frequency and urgency.   Musculoskeletal:  Negative for arthralgias, back pain and myalgias.   Skin:  Negative for pallor and rash.   Neurological:  Positive for weakness. Negative for tremors, seizures, syncope, numbness and headaches.   Hematological:  Does not bruise/bleed easily.   Psychiatric/Behavioral:  Negative for agitation, confusion, hallucinations and suicidal ideas.    Objective:     Vital Signs (Most Recent):  Temp: 98.2 °F (36.8 °C) (09/15/22 0722)  Pulse: 63 (09/15/22 0745)  Resp: 18 (09/15/22 0745)  BP: (!) 111/51 (09/15/22 0722)  SpO2: 100 % (09/15/22 0745)   Vital Signs (24h Range):  Temp:  [97 °F (36.1 °C)-98.2 °F (36.8 °C)] 98.2 °F (36.8 °C)  Pulse:  [63-87] 63  Resp:  [16-20] 18  SpO2:  [91 %-100 %] 100 %  BP: (109-168)/(51-90) 111/51     Weight: 74.3 kg (163 lb 12.8 oz)  Body mass  index is 27.26 kg/m².    Intake/Output Summary (Last 24 hours) at 9/15/2022 1016  Last data filed at 9/15/2022 0841  Gross per 24 hour   Intake 240 ml   Output 2482 ml   Net -2242 ml        Physical Exam  Vitals reviewed.   Constitutional:       Appearance: Normal appearance.   HENT:      Head: Normocephalic and atraumatic.   Eyes:      Extraocular Movements: Extraocular movements intact.   Cardiovascular:      Rate and Rhythm: Normal rate and regular rhythm.      Pulses: Normal pulses.      Heart sounds: Normal heart sounds.   Pulmonary:      Effort: Pulmonary effort is normal.      Breath sounds: Normal breath sounds.   Abdominal:      General: Abdomen is flat.      Palpations: Abdomen is soft.   Musculoskeletal:      Comments: Normal tone, moves all extremities   Skin:     General: Skin is warm and dry.      Capillary Refill: Capillary refill takes less than 2 seconds.   Neurological:      General: No focal deficit present.      Mental Status: She is alert and oriented to person, place, and time.   Psychiatric:         Mood and Affect: Mood normal.         Behavior: Behavior normal.       Significant Labs: All pertinent labs within the past 24 hours have been reviewed.    Significant Imaging: I have reviewed all pertinent imaging results/findings within the past 24 hours.      Assessment/Plan:      * Pneumothorax on right  Resolved    Atrial fibrillation  Patient with Paroxysmal (<7 days) atrial fibrillation which is controlled currently with Amiodarone. Patient is currently in sinus rhythm.[id number] Score: 4.     Detailed conversation in the presence of daughter, patient's decision was to go for AC.   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  Repeat images in 2 weeks. Dc to swingbed with CT      ESRD (end stage renal disease)  Pt w/ diaghragmatic defet, hence PD has been stopped and HD initiated.   Persistent fistula, will  send home with CT and plan for HD. Re-image in 2 weeks to see if it has healed    DM (diabetes mellitus)  Patient's FSGs are controlled on current medication regimen.  Last A1c reviewed-   Lab Results   Component Value Date    HGBA1C 5.5 04/07/2022     Most recent fingerstick glucose reviewed- No results for input(s): POCTGLUCOSE in the last 24 hours.  Current correctional scale  Medium  Maintain anti-hyperglycemic dose as follows-   Antihyperglycemics (From admission, onward)    Start     Stop Route Frequency Ordered    08/29/22 1432  insulin aspart U-100 injection 1-10 Units         -- SubQ Before meals & nightly PRN 08/29/22 1333        Hold Oral hypoglycemics while patient is in the hospital.    Chronic obstructive pulmonary disease  Inhalers.   Oxygen support      Primary hypertension  Adjust medications as needed      Chronic pain syndrome  Cont gabapentin  Prn norco      VTE Risk Mitigation (From admission, onward)         Ordered     heparin (porcine) injection 4,000 Units  As needed (PRN)         09/02/22 1357     IP VTE HIGH RISK PATIENT  Once         08/29/22 1333     Place sequential compression device  Until discontinued         08/29/22 1333                Discharge Planning   ANGY:      Code Status: Full Code   Is the patient medically ready for discharge?:     Reason for patient still in hospital (select all that apply): Treatment  Discharge Plan A: Rehab ([facility]us Aggarwal)   Discharge Delays: None known at this time              Neymar Glass DO  Department of Hospital Medicine   [facility]

## 2022-09-15 NOTE — PLAN OF CARE
Problem: Adult Inpatient Plan of Care  Goal: Plan of Care Review  Outcome: Ongoing, Progressing  Flowsheets (Taken 9/14/2022 2322)  Plan of Care Reviewed With: patient  Goal: Absence of Hospital-Acquired Illness or Injury  Outcome: Ongoing, Progressing  Intervention: Identify and Manage Fall Risk  Flowsheets (Taken 9/14/2022 2322)  Safety Promotion/Fall Prevention:   assistive device/personal item within reach   diversional activities provided   bedside commode chair   commode/urinal/bedpan at bedside   Fall Risk signage in place   Fall Risk reviewed with patient/family   supervised activity   instructed to call staff for mobility  Intervention: Prevent and Manage VTE (Venous Thromboembolism) Risk  Flowsheets (Taken 9/14/2022 2322)  Range of Motion: ROM (range of motion) performed  Goal: Optimal Comfort and Wellbeing  Outcome: Ongoing, Progressing  Intervention: Monitor Pain and Promote Comfort  Flowsheets (Taken 9/14/2022 2322)  Pain Management Interventions:   care clustered   quiet environment facilitated   relaxation techniques promoted   position adjusted  Intervention: Provide Person-Centered Care  Flowsheets (Taken 9/14/2022 2322)  Trust Relationship/Rapport:   care explained   choices provided     Problem: Diabetes Comorbidity  Goal: Blood Glucose Level Within Targeted Range  Outcome: Ongoing, Progressing  Intervention: Monitor and Manage Glycemia  Flowsheets (Taken 9/14/2022 2322)  Glycemic Management: blood glucose monitored     Problem: Gas Exchange Impaired  Goal: Optimal Gas Exchange  Outcome: Ongoing, Progressing  Intervention: Optimize Oxygenation and Ventilation  Flowsheets (Taken 9/14/2022 2322)  Airway/Ventilation Management: airway patency maintained  Head of Bed (HOB) Positioning: HOB elevated

## 2022-09-15 NOTE — PLAN OF CARE
Kayce is the assigned coordinator at [facility], however, she is unavailable at this time. There referral is still pending approval and clinic approval per Hillary at [facility]. SW following.

## 2022-09-15 NOTE — PLAN OF CARE
CATRACHIOT spoke with Hanna and she stated that she did not receive fax yesterday. CATRACHITO refaxed referral. CATRACHITO called [facility] and referral was still not received. CATRACHITO has to ask to submit referral over phone. Referral submitted over phone. SW awaiting for assigned coordinator to call back. SW following.

## 2022-09-15 NOTE — PROGRESS NOTES
[facility]  Adult Nutrition  Follow-up Note         Reason for Assessment  Reason For Assessment: RD follow-up   Nutrition Risk Screen: no indicators present    Assessment and Plan  RD assessment of pt for follow up. Current weight is 163#.  Fluctuant weights given ESRD. Will continue to follow weight trends.     Pt po intakes appear decreased from last week at this time. Nepro BID. Will monitor labs, meds, weights, po intakes, updates in pt condition.          Nutrition Diagnosis  Altered Nutrition Related Lab Values   related to Renal dysfunction as evidenced by renal lab abnormalities     Nutrition Diagnosis Status: Chronic/ continues  Comments: ESRD on PD at home, HD currently while inpatient     Nutrition Risk  Level of Risk/Frequency of Follow-up: moderate    Recent Labs   Lab 09/14/22  0455 09/14/22  0701 09/15/22  0644   *  --   --    POCGLU  --    < > 122*    < > = values in this interval not displayed.       Comments on Glucose: GLU elevated, pt with DM   Nutrition Prescription / Recommendations    Current Diet Order: Consistent Carbohydrate 1800 kcal diet  Chewing or Swallowing Difficulty?: No Chewing or swallowing difficulty  Recommended Diet: Renal (High Protein)Diabetic diet  Recommended Oral Supplement: Nepro [420 kcals, 19g Protein, 38g Carbs(6g Fiber, 8g Sugar), 23g Fat] twice daily  Is Nutrition Support Recommended: No  Is Education Recommended: No  Monitor and Evaluation  % current Intake: P.O. intake of 25 - 50 %  % intake to meet estimated needs: 75 - 100 %  Food and Nutrient Intake: energy intake  Food and Nutrient Adminstration: diet order  Anthropometric Measurements: weight, weight change  Biochemical Data, Medical Tests and Procedures: electrolyte and renal panel, gastrointestinal profile, glucose/endocrine profile, inflammatory profile, lipid profile  Nutrition-Focused Physical Findings: overall appearance, skin     Current Medical Diagnosis and Past Medical  "History  Diagnosis: other (see comments) (Pneumothorax on right)  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, without long-term current use of insulin     Vertigo     Vitamin D deficiency      Nutrition/Diet History  Typical Food/Fluid Intake: reports she typically eats well, B/L/D at home  Spiritual, Cultural Beliefs, Gnosticism Practices, Values that Affect Care: no  Lab/Procedures/Meds  Recent Labs   Lab 09/14/22  0455   *   K 4.4   BUN 26*   CREATININE 5.33*   CALCIUM 8.6   ALBUMIN 2.2*   CL 98   PHOS 3.2       Last A1c:   Lab Results   Component Value Date    HGBA1C 5.5 04/07/2022     Lab Results   Component Value Date    RBC 2.35 (L) 09/12/2022    HGB 7.6 (L) 09/12/2022    HCT 24.3 (L) 09/12/2022    .4 (H) 09/12/2022    MCH 32.3 (H) 09/12/2022    MCHC 31.3 (L) 09/12/2022    TIBC 171 (L) 09/08/2022     Pertinent Labs Reviewed: reviewed  Pertinent Labs Comments: Na 133(L), K 5.4(H), BUN 41(H), Creat 6.25(H), GFR 6(L), Phos 5.5(H), Alb 2.0(L)-all likely secondary to ESRD on HD  Pertinent Medications Reviewed: reviewed  Pertinent Medications Comments: albuterol, allopurinol, amiodarone, atorvastatin, budesonide, calcitriol, calcium carbonate, cyancobalamin, gabapentin, lactulose, minerol oil enema, pantoprazole, polyethylene glycol, senna-docusate, sevelamer, NaCl  Anthropometrics  Temp: 97 °F (36.1 °C)  Height: 5' 5" (165.1 cm)  Height (inches): 65 " in  Weight Method: Bed Scale  Weight: 74.3 kg (163 lb 12.8 oz)  Weight (lb): 163.8 lb  Ideal Body Weight (IBW), Female: 125 lb  % Ideal Body Weight, Female (lb): 134.72 %  BMI (Calculated): 27.3  Usual Body Weight (UBW), k.4 kg  % Usual Body Weight: 108.73  % Weight Change From Usual Weight: 8.5 %     Estimated/Assessed Needs  Weight Used For Calorie Calculations: 70.4 kg (155 lb 3.3 oz) (Usual Body Weigh (UBW))   Energy Need Method: Kcal/kg Energy Calorie Requirements (kcal): 7858-7048 kcal (25-30 kcal/kg)  Weight Used For Protein Calculations: 76.4 kg (168 lb 6.9 oz)  Protein Requirements:  g PRO (1.2-1.5 g PRO/kg CBW r/tPD, increased PRO needs)       RDA Method (mL): 1760     Nutrition by Nursing  Diet/Nutrition Received: consistent carb/diabetic diet  Intake (%): 50%     Diet/Feeding Tolerance: fair  Last Bowel Movement: 22              Nutrition Follow-Up  RD Follow-up?: Yes

## 2022-09-16 NOTE — SUBJECTIVE & OBJECTIVE
Interval History: +BM    Medications:  Continuous Infusions:  Scheduled Meds:   albuterol-ipratropium  3 mL Nebulization Q6H    allopurinoL  100 mg Oral Daily    amiodarone  400 mg Oral BID    atorvastatin  40 mg Oral Daily    budesonide  0.5 mg Nebulization Q12H    calcitRIOL  0.25 mcg Oral Daily    calcium carbonate  1 tablet Oral Daily    cyanocobalamin  100 mcg Oral Daily    epoetin rosa-epbx  10,000 Units Intravenous Every Mon, Wed, Fri    gabapentin  100 mg Oral BID    lactulose  20 g Oral Daily    magnesium hydroxide 400 mg/5 ml  30 mL Oral Daily    mupirocin   Topical (Top) Daily    pantoprazole  40 mg Oral BID AC    polyethylene glycol  34 g Oral BID    senna-docusate 8.6-50 mg  1 tablet Oral BID    sevelamer carbonate  800 mg Oral TID AC    sodium chloride 0.9%  10 mL Intravenous Q6H     PRN Meds:sodium chloride 0.9%, acetaminophen, bisacodyL, dextrose 50%, dextrose 50%, diphenhydrAMINE, glucagon (human recombinant), heparin (porcine), hydrALAZINE, insulin aspart U-100, LIDOcaine-prilocaine, magnesium oxide, magnesium oxide, melatonin, morphine, naloxone, ondansetron, oxyCODONE-acetaminophen, potassium bicarbonate, potassium bicarbonate, potassium bicarbonate, sodium chloride 0.9%     Review of patient's allergies indicates:  No Known Allergies  Objective:     Vital Signs (Most Recent):  Temp: 98 °F (36.7 °C) (09/16/22 0720)  Pulse: 64 (09/16/22 0726)  Resp: 18 (09/16/22 0726)  BP: (!) 150/60 (09/16/22 0720)  SpO2: 99 % (09/16/22 0726)   Vital Signs (24h Range):  Temp:  [97.6 °F (36.4 °C)-98.6 °F (37 °C)] 98 °F (36.7 °C)  Pulse:  [57-68] 64  Resp:  [17-20] 18  SpO2:  [98 %-100 %] 99 %  BP: (113-150)/(55-64) 150/60     Weight: 73.3 kg (161 lb 8 oz)  Body mass index is 26.88 kg/m².    Intake/Output - Last 3 Shifts         09/14 0700  09/15 0659 09/15 0700 09/16 0659 09/16 0700 09/17 0659    P.O.  1120     Total Intake(mL/kg)  1120 (15.3)     Other 2482      Chest Tube 0 0 0    Total Output 2482 0 0    Net  -2482 +1120 0           Stool Occurrence 1 x 1 x             Physical Exam  Vitals and nursing note reviewed.   HENT:      Head: Normocephalic.   Pulmonary:      Effort: Pulmonary effort is normal.      Comments: Dressing to right chest tube soiled not secured  Skin:     General: Skin is warm and dry.   Neurological:      Mental Status: She is alert.       Significant Labs:  I have reviewed all pertinent lab results within the past 24 hours.  CBC:   Recent Labs   Lab 09/12/22  0227   WBC 6.17   RBC 2.35*   HGB 7.6*   HCT 24.3*      .4*   MCH 32.3*   MCHC 31.3*     BMP:   Recent Labs   Lab 09/16/22  0336   *   *   K 4.1   CL 97*   CO2 27   BUN 19*   CREATININE 5.37*   CALCIUM 8.9     CMP:   Recent Labs   Lab 09/16/22  0336   *   CALCIUM 8.9   ALBUMIN 2.2*   *   K 4.1   CO2 27   CL 97*   BUN 19*   CREATININE 5.37*     LFTs:   Recent Labs   Lab 09/16/22  0336   ALBUMIN 2.2*     Coagulation: No results for input(s): LABPROT, INR, APTT in the last 168 hours.    Significant Diagnostics:  I have reviewed all pertinent imaging results/findings within the past 24 hours.

## 2022-09-16 NOTE — PLAN OF CARE
Problem: Infection  Goal: Absence of Infection Signs and Symptoms  Outcome: Ongoing, Progressing  Intervention: Prevent or Manage Infection  Flowsheets (Taken 9/15/2022 2328)  Fever Reduction/Comfort Measures: lightweight bedding  Infection Management: aseptic technique maintained  Isolation Precautions:   protective   precautions maintained     Problem: Adult Inpatient Plan of Care  Goal: Plan of Care Review  Outcome: Ongoing, Progressing  Flowsheets (Taken 9/15/2022 2328)  Plan of Care Reviewed With: patient  Goal: Patient-Specific Goal (Individualized)  Outcome: Ongoing, Progressing  Flowsheets (Taken 9/15/2022 2328)  Individualized Care Needs: chest tube care  Patient-Specific Goals (Include Timeframe): chest tube care  Goal: Absence of Hospital-Acquired Illness or Injury  Outcome: Ongoing, Progressing  Intervention: Identify and Manage Fall Risk  Flowsheets (Taken 9/15/2022 2328)  Safety Promotion/Fall Prevention:   assistive device/personal item within reach   commode/urinal/bedpan at bedside   diversional activities provided   lighting adjusted   medications reviewed   nonskid shoes/socks when out of bed   side rails raised x 3   instructed to call staff for mobility  Intervention: Prevent Skin Injury  Flowsheets (Taken 9/15/2022 2328)  Body Position:   position changed independently   turned   supine   sitting up in bed   weight shifting  Skin Protection:   adhesive use limited   incontinence pads utilized  Intervention: Prevent and Manage VTE (Venous Thromboembolism) Risk  Flowsheets (Taken 9/15/2022 2328)  Activity Management:   Arm raise - L1   Rolling - L1  VTE Prevention/Management:   remove, assess skin, and reapply sequential compression device   fluids promoted   ROM (active) performed  Range of Motion: ROM (range of motion) performed  Intervention: Prevent Infection  Flowsheets (Taken 9/15/2022 2328)  Infection Prevention:   equipment surfaces disinfected   hand hygiene promoted   rest/sleep  promoted   single patient room provided  Goal: Optimal Comfort and Wellbeing  Outcome: Ongoing, Progressing  Intervention: Monitor Pain and Promote Comfort  Flowsheets (Taken 9/15/2022 2328)  Pain Management Interventions:   care clustered   diversional activity provided   quiet environment facilitated   relaxation techniques promoted   position adjusted   pillow support provided  Intervention: Provide Person-Centered Care  Flowsheets (Taken 9/15/2022 2328)  Trust Relationship/Rapport:   care explained   choices provided   questions answered   questions encouraged  Goal: Readiness for Transition of Care  Outcome: Ongoing, Progressing  Intervention: Mutually Develop Transition Plan  Flowsheets (Taken 9/15/2022 2328)  Transportation Anticipated: family or friend will provide  Communicated ANGY with patient/caregiver: Date not available/Unable to determine  Readmission within 30 days?: No  Do you currently have service(s) that help you manage your care at home?: No     Problem: Diabetes Comorbidity  Goal: Blood Glucose Level Within Targeted Range  Outcome: Ongoing, Progressing  Intervention: Monitor and Manage Glycemia  Flowsheets (Taken 9/15/2022 2328)  Glycemic Management: blood glucose monitored     Problem: Fall Injury Risk  Goal: Absence of Fall and Fall-Related Injury  Outcome: Ongoing, Progressing  Intervention: Identify and Manage Contributors  Flowsheets (Taken 9/15/2022 2328)  Medication Review/Management: medications reviewed  Intervention: Promote Injury-Free Environment  Flowsheets (Taken 9/15/2022 2328)  Safety Promotion/Fall Prevention:   assistive device/personal item within reach   commode/urinal/bedpan at bedside   diversional activities provided   lighting adjusted   medications reviewed   nonskid shoes/socks when out of bed   side rails raised x 3   instructed to call staff for mobility     Problem: Skin Injury Risk Increased  Goal: Skin Health and Integrity  Outcome: Ongoing,  Progressing  Intervention: Optimize Skin Protection  Flowsheets (Taken 9/15/2022 2328)  Pressure Reduction Techniques:   frequent weight shift encouraged   weight shift assistance provided  Pressure Reduction Devices:   positioning supports utilized   specialty bed utilized  Skin Protection:   adhesive use limited   incontinence pads utilized  Head of Bed (HOB) Positioning:   HOB at 20-30 degrees   HOB at 30-45 degrees  Intervention: Promote and Optimize Oral Intake  Flowsheets (Taken 9/15/2022 2328)  Oral Nutrition Promotion: rest periods promoted     Problem: Breathing Pattern Ineffective  Goal: Effective Breathing Pattern  Outcome: Ongoing, Progressing  Intervention: Promote Improved Breathing Pattern  Flowsheets (Taken 9/15/2022 2328)  Airway/Ventilation Management: calming measures promoted  Supportive Measures: relaxation techniques promoted  Head of Bed (HOB) Positioning:   HOB at 20-30 degrees   HOB at 30-45 degrees     Problem: Gas Exchange Impaired  Goal: Optimal Gas Exchange  Outcome: Ongoing, Progressing  Intervention: Optimize Oxygenation and Ventilation  Flowsheets (Taken 9/15/2022 2328)  Airway/Ventilation Management: calming measures promoted  Head of Bed (HOB) Positioning:   HOB at 20-30 degrees   HOB at 30-45 degrees     Problem: Device-Related Complication Risk (Hemodialysis)  Goal: Safe, Effective Therapy Delivery  Outcome: Ongoing, Progressing  Intervention: Optimize Device Care and Function  Flowsheets (Taken 9/15/2022 2328)  Medication Review/Management: medications reviewed     Problem: Hemodynamic Instability (Hemodialysis)  Goal: Effective Tissue Perfusion  Outcome: Ongoing, Progressing     Problem: Infection (Hemodialysis)  Goal: Absence of Infection Signs and Symptoms  Outcome: Ongoing, Progressing

## 2022-09-16 NOTE — PROGRESS NOTES
[facility]  Nephrology  Progress Note    Patient Name: Bria Ray  MRN: [id number]  Admission Date: 8/29/2022  Hospital Length of Stay: 17 days  Attending Provider: Neymar Glass DO   Primary Care Physician: Lorena Richardson MD  Principal Problem:Pneumothorax on right    Subjective:     HPI: 87-year-old woman with ESRD secondary to diabetes.  She is on peritoneal dialysis.  She had a thoracentesis for right pleural effusion Thursday of last week.  She developed more shortness of breath over the weekend.  Chest x-ray today shows hydrothorax with collapse of the right lung.      Interval History:  No SOB.  Some pain at chest tube site.    Review of patient's allergies indicates:  No Known Allergies  Current Facility-Administered Medications   Medication Frequency    0.9%  NaCl infusion PRN    acetaminophen tablet 1,000 mg Q6H PRN    albuterol-ipratropium 2.5 mg-0.5 mg/3 mL nebulizer solution 3 mL Q6H    allopurinoL tablet 100 mg Daily    amiodarone tablet 400 mg BID    atorvastatin tablet 40 mg Daily    bisacodyL suppository 10 mg Daily PRN    budesonide nebulizer solution 0.5 mg Q12H    calcitRIOL capsule 0.25 mcg Daily    [START ON 9/16/2022] calcium carbonate 200 mg calcium (500 mg) chewable tablet 500 mg Daily    cyanocobalamin tablet 100 mcg Daily    dextrose 50% injection 12.5 g PRN    dextrose 50% injection 25 g PRN    diphenhydrAMINE injection 25 mg Q6H PRN    [START ON 9/16/2022] epoetin rosa-epbx injection 10,000 Units Every Mon, Wed, Fri    gabapentin capsule 100 mg BID    glucagon (human recombinant) injection 1 mg PRN    heparin (porcine) injection 4,000 Units PRN    hydrALAZINE injection 5 mg Q6H PRN    HYDROmorphone (PF) injection 0.5 mg Q5 Min PRN    insulin aspart U-100 injection 1-10 Units QID (AC + HS) PRN    lactulose 20 gram/30 mL solution Soln 20 g Daily    LIDOcaine-prilocaine cream PRN    magnesium hydroxide 400 mg/5 ml suspension 2,400 mg  Daily    magnesium oxide tablet 800 mg PRN    magnesium oxide tablet 800 mg PRN    melatonin tablet 9 mg Nightly PRN    morphine injection 4 mg Q5 Min PRN    mupirocin 2 % ointment Daily    naloxone 0.4 mg/mL injection 0.02 mg PRN    ondansetron injection 4 mg Daily PRN    oxyCODONE-acetaminophen  mg per tablet 1 tablet Q4H PRN    pantoprazole EC tablet 40 mg BID AC    polyethylene glycol packet 34 g BID    potassium bicarbonate disintegrating tablet 35 mEq PRN    potassium bicarbonate disintegrating tablet 50 mEq PRN    potassium bicarbonate disintegrating tablet 60 mEq PRN    senna-docusate 8.6-50 mg per tablet 1 tablet BID    sevelamer carbonate tablet 800 mg TID AC    sodium chloride 0.9% flush 10 mL Q12H PRN    sodium chloride 0.9% flush 10 mL Q6H       Objective:     Vital Signs (Most Recent):  Temp: 98.6 °F (37 °C) (09/15/22 1658)  Pulse: 60 (09/15/22 2010)  Resp: 18 (09/15/22 2010)  BP:  (161/71) (09/15/22 1658)  SpO2: 98 % (09/15/22 2010)  O2 Device (Oxygen Therapy): nasal cannula w/ humidification (09/15/22 1658) Vital Signs (24h Range):  Temp:  [97 °F (36.1 °C)-98.6 °F (37 °C)] 98.6 °F (37 °C)  Pulse:  [58-87] 60  Resp:  [16-22] 18  SpO2:  [98 %-100 %] 98 %  BP: (111-168)/(51-65) 132/54     Weight: 74.3 kg (163 lb 12.8 oz) (09/13/22 0600)  Body mass index is 27.26 kg/m².  Body surface area is 1.85 meters squared.    I/O last 3 completed shifts:  In: 720 [P.O.:720]  Out: 2482 [Other:2482]    Physical Exam  Eyes:      Pupils: Pupils are equal, round, and reactive to light.   Cardiovascular:      Rate and Rhythm: Normal rate and regular rhythm.   Pulmonary:      Breath sounds: No wheezing or rales.      Comments: Right chest tube present.  Dullness right base  Abdominal:      Tenderness: There is no abdominal tenderness. There is no guarding.   Musculoskeletal:      Cervical back: Neck supple.      Right lower leg: No edema.      Left lower leg: No edema.   Neurological:      Mental  Status: She is alert.       Significant Labs:  BMP:   Recent Labs   Lab 09/14/22  0455   *   *   K 4.4   CL 98   CO2 29   BUN 26*   CREATININE 5.33*   CALCIUM 8.6     CBC:   Recent Labs   Lab 09/12/22  0227   WBC 6.17   RBC 2.35*   HGB 7.6*   HCT 24.3*      .4*   MCH 32.3*   MCHC 31.3*        Significant Imaging:      Assessment/Plan:     * Pneumothorax on right  Chest tube present.  PD on hold    ESRD (end stage renal disease)  Dialysis MWF    DM (diabetes mellitus)  Underlying condition    Primary hypertension  Controlled        Thank you for your consult.     Beni Fields MD  Nephrology  [facility]

## 2022-09-16 NOTE — SUBJECTIVE & OBJECTIVE
Interval History:  No SOB.  Some pain at chest tube site.    Review of patient's allergies indicates:  No Known Allergies  Current Facility-Administered Medications   Medication Frequency    0.9%  NaCl infusion PRN    acetaminophen tablet 1,000 mg Q6H PRN    albuterol-ipratropium 2.5 mg-0.5 mg/3 mL nebulizer solution 3 mL Q6H    allopurinoL tablet 100 mg Daily    amiodarone tablet 400 mg BID    atorvastatin tablet 40 mg Daily    bisacodyL suppository 10 mg Daily PRN    budesonide nebulizer solution 0.5 mg Q12H    calcitRIOL capsule 0.25 mcg Daily    [START ON 9/16/2022] calcium carbonate 200 mg calcium (500 mg) chewable tablet 500 mg Daily    cyanocobalamin tablet 100 mcg Daily    dextrose 50% injection 12.5 g PRN    dextrose 50% injection 25 g PRN    diphenhydrAMINE injection 25 mg Q6H PRN    [START ON 9/16/2022] epoetin rosa-epbx injection 10,000 Units Every Mon, Wed, Fri    gabapentin capsule 100 mg BID    glucagon (human recombinant) injection 1 mg PRN    heparin (porcine) injection 4,000 Units PRN    hydrALAZINE injection 5 mg Q6H PRN    HYDROmorphone (PF) injection 0.5 mg Q5 Min PRN    insulin aspart U-100 injection 1-10 Units QID (AC + HS) PRN    lactulose 20 gram/30 mL solution Soln 20 g Daily    LIDOcaine-prilocaine cream PRN    magnesium hydroxide 400 mg/5 ml suspension 2,400 mg Daily    magnesium oxide tablet 800 mg PRN    magnesium oxide tablet 800 mg PRN    melatonin tablet 9 mg Nightly PRN    morphine injection 4 mg Q5 Min PRN    mupirocin 2 % ointment Daily    naloxone 0.4 mg/mL injection 0.02 mg PRN    ondansetron injection 4 mg Daily PRN    oxyCODONE-acetaminophen  mg per tablet 1 tablet Q4H PRN    pantoprazole EC tablet 40 mg BID AC    polyethylene glycol packet 34 g BID    potassium bicarbonate disintegrating tablet 35 mEq PRN    potassium bicarbonate disintegrating tablet 50 mEq PRN    potassium bicarbonate disintegrating tablet 60 mEq PRN    senna-docusate 8.6-50 mg per tablet 1 tablet BID     sevelamer carbonate tablet 800 mg TID AC    sodium chloride 0.9% flush 10 mL Q12H PRN    sodium chloride 0.9% flush 10 mL Q6H       Objective:     Vital Signs (Most Recent):  Temp: 98.6 °F (37 °C) (09/15/22 1658)  Pulse: 60 (09/15/22 2010)  Resp: 18 (09/15/22 2010)  BP:  (161/71) (09/15/22 1658)  SpO2: 98 % (09/15/22 2010)  O2 Device (Oxygen Therapy): nasal cannula w/ humidification (09/15/22 1658) Vital Signs (24h Range):  Temp:  [97 °F (36.1 °C)-98.6 °F (37 °C)] 98.6 °F (37 °C)  Pulse:  [58-87] 60  Resp:  [16-22] 18  SpO2:  [98 %-100 %] 98 %  BP: (111-168)/(51-65) 132/54     Weight: 74.3 kg (163 lb 12.8 oz) (09/13/22 0600)  Body mass index is 27.26 kg/m².  Body surface area is 1.85 meters squared.    I/O last 3 completed shifts:  In: 720 [P.O.:720]  Out: 2482 [Other:2482]    Physical Exam  Eyes:      Pupils: Pupils are equal, round, and reactive to light.   Cardiovascular:      Rate and Rhythm: Normal rate and regular rhythm.   Pulmonary:      Breath sounds: No wheezing or rales.      Comments: Right chest tube present.  Dullness right base  Abdominal:      Tenderness: There is no abdominal tenderness. There is no guarding.   Musculoskeletal:      Cervical back: Neck supple.      Right lower leg: No edema.      Left lower leg: No edema.   Neurological:      Mental Status: She is alert.       Significant Labs:  BMP:   Recent Labs   Lab 09/14/22  0455   *   *   K 4.4   CL 98   CO2 29   BUN 26*   CREATININE 5.33*   CALCIUM 8.6     CBC:   Recent Labs   Lab 09/12/22  0227   WBC 6.17   RBC 2.35*   HGB 7.6*   HCT 24.3*      .4*   MCH 32.3*   MCHC 31.3*        Significant Imaging:

## 2022-09-16 NOTE — PROGRESS NOTES
[facility] Hospital Medicine  Progress Note    Patient Name: Bria Ray  MRN: [id number]  Patient Class: IP- Inpatient   Admission Date: 8/29/2022  Length of Stay: 18 days  Attending Physician: Richi Hernandez MD  Primary Care Provider: Lorena Richardson MD        Subjective:     Principal Problem:Pneumothorax on right        HPI:  Patient is a 87-year-old female with past medical history of hypertension, diabetes mellitus, ESRD, COPD, DJD, presented from home as direct admission for right-sided pneumothorax.  Patient underwent right-sided thoracentesis last Thursday for pleural effusion, however since then she continued to have right-sided chest pain along with shortness of bread for which she got an x-ray revealing pneumothorax on the right side.  Patient was then instructed to come to the hospital for further care.  Patient's primary pulmonologist is Dr. Bautista, and has been seeing General surgery in the past for her dialysis access issues.  Patient is also on peritoneal dialysis at home.  Patient is accompanied by her granddaughter, who is trying to get her hospice setup outpatient due to chronic conditions and mostly how her health has been declining steadily for the past few months.  I had a long discussion about goals of care for the patient as of now the patient has not made her mind about her code status and would like to be full code until she has a discussion with the rest of her family.  General surgery and Nephrology consulted and made aware of the patient's arrival.  Patient denies any fevers chills nausea vomiting or diarrhea does have cough and shortness of breath along with right-sided chest pain.      Overview/Hospital Course:  9/5-pneumo unchanged. Resealed per surgery. Re-eval in am. Plan for discharge home with hospice once medically stable  9/6-small pneumo persists  9/7- no events overnight, NM study today to eval fistula. 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  Will re-image for fistula closure in 2 weeks  9/9- will need swingbed for 2 weeks for management of chest tube  9/10- will need swingbed, CM assisting. Pneumo resolved  9/11- no hospice since continuing dialysis, will need swingbed. CM following  9/12-CM assisting with placement  9/13-no changes  9/14-dialysis today, awaiting placement  9/15- awating placement. Some pain around CT site today.  09/16/2022 patient is awake alert today patient in no acute distress today.  Patient underwent hemodialysis today.  Nephrology states that the patient will not require dialysis into Monday.  Patient does complain of pain to her right side her chest where the catheter is.  The plan will be to change her Norco to q.4 hours p.r.n. pain lungs slightly diminished breath sounds sounds on the right cardiovascular S1-S2 regular rate rhythm abdomen is soft positive bowel sounds nontender extremities nonedematous.    awaiting placement    No new subjective & objective note has been filed under this hospital service since the last note was generated.      Assessment/Plan:      * Pneumothorax on right  Resolved    Atrial fibrillation  Patient with Paroxysmal (<7 days) atrial fibrillation which is controlled currently with Amiodarone. Patient is currently in sinus rhythm.[id number] Score: 4.     Detailed conversation in the presence of daughter, patient's decision was to go for AC.   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  Repeat images in 2 weeks. Dc to swingbed with CT      ESRD (end stage renal disease)  Pt w/ diaghragmatic defet, hence PD has been stopped and HD initiated.   Persistent fistula, will send home with CT and plan for HD. Re-image in 2 weeks to see if it has healed    DM (diabetes mellitus)  Patient's FSGs are controlled on current  medication regimen.  Last A1c reviewed-   Lab Results   Component Value Date    HGBA1C 5.5 04/07/2022     Most recent fingerstick glucose reviewed- No results for input(s): POCTGLUCOSE in the last 24 hours.  Current correctional scale  Medium  Maintain anti-hyperglycemic dose as follows-   Antihyperglycemics (From admission, onward)      Start     Stop Route Frequency Ordered    08/29/22 1432  insulin aspart U-100 injection 1-10 Units         -- SubQ Before meals & nightly PRN 08/29/22 1333          Hold Oral hypoglycemics while patient is in the hospital.    Chronic obstructive pulmonary disease  Inhalers.   Oxygen support      Primary hypertension  Adjust medications as needed      Chronic pain syndrome  Cont gabapentin  Prn norco        VTE Risk Mitigation (From admission, onward)           Ordered     heparin (porcine) injection 4,000 Units  As needed (PRN)         09/02/22 1357     IP VTE HIGH RISK PATIENT  Once         08/29/22 1333     Place sequential compression device  Until discontinued         08/29/22 1333                    Discharge Planning   ANGY:      Code Status: Full Code   Is the patient medically ready for discharge?:     Reason for patient still in hospital (select all that apply): Treatment  Discharge Plan A: Rehab ([facility] Aggarwal)   Discharge Delays: None known at this time              Richi Hernandez MD  Department of Hospital Medicine   [facility]

## 2022-09-16 NOTE — PLAN OF CARE
CATRACHITO called Sandeep and left a message with Elysia barr: status of chair time for dialysis. CATRACHITO awaiting call back, CATRACHITO following.

## 2022-09-16 NOTE — PROGRESS NOTES
[facility]  General Surgery  Progress Note    Subjective:     History of Present Illness:  No notes on file    Post-Op Info:  Procedure(s) (LRB):  INSERTION, CATHETER, HEMODIALYSIS, DUAL LUMEN (Right)   14 Days Post-Op     Interval History: +BM    Medications:  Continuous Infusions:  Scheduled Meds:   albuterol-ipratropium  3 mL Nebulization Q6H    allopurinoL  100 mg Oral Daily    amiodarone  400 mg Oral BID    atorvastatin  40 mg Oral Daily    budesonide  0.5 mg Nebulization Q12H    calcitRIOL  0.25 mcg Oral Daily    calcium carbonate  1 tablet Oral Daily    cyanocobalamin  100 mcg Oral Daily    epoetin rosa-epbx  10,000 Units Intravenous Every Mon, Wed, Fri    gabapentin  100 mg Oral BID    lactulose  20 g Oral Daily    magnesium hydroxide 400 mg/5 ml  30 mL Oral Daily    mupirocin   Topical (Top) Daily    pantoprazole  40 mg Oral BID AC    polyethylene glycol  34 g Oral BID    senna-docusate 8.6-50 mg  1 tablet Oral BID    sevelamer carbonate  800 mg Oral TID AC    sodium chloride 0.9%  10 mL Intravenous Q6H     PRN Meds:sodium chloride 0.9%, acetaminophen, bisacodyL, dextrose 50%, dextrose 50%, diphenhydrAMINE, glucagon (human recombinant), heparin (porcine), hydrALAZINE, insulin aspart U-100, LIDOcaine-prilocaine, magnesium oxide, magnesium oxide, melatonin, morphine, naloxone, ondansetron, oxyCODONE-acetaminophen, potassium bicarbonate, potassium bicarbonate, potassium bicarbonate, sodium chloride 0.9%     Review of patient's allergies indicates:  No Known Allergies  Objective:     Vital Signs (Most Recent):  Temp: 98 °F (36.7 °C) (09/16/22 0720)  Pulse: 64 (09/16/22 0726)  Resp: 18 (09/16/22 0726)  BP: (!) 150/60 (09/16/22 0720)  SpO2: 99 % (09/16/22 0726)   Vital Signs (24h Range):  Temp:  [97.6 °F (36.4 °C)-98.6 °F (37 °C)] 98 °F (36.7 °C)  Pulse:  [57-68] 64  Resp:  [17-20] 18  SpO2:  [98 %-100 %] 99 %  BP: (113-150)/(55-64) 150/60     Weight: 73.3 kg (161 lb 8  oz)  Body mass index is 26.88 kg/m².    Intake/Output - Last 3 Shifts         09/14 0700  09/15 0659 09/15 0700 09/16 0659 09/16 0700  09/17 0659    P.O.  1120     Total Intake(mL/kg)  1120 (15.3)     Other 2482      Chest Tube 0 0 0    Total Output 2482 0 0    Net -2482 +1120 0           Stool Occurrence 1 x 1 x             Physical Exam  Vitals and nursing note reviewed.   HENT:      Head: Normocephalic.   Pulmonary:      Effort: Pulmonary effort is normal.      Comments: Dressing to right chest tube soiled not secured  Skin:     General: Skin is warm and dry.   Neurological:      Mental Status: She is alert.       Significant Labs:  I have reviewed all pertinent lab results within the past 24 hours.  CBC:   Recent Labs   Lab 09/12/22 0227   WBC 6.17   RBC 2.35*   HGB 7.6*   HCT 24.3*      .4*   MCH 32.3*   MCHC 31.3*     BMP:   Recent Labs   Lab 09/16/22  0336   *   *   K 4.1   CL 97*   CO2 27   BUN 19*   CREATININE 5.37*   CALCIUM 8.9     CMP:   Recent Labs   Lab 09/16/22  0336   *   CALCIUM 8.9   ALBUMIN 2.2*   *   K 4.1   CO2 27   CL 97*   BUN 19*   CREATININE 5.37*     LFTs:   Recent Labs   Lab 09/16/22  0336   ALBUMIN 2.2*     Coagulation: No results for input(s): LABPROT, INR, APTT in the last 168 hours.    Significant Diagnostics:  I have reviewed all pertinent imaging results/findings within the past 24 hours.    Assessment/Plan:     * Pneumothorax on right  08/30/2022 increased serous output on chest tube during peritoneal dialysis concern for diaphragmatic leak, will hold peritoneal dialysis as dr Mckinnon discussed with Dr. 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  Continue chest tube to suction over the weekend.  Continue hemodialysis.  We will possibly remove chest tube within the next week if patient is doing  well.  If the fistula does not close and ascites reaccumulates, patient will need a VATS for closure of diaphragmatic fistula    9/4: pneumothorax small to moderate size; Repeat CXR tomorrow; if getting larger, may have to reposition chest tube in OR; continue medical management    9/5:  Pneumothorax the same size; continuous air leak found and chest tube secured tubing with resolution of air leak.  We will repeat chest x-ray today; also get a chest x-ray tomorrow a.m..  I will make the patient NPO after midnight in case patient may require a procedure or reinsertion of chest tube/VATS.      09/06/2022 stable, no air leak,  follow cxr    09/07/2022 repeat nuclear medicine  Study evaluate pleural peritoneal fistula        09/14/2022 patient In dialysis on rounds    09/16/2022 chest tube dressing redressed by me, minimum serous chest tube output, continue nonoperative treatment for Right  Pleural peritoneal fistula repeat Nuclear medicine shunt patency Thursday, keep chest tubes until after study   Any problems with chest tube over weekend dr gomez is on call otherwise general surgery will reevaluate patient on monday        DEBORAH Sebastian  General Surgery  [facility]

## 2022-09-16 NOTE — PLAN OF CARE
Problem: Infection  Goal: Absence of Infection Signs and Symptoms  Outcome: Ongoing, Progressing     Problem: Adult Inpatient Plan of Care  Goal: Plan of Care Review  Outcome: Ongoing, Progressing  Flowsheets (Taken 9/16/2022 3836)  Plan of Care Reviewed With: patient  Goal: Patient-Specific Goal (Individualized)  Outcome: Ongoing, Progressing  Goal: Absence of Hospital-Acquired Illness or Injury  Outcome: Ongoing, Progressing  Goal: Optimal Comfort and Wellbeing  Outcome: Ongoing, Progressing  Goal: Readiness for Transition of Care  Outcome: Ongoing, Progressing     Problem: Diabetes Comorbidity  Goal: Blood Glucose Level Within Targeted Range  Outcome: Ongoing, Progressing     Problem: Fall Injury Risk  Goal: Absence of Fall and Fall-Related Injury  Outcome: Ongoing, Progressing     Problem: Skin Injury Risk Increased  Goal: Skin Health and Integrity  Outcome: Ongoing, Progressing

## 2022-09-16 NOTE — PT/OT/SLP PROGRESS
Physical Therapy      Patient Name:  Bria Ray   MRN:  [id number]    Patient not seen today secondary to Patient unwilling to participate, Pain (Pt in dialysis this AM. Now complains of pain in chest that radiates to right side. RN aware). Will follow-up 9/19/22.

## 2022-09-16 NOTE — PLAN OF CARE
CATRACHITO spoke with Sandeep and Elysia in on the other line per Karol at [facility] intake. Another message was left for Elysia to call CATRACHITO back with update on chair time. CATRACHITO informed Karol that CATRACHITO left 2 messages already and has not received a call back. CATRACHITO awaiting call back from Elysia. SW following.    [FreeTextEntry3] : All medical record entries made by the Scribe were at my, Dr. Ortiz's, discretion and personally dictated by me on 02/21/2020 . I have reviewed the chart and agree that the record accurately reflects my personal performance of the history, physical exam, assessment and plan. I have also personally directed, reviewed and agreed to the chart.

## 2022-09-16 NOTE — ASSESSMENT & PLAN NOTE
08/30/2022 increased serous output on chest tube during peritoneal dialysis concern for diaphragmatic leak, will hold peritoneal dialysis as dr Mckinnon discussed with Dr. 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  Continue chest tube to suction over the weekend.  Continue hemodialysis.  We will possibly remove chest tube within the next week if patient is doing well.  If the fistula does not close and ascites reaccumulates, patient will need a VATS for closure of diaphragmatic fistula    9/4: pneumothorax small to moderate size; Repeat CXR tomorrow; if getting larger, may have to reposition chest tube in OR; continue medical management    9/5:  Pneumothorax the same size; continuous air leak found and chest tube secured tubing with resolution of air leak.  We will repeat chest x-ray today; also get a chest x-ray tomorrow a.m..  I will make the patient NPO after midnight in case patient may require a procedure or reinsertion of chest tube/VATS.      09/06/2022 stable, no air leak,  follow cxr    09/07/2022 repeat nuclear medicine  Study evaluate pleural peritoneal fistula        09/14/2022 patient In dialysis on rounds    09/16/2022 chest tube dressing redressed by me, minimum serous chest tube output, continue nonoperative treatment for Right  Pleural peritoneal fistula repeat Nuclear medicine shunt patency Thursday, keep chest tubes until after study   Any problems with chest tube over weekend dr gomez is on call otherwise general surgery will reevaluate patient on monday

## 2022-09-17 NOTE — PROGRESS NOTES
Surgery     No specific complaints    Abdomen soft nontender paragraphs right chest tube no air leak     Continue chest tube

## 2022-09-17 NOTE — PROGRESS NOTES
[facility] Hospital Medicine  Progress Note    Patient Name: Bria Ray  MRN: [id number]  Patient Class: IP- Inpatient   Admission Date: 8/29/2022  Length of Stay: 19 days  Attending Physician: Richi Hernandez MD  Primary Care Provider: Lorena Richardson MD        Subjective:     Principal Problem:Pneumothorax on right        HPI:  Patient is a 87-year-old female with past medical history of hypertension, diabetes mellitus, ESRD, COPD, DJD, presented from home as direct admission for right-sided pneumothorax.  Patient underwent right-sided thoracentesis last Thursday for pleural effusion, however since then she continued to have right-sided chest pain along with shortness of bread for which she got an x-ray revealing pneumothorax on the right side.  Patient was then instructed to come to the hospital for further care.  Patient's primary pulmonologist is Dr. Bautista, and has been seeing General surgery in the past for her dialysis access issues.  Patient is also on peritoneal dialysis at home.  Patient is accompanied by her granddaughter, who is trying to get her hospice setup outpatient due to chronic conditions and mostly how her health has been declining steadily for the past few months.  I had a long discussion about goals of care for the patient as of now the patient has not made her mind about her code status and would like to be full code until she has a discussion with the rest of her family.  General surgery and Nephrology consulted and made aware of the patient's arrival.  Patient denies any fevers chills nausea vomiting or diarrhea does have cough and shortness of breath along with right-sided chest pain.      Overview/Hospital Course:  9/5-pneumo unchanged. Resealed per surgery. Re-eval in am. Plan for discharge home with hospice once medically stable  9/6-small pneumo persists  9/7- no events overnight, NM study today to eval fistula. May not qualify for hospice if  Patient requests refill of hydrocodone 5/325.    Patient will not have the prescription filled at [id number].  Patient was advised to bring photo ID and if they select another party to  prescription they will need a photo ID, along with hours and location of pharmacy.       continues dialysis  9/8-persistent fistula, will attempt to send home with chest tube and prepare for HD.  Will re-image for fistula closure in 2 weeks  9/9- will need swingbed for 2 weeks for management of chest tube  9/10- will need swingbed, CM assisting. Pneumo resolved  9/11- no hospice since continuing dialysis, will need swingbed. CM following  9/12-CM assisting with placement  9/13-no changes  9/14-dialysis today, awaiting placement  9/15- awating placement. Some pain around CT site today.  09/17/2022 patient states her pain on her right side is under better control today.  patient is awake alert today patient in no acute distress today.  Patient underwent hemodialysis today.  Nephrology states that the patient will not require dialysis into Monday.  Patient does complain of pain to her right side her chest where the catheter is.  The plan will be to change her Norco to q.4 hours p.r.n. pain lungs slightly diminished breath sounds sounds on the right cardiovascular S1-S2 regular rate rhythm abdomen is soft positive bowel sounds nontender extremities nonedematous.    awaiting placement  Continue p.r.n. morphine for her chest discomfort    No new subjective & objective note has been filed under this hospital service since the last note was generated.      Assessment/Plan:      * Pneumothorax on right  Resolved    Atrial fibrillation  Patient with Paroxysmal (<7 days) atrial fibrillation which is controlled currently with Amiodarone. Patient is currently in sinus rhythm.[id number] Score: 4.     Detailed conversation in the presence of daughter, patient's decision was to go for AC.   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  Repeat images in 2 weeks. Dc to swingbed with CT      ESRD (end stage renal disease)  Pt w/ diaghragmatic defet, hence PD has been stopped and HD initiated.   Persistent fistula, will send home with CT and  plan for HD. Re-image in 2 weeks to see if it has healed    DM (diabetes mellitus)  Patient's FSGs are controlled on current medication regimen.  Last A1c reviewed-   Lab Results   Component Value Date    HGBA1C 5.5 04/07/2022     Most recent fingerstick glucose reviewed- No results for input(s): POCTGLUCOSE in the last 24 hours.  Current correctional scale  Medium  Maintain anti-hyperglycemic dose as follows-   Antihyperglycemics (From admission, onward)      Start     Stop Route Frequency Ordered    08/29/22 1432  insulin aspart U-100 injection 1-10 Units         -- SubQ Before meals & nightly PRN 08/29/22 1333          Hold Oral hypoglycemics while patient is in the hospital.    Chronic obstructive pulmonary disease  Inhalers.   Oxygen support      Primary hypertension  Adjust medications as needed      Chronic pain syndrome  Cont gabapentin  Prn norco        VTE Risk Mitigation (From admission, onward)           Ordered     heparin (porcine) injection 4,000 Units  As needed (PRN)         09/02/22 1357     IP VTE HIGH RISK PATIENT  Once         08/29/22 1333     Place sequential compression device  Until discontinued         08/29/22 1333                    Discharge Planning   ANGY:      Code Status: Full Code   Is the patient medically ready for discharge?:     Reason for patient still in hospital (select all that apply): Treatment  Discharge Plan A: Rehab ([facility] Aggarwal)   Discharge Delays: None known at this time              Richi Hernandez MD  Department of Hospital Medicine   [facility]

## 2022-09-17 NOTE — SUBJECTIVE & OBJECTIVE
Interval History:  Seen during dialysis.  No SOB.    Review of patient's allergies indicates:  No Known Allergies  Current Facility-Administered Medications   Medication Frequency    0.9%  NaCl infusion PRN    acetaminophen tablet 1,000 mg Q6H PRN    albuterol-ipratropium 2.5 mg-0.5 mg/3 mL nebulizer solution 3 mL Q6H    allopurinoL tablet 100 mg Daily    amiodarone tablet 400 mg BID    atorvastatin tablet 40 mg Daily    bisacodyL suppository 10 mg Daily PRN    budesonide nebulizer solution 0.5 mg Q12H    calcitRIOL capsule 0.25 mcg Daily    calcium carbonate 200 mg calcium (500 mg) chewable tablet 500 mg Daily    cyanocobalamin tablet 100 mcg Daily    dextrose 50% injection 12.5 g PRN    dextrose 50% injection 25 g PRN    diphenhydrAMINE injection 25 mg Q6H PRN    epoetin rosa-epbx injection 10,000 Units Every Mon, Wed, Fri    gabapentin capsule 100 mg BID    glucagon (human recombinant) injection 1 mg PRN    heparin (porcine) injection 4,000 Units PRN    hydrALAZINE injection 5 mg Q6H PRN    insulin aspart U-100 injection 1-10 Units QID (AC + HS) PRN    lactulose 20 gram/30 mL solution Soln 20 g Daily    LIDOcaine-prilocaine cream PRN    magnesium hydroxide 400 mg/5 ml suspension 2,400 mg Daily    magnesium oxide tablet 800 mg PRN    magnesium oxide tablet 800 mg PRN    melatonin tablet 9 mg Nightly PRN    morphine injection 2 mg Q2H PRN    mupirocin 2 % ointment Daily    naloxone 0.4 mg/mL injection 0.02 mg PRN    ondansetron injection 4 mg Daily PRN    oxyCODONE-acetaminophen  mg per tablet 1 tablet Q4H PRN    pantoprazole EC tablet 40 mg BID AC    polyethylene glycol packet 34 g BID    potassium bicarbonate disintegrating tablet 35 mEq PRN    potassium bicarbonate disintegrating tablet 50 mEq PRN    potassium bicarbonate disintegrating tablet 60 mEq PRN    senna-docusate 8.6-50 mg per tablet 1 tablet BID    sevelamer carbonate tablet 800 mg TID AC    sodium chloride 0.9% flush 10 mL Q12H PRN    sodium  chloride 0.9% flush 10 mL Q6H       Objective:     Vital Signs (Most Recent):  Temp: 98.5 °F (36.9 °C) (09/16/22 1600)  Pulse: 86 (09/1937)  Resp: 20 (09/16/22 2042)  BP: (!) 155/70 (09/16/22 1600)  SpO2: 95 % (09/1937)  O2 Device (Oxygen Therapy): nasal cannula w/ humidification (09/1937)   Vital Signs (24h Range):  Temp:  [97.5 °F (36.4 °C)-98.5 °F (36.9 °C)] 98.5 °F (36.9 °C)  Pulse:  [57-86] 86  Resp:  [17-20] 20  SpO2:  [95 %-100 %] 95 %  BP: (113-187)/(55-87) 155/70     Weight: 73.3 kg (161 lb 8 oz) (09/16/22 0601)  Body mass index is 26.88 kg/m².  Body surface area is 1.83 meters squared.    I/O last 3 completed shifts:  In: 1630 [P.O.:1120; I.V.:10; Other:500]  Out: 1000 [Other:1000]    Physical Exam  Eyes:      Pupils: Pupils are equal, round, and reactive to light.   Cardiovascular:      Rate and Rhythm: Normal rate and regular rhythm.   Pulmonary:      Breath sounds: No wheezing or rales.      Comments: Right chest tube present.  Dullness right base  Abdominal:      Tenderness: There is no abdominal tenderness. There is no guarding.   Musculoskeletal:      Cervical back: Neck supple.      Right lower leg: No edema.      Left lower leg: No edema.   Neurological:      Mental Status: She is alert.       Significant Labs:  BMP:   Recent Labs   Lab 09/16/22  0336   *   *   K 4.1   CL 97*   CO2 27   BUN 19*   CREATININE 5.37*   CALCIUM 8.9     CBC:   Recent Labs   Lab 09/12/22  0227   WBC 6.17   RBC 2.35*   HGB 7.6*   HCT 24.3*      .4*   MCH 32.3*   MCHC 31.3*        Significant Imaging:

## 2022-09-17 NOTE — PROGRESS NOTES
[facility]  Nephrology  Progress Note    Patient Name: Bria Ray  MRN: [id number]  Admission Date: 8/29/2022  Hospital Length of Stay: 18 days  Attending Provider: Richi Hernandez MD   Primary Care Physician: Lorena Richardson MD  Principal Problem:Pneumothorax on right    Subjective:     HPI: 87-year-old woman with ESRD secondary to diabetes.  She is on peritoneal dialysis.  She had a thoracentesis for right pleural effusion Thursday of last week.  She developed more shortness of breath over the weekend.  Chest x-ray today shows hydrothorax with collapse of the right lung.      Interval History:  Seen during dialysis.  No SOB.    Review of patient's allergies indicates:  No Known Allergies  Current Facility-Administered Medications   Medication Frequency    0.9%  NaCl infusion PRN    acetaminophen tablet 1,000 mg Q6H PRN    albuterol-ipratropium 2.5 mg-0.5 mg/3 mL nebulizer solution 3 mL Q6H    allopurinoL tablet 100 mg Daily    amiodarone tablet 400 mg BID    atorvastatin tablet 40 mg Daily    bisacodyL suppository 10 mg Daily PRN    budesonide nebulizer solution 0.5 mg Q12H    calcitRIOL capsule 0.25 mcg Daily    calcium carbonate 200 mg calcium (500 mg) chewable tablet 500 mg Daily    cyanocobalamin tablet 100 mcg Daily    dextrose 50% injection 12.5 g PRN    dextrose 50% injection 25 g PRN    diphenhydrAMINE injection 25 mg Q6H PRN    epoetin rosa-epbx injection 10,000 Units Every Mon, Wed, Fri    gabapentin capsule 100 mg BID    glucagon (human recombinant) injection 1 mg PRN    heparin (porcine) injection 4,000 Units PRN    hydrALAZINE injection 5 mg Q6H PRN    insulin aspart U-100 injection 1-10 Units QID (AC + HS) PRN    lactulose 20 gram/30 mL solution Soln 20 g Daily    LIDOcaine-prilocaine cream PRN    magnesium hydroxide 400 mg/5 ml suspension 2,400 mg Daily    magnesium oxide tablet 800 mg PRN    magnesium oxide tablet 800 mg PRN    melatonin  tablet 9 mg Nightly PRN    morphine injection 2 mg Q2H PRN    mupirocin 2 % ointment Daily    naloxone 0.4 mg/mL injection 0.02 mg PRN    ondansetron injection 4 mg Daily PRN    oxyCODONE-acetaminophen  mg per tablet 1 tablet Q4H PRN    pantoprazole EC tablet 40 mg BID AC    polyethylene glycol packet 34 g BID    potassium bicarbonate disintegrating tablet 35 mEq PRN    potassium bicarbonate disintegrating tablet 50 mEq PRN    potassium bicarbonate disintegrating tablet 60 mEq PRN    senna-docusate 8.6-50 mg per tablet 1 tablet BID    sevelamer carbonate tablet 800 mg TID AC    sodium chloride 0.9% flush 10 mL Q12H PRN    sodium chloride 0.9% flush 10 mL Q6H       Objective:     Vital Signs (Most Recent):  Temp: 98.5 °F (36.9 °C) (09/16/22 1600)  Pulse: 86 (09/1937)  Resp: 20 (09/16/22 2042)  BP: (!) 155/70 (09/16/22 1600)  SpO2: 95 % (09/1937)  O2 Device (Oxygen Therapy): nasal cannula w/ humidification (09/1937)   Vital Signs (24h Range):  Temp:  [97.5 °F (36.4 °C)-98.5 °F (36.9 °C)] 98.5 °F (36.9 °C)  Pulse:  [57-86] 86  Resp:  [17-20] 20  SpO2:  [95 %-100 %] 95 %  BP: (113-187)/(55-87) 155/70     Weight: 73.3 kg (161 lb 8 oz) (09/16/22 0601)  Body mass index is 26.88 kg/m².  Body surface area is 1.83 meters squared.    I/O last 3 completed shifts:  In: 1630 [P.O.:1120; I.V.:10; Other:500]  Out: 1000 [Other:1000]    Physical Exam  Eyes:      Pupils: Pupils are equal, round, and reactive to light.   Cardiovascular:      Rate and Rhythm: Normal rate and regular rhythm.   Pulmonary:      Breath sounds: No wheezing or rales.      Comments: Right chest tube present.  Dullness right base  Abdominal:      Tenderness: There is no abdominal tenderness. There is no guarding.   Musculoskeletal:      Cervical back: Neck supple.      Right lower leg: No edema.      Left lower leg: No edema.   Neurological:      Mental Status: She is alert.       Significant Labs:  BMP:   Recent Labs   Lab  09/16/22  0336   *   *   K 4.1   CL 97*   CO2 27   BUN 19*   CREATININE 5.37*   CALCIUM 8.9     CBC:   Recent Labs   Lab 09/12/22  0227   WBC 6.17   RBC 2.35*   HGB 7.6*   HCT 24.3*      .4*   MCH 32.3*   MCHC 31.3*        Significant Imaging:      Assessment/Plan:     * Pneumothorax on right  Chest tube present.  PD on hold    ESRD (end stage renal disease)  Stable during dialysis today    DM (diabetes mellitus)  Underlying condition    Primary hypertension  Controlled        Thank you for your consult.     Beni Fields MD  Nephrology  [facility]

## 2022-09-17 NOTE — PROGRESS NOTES
[facility]  Nephrology  Progress Note    Patient Name: Bria Ray  MRN: [id number]  Admission Date: 8/29/2022  Hospital Length of Stay: 19 days  Attending Provider: Richi Hernandez MD   Primary Care Physician: Lorena Richardson MD  Principal Problem:Pneumothorax on right    Consults  Subjective:     Interval History: F/U ESRD. Dialyzed yesterday. Continues with chest tube drainage on right    Review of patient's allergies indicates:  No Known Allergies  Current Facility-Administered Medications   Medication Frequency    0.9%  NaCl infusion PRN    acetaminophen tablet 1,000 mg Q6H PRN    albuterol-ipratropium 2.5 mg-0.5 mg/3 mL nebulizer solution 3 mL Q6H    allopurinoL tablet 100 mg Daily    amiodarone tablet 400 mg BID    atorvastatin tablet 40 mg Daily    bisacodyL suppository 10 mg Daily PRN    budesonide nebulizer solution 0.5 mg Q12H    calcitRIOL capsule 0.25 mcg Daily    calcium carbonate 200 mg calcium (500 mg) chewable tablet 500 mg Daily    cyanocobalamin tablet 100 mcg Daily    dextrose 50% injection 12.5 g PRN    dextrose 50% injection 25 g PRN    diphenhydrAMINE injection 25 mg Q6H PRN    epoetin rosa-epbx injection 10,000 Units Every Mon, Wed, Fri    gabapentin capsule 100 mg BID    glucagon (human recombinant) injection 1 mg PRN    heparin (porcine) injection 4,000 Units PRN    hydrALAZINE injection 5 mg Q6H PRN    HYDROmorphone (PF) injection 1 mg Q4H PRN    insulin aspart U-100 injection 1-10 Units QID (AC + HS) PRN    lactulose 20 gram/30 mL solution Soln 20 g Daily    LIDOcaine-prilocaine cream PRN    magnesium hydroxide 400 mg/5 ml suspension 2,400 mg Daily    magnesium oxide tablet 800 mg PRN    magnesium oxide tablet 800 mg PRN    melatonin tablet 9 mg Nightly PRN    mupirocin 2 % ointment Daily    naloxone 0.4 mg/mL injection 0.02 mg PRN    ondansetron injection 4 mg Daily PRN    oxyCODONE-acetaminophen  mg per tablet 1 tablet Q4H PRN    pantoprazole EC  tablet 40 mg BID AC    polyethylene glycol packet 34 g BID    potassium bicarbonate disintegrating tablet 35 mEq PRN    potassium bicarbonate disintegrating tablet 50 mEq PRN    potassium bicarbonate disintegrating tablet 60 mEq PRN    senna-docusate 8.6-50 mg per tablet 1 tablet BID    sevelamer carbonate tablet 800 mg TID AC    sodium chloride 0.9% flush 10 mL Q12H PRN    sodium chloride 0.9% flush 10 mL Q6H       Objective:     Vital Signs (Most Recent):  Temp: 98 °F (36.7 °C) (09/17/22 1100)  Pulse: 68 (09/17/22 1200)  Resp: 20 (09/17/22 1213)  BP: (!) 157/56 (09/17/22 1100)  SpO2: 99 % (09/17/22 1200)  O2 Device (Oxygen Therapy): nasal cannula (09/17/22 1200)   Vital Signs (24h Range):  Temp:  [97.8 °F (36.6 °C)-98.7 °F (37.1 °C)] 98 °F (36.7 °C)  Pulse:  [62-86] 68  Resp:  [16-22] 20  SpO2:  [95 %-100 %] 99 %  BP: (127-157)/(52-70) 157/56     Weight: 73.3 kg (161 lb 8 oz) (09/16/22 0601)  Body mass index is 26.88 kg/m².  Body surface area is 1.83 meters squared.    I/O last 3 completed shifts:  In: 1200 [P.O.:650; I.V.:50; Other:500]  Out: 1000 [Other:1000]    Physical Exam Uncomfortable with chest tube but not sob. Says pain meds make her dizzy    Significant Labs:sureBMP:   Recent Labs   Lab 09/16/22  0336   *   *   K 4.1   CL 97*   CO2 27   BUN 19*   CREATININE 5.37*   CALCIUM 8.9     All labs within the past 24 hours have been reviewed.    Significant Imaging:  Labs: Reviewed    Assessment/Plan:     Active Diagnoses:    Diagnosis Date Noted POA    PRINCIPAL PROBLEM:  Pneumothorax on right [J93.9] 08/29/2022 Yes    Atrial fibrillation [I48.91] 09/02/2022 Unknown    Pleural effusion on right [J90] 08/24/2022 Yes    ESRD (end stage renal disease) [N18.6]  Unknown    Chronic obstructive pulmonary disease [J44.9]  Yes    DM (diabetes mellitus) [E11.9]  Unknown    Primary hypertension [I10] 01/18/2022 Yes    Chronic pain syndrome [G89.4]  Yes     Chronic      Problems Resolved During this Admission:     Diagnosis Date Noted Date Resolved POA    SVT (supraventricular tachycardia) [I47.1] 08/31/2022 09/07/2022 Yes       Continue hemodialysis as scheduled    Thank you for your consult. I will follow-up with patient. Please contact us if you have any additional questions.    Alonzo Lilly MD  Nephrology  [facility]

## 2022-09-17 NOTE — PLAN OF CARE
Problem: Infection  Goal: Absence of Infection Signs and Symptoms  Outcome: Ongoing, Progressing     Problem: Adult Inpatient Plan of Care  Goal: Plan of Care Review  Outcome: Ongoing, Progressing  Goal: Patient-Specific Goal (Individualized)  Outcome: Ongoing, Progressing  Goal: Absence of Hospital-Acquired Illness or Injury  Outcome: Ongoing, Progressing  Goal: Optimal Comfort and Wellbeing  Outcome: Ongoing, Progressing  Goal: Readiness for Transition of Care  Outcome: Ongoing, Progressing     Problem: Diabetes Comorbidity  Goal: Blood Glucose Level Within Targeted Range  Outcome: Ongoing, Progressing     Problem: Fall Injury Risk  Goal: Absence of Fall and Fall-Related Injury  Outcome: Ongoing, Progressing     Problem: Skin Injury Risk Increased  Goal: Skin Health and Integrity  Outcome: Ongoing, Progressing

## 2022-09-17 NOTE — PLAN OF CARE
Problem: Infection  Goal: Absence of Infection Signs and Symptoms  Outcome: Ongoing, Progressing     Problem: Adult Inpatient Plan of Care  Goal: Plan of Care Review  Outcome: Ongoing, Progressing  Goal: Patient-Specific Goal (Individualized)  Outcome: Ongoing, Progressing  Goal: Absence of Hospital-Acquired Illness or Injury  Outcome: Ongoing, Progressing  Goal: Optimal Comfort and Wellbeing  Outcome: Ongoing, Progressing  Goal: Readiness for Transition of Care  Outcome: Ongoing, Progressing     Problem: Diabetes Comorbidity  Goal: Blood Glucose Level Within Targeted Range  Outcome: Ongoing, Progressing     Problem: Fall Injury Risk  Goal: Absence of Fall and Fall-Related Injury  Outcome: Ongoing, Progressing     Problem: Skin Injury Risk Increased  Goal: Skin Health and Integrity  Outcome: Ongoing, Progressing     Problem: Breathing Pattern Ineffective  Goal: Effective Breathing Pattern  Outcome: Ongoing, Progressing     Problem: Gas Exchange Impaired  Goal: Optimal Gas Exchange  Outcome: Ongoing, Progressing

## 2022-09-18 PROBLEM — I46.9 CARDIAC ARREST: Status: ACTIVE | Noted: 2022-01-01

## 2022-09-18 PROBLEM — Z99.11 ON MECHANICALLY ASSISTED VENTILATION: Status: ACTIVE | Noted: 2022-01-01

## 2022-09-18 NOTE — HOSPITAL COURSE
Patient is a 87-year-old female with past medical history of hypertension, diabetes mellitus, ESRD, COPD, DJD, presented from home as direct admission for right-sided pneumothorax.  Patient underwent right-sided thoracentesis last Thursday for pleural effusion, however since then she continued to have right-sided chest pain along with shortness of bread for which she got an x-ray revealing pneumothorax on the right side.  Patient was then instructed to come to the hospital for further care.  Patient's primary pulmonologist is Dr. Bautista, and has been seeing General surgery in the past for her dialysis access issues.  Patient is also on peritoneal dialysis at home.  Patient is accompanied by her granddaughter, who is trying to get her hospice setup outpatient due to chronic conditions and mostly how her health has been declining steadily for the past few months.  I had a long discussion about goals of care for the patient as of now the patient has not made her mind about her code status and would like to be full code until she has a discussion with the rest of her family.  General surgery and Nephrology consulted and made aware of the patient's arrival.  Patient denies any fevers chills nausea vomiting or diarrhea does have cough and shortness of breath along with right-sided chest pain.    Hospital course -- pt admitted for pneumothorax post thoracentesis. Surgery consulted and placed chest tube. Due to a small peritoneal plueral fistula an HD line was placed for dialysis in attempt for spontaneous closure given age, co-morbidities and hopefully small size of fistula.  During this time of her hospitalization family had been discussing setting up hospice at time of discharge. On 09/18 pt suffered a cardiac arrest and was transferred to the ICU after she was intubated. She was then made a DNR later that day. Cardiology was consulted for proximal Afib and sick sinus syndrome, however, she was not a good candidate for  pacemaker placement. She continued to require pressors since her cardiac arrest. This morning while her family was at her bedside she went into PEA and time of death was pronounced at 09:17AM.           Pneumothorax on right  Patient status post a pneumothorax for arm a thoracentesis has a transudative effusion the question is whether not this is due to peritoneal dialysis.  The options are to consider surgery to fix a defect in the diaphragm or to proceed with hemodialysis will follow.  Currently draining a L the last 24 hours at a chest tube her x-ray this morning looked clear  9/18 surgery note reviewed. Plan was to pull chest tube in am   continue chest tube until pt extubated per surgery note      Sick sinus syndrome  - cardiology following  - HR 60-70s presently      On mechanically assisted ventilation  Intubated following cardiac arrest  Currently oxygenating adequately  Mild metabolic acidosis on ABG 7.27/40/376 (following intubation)  Will rest on vent tonight and see how she looks in am  9/20  - rested on vent again today given HD and CT head   - will attempt some trials tomorrow to see how pt tolerates      Cardiac arrest  Etiology uncertain  Patient with bradycardia that has required pacing with pads  This could be etiology  Checking troponins  Will order echo in the am  D dimer elevated  Checking venous dopplers  Prognosis is guarded at best  Family updated  Patient now DNR     Atrial fibrillation  Patient with Paroxysmal (<7 days) atrial fibrillation which is controlled currently with Amiodarone. Patient is currently in sinus rhythm.[id number] Score: 4.      Detailed conversation in the presence of daughter, patient's decision was to go for AC.   Holding for now given downtrending Hb     9/18 Now with bradycardia  Likely sick sinus syndrome  Not sure she is a good candidate for pacemaker  Dr. Parker is her cardiologist  I see amiodarone but no current rate control medications     - cardiology has  been consulted   - not a candidate for PM at present   - will continue current meds      Pleural effusion on right  Related to peritoneal dialysis,will see if will resolve with hemodialysis     - HD cath placed and has resumed HD      ESRD (end stage renal disease)  Pt w/ diaghragmatic defet, hence PD has been stopped and HD initiated.   Nephrology following the patient        DM (diabetes mellitus)  Blood sugar ok  SSI     Chronic obstructive pulmonary disease  - chronic, controlled with current meds

## 2022-09-18 NOTE — PLAN OF CARE
Problem: Infection  Goal: Absence of Infection Signs and Symptoms  Outcome: Ongoing, Progressing     Problem: Adult Inpatient Plan of Care  Goal: Plan of Care Review  Outcome: Ongoing, Progressing  Goal: Patient-Specific Goal (Individualized)  Outcome: Ongoing, Progressing  Goal: Absence of Hospital-Acquired Illness or Injury  Outcome: Ongoing, Progressing  Goal: Optimal Comfort and Wellbeing  Outcome: Ongoing, Progressing  Goal: Readiness for Transition of Care  Outcome: Ongoing, Progressing     Problem: Diabetes Comorbidity  Goal: Blood Glucose Level Within Targeted Range  Outcome: Ongoing, Progressing     Problem: Fall Injury Risk  Goal: Absence of Fall and Fall-Related Injury  Outcome: Ongoing, Progressing     Problem: Skin Injury Risk Increased  Goal: Skin Health and Integrity  Outcome: Ongoing, Progressing     Problem: Breathing Pattern Ineffective  Goal: Effective Breathing Pattern  Outcome: Ongoing, Progressing     Problem: Gas Exchange Impaired  Goal: Optimal Gas Exchange  Outcome: Ongoing, Progressing     Problem: Device-Related Complication Risk (Hemodialysis)  Goal: Safe, Effective Therapy Delivery  Outcome: Ongoing, Progressing     Problem: Hemodynamic Instability (Hemodialysis)  Goal: Effective Tissue Perfusion  Outcome: Ongoing, Progressing     Problem: Infection (Hemodialysis)  Goal: Absence of Infection Signs and Symptoms  Outcome: Ongoing, Progressing     Problem: Restraint, Nonbehavioral (Nonviolent)  Goal: Absence of Harm or Injury  Outcome: Ongoing, Progressing

## 2022-09-18 NOTE — SUBJECTIVE & OBJECTIVE
Code note    I walked into the patient's room, patient was found to be unresponsive.  I checked for a pulse and there was no pulse, then I called for code immediately.  CPR was started immediately, and patient was bagged with Ambu bag.  Patient had been given IV morphine earlier this morning for pain per nursing staff.  Therefore, patient was given IV or Narcan x2 doses, patient was intubated by resident supervised by Dr. Guillen.  ET 2 placement was confirmed with CO2 detector.  Patient heart rate stayed in the 20s.  Patient was given several rounds of atropine and epinephrine.  Patient was given 1 amp of sodium bicarb IV While still on  the 4 floor we were able to get a blood pressure of 180/100.  Pulse was approximately 55 before transfer to the ICU.  Once the patient was transferred to the ICU pulse was found to be in the 20s and 30s.  Patient given another epinephrine and atropine.  Heart rate went up to approximately 40-56.  Heart rate then dropped again to 20s, therefore rep al we placed transcutaneous pacer pads to her chest at a heart rate of 60.  Patient found to have a strong pulse, blood pressure 120/74.  Vent settings assist control rate of 14, tidal volume 500, pressure support of 10, FiO2 100%.  Additional orders, BMP, CBC, troponins, magnesium, LFTs, blood cultures x2, portable chest x-ray, ABG.   Family was notified or of patient's current condition.  Or  Case was discussed with Dr. Harsh Crespo will assume care of the patient.      Richi Hernandez MD.                          Objective:

## 2022-09-18 NOTE — SUBJECTIVE & OBJECTIVE
Interval History: The patient was found unresponsive on the 4th floor and code blue called overhead. She was coded, intubated, and transferred to ICU. She is currently on pressor and being paced by pads.     Objective:     Vital Signs (Most Recent):  Temp: 98.1 °F (36.7 °C) (09/18/22 1600)  Pulse: 60 (09/18/22 1600)  Resp: 20 (09/18/22 1600)  BP: (!) 114/47 (09/18/22 1555)  SpO2: 100 % (09/18/22 1600)   Vital Signs (24h Range):  Temp:  [97.2 °F (36.2 °C)-99.7 °F (37.6 °C)] 98.1 °F (36.7 °C)  Pulse:  [26-80] 60  Resp:  [7-28] 20  SpO2:  [92 %-100 %] 100 %  BP: ()/() 114/47     Weight: 76.6 kg (168 lb 14 oz)  Body mass index is 28.1 kg/m².      Intake/Output Summary (Last 24 hours) at 9/18/2022 1718  Last data filed at 9/18/2022 1600  Gross per 24 hour   Intake 723.16 ml   Output 0 ml   Net 723.16 ml       Physical Exam  Vitals and nursing note reviewed.   Constitutional:       General: She is not in acute distress.     Appearance: She is ill-appearing.   HENT:      Head: Normocephalic and atraumatic.      Right Ear: External ear normal.      Left Ear: External ear normal.      Nose: Nose normal.      Mouth/Throat:      Pharynx: Oropharynx is clear.      Comments: Et tube in place  Eyes:      General: No scleral icterus.     Conjunctiva/sclera: Conjunctivae normal.   Cardiovascular:      Rate and Rhythm: Normal rate and regular rhythm.      Pulses: Normal pulses.      Heart sounds: Normal heart sounds.      Comments: paced  Pulmonary:      Breath sounds: Normal breath sounds.      Comments: Clear breath sounds anteriorly on mechanical ventilation  Abdominal:      General: Bowel sounds are normal.      Palpations: Abdomen is soft.   Musculoskeletal:      Cervical back: Normal range of motion and neck supple.   Skin:     General: Skin is warm and dry.      Capillary Refill: Capillary refill takes less than 2 seconds.   Neurological:      Sensory: No sensory deficit.      Motor: No weakness.      Gait: Gait  normal.      Comments: Currently intubated  Withdraw from pain       Vents:  Vent Mode: A/C (09/18/22 1606)  Ventilator Initiated: Yes (09/18/22 1135)  Set Rate: 20 BPM (09/18/22 1606)  Vt Set: 500 mL (09/18/22 1606)  PEEP/CPAP: 5 cmH20 (09/18/22 1606)  Oxygen Concentration (%): 50 (09/18/22 1606)  Peak Airway Pressure: 22 cmH2O (09/18/22 1606)  Total Ve: 9.9 mL (09/18/22 1606)    Lines/Drains/Airways       Peripherally Inserted Central Catheter Line  Duration             PICC Double Lumen 08/31/22 0700 left basilic 18 days              Central Venous Catheter Line  Duration                  Hemodialysis Catheter 09/02/22 0815 right subclavian 16 days              Drain  Duration                  Chest Tube 08/29/22 1300 1 Right Midaxillary 20 days              Airway  Duration                  Airway - Non-Surgical 09/18/22 1118 Endotracheal Tube <1 day                    Significant Labs:    CBC/Anemia Profile:  Recent Labs   Lab 09/18/22  1201   WBC 10.38   HGB 8.7*   HCT 28.8*      .3*   RDW 14.7*        Chemistries:  Recent Labs   Lab 09/18/22  1201   *   K 5.1   CL 96*   CO2 19*   BUN 25*   CREATININE 6.18*   CALCIUM 8.7   ALBUMIN 2.2*   PROT 5.7*   BILITOT 0.6   ALKPHOS 81   ALT 23   AST 43*       All pertinent labs within the past 24 hours have been reviewed.    Significant Imaging:  I have reviewed all pertinent imaging results/findings within the past 24 hours.

## 2022-09-18 NOTE — PROGRESS NOTES
[facility] Hospital Medicine  Progress Note    Patient Name: Bria Ray  MRN: [id number]  Patient Class: IP- Inpatient   Admission Date: 8/29/2022  Length of Stay: 20 days  Attending Physician: Richi Hernandez MD  Primary Care Provider: Lorena Richardson MD        Subjective:       .      Code note    I walked into the patient's room, patient was found to be unresponsive.  I checked for a pulse and there was no pulse, then I called for code immediately.  CPR was started immediately, and patient was bagged with Ambu bag.  Patient had been given IV morphine earlier this morning for pain per nursing staff.  Therefore, patient was given IV or Narcan x2 doses, patient was intubated by resident supervised by Dr. Guillen.  ET 2 placement was confirmed with CO2 detector.  Patient heart rate stayed in the 20s.  Patient was given several rounds of atropine and epinephrine.  Patient was given 1 amp of sodium bicarb IV While still on  the 4 floor we were able to get a blood pressure of 180/100.  Pulse was approximately 55 before transfer to the ICU.  Once the patient was transferred to the ICU pulse was found to be in the 20s and 30s.  Patient given another epinephrine and atropine.  Heart rate went up to approximately 40-56.  Heart rate then dropped again to 20s, therefore rep al we placed transcutaneous pacer pads to her chest at a heart rate of 60.  Patient found to have a strong pulse, blood pressure 120/74.  Vent settings assist control rate of 14, tidal volume 500, pressure support of 10, FiO2 100%.  Additional orders, BMP, CBC, troponins, magnesium, LFTs, blood cultures x2, portable chest x-ray, ABG.   Family was notified or of patient's current condition.  Or  Case was discussed with Dr. Harsh Crespo will assume care of the patient.      Richi Hernandez MD.                          Objective:                             VTE Risk Mitigation (From admission, onward)                   Richi  Chavez Hernandez MD  Department of Hospital Medicine   [facility]

## 2022-09-18 NOTE — ASSESSMENT & PLAN NOTE
Pt w/ diaghragmatic defet, hence PD has been stopped and HD initiated.   Nephrology following the patient

## 2022-09-18 NOTE — PROGRESS NOTES
[facility]  Pulmonology  Progress Note    Patient Name: Bria Ray  MRN: [id number]  Admission Date: 8/29/2022  Hospital Length of Stay: 20 days  Code Status: DNR  Attending Provider: Misael House DO  Primary Care Provider: Lorena Richardson MD   Principal Problem: Pneumothorax on right    Subjective:     Interval History: The patient was found unresponsive on the 4th floor and code blue called overhead. She was coded, intubated, and transferred to ICU. She is currently on pressor and being paced by pads.     Objective:     Vital Signs (Most Recent):  Temp: 98.1 °F (36.7 °C) (09/18/22 1600)  Pulse: 60 (09/18/22 1600)  Resp: 20 (09/18/22 1600)  BP: (!) 114/47 (09/18/22 1555)  SpO2: 100 % (09/18/22 1600)   Vital Signs (24h Range):  Temp:  [97.2 °F (36.2 °C)-99.7 °F (37.6 °C)] 98.1 °F (36.7 °C)  Pulse:  [26-80] 60  Resp:  [7-28] 20  SpO2:  [92 %-100 %] 100 %  BP: ()/() 114/47     Weight: 76.6 kg (168 lb 14 oz)  Body mass index is 28.1 kg/m².      Intake/Output Summary (Last 24 hours) at 9/18/2022 1718  Last data filed at 9/18/2022 1600  Gross per 24 hour   Intake 723.16 ml   Output 0 ml   Net 723.16 ml       Physical Exam  Vitals and nursing note reviewed.   Constitutional:       General: She is not in acute distress.     Appearance: She is ill-appearing.   HENT:      Head: Normocephalic and atraumatic.      Right Ear: External ear normal.      Left Ear: External ear normal.      Nose: Nose normal.      Mouth/Throat:      Pharynx: Oropharynx is clear.      Comments: Et tube in place  Eyes:      General: No scleral icterus.     Conjunctiva/sclera: Conjunctivae normal.   Cardiovascular:      Rate and Rhythm: Normal rate and regular rhythm.      Pulses: Normal pulses.      Heart sounds: Normal heart sounds.      Comments: paced  Pulmonary:      Breath sounds: Normal breath sounds.      Comments: Clear breath sounds anteriorly on mechanical ventilation  Abdominal:       General: Bowel sounds are normal.      Palpations: Abdomen is soft.   Musculoskeletal:      Cervical back: Normal range of motion and neck supple.   Skin:     General: Skin is warm and dry.      Capillary Refill: Capillary refill takes less than 2 seconds.   Neurological:      Sensory: No sensory deficit.      Motor: No weakness.      Gait: Gait normal.      Comments: Currently intubated  Withdraw from pain       Vents:  Vent Mode: A/C (09/18/22 1606)  Ventilator Initiated: Yes (09/18/22 1135)  Set Rate: 20 BPM (09/18/22 1606)  Vt Set: 500 mL (09/18/22 1606)  PEEP/CPAP: 5 cmH20 (09/18/22 1606)  Oxygen Concentration (%): 50 (09/18/22 1606)  Peak Airway Pressure: 22 cmH2O (09/18/22 1606)  Total Ve: 9.9 mL (09/18/22 1606)    Lines/Drains/Airways       Peripherally Inserted Central Catheter Line  Duration             PICC Double Lumen 08/31/22 0700 left basilic 18 days              Central Venous Catheter Line  Duration                  Hemodialysis Catheter 09/02/22 0815 right subclavian 16 days              Drain  Duration                  Chest Tube 08/29/22 1300 1 Right Midaxillary 20 days              Airway  Duration                  Airway - Non-Surgical 09/18/22 1118 Endotracheal Tube <1 day                    Significant Labs:    CBC/Anemia Profile:  Recent Labs   Lab 09/18/22  1201   WBC 10.38   HGB 8.7*   HCT 28.8*      .3*   RDW 14.7*        Chemistries:  Recent Labs   Lab 09/18/22  1201   *   K 5.1   CL 96*   CO2 19*   BUN 25*   CREATININE 6.18*   CALCIUM 8.7   ALBUMIN 2.2*   PROT 5.7*   BILITOT 0.6   ALKPHOS 81   ALT 23   AST 43*       All pertinent labs within the past 24 hours have been reviewed.    Significant Imaging:  I have reviewed all pertinent imaging results/findings within the past 24 hours.    Assessment/Plan:     * Pneumothorax on right  Patient status post a pneumothorax for arm a thoracentesis has a transudative effusion the question is whether not this is due to  peritoneal dialysis.  The options are to consider surgery to fix a defect in the diaphragm or to proceed with hemodialysis will follow.  Currently draining a L the last 24 hours at a chest tube her x-ray this morning looked clear  9/18 surgery note reviewed. Plan was to pull chest tube in am      On mechanically assisted ventilation  Intubated following cardiac arrest  Currently oxygenating adequately  Mild metabolic acidosis on ABG 7.27/40/376 (following extubation)  Will rest on vent tonight and see how she looks in am    Cardiac arrest  Etiology uncertain  Patient with bradycardia that has required pacing with pads  This could be etiology  Checking troponins  Will order echo in the am  D dimer elevated  Checking venous dopplers  Prognosis is guarded at best  Family updated  Patient now DNR    Atrial fibrillation  Patient with Paroxysmal (<7 days) atrial fibrillation which is controlled currently with Amiodarone. Patient is currently in sinus rhythm.[id number] Score: 4.      Detailed conversation in the presence of daughter, patient's decision was to go for AC.   Holding for now given downtrending Hb    9/18 Now with bradycardia  Likely sick sinus syndrome  Not sure she is a good candidate for pacemaker  Dr. Parker is her cardiologist  I see amiodarone but no current rate control medications    ESRD (end stage renal disease)  Pt w/ diaghragmatic defet, hence PD has been stopped and HD initiated.   Nephrology following the patient    DM (diabetes mellitus)  Blood sugar ok  SSI    Patient is now DNR      The patient is critically ill. This note includes approximately 35 minutes of critical care time spent in the management of this patient. This includes review of labs, data, imaging, and vent management.  This also includes discussion with decision makers    Misael House DO  Pulmonology  [facility]

## 2022-09-18 NOTE — NURSING
1129- Recd pt to ICU bed 10 with 4n RN brie, Catherine CABRERA, and Dr Hernandez. Transf pt to ICU bed. Pts HR 20s, palpaple pulse. Epi 1mg given per Dr Hernandez order.   1131- Transcutaneous pacing initiated at 200 joules.  1140 Atropine given per Dr Henrandez.  1214 - Sz noted aprox 45 seconds, Dr House and Catherine CABRERA in room  1216- Ativan 1 mg IVP given  1217- Diprivan 5 mcg/kg/min started  1233- Levophed gtt started at .02mcg/kg/min  1236-Levophed gtt increased to .1mcg/kg/min  1243-Increased Levo gtt to .2mcg/kg/min  1247- Family in ICU, Catherine CABRERA updating family on pts condition  1249- Increased Levo gtt to .3mcg/kg/min  1258- Increased Levo gtt to .5mcg/kg/min

## 2022-09-18 NOTE — ASSESSMENT & PLAN NOTE
Patient with Paroxysmal (<7 days) atrial fibrillation which is controlled currently with Amiodarone. Patient is currently in sinus rhythm.[id number] Score: 4.      Detailed conversation in the presence of daughter, patient's decision was to go for AC.   Holding for now given downtrending Hb    9/18 Now with bradycardia  Likely sick sinus syndrome  Not sure she is a good candidate for pacemaker  Dr. Parker is her cardiologist  I see amiodarone but no current rate control medications

## 2022-09-18 NOTE — NURSING
Notified Jackymiguel ángel Tapiaby, the patient's son, that the patient became unresponsive and we had to start CPR and give her medications and oxygen, she has a heartbeat and showed signs of life and we transferred her to the ICU. I informed him that physicians may call him to speak with him about what happened and he and his family are welcome to visit with her. We took her phone, phone ,dentures, and other belongings with her to the ICU and that he can call us if he has any questions or needs anything. Patient's son states understanding and that he will come visit her tomorrow.   11:56 Call to Jacky Pierce he states he is on the way to the hospital now.

## 2022-09-18 NOTE — PROCEDURES
ATTENDING PHYSICIAN: Dr. Yasir Guillen at bedside with guidance throughout the procedure.     CONSENT:   [X] The procedure was emergent, the patient was unable to provide consent, and a designee was not immediately available.      PROCEDURE SUMMARY:     Patient unresponsive on medical staff's examination. CPR started. The patient was placed on a cardiac  monitor including continuous pulse oximetry. Rapid Sequence Intubation  was conducted. The patient was unresponsive and did not require an induction agent or paralytic. Cricoid pressure was maintained from time  induction agent was given to time of cuff balloon inflation. Using a mac blade and a size 7.5 endotracheal tube with stylet, the patient was intubated on the 2nd attempt. The stylet was removed and cuff balloon was  inflated. Appropriate endotracheal tube position was confirmed by  direct visualization of vocal cord passage, fogging of the tube, CO2  colormetric indicator and symmetric breath sounds. The tube was secured  at  cm at the lips. Post intubation chest x-ray shows tube in the right position.     Cindy Baker DO  FMS PGY 3

## 2022-09-18 NOTE — ASSESSMENT & PLAN NOTE
Patient status post a pneumothorax for arm a thoracentesis has a transudative effusion the question is whether not this is due to peritoneal dialysis.  The options are to consider surgery to fix a defect in the diaphragm or to proceed with hemodialysis will follow.  Currently draining a L the last 24 hours at a chest tube her x-ray this morning looked clear  9/18 surgery note reviewed. Plan was to pull chest tube in am

## 2022-09-18 NOTE — ASSESSMENT & PLAN NOTE
Etiology uncertain  Patient with bradycardia that has required pacing with pads  This could be etiology  Checking troponins  Will order echo in the am  D dimer elevated  Checking venous dopplers  Prognosis is guarded at best  Family updated  Patient now DNR

## 2022-09-18 NOTE — PROGRESS NOTES
[facility]  Nephrology  Progress Note    Patient Name: Bria Ray  MRN: [id number]  Admission Date: 8/29/2022  Hospital Length of Stay: 20 days  Attending Provider: Richi Hernandez MD   Primary Care Physician: Lorena Richardson MD  Principal Problem:Pneumothorax on right    Consults  Subjective:     Interval History: Pt seen earlier today resting and breathing unlabored. No evident leak from chest tube (no bubbles)    Review of patient's allergies indicates:  No Known Allergies  Current Facility-Administered Medications   Medication Frequency    0.9%  NaCl infusion PRN    acetaminophen tablet 1,000 mg Q6H PRN    albuterol-ipratropium 2.5 mg-0.5 mg/3 mL nebulizer solution 3 mL Q6H    allopurinoL tablet 100 mg Daily    amiodarone tablet 400 mg BID    atorvastatin tablet 40 mg Daily    bisacodyL suppository 10 mg Daily PRN    budesonide nebulizer solution 0.5 mg Q12H    calcitRIOL capsule 0.25 mcg Daily    calcium carbonate 200 mg calcium (500 mg) chewable tablet 500 mg Daily    cyanocobalamin tablet 100 mcg Daily    dextrose 50% injection 12.5 g PRN    dextrose 50% injection 25 g PRN    diphenhydrAMINE injection 25 mg Q6H PRN    epoetin rosa-epbx injection 10,000 Units Every Mon, Wed, Fri    gabapentin capsule 100 mg BID    glucagon (human recombinant) injection 1 mg PRN    heparin (porcine) injection 4,000 Units PRN    hydrALAZINE injection 5 mg Q6H PRN    HYDROmorphone (PF) injection 1 mg Q4H PRN    insulin aspart U-100 injection 1-10 Units QID (AC + HS) PRN    lactulose 20 gram/30 mL solution Soln 20 g Daily    LIDOcaine-prilocaine cream PRN    magnesium hydroxide 400 mg/5 ml suspension 2,400 mg Daily    magnesium oxide tablet 800 mg PRN    magnesium oxide tablet 800 mg PRN    melatonin tablet 9 mg Nightly PRN    mupirocin 2 % ointment Daily    naloxone 0.4 mg/mL injection 0.02 mg PRN    ondansetron injection 4 mg Daily PRN    oxyCODONE-acetaminophen  mg per tablet 1 tablet  Q4H PRN    pantoprazole EC tablet 40 mg BID AC    polyethylene glycol packet 34 g BID    potassium bicarbonate disintegrating tablet 35 mEq PRN    potassium bicarbonate disintegrating tablet 50 mEq PRN    potassium bicarbonate disintegrating tablet 60 mEq PRN    senna-docusate 8.6-50 mg per tablet 1 tablet BID    sevelamer carbonate tablet 800 mg TID AC    sodium chloride 0.9% flush 10 mL Q12H PRN    sodium chloride 0.9% flush 10 mL Q6H       Objective:     Vital Signs (Most Recent):  Temp: 98 °F (36.7 °C) (09/18/22 0400)  Pulse: 80 (09/18/22 0812)  Resp: 20 (09/18/22 0812)  BP: (!) 158/55 (09/18/22 0400)  SpO2: 99 % (09/18/22 0812)  O2 Device (Oxygen Therapy): nasal cannula (09/18/22 0812)   Vital Signs (24h Range):  Temp:  [98 °F (36.7 °C)-98.8 °F (37.1 °C)] 98 °F (36.7 °C)  Pulse:  [68-80] 80  Resp:  [17-22] 20  SpO2:  [97 %-100 %] 99 %  BP: (131-170)/(55-67) 158/55     Weight: (S)  (patient refused to be weighed doesn't want to be moved in bed.) (09/18/22 0600)  Body mass index is 26.88 kg/m².  Body surface area is 1.83 meters squared.    I/O last 3 completed shifts:  In: 530 [P.O.:490; I.V.:40]  Out: 0     Physical Exam Chest clear    Significant Labs:sureBMP:   Recent Labs   Lab 09/16/22  0336   *   *   K 4.1   CL 97*   CO2 27   BUN 19*   CREATININE 5.37*   CALCIUM 8.9     All labs within the past 24 hours have been reviewed.    Significant Imaging:  Labs: Reviewed    Assessment/Plan:     Active Diagnoses:    Diagnosis Date Noted POA    PRINCIPAL PROBLEM:  Pneumothorax on right [J93.9] 08/29/2022 Yes    Atrial fibrillation [I48.91] 09/02/2022 Unknown    Pleural effusion on right [J90] 08/24/2022 Yes    ESRD (end stage renal disease) [N18.6]  Unknown    Chronic obstructive pulmonary disease [J44.9]  Yes    DM (diabetes mellitus) [E11.9]  Unknown    Primary hypertension [I10] 01/18/2022 Yes    Chronic pain syndrome [G89.4]  Yes     Chronic      Problems Resolved During this Admission:    Diagnosis  Date Noted Date Resolved POA    SVT (supraventricular tachycardia) [I47.1] 08/31/2022 09/07/2022 Yes       Continue dialysis support as scheduled    Thank you for your consult. I will follow-up with patient. Please contact us if you have any additional questions.    Alonzo Lilly MD  Nephrology  [facility]

## 2022-09-18 NOTE — NURSING
11:12 Dr. Hernandez to patient room and reports patient was unresponsive. We immediately had to start CPR and give her medications and oxygen.     11:13- CPR started per TACOS Dalton  11:15- 1 dose of epi 1mg given per TACOS Cartwright CPR chest compression change with TACOS Perkins. Patient being bagged with Ambu bag. Patient was intubated with 7 1//2 ET tube.   11:16- Narcan 0.6mg given  11:17 pulse check brachycardia   11:19 Atropine 0.1mg given per TACOS Perkins, Pulse 31  11:19 pulse 31  11:20 Narcan 0.4 given   11:21- accu check 132, HR 24  11:22- 1 amp of Bicarb given per tacos Perkins   1123 Patient HR 57  11:25- /79 HR 49  Transferred patient to ICU @ 1126 and arrived to ICU at 1129 and report given to TACOS Valdivia  11:30 -epinephrine 1mg given  1133-patient eyes opening   1132 -pulse 81  1135 /130  11:40 Atropine given   11:40-pulse slow and strong  1141 X-ray to ICU for STAT chest X-ray  1143 Dr. Hernandez spoke with family member (Jerri)  1147 PT HR 23 with out Pacing  1152 Bld drawn

## 2022-09-18 NOTE — ASSESSMENT & PLAN NOTE
Intubated following cardiac arrest  Currently oxygenating adequately  Mild metabolic acidosis on ABG 7.27/40/376 (following extubation)  Will rest on vent tonight and see how she looks in am

## 2022-09-19 PROBLEM — I49.5 SICK SINUS SYNDROME: Status: ACTIVE | Noted: 2022-01-01

## 2022-09-19 NOTE — ASSESSMENT & PLAN NOTE
Pt w/ diaghragmatic defet, hence PD has been stopped and HD initiated.   Nephrology following the patient  9/19  - HD today

## 2022-09-19 NOTE — ASSESSMENT & PLAN NOTE
- EKG exhibited SVT with RVR at rate of 176 bpm. Likely a physiologic response. Adenosine and 250cc NS bolus administered with current rate of 95 bpm. Diltiazem was not initiated due to concern for hypotension. Continue monitoring via Telemetry and repeat EKG.    9/1/22:  - pt is in and out of SVT vs afib, she is asymptomatic  - start amiodarone gtt with bolus  - continue tele monitoring    9/2/22:  - pt is now NSR with HR in the 80s  - transition to PO amiodarone 400mg po bid x 7 days then 200mg po daily  - ASA only for AC given pt's age and high fall risk with unsteady gait  - pt to f/u with Dr. Parker in cardiology clinic in 2 weeks    9/19/22:  - pt is now NSR with HR in the 60s  - continue PO amiodarone  - currently on lovenox

## 2022-09-19 NOTE — PLAN OF CARE
Problem: Infection  Goal: Absence of Infection Signs and Symptoms  Outcome: Ongoing, Progressing     Problem: Adult Inpatient Plan of Care  Goal: Plan of Care Review  Outcome: Ongoing, Progressing  Goal: Patient-Specific Goal (Individualized)  Outcome: Ongoing, Progressing  Goal: Absence of Hospital-Acquired Illness or Injury  Outcome: Ongoing, Progressing  Goal: Optimal Comfort and Wellbeing  Outcome: Ongoing, Progressing  Goal: Readiness for Transition of Care  Outcome: Ongoing, Progressing     Problem: Diabetes Comorbidity  Goal: Blood Glucose Level Within Targeted Range  Outcome: Ongoing, Progressing     Problem: Fall Injury Risk  Goal: Absence of Fall and Fall-Related Injury  Outcome: Ongoing, Progressing     Problem: Skin Injury Risk Increased  Goal: Skin Health and Integrity  Outcome: Ongoing, Progressing     Problem: Breathing Pattern Ineffective  Goal: Effective Breathing Pattern  Outcome: Ongoing, Progressing     Problem: Gas Exchange Impaired  Goal: Optimal Gas Exchange  Outcome: Ongoing, Progressing     Problem: Device-Related Complication Risk (Hemodialysis)  Goal: Safe, Effective Therapy Delivery  Outcome: Ongoing, Progressing     Problem: Hemodynamic Instability (Hemodialysis)  Goal: Effective Tissue Perfusion  Outcome: Ongoing, Progressing     Problem: Infection (Hemodialysis)  Goal: Absence of Infection Signs and Symptoms  Outcome: Ongoing, Progressing     Problem: Restraint, Nonbehavioral (Nonviolent)  Goal: Absence of Harm or Injury  Outcome: Ongoing, Progressing     Problem: Communication Impairment (Mechanical Ventilation, Invasive)  Goal: Effective Communication  Outcome: Ongoing, Progressing     Problem: Device-Related Complication Risk (Mechanical Ventilation, Invasive)  Goal: Optimal Device Function  Outcome: Ongoing, Progressing     Problem: Inability to Wean (Mechanical Ventilation, Invasive)  Goal: Mechanical Ventilation Liberation  Outcome: Ongoing, Progressing     Problem: Nutrition  Impairment (Mechanical Ventilation, Invasive)  Goal: Optimal Nutrition Delivery  Outcome: Ongoing, Progressing     Problem: Skin and Tissue Injury (Mechanical Ventilation, Invasive)  Goal: Absence of Device-Related Skin and Tissue Injury  Outcome: Ongoing, Progressing     Problem: Ventilator-Induced Lung Injury (Mechanical Ventilation, Invasive)  Goal: Absence of Ventilator-Induced Lung Injury  Outcome: Ongoing, Progressing     Problem: Communication Impairment (Artificial Airway)  Goal: Effective Communication  Outcome: Ongoing, Progressing     Problem: Device-Related Complication Risk (Artificial Airway)  Goal: Optimal Device Function  Outcome: Ongoing, Progressing     Problem: Skin and Tissue Injury (Artificial Airway)  Goal: Absence of Device-Related Skin or Tissue Injury  Outcome: Ongoing, Progressing     Problem: Noninvasive Ventilation Acute  Goal: Effective Unassisted Ventilation and Oxygenation  Outcome: Ongoing, Progressing

## 2022-09-19 NOTE — PROGRESS NOTES
[facility]  Pulmonology  Progress Note    Patient Name: Bria Ray  MRN: [id number]  Admission Date: 8/29/2022  Hospital Length of Stay: 21 days  Code Status: DNR  Attending Provider: Misael House DO  Primary Care Provider: Lorena Richardson MD   Principal Problem: Pneumothorax on right    Subjective:     Interval History: Pt remains intubated and sedated. Has been weaned off the transcutaneous PM; sustaining HR herself. Withdraws to pain- no other response at present.     Objective:     Vital Signs (Most Recent):  Temp: 97.2 °F (36.2 °C) (09/19/22 1330)  Pulse: 82 (09/19/22 1352)  Resp: 20 (09/19/22 1352)  BP: (!) 149/56 (09/19/22 1330)  SpO2: 100 % (09/19/22 1352)   Vital Signs (24h Range):  Temp:  [91 °F (32.8 °C)-99.1 °F (37.3 °C)] 97.2 °F (36.2 °C)  Pulse:  [54-89] 82  Resp:  [0-22] 20  SpO2:  [94 %-100 %] 100 %  BP: ()/(25-73) 149/56     Weight: 73.1 kg (161 lb 2.5 oz)  Body mass index is 26.82 kg/m².      Intake/Output Summary (Last 24 hours) at 9/19/2022 1536  Last data filed at 9/19/2022 1205  Gross per 24 hour   Intake 861.93 ml   Output 84975 ml   Net -83504.07 ml       Physical Exam  Vitals and nursing note reviewed.   Constitutional:       General: She is not in acute distress.     Appearance: She is ill-appearing.   HENT:      Head: Normocephalic and atraumatic.      Right Ear: External ear normal.      Left Ear: External ear normal.      Nose: Nose normal.      Mouth/Throat:      Pharynx: Oropharynx is clear.      Comments: Et tube in place  Eyes:      General: No scleral icterus.     Conjunctiva/sclera: Conjunctivae normal.   Cardiovascular:      Rate and Rhythm: Normal rate and regular rhythm.      Pulses: Normal pulses.      Heart sounds: Normal heart sounds.   Pulmonary:      Breath sounds: Normal breath sounds.      Comments: Clear breath sounds anteriorly on mechanical ventilation  Abdominal:      General: Bowel sounds are normal.      Palpations:  Abdomen is soft.   Musculoskeletal:      Cervical back: Normal range of motion and neck supple.   Skin:     General: Skin is warm and dry.      Capillary Refill: Capillary refill takes less than 2 seconds.   Neurological:      Sensory: No sensory deficit.      Motor: No weakness.      Gait: Gait normal.      Comments: Currently intubated  Withdraw from pain       Vents:  Vent Mode: A/C (09/19/22 1500)  Ventilator Initiated: Yes (09/18/22 1135)  Set Rate: 20 BPM (09/19/22 1500)  Vt Set: 500 mL (09/19/22 1500)  PEEP/CPAP: 5 cmH20 (09/19/22 1500)  Oxygen Concentration (%): 50 (09/19/22 1500)  Peak Airway Pressure: 29 cmH2O (09/19/22 1500)  Total Ve: 11 mL (09/19/22 1500)  F/VT Ratio<105 (RSBI): (!) 40 (09/19/22 1145)    Lines/Drains/Airways       Peripherally Inserted Central Catheter Line  Duration             PICC Double Lumen 08/31/22 0700 left basilic 19 days              Central Venous Catheter Line  Duration                  Hemodialysis Catheter 09/02/22 0815 right subclavian 17 days              Drain  Duration                  Chest Tube 08/29/22 1300 1 Right Midaxillary 21 days              Airway  Duration                  Airway - Non-Surgical 09/18/22 1118 Endotracheal Tube 1 day                    Significant Labs:    CBC/Anemia Profile:  Recent Labs   Lab 09/18/22  1201   WBC 10.38   HGB 8.7*   HCT 28.8*      .3*   RDW 14.7*        Chemistries:  Recent Labs   Lab 09/18/22  1201 09/19/22  0459   * 126*   K 5.1 4.8   CL 96* 92*   CO2 19* 23   BUN 25* 33*   CREATININE 6.18* 6.19*   CALCIUM 8.7 8.1*   ALBUMIN 2.2* 2.3*   PROT 5.7*  --    BILITOT 0.6  --    ALKPHOS 81  --    ALT 23  --    AST 43*  --    PHOS  --  2.0*       All pertinent labs within the past 24 hours have been reviewed.    Significant Imaging:  I have reviewed all pertinent imaging results/findings within the past 24 hours.    Assessment/Plan:     * Pneumothorax on right  Patient status post a pneumothorax for arm a  thoracentesis has a transudative effusion the question is whether not this is due to peritoneal dialysis.  The options are to consider surgery to fix a defect in the diaphragm or to proceed with hemodialysis will follow.  Currently draining a L the last 24 hours at a chest tube her x-ray this morning looked clear  9/18 surgery note reviewed. Plan was to pull chest tube in am  9/19 continue chest tube until pt extubated per surgery note     Sick sinus syndrome  - cardiology following  - HR 60-70s presently     On mechanically assisted ventilation  Intubated following cardiac arrest  Currently oxygenating adequately  Mild metabolic acidosis on ABG 7.27/40/376 (following intubation)  Will rest on vent tonight and see how she looks in am  9/19  - rested on vent again today given HD and CT head   - will attempt some trials tomorrow to see how pt tolerates     Cardiac arrest  Etiology uncertain  Patient with bradycardia that has required pacing with pads  This could be etiology  Checking troponins  Will order echo in the am  D dimer elevated  Checking venous dopplers  Prognosis is guarded at best  Family updated  Patient now DNR  - not on tele on floor?    Atrial fibrillation  Patient with Paroxysmal (<7 days) atrial fibrillation which is controlled currently with Amiodarone. Patient is currently in sinus rhythm.[id number] Score: 4.      Detailed conversation in the presence of daughter, patient's decision was to go for AC.   Holding for now given downtrending Hb    9/18 Now with bradycardia  Likely sick sinus syndrome  Not sure she is a good candidate for pacemaker  Dr. Parker is her cardiologist  I see amiodarone but no current rate control medications  9/19  - cardiology has been consulted   - not a candidate for PM at present   - will continue current meds   - started on wt based lovenox-- not on any ac prior        Pleural effusion on right  Related to peritoneal dialysis,will see if will resolve with  hemodialysis    - HD cath placed and has resumed HD     ESRD (end stage renal disease)  Pt w/ diaghragmatic defet, hence PD has been stopped and HD initiated.   Nephrology following the patient  9/19  - HD today     DM (diabetes mellitus)  Blood sugar ok  SSI    Chronic obstructive pulmonary disease  - chronic, controlled with current meds            BELA Robbins-ELANAP  Pulmonology  [facility]

## 2022-09-19 NOTE — ASSESSMENT & PLAN NOTE
- bradycardia complicated by intercurrent illness which seems to have improved  - current HR is 67, transcutaneous pacer threshold has been lowered to 40  - pt will need quite a bit more improvement before a pacemaker can be placed  - will monitor with you as pt improves  - Dr. Jackson discussed end of life decisions with the family who are concerned pt will remain on artificial life support beyond hopeful recovery

## 2022-09-19 NOTE — SUBJECTIVE & OBJECTIVE
Interval History:  She suffered a cardiac arrest over the weekend.  She had recurrent bradycardia and has required an external pacer.  She is requiring pressors.    Review of patient's allergies indicates:  No Known Allergies  Current Facility-Administered Medications   Medication Frequency    0.9%  NaCl infusion PRN    acetaminophen tablet 1,000 mg Q6H PRN    albuterol-ipratropium 2.5 mg-0.5 mg/3 mL nebulizer solution 3 mL Q6H    atorvastatin tablet 40 mg Daily    bisacodyL suppository 10 mg Daily PRN    budesonide nebulizer solution 0.5 mg Q12H    calcitRIOL capsule 0.25 mcg Daily    calcium carbonate 200 mg calcium (500 mg) chewable tablet 500 mg Daily    cyanocobalamin tablet 100 mcg Daily    dextrose 50% injection 12.5 g PRN    dextrose 50% injection 25 g PRN    diphenhydrAMINE injection 25 mg Q6H PRN    enoxaparin injection 70 mg Q24H    epoetin rosa-epbx injection 10,000 Units Every Mon, Wed, Fri    gabapentin capsule 100 mg BID    glucagon (human recombinant) injection 1 mg PRN    heparin (porcine) injection 4,000 Units PRN    hydrALAZINE injection 5 mg Q6H PRN    HYDROmorphone (PF) injection 1 mg Q4H PRN    insulin aspart U-100 injection 1-10 Units QID (AC + HS) PRN    LIDOcaine-prilocaine cream PRN    lorazepam injection 1 mg Q2H PRN    magnesium oxide tablet 800 mg PRN    magnesium oxide tablet 800 mg PRN    melatonin tablet 9 mg Nightly PRN    mupirocin 2 % ointment Daily    naloxone 0.4 mg/mL injection 0.02 mg PRN    NORepinephrine 4 mg in dextrose 5 % 250 mL infusion Continuous    ondansetron injection 4 mg Daily PRN    oxyCODONE-acetaminophen  mg per tablet 1 tablet Q4H PRN    pantoprazole EC tablet 40 mg BID AC    potassium bicarbonate disintegrating tablet 35 mEq PRN    potassium bicarbonate disintegrating tablet 50 mEq PRN    potassium bicarbonate disintegrating tablet 60 mEq PRN    propofol (DIPRIVAN) 10 mg/mL infusion Continuous    sevelamer carbonate tablet 800 mg TID AC    sodium chloride  0.9% flush 10 mL Q12H PRN    sodium chloride 0.9% flush 10 mL Q6H       Objective:     Vital Signs (Most Recent):  Temp: 97.2 °F (36.2 °C) (09/19/22 1330)  Pulse: 82 (09/19/22 1352)  Resp: 20 (09/19/22 1352)  BP: (!) 149/56 (09/19/22 1330)  SpO2: 100 % (09/19/22 1352)  O2 Device (Oxygen Therapy): ventilator (09/19/22 1352)   Vital Signs (24h Range):  Temp:  [91 °F (32.8 °C)-99.1 °F (37.3 °C)] 97.2 °F (36.2 °C)  Pulse:  [54-89] 82  Resp:  [0-22] 20  SpO2:  [94 %-100 %] 100 %  BP: ()/(25-73) 149/56     Weight: 73.1 kg (161 lb 2.5 oz) (09/19/22 0646)  Body mass index is 26.82 kg/m².  Body surface area is 1.83 meters squared.    I/O last 3 completed shifts:  In: 936.4 [P.O.:240; I.V.:696.4]  Out: 0     Physical Exam  Constitutional:       Interventions: She is sedated and intubated.   Eyes:      Pupils: Pupils are equal, round, and reactive to light.   Cardiovascular:      Rate and Rhythm: Normal rate and regular rhythm.   Pulmonary:      Effort: She is intubated.      Breath sounds: No wheezing or rales.      Comments: Right chest tube present.  Dullness right base  Abdominal:      Tenderness: There is no abdominal tenderness. There is no guarding.   Musculoskeletal:      Cervical back: Neck supple.      Right lower leg: No edema.      Left lower leg: No edema.       Significant Labs:  BMP:   Recent Labs   Lab 09/19/22  0459   *   *   K 4.8   CL 92*   CO2 23   BUN 33*   CREATININE 6.19*   CALCIUM 8.1*     CBC:   Recent Labs   Lab 09/18/22  1201   WBC 10.38   RBC 2.66*   HGB 8.7*   HCT 28.8*      .3*   MCH 32.7*   MCHC 30.2*        Significant Imaging:

## 2022-09-19 NOTE — PROGRESS NOTES
[facility]  Nephrology  Progress Note    Patient Name: Bria Ray  MRN: [id number]  Admission Date: 8/29/2022  Hospital Length of Stay: 21 days  Attending Provider: Misael House DO   Primary Care Physician: Lorena Richardson MD  Principal Problem:Pneumothorax on right    Subjective:     HPI: 87-year-old woman with ESRD secondary to diabetes.  She is on peritoneal dialysis.  She had a thoracentesis for right pleural effusion Thursday of last week.  She developed more shortness of breath over the weekend.  Chest x-ray on day of admission shows hydrothorax with collapse of the right lung.      Interval History:  She suffered a cardiac arrest over the weekend.  She had recurrent bradycardia and has required an external pacer.  She is requiring pressors.    Review of patient's allergies indicates:  No Known Allergies  Current Facility-Administered Medications   Medication Frequency    0.9%  NaCl infusion PRN    acetaminophen tablet 1,000 mg Q6H PRN    albuterol-ipratropium 2.5 mg-0.5 mg/3 mL nebulizer solution 3 mL Q6H    atorvastatin tablet 40 mg Daily    bisacodyL suppository 10 mg Daily PRN    budesonide nebulizer solution 0.5 mg Q12H    calcitRIOL capsule 0.25 mcg Daily    calcium carbonate 200 mg calcium (500 mg) chewable tablet 500 mg Daily    cyanocobalamin tablet 100 mcg Daily    dextrose 50% injection 12.5 g PRN    dextrose 50% injection 25 g PRN    diphenhydrAMINE injection 25 mg Q6H PRN    enoxaparin injection 70 mg Q24H    epoetin rosa-epbx injection 10,000 Units Every Mon, Wed, Fri    gabapentin capsule 100 mg BID    glucagon (human recombinant) injection 1 mg PRN    heparin (porcine) injection 4,000 Units PRN    hydrALAZINE injection 5 mg Q6H PRN    HYDROmorphone (PF) injection 1 mg Q4H PRN    insulin aspart U-100 injection 1-10 Units QID (AC + HS) PRN    LIDOcaine-prilocaine cream PRN    lorazepam injection 1 mg Q2H PRN    magnesium oxide tablet  800 mg PRN    magnesium oxide tablet 800 mg PRN    melatonin tablet 9 mg Nightly PRN    mupirocin 2 % ointment Daily    naloxone 0.4 mg/mL injection 0.02 mg PRN    NORepinephrine 4 mg in dextrose 5 % 250 mL infusion Continuous    ondansetron injection 4 mg Daily PRN    oxyCODONE-acetaminophen  mg per tablet 1 tablet Q4H PRN    pantoprazole EC tablet 40 mg BID AC    potassium bicarbonate disintegrating tablet 35 mEq PRN    potassium bicarbonate disintegrating tablet 50 mEq PRN    potassium bicarbonate disintegrating tablet 60 mEq PRN    propofol (DIPRIVAN) 10 mg/mL infusion Continuous    sevelamer carbonate tablet 800 mg TID AC    sodium chloride 0.9% flush 10 mL Q12H PRN    sodium chloride 0.9% flush 10 mL Q6H       Objective:     Vital Signs (Most Recent):  Temp: 97.2 °F (36.2 °C) (09/19/22 1330)  Pulse: 82 (09/19/22 1352)  Resp: 20 (09/19/22 1352)  BP: (!) 149/56 (09/19/22 1330)  SpO2: 100 % (09/19/22 1352)  O2 Device (Oxygen Therapy): ventilator (09/19/22 1352)   Vital Signs (24h Range):  Temp:  [91 °F (32.8 °C)-99.1 °F (37.3 °C)] 97.2 °F (36.2 °C)  Pulse:  [54-89] 82  Resp:  [0-22] 20  SpO2:  [94 %-100 %] 100 %  BP: ()/(25-73) 149/56     Weight: 73.1 kg (161 lb 2.5 oz) (09/19/22 0646)  Body mass index is 26.82 kg/m².  Body surface area is 1.83 meters squared.    I/O last 3 completed shifts:  In: 936.4 [P.O.:240; I.V.:696.4]  Out: 0     Physical Exam  Constitutional:       Interventions: She is sedated and intubated.   Eyes:      Pupils: Pupils are equal, round, and reactive to light.   Cardiovascular:      Rate and Rhythm: Normal rate and regular rhythm.   Pulmonary:      Effort: She is intubated.      Breath sounds: No wheezing or rales.      Comments: Right chest tube present.  Dullness right base  Abdominal:      Tenderness: There is no abdominal tenderness. There is no guarding.   Musculoskeletal:      Cervical back: Neck supple.      Right lower leg: No edema.      Left lower leg:  No edema.       Significant Labs:  BMP:   Recent Labs   Lab 09/19/22  0459   *   *   K 4.8   CL 92*   CO2 23   BUN 33*   CREATININE 6.19*   CALCIUM 8.1*     CBC:   Recent Labs   Lab 09/18/22  1201   WBC 10.38   RBC 2.66*   HGB 8.7*   HCT 28.8*      .3*   MCH 32.7*   MCHC 30.2*        Significant Imaging:      Assessment/Plan:     * Pneumothorax on right  Chest tube present.  PD on hold    Cardiac arrest  She is requiring external pacemaker.  She previously had AFib treated with amiodarone.  Cardiology will be consulted for possible pacer.  Case discussed with multiple family members.  Critical care time 30 minutes    ESRD (end stage renal disease)  Seen during dialysis    DM (diabetes mellitus)  Underlying condition    Primary hypertension  She is requiring pressors now        Thank you for your consult.     Beni Fields MD  Nephrology  [facility]

## 2022-09-19 NOTE — SUBJECTIVE & OBJECTIVE
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, without long-term current use of insulin     Vertigo     Vitamin D deficiency        Past Surgical History:   Procedure Laterality Date    BLADDER SUSPENSION      BRONCHOSCOPY       11/2020    CHOLECYSTECTOMY      FRACTURE SURGERY      HERNIA REPAIR      HIP FRACTURE SURGERY Right 2005    HYSTERECTOMY      INSERTION OF DIALYSIS CATHETER Right 4/22/2022    Procedure: INSERTION, CATHETER, DIALYSIS;  Surgeon: Terence Jeffery MD;  Location: [facility];  Service: General;  Laterality: Right;    INSERTION OF TUNNELED CENTRAL VENOUS HEMODIALYSIS CATHETER Right 9/2/2022    Procedure: INSERTION, CATHETER, HEMODIALYSIS, DUAL LUMEN;  Surgeon: Terence Jeffery MD;  Location: [facility];  Service: General;  Laterality: Right;       Review of patient's allergies indicates:  No Known Allergies    No current facility-administered medications on file prior to encounter.     Current Outpatient Medications on File Prior to Encounter   Medication Sig    albuterol (PROVENTIL) 2.5 mg /3 mL (0.083 %) nebulizer solution     albuterol (VENTOLIN HFA) 90 mcg/actuation inhaler Inhale 2 puffs into the lungs every 6 (six) hours as needed for Wheezing. Rescue    allopurinoL (ZYLOPRIM) 100 MG tablet Take 1 tablet (100 mg total) by mouth once daily.    amLODIPine (NORVASC) 5 MG tablet Take 1 tablet (5 mg total) by mouth once daily.    aspirin  (ECOTRIN) 81 MG EC tablet Take 1 tablet (81 mg total) by mouth once daily.    blood sugar diagnostic (BLOOD GLUCOSE TEST) Strp Use to check blood glucose 2x daily    blood-glucose meter Misc Use to check blood glucose 2x daily    budesonide-formoterol 160-4.5 mcg (SYMBICORT) 160-4.5 mcg/actuation HFAA Inhale 2 puffs into the lungs every 12 (twelve) hours. Controller    calcitRIOL (ROCALTROL) 0.25 MCG Cap Take 0.25 mcg by mouth once daily.    calcium carbonate (TUMS) 200 mg calcium (500 mg) chewable tablet Take 1 tablet (500 mg total) by mouth once daily.    cyanocobalamin (VITAMIN B-12) 100 MCG tablet Take 100 mcg by mouth once daily.    gabapentin (NEURONTIN) 100 MG capsule Take 1 capsule (100 mg total) by mouth 2 (two) times daily.    hydrALAZINE (APRESOLINE) 100 MG tablet Take 1 tablet (100 mg total) by mouth every 8 (eight) hours.    HYDROcodone-acetaminophen (NORCO)  mg per tablet Take 1 tablet by mouth every 12 (twelve) hours.    [START ON 9/23/2022] HYDROcodone-acetaminophen (NORCO)  mg per tablet Take 1 tablet by mouth every 12 (twelve) hours.    sevelamer carbonate (RENVELA) 800 mg Tab     simvastatin (ZOCOR) 20 MG tablet Take 1 tablet (20 mg total) by mouth every evening.    SITagliptin (JANUVIA) 25 MG Tab Take 1 tablet (25 mg total) by mouth once daily.    [DISCONTINUED] glipiZIDE (GLUCOTROL) 5 MG TR24 Take 1 tablet (5 mg total) by mouth daily with breakfast.     Family History       Problem Relation (Age of Onset)    Alcohol abuse Father    Asthma Son    Cancer Other    Diabetes Mother, Sister          Tobacco Use    Smoking status: Never    Smokeless tobacco: Never   Substance and Sexual Activity    Alcohol use: Never    Drug use: Never    Sexual activity: Not Currently     Review of Systems   Unable to perform ROS: Intubated   Objective:     Vital Signs (Most Recent):  Temp: 97.2 °F (36.2 °C) (09/19/22 1330)  Pulse: 82 (09/19/22 1352)  Resp: 20 (09/19/22 1352)  BP: (!) 149/56 (09/19/22  1330)  SpO2: 100 % (09/19/22 1352)   Vital Signs (24h Range):  Temp:  [91 °F (32.8 °C)-99.1 °F (37.3 °C)] 97.2 °F (36.2 °C)  Pulse:  [54-89] 82  Resp:  [0-22] 20  SpO2:  [94 %-100 %] 100 %  BP: ()/(25-73) 149/56     Weight: 73.1 kg (161 lb 2.5 oz)  Body mass index is 26.82 kg/m².    SpO2: 100 %  O2 Device (Oxygen Therapy): ventilator      Intake/Output Summary (Last 24 hours) at 9/19/2022 1500  Last data filed at 9/19/2022 1205  Gross per 24 hour   Intake 861.93 ml   Output 08440 ml   Net -82029.07 ml       Lines/Drains/Airways       Peripherally Inserted Central Catheter Line  Duration             PICC Double Lumen 08/31/22 0700 left basilic 19 days              Central Venous Catheter Line  Duration                  Hemodialysis Catheter 09/02/22 0815 right subclavian 17 days              Drain  Duration                  Chest Tube 08/29/22 1300 1 Right Midaxillary 21 days              Airway  Duration                  Airway - Non-Surgical 09/18/22 1118 Endotracheal Tube 1 day                    Physical Exam  Vitals reviewed.   Constitutional:       General: She is not in acute distress.  Eyes:      Pupils: Pupils are equal, round, and reactive to light.   Cardiovascular:      Rate and Rhythm: Normal rate and regular rhythm.      Pulses: Normal pulses.      Heart sounds: Normal heart sounds.   Pulmonary:      Effort: Pulmonary effort is normal.      Comments: Intubated  Right sided chest tube present  Abdominal:      Palpations: Abdomen is soft.   Musculoskeletal:      Cervical back: Neck supple.   Skin:     General: Skin is warm and dry.   Neurological:      Comments: Intubated and sedated on vent       Significant Labs: All pertinent lab results from the last 24 hours have been reviewed. and   Recent Lab Results         09/19/22  1136   09/19/22  0740   09/19/22  0459        Albumin     2.3       Anion Gap     16       BUN     33       BUN/CREAT RATIO     5       Calcium     8.1       Chloride     92        CO2     23       Creatinine     6.19       eGFR     6       Glucose     310       Phosphorus     2.0       POC Glucose 191   333         Potassium     4.8       Sodium     126               Significant Imaging: Echocardiogram: Transthoracic echo (TTE) complete (Cupid Only):   Results for orders placed or performed during the hospital encounter of 04/19/22   Echo   Result Value Ref Range    BSA 1.91 m2    Right Atrial Pressure (from IVC) 3 mmHg    EF 70 %    Left Ventricular Outflow Tract Mean Gradient 3.00 mmHg    AORTIC VALVE CUSP SEPERATION 18.020400149565621 cm    LVIDd 4.72 3.5 - 6.0 cm    IVS 1.37 (A) 0.6 - 1.1 cm    Posterior Wall 1.26 (A) 0.6 - 1.1 cm    Ao root annulus 2.20 cm    LVIDs 2.62 2.1 - 4.0 cm    FS 44 28 - 44 %    IVC ostium 1.5 cm    LV mass 243.47 g    RVDD 4.60 cm    TAPSE 2.00 cm    Left Ventricle Relative Wall Thickness 0.53 cm    AV mean gradient 6 mmHg    AV valve area 1.48 cm2    AV Velocity Ratio 0.75     AV index (prosthetic) 0.74     MV mean gradient 2 mmHg    MV valve area p 1/2 method 2.82 cm2    MV valve area by continuity eq 1.21 cm2    E wave deceleration time 163 msec    LVOT diameter 1.60 cm    LVOT area 2.0 cm2    LVOT peak aren 1.2 m/s    LVOT peak VTI 31.00 cm    Ao peak aren 1.6 m/s    Ao VTI 42.00 cm    LVOT stroke volume 62.30 cm3    AV peak gradient 10 mmHg    MV peak gradient 6 mmHg    TV rest pulmonary artery pressure 41 mmHg    MV Peak E Aren 1.06 m/s    TR Max Aren 3.10 m/s    MV VTI 51.6 cm    MV stenosis pressure 1/2 time 78 ms    LV Systolic Volume 25.10 mL    LV Systolic Volume Index 13.3 mL/m2    LV Diastolic Volume 103.40 mL    LV Diastolic Volume Index 54.71 mL/m2    LV Mass Index 129 g/m2    Echo EF Estimated 70 %    RA Major Axis 4.30 cm    Triscuspid Valve Regurgitation Peak Gradient 38 mmHg    LA size 3.40 cm    Narrative    · The left ventricle is normal in size with mild concentric hypertrophy   and normal systolic function.  · The estimated ejection  fraction is 70%.  · Normal left ventricular diastolic function.  · Atrial fibrillation not observed.  · Mild right ventricular enlargement.  · Mild right atrial enlargement.  · Mild-to-moderate mitral regurgitation.  · Mild tricuspid regurgitation.  · Mild pulmonic regurgitation.  · Normal central venous pressure (3 mmHg).  · The estimated PA systolic pressure is 41 mmHg.  · There is pulmonary hypertension.

## 2022-09-19 NOTE — ASSESSMENT & PLAN NOTE
Patient status post a pneumothorax for arm a thoracentesis has a transudative effusion the question is whether not this is due to peritoneal dialysis.  The options are to consider surgery to fix a defect in the diaphragm or to proceed with hemodialysis will follow.  Currently draining a L the last 24 hours at a chest tube her x-ray this morning looked clear  9/18 surgery note reviewed. Plan was to pull chest tube in am  9/19 continue chest tube until pt extubated per surgery note

## 2022-09-19 NOTE — SUBJECTIVE & OBJECTIVE
Interval History: Pt remains intubated and sedated. Has been weaned off the transcutaneous PM; sustaining HR herself. Withdraws to pain- no other response at present.     Objective:     Vital Signs (Most Recent):  Temp: 97.2 °F (36.2 °C) (09/19/22 1330)  Pulse: 82 (09/19/22 1352)  Resp: 20 (09/19/22 1352)  BP: (!) 149/56 (09/19/22 1330)  SpO2: 100 % (09/19/22 1352)   Vital Signs (24h Range):  Temp:  [91 °F (32.8 °C)-99.1 °F (37.3 °C)] 97.2 °F (36.2 °C)  Pulse:  [54-89] 82  Resp:  [0-22] 20  SpO2:  [94 %-100 %] 100 %  BP: ()/(25-73) 149/56     Weight: 73.1 kg (161 lb 2.5 oz)  Body mass index is 26.82 kg/m².      Intake/Output Summary (Last 24 hours) at 9/19/2022 1536  Last data filed at 9/19/2022 1205  Gross per 24 hour   Intake 861.93 ml   Output 93355 ml   Net -46306.07 ml       Physical Exam  Vitals and nursing note reviewed.   Constitutional:       General: She is not in acute distress.     Appearance: She is ill-appearing.   HENT:      Head: Normocephalic and atraumatic.      Right Ear: External ear normal.      Left Ear: External ear normal.      Nose: Nose normal.      Mouth/Throat:      Pharynx: Oropharynx is clear.      Comments: Et tube in place  Eyes:      General: No scleral icterus.     Conjunctiva/sclera: Conjunctivae normal.   Cardiovascular:      Rate and Rhythm: Normal rate and regular rhythm.      Pulses: Normal pulses.      Heart sounds: Normal heart sounds.   Pulmonary:      Breath sounds: Normal breath sounds.      Comments: Clear breath sounds anteriorly on mechanical ventilation  Abdominal:      General: Bowel sounds are normal.      Palpations: Abdomen is soft.   Musculoskeletal:      Cervical back: Normal range of motion and neck supple.   Skin:     General: Skin is warm and dry.      Capillary Refill: Capillary refill takes less than 2 seconds.   Neurological:      Sensory: No sensory deficit.      Motor: No weakness.      Gait: Gait normal.      Comments: Currently intubated  Withdraw  from pain       Vents:  Vent Mode: A/C (09/19/22 1500)  Ventilator Initiated: Yes (09/18/22 1135)  Set Rate: 20 BPM (09/19/22 1500)  Vt Set: 500 mL (09/19/22 1500)  PEEP/CPAP: 5 cmH20 (09/19/22 1500)  Oxygen Concentration (%): 50 (09/19/22 1500)  Peak Airway Pressure: 29 cmH2O (09/19/22 1500)  Total Ve: 11 mL (09/19/22 1500)  F/VT Ratio<105 (RSBI): (!) 40 (09/19/22 1145)    Lines/Drains/Airways       Peripherally Inserted Central Catheter Line  Duration             PICC Double Lumen 08/31/22 0700 left basilic 19 days              Central Venous Catheter Line  Duration                  Hemodialysis Catheter 09/02/22 0815 right subclavian 17 days              Drain  Duration                  Chest Tube 08/29/22 1300 1 Right Midaxillary 21 days              Airway  Duration                  Airway - Non-Surgical 09/18/22 1118 Endotracheal Tube 1 day                    Significant Labs:    CBC/Anemia Profile:  Recent Labs   Lab 09/18/22  1201   WBC 10.38   HGB 8.7*   HCT 28.8*      .3*   RDW 14.7*        Chemistries:  Recent Labs   Lab 09/18/22  1201 09/19/22  0459   * 126*   K 5.1 4.8   CL 96* 92*   CO2 19* 23   BUN 25* 33*   CREATININE 6.18* 6.19*   CALCIUM 8.7 8.1*   ALBUMIN 2.2* 2.3*   PROT 5.7*  --    BILITOT 0.6  --    ALKPHOS 81  --    ALT 23  --    AST 43*  --    PHOS  --  2.0*       All pertinent labs within the past 24 hours have been reviewed.    Significant Imaging:  I have reviewed all pertinent imaging results/findings within the past 24 hours.

## 2022-09-19 NOTE — ASSESSMENT & PLAN NOTE
She is requiring external pacemaker.  She previously had AFib treated with amiodarone.  Cardiology will be consulted for possible pacer.  Case discussed with multiple family members.  Critical care time 30 minutes

## 2022-09-19 NOTE — CONSULTS
[facility] ICU  Cardiology  Consult Note    Patient Name: Bria Ray  MRN: [id number]  Admission Date: 8/29/2022  Hospital Length of Stay: 21 days  Code Status: DNR   Attending Provider: Misael House DO   Consulting Provider: BELA Carlos  Primary Care Physician: Lorena Richardson MD  Principal Problem:Pneumothorax on right    Patient information was obtained from relative(s) and ER records.     Inpatient consult to Cardiology  Consult performed by: BELA Carlos  Cons[facility]        Subjective:     Chief Complaint:  pneumothorax     HPI:   Bria Ray is a 87-year-old female with PMHx of HTN, DM, ESRD, COPD, DJD, Hip fracture 9/2020 - s/p nailing. Pt presented from home as direct admission for right-sided pneumothorax. Patient underwent right-sided thoracentesis on 8/25/22 for pleural effusion, however she continued to have right-sided chest pain along with SOB, CXR was obtained on 8/29/22 revealing pneumothorax on the right side. Patient was then instructed to come to the hospital for further care. Pt follows with Dr. Bautista for pulmonary. The patient was found unresponsive yesterday (9/18/22) on the 4th floor yesterday and code blue was called. She was coded, intubated, and transferred to ICU where she is currently requiring IV pressor support and is being transcutaneously paced. Pt currently receiving HD in her ICU room, was on peritoneal dialysis at home. Family has now made pt a DNR.    Three of her children are present in room and state pt has a history of bradycardia. Also state pt is wheelchair/chair bound at home and requires assistance with ADLs due to weakness/SOB. Pt has been bed-bound for the last 3 weeks while she has been in the hospital. ECHO 04/2022 shows EF 70%, mild to moderate MR, pulmonary hypertension with estimated PASP 41mmHg. Lexiscan 5/17/22 - normal cardiac perfusion study; normal TID ratio.      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, without long-term current use of insulin     Vertigo     Vitamin D deficiency        Past Surgical History:   Procedure Laterality Date    BLADDER SUSPENSION      BRONCHOSCOPY       11/2020    CHOLECYSTECTOMY      FRACTURE SURGERY      HERNIA REPAIR      HIP FRACTURE SURGERY Right 2005    HYSTERECTOMY      INSERTION OF DIALYSIS CATHETER Right 4/22/2022    Procedure: INSERTION, CATHETER, DIALYSIS;  Surgeon: Terence Jeffery MD;  Location: [facility];  Service: General;  Laterality: Right;    INSERTION OF TUNNELED CENTRAL VENOUS HEMODIALYSIS CATHETER Right 9/2/2022    Procedure: INSERTION, CATHETER, HEMODIALYSIS, DUAL LUMEN;  Surgeon: Terence Jeffery MD;  Location: [facility];  Service: General;  Laterality: Right;       Review of patient's allergies indicates:  No Known Allergies    No current facility-administered medications on file prior to encounter.     Current Outpatient Medications on File Prior to Encounter   Medication Sig    albuterol (PROVENTIL) 2.5 mg /3 mL (0.083 %) nebulizer solution     albuterol (VENTOLIN HFA) 90 mcg/actuation inhaler Inhale 2 puffs into the lungs every 6 (six) hours as needed for Wheezing. Rescue    allopurinoL (ZYLOPRIM) 100 MG tablet Take 1 tablet (100 mg total) by mouth once daily.    amLODIPine (NORVASC) 5 MG tablet Take 1 tablet (5 mg total) by  mouth once daily.    aspirin (ECOTRIN) 81 MG EC tablet Take 1 tablet (81 mg total) by mouth once daily.    blood sugar diagnostic (BLOOD GLUCOSE TEST) Strp Use to check blood glucose 2x daily    blood-glucose meter Misc Use to check blood glucose 2x daily    budesonide-formoterol 160-4.5 mcg (SYMBICORT) 160-4.5 mcg/actuation HFAA Inhale 2 puffs into the lungs every 12 (twelve) hours. Controller    calcitRIOL (ROCALTROL) 0.25 MCG Cap Take 0.25 mcg by mouth once daily.    calcium carbonate (TUMS) 200 mg calcium (500 mg) chewable tablet Take 1 tablet (500 mg total) by mouth once daily.    cyanocobalamin (VITAMIN B-12) 100 MCG tablet Take 100 mcg by mouth once daily.    gabapentin (NEURONTIN) 100 MG capsule Take 1 capsule (100 mg total) by mouth 2 (two) times daily.    hydrALAZINE (APRESOLINE) 100 MG tablet Take 1 tablet (100 mg total) by mouth every 8 (eight) hours.    HYDROcodone-acetaminophen (NORCO)  mg per tablet Take 1 tablet by mouth every 12 (twelve) hours.    [START ON 9/23/2022] HYDROcodone-acetaminophen (NORCO)  mg per tablet Take 1 tablet by mouth every 12 (twelve) hours.    sevelamer carbonate (RENVELA) 800 mg Tab     simvastatin (ZOCOR) 20 MG tablet Take 1 tablet (20 mg total) by mouth every evening.    SITagliptin (JANUVIA) 25 MG Tab Take 1 tablet (25 mg total) by mouth once daily.    [DISCONTINUED] glipiZIDE (GLUCOTROL) 5 MG TR24 Take 1 tablet (5 mg total) by mouth daily with breakfast.     Family History       Problem Relation (Age of Onset)    Alcohol abuse Father    Asthma Son    Cancer Other    Diabetes Mother, Sister          Tobacco Use    Smoking status: Never    Smokeless tobacco: Never   Substance and Sexual Activity    Alcohol use: Never    Drug use: Never    Sexual activity: Not Currently     Review of Systems   Unable to perform ROS: Intubated   Objective:     Vital Signs (Most Recent):  Temp: 97.2 °F (36.2 °C) (09/19/22 1330)  Pulse: 82 (09/19/22 1352)  Resp:  20 (09/19/22 1352)  BP: (!) 149/56 (09/19/22 1330)  SpO2: 100 % (09/19/22 1352)   Vital Signs (24h Range):  Temp:  [91 °F (32.8 °C)-99.1 °F (37.3 °C)] 97.2 °F (36.2 °C)  Pulse:  [54-89] 82  Resp:  [0-22] 20  SpO2:  [94 %-100 %] 100 %  BP: ()/(25-73) 149/56     Weight: 73.1 kg (161 lb 2.5 oz)  Body mass index is 26.82 kg/m².    SpO2: 100 %  O2 Device (Oxygen Therapy): ventilator      Intake/Output Summary (Last 24 hours) at 9/19/2022 1500  Last data filed at 9/19/2022 1205  Gross per 24 hour   Intake 861.93 ml   Output 77160 ml   Net -85546.07 ml       Lines/Drains/Airways       Peripherally Inserted Central Catheter Line  Duration             PICC Double Lumen 08/31/22 0700 left basilic 19 days              Central Venous Catheter Line  Duration                  Hemodialysis Catheter 09/02/22 0815 right subclavian 17 days              Drain  Duration                  Chest Tube 08/29/22 1300 1 Right Midaxillary 21 days              Airway  Duration                  Airway - Non-Surgical 09/18/22 1118 Endotracheal Tube 1 day                    Physical Exam  Vitals reviewed.   Constitutional:       General: She is not in acute distress.  Eyes:      Pupils: Pupils are equal, round, and reactive to light.   Cardiovascular:      Rate and Rhythm: Normal rate and regular rhythm.      Pulses: Normal pulses.      Heart sounds: Normal heart sounds.   Pulmonary:      Effort: Pulmonary effort is normal.      Comments: Intubated  Right sided chest tube present  Abdominal:      Palpations: Abdomen is soft.   Musculoskeletal:      Cervical back: Neck supple.   Skin:     General: Skin is warm and dry.   Neurological:      Comments: Intubated and sedated on vent       Significant Labs: All pertinent lab results from the last 24 hours have been reviewed. and   Recent Lab Results         09/19/22  1136   09/19/22  0740   09/19/22  0459        Albumin     2.3       Anion Gap     16       BUN     33       BUN/CREAT RATIO     5        Calcium     8.1       Chloride     92       CO2     23       Creatinine     6.19       eGFR     6       Glucose     310       Phosphorus     2.0       POC Glucose 191   333         Potassium     4.8       Sodium     126               Significant Imaging: Echocardiogram: Transthoracic echo (TTE) complete (Cupid Only):   Results for orders placed or performed during the hospital encounter of 04/19/22   Echo   Result Value Ref Range    BSA 1.91 m2    Right Atrial Pressure (from IVC) 3 mmHg    EF 70 %    Left Ventricular Outflow Tract Mean Gradient 3.00 mmHg    AORTIC VALVE CUSP SEPERATION [id number] cm    LVIDd 4.72 3.5 - 6.0 cm    IVS 1.37 (A) 0.6 - 1.1 cm    Posterior Wall 1.26 (A) 0.6 - 1.1 cm    Ao root annulus 2.20 cm    LVIDs 2.62 2.1 - 4.0 cm    FS 44 28 - 44 %    IVC ostium 1.5 cm    LV mass 243.47 g    RVDD 4.60 cm    TAPSE 2.00 cm    Left Ventricle Relative Wall Thickness 0.53 cm    AV mean gradient 6 mmHg    AV valve area 1.48 cm2    AV Velocity Ratio 0.75     AV index (prosthetic) 0.74     MV mean gradient 2 mmHg    MV valve area p 1/2 method 2.82 cm2    MV valve area by continuity eq 1.21 cm2    E wave deceleration time 163 msec    LVOT diameter 1.60 cm    LVOT area 2.0 cm2    LVOT peak aren 1.2 m/s    LVOT peak VTI 31.00 cm    Ao peak aren 1.6 m/s    Ao VTI 42.00 cm    LVOT stroke volume 62.30 cm3    AV peak gradient 10 mmHg    MV peak gradient 6 mmHg    TV rest pulmonary artery pressure 41 mmHg    MV Peak E Aren 1.06 m/s    TR Max Aren 3.10 m/s    MV VTI 51.6 cm    MV stenosis pressure 1/2 time 78 ms    LV Systolic Volume 25.10 mL    LV Systolic Volume Index 13.3 mL/m2    LV Diastolic Volume 103.40 mL    LV Diastolic Volume Index 54.71 mL/m2    LV Mass Index 129 g/m2    Echo EF Estimated 70 %    RA Major Axis 4.30 cm    Triscuspid Valve Regurgitation Peak Gradient 38 mmHg    LA size 3.40 cm    Narrative    · The left ventricle is normal in size with mild concentric hypertrophy   and normal  systolic function.  · The estimated ejection fraction is 70%.  · Normal left ventricular diastolic function.  · Atrial fibrillation not observed.  · Mild right ventricular enlargement.  · Mild right atrial enlargement.  · Mild-to-moderate mitral regurgitation.  · Mild tricuspid regurgitation.  · Mild pulmonic regurgitation.  · Normal central venous pressure (3 mmHg).  · The estimated PA systolic pressure is 41 mmHg.  · There is pulmonary hypertension.        Assessment and Plan:     * Pneumothorax on right  - right sided chest tube   - primary and surgery team managing    Sick sinus syndrome  - bradycardia complicated by intercurrent illness which seems to have improved  - current HR is 67, transcutaneous pacer threshold has been lowered to 40  - pt will need quite a bit more improvement before a pacemaker can be placed  - will monitor with you as pt improves  - Dr. Jackson discussed end of life decisions with the family who are concerned pt will remain on artificial life support beyond hopeful recovery    Atrial fibrillation  - EKG exhibited SVT with RVR at rate of 176 bpm. Likely a physiologic response. Adenosine and 250cc NS bolus administered with current rate of 95 bpm. Diltiazem was not initiated due to concern for hypotension. Continue monitoring via Telemetry and repeat EKG.    9/1/22:  - pt is in and out of SVT vs afib, she is asymptomatic  - start amiodarone gtt with bolus  - continue tele monitoring    9/2/22:  - pt is now NSR with HR in the 80s  - transition to PO amiodarone 400mg po bid x 7 days then 200mg po daily  - ASA only for AC given pt's age and high fall risk with unsteady gait  - pt to f/u with Dr. Parker in cardiology clinic in 2 weeks    9/19/22:  - pt is now NSR with HR in the 60s  - continue PO amiodarone  - currently on lovenox        ESRD (end stage renal disease)  - now receiving HD  - primary and nephrology managing        VTE Risk Mitigation (From admission, onward)          Ordered     enoxaparin injection 70 mg  Every 24 hours (non-standard times)         09/19/22 0957     heparin (porcine) injection 4,000 Units  As needed (PRN)         09/02/22 1357     IP VTE HIGH RISK PATIENT  Once         08/29/22 1333     Place sequential compression device  Until discontinued         08/29/22 1333                Thank you for your consult. I will follow-up with patient. Please contact us if you have any additional questions.    Daniela Sparks, LINNP  Cardiology   [facility]

## 2022-09-19 NOTE — ASSESSMENT & PLAN NOTE
Intubated following cardiac arrest  Currently oxygenating adequately  Mild metabolic acidosis on ABG 7.27/40/376 (following intubation)  Will rest on vent tonight and see how she looks in am  9/19  - rested on vent again today given HD and CT head   - will attempt some trials tomorrow to see how pt tolerates

## 2022-09-19 NOTE — NURSING
Pt now awake and moving arms all over, appears in pain, transcutaneous pacer mA down to 100, diprivan increased now at 30 dr guzmán made aware, received orders to leave mA at 100 and start versed if pt bp does not tolerate diprivan

## 2022-09-19 NOTE — SUBJECTIVE & OBJECTIVE
Interval History: cardiac arrest    Medications:  Continuous Infusions:   NORepinephrine bitartrate-D5W 0.3 mcg/kg/min (09/19/22 0945)    propofoL 35 mcg/kg/min (09/19/22 1030)     Scheduled Meds:   albumin human 25%  25 g Intravenous Once    albuterol-ipratropium  3 mL Nebulization Q6H    atorvastatin  40 mg Oral Daily    budesonide  0.5 mg Nebulization Q12H    calcitRIOL  0.25 mcg Oral Daily    calcium carbonate  1 tablet Oral Daily    cyanocobalamin  100 mcg Oral Daily    enoxaparin  1 mg/kg Subcutaneous Q24H    epoetin rosa-epbx  10,000 Units Intravenous Every Mon, Wed, Fri    gabapentin  100 mg Oral BID    mupirocin   Topical (Top) Daily    pantoprazole  40 mg Oral BID AC    sevelamer carbonate  800 mg Oral TID AC    sodium chloride 0.9%  10 mL Intravenous Q6H     PRN Meds:sodium chloride 0.9%, acetaminophen, bisacodyL, dextrose 50%, dextrose 50%, diphenhydrAMINE, glucagon (human recombinant), heparin (porcine), hydrALAZINE, HYDROmorphone, insulin aspart U-100, LIDOcaine-prilocaine, lorazepam, magnesium oxide, magnesium oxide, melatonin, naloxone, ondansetron, oxyCODONE-acetaminophen, potassium bicarbonate, potassium bicarbonate, potassium bicarbonate, sodium chloride 0.9%     Review of patient's allergies indicates:  No Known Allergies  Objective:     Vital Signs (Most Recent):  Temp: (!) 95.9 °F (35.5 °C) (09/19/22 1000)  Pulse: 62 (09/19/22 1000)  Resp: 20 (09/19/22 1000)  BP: (!) 109/54 (09/19/22 1000)  SpO2: 100 % (09/19/22 1000)   Vital Signs (24h Range):  Temp:  [91 °F (32.8 °C)-99.7 °F (37.6 °C)] 95.9 °F (35.5 °C)  Pulse:  [26-89] 62  Resp:  [0-28] 20  SpO2:  [92 %-100 %] 100 %  BP: ()/() 109/54     Weight: 73.1 kg (161 lb 2.5 oz)  Body mass index is 26.82 kg/m².    Intake/Output - Last 3 Shifts         09/17 0700 09/18 0659 09/18 0700 09/19 0659 09/19 0700 09/20 0659    P.O. 290      I.V. (mL/kg) 20 (0.3) 696.4 (9.5)     Other       Total Intake(mL/kg) 310 (4.2) 696.4 (9.5)     Other        Chest Tube 0      Total Output 0      Net +310 +696.4            Stool Occurrence  2 x             Physical Exam  Vitals and nursing note reviewed. Exam conducted with a chaperone present.   Constitutional:       Appearance: She is ill-appearing.   HENT:      Mouth/Throat:      Comments: ETT  Cardiovascular:      Rate and Rhythm: Bradycardia present.   Pulmonary:      Comments: Mechanical ventilation  Right chest tube intact to gravity no air leak  Skin:     Comments: HD CATH RIGHT IJ dialyzing       Significant Labs:  I have reviewed all pertinent lab results within the past 24 hours.  CBC:   Recent Labs   Lab 09/18/22  1201   WBC 10.38   RBC 2.66*   HGB 8.7*   HCT 28.8*      .3*   MCH 32.7*   MCHC 30.2*     BMP:   Recent Labs   Lab 09/19/22  0459   *   *   K 4.8   CL 92*   CO2 23   BUN 33*   CREATININE 6.19*   CALCIUM 8.1*     CMP:   Recent Labs   Lab 09/18/22  1201 09/19/22  0459   * 310*   CALCIUM 8.7 8.1*   ALBUMIN 2.2* 2.3*   PROT 5.7*  --    * 126*   K 5.1 4.8   CO2 19* 23   CL 96* 92*   BUN 25* 33*   CREATININE 6.18* 6.19*   ALKPHOS 81  --    ALT 23  --    AST 43*  --    BILITOT 0.6  --      LFTs:   Recent Labs   Lab 09/18/22  1201 09/19/22  0459   ALT 23  --    AST 43*  --    ALKPHOS 81  --    BILITOT 0.6  --    PROT 5.7*  --    ALBUMIN 2.2* 2.3*     Coagulation: No results for input(s): LABPROT, INR, APTT in the last 168 hours.    Significant Diagnostics:  I have reviewed all pertinent imaging results/findings within the past 24 hours.

## 2022-09-19 NOTE — ASSESSMENT & PLAN NOTE
08/30/2022 increased serous output on chest tube during peritoneal dialysis concern for diaphragmatic leak, will hold peritoneal dialysis as dr Mckinnon discussed with Dr. 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  Continue chest tube to suction over the weekend.  Continue hemodialysis.  We will possibly remove chest tube within the next week if patient is doing well.  If the fistula does not close and ascites reaccumulates, patient will need a VATS for closure of diaphragmatic fistula    9/4: pneumothorax small to moderate size; Repeat CXR tomorrow; if getting larger, may have to reposition chest tube in OR; continue medical management    9/5:  Pneumothorax the same size; continuous air leak found and chest tube secured tubing with resolution of air leak.  We will repeat chest x-ray today; also get a chest x-ray tomorrow a.m..  I will make the patient NPO after midnight in case patient may require a procedure or reinsertion of chest tube/VATS.      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        09/14/2022 patient In dialysis on rounds    09/16/2022 chest tube dressing redressed by me, minimum serous chest tube output, continue nonoperative treatment for Right  Pleural peritoneal fistula repeat Nuclear medicine shunt patency Thursday, keep chest tubes until after study   Any problems with chest tube over weekend dr gomez is on call otherwise general surgery will reevaluate patient on Monday 09/19/2022 cardiac arrest on vent, when stable can repeat nuclear med study if persistent pleural peritoneal fistula, dr mckinnon does not recommend any surgery (VATS)  and would continue chest tube and hemodialysis and not be able to use PD cath, dr mckinnon updated family

## 2022-09-19 NOTE — PT/OT/SLP PROGRESS
Physical Therapy      Patient Name:  Bria Ray   MRN:  [id number]    Patient not seen today secondary to Transfer to ICU, await clearance (Pt is in ICU, now on ventilator). Will follow-up 9/20/22.

## 2022-09-19 NOTE — ASSESSMENT & PLAN NOTE
Patient with Paroxysmal (<7 days) atrial fibrillation which is controlled currently with Amiodarone. Patient is currently in sinus rhythm.[id number] Score: 4.      Detailed conversation in the presence of daughter, patient's decision was to go for AC.   Holding for now given downtrending Hb    9/18 Now with bradycardia  Likely sick sinus syndrome  Not sure she is a good candidate for pacemaker  Dr. Parker is her cardiologist  I see amiodarone but no current rate control medications  9/19  - cardiology has been consulted   - not a candidate for PM at present   - will continue current meds

## 2022-09-19 NOTE — TELEPHONE ENCOUNTER
Call received with message from   Ms. Jerri [facility].    She reported that  has been admitted to ICU and is on the vent...  Just wasted  to know.    Message to Everardo's voice mail hoping for good outcome and thank you for status call.

## 2022-09-20 NOTE — PLAN OF CARE
Problem: Skin Injury Risk Increased  Goal: Skin Health and Integrity  Outcome: Ongoing, Progressing  Intervention: Optimize Skin Protection  Flowsheets (Taken 9/19/2022 1915)  Pressure Reduction Techniques:   weight shift assistance provided   positioned off wounds   frequent weight shift encouraged  Pressure Reduction Devices:   specialty bed utilized   pressure-redistributing mattress utilized  Skin Protection: tubing/devices free from skin contact  Head of Bed (HOB) Positioning: HOB at 20-30 degrees  Intervention: Promote and Optimize Oral Intake  Flowsheets (Taken 9/19/2022 1915)  Oral Nutrition Promotion:   social interaction promoted   rest periods promoted     Problem: Restraint, Nonbehavioral (Nonviolent)  Goal: Absence of Harm or Injury  Outcome: Ongoing, Progressing  Intervention: Implement Least Restrictive Safety Strategies  Flowsheets (Taken 9/19/2022 1915)  Medical Device Protection: tubing secured  Less Restrictive Alternative:   safety enhancements provided   security enhancements provided   sensory stimulation limited  Diversional Activities: television  De-Escalation Techniques:   quiet time facilitated   stimulation decreased  Intervention: Protect Dignity, Rights, and Personal Wellbeing  Flowsheets (Taken 9/19/2022 1915)  Trust Relationship/Rapport:   care explained   thoughts/feelings acknowledged

## 2022-09-20 NOTE — ASSESSMENT & PLAN NOTE
Intubated following cardiac arrest  Currently oxygenating adequately  Mild metabolic acidosis on ABG 7.27/40/376 (following intubation)  Will rest on vent tonight and see how she looks in am  9/20  - rested on vent again today given HD and CT head   - will attempt some trials tomorrow to see how pt tolerates

## 2022-09-20 NOTE — PLAN OF CARE
[facility] ICU  Discharge Final Note    Primary Care Provider: Lorena Richardson MD    Expected Discharge Date:     Final Discharge Note (most recent)       Final Note - 22 1330          Final Note    Assessment Type Final Discharge Note     Anticipated Discharge Disposition                      Important Message from Medicare  Important Message from Medicare regarding Discharge Appeal Rights: Given to patient/caregiver, Explained to patient/caregiver, Signed/date by patient/caregiver     Date IMM was signed: 22  Time IMM was signed: 1612    Contact Info       Abelardo aPrker MD   Specialty: Interventional Cardiology, Cardiology    [address] [place] [id number]   Phone: [phone]       Next Steps: Schedule an appointment as soon as possible for a visit in 4 week(s)    Instructions: Hospital f/u - SVT          Pt .

## 2022-09-20 NOTE — DISCHARGE SUMMARY
[facility] ICU  Pulmonology  Discharge Summary      Patient Name: Bria Ray  MRN: [id number]  Admission Date: 8/29/2022  Hospital Length of Stay: 22 days  Discharge Date and Time:  09/20/2022 12:08 PM  Attending Physician: Misael House DO   Discharging Provider: Gilma Myers AG-ACNP  Primary Care Provider: Lorena Richardson MD    HPI:  Patient was admitted to the hospital for evaluation of shortness of breath found to have a hydropneumothorax after having a previous thoracentesis the pleural effusion was transudative with lymphocytic shift.  Patient is now on hospital chest tube in place and there is a question this could be due to her peritoneal dialysis and whether not there may be some kind of diaphragmatic leak      Procedure(s) (LRB):  INSERTION, CATHETER, HEMODIALYSIS, DUAL LUMEN (Right)    Indwelling Lines/Drains at Time of Discharge:   Lines/Drains/Airways     Peripherally Inserted Central Catheter Line  Duration           PICC Double Lumen 08/31/22 0700 left basilic 20 days          Central Venous Catheter Line  Duration                Hemodialysis Catheter 09/02/22 0815 right subclavian 18 days          Drain  Duration                Chest Tube 08/29/22 1300 1 Right Midaxillary 21 days          Airway  Duration                Airway - Non-Surgical 09/18/22 1118 Endotracheal Tube 2 days                Hospital Course:  HPI -  Patient is a 87-year-old female with past medical history of hypertension, diabetes mellitus, ESRD, COPD, DJD, presented from home as direct admission for right-sided pneumothorax.  Patient underwent right-sided thoracentesis last Thursday for pleural effusion, however since then she continued to have right-sided chest pain along with shortness of bread for which she got an x-ray revealing pneumothorax on the right side.  Patient was then instructed to come to the hospital for further care.  Patient's primary pulmonologist is Dr. Bautista, and has  been seeing General surgery in the past for her dialysis access issues.  Patient is also on peritoneal dialysis at home.  Patient is accompanied by her granddaughter, who is trying to get her hospice setup outpatient due to chronic conditions and mostly how her health has been declining steadily for the past few months.  I had a long discussion about goals of care for the patient as of now the patient has not made her mind about her code status and would like to be full code until she has a discussion with the rest of her family.  General surgery and Nephrology consulted and made aware of the patient's arrival.  Patient denies any fevers chills nausea vomiting or diarrhea does have cough and shortness of breath along with right-sided chest pain.    Hospital course -- pt admitted for pneumothorax post thoracentesis. Surgery consulted and placed chest tube. Due to a small peritoneal plueral fistula an HD line was placed for dialysis in attempt for spontaneous closure given age, co-morbidities and hopefully small size of fistula.  During this time of her hospitalization family had been discussing setting up hospice at time of discharge. On 09/18 pt suffered a cardiac arrest and was transferred to the ICU after she was intubated. She was then made a DNR later that day. Cardiology was consulted for proximal Afib and sick sinus syndrome, however, she was not a good candidate for pacemaker placement. She continued to require pressors since her cardiac arrest. This morning while her family was at her bedside she went into PEA and time of death was pronounced at 09:17AM.           Pneumothorax on right  Patient status post a pneumothorax for arm a thoracentesis has a transudative effusion the question is whether not this is due to peritoneal dialysis.  The options are to consider surgery to fix a defect in the diaphragm or to proceed with hemodialysis will follow.  Currently draining a L the last 24 hours at a chest tube her  x-ray this morning looked clear  9/18 surgery note reviewed. Plan was to pull chest tube in am   continue chest tube until pt extubated per surgery note      Sick sinus syndrome  - cardiology following  - HR 60-70s presently      On mechanically assisted ventilation  Intubated following cardiac arrest  Currently oxygenating adequately  Mild metabolic acidosis on ABG 7.27/40/376 (following intubation)  Will rest on vent tonight and see how she looks in am  9/20  - rested on vent again today given HD and CT head   - will attempt some trials tomorrow to see how pt tolerates      Cardiac arrest  Etiology uncertain  Patient with bradycardia that has required pacing with pads  This could be etiology  Checking troponins  Will order echo in the am  D dimer elevated  Checking venous dopplers  Prognosis is guarded at best  Family updated  Patient now DNR     Atrial fibrillation  Patient with Paroxysmal (<7 days) atrial fibrillation which is controlled currently with Amiodarone. Patient is currently in sinus rhythm.[id number] Score: 4.      Detailed conversation in the presence of daughter, patient's decision was to go for AC.   Holding for now given downtrending Hb     9/18 Now with bradycardia  Likely sick sinus syndrome  Not sure she is a good candidate for pacemaker  Dr. Parker is her cardiologist  I see amiodarone but no current rate control medications     - cardiology has been consulted   - not a candidate for PM at present   - will continue current meds      Pleural effusion on right  Related to peritoneal dialysis,will see if will resolve with hemodialysis     - HD cath placed and has resumed HD      ESRD (end stage renal disease)  Pt w/ diaghragmatic defet, hence PD has been stopped and HD initiated.   Nephrology following the patient        DM (diabetes mellitus)  Blood sugar ok  SSI     Chronic obstructive pulmonary disease  - chronic, controlled with current meds          Goals of Care Treatment  Preferences:  Code Status: DNR      Consults (From admission, onward)        Status Ordering Provider     Inpatient consult to Cardiology  Once        Provider:  Mario Alberto Jackson MD    Completed VALENTIN HANSEN     Inpatient consult to Social Work  Once        Provider:  (Not yet assigned)    Completed BARON SHEPHERD     Inpatient consult to Social Work  Once        Provider:  (Not yet assigned)    Completed LEILA HECK     Inpatient consult to Social Work  Once        Provider:  (Not yet assigned)    Completed BARON SHEPHERD     Inpatient consult to Cardiology  Once        Provider:  Kris Chatterjee DO    Completed RENEE, REHMAT U     Inpatient consult to Cardiology  Once        Provider:  Kris Chatterjee DO    Completed RENEE, REHMAT U     Inpatient consult to Nephrology  Once        Provider:  Beni Fields MD    Acknowledged RENEE, REHMAT U     Inpatient consult to Social Work  Once        Provider:  (Not yet assigned)    Completed RENEE, REHMAT U     Inpatient consult to Respiratory Care  Once        Provider:  (Not yet assigned)    Acknowledged RENEE, REHMAT U     Inpatient consult to General Surgery  Once        Provider:  Terence Jeffery MD    Completed RENEE, REHMAT U          Significant Labs:  All pertinent labs within the past 24 hours have been reviewed.    Significant Imaging:  I have reviewed all pertinent imaging results/findings within the past 24 hours.    Pending Diagnostic Studies:     Procedure Component Value Units Date/Time    EXTRA TUBES [[id number]] Collected: 08/31/22 1329    Order Status: Sent Lab Status: In process Updated: 09/19/22 1101    Specimen: Blood, Venous     Narrative:      The following orders were created for panel order EXTRA TUBES.  Procedure                               Abnormality         Status                     ---------                               -----------         ------                     Lavender Top Hold[[id number]]                                In process                  Gold Top Hold[[id number]]                                                                 Please view results for these tests on the individual orders.    EXTRA TUBES [[id number]] Collected: 09/08/22 1040    Order Status: Sent Lab Status: In process Updated: 09/19/22 1101    Specimen: Blood, Venous     Narrative:      The following orders were created for panel order EXTRA TUBES.  Procedure                               Abnormality         Status                     ---------                               -----------         ------                     Lavender Top Hold[[id number]]                                In process                 Gold Top Hold[[id number]]                                                                 Please view results for these tests on the individual orders.    EXTRA TUBES [[id number]] Collected: 09/19/22 0459    Order Status: Sent Lab Status: In process Updated: 09/19/22 0531    Specimen: Blood, Venous     Narrative:      The following orders were created for panel order EXTRA TUBES.  Procedure                               Abnormality         Status                     ---------                               -----------         ------                     Lavender Top Hold[[id number]]                                In process                   Please view results for these tests on the individual orders.    EXTRA TUBES [[id number]] Collected: 08/31/22 0854    Order Status: Sent Lab Status: In process Updated: 08/31/22 0924    Specimen: Blood, Venous     Narrative:      The following orders were created for panel order EXTRA TUBES.  Procedure                               Abnormality         Status                     ---------                               -----------         ------                     Light Blue Top Hold[[id number]]                              In process                 Pink Top Hold[[id number]]                                    In process                    Please view results for these tests on the individual orders.        Final Active Diagnoses:    Diagnosis Date Noted POA    PRINCIPAL PROBLEM:  Pneumothorax on right [J93.9] 2022 Yes    Sick sinus syndrome [I49.5] 2022 Unknown    Cardiac arrest [I46.9] 2022 Unknown    On mechanically assisted ventilation [Z99.11] 2022 Not Applicable    Atrial fibrillation [I48.91] 2022 Unknown    Pleural effusion on right [J90] 2022 Yes    ESRD (end stage renal disease) [N18.6]  Unknown    Chronic obstructive pulmonary disease [J44.9]  Yes    DM (diabetes mellitus) [E11.9]  Unknown    Primary hypertension [I10] 2022 Yes    Chronic pain syndrome [G89.4]  Yes     Chronic      Problems Resolved During this Admission:    Diagnosis Date Noted Date Resolved POA    SVT (supraventricular tachycardia) [I47.1] 2022 Yes       Discharged Condition:     Disposition:     Follow Up:   Follow-up Information     Abelardo Praker MD. Schedule an appointment as soon as possible for a visit in 4 week(s).    Specialties: Interventional Cardiology, Cardiology  Why: Hospital f/u - SVT  Contact information:  [address] [place] [id number] [phone]                       Patient Instructions:   No discharge procedures on file.  Medications:  None    BOBY Hoover-ACNP  Pulmonology  [facility]

## 2022-09-20 NOTE — ASSESSMENT & PLAN NOTE
Patient status post a pneumothorax for arm a thoracentesis has a transudative effusion the question is whether not this is due to peritoneal dialysis.  The options are to consider surgery to fix a defect in the diaphragm or to proceed with hemodialysis will follow.  Currently draining a L the last 24 hours at a chest tube her x-ray this morning looked clear  9/18 surgery note reviewed. Plan was to pull chest tube in am   continue chest tube until pt extubated per surgery note

## 2022-09-20 NOTE — ASSESSMENT & PLAN NOTE
Patient with Paroxysmal (<7 days) atrial fibrillation which is controlled currently with Amiodarone. Patient is currently in sinus rhythm.[id number] Score: 4.      Detailed conversation in the presence of daughter, patient's decision was to go for AC.   Holding for now given downtrending Hb    9/18 Now with bradycardia  Likely sick sinus syndrome  Not sure she is a good candidate for pacemaker  Dr. Parker is her cardiologist  I see amiodarone but no current rate control medications    - cardiology has been consulted   - not a candidate for PM at present   - will continue current meds

## 2022-09-20 NOTE — PROGRESS NOTES
[facility]  Pulmonology  Progress Note    Patient Name: Bria Ray  MRN: [id number]  Admission Date: 8/29/2022  Hospital Length of Stay: 22 days  Code Status: DNR  Attending Provider: Misael House DO  Primary Care Provider: Lorena Richardson MD   Principal Problem: Pneumothorax on right    Subjective:     Interval History:  Patient without complaints    Objective:     Vital Signs (Most Recent):  Temp: 98.8 °F (37.1 °C) (09/20/22 0600)  Pulse: 76 (09/20/22 0600)  Resp: 14 (09/20/22 0600)  BP: (!) 113/41 (09/20/22 0600)  SpO2: 100 % (09/20/22 0600)   Vital Signs (24h Range):  Temp:  [95.9 °F (35.5 °C)-100.2 °F (37.9 °C)] 98.8 °F (37.1 °C)  Pulse:  [] 76  Resp:  [0-27] 14  SpO2:  [88 %-100 %] 100 %  BP: ()/() 113/41     Weight: 78.1 kg (172 lb 2.9 oz)  Body mass index is 28.65 kg/m².      Intake/Output Summary (Last 24 hours) at 9/20/2022 0653  Last data filed at 9/20/2022 0600  Gross per 24 hour   Intake 3555.45 ml   Output 1500 ml   Net 2055.45 ml       Physical Exam  Vitals reviewed.   Constitutional:       Appearance: Normal appearance.      Interventions: She is not intubated.  HENT:      Head: Normocephalic and atraumatic.      Nose: Nose normal.      Mouth/Throat:      Mouth: Mucous membranes are dry.      Pharynx: Oropharynx is clear.   Eyes:      Extraocular Movements: Extraocular movements intact.      Conjunctiva/sclera: Conjunctivae normal.      Pupils: Pupils are equal, round, and reactive to light.   Cardiovascular:      Rate and Rhythm: Normal rate.      Heart sounds: Normal heart sounds. No murmur heard.  Pulmonary:      Effort: Pulmonary effort is normal. She is not intubated.      Breath sounds: Normal breath sounds.   Abdominal:      General: Abdomen is flat. Bowel sounds are normal.      Palpations: Abdomen is soft.   Musculoskeletal:         General: Normal range of motion.      Cervical back: Normal range of motion and neck supple.      Right  lower leg: No edema.      Left lower leg: No edema.   Skin:     General: Skin is warm and dry.      Capillary Refill: Capillary refill takes less than 2 seconds.   Neurological:      General: No focal deficit present.      Mental Status: She is alert and oriented to person, place, and time.   Psychiatric:         Mood and Affect: Mood normal.         Behavior: Behavior normal.       Vents:  Vent Mode: A/C (09/20/22 0419)  Ventilator Initiated: Yes (09/18/22 1135)  Set Rate: 20 BPM (09/20/22 0419)  Vt Set: 500 mL (09/20/22 0419)  Pressure Support: 10 cmH20 (09/20/22 0419)  PEEP/CPAP: 5 cmH20 (09/20/22 0419)  Oxygen Concentration (%): 50 (09/20/22 0000)  Peak Airway Pressure: 25 cmH2O (09/20/22 0419)  Total Ve: 10.3 mL (09/20/22 0419)  F/VT Ratio<105 (RSBI): (!) 34.78 (09/20/22 0000)    Lines/Drains/Airways       Peripherally Inserted Central Catheter Line  Duration             PICC Double Lumen 08/31/22 0700 left basilic 19 days              Central Venous Catheter Line  Duration                  Hemodialysis Catheter 09/02/22 0815 right subclavian 17 days              Drain  Duration                  Chest Tube 08/29/22 1300 1 Right Midaxillary 21 days              Airway  Duration                  Airway - Non-Surgical 09/18/22 1118 Endotracheal Tube 1 day                    Significant Labs:    CBC/Anemia Profile:  Recent Labs   Lab 09/18/22  1201   WBC 10.38   HGB 8.7*   HCT 28.8*      .3*   RDW 14.7*        Chemistries:  Recent Labs   Lab 09/18/22  1201 09/19/22  0459   * 126*   K 5.1 4.8   CL 96* 92*   CO2 19* 23   BUN 25* 33*   CREATININE 6.18* 6.19*   CALCIUM 8.7 8.1*   ALBUMIN 2.2* 2.3*   PROT 5.7*  --    BILITOT 0.6  --    ALKPHOS 81  --    ALT 23  --    AST 43*  --    PHOS  --  2.0*       Recent Lab Results  (Last 5 results in the past 24 hours)        09/20/22  0449   09/19/22 2035 09/19/22  1536   09/19/22  1136   09/19/22  0740        POC Glucose 248   233   219   191   200                               Significant Imaging:  I have reviewed all pertinent imaging results/findings within the past 24 hours.    Assessment/Plan:     * Pneumothorax on right  Patient status post a pneumothorax for arm a thoracentesis has a transudative effusion the question is whether not this is due to peritoneal dialysis.  The options are to consider surgery to fix a defect in the diaphragm or to proceed with hemodialysis will follow.  Currently draining a L the last 24 hours at a chest tube her x-ray this morning looked clear  9/18 surgery note reviewed. Plan was to pull chest tube in am   continue chest tube until pt extubated per surgery note     Sick sinus syndrome  - cardiology following  - HR 60-70s presently     On mechanically assisted ventilation  Intubated following cardiac arrest  Currently oxygenating adequately  Mild metabolic acidosis on ABG 7.27/40/376 (following intubation)  Will rest on vent tonight and see how she looks in am  9/20  - rested on vent again today given HD and CT head   - will attempt some trials tomorrow to see how pt tolerates     Cardiac arrest  Etiology uncertain  Patient with bradycardia that has required pacing with pads  This could be etiology  Checking troponins  Will order echo in the am  D dimer elevated  Checking venous dopplers  Prognosis is guarded at best  Family updated  Patient now DNR    Atrial fibrillation  Patient with Paroxysmal (<7 days) atrial fibrillation which is controlled currently with Amiodarone. Patient is currently in sinus rhythm.[id number] Score: 4.      Detailed conversation in the presence of daughter, patient's decision was to go for AC.   Holding for now given downtrending Hb    9/18 Now with bradycardia  Likely sick sinus syndrome  Not sure she is a good candidate for pacemaker  Dr. Parker is her cardiologist  I see amiodarone but no current rate control medications    - cardiology has been consulted   - not a candidate for PM at present   -  will continue current meds     Pleural effusion on right  Related to peritoneal dialysis,will see if will resolve with hemodialysis    - HD cath placed and has resumed HD     ESRD (end stage renal disease)  Pt w/ diaghragmatic defet, hence PD has been stopped and HD initiated.   Nephrology following the patient      DM (diabetes mellitus)  Blood sugar ok  SSI    Chronic obstructive pulmonary disease  - chronic, controlled with current meds                  Mauro Batuista MD  Pulmonology  [facility]

## 2022-09-20 NOTE — PLAN OF CARE
Problem: Communication Impairment (Mechanical Ventilation, Invasive)  Goal: Effective Communication  Outcome: Ongoing, Progressing     Problem: Device-Related Complication Risk (Mechanical Ventilation, Invasive)  Goal: Optimal Device Function  Outcome: Ongoing, Progressing     Problem: Inability to Wean (Mechanical Ventilation, Invasive)  Goal: Mechanical Ventilation Liberation  Outcome: Ongoing, Progressing     Problem: Ventilator-Induced Lung Injury (Mechanical Ventilation, Invasive)  Goal: Absence of Ventilator-Induced Lung Injury  Outcome: Ongoing, Progressing

## 2022-09-20 NOTE — PLAN OF CARE
Problem: Infection  Goal: Absence of Infection Signs and Symptoms  Outcome: Met     Problem: Adult Inpatient Plan of Care  Goal: Plan of Care Review  Outcome: Met  Goal: Patient-Specific Goal (Individualized)  Outcome: Met  Goal: Absence of Hospital-Acquired Illness or Injury  Outcome: Met  Goal: Optimal Comfort and Wellbeing  Outcome: Met  Goal: Readiness for Transition of Care  Outcome: Met     Problem: Diabetes Comorbidity  Goal: Blood Glucose Level Within Targeted Range  Outcome: Met     Problem: Fall Injury Risk  Goal: Absence of Fall and Fall-Related Injury  Outcome: Met     Problem: Skin Injury Risk Increased  Goal: Skin Health and Integrity  Outcome: Met     Problem: Breathing Pattern Ineffective  Goal: Effective Breathing Pattern  Outcome: Met     Problem: Gas Exchange Impaired  Goal: Optimal Gas Exchange  Outcome: Met     Problem: Device-Related Complication Risk (Hemodialysis)  Goal: Safe, Effective Therapy Delivery  Outcome: Met     Problem: Hemodynamic Instability (Hemodialysis)  Goal: Effective Tissue Perfusion  Outcome: Met     Problem: Infection (Hemodialysis)  Goal: Absence of Infection Signs and Symptoms  Outcome: Met     Problem: Restraint, Nonbehavioral (Nonviolent)  Goal: Absence of Harm or Injury  Outcome: Met     Problem: Communication Impairment (Mechanical Ventilation, Invasive)  Goal: Effective Communication  Outcome: Met     Problem: Device-Related Complication Risk (Mechanical Ventilation, Invasive)  Goal: Optimal Device Function  Outcome: Met     Problem: Inability to Wean (Mechanical Ventilation, Invasive)  Goal: Mechanical Ventilation Liberation  Outcome: Met     Problem: Nutrition Impairment (Mechanical Ventilation, Invasive)  Goal: Optimal Nutrition Delivery  Outcome: Met     Problem: Skin and Tissue Injury (Mechanical Ventilation, Invasive)  Goal: Absence of Device-Related Skin and Tissue Injury  Outcome: Met     Problem: Ventilator-Induced Lung Injury (Mechanical Ventilation,  Invasive)  Goal: Absence of Ventilator-Induced Lung Injury  Outcome: Met     Problem: Communication Impairment (Artificial Airway)  Goal: Effective Communication  Outcome: Met     Problem: Device-Related Complication Risk (Artificial Airway)  Goal: Optimal Device Function  Outcome: Met     Problem: Skin and Tissue Injury (Artificial Airway)  Goal: Absence of Device-Related Skin or Tissue Injury  Outcome: Met     Problem: Noninvasive Ventilation Acute  Goal: Effective Unassisted Ventilation and Oxygenation  Outcome: Met

## 2022-09-20 NOTE — ASSESSMENT & PLAN NOTE
Patient with Paroxysmal (<7 days) atrial fibrillation which is controlled currently with Amiodarone. Patient is currently in sinus rhythm.[id number] Score: 4.      Detailed conversation in the presence of daughter, patient's decision was to go for AC.   Holding for now given downtrending Hb    9/18 Now with bradycardia  Likely sick sinus syndrome  Not sure she is a good candidate for pacemaker  Dr. Parker is her cardiologist  I see amiodarone but no current rate control medications    - cardiology has been consulted   - not a candidate for PM at present   - will continue current meds

## 2022-09-20 NOTE — NURSING
"0828 Asystole noted on monitor. Pusle palpable but weak. Family bedside x's 2 (Catherine & 1 other)    0830 Call placed to Jacky & Jerri (no answer from either)    0831 Family on phone with Tigist (Jacky's wife) per Tgiist, Jacky is enroute to hospital    0835 Return call received from Jacky, update given that patient's HR has dropped significantly. Before able to give additional information, Jacky states "I'm on my way" and I was unable to given additional information prior to the call disconnecting.    0835 Return call received from Jerri, update given per Charge RN, Gila. Amberg is also enroute to hospital.    0845 Palpable pulse with HR of about 13 noted. Nai RAYO has spoken with family bedside. Pt is still a DNR at this time. Family verbalizes understanding.    0917 No palpable pulse noted. Nai RAYO bedside at this time. TOD called. Family bedside x's 3 (Jerri, Catherine, & 1 other member)    "

## 2022-09-20 NOTE — SUBJECTIVE & OBJECTIVE
Interval History:  Patient without complaints    Objective:     Vital Signs (Most Recent):  Temp: 98.8 °F (37.1 °C) (09/20/22 0600)  Pulse: 76 (09/20/22 0600)  Resp: 14 (09/20/22 0600)  BP: (!) 113/41 (09/20/22 0600)  SpO2: 100 % (09/20/22 0600)   Vital Signs (24h Range):  Temp:  [95.9 °F (35.5 °C)-100.2 °F (37.9 °C)] 98.8 °F (37.1 °C)  Pulse:  [] 76  Resp:  [0-27] 14  SpO2:  [88 %-100 %] 100 %  BP: ()/() 113/41     Weight: 78.1 kg (172 lb 2.9 oz)  Body mass index is 28.65 kg/m².      Intake/Output Summary (Last 24 hours) at 9/20/2022 0653  Last data filed at 9/20/2022 0600  Gross per 24 hour   Intake 3555.45 ml   Output 1500 ml   Net 2055.45 ml       Physical Exam  Vitals reviewed.   Constitutional:       Appearance: Normal appearance.      Interventions: She is not intubated.  HENT:      Head: Normocephalic and atraumatic.      Nose: Nose normal.      Mouth/Throat:      Mouth: Mucous membranes are dry.      Pharynx: Oropharynx is clear.   Eyes:      Extraocular Movements: Extraocular movements intact.      Conjunctiva/sclera: Conjunctivae normal.      Pupils: Pupils are equal, round, and reactive to light.   Cardiovascular:      Rate and Rhythm: Normal rate.      Heart sounds: Normal heart sounds. No murmur heard.  Pulmonary:      Effort: Pulmonary effort is normal. She is not intubated.      Breath sounds: Normal breath sounds.   Abdominal:      General: Abdomen is flat. Bowel sounds are normal.      Palpations: Abdomen is soft.   Musculoskeletal:         General: Normal range of motion.      Cervical back: Normal range of motion and neck supple.      Right lower leg: No edema.      Left lower leg: No edema.   Skin:     General: Skin is warm and dry.      Capillary Refill: Capillary refill takes less than 2 seconds.   Neurological:      General: No focal deficit present.      Mental Status: She is alert and oriented to person, place, and time.   Psychiatric:         Mood and Affect: Mood normal.          Behavior: Behavior normal.       Vents:  Vent Mode: A/C (09/20/22 0419)  Ventilator Initiated: Yes (09/18/22 1135)  Set Rate: 20 BPM (09/20/22 0419)  Vt Set: 500 mL (09/20/22 0419)  Pressure Support: 10 cmH20 (09/20/22 0419)  PEEP/CPAP: 5 cmH20 (09/20/22 0419)  Oxygen Concentration (%): 50 (09/20/22 0000)  Peak Airway Pressure: 25 cmH2O (09/20/22 0419)  Total Ve: 10.3 mL (09/20/22 0419)  F/VT Ratio<105 (RSBI): (!) 34.78 (09/20/22 0000)    Lines/Drains/Airways       Peripherally Inserted Central Catheter Line  Duration             PICC Double Lumen 08/31/22 0700 left basilic 19 days              Central Venous Catheter Line  Duration                  Hemodialysis Catheter 09/02/22 0815 right subclavian 17 days              Drain  Duration                  Chest Tube 08/29/22 1300 1 Right Midaxillary 21 days              Airway  Duration                  Airway - Non-Surgical 09/18/22 1118 Endotracheal Tube 1 day                    Significant Labs:    CBC/Anemia Profile:  Recent Labs   Lab 09/18/22  1201   WBC 10.38   HGB 8.7*   HCT 28.8*      .3*   RDW 14.7*        Chemistries:  Recent Labs   Lab 09/18/22  1201 09/19/22  0459   * 126*   K 5.1 4.8   CL 96* 92*   CO2 19* 23   BUN 25* 33*   CREATININE 6.18* 6.19*   CALCIUM 8.7 8.1*   ALBUMIN 2.2* 2.3*   PROT 5.7*  --    BILITOT 0.6  --    ALKPHOS 81  --    ALT 23  --    AST 43*  --    PHOS  --  2.0*       Recent Lab Results  (Last 5 results in the past 24 hours)        09/20/22  0449   09/19/22 2035   09/19/22  1536   09/19/22  1136   09/19/22  0740        POC Glucose 248   233   219   191   333                              Significant Imaging:  I have reviewed all pertinent imaging results/findings within the past 24 hours.

## 2022-09-24 LAB
BACTERIA BLD CULT: NORMAL
BACTERIA BLD CULT: NORMAL

## (undated) DEVICE — SYR ONLY LUER LOCK 20CC

## (undated) DEVICE — SPONGE GAUZE 4X4 12 PLY STL AMD 10/TRAY

## (undated) DEVICE — SUTURE MONOCRYL 4-0 27IN

## (undated) DEVICE — KIT CATH HEMODIALYSIS GLIDEPATH 19CM STR.

## (undated) DEVICE — Device

## (undated) DEVICE — SYRINGE 10-12CC LURE -LOK TIP

## (undated) DEVICE — STRIP MEDI WND CLSR 1/2X4IN

## (undated) DEVICE — MONOPOLAR FOOT SWITCHING LAP CHORD

## (undated) DEVICE — ADHESIVE LIQUID MASTISOL 2/3CC

## (undated) DEVICE — DRESSING IV TEGADERM 10X12CM

## (undated) DEVICE — SYR 10CC LUER LOCK

## (undated) DEVICE — GLOVE PROTEXIS PI SYN SURG 6.5

## (undated) DEVICE — NDL PERC ENTRY BSDN 18-7.0

## (undated) DEVICE — TROCAR SLEEVE Z-THREAD 5MM X 100MM

## (undated) DEVICE — TRAY FOLEY CATH 16FR SILICONE LUBRI-SIL W/DRAINAGE BAG

## (undated) DEVICE — GLOVE SURGICAL PROTEXIS PI SIZE 6.5

## (undated) DEVICE — CDS LAP CHOLE

## (undated) DEVICE — TROCAR BLADELESS Z-THREAD 5MM X 100MM

## (undated) DEVICE — BAG RECTANGLE RBBRBND 30X36IN

## (undated) DEVICE — GLOVE SURGICAL PROTEXIS PI BLUE SIZE 7.0

## (undated) DEVICE — GLOVE SURGICAL PROTEXIS PI SIZE 7

## (undated) DEVICE — SUT 3-0 VICRYL / SH (J416)

## (undated) DEVICE — SUTURE VICRYL 3-0 SH UNDYED 27IN

## (undated) DEVICE — SUT SILK 2.0 BLK 18

## (undated) DEVICE — GOWN SURGICAL SMARTGOWN LEVEL 4 / EXTRA LARGE STERILE

## (undated) DEVICE — BAG-A-JET FLUID DISPENSER SYSTEM

## (undated) DEVICE — WARMER SCOPE DISP

## (undated) DEVICE — SOL IRRIGATION SALINE 0.9% 1000ML BOTTLE

## (undated) DEVICE — SYRINGE 30CC LL

## (undated) DEVICE — APPLICATOR CHLORAPREP ORN 26ML

## (undated) DEVICE — CDS ENT

## (undated) DEVICE — PENCIL HANDSWITCHING ROCKER SW

## (undated) DEVICE — BLADE SURG CARBON STEEL SZ11

## (undated) DEVICE — GLOVE PROTEXIS PI SYN SURG 7

## (undated) DEVICE — SCISSOR INSERT 3/4 IN DISP

## (undated) DEVICE — SET IRRIG CYSTO/BLADDER 78IN

## (undated) DEVICE — APPLICATOR CHLORAPREP HI-LITE TINTED ORANGE 26ML

## (undated) DEVICE — DISK PROTECTIVE BIOPATCH AMD

## (undated) DEVICE — BLADE BOVIE INSULATED COATED 2.75IN

## (undated) DEVICE — STRIP CLOSURE SKIN 1/2 X 4 IN

## (undated) DEVICE — NDL HYPODERMIC SAF 25G 1.5IN

## (undated) DEVICE — DRAPE FLUORO C ARM 36X30IN

## (undated) DEVICE — SYR 30CC LUER LOCK

## (undated) DEVICE — COVER PROBE WITH BAND 6X96IN

## (undated) DEVICE — COVER PROBE 6X96 IN STERILE CS/20

## (undated) DEVICE — DECANTER FLUID TRNSF WHITE 9IN

## (undated) DEVICE — LAPARSCOPIC L-HOOK WIRE

## (undated) DEVICE — GLOVE 7.0 PROTEXIS PI BLUE

## (undated) DEVICE — SUTURE SILK 2-0 FS 18 BLACK

## (undated) DEVICE — BLADE SURG SS SZ 11 STERILE